# Patient Record
Sex: MALE | Race: WHITE | Employment: OTHER | URBAN - METROPOLITAN AREA
[De-identification: names, ages, dates, MRNs, and addresses within clinical notes are randomized per-mention and may not be internally consistent; named-entity substitution may affect disease eponyms.]

---

## 2017-01-03 ENCOUNTER — ANTI-COAG VISIT (OUTPATIENT)
Dept: PHARMACY | Facility: CLINIC | Age: 67
End: 2017-01-03

## 2017-01-03 LAB — INR BLD: 1.9

## 2017-02-02 ENCOUNTER — ANTI-COAG VISIT (OUTPATIENT)
Dept: PHARMACY | Facility: CLINIC | Age: 67
End: 2017-02-02

## 2017-02-02 DIAGNOSIS — I48.20 CHRONIC ATRIAL FIBRILLATION (HCC): ICD-10-CM

## 2017-02-02 LAB — INR BLD: 2.6

## 2017-03-02 ENCOUNTER — ANTI-COAG VISIT (OUTPATIENT)
Dept: PHARMACY | Facility: CLINIC | Age: 67
End: 2017-03-02

## 2017-03-02 DIAGNOSIS — I48.20 CHRONIC ATRIAL FIBRILLATION (HCC): ICD-10-CM

## 2017-03-02 LAB — INR BLD: 2.9

## 2017-03-31 ENCOUNTER — ANTI-COAG VISIT (OUTPATIENT)
Dept: PHARMACY | Facility: CLINIC | Age: 67
End: 2017-03-31

## 2017-03-31 DIAGNOSIS — I48.20 CHRONIC ATRIAL FIBRILLATION (HCC): ICD-10-CM

## 2017-03-31 LAB — INR BLD: 2.8

## 2017-03-31 RX ORDER — TESTOSTERONE GEL, 1% 10 MG/G
GEL TRANSDERMAL DAILY
Status: ON HOLD | COMMUNITY
End: 2017-07-20 | Stop reason: HOSPADM

## 2017-05-16 ENCOUNTER — ANTI-COAG VISIT (OUTPATIENT)
Dept: PHARMACY | Facility: CLINIC | Age: 67
End: 2017-05-16

## 2017-05-16 DIAGNOSIS — I48.20 CHRONIC ATRIAL FIBRILLATION (HCC): ICD-10-CM

## 2017-05-16 LAB — INR BLD: 3.3

## 2017-06-01 ENCOUNTER — ANTI-COAG VISIT (OUTPATIENT)
Dept: PHARMACY | Facility: CLINIC | Age: 67
End: 2017-06-01

## 2017-06-01 LAB — INR BLD: 2.3

## 2017-07-04 PROBLEM — K92.2 UPPER GI BLEED: Status: ACTIVE | Noted: 2017-07-04

## 2017-07-04 PROBLEM — I48.91 ATRIAL FIBRILLATION WITH RVR (HCC): Status: ACTIVE | Noted: 2017-07-04

## 2017-07-04 PROBLEM — R42 ORTHOSTATIC DIZZINESS: Status: ACTIVE | Noted: 2017-07-04

## 2017-07-04 PROBLEM — I95.9 HYPOTENSION: Status: ACTIVE | Noted: 2017-07-04

## 2017-07-04 PROBLEM — E87.4 METABOLIC ACIDOSIS WITH RESPIRATORY ACIDOSIS: Status: ACTIVE | Noted: 2017-07-04

## 2017-07-04 PROBLEM — E87.5 HYPERKALEMIA: Status: ACTIVE | Noted: 2017-07-04

## 2017-07-04 PROBLEM — E87.20 LACTIC ACIDOSIS: Status: ACTIVE | Noted: 2017-07-04

## 2017-07-04 PROBLEM — E66.9 OBESITY (BMI 30-39.9): Chronic | Status: ACTIVE | Noted: 2017-07-04

## 2017-07-04 PROBLEM — T81.19XA POSTOPERATIVE HEMORRHAGIC SHOCK: Status: ACTIVE | Noted: 2017-07-04

## 2017-07-04 PROBLEM — R65.10 SIRS (SYSTEMIC INFLAMMATORY RESPONSE SYNDROME) (HCC): Status: ACTIVE | Noted: 2017-07-04

## 2017-07-04 PROBLEM — E11.10 DKA (DIABETIC KETOACIDOSIS) (HCC): Status: ACTIVE | Noted: 2017-07-04

## 2017-07-04 PROBLEM — D62 ACUTE BLOOD LOSS ANEMIA: Status: ACTIVE | Noted: 2017-07-04

## 2017-07-04 PROBLEM — E86.9 VOLUME DEPLETION: Status: ACTIVE | Noted: 2017-07-04

## 2017-07-04 PROBLEM — N17.9 AKI (ACUTE KIDNEY INJURY) (HCC): Status: ACTIVE | Noted: 2017-07-04

## 2017-07-04 PROBLEM — Z72.0 TOBACCO ABUSE: Chronic | Status: ACTIVE | Noted: 2017-07-04

## 2017-07-07 PROBLEM — E10.10 DIABETIC KETOACIDOSIS WITHOUT COMA ASSOCIATED WITH TYPE 1 DIABETES MELLITUS (HCC): Status: ACTIVE | Noted: 2017-07-04

## 2017-07-08 PROBLEM — I47.29 NSVT (NONSUSTAINED VENTRICULAR TACHYCARDIA) (HCC): Status: ACTIVE | Noted: 2017-07-08

## 2017-07-30 PROBLEM — D64.9 ANEMIA: Status: ACTIVE | Noted: 2017-07-30

## 2017-07-30 PROBLEM — N18.6 ESRD (END STAGE RENAL DISEASE) (HCC): Status: ACTIVE | Noted: 2017-07-30

## 2017-07-30 PROBLEM — E87.1 HYPONATREMIA: Status: ACTIVE | Noted: 2017-07-30

## 2017-07-30 PROBLEM — N39.0 UTI (URINARY TRACT INFECTION): Status: ACTIVE | Noted: 2017-07-30

## 2017-07-30 PROBLEM — R06.02 SOB (SHORTNESS OF BREATH): Status: ACTIVE | Noted: 2017-07-30

## 2017-07-30 PROBLEM — E11.9 DM (DIABETES MELLITUS) (HCC): Status: ACTIVE | Noted: 2017-07-30

## 2017-08-06 PROBLEM — Z99.2 HEMODIALYSIS PATIENT (HCC): Status: ACTIVE | Noted: 2017-08-06

## 2017-08-06 PROBLEM — G93.41 METABOLIC ENCEPHALOPATHY: Status: ACTIVE | Noted: 2017-08-06

## 2017-08-06 PROBLEM — N30.01 ACUTE CYSTITIS WITH HEMATURIA: Status: ACTIVE | Noted: 2017-08-06

## 2017-08-10 PROBLEM — R31.9 URINARY TRACT INFECTION WITH HEMATURIA: Status: ACTIVE | Noted: 2017-07-30

## 2017-09-28 ENCOUNTER — HOSPITAL ENCOUNTER (OUTPATIENT)
Dept: OTHER | Age: 67
Discharge: HOME | End: 2017-09-28
Attending: INTERNAL MEDICINE | Admitting: INTERNAL MEDICINE

## 2017-09-28 LAB
ANION GAP SERPL CALCULATED.3IONS-SCNC: 13 MMOL/L (ref 3–16)
BASOPHILS ABSOLUTE: 0.1 K/UL (ref 0–0.2)
BASOPHILS RELATIVE PERCENT: 1.3 %
BUN BLDV-MCNC: 15 MG/DL (ref 7–20)
CALCIUM SERPL-MCNC: 9.1 MG/DL (ref 8.3–10.6)
CHLORIDE BLD-SCNC: 105 MMOL/L (ref 99–110)
CO2: 23 MMOL/L (ref 21–32)
CREAT SERPL-MCNC: 1.4 MG/DL (ref 0.8–1.3)
EOSINOPHILS ABSOLUTE: 0.1 K/UL (ref 0–0.6)
EOSINOPHILS RELATIVE PERCENT: 1.2 %
GFR AFRICAN AMERICAN: >60
GFR NON-AFRICAN AMERICAN: 50
GLUCOSE BLD-MCNC: 156 MG/DL (ref 70–99)
HCT VFR BLD CALC: 31 % (ref 40.5–52.5)
HEMOGLOBIN: 9.7 G/DL (ref 13.5–17.5)
LYMPHOCYTES ABSOLUTE: 2.1 K/UL (ref 1–5.1)
LYMPHOCYTES RELATIVE PERCENT: 28.3 %
MCH RBC QN AUTO: 24 PG (ref 26–34)
MCHC RBC AUTO-ENTMCNC: 31.1 G/DL (ref 31–36)
MCV RBC AUTO: 77 FL (ref 80–100)
MONOCYTES ABSOLUTE: 0.6 K/UL (ref 0–1.3)
MONOCYTES RELATIVE PERCENT: 7.7 %
NEUTROPHILS ABSOLUTE: 4.6 K/UL (ref 1.7–7.7)
NEUTROPHILS RELATIVE PERCENT: 61.5 %
PDW BLD-RTO: 17.1 % (ref 12.4–15.4)
PLATELET # BLD: 247 K/UL (ref 135–450)
PMV BLD AUTO: 9.3 FL (ref 5–10.5)
POTASSIUM SERPL-SCNC: 4.1 MMOL/L (ref 3.5–5.1)
RBC # BLD: 4.03 M/UL (ref 4.2–5.9)
SODIUM BLD-SCNC: 141 MMOL/L (ref 136–145)
WBC # BLD: 7.5 K/UL (ref 4–11)

## 2017-10-11 ENCOUNTER — HOSPITAL ENCOUNTER (OUTPATIENT)
Dept: SURGERY | Age: 67
Discharge: HOME | End: 2017-10-11
Attending: OPHTHALMOLOGY | Admitting: OPHTHALMOLOGY

## 2017-10-11 VITALS
HEART RATE: 59 BPM | DIASTOLIC BLOOD PRESSURE: 88 MMHG | RESPIRATION RATE: 16 BRPM | OXYGEN SATURATION: 97 % | SYSTOLIC BLOOD PRESSURE: 152 MMHG

## 2017-10-11 RX ORDER — PROPARACAINE HYDROCHLORIDE 5 MG/ML
1 SOLUTION/ DROPS OPHTHALMIC
Status: COMPLETED | OUTPATIENT
Start: 2017-10-11 | End: 2017-10-11

## 2017-10-11 RX ORDER — PILOCARPINE HYDROCHLORIDE 20 MG/ML
1 SOLUTION/ DROPS OPHTHALMIC
Status: COMPLETED | OUTPATIENT
Start: 2017-10-11 | End: 2017-10-11

## 2017-10-11 RX ORDER — PREDNISOLONE ACETATE 10 MG/ML
1 SUSPENSION/ DROPS OPHTHALMIC
Status: COMPLETED | OUTPATIENT
Start: 2017-10-11 | End: 2017-10-11

## 2017-10-11 RX ADMIN — PREDNISOLONE ACETATE 1 DROP: 10 SUSPENSION/ DROPS OPHTHALMIC at 13:44

## 2017-10-11 RX ADMIN — PILOCARPINE HYDROCHLORIDE 1 DROP: 20 SOLUTION/ DROPS OPHTHALMIC at 12:54

## 2017-10-11 RX ADMIN — PROPARACAINE HYDROCHLORIDE 1 DROP: 5 SOLUTION/ DROPS OPHTHALMIC at 13:44

## 2017-10-11 ASSESSMENT — PAIN - FUNCTIONAL ASSESSMENT: PAIN_FUNCTIONAL_ASSESSMENT: 0-10

## 2017-10-12 NOTE — OP NOTE
OPERATIVE NOTE      Surgery Date:  10/11/2017       Pre-operative Diagnosis:   Primary Open Angle Glaucoma    Procedure:   Selective LASER Trabeculoplasty right eye    Anesthesia:  Topical    Complications:  None    The patient tolerated the procedure well and will follow up as an outpatient in my office as scheduled. Leonor Ozuna M.D.

## 2017-10-25 ENCOUNTER — HOSPITAL ENCOUNTER (OUTPATIENT)
Dept: SURGERY | Age: 67
Discharge: HOME | End: 2017-10-25
Attending: OPHTHALMOLOGY | Admitting: OPHTHALMOLOGY

## 2017-10-25 VITALS
DIASTOLIC BLOOD PRESSURE: 68 MMHG | HEART RATE: 68 BPM | BODY MASS INDEX: 37.19 KG/M2 | SYSTOLIC BLOOD PRESSURE: 152 MMHG | OXYGEN SATURATION: 98 % | WEIGHT: 315 LBS | RESPIRATION RATE: 16 BRPM | HEIGHT: 77 IN

## 2017-10-25 RX ORDER — PREDNISOLONE ACETATE 10 MG/ML
1 SUSPENSION/ DROPS OPHTHALMIC
Status: ACTIVE | OUTPATIENT
Start: 2017-10-25 | End: 2017-10-25

## 2017-10-25 RX ORDER — PROPARACAINE HYDROCHLORIDE 5 MG/ML
1 SOLUTION/ DROPS OPHTHALMIC
Status: COMPLETED | OUTPATIENT
Start: 2017-10-25 | End: 2017-10-25

## 2017-10-25 RX ORDER — PILOCARPINE HYDROCHLORIDE 20 MG/ML
1 SOLUTION/ DROPS OPHTHALMIC
Status: COMPLETED | OUTPATIENT
Start: 2017-10-25 | End: 2017-10-25

## 2017-10-25 RX ADMIN — PILOCARPINE HYDROCHLORIDE 1 DROP: 20 SOLUTION/ DROPS OPHTHALMIC at 12:50

## 2017-10-25 RX ADMIN — PROPARACAINE HYDROCHLORIDE 1 DROP: 5 SOLUTION/ DROPS OPHTHALMIC at 14:14

## 2017-10-25 NOTE — PLAN OF CARE
Patient and family received discharge instruction and tolerated procedure well. All questions answered. ** Prescription drop instruction given. Patient left via self.

## 2017-10-26 NOTE — OP NOTE
OPERATIVE NOTE      Surgery Date:   10/25/2017       Pre-operative Diagnosis:   Primary Open Angle Glaucoma    Procedure:   Selective LASER Trabeculoplasty left eye    Anesthesia:  Topical    Complications:  None    The patient tolerated the procedure well and will follow up as an outpatient in my office as scheduled. Alex Lyman M.D.

## 2017-11-10 ENCOUNTER — HOSPITAL ENCOUNTER (OUTPATIENT)
Dept: OTHER | Age: 67
Discharge: HOME | End: 2017-11-30
Attending: INTERNAL MEDICINE | Admitting: INTERNAL MEDICINE

## 2017-11-12 LAB — URINE CULTURE, ROUTINE: NORMAL

## 2017-12-01 ENCOUNTER — HOSPITAL ENCOUNTER (OUTPATIENT)
Dept: OTHER | Age: 67
Discharge: HOME | End: 2017-12-31
Attending: INTERNAL MEDICINE | Admitting: INTERNAL MEDICINE

## 2018-04-22 ENCOUNTER — HOSPITAL ENCOUNTER (OUTPATIENT)
Dept: OTHER | Age: 68
Discharge: HOME | End: 2018-04-30
Attending: INTERNAL MEDICINE | Admitting: INTERNAL MEDICINE

## 2018-04-22 LAB
BACTERIA: ABNORMAL /HPF
BILIRUBIN URINE: NEGATIVE
BLOOD, URINE: NEGATIVE
CLARITY: CLEAR
COLOR: YELLOW
EPITHELIAL CELLS, UA: ABNORMAL /HPF
GLUCOSE URINE: NEGATIVE MG/DL
KETONES, URINE: NEGATIVE MG/DL
LEUKOCYTE ESTERASE, URINE: NEGATIVE
MICROSCOPIC EXAMINATION: YES
NITRITE, URINE: NEGATIVE
PH UA: 7.5
PROTEIN UA: ABNORMAL MG/DL
RBC UA: ABNORMAL /HPF (ref 0–2)
SPECIFIC GRAVITY UA: 1.01
URINE TYPE: ABNORMAL
UROBILINOGEN, URINE: 1 E.U./DL
WBC UA: ABNORMAL /HPF (ref 0–5)

## 2018-04-24 LAB — URINE CULTURE, ROUTINE: NORMAL

## 2018-05-01 ENCOUNTER — HOSPITAL ENCOUNTER (OUTPATIENT)
Dept: OTHER | Age: 68
Discharge: HOME | End: 2018-05-31
Attending: INTERNAL MEDICINE | Admitting: INTERNAL MEDICINE

## 2018-09-12 ENCOUNTER — HOSPITAL ENCOUNTER (OUTPATIENT)
Dept: OPERATING ROOM | Age: 68
Setting detail: OUTPATIENT SURGERY
Discharge: HOME | End: 2018-09-12
Admitting: INTERNAL MEDICINE
Payer: MEDICARE

## 2018-09-12 VITALS
HEART RATE: 61 BPM | RESPIRATION RATE: 20 BRPM | DIASTOLIC BLOOD PRESSURE: 87 MMHG | OXYGEN SATURATION: 100 % | SYSTOLIC BLOOD PRESSURE: 176 MMHG

## 2018-09-12 PROCEDURE — 6370000000 HC RX 637 (ALT 250 FOR IP): Performed by: OPHTHALMOLOGY

## 2018-09-12 PROCEDURE — 65855 TRABECULOPLASTY LASER SURG: CPT

## 2018-09-12 RX ORDER — PREDNISOLONE ACETATE 10 MG/ML
1 SUSPENSION/ DROPS OPHTHALMIC
Status: COMPLETED | OUTPATIENT
Start: 2018-09-12 | End: 2018-09-12

## 2018-09-12 RX ORDER — ASPIRIN/CALCIUM/MAG/ALUMINUM 325 MG
TABLET ORAL DAILY
Status: ON HOLD | COMMUNITY
End: 2018-12-22 | Stop reason: HOSPADM

## 2018-09-12 RX ORDER — PROPARACAINE HYDROCHLORIDE 5 MG/ML
1 SOLUTION/ DROPS OPHTHALMIC
Status: COMPLETED | OUTPATIENT
Start: 2018-09-12 | End: 2018-09-12

## 2018-09-12 RX ORDER — PILOCARPINE HYDROCHLORIDE 20 MG/ML
1 SOLUTION/ DROPS OPHTHALMIC
Status: COMPLETED | OUTPATIENT
Start: 2018-09-12 | End: 2018-09-12

## 2018-09-12 RX ADMIN — PROPARACAINE HYDROCHLORIDE 1 DROP: 5 SOLUTION/ DROPS OPHTHALMIC at 14:18

## 2018-09-12 RX ADMIN — PILOCARPINE HYDROCHLORIDE 1 DROP: 20 SOLUTION/ DROPS OPHTHALMIC at 13:38

## 2018-09-12 RX ADMIN — PREDNISOLONE ACETATE 1 DROP: 10 SUSPENSION/ DROPS OPHTHALMIC at 14:18

## 2018-09-12 RX ADMIN — APRACLONIDINE HYDROCHLORIDE 1 DROP: 10 SOLUTION/ DROPS OPHTHALMIC at 13:39

## 2018-09-12 ASSESSMENT — PAIN - FUNCTIONAL ASSESSMENT: PAIN_FUNCTIONAL_ASSESSMENT: 0-10

## 2018-09-18 NOTE — OP NOTE
OPERATIVE NOTE      Surgery Date:  09/12/2018       Pre-operative Diagnosis:   Primary Open Angle Glaucoma    Procedure:   Selective LASER Trabeculoplasty right eye    Anesthesia:  Topical    Complications:  None    The patient tolerated the procedure well and will follow up as an outpatient in my office as scheduled. Patrica Castellon M.D.

## 2018-09-26 ENCOUNTER — HOSPITAL ENCOUNTER (OUTPATIENT)
Dept: OPERATING ROOM | Age: 68
Setting detail: OUTPATIENT SURGERY
Discharge: HOME | End: 2018-09-26
Payer: MEDICARE

## 2018-09-26 VITALS — SYSTOLIC BLOOD PRESSURE: 159 MMHG | HEART RATE: 51 BPM | DIASTOLIC BLOOD PRESSURE: 78 MMHG

## 2018-09-26 PROCEDURE — 65855 TRABECULOPLASTY LASER SURG: CPT

## 2018-09-26 PROCEDURE — 6370000000 HC RX 637 (ALT 250 FOR IP): Performed by: OPHTHALMOLOGY

## 2018-09-26 RX ORDER — PROPARACAINE HYDROCHLORIDE 5 MG/ML
1 SOLUTION/ DROPS OPHTHALMIC
Status: COMPLETED | OUTPATIENT
Start: 2018-09-26 | End: 2018-09-26

## 2018-09-26 RX ORDER — PILOCARPINE HYDROCHLORIDE 20 MG/ML
1 SOLUTION/ DROPS OPHTHALMIC
Status: COMPLETED | OUTPATIENT
Start: 2018-09-26 | End: 2018-09-26

## 2018-09-26 RX ORDER — PREDNISOLONE ACETATE 10 MG/ML
1 SUSPENSION/ DROPS OPHTHALMIC
Status: COMPLETED | OUTPATIENT
Start: 2018-09-26 | End: 2018-09-26

## 2018-09-26 RX ADMIN — PROPARACAINE HYDROCHLORIDE 1 DROP: 5 SOLUTION/ DROPS OPHTHALMIC at 14:18

## 2018-09-26 RX ADMIN — PILOCARPINE HYDROCHLORIDE 1 DROP: 20 SOLUTION/ DROPS OPHTHALMIC at 13:37

## 2018-09-26 RX ADMIN — APRACLONIDINE HYDROCHLORIDE 1 DROP: 10 SOLUTION/ DROPS OPHTHALMIC at 13:37

## 2018-09-26 RX ADMIN — PREDNISOLONE ACETATE 1 DROP: 10 SUSPENSION/ DROPS OPHTHALMIC at 14:24

## 2018-09-27 NOTE — OP NOTE
OPERATIVE NOTE      Surgery Date:   09/26/2018       Pre-operative Diagnosis:   Primary Open Angle Glaucoma    Procedure:   Selective LASER Trabeculoplasty left eye    Anesthesia:  Topical    Complications:  None    The patient tolerated the procedure well and will follow up as an outpatient in my office as scheduled. Nettie Palma M.D.

## 2018-12-05 ENCOUNTER — APPOINTMENT (OUTPATIENT)
Dept: GENERAL RADIOLOGY | Age: 68
End: 2018-12-05
Payer: MEDICARE

## 2018-12-05 ENCOUNTER — HOSPITAL ENCOUNTER (EMERGENCY)
Age: 68
Discharge: HOME | End: 2018-12-05
Attending: EMERGENCY MEDICINE
Payer: MEDICARE

## 2018-12-05 VITALS
BODY MASS INDEX: 28.46 KG/M2 | SYSTOLIC BLOOD PRESSURE: 187 MMHG | OXYGEN SATURATION: 99 % | RESPIRATION RATE: 18 BRPM | DIASTOLIC BLOOD PRESSURE: 89 MMHG | HEART RATE: 68 BPM | TEMPERATURE: 98.6 F | WEIGHT: 240 LBS

## 2018-12-05 DIAGNOSIS — R55 SYNCOPE AND COLLAPSE: Primary | ICD-10-CM

## 2018-12-05 DIAGNOSIS — E86.0 DEHYDRATION: ICD-10-CM

## 2018-12-05 LAB
A/G RATIO: 1.2 (ref 1.1–2.2)
ALBUMIN SERPL-MCNC: 3.9 G/DL (ref 3.4–5)
ALP BLD-CCNC: 38 U/L (ref 40–129)
ALT SERPL-CCNC: 14 U/L (ref 10–40)
ANION GAP SERPL CALCULATED.3IONS-SCNC: 14 MMOL/L (ref 3–16)
AST SERPL-CCNC: 19 U/L (ref 15–37)
BASOPHILS ABSOLUTE: 0.1 K/UL (ref 0–0.2)
BASOPHILS RELATIVE PERCENT: 1.1 %
BILIRUB SERPL-MCNC: 0.5 MG/DL (ref 0–1)
BUN BLDV-MCNC: 37 MG/DL (ref 7–20)
CALCIUM SERPL-MCNC: 9.8 MG/DL (ref 8.3–10.6)
CHLORIDE BLD-SCNC: 102 MMOL/L (ref 99–110)
CO2: 26 MMOL/L (ref 21–32)
CREAT SERPL-MCNC: 1.5 MG/DL (ref 0.8–1.3)
EKG ATRIAL RATE: 54 BPM
EKG DIAGNOSIS: NORMAL
EKG Q-T INTERVAL: 434 MS
EKG QRS DURATION: 104 MS
EKG QTC CALCULATION (BAZETT): 403 MS
EKG R AXIS: -67 DEGREES
EKG T AXIS: 50 DEGREES
EKG VENTRICULAR RATE: 52 BPM
EOSINOPHILS ABSOLUTE: 0.1 K/UL (ref 0–0.6)
EOSINOPHILS RELATIVE PERCENT: 2.2 %
GFR AFRICAN AMERICAN: 56
GFR NON-AFRICAN AMERICAN: 46
GLOBULIN: 3.3 G/DL
GLUCOSE BLD-MCNC: 205 MG/DL (ref 70–99)
HCT VFR BLD CALC: 40 % (ref 40.5–52.5)
HEMOGLOBIN: 13.2 G/DL (ref 13.5–17.5)
LYMPHOCYTES ABSOLUTE: 1.7 K/UL (ref 1–5.1)
LYMPHOCYTES RELATIVE PERCENT: 24.7 %
MCH RBC QN AUTO: 26.8 PG (ref 26–34)
MCHC RBC AUTO-ENTMCNC: 32.9 G/DL (ref 31–36)
MCV RBC AUTO: 81.5 FL (ref 80–100)
MONOCYTES ABSOLUTE: 0.4 K/UL (ref 0–1.3)
MONOCYTES RELATIVE PERCENT: 6.5 %
NEUTROPHILS ABSOLUTE: 4.5 K/UL (ref 1.7–7.7)
NEUTROPHILS RELATIVE PERCENT: 65.5 %
PDW BLD-RTO: 15.9 % (ref 12.4–15.4)
PLATELET # BLD: 195 K/UL (ref 135–450)
PMV BLD AUTO: 8.6 FL (ref 5–10.5)
POTASSIUM SERPL-SCNC: 4 MMOL/L (ref 3.5–5.1)
RBC # BLD: 4.91 M/UL (ref 4.2–5.9)
SODIUM BLD-SCNC: 142 MMOL/L (ref 136–145)
TOTAL PROTEIN: 7.2 G/DL (ref 6.4–8.2)
TROPONIN: <0.01 NG/ML
WBC # BLD: 6.8 K/UL (ref 4–11)

## 2018-12-05 PROCEDURE — 93005 ELECTROCARDIOGRAM TRACING: CPT | Performed by: EMERGENCY MEDICINE

## 2018-12-05 PROCEDURE — 93010 ELECTROCARDIOGRAM REPORT: CPT | Performed by: INTERNAL MEDICINE

## 2018-12-05 PROCEDURE — 85025 COMPLETE CBC W/AUTO DIFF WBC: CPT

## 2018-12-05 PROCEDURE — 96360 HYDRATION IV INFUSION INIT: CPT

## 2018-12-05 PROCEDURE — 84484 ASSAY OF TROPONIN QUANT: CPT

## 2018-12-05 PROCEDURE — 71046 X-RAY EXAM CHEST 2 VIEWS: CPT

## 2018-12-05 PROCEDURE — 2580000003 HC RX 258: Performed by: NURSE PRACTITIONER

## 2018-12-05 PROCEDURE — 80053 COMPREHEN METABOLIC PANEL: CPT

## 2018-12-05 PROCEDURE — 99284 EMERGENCY DEPT VISIT MOD MDM: CPT

## 2018-12-05 RX ORDER — INSULIN GLARGINE 100 [IU]/ML
INJECTION, SOLUTION SUBCUTANEOUS NIGHTLY
COMMUNITY
End: 2019-03-22

## 2018-12-05 RX ORDER — MAGNESIUM OXIDE 400 MG/1
400 TABLET ORAL PRN
Status: ON HOLD | COMMUNITY
End: 2019-06-24

## 2018-12-05 RX ORDER — CITALOPRAM 20 MG/1
20 TABLET ORAL
COMMUNITY
Start: 2018-08-31 | End: 2019-03-22

## 2018-12-05 RX ORDER — LANOLIN ALCOHOL/MO/W.PET/CERES
325 CREAM (GRAM) TOPICAL
COMMUNITY
End: 2019-03-22

## 2018-12-05 RX ORDER — POTASSIUM CHLORIDE 1.5 G/1.77G
20 POWDER, FOR SOLUTION ORAL 2 TIMES DAILY
Status: ON HOLD | COMMUNITY
End: 2018-12-22 | Stop reason: HOSPADM

## 2018-12-05 RX ORDER — LOSARTAN POTASSIUM 50 MG/1
100 TABLET ORAL DAILY
Status: ON HOLD | COMMUNITY
End: 2019-06-24

## 2018-12-05 RX ORDER — 0.9 % SODIUM CHLORIDE 0.9 %
1000 INTRAVENOUS SOLUTION INTRAVENOUS ONCE
Status: COMPLETED | OUTPATIENT
Start: 2018-12-05 | End: 2018-12-05

## 2018-12-05 RX ORDER — QUETIAPINE FUMARATE 25 MG/1
50 TABLET, FILM COATED ORAL 2 TIMES DAILY
Status: ON HOLD | COMMUNITY
End: 2019-06-24

## 2018-12-05 RX ORDER — ATORVASTATIN CALCIUM 40 MG/1
40 TABLET, FILM COATED ORAL DAILY
COMMUNITY

## 2018-12-05 RX ORDER — TORSEMIDE 20 MG/1
20 TABLET ORAL DAILY
Status: ON HOLD | COMMUNITY
End: 2018-12-22 | Stop reason: HOSPADM

## 2018-12-05 RX ADMIN — SODIUM CHLORIDE 1000 ML: 9 INJECTION, SOLUTION INTRAVENOUS at 12:22

## 2018-12-05 NOTE — ED PROVIDER NOTES
 Traumatic hemorrhagic shock ([facility] [address]      Past Surgical History:   Procedure Laterality Date    KNEE SURGERY      OTHER SURGICAL HISTORY  10/11/13     CYSTOSCOPY, TRANSURETHRAL RESECTION OF PROSTATE WITH OLYMPUS    PROSTATE BIOPSY      UPPER GASTROINTESTINAL ENDOSCOPY  7/13 17     Family History   Problem Relation Age of Onset    Early Death Mother         age 54    Other Mother         head aneursym    Heart Attack Father     Diabetes Brother     Heart Disease Brother      Social History     Social History    Marital status:      Spouse name: N/A    Number of children: N/A    Years of education: N/A     Occupational History    Not on file. Social History Main Topics    Smoking status: Former Smoker     Packs/day: 0.50     Years: 20.00     Types: Cigarettes     Quit date: 01/1997    Smokeless tobacco: Not on file      Comment: smokes occ cigar, socially    Alcohol use Yes      Comment: occasionally    Drug use: No    Sexual activity: Not on file     Other Topics Concern    Not on file     Social History Narrative    No narrative on file     No current facility-administered medications for this encounter.       Current Outpatient Prescriptions   Medication Sig Dispense Refill    Aspirin Buf,[id number], (BUFFERED ASPIRIN) 325 MG TABS Take by mouth daily      metFORMIN (GLUCOPHAGE) 500 MG tablet Take 500 mg by mouth 2 times daily (with meals)      carvedilol (COREG) 6.25 MG tablet Take 6.25 mg by mouth 2 times daily      amLODIPine (NORVASC) 5 MG tablet Take 5 mg by mouth daily      clopidogrel (PLAVIX) 75 MG tablet Take 75 mg by mouth daily      digoxin (DIGOX) 125 MCG tablet Take 125 mcg by mouth daily      pantoprazole (PROTONIX) 40 MG tablet Take 40 mg by mouth daily      folic acid (FOLVITE) 1 MG tablet Take 1 mg by mouth daily      acetaminophen (TYLENOL) 325 MG tablet Take 2 tablets by mouth every 6 hours as needed for Pain or Fever 120 tablet 3    fenofibrate 12/5/2018 10:52 am COMPARISON: August 5, 2017 HISTORY: ORDERING SYSTEM PROVIDED HISTORY: syncope TECHNOLOGIST PROVIDED HISTORY: Reason for exam:->syncope Ordering Physician Provided Reason for Exam: syncope Acuity: Acute Type of Exam: Initial Acute syncopal episode. Initial encounter. FINDINGS: Cardiac silhouette is mildly enlarged. Closure device overlies the heart. Lungs are clear. No focal consolidation. No pleural effusion or pneumothorax. No evidence of pulmonary edema. 1. Stable mild enlargement of the cardiac silhouette. No superimposed acute pulmonary abnormality. ED COURSE   I have evaluated this patient in collaboration with Dr Andrew Sullivan.      Vital signs stable. EKG performed and interpreted by Dr. Andrew Sullivan. Patient did not require pain medication while in emergency department. Radiological imaging of the chest revealed stable mild enlargement of the cardiac silhouettesuperimposed acute pulmonary abnormalities noted per radiologist. CBC revealed no leukocytosis. CMP revealed BUN 37, creatinine 1.5, GFR 46 which appears to be baseline for patient in no acute abnormalities based on previous laboratory studies. Troponin negative at <0.01. Patient was hypertensive upon arrival which improved throughout ED course and treatment. Orthostatic vital signs were performed patient denies any dizziness or lightheadedness abnormalities noted and readings. Patient will be given 1 L normal saline bolus and reassessed. Patient was reassessed after IV fluids and continues to deny any dizziness, lightheadedness, chest pain or shortness of breath. Vital signs remained stable. All ED findings were discussed in detail with the ER attending Dr. Andrew Sullivan prior to disposition. A discussion was had with  Maciej Catalanmarito regarding ED findings. All questions were answered. Patient will follow up with  PCP in 1-2 days for further evaluation/treatment. Patient will return to ED for new/worsening symptoms.   She comfortable upon 03-JUL-2017)Anterior infarct (cited on or before 03-JUL-2017)Abnormal ECGWhen compared with ECG of 06-AUG-2017 07:11,No significant change was foundConfirmed by [facility] JUSTYN FARRAR, Marielos Crespo ([id number]) on 12/5/2018 12:39:17 PM       I estimate there is LOW risk for ACUTE CORONARY SYNDROME, INTRACRANIAL HEMORRHAGE, MALIGNANT DYSRHYTHMIA or HYPERTENSION, PULMONARY EMBOLISM, SEPSIS, SUBARACHNOID HEMORRHAGE, SUBDURAL HEMATOMA, STROKE, or THORACIC AORTIC DISSECTION, thus I consider the discharge disposition reasonable. Agustina Mccarthy and I have discussed the diagnosis and risks, and we agree with discharging home to follow-up with their primary doctor. We also discussed returning to the Emergency Department immediately if new or worsening symptoms occur. We have discussed the symptoms which are most concerning (e.g., bloody sputum, fever, worsening pain or shortness of breath, vomiting, weakness) that necessitate immediate return. Final Impression    1. Syncope and collapse    2. Dehydration        Blood pressure (!) 187/89, pulse 68, temperature 98.6 °F (37 °C), temperature source Oral, resp. rate 18, weight 240 lb (108.9 kg), SpO2 99 %. DISPOSITION  Patient was discharged to home in good condition.           Kingsley Romberg, JAMEY - KALI  12/05/18 9444

## 2018-12-20 ENCOUNTER — APPOINTMENT (OUTPATIENT)
Dept: GENERAL RADIOLOGY | Age: 68
DRG: 071 | End: 2018-12-20
Payer: MEDICARE

## 2018-12-20 ENCOUNTER — HOSPITAL ENCOUNTER (INPATIENT)
Age: 68
LOS: 1 days | Discharge: HOME | DRG: 071 | End: 2018-12-22
Attending: EMERGENCY MEDICINE | Admitting: INTERNAL MEDICINE
Payer: MEDICARE

## 2018-12-20 ENCOUNTER — APPOINTMENT (OUTPATIENT)
Dept: CT IMAGING | Age: 68
DRG: 071 | End: 2018-12-20
Payer: MEDICARE

## 2018-12-20 DIAGNOSIS — D64.9 CHRONIC ANEMIA: ICD-10-CM

## 2018-12-20 DIAGNOSIS — N18.9 CHRONIC KIDNEY DISEASE, UNSPECIFIED CKD STAGE: ICD-10-CM

## 2018-12-20 DIAGNOSIS — R41.82 ALTERED MENTAL STATUS, UNSPECIFIED ALTERED MENTAL STATUS TYPE: Primary | ICD-10-CM

## 2018-12-20 DIAGNOSIS — E83.42 HYPOMAGNESEMIA: ICD-10-CM

## 2018-12-20 LAB
A/G RATIO: 1.4 (ref 1.1–2.2)
ALBUMIN SERPL-MCNC: 3.8 G/DL (ref 3.4–5)
ALP BLD-CCNC: 36 U/L (ref 40–129)
ALT SERPL-CCNC: 12 U/L (ref 10–40)
AMPHETAMINE SCREEN, URINE: NORMAL
ANION GAP SERPL CALCULATED.3IONS-SCNC: 13 MMOL/L (ref 3–16)
APTT: 32.5 SEC (ref 26–36)
AST SERPL-CCNC: 17 U/L (ref 15–37)
BARBITURATE SCREEN URINE: NORMAL
BASOPHILS ABSOLUTE: 0 K/UL (ref 0–0.2)
BASOPHILS RELATIVE PERCENT: 0.7 %
BENZODIAZEPINE SCREEN, URINE: NORMAL
BILIRUB SERPL-MCNC: 0.4 MG/DL (ref 0–1)
BILIRUBIN URINE: NEGATIVE
BLOOD, URINE: NEGATIVE
BUN BLDV-MCNC: 28 MG/DL (ref 7–20)
CALCIUM SERPL-MCNC: 9.4 MG/DL (ref 8.3–10.6)
CANNABINOID SCREEN URINE: NORMAL
CHLORIDE BLD-SCNC: 103 MMOL/L (ref 99–110)
CLARITY: CLEAR
CO2: 24 MMOL/L (ref 21–32)
COCAINE METABOLITE SCREEN URINE: NORMAL
COLOR: YELLOW
CREAT SERPL-MCNC: 1.4 MG/DL (ref 0.8–1.3)
EOSINOPHILS ABSOLUTE: 0.2 K/UL (ref 0–0.6)
EOSINOPHILS RELATIVE PERCENT: 2.9 %
ETHANOL: NORMAL MG/DL (ref 0–0.08)
GFR AFRICAN AMERICAN: >60
GFR NON-AFRICAN AMERICAN: 50
GLOBULIN: 2.8 G/DL
GLUCOSE BLD-MCNC: 158 MG/DL (ref 70–99)
GLUCOSE URINE: NEGATIVE MG/DL
HCT VFR BLD CALC: 33.5 % (ref 40.5–52.5)
HEMOGLOBIN: 11.2 G/DL (ref 13.5–17.5)
INR BLD: 1.2 (ref 0.86–1.14)
KETONES, URINE: NEGATIVE MG/DL
LEUKOCYTE ESTERASE, URINE: NEGATIVE
LYMPHOCYTES ABSOLUTE: 1.5 K/UL (ref 1–5.1)
LYMPHOCYTES RELATIVE PERCENT: 24.8 %
Lab: NORMAL
MAGNESIUM: 1.6 MG/DL (ref 1.8–2.4)
MCH RBC QN AUTO: 27.8 PG (ref 26–34)
MCHC RBC AUTO-ENTMCNC: 33.5 G/DL (ref 31–36)
MCV RBC AUTO: 83 FL (ref 80–100)
METHADONE SCREEN, URINE: NORMAL
MICROSCOPIC EXAMINATION: NORMAL
MONOCYTES ABSOLUTE: 0.4 K/UL (ref 0–1.3)
MONOCYTES RELATIVE PERCENT: 6.9 %
NEUTROPHILS ABSOLUTE: 3.9 K/UL (ref 1.7–7.7)
NEUTROPHILS RELATIVE PERCENT: 64.7 %
NITRITE, URINE: NEGATIVE
OPIATE SCREEN URINE: NORMAL
OXYCODONE URINE: NORMAL
PDW BLD-RTO: 16.4 % (ref 12.4–15.4)
PH UA: 7
PH UA: 7
PHENCYCLIDINE SCREEN URINE: NORMAL
PLATELET # BLD: 163 K/UL (ref 135–450)
PMV BLD AUTO: 8.7 FL (ref 5–10.5)
POTASSIUM REFLEX MAGNESIUM: 4.3 MMOL/L (ref 3.5–5.1)
PROPOXYPHENE SCREEN: NORMAL
PROTEIN UA: NEGATIVE MG/DL
PROTHROMBIN TIME: 13.7 SEC (ref 9.8–13)
RAPID INFLUENZA  B AGN: NEGATIVE
RAPID INFLUENZA A AGN: NEGATIVE
RBC # BLD: 4.04 M/UL (ref 4.2–5.9)
SODIUM BLD-SCNC: 140 MMOL/L (ref 136–145)
SPECIFIC GRAVITY UA: 1.01
TOTAL PROTEIN: 6.6 G/DL (ref 6.4–8.2)
TROPONIN: <0.01 NG/ML
URINE REFLEX TO CULTURE: NORMAL
URINE TYPE: NORMAL
UROBILINOGEN, URINE: 0.2 E.U./DL
WBC # BLD: 6 K/UL (ref 4–11)

## 2018-12-20 PROCEDURE — 6360000002 HC RX W HCPCS: Performed by: EMERGENCY MEDICINE

## 2018-12-20 PROCEDURE — 80307 DRUG TEST PRSMV CHEM ANLYZR: CPT

## 2018-12-20 PROCEDURE — 81003 URINALYSIS AUTO W/O SCOPE: CPT

## 2018-12-20 PROCEDURE — 99285 EMERGENCY DEPT VISIT HI MDM: CPT

## 2018-12-20 PROCEDURE — G0480 DRUG TEST DEF 1-7 CLASSES: HCPCS

## 2018-12-20 PROCEDURE — 71046 X-RAY EXAM CHEST 2 VIEWS: CPT

## 2018-12-20 PROCEDURE — 84484 ASSAY OF TROPONIN QUANT: CPT

## 2018-12-20 PROCEDURE — 93005 ELECTROCARDIOGRAM TRACING: CPT | Performed by: EMERGENCY MEDICINE

## 2018-12-20 PROCEDURE — 80053 COMPREHEN METABOLIC PANEL: CPT

## 2018-12-20 PROCEDURE — 85025 COMPLETE CBC W/AUTO DIFF WBC: CPT

## 2018-12-20 PROCEDURE — 96365 THER/PROPH/DIAG IV INF INIT: CPT

## 2018-12-20 PROCEDURE — 85730 THROMBOPLASTIN TIME PARTIAL: CPT

## 2018-12-20 PROCEDURE — 36415 COLL VENOUS BLD VENIPUNCTURE: CPT

## 2018-12-20 PROCEDURE — 83735 ASSAY OF MAGNESIUM: CPT

## 2018-12-20 PROCEDURE — 87804 INFLUENZA ASSAY W/OPTIC: CPT

## 2018-12-20 PROCEDURE — 85610 PROTHROMBIN TIME: CPT

## 2018-12-20 PROCEDURE — 96372 THER/PROPH/DIAG INJ SC/IM: CPT

## 2018-12-20 PROCEDURE — 70450 CT HEAD/BRAIN W/O DYE: CPT

## 2018-12-20 RX ORDER — SPIRONOLACTONE 25 MG/1
25 TABLET ORAL DAILY
COMMUNITY
End: 2019-09-17

## 2018-12-20 RX ORDER — MAGNESIUM SULFATE 1 G/100ML
1 INJECTION INTRAVENOUS ONCE
Status: COMPLETED | OUTPATIENT
Start: 2018-12-20 | End: 2018-12-21

## 2018-12-20 RX ORDER — ASPIRIN 325 MG
325 TABLET ORAL DAILY
COMMUNITY
End: 2019-03-22

## 2018-12-20 RX ORDER — DICYCLOMINE HYDROCHLORIDE 10 MG/ML
20 INJECTION INTRAMUSCULAR ONCE
Status: DISCONTINUED | OUTPATIENT
Start: 2018-12-20 | End: 2018-12-20

## 2018-12-20 RX ADMIN — MAGNESIUM SULFATE HEPTAHYDRATE 1 G: 1 INJECTION, SOLUTION INTRAVENOUS at 23:49

## 2018-12-20 ASSESSMENT — ENCOUNTER SYMPTOMS
EYE PAIN: 0
CONSTIPATION: 0
ABDOMINAL DISTENTION: 0
SHORTNESS OF BREATH: 0
SORE THROAT: 0
COUGH: 0
EYE REDNESS: 0
BACK PAIN: 0

## 2018-12-21 ENCOUNTER — APPOINTMENT (OUTPATIENT)
Dept: MRI IMAGING | Age: 68
DRG: 071 | End: 2018-12-21
Payer: MEDICARE

## 2018-12-21 PROBLEM — G93.41 ACUTE METABOLIC ENCEPHALOPATHY: Status: ACTIVE | Noted: 2018-12-21

## 2018-12-21 PROBLEM — F63.9 IMPULSE CONTROL DISORDER IN ADULT: Chronic | Status: ACTIVE | Noted: 2018-12-21

## 2018-12-21 LAB
A/G RATIO: 1.3 (ref 1.1–2.2)
ALBUMIN SERPL-MCNC: 3.3 G/DL (ref 3.4–5)
ALP BLD-CCNC: 32 U/L (ref 40–129)
ALT SERPL-CCNC: 10 U/L (ref 10–40)
ANION GAP SERPL CALCULATED.3IONS-SCNC: 12 MMOL/L (ref 3–16)
AST SERPL-CCNC: 14 U/L (ref 15–37)
BASOPHILS ABSOLUTE: 0 K/UL (ref 0–0.2)
BASOPHILS RELATIVE PERCENT: 0.8 %
BILIRUB SERPL-MCNC: 0.4 MG/DL (ref 0–1)
BUN BLDV-MCNC: 26 MG/DL (ref 7–20)
CALCIUM SERPL-MCNC: 9.2 MG/DL (ref 8.3–10.6)
CHLORIDE BLD-SCNC: 106 MMOL/L (ref 99–110)
CHOLESTEROL, TOTAL: 103 MG/DL (ref 0–199)
CO2: 25 MMOL/L (ref 21–32)
CREAT SERPL-MCNC: 1.3 MG/DL (ref 0.8–1.3)
DIGOXIN LEVEL: 0.5 NG/ML (ref 0.8–2)
EOSINOPHILS ABSOLUTE: 0.2 K/UL (ref 0–0.6)
EOSINOPHILS RELATIVE PERCENT: 3.5 %
ESTIMATED AVERAGE GLUCOSE: 162.8 MG/DL
GFR AFRICAN AMERICAN: >60
GFR NON-AFRICAN AMERICAN: 54
GLOBULIN: 2.6 G/DL
GLUCOSE BLD-MCNC: 107 MG/DL (ref 70–99)
GLUCOSE BLD-MCNC: 109 MG/DL (ref 70–99)
GLUCOSE BLD-MCNC: 120 MG/DL (ref 70–99)
GLUCOSE BLD-MCNC: 126 MG/DL (ref 70–99)
GLUCOSE BLD-MCNC: 128 MG/DL (ref 70–99)
GLUCOSE BLD-MCNC: 175 MG/DL (ref 70–99)
HBA1C MFR BLD: 7.3 %
HCT VFR BLD CALC: 30.7 % (ref 40.5–52.5)
HDLC SERPL-MCNC: 39 MG/DL (ref 40–60)
HEMOGLOBIN: 10.3 G/DL (ref 13.5–17.5)
LDL CHOLESTEROL CALCULATED: 50 MG/DL
LYMPHOCYTES ABSOLUTE: 1.8 K/UL (ref 1–5.1)
LYMPHOCYTES RELATIVE PERCENT: 32.3 %
MCH RBC QN AUTO: 28 PG (ref 26–34)
MCHC RBC AUTO-ENTMCNC: 33.7 G/DL (ref 31–36)
MCV RBC AUTO: 83.1 FL (ref 80–100)
MONOCYTES ABSOLUTE: 0.5 K/UL (ref 0–1.3)
MONOCYTES RELATIVE PERCENT: 9.7 %
NEUTROPHILS ABSOLUTE: 2.9 K/UL (ref 1.7–7.7)
NEUTROPHILS RELATIVE PERCENT: 53.7 %
PDW BLD-RTO: 16.3 % (ref 12.4–15.4)
PERFORMED ON: ABNORMAL
PLATELET # BLD: 151 K/UL (ref 135–450)
PMV BLD AUTO: 8.7 FL (ref 5–10.5)
POTASSIUM REFLEX MAGNESIUM: 3.7 MMOL/L (ref 3.5–5.1)
RBC # BLD: 3.7 M/UL (ref 4.2–5.9)
SEDIMENTATION RATE, ERYTHROCYTE: 13 MM/HR (ref 0–20)
SODIUM BLD-SCNC: 143 MMOL/L (ref 136–145)
TOTAL PROTEIN: 5.9 G/DL (ref 6.4–8.2)
TRIGL SERPL-MCNC: 69 MG/DL (ref 0–150)
TROPONIN: <0.01 NG/ML
VLDLC SERPL CALC-MCNC: 14 MG/DL
WBC # BLD: 5.4 K/UL (ref 4–11)

## 2018-12-21 PROCEDURE — 84484 ASSAY OF TROPONIN QUANT: CPT

## 2018-12-21 PROCEDURE — 82607 VITAMIN B-12: CPT

## 2018-12-21 PROCEDURE — 6370000000 HC RX 637 (ALT 250 FOR IP): Performed by: EMERGENCY MEDICINE

## 2018-12-21 PROCEDURE — 6370000000 HC RX 637 (ALT 250 FOR IP): Performed by: NURSE PRACTITIONER

## 2018-12-21 PROCEDURE — 99222 1ST HOSP IP/OBS MODERATE 55: CPT | Performed by: INTERNAL MEDICINE

## 2018-12-21 PROCEDURE — 85652 RBC SED RATE AUTOMATED: CPT

## 2018-12-21 PROCEDURE — 85025 COMPLETE CBC W/AUTO DIFF WBC: CPT

## 2018-12-21 PROCEDURE — 86038 ANTINUCLEAR ANTIBODIES: CPT

## 2018-12-21 PROCEDURE — 1200000000 HC SEMI PRIVATE

## 2018-12-21 PROCEDURE — 83036 HEMOGLOBIN GLYCOSYLATED A1C: CPT

## 2018-12-21 PROCEDURE — 36415 COLL VENOUS BLD VENIPUNCTURE: CPT

## 2018-12-21 PROCEDURE — 84443 ASSAY THYROID STIM HORMONE: CPT

## 2018-12-21 PROCEDURE — 80053 COMPREHEN METABOLIC PANEL: CPT

## 2018-12-21 PROCEDURE — 99223 1ST HOSP IP/OBS HIGH 75: CPT | Performed by: PSYCHIATRY & NEUROLOGY

## 2018-12-21 PROCEDURE — 70551 MRI BRAIN STEM W/O DYE: CPT

## 2018-12-21 PROCEDURE — 80162 ASSAY OF DIGOXIN TOTAL: CPT

## 2018-12-21 PROCEDURE — 87040 BLOOD CULTURE FOR BACTERIA: CPT

## 2018-12-21 PROCEDURE — 80061 LIPID PANEL: CPT

## 2018-12-21 PROCEDURE — 6370000000 HC RX 637 (ALT 250 FOR IP): Performed by: PSYCHIATRY & NEUROLOGY

## 2018-12-21 PROCEDURE — 82746 ASSAY OF FOLIC ACID SERUM: CPT

## 2018-12-21 PROCEDURE — 2580000003 HC RX 258: Performed by: NURSE PRACTITIONER

## 2018-12-21 RX ORDER — AMLODIPINE BESYLATE 5 MG/1
5 TABLET ORAL DAILY
Status: DISCONTINUED | OUTPATIENT
Start: 2018-12-21 | End: 2018-12-22 | Stop reason: HOSPADM

## 2018-12-21 RX ORDER — NICOTINE POLACRILEX 4 MG
15 LOZENGE BUCCAL PRN
Status: DISCONTINUED | OUTPATIENT
Start: 2018-12-21 | End: 2018-12-22 | Stop reason: HOSPADM

## 2018-12-21 RX ORDER — ONDANSETRON 2 MG/ML
4 INJECTION INTRAMUSCULAR; INTRAVENOUS EVERY 6 HOURS PRN
Status: DISCONTINUED | OUTPATIENT
Start: 2018-12-21 | End: 2018-12-22 | Stop reason: HOSPADM

## 2018-12-21 RX ORDER — QUETIAPINE FUMARATE 25 MG/1
50 TABLET, FILM COATED ORAL NIGHTLY
Status: DISCONTINUED | OUTPATIENT
Start: 2018-12-21 | End: 2018-12-22 | Stop reason: HOSPADM

## 2018-12-21 RX ORDER — ASPIRIN 81 MG/1
81 TABLET ORAL DAILY
Status: DISCONTINUED | OUTPATIENT
Start: 2018-12-21 | End: 2018-12-21 | Stop reason: DRUGHIGH

## 2018-12-21 RX ORDER — SODIUM CHLORIDE 0.9 % (FLUSH) 0.9 %
10 SYRINGE (ML) INJECTION PRN
Status: DISCONTINUED | OUTPATIENT
Start: 2018-12-21 | End: 2018-12-22 | Stop reason: HOSPADM

## 2018-12-21 RX ORDER — ACETAMINOPHEN 325 MG/1
650 TABLET ORAL EVERY 6 HOURS PRN
Status: DISCONTINUED | OUTPATIENT
Start: 2018-12-21 | End: 2018-12-22 | Stop reason: HOSPADM

## 2018-12-21 RX ORDER — SODIUM CHLORIDE 0.9 % (FLUSH) 0.9 %
10 SYRINGE (ML) INJECTION EVERY 12 HOURS SCHEDULED
Status: DISCONTINUED | OUTPATIENT
Start: 2018-12-21 | End: 2018-12-22 | Stop reason: HOSPADM

## 2018-12-21 RX ORDER — PANTOPRAZOLE SODIUM 40 MG/1
40 TABLET, DELAYED RELEASE ORAL DAILY
Status: DISCONTINUED | OUTPATIENT
Start: 2018-12-21 | End: 2018-12-22 | Stop reason: HOSPADM

## 2018-12-21 RX ORDER — QUETIAPINE FUMARATE 25 MG/1
25 TABLET, FILM COATED ORAL 2 TIMES DAILY
Status: DISCONTINUED | OUTPATIENT
Start: 2018-12-21 | End: 2018-12-22 | Stop reason: HOSPADM

## 2018-12-21 RX ORDER — QUETIAPINE FUMARATE 25 MG/1
25 TABLET, FILM COATED ORAL 2 TIMES DAILY
Status: DISCONTINUED | OUTPATIENT
Start: 2018-12-21 | End: 2018-12-21

## 2018-12-21 RX ORDER — LOSARTAN POTASSIUM 25 MG/1
50 TABLET ORAL DAILY
Status: DISCONTINUED | OUTPATIENT
Start: 2018-12-21 | End: 2018-12-22 | Stop reason: HOSPADM

## 2018-12-21 RX ORDER — SPIRONOLACTONE 25 MG/1
25 TABLET ORAL DAILY
Status: DISCONTINUED | OUTPATIENT
Start: 2018-12-21 | End: 2018-12-22 | Stop reason: HOSPADM

## 2018-12-21 RX ORDER — CARVEDILOL 6.25 MG/1
6.25 TABLET ORAL 2 TIMES DAILY
Status: DISCONTINUED | OUTPATIENT
Start: 2018-12-21 | End: 2018-12-21

## 2018-12-21 RX ORDER — ATORVASTATIN CALCIUM 40 MG/1
40 TABLET, FILM COATED ORAL DAILY
Status: DISCONTINUED | OUTPATIENT
Start: 2018-12-21 | End: 2018-12-22 | Stop reason: HOSPADM

## 2018-12-21 RX ORDER — LORAZEPAM 1 MG/1
2 TABLET ORAL ONCE
Status: COMPLETED | OUTPATIENT
Start: 2018-12-21 | End: 2018-12-21

## 2018-12-21 RX ORDER — ASPIRIN 325 MG
325 TABLET ORAL DAILY
Status: DISCONTINUED | OUTPATIENT
Start: 2018-12-21 | End: 2018-12-22 | Stop reason: HOSPADM

## 2018-12-21 RX ORDER — FENOFIBRATE 160 MG/1
160 TABLET ORAL DAILY
Status: DISCONTINUED | OUTPATIENT
Start: 2018-12-21 | End: 2018-12-22 | Stop reason: HOSPADM

## 2018-12-21 RX ORDER — DEXTROSE MONOHYDRATE 25 G/50ML
12.5 INJECTION, SOLUTION INTRAVENOUS PRN
Status: DISCONTINUED | OUTPATIENT
Start: 2018-12-21 | End: 2018-12-22 | Stop reason: HOSPADM

## 2018-12-21 RX ORDER — DEXTROSE MONOHYDRATE 50 MG/ML
100 INJECTION, SOLUTION INTRAVENOUS PRN
Status: DISCONTINUED | OUTPATIENT
Start: 2018-12-21 | End: 2018-12-22 | Stop reason: HOSPADM

## 2018-12-21 RX ADMIN — QUETIAPINE FUMARATE 25 MG: 25 TABLET ORAL at 14:08

## 2018-12-21 RX ADMIN — QUETIAPINE FUMARATE 25 MG: 25 TABLET ORAL at 08:11

## 2018-12-21 RX ADMIN — ATORVASTATIN CALCIUM 40 MG: 40 TABLET, FILM COATED ORAL at 08:11

## 2018-12-21 RX ADMIN — SODIUM CHLORIDE, PRESERVATIVE FREE 10 ML: 5 INJECTION INTRAVENOUS at 08:11

## 2018-12-21 RX ADMIN — PANTOPRAZOLE SODIUM 40 MG: 40 TABLET, DELAYED RELEASE ORAL at 08:11

## 2018-12-21 RX ADMIN — FENOFIBRATE 160 MG: 160 TABLET ORAL at 08:11

## 2018-12-21 RX ADMIN — ASPIRIN 325 MG: 325 TABLET ORAL at 08:11

## 2018-12-21 RX ADMIN — LOSARTAN POTASSIUM 50 MG: 25 TABLET, FILM COATED ORAL at 08:11

## 2018-12-21 RX ADMIN — LORAZEPAM 2 MG: 1 TABLET ORAL at 00:30

## 2018-12-21 RX ADMIN — QUETIAPINE FUMARATE 50 MG: 25 TABLET ORAL at 20:47

## 2018-12-21 RX ADMIN — VITAMIN D, TAB 1000IU (100/BT) 1000 UNITS: 25 TAB at 08:11

## 2018-12-21 RX ADMIN — SODIUM CHLORIDE, PRESERVATIVE FREE 10 ML: 5 INJECTION INTRAVENOUS at 20:48

## 2018-12-21 RX ADMIN — AMLODIPINE BESYLATE 5 MG: 5 TABLET ORAL at 08:11

## 2018-12-21 RX ADMIN — SPIRONOLACTONE 25 MG: 25 TABLET ORAL at 08:11

## 2018-12-21 ASSESSMENT — PAIN SCALES - GENERAL
PAINLEVEL_OUTOF10: 0

## 2018-12-21 NOTE — CARE COORDINATION
CASE MANAGEMENT INITIAL ASSESSMENT      Reviewed chart and met with patient today, re: potential needs at discharge. Explained Case Management role/services. Family present: no  Primary contact information: daughterGlenn. [phone]    Admit date/status: inpatient 12/21/18   Diagnosis: acute metabolic encephalopathy    Insurance: Medicare. BCBS secondary    Precert required for SNF - N    3 night stay required - Y    Living arrangements, Adls, care needs, prior to admission: daughter lives with patient and spouse as their \"caretaker\". Daughter, Julissa Whiteside, states pt's spouse has dementia. Pt indep in adls. Transportation: to be determined    Durable Medical Equipment at home: Walker__Cane_x_RTS__ BSC__Shower Chair__  02__ HHN__ CPAP__  BiPap__  Hospital Bed__ W/C___ Other__________    Services in the home and/or outpatient, prior to admission: none    PT/OT recs: to be done prior to d/c    Hospital Exemption Notification (HEN): not started, as pt's daughter states he will decline SNF and she prefers he come home as well. Barriers to discharge: none identified    Plan/comments: per daughter, Julissa Whiteside, pt will decline snf if recommended. Julissa Whiteside also prefers that pt discharge to home. Daughter IS agreeable to home care if needed. ECOC on chart for MD signature. CM will follow.      Kymberly Esquivel RN DCP

## 2018-12-21 NOTE — ED PROVIDER NOTES
[facility]  ED    Patient: Patricia Dodson  MRN: [id number]  : 1950  Date of Evaluation: 2018  ED Provider: Joe Stephen DO    Chief Complaint       Chief Complaint   Patient presents with    Altered Mental Status     patient lives with daughter, daughter states, \"Patient mood has been all over the place, he has been acting different since stopping digoxin and torsemide. \" Patient behavior has been more all over the place starting tonight     HPI     History provided by patient and daughter   Mode of arrival: private car  History limited by Belle Travis is a 77 y.o. male who presents to the emergency department With altered mental status. For the past 2 days, patient has been emotionally labile. He has been very agitated. He has been inappropriately sexual and trying to kiss strangers. This is never happened previously. No history of psych disorder. Daughter does state that he stopped taking his digitoxin and torsemide per his doctors earlier in the week. ROS:     Review of Systems   Constitutional: Negative for chills and fever. HENT: Negative for congestion and sore throat. Eyes: Negative for pain and redness. Respiratory: Negative for cough and shortness of breath. Cardiovascular: Negative for chest pain and palpitations. Gastrointestinal: Negative for abdominal distention and constipation. Genitourinary: Negative for dysuria and frequency. Musculoskeletal: Negative for back pain and gait problem. Neurological: Negative for dizziness and headaches. Psychiatric/Behavioral: Positive for agitation, behavioral problems and confusion. All other systems reviewed and are negative.       Past History     Past Medical History:   Diagnosis Date    Acute gastric ulcer with bleeding     Acute metabolic encephalopathy     MAISHA (acute kidney injury) ([facility] [address]     Requiring dialysis since 15 in setting of hemorrhagic shock daily    CITALOPRAM (CELEXA) 20 MG TABLET    Take 20 mg by mouth    CLOPIDOGREL (PLAVIX) 75 MG TABLET    Take 75 mg by mouth daily    DIGOXIN (DIGOX) 125 MCG TABLET    Take 125 mcg by mouth daily    DOCUSATE SODIUM (COLACE) 100 MG CAPSULE    Take 1 capsule by mouth 2 times daily. FENOFIBRATE (TRICOR) 145 MG TABLET    Take 145 mg by mouth daily    FERROUS SULFATE 325 (65 FE) MG EC TABLET    Take 325 mg by mouth 3 times daily (with meals)    FOLIC ACID (FOLVITE) 1 MG TABLET    Take 1 mg by mouth daily    INSULIN GLARGINE (LANTUS) 100 UNIT/ML INJECTION VIAL    Inject into the skin nightly    INSULIN LISPRO (HUMALOG) 100 UNIT/ML INJECTION VIAL    Inject into the skin 3 times daily (before meals)    LOSARTAN (COZAAR) 50 MG TABLET    Take 50 mg by mouth daily    MAGNESIUM OXIDE (MAG-OX) 400 MG TABLET    Take 400 mg by mouth daily    METFORMIN (GLUCOPHAGE) 500 MG TABLET    Take 500 mg by mouth 2 times daily (with meals)    PANTOPRAZOLE (PROTONIX) 40 MG TABLET    Take 40 mg by mouth daily    POTASSIUM CHLORIDE (KLOR-CON) 20 MEQ PACKET    Take 20 mEq by mouth 2 times daily    QUETIAPINE (SEROQUEL) 25 MG TABLET    Take 25 mg by mouth 2 times daily    SENNOSIDES (SENNA) 8.6 MG TABS    Take 1 tablet by mouth daily as needed for Other (constipation). SPIRONOLACTONE (ALDACTONE) 25 MG TABLET    Take 25 mg by mouth daily    TORSEMIDE (DEMADEX) 20 MG TABLET    Take 20 mg by mouth daily    VITAMIN D (CHOLECALCIFEROL) 1000 UNIT TABS TABLET    Take 1,000 Units by mouth daily     No Known Allergies     Physical Exam       ED Triage Vitals [12/20/18 2220]   BP Temp Temp Source Pulse Resp SpO2 Height Weight   -- 97.9 °F (36.6 °C) Oral 87 18 98 % -- --       Physical Exam   Constitutional: He appears well-developed and well-nourished. No distress. HENT:   Head: Normocephalic and atraumatic. Eyes: Pupils are equal, round, and reactive to light. Conjunctivae and EOM are normal.   Neck: Normal range of motion. Neck supple. and/or Mortality  Presenting problems: high  Diagnostic procedures: high  Management options: high        In brief, Dick Prajapati is a 77 y.o. male who presented to the emergency department For altered mental status. Daughter states that he has been acting bizarrely for the past few days. Recently taken off of digoxin and torsemide. Differential includes but is not limited to intracranial abnormality, infection, anemia, arrhythmia, electrolyte abnormality, ingestion. 0020 Discussed with patient and daughter diagnosis and need for admission. Daughter is in agreement with plan. 0030 Discussed with NP for hospitalist, accepted for admission. ED Medication Orders     Start Ordered     Status Ordering Provider    12/21/18 0030 12/21/18 0020  LORazepam (ATIVAN) tablet 2 mg  ONCE      Last MAR action:  Given - by Madhuri King on 12/21/18 at 850 [facility] 26 A    12/20/18 2345 12/20/18 2339  magnesium sulfate 1 g in dextrose 5% 100 mL IVPB  ONCE      Last MAR action:  New Bag - by Madhuri King on 12/20/18 at [address] [facility] A        Vitals:    12/20/18 2220 12/20/18 2328 12/20/18 2342   BP:  (!) 188/105 (!) 198/84   Pulse: 87 62    Resp: 18 20    Temp: 97.9 °F (36.6 °C)     TempSrc: Oral     SpO2: 98% 97%          DISPOSITION Decision To Admit 12/21/2018 12:21:07 AM       PATIENT REFERRED TO:  No follow-up provider specified. DISCHARGE MEDICATIONS:  New Prescriptions    No medications on file       Final Impression      1. Altered mental status, unspecified altered mental status type    2. Hypomagnesemia    3. Chronic kidney disease, unspecified CKD stage    4.  Chronic anemia        (Please note thatportions of this note may have been completed with a voice recognition program. Efforts were made to edit the dictations but occasionally words are mis-transcribed.)    Marcus Naranjo, [address] [place] [address],   12/21/18 2959

## 2018-12-21 NOTE — H&P
Historical Provider, MD   pantoprazole (PROTONIX) 40 MG tablet Take 40 mg by mouth daily   Yes Historical Provider, MD   fenofibrate (TRICOR) 145 MG tablet Take 145 mg by mouth daily   Yes Historical Provider, MD   citalopram (CELEXA) 20 MG tablet Take 20 mg by mouth 8/31/18   Historical Provider, MD   ferrous sulfate 325 (65 Fe) MG EC tablet Take 325 mg by mouth 3 times daily (with meals)    Historical Provider, MD   insulin glargine (LANTUS) 100 UNIT/ML injection vial Inject into the skin nightly    Historical Provider, MD   insulin lispro (HUMALOG) 100 UNIT/ML injection vial Inject into the skin 3 times daily (before meals)    Historical Provider, MD   magnesium oxide (MAG-OX) 400 MG tablet Take 400 mg by mouth daily    Historical Provider, MD   potassium chloride (KLOR-CON) 20 MEQ packet Take 20 mEq by mouth 2 times daily    Historical Provider, MD   torsemide (DEMADEX) 20 MG tablet Take 20 mg by mouth daily    Historical Provider, MD   Aspirin Buf,[id number]-[id number]-[id number], (BUFFERED ASPIRIN) 325 MG TABS Take by mouth daily    Historical Provider, MD   clopidogrel (PLAVIX) 75 MG tablet Take 75 mg by mouth daily    Historical Provider, MD   digoxin (DIGOX) 125 MCG tablet Take 125 mcg by mouth daily    Historical Provider, MD   folic acid (FOLVITE) 1 MG tablet Take 1 mg by mouth daily    Historical Provider, MD   acetaminophen (TYLENOL) 325 MG tablet Take 2 tablets by mouth every 6 hours as needed for Pain or Fever 8/2/17   Belle Guillermo MD   docusate sodium (COLACE) 100 MG capsule Take 1 capsule by mouth 2 times daily. Patient taking differently: Take 100 mg by mouth daily  9/26/13   Daiana Woodruff MD   Sennosides (SENNA) 8.6 MG TABS Take 1 tablet by mouth daily as needed for Other (constipation). 9/26/13   Daiana Woodruff MD     Allergies:  Patient has no known allergies.     Social History:      The patient currently lives at home with his wife and his daughter    TOBACCO:   reports that he quit smoking about 24 years ago. His smoking use included Cigarettes. He has a 10.00 pack-year smoking history. He uses smokeless tobacco.  ETOH:   reports that he drinks alcohol. Family History:      Reviewed in detail. Positive as follows:    Family History   Problem Relation Age of Onset   [employer] Early Death Mother         age 54    Other Mother         head aneursym    Heart Attack Father     Diabetes Brother     Heart Disease Brother      REVIEW OF SYSTEMS:   Pertinent positives as noted in the HPI. All other systems reviewed and negative. PHYSICAL EXAM PERFORMED:    BP (!) 181/74   Pulse 50   Temp 97.8 °F (36.6 °C) (Oral)   Resp 16   SpO2 97%     General appearance:  Pleasant male in no apparent distress, appears stated age and cooperative. HEENT:  Pupils equal, round, and reactive to light. Glasses, edentulous, Extra ocular muscles intact. Conjunctivae/corneas clear. Neck: Supple, with full range of motion. No jugular venous distention. Trachea midline. Respiratory:  Normal respiratory effort. Clear to auscultation, bilaterally without Rales/Wheezes/Rhonchi. Cardiovascular:  Regular rate and rhythm with normal S1/S2 without murmurs, rubs or gallops. Abdomen: Soft, non-tender, non-distended with normal bowel sounds. Musculoskeletal:  No clubbing, cyanosis or edema bilaterally. Full range of motion without deformity. Skin: Skin color, texture, turgor normal.  No significant rashes or lesions. Neurologic:  Neurovascularly intact.  Cranial nerves: II-XII intact, grossly non-focal.  Psychiatric:  Alert and oriented, thought content appropriate, normal insight  Capillary Refill: Brisk,< 3 seconds   Peripheral Pulses: +2 palpable, equal bilaterally     Labs:     Recent Labs      12/20/18 2248   WBC  6.0   HGB  11.2*   HCT  33.5*   PLT  163     Recent Labs      12/20/18   2248   NA  140   K  4.3   CL  103   CO2  24   BUN  28*   CREATININE  1.4*   CALCIUM  9.4     Recent Labs      12/20/18   2248   AST  17   ALT  12

## 2018-12-22 VITALS
OXYGEN SATURATION: 95 % | WEIGHT: 255.3 LBS | TEMPERATURE: 97.8 F | BODY MASS INDEX: 30.14 KG/M2 | HEART RATE: 67 BPM | DIASTOLIC BLOOD PRESSURE: 71 MMHG | SYSTOLIC BLOOD PRESSURE: 176 MMHG | RESPIRATION RATE: 18 BRPM | HEIGHT: 77 IN

## 2018-12-22 LAB
EKG ATRIAL RATE: 50 BPM
EKG DIAGNOSIS: NORMAL
EKG Q-T INTERVAL: 418 MS
EKG QRS DURATION: 104 MS
EKG QTC CALCULATION (BAZETT): 424 MS
EKG R AXIS: -65 DEGREES
EKG T AXIS: 64 DEGREES
EKG VENTRICULAR RATE: 62 BPM
FOLATE: 6.76 NG/ML (ref 4.78–24.2)
GLUCOSE BLD-MCNC: 121 MG/DL (ref 70–99)
GLUCOSE BLD-MCNC: 208 MG/DL (ref 70–99)
PERFORMED ON: ABNORMAL
PERFORMED ON: ABNORMAL
TSH REFLEX: 2.07 UIU/ML (ref 0.27–4.2)
VITAMIN B-12: 266 PG/ML (ref 211–911)

## 2018-12-22 PROCEDURE — 2580000003 HC RX 258: Performed by: NURSE PRACTITIONER

## 2018-12-22 PROCEDURE — 93010 ELECTROCARDIOGRAM REPORT: CPT | Performed by: INTERNAL MEDICINE

## 2018-12-22 PROCEDURE — 99232 SBSQ HOSP IP/OBS MODERATE 35: CPT | Performed by: PSYCHIATRY & NEUROLOGY

## 2018-12-22 PROCEDURE — 6370000000 HC RX 637 (ALT 250 FOR IP): Performed by: PSYCHIATRY & NEUROLOGY

## 2018-12-22 PROCEDURE — 6370000000 HC RX 637 (ALT 250 FOR IP): Performed by: NURSE PRACTITIONER

## 2018-12-22 RX ADMIN — LOSARTAN POTASSIUM 50 MG: 25 TABLET, FILM COATED ORAL at 09:19

## 2018-12-22 RX ADMIN — FENOFIBRATE 160 MG: 160 TABLET ORAL at 09:19

## 2018-12-22 RX ADMIN — SPIRONOLACTONE 25 MG: 25 TABLET ORAL at 09:19

## 2018-12-22 RX ADMIN — AMLODIPINE BESYLATE 5 MG: 5 TABLET ORAL at 09:19

## 2018-12-22 RX ADMIN — SODIUM CHLORIDE, PRESERVATIVE FREE 10 ML: 5 INJECTION INTRAVENOUS at 09:21

## 2018-12-22 RX ADMIN — ATORVASTATIN CALCIUM 40 MG: 40 TABLET, FILM COATED ORAL at 09:20

## 2018-12-22 RX ADMIN — ASPIRIN 325 MG: 325 TABLET ORAL at 09:19

## 2018-12-22 RX ADMIN — VITAMIN D, TAB 1000IU (100/BT) 1000 UNITS: 25 TAB at 09:19

## 2018-12-22 RX ADMIN — INSULIN LISPRO 4 UNITS: 100 INJECTION, SOLUTION INTRAVENOUS; SUBCUTANEOUS at 13:10

## 2018-12-22 RX ADMIN — QUETIAPINE FUMARATE 25 MG: 25 TABLET ORAL at 09:20

## 2018-12-22 RX ADMIN — PANTOPRAZOLE SODIUM 40 MG: 40 TABLET, DELAYED RELEASE ORAL at 09:19

## 2018-12-22 ASSESSMENT — PAIN SCALES - GENERAL: PAINLEVEL_OUTOF10: 0

## 2018-12-22 NOTE — PLAN OF CARE
Problem: Falls - Risk of:  Goal: Will remain free from falls  Will remain free from falls   Outcome: Ongoing  Fall risk assessment complete, fall precautions in place. Fall visuals posted, bed alarm on, bed in lowest position with wheels locked. Patient has been free of falls this shift, will continue to monitor.

## 2018-12-22 NOTE — DISCHARGE SUMMARY
ischemic disease with evidence of encephalomalacia   left parietal lobe suggestive of remote stroke. Consults:     IP CONSULT TO HOSPITALIST  IP CONSULT TO NEUROLOGY  IP CONSULT TO PSYCHIATRY  IP CONSULT TO CARDIOLOGY    Disposition:  Home    Condition at Discharge: Stable    Discharge Instructions/Follow-up:  PCP and cardiology    Code Status:  Full Code     Activity: activity as tolerated    Diet: cardiac diet and diabetic diet      Discharge Medications:     Current Discharge Medication List           Details   aspirin 325 MG tablet Take 325 mg by mouth daily      spironolactone (ALDACTONE) 25 MG tablet Take 25 mg by mouth daily      atorvastatin (LIPITOR) 40 MG tablet Take 40 mg by mouth daily      vitamin D (CHOLECALCIFEROL) 1000 UNIT TABS tablet Take 1,000 Units by mouth daily      losartan (COZAAR) 50 MG tablet Take 50 mg by mouth daily      QUEtiapine (SEROQUEL) 25 MG tablet Take 25 mg by mouth 2 times daily      metFORMIN (GLUCOPHAGE) 500 MG tablet Take 500 mg by mouth 2 times daily (with meals)      amLODIPine (NORVASC) 5 MG tablet Take 5 mg by mouth daily      pantoprazole (PROTONIX) 40 MG tablet Take 40 mg by mouth daily      fenofibrate (TRICOR) 145 MG tablet Take 145 mg by mouth daily      citalopram (CELEXA) 20 MG tablet Take 20 mg by mouth      ferrous sulfate 325 (65 Fe) MG EC tablet Take 325 mg by mouth 3 times daily (with meals)      insulin glargine (LANTUS) 100 UNIT/ML injection vial Inject into the skin nightly      insulin lispro (HUMALOG) 100 UNIT/ML injection vial Inject into the skin 3 times daily (before meals)      magnesium oxide (MAG-OX) 400 MG tablet Take 400 mg by mouth daily      folic acid (FOLVITE) 1 MG tablet Take 1 mg by mouth daily      acetaminophen (TYLENOL) 325 MG tablet Take 2 tablets by mouth every 6 hours as needed for Pain or Fever  Qty: 120 tablet, Refills: 3      docusate sodium (COLACE) 100 MG capsule Take 1 capsule by mouth 2 times daily.   Qty: 30

## 2018-12-22 NOTE — ED PROVIDER NOTES
Protein 7.2 6.4 - 8.2 g/dL    Alb 3.9 3.4 - 5.0 g/dL    Albumin/Globulin Ratio 1.2 1.1 - 2.2    Total Bilirubin 0.5 0.0 - 1.0 mg/dL    Alkaline Phosphatase 38 (L) 40 - 129 U/L    ALT 14 10 - 40 U/L    AST 19 15 - 37 U/L    Globulin 3.3 g/dL   Troponin   Result Value Ref Range    Troponin <0.01 <0.01 ng/mL   EKG 12 Lead   Result Value Ref Range    Ventricular Rate 52 BPM    Atrial Rate 54 BPM    QRS Duration 104 ms    Q-T Interval 434 ms    QTc Calculation (Bazett) 403 ms    R Axis -67 degrees    T Axis 50 degrees    Diagnosis       Atrial fibrillation with slow ventricular responseLeft axis deviationInferior infarct (cited on or before 03-JUL-2017)Anterior infarct (cited on or before 03-JUL-2017)Abnormal ECGWhen compared with ECG of 06-AUG-2017 07:11,No significant change was foundConfirmed by Moose Vasquez MD, MAYKEL ([id number]) on 12/5/2018 12:39:17 PM       XR CHEST STANDARD (2 VW)   Final Result   1. Stable mild enlargement of the cardiac silhouette. No superimposed acute   pulmonary abnormality. CLINICAL IMPRESSION  1. Syncope and collapse    2. Dehydration        Blood pressure (!) 187/89, pulse 68, temperature 98.6 °F (37 °C), temperature source Oral, resp. rate 18, weight 240 lb (108.9 kg), SpO2 99 %. Remy Sarkar was discharged to home in stable condition. This chart was generated in part by using Dragon Dictation system and may contain errors related to that system including errors in grammar, punctuation, and spelling, as well as words and phrases that may be inappropriate. If there are any questions or concerns please feel free to contact the dictating provider for clarification.      Cecelia Dixon,   ATTENDING, [address] [facility], DO  12/22/18 7951

## 2018-12-22 NOTE — PROGRESS NOTES
[id number]    Bradycardia - asymptomatic    Stop dig and coreg  F/U Dr Kareen Pappas
Known Allergies  family history includes Diabetes in his brother; Early Death in his mother; Heart Attack in his father; Heart Disease in his brother; Other in his mother. reports that he quit smoking about 21 years ago. His smoking use included Cigarettes. He has a 10.00 pack-year smoking history. He uses smokeless tobacco. He reports that he drinks alcohol. He reports that he does not use drugs. Objective:  Exam:   Constitutional:   Vitals:    12/21/18 2325 12/22/18 0337 12/22/18 0900 12/22/18 1129   BP: (!) 177/76 (!) 145/66 (!) 198/104 (!) 176/71   Pulse: 51 52 70 67   Resp: 16 18 18 18   Temp:  97.4 °F (36.3 °C) 97.3 °F (36.3 °C) 97.8 °F (36.6 °C)   TempSrc: Oral Oral Oral Oral   SpO2: 100% 94% 99% 95%   Weight:       Height:           General appearance:  Normal development and appear in no acute distress. Eye: No icterus. Fundus: No blurring of optic disc. Neck: supple  Cardiovascular: No carotid bruit. No lower leg edema with good pulsation. Mental Status:   Oriented to place and person but no problem. Memory: Good immediate recall. Easily distracted. Poor fund of knowledge. Poor insight. Impaired remote memory  Normal attention span and concentration. Language: intact naming, repeating and fluency      Cranial Nerves:   II: Visual fields: Full to confrontation and nl VA. Pupils: equal, round, reactive to light  III,IV,VI: Extra Ocular Movements are intact. No nystagmus  V: Facial sensation is intact to pin prick and light touch  VII: Facial strength and movements: intact and symmetric  VIII: Hearing: Intact to finger rub bilaterally  IX: Palate elevation is symmetric  XI: Shoulder shrug is intact  XII: Tongue movements are normal  Musculoskeletal: 5/5 in all 4 extremities. Tone: Normal tone. Reflexes: Bilateral biceps 2/4, triceps 2/4, brachial radialis 2/4, knee 2/4 and ankle 2/4. Planters: flexor bilaterally.   Coordination: no pronator drift, no dysmetria with FNF

## 2018-12-22 NOTE — CONSULTS
Psychiatry Consult dictated    Dx:   1. Impulse Control Disorder    Rec:  1. Rena Keene was pleasant until his daughter came in and he became more animated, taking about politics and that his daughter\"is a big fredy. \" He was pressured at times and easily agitated. He also appears rather impulsive, and sexually provocative here and home, which he denied. 2. He is refusing psychiatric tx and was terminated at Dr. [facility] practice a few months ago for not making appts. 3.In my opinion,  his stroke, which affected his frontal lobe, has exacerbated his behaviors, as he was also focused on women , but is now more impulsive, and watching porn at home. He showed me photos of a woman in a thong today and was trying to scroll down pages of women. Also instructed daughter to not allow patient to be alone with her 15 yo daughter as he is focused on \"Adopting her, \" and wants her to go to [facility] and be a model. 4. Recommend follow up with [facility] , Dr. Ava Madrid MD    5.  Continue with Seroquel but increase to 25 mg BID and 50 mg HS for agitation
([facility]     Atrial fib/flutter, transient     Atrial fibrillation ([facility]     [facility] with [facility] 08/05/2017    weakness and aphasia    Diabetes mellitus [facility]     ESRD (end stage renal disease) ([facility]     HD had to be stopped due to hemorrhagic stroke    GI bleed     History of blood transfusion     Hyperlipidemia     Hypertension     Mobility impaired     uses walker and W/C    Traumatic hemorrhagic shock ([facility]      Family History   Problem Relation Age of Onset    Early Death Mother         age 52   Rachael Roberto Other Mother         head aneursym    Heart Attack Father     Diabetes Brother     Heart Disease Brother      Past Surgical History:   Procedure Laterality Date    KNEE SURGERY      OTHER SURGICAL HISTORY  10/11/13     CYSTOSCOPY, TRANSURETHRAL RESECTION OF PROSTATE WITH OLYMPUS    PROSTATE BIOPSY      UPPER GASTROINTESTINAL ENDOSCOPY  7/13 17     Past Surgical History:   Procedure Laterality Date    KNEE SURGERY      OTHER SURGICAL HISTORY  10/11/13     CYSTOSCOPY, TRANSURETHRAL RESECTION OF PROSTATE WITH OLYMPUS    PROSTATE BIOPSY      UPPER GASTROINTESTINAL ENDOSCOPY  7/13 17     Family History   Problem Relation Age of Onset    Early Death Mother         age 52   Rachael Mejia Other Mother         head aneursym    Heart Attack Father     Diabetes Brother     Heart Disease Brother      Social History   Substance Use Topics    Smoking status: Former Smoker     Packs/day: 0.50     Years: 20.00     Types: Cigarettes     Quit date: 01/1997    Smokeless tobacco: Current User      Comment: smokes occ cigar, socially    Alcohol use Yes      Comment: occasionally     No Known Allergies  Current Facility-Administered Medications   Medication Dose Route Frequency Provider Last Rate Last Dose    acetaminophen (TYLENOL) tablet 650 mg  650 mg Oral Q6H PRN JAMEY Tabares CNP        amLODIPine (NORVASC) tablet 5 mg  5 mg Oral Daily JAMEY Tabares CNP   5 mg at
white male, in no acute  distress. HEENT:  Normocephalic, atraumatic. Oropharynx clear. Moist mucous  membranes. NECK:  Supple. CHEST:  Clear. CARDIAC:  Irregularly irregular S1, S2. There is no S3 or S4 gallop. There is a soft systolic murmur. Jugular venous pressure is normal.   Carotids are 2+ and symmetric without bruit. ABDOMEN:  Soft and nontender. Positive bowel sounds. EXTREMITIES:  No cyanosis or edema. NEUROLOGIC:  Grossly nonfocal.  SKIN:  Warm and dry. PSYCHIATRIC:  Affect calm. EKG, atrial fibrillation with slow ventricular response, left axis  deviation, inferior wall myocardial infarction, age indeterminate; poor  R-wave progression. It represents prior anterior wall myocardial  infarction, age indeterminate. Significant laboratory data includes  creatinine 1.3. Troponin is less than 0.01. Chest x-ray, no pulmonary  edema. IMPRESSION:  1. Bradycardia. It appears that he was recently symptomatic with his  bradycardia and had his digoxin subsequently discontinued just 3 days  ago by his primary cardiologist, Dr. Janna Acuña. He is now asymptomatic;  however, he continues to have bradycardia which primarily occurs at  night when he is asleep. His heart rate has been normal during his  waking hours and with activity. 2.  Atrial fibrillation. The patient is currently not on  anticoagulation for reason that are unclear. He just saw his primary  cardiologist 3 days ago and therefore, we will defer the decision  regarding anticoagulation to his primary cardiologist, Dr. Janna Acuña. 3.  Hypertension, suboptimally controlled. RECOMMENDATIONS:  1. Digoxin was recently discontinued by Dr. Janna Acuña. At this point, we  will also discontinue his carvedilol and follow his heart rate.   2.  The patient is to follow up with Dr. Janna Acuña in next 1 to 2 weeks for  followup with his bradycardia as well as further treatment of his atrial  fibrillation and any decision regarding
clinically  indicated.  Drug Screen Comment: 12/20/2018 see below   Final    Comment: This method is a screening test to detect only these drug  classes as part of a medical workup. Confirmatory testing  by another method should be ordered if clinically indicated.  Oxycodone Urine 12/20/2018 Neg  Negative <100 ng/ml Final    Protime 12/20/2018 13.7* 9.8 - 13.0 sec Final    Comment: Effective 5-31-18 09:00am EST  Please note reference ranges have  changed for PT and INR Testing.  INR 12/20/2018 1.20* 0.86 - 1.14 Final    Comment: Effective 11/28/18 at 02:30pm EST    Normal: 0.94 - 1.06  Therapeutic: 2.0 - 3.0  Pros. Valve: 2.5 - 3.5  AMI: 2.0 - 3.0      aPTT 12/20/2018 32.5  26.0 - 36.0 sec Final    Comment: Therapeutic range: 54.0 - 90.0 sec    Effective 5-31-18 9:00am EST  Please note reference ranges have  changed for PTT Testing.       Rapid Influenza A Ag 12/20/2018 Negative  Negative Final    Rapid Influenza B Ag 12/20/2018 Negative  Negative Final    Troponin 12/21/2018 <0.01  <0.01 ng/mL Final    Methodology by Troponin T    Magnesium 12/20/2018 1.60* 1.80 - 2.40 mg/dL Final    Troponin 12/20/2018 <0.01  <0.01 ng/mL Final    Methodology by Troponin T    Ethanol Lvl 12/20/2018 None Detected  mg/dL Final    Comment:    None Detected  Conversion factor:  100 mg/dl = .100 g/dl  For Medical Purposes Only      Cholesterol, Total 12/21/2018 103  0 - 199 mg/dL Final    Triglycerides 12/21/2018 69  0 - 150 mg/dL Final    HDL 12/21/2018 39* 40 - 60 mg/dL Final    LDL Calculated 12/21/2018 50  <100 mg/dL Final    VLDL Cholesterol Calculated 12/21/2018 14  Not Established mg/dL Final    Sodium 12/21/2018 143  136 - 145 mmol/L Final    Potassium reflex Magnesium 12/21/2018 3.7  3.5 - 5.1 mmol/L Final    Chloride 12/21/2018 106  99 - 110 mmol/L Final    CO2 12/21/2018 25  21 - 32 mmol/L Final    Anion Gap 12/21/2018 12  3 - 16 Final    Glucose 12/21/2018 109* 70 - 99 mg/dL Final    BUN

## 2018-12-23 LAB
ANA INTERPRETATION: NORMAL
ANTI-NUCLEAR ANTIBODY (ANA): NEGATIVE

## 2018-12-26 LAB
BLOOD CULTURE, ROUTINE: NORMAL
CULTURE, BLOOD 2: NORMAL

## 2019-02-11 ENCOUNTER — ANTI-COAG VISIT (OUTPATIENT)
Dept: PHARMACY | Age: 69
End: 2019-02-11

## 2019-03-13 ENCOUNTER — HOSPITAL ENCOUNTER (OUTPATIENT)
Dept: OPERATING ROOM | Age: 69
Setting detail: OUTPATIENT SURGERY
Discharge: HOME | End: 2019-03-13
Payer: MEDICARE

## 2019-03-13 VITALS — SYSTOLIC BLOOD PRESSURE: 137 MMHG | DIASTOLIC BLOOD PRESSURE: 72 MMHG | HEART RATE: 111 BPM

## 2019-03-13 PROCEDURE — 6370000000 HC RX 637 (ALT 250 FOR IP): Performed by: OPHTHALMOLOGY

## 2019-03-13 PROCEDURE — 65855 TRABECULOPLASTY LASER SURG: CPT

## 2019-03-13 RX ORDER — PILOCARPINE HYDROCHLORIDE 20 MG/ML
1 SOLUTION/ DROPS OPHTHALMIC ONCE
Status: COMPLETED | OUTPATIENT
Start: 2019-03-13 | End: 2019-03-13

## 2019-03-13 RX ORDER — PROPARACAINE HYDROCHLORIDE 5 MG/ML
1 SOLUTION/ DROPS OPHTHALMIC ONCE
Status: COMPLETED | OUTPATIENT
Start: 2019-03-13 | End: 2019-03-13

## 2019-03-13 RX ADMIN — BROMFENAC SODIUM 1 DROP: 0.7 SOLUTION/ DROPS OPHTHALMIC at 14:41

## 2019-03-13 RX ADMIN — APRACLONIDINE HYDROCHLORIDE 1 DROP: 10 SOLUTION/ DROPS OPHTHALMIC at 14:40

## 2019-03-13 RX ADMIN — PROPARACAINE HYDROCHLORIDE 1 DROP: 5 SOLUTION/ DROPS OPHTHALMIC at 14:36

## 2019-03-13 RX ADMIN — APRACLONIDINE HYDROCHLORIDE 1 DROP: 10 SOLUTION/ DROPS OPHTHALMIC at 13:30

## 2019-03-13 RX ADMIN — PILOCARPINE HYDROCHLORIDE 1 DROP: 20 SOLUTION/ DROPS OPHTHALMIC at 13:30

## 2019-03-22 RX ORDER — LANOLIN ALCOHOL/MO/W.PET/CERES
3 CREAM (GRAM) TOPICAL DAILY
COMMUNITY
End: 2019-03-22

## 2019-03-22 RX ORDER — DIVALPROEX SODIUM 500 MG/1
500 TABLET, DELAYED RELEASE ORAL EVERY MORNING
Status: ON HOLD | COMMUNITY
End: 2019-06-24

## 2019-03-22 RX ORDER — VITAMIN B COMPLEX
1 CAPSULE ORAL DAILY
COMMUNITY

## 2019-03-22 RX ORDER — DIVALPROEX SODIUM 500 MG/1
1000 TABLET, DELAYED RELEASE ORAL NIGHTLY
Status: ON HOLD | COMMUNITY
End: 2019-06-24

## 2019-03-22 RX ORDER — CHOLECALCIFEROL (VITAMIN D3) 125 MCG
5 CAPSULE ORAL NIGHTLY
COMMUNITY
End: 2020-12-28

## 2019-03-22 NOTE — PROGRESS NOTES
Obstructive Sleep Apnea (CARMELLA) Screening     Patient:  Sherry Cisneros    YOB: 1950      Medical Record #:  [id number]                     Date:  3/22/2019     1. Are you a loud and/or regular snorer? []  Yes       [x] No    2. Have you been observed to gasp or stop breathing during sleep? []  Yes       [x] No    3. Do you feel tired or groggy upon awakening or do you awaken with a headache?           []  Yes       [x] No    4. Are you often tired or fatigued during the wake time hours? []  Yes       [x] No    5. Do you fall asleep sitting, reading, watching TV or driving? []  Yes       [x] No    6. Do you often have problems with memory or concentration? []  Yes       [x] No    **If patient's score is ? 3 they are considered high risk for CARMELLA. Notify the anesthesiologist of the high risk and document in focus note. Note:  If the patient's BMI is more than 35 kg m¯² , has neck circumference > 40 cm, and/or high blood pressure the risk is greater (© American Sleep Apnea Association, 2006).

## 2019-03-27 ENCOUNTER — ANESTHESIA EVENT (OUTPATIENT)
Dept: OPERATING ROOM | Age: 69
End: 2019-03-27
Payer: MEDICARE

## 2019-03-28 ENCOUNTER — HOSPITAL ENCOUNTER (OUTPATIENT)
Age: 69
Setting detail: OUTPATIENT SURGERY
Discharge: HOME | End: 2019-03-28
Attending: DENTIST | Admitting: DENTIST
Payer: MEDICARE

## 2019-03-28 ENCOUNTER — ANESTHESIA (OUTPATIENT)
Dept: OPERATING ROOM | Age: 69
End: 2019-03-28
Payer: MEDICARE

## 2019-03-28 VITALS
RESPIRATION RATE: 23 BRPM | DIASTOLIC BLOOD PRESSURE: 109 MMHG | SYSTOLIC BLOOD PRESSURE: 129 MMHG | OXYGEN SATURATION: 100 %

## 2019-03-28 VITALS
OXYGEN SATURATION: 97 % | HEART RATE: 85 BPM | TEMPERATURE: 97 F | DIASTOLIC BLOOD PRESSURE: 84 MMHG | BODY MASS INDEX: 32.51 KG/M2 | RESPIRATION RATE: 12 BRPM | SYSTOLIC BLOOD PRESSURE: 149 MMHG | WEIGHT: 267 LBS | HEIGHT: 76 IN

## 2019-03-28 LAB
GLUCOSE BLD-MCNC: 97 MG/DL (ref 70–99)
PERFORMED ON: NORMAL

## 2019-03-28 PROCEDURE — 3700000000 HC ANESTHESIA ATTENDED CARE: Performed by: DENTIST

## 2019-03-28 PROCEDURE — 6360000002 HC RX W HCPCS: Performed by: DENTIST

## 2019-03-28 PROCEDURE — 7100000011 HC PHASE II RECOVERY - ADDTL 15 MIN: Performed by: DENTIST

## 2019-03-28 PROCEDURE — 6360000002 HC RX W HCPCS: Performed by: NURSE ANESTHETIST, CERTIFIED REGISTERED

## 2019-03-28 PROCEDURE — 2709999900 HC NON-CHARGEABLE SUPPLY: Performed by: DENTIST

## 2019-03-28 PROCEDURE — 3600000014 HC SURGERY LEVEL 4 ADDTL 15MIN: Performed by: DENTIST

## 2019-03-28 PROCEDURE — 6370000000 HC RX 637 (ALT 250 FOR IP): Performed by: NURSE ANESTHETIST, CERTIFIED REGISTERED

## 2019-03-28 PROCEDURE — 3700000001 HC ADD 15 MINUTES (ANESTHESIA): Performed by: DENTIST

## 2019-03-28 PROCEDURE — 6360000002 HC RX W HCPCS: Performed by: ANESTHESIOLOGY

## 2019-03-28 PROCEDURE — 2580000003 HC RX 258: Performed by: ANESTHESIOLOGY

## 2019-03-28 PROCEDURE — 2500000003 HC RX 250 WO HCPCS: Performed by: DENTIST

## 2019-03-28 PROCEDURE — 7100000010 HC PHASE II RECOVERY - FIRST 15 MIN: Performed by: DENTIST

## 2019-03-28 PROCEDURE — 3600000004 HC SURGERY LEVEL 4 BASE: Performed by: DENTIST

## 2019-03-28 PROCEDURE — 7100000001 HC PACU RECOVERY - ADDTL 15 MIN: Performed by: DENTIST

## 2019-03-28 PROCEDURE — 2580000003 HC RX 258: Performed by: DENTIST

## 2019-03-28 PROCEDURE — 7100000000 HC PACU RECOVERY - FIRST 15 MIN: Performed by: DENTIST

## 2019-03-28 PROCEDURE — 2500000003 HC RX 250 WO HCPCS: Performed by: NURSE ANESTHETIST, CERTIFIED REGISTERED

## 2019-03-28 RX ORDER — FENTANYL CITRATE 50 UG/ML
INJECTION, SOLUTION INTRAMUSCULAR; INTRAVENOUS PRN
Status: DISCONTINUED | OUTPATIENT
Start: 2019-03-28 | End: 2019-03-28 | Stop reason: SDUPTHER

## 2019-03-28 RX ORDER — MORPHINE SULFATE 4 MG/ML
2 INJECTION, SOLUTION INTRAMUSCULAR; INTRAVENOUS EVERY 5 MIN PRN
Status: DISCONTINUED | OUTPATIENT
Start: 2019-03-28 | End: 2019-03-28 | Stop reason: HOSPADM

## 2019-03-28 RX ORDER — ONDANSETRON 2 MG/ML
4 INJECTION INTRAMUSCULAR; INTRAVENOUS
Status: DISCONTINUED | OUTPATIENT
Start: 2019-03-28 | End: 2019-03-28 | Stop reason: HOSPADM

## 2019-03-28 RX ORDER — MORPHINE SULFATE 4 MG/ML
1 INJECTION, SOLUTION INTRAMUSCULAR; INTRAVENOUS EVERY 5 MIN PRN
Status: DISCONTINUED | OUTPATIENT
Start: 2019-03-28 | End: 2019-03-28 | Stop reason: HOSPADM

## 2019-03-28 RX ORDER — EPHEDRINE SULFATE 50 MG/ML
INJECTION INTRAVENOUS PRN
Status: DISCONTINUED | OUTPATIENT
Start: 2019-03-28 | End: 2019-03-28 | Stop reason: SDUPTHER

## 2019-03-28 RX ORDER — OXYCODONE HYDROCHLORIDE AND ACETAMINOPHEN 5; 325 MG/1; MG/1
2 TABLET ORAL PRN
Status: DISCONTINUED | OUTPATIENT
Start: 2019-03-28 | End: 2019-03-28 | Stop reason: HOSPADM

## 2019-03-28 RX ORDER — OXYMETAZOLINE HYDROCHLORIDE 0.05 G/100ML
SPRAY NASAL
Status: COMPLETED
Start: 2019-03-28 | End: 2019-03-28

## 2019-03-28 RX ORDER — OXYCODONE HYDROCHLORIDE AND ACETAMINOPHEN 5; 325 MG/1; MG/1
1 TABLET ORAL PRN
Status: DISCONTINUED | OUTPATIENT
Start: 2019-03-28 | End: 2019-03-28 | Stop reason: HOSPADM

## 2019-03-28 RX ORDER — LIDOCAINE HYDROCHLORIDE 10 MG/ML
INJECTION, SOLUTION INFILTRATION; PERINEURAL PRN
Status: DISCONTINUED | OUTPATIENT
Start: 2019-03-28 | End: 2019-03-28 | Stop reason: SDUPTHER

## 2019-03-28 RX ORDER — PROMETHAZINE HYDROCHLORIDE 25 MG/ML
6.25 INJECTION, SOLUTION INTRAMUSCULAR; INTRAVENOUS
Status: DISCONTINUED | OUTPATIENT
Start: 2019-03-28 | End: 2019-03-28 | Stop reason: HOSPADM

## 2019-03-28 RX ORDER — MEPERIDINE HYDROCHLORIDE 50 MG/ML
12.5 INJECTION INTRAMUSCULAR; INTRAVENOUS; SUBCUTANEOUS EVERY 5 MIN PRN
Status: DISCONTINUED | OUTPATIENT
Start: 2019-03-28 | End: 2019-03-28 | Stop reason: HOSPADM

## 2019-03-28 RX ORDER — OXYMETAZOLINE HYDROCHLORIDE 0.05 G/100ML
SPRAY NASAL PRN
Status: DISCONTINUED | OUTPATIENT
Start: 2019-03-28 | End: 2019-03-28 | Stop reason: SDUPTHER

## 2019-03-28 RX ORDER — SODIUM CHLORIDE 0.9 % (FLUSH) 0.9 %
10 SYRINGE (ML) INJECTION PRN
Status: DISCONTINUED | OUTPATIENT
Start: 2019-03-28 | End: 2019-03-28 | Stop reason: HOSPADM

## 2019-03-28 RX ORDER — HYDRALAZINE HYDROCHLORIDE 20 MG/ML
5 INJECTION INTRAMUSCULAR; INTRAVENOUS EVERY 10 MIN PRN
Status: DISCONTINUED | OUTPATIENT
Start: 2019-03-28 | End: 2019-03-28 | Stop reason: HOSPADM

## 2019-03-28 RX ORDER — MAGNESIUM HYDROXIDE 1200 MG/15ML
LIQUID ORAL CONTINUOUS PRN
Status: COMPLETED | OUTPATIENT
Start: 2019-03-28 | End: 2019-03-28

## 2019-03-28 RX ORDER — LIDOCAINE HYDROCHLORIDE 10 MG/ML
1 INJECTION, SOLUTION EPIDURAL; INFILTRATION; INTRACAUDAL; PERINEURAL
Status: DISCONTINUED | OUTPATIENT
Start: 2019-03-28 | End: 2019-03-28 | Stop reason: HOSPADM

## 2019-03-28 RX ORDER — LABETALOL HYDROCHLORIDE 5 MG/ML
5 INJECTION, SOLUTION INTRAVENOUS EVERY 10 MIN PRN
Status: DISCONTINUED | OUTPATIENT
Start: 2019-03-28 | End: 2019-03-28 | Stop reason: HOSPADM

## 2019-03-28 RX ORDER — ONDANSETRON 2 MG/ML
INJECTION INTRAMUSCULAR; INTRAVENOUS PRN
Status: DISCONTINUED | OUTPATIENT
Start: 2019-03-28 | End: 2019-03-28 | Stop reason: SDUPTHER

## 2019-03-28 RX ORDER — PROPOFOL 10 MG/ML
INJECTION, EMULSION INTRAVENOUS PRN
Status: DISCONTINUED | OUTPATIENT
Start: 2019-03-28 | End: 2019-03-28 | Stop reason: SDUPTHER

## 2019-03-28 RX ORDER — BUPIVACAINE HYDROCHLORIDE AND EPINEPHRINE 5; 5 MG/ML; UG/ML
INJECTION, SOLUTION PERINEURAL PRN
Status: DISCONTINUED | OUTPATIENT
Start: 2019-03-28 | End: 2019-03-28 | Stop reason: ALTCHOICE

## 2019-03-28 RX ORDER — SODIUM CHLORIDE 0.9 % (FLUSH) 0.9 %
10 SYRINGE (ML) INJECTION EVERY 12 HOURS SCHEDULED
Status: DISCONTINUED | OUTPATIENT
Start: 2019-03-28 | End: 2019-03-28 | Stop reason: HOSPADM

## 2019-03-28 RX ORDER — SODIUM CHLORIDE, SODIUM LACTATE, POTASSIUM CHLORIDE, CALCIUM CHLORIDE 600; 310; 30; 20 MG/100ML; MG/100ML; MG/100ML; MG/100ML
INJECTION, SOLUTION INTRAVENOUS CONTINUOUS
Status: DISCONTINUED | OUTPATIENT
Start: 2019-03-28 | End: 2019-03-28 | Stop reason: HOSPADM

## 2019-03-28 RX ORDER — LIDOCAINE HYDROCHLORIDE AND EPINEPHRINE 10; 10 MG/ML; UG/ML
INJECTION, SOLUTION INFILTRATION; PERINEURAL PRN
Status: DISCONTINUED | OUTPATIENT
Start: 2019-03-28 | End: 2019-03-28 | Stop reason: ALTCHOICE

## 2019-03-28 RX ORDER — DEXAMETHASONE SODIUM PHOSPHATE 4 MG/ML
INJECTION, SOLUTION INTRA-ARTICULAR; INTRALESIONAL; INTRAMUSCULAR; INTRAVENOUS; SOFT TISSUE PRN
Status: DISCONTINUED | OUTPATIENT
Start: 2019-03-28 | End: 2019-03-28 | Stop reason: SDUPTHER

## 2019-03-28 RX ORDER — ROCURONIUM BROMIDE 10 MG/ML
INJECTION, SOLUTION INTRAVENOUS PRN
Status: DISCONTINUED | OUTPATIENT
Start: 2019-03-28 | End: 2019-03-28 | Stop reason: SDUPTHER

## 2019-03-28 RX ORDER — GLYCOPYRROLATE 0.2 MG/ML
INJECTION INTRAMUSCULAR; INTRAVENOUS PRN
Status: DISCONTINUED | OUTPATIENT
Start: 2019-03-28 | End: 2019-03-28 | Stop reason: SDUPTHER

## 2019-03-28 RX ORDER — DIPHENHYDRAMINE HYDROCHLORIDE 50 MG/ML
12.5 INJECTION INTRAMUSCULAR; INTRAVENOUS
Status: DISCONTINUED | OUTPATIENT
Start: 2019-03-28 | End: 2019-03-28 | Stop reason: HOSPADM

## 2019-03-28 RX ADMIN — EPHEDRINE SULFATE 10 MG: 50 INJECTION INTRAVENOUS at 08:14

## 2019-03-28 RX ADMIN — ONDANSETRON 4 MG: 2 INJECTION INTRAMUSCULAR; INTRAVENOUS at 08:40

## 2019-03-28 RX ADMIN — GLYCOPYRROLATE 0.2 MG: 0.2 INJECTION, SOLUTION INTRAMUSCULAR; INTRAVENOUS at 08:10

## 2019-03-28 RX ADMIN — Medication 2 G: at 07:46

## 2019-03-28 RX ADMIN — SUGAMMADEX 200 MG: 100 INJECTION, SOLUTION INTRAVENOUS at 08:40

## 2019-03-28 RX ADMIN — PHENYLEPHRINE HYDROCHLORIDE 100 MCG: 10 INJECTION INTRAVENOUS at 07:52

## 2019-03-28 RX ADMIN — PHENYLEPHRINE HYDROCHLORIDE 200 MCG: 10 INJECTION INTRAVENOUS at 08:16

## 2019-03-28 RX ADMIN — SODIUM CHLORIDE, POTASSIUM CHLORIDE, SODIUM LACTATE AND CALCIUM CHLORIDE: 600; 310; 30; 20 INJECTION, SOLUTION INTRAVENOUS at 08:16

## 2019-03-28 RX ADMIN — FENTANYL CITRATE 100 MCG: 50 INJECTION, SOLUTION INTRAMUSCULAR; INTRAVENOUS at 07:30

## 2019-03-28 RX ADMIN — ROCURONIUM BROMIDE 50 MG: 10 SOLUTION INTRAVENOUS at 07:41

## 2019-03-28 RX ADMIN — PROPOFOL 300 MG: 10 INJECTION, EMULSION INTRAVENOUS at 07:41

## 2019-03-28 RX ADMIN — PHENYLEPHRINE HYDROCHLORIDE 100 MCG: 10 INJECTION INTRAVENOUS at 08:23

## 2019-03-28 RX ADMIN — PHENYLEPHRINE HYDROCHLORIDE 100 MCG: 10 INJECTION INTRAVENOUS at 08:00

## 2019-03-28 RX ADMIN — SODIUM CHLORIDE, POTASSIUM CHLORIDE, SODIUM LACTATE AND CALCIUM CHLORIDE: 600; 310; 30; 20 INJECTION, SOLUTION INTRAVENOUS at 07:17

## 2019-03-28 RX ADMIN — FENTANYL CITRATE 50 MCG: 50 INJECTION, SOLUTION INTRAMUSCULAR; INTRAVENOUS at 07:41

## 2019-03-28 RX ADMIN — OXYMETAZOLINE HYDROCHLORIDE 2 SPRAY: 5 SPRAY NASAL at 07:22

## 2019-03-28 RX ADMIN — LIDOCAINE HYDROCHLORIDE 50 MG: 10 INJECTION, SOLUTION INFILTRATION; PERINEURAL at 07:41

## 2019-03-28 RX ADMIN — DEXAMETHASONE SODIUM PHOSPHATE 12 MG: 4 INJECTION, SOLUTION INTRAMUSCULAR; INTRAVENOUS at 07:52

## 2019-03-28 RX ADMIN — HYDROMORPHONE HYDROCHLORIDE 0.5 MG: 1 INJECTION, SOLUTION INTRAMUSCULAR; INTRAVENOUS; SUBCUTANEOUS at 09:20

## 2019-03-28 RX ADMIN — PHENYLEPHRINE HYDROCHLORIDE 200 MCG: 10 INJECTION INTRAVENOUS at 08:35

## 2019-03-28 ASSESSMENT — PULMONARY FUNCTION TESTS
PIF_VALUE: 20
PIF_VALUE: 2
PIF_VALUE: 17
PIF_VALUE: 19
PIF_VALUE: 12
PIF_VALUE: 21
PIF_VALUE: 20
PIF_VALUE: 19
PIF_VALUE: 20
PIF_VALUE: 18
PIF_VALUE: 21
PIF_VALUE: 20
PIF_VALUE: 19
PIF_VALUE: 1
PIF_VALUE: 20
PIF_VALUE: 24
PIF_VALUE: 20
PIF_VALUE: 21
PIF_VALUE: 20
PIF_VALUE: 18
PIF_VALUE: 20
PIF_VALUE: 1
PIF_VALUE: 20
PIF_VALUE: 16
PIF_VALUE: 21
PIF_VALUE: 19
PIF_VALUE: 20
PIF_VALUE: 21
PIF_VALUE: 18
PIF_VALUE: 3
PIF_VALUE: 16
PIF_VALUE: 5
PIF_VALUE: 18
PIF_VALUE: 20
PIF_VALUE: 12
PIF_VALUE: 20
PIF_VALUE: 19
PIF_VALUE: 19
PIF_VALUE: 2
PIF_VALUE: 21
PIF_VALUE: 18
PIF_VALUE: 22
PIF_VALUE: 20
PIF_VALUE: 3
PIF_VALUE: 21
PIF_VALUE: 19
PIF_VALUE: 19
PIF_VALUE: 20
PIF_VALUE: 19
PIF_VALUE: 1
PIF_VALUE: 20
PIF_VALUE: 19
PIF_VALUE: 20
PIF_VALUE: 18
PIF_VALUE: 20
PIF_VALUE: 18
PIF_VALUE: 19
PIF_VALUE: 20
PIF_VALUE: 18
PIF_VALUE: 21
PIF_VALUE: 4
PIF_VALUE: 18
PIF_VALUE: 2
PIF_VALUE: 19
PIF_VALUE: 20

## 2019-03-28 ASSESSMENT — PAIN SCALES - GENERAL
PAINLEVEL_OUTOF10: 0
PAINLEVEL_OUTOF10: 10

## 2019-03-28 ASSESSMENT — PAIN DESCRIPTION - PAIN TYPE: TYPE: SURGICAL PAIN

## 2019-03-28 ASSESSMENT — LIFESTYLE VARIABLES: SMOKING_STATUS: 1

## 2019-03-28 ASSESSMENT — PAIN DESCRIPTION - LOCATION: LOCATION: MOUTH

## 2019-03-28 ASSESSMENT — ENCOUNTER SYMPTOMS: SHORTNESS OF BREATH: 1

## 2019-03-28 NOTE — OP NOTE
dose used. The oral cavity  was then suctioned and verified to be free of debris. The throat pack  was removed and an OG tube was passed per Anesthesia request.  The upper  and lower dentures were then delivered. Gauze pack placed  interocclusally for retention. The patient then re-emerged from  anesthesia and was extubated without difficulty and taken to the PACU  for recovery. DISPOSITION:  The patient will be discharged home to the care of his  family once he meets PACU/anesthesia criteria. COMPLICATIONS:  None. ESTIMATED BLOOD LOSS:  Less than 50 mL. FOLLOWUP:  Daughter/power of  is to call my office for followup  in two weeks. He is to follow up as scheduled with his general Dr. Cristian Galvin in 24 hours for denture adjustment. POSTOPERATIVE INSTRUCTIONS:  Instructions given by phone and in a  written form to the daughter. PRESCRIPTIONS:  Norco 5/325 take one tab q.4-6 hours p.r.n. pain, 12. No refills.         Cammy Perdomo DMD    D: 03/28/2019 11:02:17       T: 03/28/2019 15:42:52     [email or web address]  Job#: [id number]     Doc#: [id number]    CC:

## 2019-03-28 NOTE — DISCHARGE INSTR - COC
Continuity of Care Form    Patient Name: Osmin Andrea   :  1950  MRN:  [id number]    Admit date:  3/28/2019  Discharge date:  ***    Code Status Order: Full Code   Advance Directives:   Advance Care Flowsheet Documentation     Date/Time Healthcare Directive Type of Healthcare Directive Copy in [address] Agent's Name Healthcare Agent's Phone Number    19 1211  Yes, patient has an advance directive for healthcare treatment  Living will;Durable power of  for health care --  Adult Children  [address] --          Admitting Physician:  Josef Sebastian., DMD  PCP: Fallon Zuniga MD    Discharging Nurse: [facility] Unit/Room#: OR Pool/NONE  Discharging Unit Phone Number: ***    Emergency Contact:   Extended Emergency Contact Information  Primary Emergency Contact: Chandni Garner  Address: [address] [place] [address], [address]  Home Phone: [phone]  Mobile Phone: [phone]  Relation: Spouse  Secondary Emergency Contact: Meghann [address] Phone: [phone]  Relation: Child    Past Surgical History:  Past Surgical History:   Procedure Laterality Date    KNEE SURGERY      OTHER SURGICAL HISTORY  10/11/13     CYSTOSCOPY, TRANSURETHRAL RESECTION OF PROSTATE WITH OLYMPUS    PROSTATE BIOPSY      UPPER GASTROINTESTINAL ENDOSCOPY   17       Immunization History:   Immunization History   Administered Date(s) Administered    Influenza Virus Vaccine 2014    Pneumococcal Polysaccharide ([id number]) 2015    Td Vaccine >8yo [facility] 2008       Active Problems:  Patient Active Problem List   Diagnosis Code    Hx BPH w/urinary retention s/p prostatectomy R33.9    Hypertension I10    Cigar smoker Z72.0    Obesity (BMI 30-39. 9) E66.9    Diabetic ketoacidosis without coma associated with type 1 diabetes mellitus ([facility] E10.10    Lactic acidosis E87.2    Hyperkalemia E87.5    MAISHA (acute kidney injury) ([facility] N17.9    SIRS (systemic inflammatory response syndrome) ([facility]) R65.10    Volume depletion E86.9    Hypotension I95.9    Orthostatic dizziness R42    Hemorrhagic shock T81.19XA    Upper GI bleed K92.2    Atrial fibrillation with RVR ([facility]) I48.91    Acute blood loss anemia B65    Metabolic acidosis with respiratory acidosis E87.4    Hyperglycemia R73.9    Elevated lactic acid level R79.89    NSVT (nonsustained ventricular tachycardia) ([facility]) I47.2    DM (diabetes mellitus), secondary, uncontrolled, w/neurologic complic ([facility]) Z99.61, Y83.29    ESRD (end stage renal disease) ([facility]) N18.6    Hyponatremia E87.1    Anemia D64.9    SOB (shortness of breath) R06.02    Urinary tract infection with hematuria N39.0, R31.9    Hemodialysis patient ([facility] N99.5    Metabolic encephalopathy N39.60    Acute cystitis with hematuria N30.01    Positive blood culture R78.81    Altered mental status R41.82    Chronic kidney disease (CKD) N18.9    Arterial ischemic stroke, ICA, left, acute ([facility]) B06.977    Encephalopathy, metabolic T59.47    HTN (hypertension), benign I10    CKD (chronic kidney disease) N18.9    Dyslipidemia E78.5    Acute metabolic encephalopathy L96.13    Bradycardia R00.1    Impulse control disorder in adult F63.9    Dementia due to Alzheimer's disease G30.9, F02.80    PAF (paroxysmal atrial fibrillation) ([facility]) I48.0       Isolation/Infection:   Isolation          No Isolation            Nurse Assessment:  Last Vital Signs: BP (!) 152/92   Pulse 87   Resp 16   Ht 6' 4\" (1.93 m)   Wt 267 lb (121.1 kg)   SpO2 100%   BMI 32.50 kg/m²     Last documented pain score (0-10 scale):    Last Weight:   Wt Readings from Last 1 Encounters:   03/22/19 267 lb (121.1 kg)     Mental Status:  {IP PT MENTAL STATUS:60516}    IV Access:  {[facility] IV ACCESS:253260742}    Nursing Mobility/ADLs:  Walking   {P DME HPGD:591115056}  Transfer  {P DME JCXA:820017774}  Bathing  {P DME XCBZ:867719063}  Dressing  {CHP DME PVZN:872112858}  Toileting  {CHP DME GFCK:223640302}  Feeding  {CHP DME FKRN:153540898}  Med Admin  {CHP DME EZUI:259148006}  Med Delivery   { YAS MED Delivery:302619563}    Wound Care Documentation and Therapy:        Elimination:  Continence:   · Bowel: {YES / WY:58253}  · Bladder: {YES / NM:30275}  Urinary Catheter: {Urinary Catheter:380302670}   Colostomy/Ileostomy/Ileal Conduit: {YES / EA:92401}       Date of Last BM: ***  No intake or output data in the 24 hours ending 19 0731  No intake/output data recorded.     Safety Concerns:     [address] Safety Concerns:641024952}    Impairments/Disabilities:      [address] Impairments/Disabilities:611288600}    Nutrition Therapy:  Current Nutrition Therapy:   [address] Diet List:728496859}    Routes of Feeding: {CHP DME Other Feedings:540961349}  Liquids: {Slp liquid thickness:38909}  Daily Fluid Restriction: {CHP DME Yes amt example:718462343}  Last Modified Barium Swallow with Video (Video Swallowing Test): {Done Not Done VDE}    Treatments at the Time of Hospital Discharge:   Respiratory Treatments: ***  Oxygen Therapy:  {Therapy; copd oxygen:53106}  Ventilator:    { CC Vent BHVL:297497326}    Rehab Therapies: {THERAPEUTIC INTERVENTION:7402797857}  Weight Bearing Status/Restrictions: [address] Weight Bearin}  Other Medical Equipment (for information only, NOT a DME order):  {EQUIPMENT:019174227}  Other Treatments: ***    Patient's personal belongings (please select all that are sent with patient):  {P DME Belongings:832383064}    RN SIGNATURE:  {Esignature:223209922}    CASE MANAGEMENT/SOCIAL WORK SECTION    Inpatient Status Date: ***    Readmission Risk Assessment Score:  Readmission Risk              Risk of Unplanned Readmission:        16           Discharging to Facility/ Agency   · Name:   · Address:  · Phone:  · Fax:    Dialysis Facility (if applicable)   · Name:  · Address:  · Dialysis Schedule:  · Phone:  · Fax:    / signature: {Esignature:412641628}    PHYSICIAN SECTION    Prognosis: {Prognosis:8160541921}    Condition at Discharge: Lencho Silvestre Patient Condition:762860159}    Rehab Potential (if transferring to Rehab): {Prognosis:7287384730}    Recommended Labs or Other Treatments After Discharge: ***    Physician Certification: I certify the above information and transfer of Gavin Martinez  is necessary for the continuing treatment of the diagnosis listed and that he requires {Admit to Appropriate Level of Care:68239} for {GREATER/LESS:077088854} 30 days.      Update Admission H&P: {CHP DME Changes in LPPPX:914143750}    PHYSICIAN SIGNATURE:  {Esignature:575058450}

## 2019-03-28 NOTE — PROGRESS NOTES
Pt arrived to PACU in stable condition. Upper and lower dentures and gauze in place. No drainage on gauze. Will monitor.

## 2019-04-26 ENCOUNTER — APPOINTMENT (OUTPATIENT)
Dept: GENERAL RADIOLOGY | Age: 69
End: 2019-04-26
Payer: MEDICARE

## 2019-04-26 ENCOUNTER — HOSPITAL ENCOUNTER (OUTPATIENT)
Age: 69
Setting detail: OBSERVATION
Discharge: HOME | End: 2019-04-29
Attending: EMERGENCY MEDICINE | Admitting: INTERNAL MEDICINE
Payer: MEDICARE

## 2019-04-26 DIAGNOSIS — N39.0 ACUTE UTI: Primary | ICD-10-CM

## 2019-04-26 DIAGNOSIS — R19.7 DIARRHEA OF PRESUMED INFECTIOUS ORIGIN: ICD-10-CM

## 2019-04-26 DIAGNOSIS — E86.0 DEHYDRATION: ICD-10-CM

## 2019-04-26 DIAGNOSIS — R41.0 CONFUSION: ICD-10-CM

## 2019-04-26 LAB
A/G RATIO: 0.8 (ref 1.1–2.2)
ALBUMIN SERPL-MCNC: 3.1 G/DL (ref 3.4–5)
ALP BLD-CCNC: 55 U/L (ref 40–129)
ALT SERPL-CCNC: 18 U/L (ref 10–40)
ANION GAP SERPL CALCULATED.3IONS-SCNC: 10 MMOL/L (ref 3–16)
AST SERPL-CCNC: 55 U/L (ref 15–37)
BASE EXCESS VENOUS: -0.2 MMOL/L (ref -3–3)
BASOPHILS ABSOLUTE: 0.1 K/UL (ref 0–0.2)
BASOPHILS RELATIVE PERCENT: 0.9 %
BILIRUB SERPL-MCNC: 0.5 MG/DL (ref 0–1)
BUN BLDV-MCNC: 29 MG/DL (ref 7–20)
CALCIUM SERPL-MCNC: 9 MG/DL (ref 8.3–10.6)
CARBOXYHEMOGLOBIN: 0.8 % (ref 0–1.5)
CHLORIDE BLD-SCNC: 104 MMOL/L (ref 99–110)
CO2: 23 MMOL/L (ref 21–32)
CREAT SERPL-MCNC: 1.5 MG/DL (ref 0.8–1.3)
EOSINOPHILS ABSOLUTE: 0.1 K/UL (ref 0–0.6)
EOSINOPHILS RELATIVE PERCENT: 2 %
GFR AFRICAN AMERICAN: 56
GFR NON-AFRICAN AMERICAN: 46
GLOBULIN: 4.1 G/DL
GLUCOSE BLD-MCNC: 170 MG/DL (ref 70–99)
HCO3 VENOUS: 25.1 MMOL/L (ref 23–29)
HCT VFR BLD CALC: 35.5 % (ref 40.5–52.5)
HEMOGLOBIN: 12 G/DL (ref 13.5–17.5)
LACTIC ACID: 1.6 MMOL/L (ref 0.4–2)
LYMPHOCYTES ABSOLUTE: 1.3 K/UL (ref 1–5.1)
LYMPHOCYTES RELATIVE PERCENT: 21.5 %
MCH RBC QN AUTO: 29 PG (ref 26–34)
MCHC RBC AUTO-ENTMCNC: 33.7 G/DL (ref 31–36)
MCV RBC AUTO: 86 FL (ref 80–100)
METHEMOGLOBIN VENOUS: 0.5 %
MONOCYTES ABSOLUTE: 0.6 K/UL (ref 0–1.3)
MONOCYTES RELATIVE PERCENT: 9.2 %
NEUTROPHILS ABSOLUTE: 4.2 K/UL (ref 1.7–7.7)
NEUTROPHILS RELATIVE PERCENT: 66.4 %
O2 CONTENT, VEN: 8 VOL %
O2 SAT, VEN: 46 %
O2 THERAPY: NORMAL
PCO2, VEN: 43.4 MMHG (ref 40–50)
PDW BLD-RTO: 17.7 % (ref 12.4–15.4)
PH VENOUS: 7.38 (ref 7.35–7.45)
PLATELET # BLD: 139 K/UL (ref 135–450)
PMV BLD AUTO: 9.5 FL (ref 5–10.5)
PO2, VEN: 28.3 MMHG (ref 25–40)
POTASSIUM SERPL-SCNC: 4.7 MMOL/L (ref 3.5–5.1)
RBC # BLD: 4.13 M/UL (ref 4.2–5.9)
SODIUM BLD-SCNC: 137 MMOL/L (ref 136–145)
TCO2 CALC VENOUS: 26 MMOL/L
TOTAL PROTEIN: 7.2 G/DL (ref 6.4–8.2)
WBC # BLD: 6.3 K/UL (ref 4–11)

## 2019-04-26 PROCEDURE — 84145 PROCALCITONIN (PCT): CPT

## 2019-04-26 PROCEDURE — 71045 X-RAY EXAM CHEST 1 VIEW: CPT

## 2019-04-26 PROCEDURE — 84484 ASSAY OF TROPONIN QUANT: CPT

## 2019-04-26 PROCEDURE — 83605 ASSAY OF LACTIC ACID: CPT

## 2019-04-26 PROCEDURE — 80053 COMPREHEN METABOLIC PANEL: CPT

## 2019-04-26 PROCEDURE — 85025 COMPLETE CBC W/AUTO DIFF WBC: CPT

## 2019-04-26 PROCEDURE — 82803 BLOOD GASES ANY COMBINATION: CPT

## 2019-04-26 PROCEDURE — 99285 EMERGENCY DEPT VISIT HI MDM: CPT

## 2019-04-26 ASSESSMENT — PAIN SCALES - GENERAL: PAINLEVEL_OUTOF10: 2

## 2019-04-27 ENCOUNTER — APPOINTMENT (OUTPATIENT)
Dept: CT IMAGING | Age: 69
End: 2019-04-27
Payer: MEDICARE

## 2019-04-27 LAB
A/G RATIO: 0.9 (ref 1.1–2.2)
ALBUMIN SERPL-MCNC: 3 G/DL (ref 3.4–5)
ALP BLD-CCNC: 50 U/L (ref 40–129)
ALT SERPL-CCNC: 13 U/L (ref 10–40)
ANION GAP SERPL CALCULATED.3IONS-SCNC: 9 MMOL/L (ref 3–16)
AST SERPL-CCNC: 29 U/L (ref 15–37)
BACTERIA: ABNORMAL /HPF
BASOPHILS ABSOLUTE: 0 K/UL (ref 0–0.2)
BASOPHILS RELATIVE PERCENT: 0.7 %
BILIRUB SERPL-MCNC: 0.5 MG/DL (ref 0–1)
BILIRUBIN URINE: NEGATIVE
BLOOD, URINE: NEGATIVE
BUN BLDV-MCNC: 27 MG/DL (ref 7–20)
CALCIUM SERPL-MCNC: 8.4 MG/DL (ref 8.3–10.6)
CHLORIDE BLD-SCNC: 106 MMOL/L (ref 99–110)
CLARITY: ABNORMAL
CO2: 26 MMOL/L (ref 21–32)
COLOR: YELLOW
CREAT SERPL-MCNC: 1.2 MG/DL (ref 0.8–1.3)
CREATININE URINE: 51.3 MG/DL (ref 39–259)
EKG ATRIAL RATE: 66 BPM
EKG DIAGNOSIS: NORMAL
EKG Q-T INTERVAL: 386 MS
EKG QRS DURATION: 100 MS
EKG QTC CALCULATION (BAZETT): 410 MS
EKG R AXIS: -62 DEGREES
EKG T AXIS: 37 DEGREES
EKG VENTRICULAR RATE: 68 BPM
EOSINOPHILS ABSOLUTE: 0.1 K/UL (ref 0–0.6)
EOSINOPHILS RELATIVE PERCENT: 1.3 %
GFR AFRICAN AMERICAN: >60
GFR NON-AFRICAN AMERICAN: 60
GLOBULIN: 3.4 G/DL
GLUCOSE BLD-MCNC: 100 MG/DL (ref 70–99)
GLUCOSE BLD-MCNC: 107 MG/DL (ref 70–99)
GLUCOSE BLD-MCNC: 112 MG/DL (ref 70–99)
GLUCOSE BLD-MCNC: 119 MG/DL (ref 70–99)
GLUCOSE BLD-MCNC: 124 MG/DL (ref 70–99)
GLUCOSE URINE: NEGATIVE MG/DL
HCT VFR BLD CALC: 34.2 % (ref 40.5–52.5)
HEMOGLOBIN: 11.5 G/DL (ref 13.5–17.5)
KETONES, URINE: NEGATIVE MG/DL
LEUKOCYTE ESTERASE, URINE: ABNORMAL
LYMPHOCYTES ABSOLUTE: 1.2 K/UL (ref 1–5.1)
LYMPHOCYTES RELATIVE PERCENT: 20 %
MCH RBC QN AUTO: 29.1 PG (ref 26–34)
MCHC RBC AUTO-ENTMCNC: 33.7 G/DL (ref 31–36)
MCV RBC AUTO: 86.2 FL (ref 80–100)
MICROSCOPIC EXAMINATION: YES
MONOCYTES ABSOLUTE: 0.6 K/UL (ref 0–1.3)
MONOCYTES RELATIVE PERCENT: 10.4 %
MUCUS: ABNORMAL /LPF
NEUTROPHILS ABSOLUTE: 3.9 K/UL (ref 1.7–7.7)
NEUTROPHILS RELATIVE PERCENT: 67.6 %
NITRITE, URINE: POSITIVE
PDW BLD-RTO: 18.4 % (ref 12.4–15.4)
PERFORMED ON: ABNORMAL
PH UA: 5.5 (ref 5–8)
PLATELET # BLD: 154 K/UL (ref 135–450)
PMV BLD AUTO: 8.7 FL (ref 5–10.5)
POTASSIUM REFLEX MAGNESIUM: 3.7 MMOL/L (ref 3.5–5.1)
PROCALCITONIN: 0.06 NG/ML (ref 0–0.15)
PROTEIN UA: NEGATIVE MG/DL
RBC # BLD: 3.97 M/UL (ref 4.2–5.9)
RBC UA: ABNORMAL /HPF (ref 0–2)
SODIUM BLD-SCNC: 141 MMOL/L (ref 136–145)
SODIUM URINE: 138 MMOL/L
SPECIFIC GRAVITY UA: 1.01 (ref 1–1.03)
TOTAL PROTEIN: 6.4 G/DL (ref 6.4–8.2)
TROPONIN: <0.01 NG/ML
URINE REFLEX TO CULTURE: YES
URINE TYPE: ABNORMAL
UROBILINOGEN, URINE: 0.2 E.U./DL
VALPROIC ACID LEVEL: 23 UG/ML (ref 50–100)
WBC # BLD: 5.8 K/UL (ref 4–11)
WBC UA: ABNORMAL /HPF (ref 0–5)

## 2019-04-27 PROCEDURE — 96361 HYDRATE IV INFUSION ADD-ON: CPT

## 2019-04-27 PROCEDURE — 87186 SC STD MICRODIL/AGAR DIL: CPT

## 2019-04-27 PROCEDURE — 6370000000 HC RX 637 (ALT 250 FOR IP): Performed by: NURSE PRACTITIONER

## 2019-04-27 PROCEDURE — 93010 ELECTROCARDIOGRAM REPORT: CPT | Performed by: INTERNAL MEDICINE

## 2019-04-27 PROCEDURE — 87086 URINE CULTURE/COLONY COUNT: CPT

## 2019-04-27 PROCEDURE — 87449 NOS EACH ORGANISM AG IA: CPT

## 2019-04-27 PROCEDURE — 36415 COLL VENOUS BLD VENIPUNCTURE: CPT

## 2019-04-27 PROCEDURE — 6360000002 HC RX W HCPCS: Performed by: NURSE PRACTITIONER

## 2019-04-27 PROCEDURE — 93005 ELECTROCARDIOGRAM TRACING: CPT | Performed by: EMERGENCY MEDICINE

## 2019-04-27 PROCEDURE — 2580000003 HC RX 258: Performed by: EMERGENCY MEDICINE

## 2019-04-27 PROCEDURE — G0378 HOSPITAL OBSERVATION PER HR: HCPCS

## 2019-04-27 PROCEDURE — 96366 THER/PROPH/DIAG IV INF ADDON: CPT

## 2019-04-27 PROCEDURE — 6360000002 HC RX W HCPCS: Performed by: EMERGENCY MEDICINE

## 2019-04-27 PROCEDURE — 80053 COMPREHEN METABOLIC PANEL: CPT

## 2019-04-27 PROCEDURE — 84300 ASSAY OF URINE SODIUM: CPT

## 2019-04-27 PROCEDURE — 2580000003 HC RX 258: Performed by: NURSE PRACTITIONER

## 2019-04-27 PROCEDURE — 96372 THER/PROPH/DIAG INJ SC/IM: CPT

## 2019-04-27 PROCEDURE — 82570 ASSAY OF URINE CREATININE: CPT

## 2019-04-27 PROCEDURE — 96365 THER/PROPH/DIAG IV INF INIT: CPT

## 2019-04-27 PROCEDURE — 80164 ASSAY DIPROPYLACETIC ACD TOT: CPT

## 2019-04-27 PROCEDURE — 87077 CULTURE AEROBIC IDENTIFY: CPT

## 2019-04-27 PROCEDURE — 85025 COMPLETE CBC W/AUTO DIFF WBC: CPT

## 2019-04-27 PROCEDURE — 87324 CLOSTRIDIUM AG IA: CPT

## 2019-04-27 PROCEDURE — 81001 URINALYSIS AUTO W/SCOPE: CPT

## 2019-04-27 RX ORDER — DOCUSATE SODIUM 100 MG/1
100 CAPSULE, LIQUID FILLED ORAL 2 TIMES DAILY
Status: DISCONTINUED | OUTPATIENT
Start: 2019-04-27 | End: 2019-04-29 | Stop reason: HOSPADM

## 2019-04-27 RX ORDER — DIVALPROEX SODIUM 250 MG/1
1000 TABLET, DELAYED RELEASE ORAL NIGHTLY
Status: DISCONTINUED | OUTPATIENT
Start: 2019-04-27 | End: 2019-04-29 | Stop reason: HOSPADM

## 2019-04-27 RX ORDER — VITAMIN B COMPLEX
1 CAPSULE ORAL DAILY
Status: DISCONTINUED | OUTPATIENT
Start: 2019-04-27 | End: 2019-04-27 | Stop reason: RX

## 2019-04-27 RX ORDER — SODIUM CHLORIDE 9 MG/ML
1000 INJECTION, SOLUTION INTRAVENOUS CONTINUOUS
Status: DISCONTINUED | OUTPATIENT
Start: 2019-04-27 | End: 2019-04-28

## 2019-04-27 RX ORDER — AMLODIPINE BESYLATE 5 MG/1
5 TABLET ORAL 2 TIMES DAILY
Status: DISCONTINUED | OUTPATIENT
Start: 2019-04-27 | End: 2019-04-29 | Stop reason: HOSPADM

## 2019-04-27 RX ORDER — ATORVASTATIN CALCIUM 40 MG/1
40 TABLET, FILM COATED ORAL DAILY
Status: DISCONTINUED | OUTPATIENT
Start: 2019-04-27 | End: 2019-04-29 | Stop reason: HOSPADM

## 2019-04-27 RX ORDER — LOSARTAN POTASSIUM 100 MG/1
100 TABLET ORAL DAILY
Status: DISCONTINUED | OUTPATIENT
Start: 2019-04-27 | End: 2019-04-29 | Stop reason: HOSPADM

## 2019-04-27 RX ORDER — ASPIRIN 81 MG/1
81 TABLET, CHEWABLE ORAL DAILY
Status: DISCONTINUED | OUTPATIENT
Start: 2019-04-27 | End: 2019-04-29 | Stop reason: HOSPADM

## 2019-04-27 RX ORDER — DEXTROSE MONOHYDRATE 25 G/50ML
12.5 INJECTION, SOLUTION INTRAVENOUS PRN
Status: DISCONTINUED | OUTPATIENT
Start: 2019-04-27 | End: 2019-04-29 | Stop reason: HOSPADM

## 2019-04-27 RX ORDER — ACETAMINOPHEN 325 MG/1
650 TABLET ORAL EVERY 6 HOURS PRN
Status: DISCONTINUED | OUTPATIENT
Start: 2019-04-27 | End: 2019-04-29 | Stop reason: HOSPADM

## 2019-04-27 RX ORDER — NICOTINE POLACRILEX 4 MG
15 LOZENGE BUCCAL PRN
Status: DISCONTINUED | OUTPATIENT
Start: 2019-04-27 | End: 2019-04-29 | Stop reason: HOSPADM

## 2019-04-27 RX ORDER — PANTOPRAZOLE SODIUM 40 MG/1
40 TABLET, DELAYED RELEASE ORAL DAILY
Status: DISCONTINUED | OUTPATIENT
Start: 2019-04-27 | End: 2019-04-29 | Stop reason: HOSPADM

## 2019-04-27 RX ORDER — SODIUM CHLORIDE 0.9 % (FLUSH) 0.9 %
10 SYRINGE (ML) INJECTION PRN
Status: DISCONTINUED | OUTPATIENT
Start: 2019-04-27 | End: 2019-04-29 | Stop reason: HOSPADM

## 2019-04-27 RX ORDER — SODIUM CHLORIDE 9 MG/ML
INJECTION, SOLUTION INTRAVENOUS CONTINUOUS
Status: DISCONTINUED | OUTPATIENT
Start: 2019-04-27 | End: 2019-04-29

## 2019-04-27 RX ORDER — HEPARIN SODIUM 5000 [USP'U]/ML
5000 INJECTION, SOLUTION INTRAVENOUS; SUBCUTANEOUS EVERY 8 HOURS SCHEDULED
Status: DISCONTINUED | OUTPATIENT
Start: 2019-04-27 | End: 2019-04-29 | Stop reason: HOSPADM

## 2019-04-27 RX ORDER — ONDANSETRON 2 MG/ML
4 INJECTION INTRAMUSCULAR; INTRAVENOUS EVERY 6 HOURS PRN
Status: DISCONTINUED | OUTPATIENT
Start: 2019-04-27 | End: 2019-04-29 | Stop reason: HOSPADM

## 2019-04-27 RX ORDER — DIVALPROEX SODIUM 250 MG/1
500 TABLET, DELAYED RELEASE ORAL EVERY MORNING
Status: DISCONTINUED | OUTPATIENT
Start: 2019-04-27 | End: 2019-04-29 | Stop reason: HOSPADM

## 2019-04-27 RX ORDER — SODIUM CHLORIDE 0.9 % (FLUSH) 0.9 %
10 SYRINGE (ML) INJECTION EVERY 12 HOURS SCHEDULED
Status: DISCONTINUED | OUTPATIENT
Start: 2019-04-27 | End: 2019-04-29 | Stop reason: HOSPADM

## 2019-04-27 RX ORDER — DEXTROSE MONOHYDRATE 50 MG/ML
100 INJECTION, SOLUTION INTRAVENOUS PRN
Status: DISCONTINUED | OUTPATIENT
Start: 2019-04-27 | End: 2019-04-29 | Stop reason: HOSPADM

## 2019-04-27 RX ORDER — FENOFIBRATE 54 MG/1
54 TABLET ORAL DAILY
Status: DISCONTINUED | OUTPATIENT
Start: 2019-04-27 | End: 2019-04-29 | Stop reason: HOSPADM

## 2019-04-27 RX ADMIN — SODIUM CHLORIDE: 9 INJECTION, SOLUTION INTRAVENOUS at 22:11

## 2019-04-27 RX ADMIN — AMLODIPINE BESYLATE 5 MG: 5 TABLET ORAL at 22:01

## 2019-04-27 RX ADMIN — SODIUM CHLORIDE: 9 INJECTION, SOLUTION INTRAVENOUS at 04:32

## 2019-04-27 RX ADMIN — HEPARIN SODIUM 5000 UNITS: 5000 INJECTION INTRAVENOUS; SUBCUTANEOUS at 06:17

## 2019-04-27 RX ADMIN — SODIUM CHLORIDE: 9 INJECTION, SOLUTION INTRAVENOUS at 14:26

## 2019-04-27 RX ADMIN — FENOFIBRATE 54 MG: 54 TABLET ORAL at 09:28

## 2019-04-27 RX ADMIN — LOSARTAN POTASSIUM 100 MG: 100 TABLET, FILM COATED ORAL at 09:28

## 2019-04-27 RX ADMIN — DIVALPROEX SODIUM 500 MG: 250 TABLET, DELAYED RELEASE ORAL at 09:29

## 2019-04-27 RX ADMIN — ASPIRIN 81 MG 81 MG: 81 TABLET ORAL at 09:29

## 2019-04-27 RX ADMIN — CEFTRIAXONE SODIUM 1 G: 1 INJECTION, POWDER, FOR SOLUTION INTRAMUSCULAR; INTRAVENOUS at 01:00

## 2019-04-27 RX ADMIN — AMLODIPINE BESYLATE 5 MG: 5 TABLET ORAL at 09:29

## 2019-04-27 RX ADMIN — SODIUM CHLORIDE 1000 ML: 9 INJECTION, SOLUTION INTRAVENOUS at 01:00

## 2019-04-27 RX ADMIN — DOCUSATE SODIUM 100 MG: 100 CAPSULE, LIQUID FILLED ORAL at 22:01

## 2019-04-27 RX ADMIN — HEPARIN SODIUM 5000 UNITS: 5000 INJECTION INTRAVENOUS; SUBCUTANEOUS at 22:02

## 2019-04-27 RX ADMIN — HEPARIN SODIUM 5000 UNITS: 5000 INJECTION INTRAVENOUS; SUBCUTANEOUS at 14:30

## 2019-04-27 RX ADMIN — ATORVASTATIN CALCIUM 40 MG: 40 TABLET, FILM COATED ORAL at 09:29

## 2019-04-27 RX ADMIN — MAGNESIUM GLUCONATE 500 MG ORAL TABLET 400 MG: 500 TABLET ORAL at 09:28

## 2019-04-27 RX ADMIN — PANTOPRAZOLE SODIUM 40 MG: 40 TABLET, DELAYED RELEASE ORAL at 09:29

## 2019-04-27 RX ADMIN — DIVALPROEX SODIUM 1000 MG: 250 TABLET, DELAYED RELEASE ORAL at 22:02

## 2019-04-27 ASSESSMENT — PAIN SCALES - GENERAL: PAINLEVEL_OUTOF10: 0

## 2019-04-27 NOTE — H&P
Hospital Medicine History & Physical      PCP: Shaila Chavez MD    Date of Admission: 4/26/2019    Date of Service: Pt seen/examined on 4/27/2019 and Admitted to Inpatient with expected LOS greater than two midnights due to medical therapy. Chief Complaint:  Altered mental status    History Of Present Illness:      68 y.o. male, PMH of DM, HTN, HLD and obesity, who presented to [facility] with altered mental status. History obtained from the patient and review of EMR. The patient stated he has been experiencing diarrhea for the last 2 weeks. He stated he was on antibiotics a few weeks ago for a UTI and has had \"explosive diarrhea\" since then. The patient stated his daughter brought him to the ED for further evaluation because she thought the patient was acting confused. At the time of this assessment, the patient appeared A&O x4. His son is at the bedside and stated the patients seems to be at his baseline. In the ED the patient was found to have a UTI and was started on rocephin and a C Diff was ordered. The patient denied any other associated symptoms as well as any aggravating and/or alleviating factors. At the time of this assessment, the patient was resting comfortably in bed. He currently denies any chest pain, back pain, abdominal pain, shortness of breath, numbness, tingling, N/V/C/D, fever and/or chills.      Past Medical History:          Diagnosis Date    Acute gastric ulcer with bleeding     Acute metabolic encephalopathy     MAISHA (acute kidney injury) ([address]     Requiring dialysis since 7/4/17 in setting of hemorrhagic shock    ATN (acute tubular necrosis) (HCC)     Atrial fib/flutter, transient     Atrial fibrillation (HCC)     BPH (benign prostatic hyperplasia)     Cerebral artery occlusion with cerebral infarction ([address] 08/05/2017    weakness and aphasia    Diabetes mellitus ([address]     ESRD (end stage renal disease) ([address]     HD had to be stopped due to hemorrhagic stroke    GI bleed     Glaucoma     History of blood transfusion     Hx of diabetic neuropathy     Hyperlipidemia     Hypertension     Mitral insufficiency     Mobility impaired     uses walker and W/C    Pulmonary hypertension (HCC)     Traumatic hemorrhagic shock (HCC)      Past Surgical History:          Procedure Laterality Date    DENTAL SURGERY N/A 3/28/2019    EXTRACTION OF ALL REMAINING UPPER AND LOWER TEETH AND ALVEOLOPLASTY   performed by Riccardo Suero DMD at [address] [place] [address] OTHER SURGICAL HISTORY  10/11/13     CYSTOSCOPY, TRANSURETHRAL RESECTION OF PROSTATE WITH OLYMPUS    PROSTATE BIOPSY      UPPER GASTROINTESTINAL ENDOSCOPY  7/13 17     Medications Prior to Admission:      Prior to Admission medications    Medication Sig Start Date End Date Taking?  Authorizing Provider   aspirin 81 MG tablet Take 81 mg by mouth daily   Yes Historical Provider, MD   divalproex (DEPAKOTE) 500 MG DR tablet Take 500 mg by mouth every morning    Yes Historical Provider, MD   divalproex (DEPAKOTE) 500 MG DR tablet Take 1,000 mg by mouth nightly   Yes Historical Provider, MD   b complex vitamins capsule Take 1 capsule by mouth daily   Yes Historical Provider, MD   melatonin 5 MG TABS tablet Take 5 mg by mouth nightly   Yes Historical Provider, MD   spironolactone (ALDACTONE) 25 MG tablet Take 25 mg by mouth daily   Yes Historical Provider, MD   atorvastatin (LIPITOR) 40 MG tablet Take 40 mg by mouth daily   Yes Historical Provider, MD   vitamin D (CHOLECALCIFEROL) 1000 UNIT TABS tablet Take 1,000 Units by mouth daily   Yes Historical Provider, MD   losartan (COZAAR) 50 MG tablet Take 100 mg by mouth daily    Yes Historical Provider, MD   magnesium oxide (MAG-OX) 400 MG tablet Take 400 mg by mouth daily   Yes Historical Provider, MD   QUEtiapine (SEROQUEL) 25 MG tablet Take 50 mg by mouth 2 times daily Indications: 50mg in AM 75 @ HS    Yes Historical Provider, MD   amLODIPine (NORVASC) 5 MG tablet Take 5 mg by mouth 2 times daily    Yes Historical Provider, MD   pantoprazole (PROTONIX) 40 MG tablet Take 40 mg by mouth daily   Yes Historical Provider, MD   acetaminophen (TYLENOL) 325 MG tablet Take 2 tablets by mouth every 6 hours as needed for Pain or Fever 8/2/17  Yes Rena Oneill MD   fenofibrate (TRICOR) 145 MG tablet Take 145 mg by mouth daily   Yes Historical Provider, MD     Allergies:  Bactrim [sulfamethoxazole-trimethoprim]    Social History:      The patient currently lives at home with his wife    TOBACCO:   reports that he quit smoking about 22 years ago. His smoking use included cigarettes. He has a 10.00 pack-year smoking history. He has quit using smokeless tobacco.  ETOH:   reports that he drinks alcohol. Family History:      Reviewed in detail. Positive as follows:        Problem Relation Age of Onset   [facility] Early Death Mother         age 54    Other Mother         head aneursym    Heart Attack Father     Diabetes Brother     Heart Disease Brother      REVIEW OF SYSTEMS:   Pertinent positives as noted in the HPI. All other systems reviewed and negative. PHYSICAL EXAM PERFORMED:    BP (!) 134/99   Pulse 74   Temp 97.6 °F (36.4 °C) (Oral)   Resp 16   Ht 6' 5\" (1.956 m)   Wt 259 lb (117.5 kg)   SpO2 100%   BMI 30.71 kg/m²     General appearance:  Pleasant, obese male in no apparent distress, appears stated age and cooperative. HEENT:  Pupils equal, round, and reactive to light. Extra ocular muscles intact. Conjunctivae/corneas clear. Neck: Supple, with full range of motion. No jugular venous distention. Trachea midline. Respiratory:  Normal respiratory effort. Clear to auscultation, bilaterally without Rales/Wheezes/Rhonchi. Cardiovascular:  Regular rate and rhythm with normal S1/S2 without murmurs, rubs or gallops. Abdomen: Soft, non-tender, non-distended with normal bowel sounds. Musculoskeletal:  No clubbing, cyanosis or edema bilaterally.   Full range of motion without am  -mdssi  -poct ac/hs  -hypoglycemia protocol  -carb control diet    Obesity  With Body mass index is 30.8 kg/m². Complicating assessment and treatment. Placing patient at risk for multiple co-morbidities as well as early death and contributing to the patient's presentation. Counseled on weight loss    Essential HTN  -continue amlodipine and losartan    HLD, stable  -continue atorvstatin    Chronic normocytic anemia, 12.0/35.5  -no s/s of bleeding at this time  -cbc in am    DVT Prophylaxis: heparin  Diet: No diet orders on file  Code Status: Prior    PT/OT Eval Status: no indication for need at this time    Dispo - 2-3 days pending clinical improvement     Husam Quiñones, JAMEY - CNP    Thank you Zaida Landers MD for the opportunity to be involved in this patient's care.  If you have any questions or concerns please feel free to contact me at [phone].  ------------------------------------------Anticipated Dr. Lynn aguirre----------------------------------------

## 2019-04-27 NOTE — PROGRESS NOTES
Admission assessment completed. Pt A&O, VSS. Pt oriented to room. Denies pain. Pt reports he has had diarrhea for one week. Denies any needs at this time. Bed locked and in lowest position, call light within reach, side rails up 2/4. Will continue to monitor.

## 2019-04-27 NOTE — DISCHARGE INSTR - COC
Continuity of Care Form    Patient Name: Gavin Martinez   :  1950  MRN:  [id number]    Admit date:  2019  Discharge date:  19    Code Status Order: Full Code   Advance Directives:   [facility] Documentation     Date/Time Healthcare Directive Type of Healthcare Directive Copy in [facility] [address] Agent's Name Healthcare Agent's Phone Number    [phone]  Yes, patient has an advance directive for healthcare treatment  Durable power of  for health care;Living will  --  Adult Children  Rollen Moritz - daughter  --          Admitting Physician:  Amos Thomas MD  PCP: Lizbeth Ramon MD    Discharging Nurse: Deanne Moreno Unit/Room#: 9242/2711-63  Discharging Unit Phone Number: [phone]    Emergency Contact:   Extended Emergency Contact Information  [facility]  Address: [address] [place] [address], [address] [employer]  Home Phone: [phone]  Mobile Phone: [phone]  Relation: Spouse  Secondary Emergency Contact: Meghann Gary 1696 Phone: [phone]  Relation: Child    Past Surgical History:  Past Surgical History:   Procedure Laterality Date    DENTAL SURGERY N/A 3/28/2019    EXTRACTION OF ALL REMAINING UPPER AND LOWER TEETH AND ALVEOLOPLASTY   performed by Humza Anderson DMD at [address] [place] [address]  10/11/13     CYSTOSCOPY, TRANSURETHRAL RESECTION OF PROSTATE WITH OLYMPUS    PROSTATE BIOPSY      UPPER GASTROINTESTINAL ENDOSCOPY   17       Immunization History:   Immunization History   Administered Date(s) Administered    Influenza Virus Vaccine 2014    Pneumococcal Polysaccharide ([id number]) 2015    Td Vaccine >8yo [facility] 2008       Active Problems:  Patient Active Problem List   Diagnosis Code    Hx BPH w/urinary retention s/p prostatectomy R33.9    Hypertension I10    Cigar smoker Z72.0    Obesity lb (117.5 kg)     Mental Status:  oriented and alert    IV Access:  - None    Nursing Mobility/ADLs:  Walking   Assisted  Transfer  Assisted  Bathing  Assisted  Dressing  Assisted  Toileting  Assisted  Feeding  Assisted  Med Admin  Assisted  Med Delivery   whole    Wound Care Documentation and Therapy:  Wound 04/27/19 Leg Left (Active)   Wound Traumatic 4/27/2019  3:02 PM   Dressing Status Clean; Intact;Dry;Changed 4/27/2019  3:02 PM   Dressing Changed Changed/New 4/27/2019  3:02 PM   Dressing/Treatment Moist to dry;Dry Dressing 4/27/2019  3:02 PM   Wound Cleansed Rinsed/Irrigated with saline 4/27/2019  3:02 PM   Dressing Change Due 04/29/19 4/27/2019  3:02 PM   Wound Length (cm) 1.6 cm 4/27/2019  3:02 PM   Wound Width (cm) 1.3 cm 4/27/2019  3:02 PM   Wound Depth (cm) 2 cm 4/27/2019  3:02 PM   Wound Surface Area (cm^2) 2.08 cm^2 4/27/2019  3:02 PM   Wound Volume (cm^3) 4.16 cm^3 4/27/2019  3:02 PM   Distance Tunneling (cm) 1.8 cm 4/27/2019  3:02 PM   Wound Assessment Drainage; Intact;Dry;Clean 4/27/2019  3:02 PM   Drainage Amount Moderate 4/27/2019  3:02 PM   Drainage Description Selestine Brand 4/27/2019  3:02 PM   Odor Strong 4/27/2019  3:02 PM   Margins Attached edges 4/27/2019  3:02 PM   Tasia-wound Assessment Brown;Red;Swelling 4/27/2019  3:02 PM   Number of days: 0        Elimination:  Continence:   · Bowel: Yes  · Bladder: Yes  Urinary Catheter: None   Colostomy/Ileostomy/Ileal Conduit: No       Date of Last BM: 4/29    Intake/Output Summary (Last 24 hours) at 4/27/2019 1616  Last data filed at 4/27/2019 1426  Gross per 24 hour   Intake 863 ml   Output 200 ml   Net 663 ml     I/O last 3 completed shifts: In: 863 [I.V.:863]  Out: 200 [Urine:200]    Safety Concerns: At Risk for Falls    Impairments/Disabilities:      None    Nutrition Therapy:  Current Nutrition Therapy:   - Oral Diet:  Carb Control 3 carbs/meal (1500kcals/day)    Routes of Feeding: Oral  Liquids:  Thin Liquids  Daily Fluid Restriction: no  Last Modified Barium Swallow with Video (Video Swallowing Test): not done    Treatments at the Time of Hospital Discharge:   Respiratory Treatments:   Oxygen Therapy:  is not on home oxygen therapy. Ventilator:    - No ventilator support    Rehab Therapies: Physical Therapy and Occupational Therapy  Weight Bearing Status/Restrictions: No weight bearing restirctions  Other Medical Equipment (for information only, NOT a DME order): Other Treatments:     Patient's personal belongings (please select all that are sent with patient):  Glasses, Dentures upper and lower    RN SIGNATURE:  Electronically signed by Pamella Michaud RN on 4/29/19 at 5:35 PM    CASE MANAGEMENT/SOCIAL WORK SECTION    Inpatient Status Date: OVA    Readmission Risk Assessment Score:  Readmission Risk              Risk of Unplanned Readmission:        26           Discharging to Facility/ Agency   · Name: [facility]  · Address:  · Phone:[phone]  · Fax:[phone]      / signature: Electronically signed by Gm Del Cid RN on 4/29/19 at 2:14 PM    PHYSICIAN SECTION    Prognosis: Good    Condition at Discharge: Stable    Rehab Potential (if transferring to Rehab): Good    Recommended Labs or Other Treatments After Discharge: PT, OT, skilled nursing    Physician Certification: I certify the above information and transfer of Ángel June  is necessary for the continuing treatment of the diagnosis listed and that he requires [address] for less 30 days.      Update Admission H&P: No change in H&P    PHYSICIAN SIGNATURE:  Electronically signed by Radha Ragland MD on 4/29/19 at 12:26 PM

## 2019-04-27 NOTE — PROGRESS NOTES
Pt came in with a packed and dressed wound. He states he has had it for a month d/t a fall he had at home. He is followed by wound care at home. Will need a wound consult.

## 2019-04-27 NOTE — CARE COORDINATION
CASE MANAGEMENT INITIAL ASSESSMENT      Reviewed chart and met with patient today, re: DCP services  Explained Case Management role/services. Family present: none  Primary contact information: dtr Savannah Shepherd [phone]    Admit date/status: OBS  Diagnosis: AMS    Insurance: Medicare primary and BCBS secondary  Precert required for SNF - N        3 night stay required - Y    Living arrangements, Adls, care needs, prior to admission: Pt lives with spouse (dementia) and dtr who is their caregiver    Transportation: tbd    Durable Medical Equipment at home: Walker__Cane_x_RTS__ BSC__Shower Chair__  02__ HHN__ CPAP__  BiPap__  Hospital Bed__ W/C___ Other__________    Services in the home and/or outpatient, prior to admission: Pt reports yes, but cannot remember name. Will clarify with dtr as pt seems slightly confused. Addendum- pt is active with [address] [place] [address] (if applicable) N/A  · Name:  · Address:  · Dialysis Schedule:  · Phone:  · Fax:    PT/OT recs: none yet    Hospital Exemption Notification (HEN): N/A    Barriers to discharge: none    Plan/comments: Pt reports wishing to d/c home when ready but would be willing to go to rehab if needed. Will clarify whether pt is active with [place] [address] agency already. Left vm with dtr Savannah Shepherd with pt permission. Addendum: David Mata would be open to referral to [facility] if pt is rec'd for rehab. She thinks pt could do 3 hrs therapy a day and would really benefit from rehab stay but aware pt is in obs status. Will follow.     ECOC on chart for MD signature

## 2019-04-27 NOTE — PROGRESS NOTES
Shift assessment completed. Pt A&O forgetful at times, VSS. Pt has wound on his leg, informed this RN the dressing is changed daily by wound care nurse. Will access and change wound dressing today. Dressing is currently C/D/I. Bed alarm on for safety. Denies any needs at this time. Bed locked and in lowest position. Call light within reach. Will continue to monitor.

## 2019-04-27 NOTE — PROGRESS NOTES
4 Eyes Skin Assessment     The patient is being assess for  {Blank single:85105::\"Admission\",\"Transfer to New Unit\",\"Post-Op Surgical\",\"Cath Lab Post-Op\",\"Shift Handoff\"}    I agree that 2 RN's have performed a thorough Head to Toe Skin Assessment on the patient. ALL assessment sites listed below have been assessed. Areas assessed by both nurses:   [x]   Head, Face, and Ears   [x]   Shoulders, Back, and Chest  [x]   Arms, Elbows, and Hands   [x]   Coccyx, Sacrum, and Ischum  [x]   Legs, Feet, and Heels        Does the Patient have Skin Breakdown?   Yes a wound was noted on the Admission Assessment and an WOUND LDA was Initiated documentation include the Tasia-wound, Wound Assessment, Measurements, Dressing Treatment, Drainage, and Color\",         Davidson Prevention initiated:  Yes   Wound Care Orders initiated:  Yes      [id number]  nurse consulted for Pressure Injury (Stage 3,4, Unstageable, DTI, NWPT, and Complex wounds):  No      Nurse 1 eSignature: Electronically signed by Lara De La Rosa RN on 4/27/19 at 4:02 AM    **SHARE this note so that the co-signing nurse is able to place an eSignature**    Nurse 2 eSignature: {Esignature:[id number]}

## 2019-04-27 NOTE — PROGRESS NOTES
Assessed patient's wound on left calf with Josh Chase RN. Patient had a large amount of brown drainage on the dressing. Cleaned and rinsed with saline, wet to dry dressing applied. Wrapped applied. Patient stated dressing gets changed every other day per wound care nurse. Dressing C/D/I. Measurements in the flowsheets.

## 2019-04-28 LAB
AMMONIA: 90 UMOL/L (ref 16–60)
ANION GAP SERPL CALCULATED.3IONS-SCNC: 11 MMOL/L (ref 3–16)
BUN BLDV-MCNC: 21 MG/DL (ref 7–20)
C DIFFICILE TOXIN, EIA: NORMAL
CALCIUM SERPL-MCNC: 8.3 MG/DL (ref 8.3–10.6)
CHLORIDE BLD-SCNC: 104 MMOL/L (ref 99–110)
CO2: 22 MMOL/L (ref 21–32)
CREAT SERPL-MCNC: 1.1 MG/DL (ref 0.8–1.3)
GFR AFRICAN AMERICAN: >60
GFR NON-AFRICAN AMERICAN: >60
GLUCOSE BLD-MCNC: 108 MG/DL (ref 70–99)
GLUCOSE BLD-MCNC: 117 MG/DL (ref 70–99)
GLUCOSE BLD-MCNC: 143 MG/DL (ref 70–99)
GLUCOSE BLD-MCNC: 271 MG/DL (ref 70–99)
GLUCOSE BLD-MCNC: 98 MG/DL (ref 70–99)
HCT VFR BLD CALC: 45.9 % (ref 40.5–52.5)
HEMOGLOBIN: 14.2 G/DL (ref 13.5–17.5)
MCH RBC QN AUTO: 27.7 PG (ref 26–34)
MCHC RBC AUTO-ENTMCNC: 31 G/DL (ref 31–36)
MCV RBC AUTO: 89.4 FL (ref 80–100)
PDW BLD-RTO: 18.3 % (ref 12.4–15.4)
PERFORMED ON: ABNORMAL
PLATELET # BLD: 117 K/UL (ref 135–450)
PMV BLD AUTO: 8.8 FL (ref 5–10.5)
POTASSIUM SERPL-SCNC: 3.4 MMOL/L (ref 3.5–5.1)
RBC # BLD: 5.13 M/UL (ref 4.2–5.9)
SODIUM BLD-SCNC: 137 MMOL/L (ref 136–145)
WBC # BLD: 6.2 K/UL (ref 4–11)

## 2019-04-28 PROCEDURE — 96372 THER/PROPH/DIAG INJ SC/IM: CPT

## 2019-04-28 PROCEDURE — 6360000002 HC RX W HCPCS: Performed by: NURSE PRACTITIONER

## 2019-04-28 PROCEDURE — 96361 HYDRATE IV INFUSION ADD-ON: CPT

## 2019-04-28 PROCEDURE — 2580000003 HC RX 258: Performed by: INTERNAL MEDICINE

## 2019-04-28 PROCEDURE — 82140 ASSAY OF AMMONIA: CPT

## 2019-04-28 PROCEDURE — 96366 THER/PROPH/DIAG IV INF ADDON: CPT

## 2019-04-28 PROCEDURE — 2580000003 HC RX 258

## 2019-04-28 PROCEDURE — 36415 COLL VENOUS BLD VENIPUNCTURE: CPT

## 2019-04-28 PROCEDURE — 80048 BASIC METABOLIC PNL TOTAL CA: CPT

## 2019-04-28 PROCEDURE — 6370000000 HC RX 637 (ALT 250 FOR IP): Performed by: NURSE PRACTITIONER

## 2019-04-28 PROCEDURE — G0378 HOSPITAL OBSERVATION PER HR: HCPCS

## 2019-04-28 PROCEDURE — 2580000003 HC RX 258: Performed by: NURSE PRACTITIONER

## 2019-04-28 PROCEDURE — 6360000002 HC RX W HCPCS: Performed by: INTERNAL MEDICINE

## 2019-04-28 PROCEDURE — 85027 COMPLETE CBC AUTOMATED: CPT

## 2019-04-28 PROCEDURE — 6370000000 HC RX 637 (ALT 250 FOR IP): Performed by: INTERNAL MEDICINE

## 2019-04-28 RX ORDER — SODIUM CHLORIDE 9 MG/ML
INJECTION, SOLUTION INTRAVENOUS
Status: COMPLETED
Start: 2019-04-28 | End: 2019-04-28

## 2019-04-28 RX ORDER — LEVOCARNITINE 1 G/10ML
1000 SOLUTION ORAL 3 TIMES DAILY
Status: DISCONTINUED | OUTPATIENT
Start: 2019-04-28 | End: 2019-04-29 | Stop reason: HOSPADM

## 2019-04-28 RX ADMIN — DOCUSATE SODIUM 100 MG: 100 CAPSULE, LIQUID FILLED ORAL at 22:04

## 2019-04-28 RX ADMIN — AMLODIPINE BESYLATE 5 MG: 5 TABLET ORAL at 10:15

## 2019-04-28 RX ADMIN — DIVALPROEX SODIUM 1000 MG: 250 TABLET, DELAYED RELEASE ORAL at 22:03

## 2019-04-28 RX ADMIN — ASPIRIN 81 MG 81 MG: 81 TABLET ORAL at 10:15

## 2019-04-28 RX ADMIN — HEPARIN SODIUM 5000 UNITS: 5000 INJECTION INTRAVENOUS; SUBCUTANEOUS at 15:04

## 2019-04-28 RX ADMIN — SODIUM CHLORIDE 250 ML: 9 INJECTION, SOLUTION INTRAVENOUS at 17:54

## 2019-04-28 RX ADMIN — DIVALPROEX SODIUM 500 MG: 250 TABLET, DELAYED RELEASE ORAL at 10:15

## 2019-04-28 RX ADMIN — ATORVASTATIN CALCIUM 40 MG: 40 TABLET, FILM COATED ORAL at 10:15

## 2019-04-28 RX ADMIN — HEPARIN SODIUM 5000 UNITS: 5000 INJECTION INTRAVENOUS; SUBCUTANEOUS at 07:12

## 2019-04-28 RX ADMIN — INSULIN LISPRO 6 UNITS: 100 INJECTION, SOLUTION INTRAVENOUS; SUBCUTANEOUS at 17:50

## 2019-04-28 RX ADMIN — CEFTRIAXONE SODIUM 1 G: 1 INJECTION, POWDER, FOR SOLUTION INTRAMUSCULAR; INTRAVENOUS at 17:53

## 2019-04-28 RX ADMIN — SODIUM CHLORIDE: 9 INJECTION, SOLUTION INTRAVENOUS at 22:18

## 2019-04-28 RX ADMIN — MAGNESIUM GLUCONATE 500 MG ORAL TABLET 400 MG: 500 TABLET ORAL at 10:15

## 2019-04-28 RX ADMIN — LEVOCARNITINE 1000 MG: 1 SOLUTION ORAL at 15:10

## 2019-04-28 RX ADMIN — LEVOCARNITINE 1000 MG: 1 SOLUTION ORAL at 22:13

## 2019-04-28 RX ADMIN — LOSARTAN POTASSIUM 100 MG: 100 TABLET, FILM COATED ORAL at 10:15

## 2019-04-28 RX ADMIN — DOCUSATE SODIUM 100 MG: 100 CAPSULE, LIQUID FILLED ORAL at 10:15

## 2019-04-28 RX ADMIN — FENOFIBRATE 54 MG: 54 TABLET ORAL at 10:15

## 2019-04-28 RX ADMIN — AMLODIPINE BESYLATE 5 MG: 5 TABLET ORAL at 22:02

## 2019-04-28 RX ADMIN — HEPARIN SODIUM 5000 UNITS: 5000 INJECTION INTRAVENOUS; SUBCUTANEOUS at 22:04

## 2019-04-28 RX ADMIN — PANTOPRAZOLE SODIUM 40 MG: 40 TABLET, DELAYED RELEASE ORAL at 10:16

## 2019-04-28 NOTE — PLAN OF CARE
Problem: Falls - Risk of:  Goal: Will remain free from falls  Description  Will remain free from falls  4/28/2019 1304 by Yusef Schmitt RN  Outcome: Ongoing  Note:   Pt remains free from falls. Non skid socks on. Bed alarm active for safety. Bed locked and in lowest position. Call light and bedside table within reach. Will continue to monitor.

## 2019-04-28 NOTE — PROGRESS NOTES
Hospitalist Progress Note      PCP: Tanya Hoyos MD    Date of Admission: 4/26/2019    Chief Complaint: confusion    Hospital Course: admitted with diarrhea, confusion, dehydration. No signs of acute infection. On Depakote for fan. Ammonia found elevated despite low valproic acid level. Off antibiotics. Wants to go home    Subjective: no more diarrhea. No chest pain, no shortness of breath, no nausea or vomiting. Medications:  Reviewed    Infusion Medications    sodium chloride 75 mL/hr at 04/27/19 2211    dextrose       Scheduled Medications    levOCARNitine  1,000 mg Oral TID    amLODIPine  5 mg Oral BID    aspirin  81 mg Oral Daily    atorvastatin  40 mg Oral Daily    divalproex  500 mg Oral QAM    divalproex  1,000 mg Oral Nightly    fenofibrate  54 mg Oral Daily    losartan  100 mg Oral Daily    magnesium oxide  400 mg Oral Daily    pantoprazole  40 mg Oral Daily    sodium chloride flush  10 mL Intravenous 2 times per day    docusate sodium  100 mg Oral BID    heparin (porcine)  5,000 Units Subcutaneous 3 times per day    insulin lispro  0-12 Units Subcutaneous TID WC    insulin lispro  0-6 Units Subcutaneous Nightly     PRN Meds: acetaminophen, sodium chloride flush, ondansetron, glucose, dextrose, glucagon (rDNA), dextrose      Intake/Output Summary (Last 24 hours) at 4/28/2019 1359  Last data filed at 4/28/2019 1345  Gross per 24 hour   Intake 3195 ml   Output 2200 ml   Net 995 ml       Physical Exam Performed:    BP (!) 156/73   Pulse 64   Temp 97.8 °F (36.6 °C) (Oral)   Resp 16   Ht 6' 5\" (1.956 m)   Wt 259 lb (117.5 kg)   SpO2 100%   BMI 30.71 kg/m²     General appearance: No apparent distress, appears stated age and cooperative. HEENT: Pupils equal, round, and reactive to light. Conjunctivae/corneas clear. Neck: Supple, with full range of motion. No jugular venous distention. Trachea midline. Respiratory:  Normal respiratory effort.  Clear to auscultation, bilaterally without Rales/Wheezes/Rhonchi. Cardiovascular: Regular rate and rhythm with normal S1/S2 without murmurs, rubs or gallops. Abdomen: Soft, non-tender, non-distended with normal bowel sounds. Musculoskeletal: No clubbing, cyanosis or edema bilaterally. Full range of motion without deformity. Skin: Skin color, texture, turgor normal.  No rashes or lesions. Neurologic:  Neurovascularly intact without any focal sensory/motor deficits. Cranial nerves: II-XII intact, grossly non-focal.  Psychiatric: Alert, short term memory impairment noted. Appears very pleasant and cooperative but manic  Capillary Refill: Brisk,< 3 seconds   Peripheral Pulses: +2 palpable, equal bilaterally       Labs:   Recent Labs     04/26/19 2324 04/27/19  1002 04/28/19  0518   WBC 6.3 5.8 6.2   HGB 12.0* 11.5* 14.2   HCT 35.5* 34.2* 45.9    154 117*     Recent Labs     04/26/19 2324 04/27/19  1002 04/28/19  0518    141 137   K 4.7 3.7 3.4*    106 104   CO2 23 26 22   BUN 29* 27* 21*   CREATININE 1.5* 1.2 1.1   CALCIUM 9.0 8.4 8.3     Recent Labs     04/26/19 2324 04/27/19  1002   AST 55* 29   ALT 18 13   BILITOT 0.5 0.5   ALKPHOS 55 50     No results for input(s): INR in the last 72 hours. Recent Labs     04/26/19 2324   TROPONINI <0.01       Urinalysis:      Lab Results   Component Value Date    NITRU POSITIVE 04/27/2019    WBCUA  04/27/2019    BACTERIA 4+ 04/27/2019    RBCUA None seen 04/27/2019    BLOODU Negative 04/27/2019    SPECGRAV 1.015 04/27/2019    GLUCOSEU Negative 04/27/2019       Radiology:  XR CHEST PORTABLE   Final Result   No acute cardiopulmonary process. Mild cardiomegaly                 Assessment/Plan:    Active Hospital Problems    Diagnosis Date Noted    DM (diabetes mellitus), secondary, uncontrolled, w/neurologic complic ([facility] [N08.32, O87.86] 07/30/2017    Obesity (BMI 30-39. 9) [E66.9] 07/04/2017    MAISHA (acute kidney injury) ([facility] [N17.9] 07/04/2017    Hypertension [I10]
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
## 2019-04-28 NOTE — PROGRESS NOTES
Shift assessment completed. Pt A&O with some confusion, VSS. Daughter at bedside. Non skid socks on. Denies any needs at this time. Bed locked and in lowest position. Call light within reach. Will continue to monitor.

## 2019-04-28 NOTE — PROGRESS NOTES
Psychiatry   Received consult for confusion.    Elevated Ammonia and >212956 gram neg rods may help explain the confusion.'  Depakote subtherapeutic    Will see 4/29/19

## 2019-04-29 VITALS
WEIGHT: 259 LBS | SYSTOLIC BLOOD PRESSURE: 129 MMHG | DIASTOLIC BLOOD PRESSURE: 87 MMHG | HEIGHT: 77 IN | BODY MASS INDEX: 30.58 KG/M2 | HEART RATE: 78 BPM | OXYGEN SATURATION: 93 % | TEMPERATURE: 97.5 F | RESPIRATION RATE: 20 BRPM

## 2019-04-29 LAB
AMMONIA: 61 UMOL/L (ref 16–60)
ANION GAP SERPL CALCULATED.3IONS-SCNC: 9 MMOL/L (ref 3–16)
BUN BLDV-MCNC: 23 MG/DL (ref 7–20)
CALCIUM SERPL-MCNC: 8.5 MG/DL (ref 8.3–10.6)
CHLORIDE BLD-SCNC: 108 MMOL/L (ref 99–110)
CO2: 23 MMOL/L (ref 21–32)
CREAT SERPL-MCNC: 1.2 MG/DL (ref 0.8–1.3)
GFR AFRICAN AMERICAN: >60
GFR NON-AFRICAN AMERICAN: 60
GLUCOSE BLD-MCNC: 103 MG/DL (ref 70–99)
GLUCOSE BLD-MCNC: 105 MG/DL (ref 70–99)
GLUCOSE BLD-MCNC: 137 MG/DL (ref 70–99)
GLUCOSE BLD-MCNC: 144 MG/DL (ref 70–99)
ORGANISM: ABNORMAL
PERFORMED ON: ABNORMAL
POTASSIUM SERPL-SCNC: 3.6 MMOL/L (ref 3.5–5.1)
SODIUM BLD-SCNC: 140 MMOL/L (ref 136–145)
URINE CULTURE, ROUTINE: ABNORMAL
URINE CULTURE, ROUTINE: ABNORMAL

## 2019-04-29 PROCEDURE — 36415 COLL VENOUS BLD VENIPUNCTURE: CPT

## 2019-04-29 PROCEDURE — 6370000000 HC RX 637 (ALT 250 FOR IP): Performed by: NURSE PRACTITIONER

## 2019-04-29 PROCEDURE — 97161 PT EVAL LOW COMPLEX 20 MIN: CPT

## 2019-04-29 PROCEDURE — 2580000003 HC RX 258: Performed by: NURSE PRACTITIONER

## 2019-04-29 PROCEDURE — 97166 OT EVAL MOD COMPLEX 45 MIN: CPT

## 2019-04-29 PROCEDURE — 96372 THER/PROPH/DIAG INJ SC/IM: CPT

## 2019-04-29 PROCEDURE — 97535 SELF CARE MNGMENT TRAINING: CPT

## 2019-04-29 PROCEDURE — G0378 HOSPITAL OBSERVATION PER HR: HCPCS

## 2019-04-29 PROCEDURE — 97116 GAIT TRAINING THERAPY: CPT

## 2019-04-29 PROCEDURE — 82140 ASSAY OF AMMONIA: CPT

## 2019-04-29 PROCEDURE — 80048 BASIC METABOLIC PNL TOTAL CA: CPT

## 2019-04-29 PROCEDURE — 6370000000 HC RX 637 (ALT 250 FOR IP): Performed by: INTERNAL MEDICINE

## 2019-04-29 PROCEDURE — 6360000002 HC RX W HCPCS: Performed by: NURSE PRACTITIONER

## 2019-04-29 RX ORDER — GREEN TEA/HOODIA GORDONII 315-12.5MG
1 CAPSULE ORAL DAILY
Qty: 30 TABLET | Refills: 0 | Status: SHIPPED | OUTPATIENT
Start: 2019-04-29

## 2019-04-29 RX ORDER — AMOXICILLIN AND CLAVULANATE POTASSIUM 875; 125 MG/1; MG/1
1 TABLET, FILM COATED ORAL EVERY 12 HOURS SCHEDULED
Status: DISCONTINUED | OUTPATIENT
Start: 2019-04-29 | End: 2019-04-29 | Stop reason: HOSPADM

## 2019-04-29 RX ORDER — AMOXICILLIN AND CLAVULANATE POTASSIUM 875; 125 MG/1; MG/1
1 TABLET, FILM COATED ORAL EVERY 12 HOURS SCHEDULED
Qty: 14 TABLET | Refills: 0 | Status: SHIPPED | OUTPATIENT
Start: 2019-04-29 | End: 2019-05-06

## 2019-04-29 RX ADMIN — MAGNESIUM GLUCONATE 500 MG ORAL TABLET 400 MG: 500 TABLET ORAL at 08:40

## 2019-04-29 RX ADMIN — FENOFIBRATE 54 MG: 54 TABLET ORAL at 08:40

## 2019-04-29 RX ADMIN — ATORVASTATIN CALCIUM 40 MG: 40 TABLET, FILM COATED ORAL at 08:40

## 2019-04-29 RX ADMIN — LEVOCARNITINE 1000 MG: 1 SOLUTION ORAL at 08:40

## 2019-04-29 RX ADMIN — AMLODIPINE BESYLATE 5 MG: 5 TABLET ORAL at 08:40

## 2019-04-29 RX ADMIN — SODIUM CHLORIDE, PRESERVATIVE FREE 10 ML: 5 INJECTION INTRAVENOUS at 08:41

## 2019-04-29 RX ADMIN — LOSARTAN POTASSIUM 100 MG: 100 TABLET, FILM COATED ORAL at 08:40

## 2019-04-29 RX ADMIN — DOCUSATE SODIUM 100 MG: 100 CAPSULE, LIQUID FILLED ORAL at 08:40

## 2019-04-29 RX ADMIN — HEPARIN SODIUM 5000 UNITS: 5000 INJECTION INTRAVENOUS; SUBCUTANEOUS at 06:12

## 2019-04-29 RX ADMIN — AMOXICILLIN AND CLAVULANATE POTASSIUM 1 TABLET: 875; 125 TABLET, FILM COATED ORAL at 08:40

## 2019-04-29 RX ADMIN — ASPIRIN 81 MG 81 MG: 81 TABLET ORAL at 08:40

## 2019-04-29 RX ADMIN — DIVALPROEX SODIUM 500 MG: 250 TABLET, DELAYED RELEASE ORAL at 08:40

## 2019-04-29 RX ADMIN — LEVOCARNITINE 1000 MG: 1 SOLUTION ORAL at 15:21

## 2019-04-29 RX ADMIN — PANTOPRAZOLE SODIUM 40 MG: 40 TABLET, DELAYED RELEASE ORAL at 08:40

## 2019-04-29 NOTE — CARE COORDINATION
[facility]    Referral received from SOHA Mead RN CM with request for SN visits. Pt's dtr is the primary caregiver, pt's bedside nurse spoke with dtr who would like the pt to have [place] [id number] services. CM reports the pt is a little confused at this time. Per pt's MVP through [facility] the pt is showing as being active with a [address]. CM updated, will sign off at this time. RN is aware that the pt needs to DC home with supplies for dressing changes.      Tiffany Ferrer RN CTN with [facility]eg[facility]  JET:[phone]

## 2019-04-29 NOTE — DISCHARGE SUMMARY
Hospital Medicine Discharge Summary    Patient ID: Nimisha Lund      Patient's PCP: Lia Irizarry MD    Admit Date: 4/26/2019     Discharge Date:   04/29/19     Admitting Physician: Mulugeta Rashid MD     Discharge Physician: Lizette Hernandez MD     Discharge Diagnoses: Active Hospital Problems    Diagnosis Date Noted    DM (diabetes mellitus), secondary, uncontrolled, w/neurologic complic ([facility] [U71.25, K42.09] 07/30/2017    Obesity (BMI 30-39. 9) [E66.9] 07/04/2017    MAISHA (acute kidney injury) ([facility] [N17.9] 07/04/2017    Hypertension [G85]        Metabolic encephalopathy  Hyperammonemia caused by valproic acid  UTI   Dehydration  MAISHA      The patient was seen and examined on day of discharge and this discharge summary is in conjunction with any daily progress note from day of discharge. Hospital Course:     Patient was admitted with mental status changes. More than one reason was found. Ammonia level was elevated. Patient is on valproic acid, the valproic acid levels are not toxic, but hyperammonemia can happen even in the presence of low levels. Ammonia level improved with carnitine supplementation. I did not want to stop the patient's valproic acid because it was started for fan. Creatinine was about baseline at the time of admission. Improved with IV hydration and supportive treatment. Antibiotics were started according to the urine culture results, for urinary tract infection  On the day of discharge, patient was steady on his feet. PT and OT evaluated the patient and recommended discharge home with prn assist.  Patient will be discharged home with oral antibiotics, diabetes supplementation and probiotics  Home health care was also ordered.         Physical Exam Performed:     BP (!) 163/79   Pulse 79   Temp 97.8 °F (36.6 °C) (Oral)   Resp 18   Ht 6' 5\" (1.956 m)   Wt 259 lb (117.5 kg)   SpO2 99%   BMI 30.71 kg/m²       General appearance:  No apparent distress, appears stated age and cooperative. HEENT:  Normal cephalic, atraumatic without obvious deformity. Pupils equal, round, and reactive to light. Extra ocular muscles intact. Conjunctivae/corneas clear. Neck: Supple, with full range of motion. No jugular venous distention. Trachea midline. Respiratory:  Normal respiratory effort. Clear to auscultation, bilaterally without Rales/Wheezes/Rhonchi. Cardiovascular:  Regular rate and rhythm with normal S1/S2 without murmurs, rubs or gallops. Abdomen: Soft, non-tender, non-distended with normal bowel sounds. Musculoskeletal:  No clubbing, cyanosis or edema bilaterally. Full range of motion without deformity. Skin: Skin color, texture, turgor normal.  No rashes or lesions. Neurologic:  Neurovascularly intact without any focal sensory/motor deficits. Cranial nerves: II-XII intact, grossly non-focal.  Psychiatric:  Alert and oriented, thought content appropriate, normal insight  Capillary Refill: Brisk,< 3 seconds   Peripheral Pulses: +2 palpable, equal bilaterally       Labs: For convenience and continuity at follow-up the following most recent labs are provided:      CBC:    Lab Results   Component Value Date    WBC 6.2 04/28/2019    HGB 14.2 04/28/2019    HCT 45.9 04/28/2019     04/28/2019       Renal:    Lab Results   Component Value Date     04/29/2019    K 3.6 04/29/2019    K 3.7 04/27/2019     04/29/2019    CO2 23 04/29/2019    BUN 23 04/29/2019    CREATININE 1.2 04/29/2019    CALCIUM 8.5 04/29/2019    PHOS 3.0 08/14/2017         Significant Diagnostic Studies    Radiology:   XR CHEST PORTABLE   Final Result   No acute cardiopulmonary process.   Mild cardiomegaly                Consults:     IP CONSULT TO HOSPITALIST  IP CONSULT TO HOME CARE NEEDS    Disposition:  Home with home health care    Condition at Discharge: Stable    Discharge Instructions/Follow-up:  Primary care physician and psychiatrist    Code Status:  Full Code     Activity: activity as tolerated    Diet: regular diet      Discharge Medications:     Current Discharge Medication List           Details   amoxicillin-clavulanate (AUGMENTIN) 875-125 MG per tablet Take 1 tablet by mouth every 12 hours for 7 days  Qty: 14 tablet, Refills: 0      levOCARNitine fumarate (CARNITOR) 250 MG capsule Take 2 capsules by mouth 3 times daily  Qty: 180 capsule, Refills: 1      Lactobacillus (PROBIOTIC ACIDOPHILUS) TABS Take 1 tablet by mouth daily  Qty: 30 tablet, Refills: 0              Details   aspirin 81 MG tablet Take 81 mg by mouth daily      !! divalproex (DEPAKOTE) 500 MG DR tablet Take 500 mg by mouth every morning       !! divalproex (DEPAKOTE) 500 MG DR tablet Take 1,000 mg by mouth nightly      b complex vitamins capsule Take 1 capsule by mouth daily      spironolactone (ALDACTONE) 25 MG tablet Take 25 mg by mouth daily      atorvastatin (LIPITOR) 40 MG tablet Take 40 mg by mouth daily      vitamin D (CHOLECALCIFEROL) 1000 UNIT TABS tablet Take 1,000 Units by mouth daily      losartan (COZAAR) 50 MG tablet Take 100 mg by mouth daily       magnesium oxide (MAG-OX) 400 MG tablet Take 400 mg by mouth daily      QUEtiapine (SEROQUEL) 25 MG tablet Take 50 mg by mouth 2 times daily Indications: 50mg in AM 75 @ HS       amLODIPine (NORVASC) 5 MG tablet Take 5 mg by mouth 2 times daily       pantoprazole (PROTONIX) 40 MG tablet Take 40 mg by mouth daily      acetaminophen (TYLENOL) 325 MG tablet Take 2 tablets by mouth every 6 hours as needed for Pain or Fever  Qty: 120 tablet, Refills: 3      fenofibrate (TRICOR) 145 MG tablet Take 145 mg by mouth daily      melatonin 5 MG TABS tablet Take 5 mg by mouth nightly       !! - Potential duplicate medications found. Please discuss with provider. Time Spent on discharge is more than 45 minutes in the examination, evaluation, counseling and review of medications and discharge plan.       Signed:    Sonia Farooq MD   4/29/2019      Thank you

## 2019-04-29 NOTE — PROGRESS NOTES
Pt's verbal and written discharge instructions given, all questions answered. Instructions given to daughter. IV removed. Follow up appointments made and discussed. New medications reviewed and scripts called to [facility] pharmacy. Pt left in stable condition via wheelchair to private car with family.
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
## 2019-04-29 NOTE — PLAN OF CARE
Problem: Falls - Risk of:  Goal: Will remain free from falls  Description  Will remain free from falls  4/29/2019 1404 by Corina Rizvi RN  Outcome: Ongoing  Note:   Pt remains free from falls. Non skid socks on. Bed alarm active for safety. Bed locked and in lowest position. Call light and bedside table within reach. Will continue to monitor.

## 2019-04-29 NOTE — PROGRESS NOTES
Shift assessment completed. Pt A&O, VSS. Denies any needs at this time. Bed locked and in lowest position, call light within reach, side rails up 2/4. Bed alarm on. Will continue to monitor.

## 2019-04-29 NOTE — PROGRESS NOTES
Shift assessment completed. Pt A&O with some confusion, VSS. Denies any needs at this time. Bed locked and in lowest position. Call light within reach. Will continue to monitor.

## 2019-04-29 NOTE — CONSULTS
Consult for Wound on Left lower extremity. Patient dressed in street clothes and states he is going home. He reports he has home care nurses help with he dressings at home. Follows Dr Darlyn Heredia at [facility] wound care clinic. Patient reports the bed side RN just changed his dressings. No need to see.

## 2019-04-29 NOTE — PROGRESS NOTES
Physical Therapy    Facility/Department: [facility] C3 TELE/MED SURG/ONC  Initial Assessment, treatment, and discharge    NAME: Ming Ortiz  : 1950  MRN: [id number]    Date of Service: 2019    Discharge Recommendations:  24 hour supervision or assist   PT Equipment Recommendations  Equipment Needed: No    Assessment   Assessment: Pt is a 69 y/o male admitted with confusion and dehydration. Pt is mod I for transfers and short distance ambulation. No further skilled PT indicated at this time. Recommend 24hr supervision at discharge secondary to impulsivity. Prognosis: Good  Decision Making: Low Complexity  Patient Education: Educated on role of PT, use of call light, safety with mobility  No Skilled PT: At baseline function  REQUIRES PT FOLLOW UP: No  Activity Tolerance  Activity Tolerance: Patient Tolerated treatment well       Patient Diagnosis(es): The primary encounter diagnosis was Acute UTI. Diagnoses of Confusion, Dehydration, and Diarrhea of presumed infectious origin were also pertinent to this visit. has a past medical history of Acute gastric ulcer with bleeding, Acute metabolic encephalopathy, MAISHA (acute kidney injury) ([address] ATN (acute tubular necrosis) ([address] Atrial fib/flutter, transient, Atrial fibrillation ([address] BPH (benign prostatic hyperplasia), Cerebral artery occlusion with cerebral infarction ([address] Diabetes mellitus ([address] ESRD (end stage renal disease) ([address] GI bleed, Glaucoma, History of blood transfusion, Hx of diabetic neuropathy, Hyperlipidemia, Hypertension, Mitral insufficiency, Mobility impaired, Pulmonary hypertension ([address] and Traumatic hemorrhagic shock ([address] has a past surgical history that includes knee surgery; Prostate biopsy; other surgical history (10/11/13); Upper gastrointestinal endoscopy (17); and Dental surgery (N/A, 3/28/2019).     Restrictions  Restrictions/Precautions  Restrictions/Precautions: Fall Risk, Up as Tolerated  Vision/Hearing  Vision: Impaired  Vision Exceptions: Wears glasses at all times  Hearing: Within functional limits     Subjective  General  Chart Reviewed: Yes  Patient assessed for rehabilitation services?: Yes  Family / Caregiver Present: No  Referring Practitioner: Nancy Watson MD  Referral Date : 04/28/19  Diagnosis: MAISHA  Follows Commands: Within Functional Limits  Other (Comment): requires cues to redirect to current task and is impulsive  General Comment  Comments: Pt supine in bed upon arrival. RN cleared pt for therapy  Subjective  Subjective: Pt agreeable to evaluation  Pain Screening  Patient Currently in Pain: No  Vital Signs  Patient Currently in Pain: No   Social/Functional History  Social/Functional History  Lives With: Spouse(and daughter; spouse has dementia)  Type of Home: House  Home Layout: Two level, Able to Live on Main level with bedroom/bathroom  Entrance Stairs - Number of Steps: 2 TANNER  Bathroom Shower/Tub: Walk-in shower  Bathroom Toilet: Standard  Bathroom Equipment: Grab bars in shower, Grab bars around toilet, Built-in shower seat  Home Equipment: Hospital bed, Cane, Rolling walker, [employer]  ADL Assistance: Independent  Homemaking Assistance: Independent  Ambulation Assistance: Independent  Transfer Assistance: Independent  Objective  AROM RLE (degrees)  RLE AROM: WFL  AROM LLE (degrees)  LLE AROM : WFL  Strength RLE  Strength RLE: WFL  Strength LLE  Strength LLE: WFL        Bed mobility  Supine to Sit: Modified independent  Sit to Supine: Modified independent  Transfers  Sit to Stand: Modified independent  Stand to sit: Modified independent  Ambulation  Ambulation?: Yes  Ambulation 1  Surface: level tile  Device: No Device  Assistance: Modified Independent  Quality of Gait: partial step through gait pattern, decreased shruthi, mildly unsteady but no LOB  Distance: 36' and 15'  Stairs/Curb  Stairs?: No     Balance  Sitting - Static: Good  Sitting - Dynamic: Good  Standing - Static: Good;-  Standing - Dynamic: Good;-      Plan   Plan  Times per week: one time only  Current Treatment Recommendations: Gait Training  Safety Devices  Type of devices:  All fall risk precautions in place, Call light within reach, Bed alarm in place, Nurse notified, Patient at risk for falls, Left in bed    AM-PAC Score  AM-PAC Inpatient Mobility Raw Score : 23  AM-PAC Inpatient T-Scale Score : 56.93  Mobility Inpatient CMS 0-100% Score: 11.2  Mobility Inpatient CMS G-Code Modifier : CI        Goals  Short term goals  Time Frame for Short term goals: 1 day  Short term goal 1: Pt will perform transfers mod I - goal met  Patient Goals   Patient goals : \"to go home\"     Therapy Time   Individual Concurrent Group Co-treatment   Time In 0956         Time Out 1017         Minutes 21         Timed Code Treatment Minutes: [address] [facility],   [facility]

## 2019-04-29 NOTE — PROGRESS NOTES
Pt's leg wound cleaned with normal saline, wet to dry dressing applied with kerlex. Sending supplies home with patient until wound care nurse comes to see him at home.

## 2019-06-22 ENCOUNTER — APPOINTMENT (OUTPATIENT)
Dept: GENERAL RADIOLOGY | Age: 69
DRG: 329 | End: 2019-06-22
Payer: MEDICARE

## 2019-06-22 ENCOUNTER — ANESTHESIA EVENT (OUTPATIENT)
Dept: OPERATING ROOM | Age: 69
DRG: 329 | End: 2019-06-22
Payer: MEDICARE

## 2019-06-22 ENCOUNTER — APPOINTMENT (OUTPATIENT)
Dept: CT IMAGING | Age: 69
DRG: 329 | End: 2019-06-22
Payer: MEDICARE

## 2019-06-22 ENCOUNTER — ANESTHESIA (OUTPATIENT)
Dept: OPERATING ROOM | Age: 69
DRG: 329 | End: 2019-06-22
Payer: MEDICARE

## 2019-06-22 ENCOUNTER — HOSPITAL ENCOUNTER (INPATIENT)
Age: 69
LOS: 7 days | Discharge: NURSING, REHAB OR LONG-TERM CARE | DRG: 329 | End: 2019-06-29
Attending: EMERGENCY MEDICINE | Admitting: SURGERY
Payer: MEDICARE

## 2019-06-22 VITALS — SYSTOLIC BLOOD PRESSURE: 121 MMHG | DIASTOLIC BLOOD PRESSURE: 75 MMHG | OXYGEN SATURATION: 100 %

## 2019-06-22 DIAGNOSIS — N28.9 RENAL INSUFFICIENCY: ICD-10-CM

## 2019-06-22 DIAGNOSIS — K56.609 SMALL BOWEL OBSTRUCTION (HCC): Primary | ICD-10-CM

## 2019-06-22 DIAGNOSIS — R31.9 HEMATURIA, UNSPECIFIED TYPE: ICD-10-CM

## 2019-06-22 LAB
A/G RATIO: 0.8 (ref 1.1–2.2)
ALBUMIN SERPL-MCNC: 2.4 G/DL (ref 3.4–5)
ALP BLD-CCNC: 42 U/L (ref 40–129)
ALT SERPL-CCNC: 15 U/L (ref 10–40)
AMMONIA: 25 UMOL/L (ref 16–60)
ANION GAP SERPL CALCULATED.3IONS-SCNC: 10 MMOL/L (ref 3–16)
AST SERPL-CCNC: 38 U/L (ref 15–37)
BACTERIA: ABNORMAL /HPF
BASOPHILS ABSOLUTE: 0 K/UL (ref 0–0.2)
BASOPHILS RELATIVE PERCENT: 0.7 %
BILIRUB SERPL-MCNC: 0.5 MG/DL (ref 0–1)
BILIRUBIN URINE: ABNORMAL
BLOOD, URINE: ABNORMAL
BUN BLDV-MCNC: 31 MG/DL (ref 7–20)
CALCIUM SERPL-MCNC: 8.6 MG/DL (ref 8.3–10.6)
CHLORIDE BLD-SCNC: 104 MMOL/L (ref 99–110)
CLARITY: CLEAR
CO2: 27 MMOL/L (ref 21–32)
COLOR: ABNORMAL
CREAT SERPL-MCNC: 1.6 MG/DL (ref 0.8–1.3)
EKG ATRIAL RATE: 468 BPM
EKG DIAGNOSIS: NORMAL
EKG Q-T INTERVAL: 406 MS
EKG QRS DURATION: 118 MS
EKG QTC CALCULATION (BAZETT): 453 MS
EKG R AXIS: -68 DEGREES
EKG T AXIS: 1 DEGREES
EKG VENTRICULAR RATE: 75 BPM
EOSINOPHILS ABSOLUTE: 0.1 K/UL (ref 0–0.6)
EOSINOPHILS RELATIVE PERCENT: 1 %
EPITHELIAL CELLS, UA: ABNORMAL /HPF
GFR AFRICAN AMERICAN: 52
GFR NON-AFRICAN AMERICAN: 43
GLOBULIN: 2.9 G/DL
GLUCOSE BLD-MCNC: 105 MG/DL (ref 70–99)
GLUCOSE BLD-MCNC: 114 MG/DL (ref 70–99)
GLUCOSE URINE: NEGATIVE MG/DL
HCT VFR BLD CALC: 34.4 % (ref 40.5–52.5)
HEMOGLOBIN: 11.4 G/DL (ref 13.5–17.5)
KETONES, URINE: ABNORMAL MG/DL
LACTIC ACID: 1.1 MMOL/L (ref 0.4–2)
LEUKOCYTE ESTERASE, URINE: NEGATIVE
LYMPHOCYTES ABSOLUTE: 1 K/UL (ref 1–5.1)
LYMPHOCYTES RELATIVE PERCENT: 17.8 %
MCH RBC QN AUTO: 29.4 PG (ref 26–34)
MCHC RBC AUTO-ENTMCNC: 33.2 G/DL (ref 31–36)
MCV RBC AUTO: 88.5 FL (ref 80–100)
MICROSCOPIC EXAMINATION: YES
MONOCYTES ABSOLUTE: 0.4 K/UL (ref 0–1.3)
MONOCYTES RELATIVE PERCENT: 6.8 %
NEUTROPHILS ABSOLUTE: 4.1 K/UL (ref 1.7–7.7)
NEUTROPHILS RELATIVE PERCENT: 73.7 %
NITRITE, URINE: NEGATIVE
PDW BLD-RTO: 18.5 % (ref 12.4–15.4)
PERFORMED ON: ABNORMAL
PH UA: 5.5 (ref 5–8)
PLATELET # BLD: 146 K/UL (ref 135–450)
PMV BLD AUTO: 8.1 FL (ref 5–10.5)
POTASSIUM SERPL-SCNC: 3.6 MMOL/L (ref 3.5–5.1)
PRO-BNP: 9271 PG/ML (ref 0–124)
PROTEIN UA: >=300 MG/DL
RBC # BLD: 3.89 M/UL (ref 4.2–5.9)
RBC UA: ABNORMAL /HPF (ref 0–2)
SODIUM BLD-SCNC: 141 MMOL/L (ref 136–145)
SPECIFIC GRAVITY UA: >=1.03 (ref 1–1.03)
TOTAL PROTEIN: 5.3 G/DL (ref 6.4–8.2)
TROPONIN: <0.01 NG/ML
URINE TYPE: ABNORMAL
UROBILINOGEN, URINE: 1 E.U./DL
VALPROIC ACID LEVEL: 40.1 UG/ML (ref 50–100)
WBC # BLD: 5.6 K/UL (ref 4–11)
WBC UA: ABNORMAL /HPF (ref 0–5)

## 2019-06-22 PROCEDURE — 6360000002 HC RX W HCPCS: Performed by: SURGERY

## 2019-06-22 PROCEDURE — 6360000002 HC RX W HCPCS: Performed by: ANESTHESIOLOGY

## 2019-06-22 PROCEDURE — 83880 ASSAY OF NATRIURETIC PEPTIDE: CPT

## 2019-06-22 PROCEDURE — 96374 THER/PROPH/DIAG INJ IV PUSH: CPT

## 2019-06-22 PROCEDURE — 3600000004 HC SURGERY LEVEL 4 BASE: Performed by: SURGERY

## 2019-06-22 PROCEDURE — 49587 REPAIR UMBILICAL HERN,5+Y/O,STRANG: CPT | Performed by: SURGERY

## 2019-06-22 PROCEDURE — 81001 URINALYSIS AUTO W/SCOPE: CPT

## 2019-06-22 PROCEDURE — 80053 COMPREHEN METABOLIC PANEL: CPT

## 2019-06-22 PROCEDURE — 0WJF4ZZ INSPECTION OF ABDOMINAL WALL, PERCUTANEOUS ENDOSCOPIC APPROACH: ICD-10-PCS | Performed by: SURGERY

## 2019-06-22 PROCEDURE — 82140 ASSAY OF AMMONIA: CPT

## 2019-06-22 PROCEDURE — 96375 TX/PRO/DX INJ NEW DRUG ADDON: CPT

## 2019-06-22 PROCEDURE — 7100000000 HC PACU RECOVERY - FIRST 15 MIN: Performed by: SURGERY

## 2019-06-22 PROCEDURE — 0DT80ZZ RESECTION OF SMALL INTESTINE, OPEN APPROACH: ICD-10-PCS | Performed by: SURGERY

## 2019-06-22 PROCEDURE — 99285 EMERGENCY DEPT VISIT HI MDM: CPT

## 2019-06-22 PROCEDURE — 7100000001 HC PACU RECOVERY - ADDTL 15 MIN: Performed by: SURGERY

## 2019-06-22 PROCEDURE — 3700000001 HC ADD 15 MINUTES (ANESTHESIA): Performed by: SURGERY

## 2019-06-22 PROCEDURE — 99222 1ST HOSP IP/OBS MODERATE 55: CPT | Performed by: SURGERY

## 2019-06-22 PROCEDURE — 44120 REMOVAL OF SMALL INTESTINE: CPT | Performed by: SURGERY

## 2019-06-22 PROCEDURE — 85025 COMPLETE CBC W/AUTO DIFF WBC: CPT

## 2019-06-22 PROCEDURE — 71045 X-RAY EXAM CHEST 1 VIEW: CPT

## 2019-06-22 PROCEDURE — 93010 ELECTROCARDIOGRAM REPORT: CPT | Performed by: INTERNAL MEDICINE

## 2019-06-22 PROCEDURE — 88307 TISSUE EXAM BY PATHOLOGIST: CPT

## 2019-06-22 PROCEDURE — 0WQF0ZZ REPAIR ABDOMINAL WALL, OPEN APPROACH: ICD-10-PCS | Performed by: SURGERY

## 2019-06-22 PROCEDURE — 2720000010 HC SURG SUPPLY STERILE: Performed by: SURGERY

## 2019-06-22 PROCEDURE — 84484 ASSAY OF TROPONIN QUANT: CPT

## 2019-06-22 PROCEDURE — 83605 ASSAY OF LACTIC ACID: CPT

## 2019-06-22 PROCEDURE — 2500000003 HC RX 250 WO HCPCS

## 2019-06-22 PROCEDURE — 80164 ASSAY DIPROPYLACETIC ACD TOT: CPT

## 2019-06-22 PROCEDURE — 3700000000 HC ANESTHESIA ATTENDED CARE: Performed by: SURGERY

## 2019-06-22 PROCEDURE — 2580000003 HC RX 258: Performed by: SURGERY

## 2019-06-22 PROCEDURE — 74176 CT ABD & PELVIS W/O CONTRAST: CPT

## 2019-06-22 PROCEDURE — 6370000000 HC RX 637 (ALT 250 FOR IP): Performed by: SURGERY

## 2019-06-22 PROCEDURE — 1200000000 HC SEMI PRIVATE

## 2019-06-22 PROCEDURE — 3600000014 HC SURGERY LEVEL 4 ADDTL 15MIN: Performed by: SURGERY

## 2019-06-22 PROCEDURE — 6360000002 HC RX W HCPCS: Performed by: EMERGENCY MEDICINE

## 2019-06-22 PROCEDURE — 2709999900 HC NON-CHARGEABLE SUPPLY: Performed by: SURGERY

## 2019-06-22 PROCEDURE — 2580000003 HC RX 258: Performed by: EMERGENCY MEDICINE

## 2019-06-22 PROCEDURE — 2580000003 HC RX 258: Performed by: ANESTHESIOLOGY

## 2019-06-22 PROCEDURE — 93005 ELECTROCARDIOGRAM TRACING: CPT | Performed by: EMERGENCY MEDICINE

## 2019-06-22 PROCEDURE — 2500000003 HC RX 250 WO HCPCS: Performed by: ANESTHESIOLOGY

## 2019-06-22 RX ORDER — ONDANSETRON 2 MG/ML
4 INJECTION INTRAMUSCULAR; INTRAVENOUS EVERY 6 HOURS PRN
Status: DISCONTINUED | OUTPATIENT
Start: 2019-06-22 | End: 2019-06-29 | Stop reason: HOSPADM

## 2019-06-22 RX ORDER — 0.9 % SODIUM CHLORIDE 0.9 %
10 VIAL (ML) INJECTION DAILY
Status: DISCONTINUED | OUTPATIENT
Start: 2019-06-23 | End: 2019-06-28

## 2019-06-22 RX ORDER — SODIUM CHLORIDE, SODIUM LACTATE, POTASSIUM CHLORIDE, CALCIUM CHLORIDE 600; 310; 30; 20 MG/100ML; MG/100ML; MG/100ML; MG/100ML
INJECTION, SOLUTION INTRAVENOUS CONTINUOUS PRN
Status: DISCONTINUED | OUTPATIENT
Start: 2019-06-22 | End: 2019-06-22 | Stop reason: SDUPTHER

## 2019-06-22 RX ORDER — PANTOPRAZOLE SODIUM 40 MG/10ML
40 INJECTION, POWDER, LYOPHILIZED, FOR SOLUTION INTRAVENOUS DAILY
Status: DISCONTINUED | OUTPATIENT
Start: 2019-06-23 | End: 2019-06-28

## 2019-06-22 RX ORDER — MORPHINE SULFATE 2 MG/ML
2 INJECTION, SOLUTION INTRAMUSCULAR; INTRAVENOUS
Status: DISCONTINUED | OUTPATIENT
Start: 2019-06-22 | End: 2019-06-29 | Stop reason: HOSPADM

## 2019-06-22 RX ORDER — 0.9 % SODIUM CHLORIDE 0.9 %
1000 INTRAVENOUS SOLUTION INTRAVENOUS ONCE
Status: COMPLETED | OUTPATIENT
Start: 2019-06-22 | End: 2019-06-22

## 2019-06-22 RX ORDER — ONDANSETRON 2 MG/ML
4 INJECTION INTRAMUSCULAR; INTRAVENOUS ONCE
Status: COMPLETED | OUTPATIENT
Start: 2019-06-22 | End: 2019-06-22

## 2019-06-22 RX ORDER — SODIUM CHLORIDE 0.9 % (FLUSH) 0.9 %
10 SYRINGE (ML) INJECTION EVERY 12 HOURS SCHEDULED
Status: DISCONTINUED | OUTPATIENT
Start: 2019-06-22 | End: 2019-06-29 | Stop reason: HOSPADM

## 2019-06-22 RX ORDER — DIVALPROEX SODIUM 250 MG/1
500 TABLET, DELAYED RELEASE ORAL EVERY MORNING
Status: DISCONTINUED | OUTPATIENT
Start: 2019-06-23 | End: 2019-06-23

## 2019-06-22 RX ORDER — ONDANSETRON 2 MG/ML
INJECTION INTRAMUSCULAR; INTRAVENOUS PRN
Status: DISCONTINUED | OUTPATIENT
Start: 2019-06-22 | End: 2019-06-22 | Stop reason: SDUPTHER

## 2019-06-22 RX ORDER — LIDOCAINE HYDROCHLORIDE 20 MG/ML
INJECTION, SOLUTION INFILTRATION; PERINEURAL PRN
Status: DISCONTINUED | OUTPATIENT
Start: 2019-06-22 | End: 2019-06-22 | Stop reason: SDUPTHER

## 2019-06-22 RX ORDER — ROCURONIUM BROMIDE 10 MG/ML
INJECTION, SOLUTION INTRAVENOUS PRN
Status: DISCONTINUED | OUTPATIENT
Start: 2019-06-22 | End: 2019-06-22 | Stop reason: SDUPTHER

## 2019-06-22 RX ORDER — SODIUM CHLORIDE 0.9 % (FLUSH) 0.9 %
10 SYRINGE (ML) INJECTION PRN
Status: DISCONTINUED | OUTPATIENT
Start: 2019-06-22 | End: 2019-06-29 | Stop reason: HOSPADM

## 2019-06-22 RX ORDER — DEXTROSE, SODIUM CHLORIDE, AND POTASSIUM CHLORIDE 5; .45; .15 G/100ML; G/100ML; G/100ML
1000 INJECTION INTRAVENOUS CONTINUOUS
Status: DISCONTINUED | OUTPATIENT
Start: 2019-06-22 | End: 2019-06-24

## 2019-06-22 RX ORDER — DIVALPROEX SODIUM 250 MG/1
1000 TABLET, DELAYED RELEASE ORAL NIGHTLY
Status: DISCONTINUED | OUTPATIENT
Start: 2019-06-22 | End: 2019-06-23

## 2019-06-22 RX ORDER — MORPHINE SULFATE 4 MG/ML
4 INJECTION, SOLUTION INTRAMUSCULAR; INTRAVENOUS
Status: DISCONTINUED | OUTPATIENT
Start: 2019-06-22 | End: 2019-06-29 | Stop reason: HOSPADM

## 2019-06-22 RX ORDER — SUCCINYLCHOLINE CHLORIDE 20 MG/ML
INJECTION INTRAMUSCULAR; INTRAVENOUS PRN
Status: DISCONTINUED | OUTPATIENT
Start: 2019-06-22 | End: 2019-06-22 | Stop reason: SDUPTHER

## 2019-06-22 RX ORDER — SODIUM CHLORIDE 9 MG/ML
INJECTION, SOLUTION INTRAVENOUS CONTINUOUS PRN
Status: DISCONTINUED | OUTPATIENT
Start: 2019-06-22 | End: 2019-06-22 | Stop reason: SDUPTHER

## 2019-06-22 RX ORDER — LOSARTAN POTASSIUM 100 MG/1
100 TABLET ORAL DAILY
Status: DISCONTINUED | OUTPATIENT
Start: 2019-06-23 | End: 2019-06-23

## 2019-06-22 RX ORDER — MAGNESIUM HYDROXIDE 1200 MG/15ML
LIQUID ORAL CONTINUOUS PRN
Status: COMPLETED | OUTPATIENT
Start: 2019-06-22 | End: 2019-06-22

## 2019-06-22 RX ORDER — DEXTROSE, SODIUM CHLORIDE, AND POTASSIUM CHLORIDE 5; .45; .15 G/100ML; G/100ML; G/100ML
INJECTION INTRAVENOUS
Status: COMPLETED
Start: 2019-06-22 | End: 2019-06-22

## 2019-06-22 RX ORDER — AMLODIPINE BESYLATE 5 MG/1
5 TABLET ORAL 2 TIMES DAILY
Status: DISCONTINUED | OUTPATIENT
Start: 2019-06-22 | End: 2019-06-24

## 2019-06-22 RX ORDER — BUPIVACAINE HYDROCHLORIDE 5 MG/ML
INJECTION, SOLUTION PERINEURAL PRN
Status: DISCONTINUED | OUTPATIENT
Start: 2019-06-22 | End: 2019-06-22 | Stop reason: HOSPADM

## 2019-06-22 RX ORDER — PROPOFOL 10 MG/ML
INJECTION, EMULSION INTRAVENOUS PRN
Status: DISCONTINUED | OUTPATIENT
Start: 2019-06-22 | End: 2019-06-22 | Stop reason: SDUPTHER

## 2019-06-22 RX ADMIN — ROCURONIUM BROMIDE 40 MG: 10 SOLUTION INTRAVENOUS at 19:29

## 2019-06-22 RX ADMIN — SODIUM CHLORIDE, POTASSIUM CHLORIDE, SODIUM LACTATE AND CALCIUM CHLORIDE: 600; 310; 30; 20 INJECTION, SOLUTION INTRAVENOUS at 20:17

## 2019-06-22 RX ADMIN — SUCCINYLCHOLINE CHLORIDE 160 MG: 20 INJECTION, SOLUTION INTRAMUSCULAR; INTRAVENOUS at 19:19

## 2019-06-22 RX ADMIN — SODIUM CHLORIDE: 9 INJECTION, SOLUTION INTRAVENOUS at 19:14

## 2019-06-22 RX ADMIN — ONDANSETRON 4 MG: 2 INJECTION INTRAMUSCULAR; INTRAVENOUS at 15:20

## 2019-06-22 RX ADMIN — PHENYLEPHRINE HYDROCHLORIDE 100 MCG: 10 INJECTION INTRAVENOUS at 19:33

## 2019-06-22 RX ADMIN — PHENYLEPHRINE HYDROCHLORIDE 100 MCG: 10 INJECTION INTRAVENOUS at 19:27

## 2019-06-22 RX ADMIN — SODIUM CHLORIDE, POTASSIUM CHLORIDE, SODIUM LACTATE AND CALCIUM CHLORIDE: 600; 310; 30; 20 INJECTION, SOLUTION INTRAVENOUS at 19:42

## 2019-06-22 RX ADMIN — ONDANSETRON 4 MG: 2 INJECTION INTRAMUSCULAR; INTRAVENOUS at 20:35

## 2019-06-22 RX ADMIN — PROPOFOL 250 MG: 10 INJECTION, EMULSION INTRAVENOUS at 19:19

## 2019-06-22 RX ADMIN — SODIUM CHLORIDE 1000 ML: 9 INJECTION, SOLUTION INTRAVENOUS at 15:20

## 2019-06-22 RX ADMIN — HYDROMORPHONE HYDROCHLORIDE 1 MG: 1 INJECTION, SOLUTION INTRAMUSCULAR; INTRAVENOUS; SUBCUTANEOUS at 15:20

## 2019-06-22 RX ADMIN — DEXTROSE MONOHYDRATE, SODIUM CHLORIDE, AND POTASSIUM CHLORIDE 1000 ML: 50; 4.5; 1.49 INJECTION, SOLUTION INTRAVENOUS at 21:31

## 2019-06-22 RX ADMIN — PHENYLEPHRINE HYDROCHLORIDE 100 MCG: 10 INJECTION INTRAVENOUS at 19:31

## 2019-06-22 RX ADMIN — Medication 2 G: at 19:25

## 2019-06-22 RX ADMIN — LIDOCAINE HYDROCHLORIDE 3 ML: 20 INJECTION, SOLUTION INFILTRATION; PERINEURAL at 19:19

## 2019-06-22 RX ADMIN — DIVALPROEX SODIUM 1000 MG: 250 TABLET, DELAYED RELEASE ORAL at 22:52

## 2019-06-22 RX ADMIN — SUGAMMADEX 200 MG: 100 INJECTION, SOLUTION INTRAVENOUS at 20:35

## 2019-06-22 RX ADMIN — AMLODIPINE BESYLATE 5 MG: 5 TABLET ORAL at 22:52

## 2019-06-22 RX ADMIN — POTASSIUM CHLORIDE, DEXTROSE MONOHYDRATE AND SODIUM CHLORIDE 1000 ML: 150; 5; 450 INJECTION, SOLUTION INTRAVENOUS at 21:31

## 2019-06-22 ASSESSMENT — PULMONARY FUNCTION TESTS
PIF_VALUE: 2
PIF_VALUE: 20
PIF_VALUE: 20
PIF_VALUE: 23
PIF_VALUE: 21
PIF_VALUE: 22
PIF_VALUE: 23
PIF_VALUE: 21
PIF_VALUE: 2
PIF_VALUE: 1
PIF_VALUE: 23
PIF_VALUE: 23
PIF_VALUE: 21
PIF_VALUE: 21
PIF_VALUE: 22
PIF_VALUE: 1
PIF_VALUE: 24
PIF_VALUE: 15
PIF_VALUE: 1
PIF_VALUE: 19
PIF_VALUE: 21
PIF_VALUE: 20
PIF_VALUE: 21
PIF_VALUE: 19
PIF_VALUE: 24
PIF_VALUE: 22
PIF_VALUE: 18
PIF_VALUE: 23
PIF_VALUE: 24
PIF_VALUE: 22
PIF_VALUE: 21
PIF_VALUE: 24
PIF_VALUE: 24
PIF_VALUE: 20
PIF_VALUE: 21
PIF_VALUE: 19
PIF_VALUE: 22
PIF_VALUE: 18
PIF_VALUE: 22
PIF_VALUE: 20
PIF_VALUE: 21
PIF_VALUE: 23
PIF_VALUE: 22
PIF_VALUE: 3
PIF_VALUE: 18
PIF_VALUE: 20
PIF_VALUE: 21
PIF_VALUE: 24
PIF_VALUE: 20
PIF_VALUE: 19
PIF_VALUE: 2
PIF_VALUE: 4
PIF_VALUE: 24
PIF_VALUE: 23
PIF_VALUE: 21
PIF_VALUE: 3
PIF_VALUE: 19
PIF_VALUE: 23
PIF_VALUE: 24
PIF_VALUE: 20
PIF_VALUE: 22
PIF_VALUE: 17
PIF_VALUE: 21
PIF_VALUE: 22
PIF_VALUE: 2
PIF_VALUE: 23
PIF_VALUE: 22
PIF_VALUE: 22
PIF_VALUE: 17
PIF_VALUE: 0
PIF_VALUE: 3
PIF_VALUE: 22
PIF_VALUE: 21
PIF_VALUE: 22
PIF_VALUE: 21
PIF_VALUE: 22
PIF_VALUE: 2
PIF_VALUE: 13
PIF_VALUE: 2
PIF_VALUE: 1
PIF_VALUE: 21
PIF_VALUE: 4
PIF_VALUE: 23
PIF_VALUE: 19
PIF_VALUE: 22
PIF_VALUE: 19
PIF_VALUE: 19
PIF_VALUE: 23
PIF_VALUE: 24

## 2019-06-22 ASSESSMENT — ENCOUNTER SYMPTOMS: SHORTNESS OF BREATH: 1

## 2019-06-22 ASSESSMENT — PAIN SCALES - GENERAL
PAINLEVEL_OUTOF10: 0
PAINLEVEL_OUTOF10: 10
PAINLEVEL_OUTOF10: 0
PAINLEVEL_OUTOF10: 10

## 2019-06-22 ASSESSMENT — LIFESTYLE VARIABLES: SMOKING_STATUS: 1

## 2019-06-22 NOTE — H&P
Department of General Surgery - Adult   History and Physical      PATIENT NAME: Juliet Haji OF BIRTH: 1950    ADMISSION DATE: 6/22/2019  2:24 PM      TODAY'S DATE: 6/22/2019    CHIEF COMPLAINT:  Abdominal pain      HISTORY OF PRESENT ILLNESS:  The patient is a 68 y.o. male  who presents with generalized abdominal pain which began early this AM and gradually worsened. Nausea, no emesis, no fever/chills. Thinks last BM was yesterday. No prior h/o similar sx. Feels bloated/distended. No prior abdominal surgery. Seen in ED. CT shows likely closed-loop SBO w/ possible internal hernia, no sx of ischemic bowel.     Past Medical History:        Diagnosis Date    Acute gastric ulcer with bleeding     Acute metabolic encephalopathy     MAISHA (acute kidney injury) ([address]     Requiring dialysis since 7/4/17 in setting of hemorrhagic shock    ATN (acute tubular necrosis) (HCC)     Atrial fib/flutter, transient     Atrial fibrillation (HCC)     BPH (benign prostatic hyperplasia)     Cerebral artery occlusion with cerebral infarction ([address] 08/05/2017    weakness and aphasia    Diabetes mellitus ([address]     ESRD (end stage renal disease) ([address]     HD had to be stopped due to hemorrhagic stroke    GI bleed     Glaucoma     History of blood transfusion     Hx of diabetic neuropathy     Hyperlipidemia     Hypertension     Mitral insufficiency     Mobility impaired     uses walker and W/C    Pulmonary hypertension ([address]     Traumatic hemorrhagic shock ([address]        Past Surgical History:        Procedure Laterality Date    DENTAL SURGERY N/A 3/28/2019    EXTRACTION OF ALL REMAINING UPPER AND LOWER TEETH AND ALVEOLOPLASTY   performed by Marian Elliott., ESTHER at [address] [place] [address] OTHER SURGICAL HISTORY  10/11/13     CYSTOSCOPY, TRANSURETHRAL RESECTION OF PROSTATE WITH OLYMPUS    PROSTATE BIOPSY      UPPER GASTROINTESTINAL ENDOSCOPY  7/13 17       Medications Prior to Admission:

## 2019-06-22 NOTE — ED PROVIDER NOTES
CYSTOSCOPY, TRANSURETHRAL RESECTION OF PROSTATE WITH OLYMPUS    PROSTATE BIOPSY      UPPER GASTROINTESTINAL ENDOSCOPY   17     Family History   Problem Relation Age of Onset    Early Death Mother         age 54    Other Mother         head aneursym    Heart Attack Father     Diabetes Brother     Heart Disease Brother      Social History     Socioeconomic History    Marital status:      Spouse name: Not on file    Number of children: Not on file    Years of education: Not on file    Highest education level: Not on file   Occupational History    Not on file   Social Needs    Financial resource strain: Not on file    Food insecurity:     Worry: Not on file     Inability: Not on file    Transportation needs:     Medical: Not on file     Non-medical: Not on file   Tobacco Use    Smoking status: Former Smoker     Packs/day: 0.50     Years: 20.00     Pack years: 10.00     Types: Cigarettes     Last attempt to quit: 1997     Years since quittin.4    Smokeless tobacco: Former User    Tobacco comment: smokes occ cigar, socially   Substance and Sexual Activity    Alcohol use: Yes     Comment: occasionally    Drug use: No    Sexual activity: Not on file   Lifestyle    Physical activity:     Days per week: Not on file     Minutes per session: Not on file    Stress: Not on file   Relationships    Social connections:     Talks on phone: Not on file     Gets together: Not on file     Attends Hindu service: Not on file     Active member of club or organization: Not on file     Attends meetings of clubs or organizations: Not on file     Relationship status: Not on file    Intimate partner violence:     Fear of current or ex partner: Not on file     Emotionally abused: Not on file     Physically abused: Not on file     Forced sexual activity: Not on file   Other Topics Concern    Not on file   Social History Narrative    Not on file     Current Facility-Administered Medications CBC auto differential   Result Value Ref Range    WBC 5.6 4.0 - 11.0 K/uL    RBC 3.89 (L) 4.20 - 5.90 M/uL    Hemoglobin 11.4 (L) 13.5 - 17.5 g/dL    Hematocrit 34.4 (L) 40.5 - 52.5 %    MCV 88.5 80.0 - 100.0 fL    MCH 29.4 26.0 - 34.0 pg    MCHC 33.2 31.0 - 36.0 g/dL    RDW 18.5 (H) 12.4 - 15.4 %    Platelets 351 677 - 181 K/uL    MPV 8.1 5.0 - 10.5 fL    Neutrophils % 73.7 %    Lymphocytes % 17.8 %    Monocytes % 6.8 %    Eosinophils % 1.0 %    Basophils % 0.7 %    Neutrophils # 4.1 1.7 - 7.7 K/uL    Lymphocytes # 1.0 1.0 - 5.1 K/uL    Monocytes # 0.4 0.0 - 1.3 K/uL    Eosinophils # 0.1 0.0 - 0.6 K/uL    Basophils # 0.0 0.0 - 0.2 K/uL   Comprehensive metabolic panel   Result Value Ref Range    Sodium 141 136 - 145 mmol/L    Potassium 3.6 3.5 - 5.1 mmol/L    Chloride 104 99 - 110 mmol/L    CO2 27 21 - 32 mmol/L    Anion Gap 10 3 - 16    Glucose 114 (H) 70 - 99 mg/dL    BUN 31 (H) 7 - 20 mg/dL    CREATININE 1.6 (H) 0.8 - 1.3 mg/dL    GFR Non- 43 (A) >60    GFR  52 (A) >60    Calcium 8.6 8.3 - 10.6 mg/dL    Total Protein 5.3 (L) 6.4 - 8.2 g/dL    Alb 2.4 (L) 3.4 - 5.0 g/dL    Albumin/Globulin Ratio 0.8 (L) 1.1 - 2.2    Total Bilirubin 0.5 0.0 - 1.0 mg/dL    Alkaline Phosphatase 42 40 - 129 U/L    ALT 15 10 - 40 U/L    AST 38 (H) 15 - 37 U/L    Globulin 2.9 g/dL   Urinalysis, reflex to microscopic   Result Value Ref Range    Color, UA DARK YELLOW (A) Straw/Yellow    Clarity, UA Clear Clear    Glucose, Ur Negative Negative mg/dL    Bilirubin Urine SMALL (A) Negative    Ketones, Urine TRACE (A) Negative mg/dL    Specific Gravity, UA >=1.030 1.005 - 1.030    Blood, Urine LARGE (A) Negative    pH, UA 5.5 5.0 - 8.0    Protein, UA >=300 (A) Negative mg/dL    Urobilinogen, Urine 1.0 <2.0 E.U./dL    Nitrite, Urine Negative Negative    Leukocyte Esterase, Urine Negative Negative    Microscopic Examination YES     Urine Type Not Specified    Lactic acid, plasma   Result Value Ref Range

## 2019-06-22 NOTE — ANESTHESIA PRE PROCEDURE
Department of Anesthesiology  Preprocedure Note       Name:  Deepti Solis   Age:  68 y.o.  :  1950                                          MRN:  [id number]         Date:  2019      Surgeon: Ardeen Crigler):  Gabo Vasquez MD    Procedure: LAPAROSCOPY EXPLORATORY, CONVERTED TO OPEN (N/A Abdomen)    Medications prior to admission:   Prior to Admission medications    Medication Sig Start Date End Date Taking?  Authorizing Provider   levOCARNitine fumarate (CARNITOR) 250 MG capsule Take 2 capsules by mouth 3 times daily 19   Christine Lazcano MD   Lactobacillus (PROBIOTIC ACIDOPHILUS) TABS Take 1 tablet by mouth daily 19   Christine Lazcano MD   aspirin 81 MG tablet Take 81 mg by mouth daily    Historical Provider, MD   divalproex (DEPAKOTE) 500 MG DR tablet Take 500 mg by mouth every morning     Historical Provider, MD   divalproex (DEPAKOTE) 500 MG DR tablet Take 1,000 mg by mouth nightly    Historical Provider, MD   b complex vitamins capsule Take 1 capsule by mouth daily    Historical Provider, MD   melatonin 5 MG TABS tablet Take 5 mg by mouth nightly    Historical Provider, MD   spironolactone (ALDACTONE) 25 MG tablet Take 25 mg by mouth daily    Historical Provider, MD   atorvastatin (LIPITOR) 40 MG tablet Take 40 mg by mouth daily    Historical Provider, MD   vitamin D (CHOLECALCIFEROL) 1000 UNIT TABS tablet Take 1,000 Units by mouth daily    Historical Provider, MD   losartan (COZAAR) 50 MG tablet Take 100 mg by mouth daily     Historical Provider, MD   magnesium oxide (MAG-OX) 400 MG tablet Take 400 mg by mouth daily    Historical Provider, MD   QUEtiapine (SEROQUEL) 25 MG tablet Take 50 mg by mouth 2 times daily Indications: 50mg in AM 75 @ HS     Historical Provider, MD   amLODIPine (NORVASC) 5 MG tablet Take 5 mg by mouth 2 times daily     Historical Provider, MD   pantoprazole (PROTONIX) 40 MG tablet Take 40 mg by mouth daily    Historical Provider, MD   acetaminophen (TYLENOL) 325 Hyperglycemia R73.9    Elevated lactic acid level R79.89    NSVT (nonsustained ventricular tachycardia) ([facility]) I47.2    DM (diabetes mellitus), secondary, uncontrolled, w/neurologic complic ([facility]) P90.57, V71.87    ESRD (end stage renal disease) ([facility] N18.6    Hyponatremia E87.1    Anemia D64.9    SOB (shortness of breath) R06.02    Urinary tract infection with hematuria N39.0, R31.9    Hemodialysis patient ([facility] F34.9    Metabolic encephalopathy K21.88    Acute cystitis with hematuria N30.01    Positive blood culture R78.81    Altered mental status R41.82    Chronic kidney disease (CKD) N18.9    Encephalopathy, metabolic F32.64    HTN (hypertension), benign I10    CKD (chronic kidney disease) N18.9    Dyslipidemia E78.5    Acute metabolic encephalopathy W36.20    Bradycardia R00.1    Impulse control disorder in adult F63.9    Dementia due to Alzheimer's disease G30.9, F02.80    PAF (paroxysmal atrial fibrillation) ([facility]) I48.0       Past Medical History:        Diagnosis Date    Acute gastric ulcer with bleeding     Acute metabolic encephalopathy     MAISHA (acute kidney injury) ([facility]     Requiring dialysis since 7/4/17 in setting of hemorrhagic shock    ATN (acute tubular necrosis) ([facility])     Atrial fib/flutter, transient     Atrial fibrillation ([facility])     BPH (benign prostatic hyperplasia)     Cerebral artery occlusion with cerebral infarction ([address] 08/05/2017    weakness and aphasia    Diabetes mellitus ([address]     ESRD (end stage renal disease) ([address]     HD had to be stopped due to hemorrhagic stroke    GI bleed     Glaucoma     History of blood transfusion     Hx of diabetic neuropathy     Hyperlipidemia     Hypertension     Mitral insufficiency     Mobility impaired     uses walker and W/C    Pulmonary hypertension ([address]     Traumatic hemorrhagic shock ([address]        Past Surgical History:        Procedure Laterality Date    DENTAL SURGERY N/A 3/28/2019    EXTRACTION OF ALL REMAINING UPPER AND LOWER TEETH AND ALVEOLOPLASTY   performed by Monae Hernandez., DMD at [facility] OTHER SURGICAL HISTORY  10/11/13     CYSTOSCOPY, TRANSURETHRAL RESECTION OF PROSTATE WITH OLYMPUS    PROSTATE BIOPSY      UPPER GASTROINTESTINAL ENDOSCOPY  17       Social History:    Social History     Tobacco Use    Smoking status: Former Smoker     Packs/day: 0.50     Years: 20.00     Pack years: 10.00     Types: Cigarettes     Last attempt to quit: 1997     Years since quittin.4    Smokeless tobacco: Former User    Tobacco comment: smokes occ cigar, socially   Substance Use Topics    Alcohol use: Yes     Comment: occasionally                                Counseling given: Not Answered  Comment: smokes occ cigar, socially      Vital Signs (Current):   Vitals:    19 1730 19 1752 19 1758 19 1847   BP: (!) 140/94 (!) 155/114  (!) 155/90   Pulse: 94 113 98 83   Resp:  20   Temp:       TempSrc:       SpO2: 96% 96%  98%   Weight:       Height:                                                  BP Readings from Last 3 Encounters:   19 (!) 155/90   19 121/84   19 129/87       NPO Status: Time of last liquid consumption: 1000                        Time of last solid consumption: 1000                        Date of last liquid consumption: 19                        Date of last solid food consumption: 19    BMI:   Wt Readings from Last 3 Encounters:   19 282 lb (127.9 kg)   19 259 lb (117.5 kg)   19 267 lb (121.1 kg)     Body mass index is 32.59 kg/m².     CBC:   Lab Results   Component Value Date    WBC 5.6 2019    RBC 3.89 2019    HGB 11.4 2019    HCT 34.4 2019    MCV 88.5 2019    RDW 18.5 2019     2019       CMP:   Lab Results   Component Value Date     2019    K 3.6 2019    K 3.7 2019     2019    CO2 27 2019 BUN 31 06/22/2019    CREATININE 1.6 06/22/2019    GFRAA 52 06/22/2019    GFRAA >60 04/05/2013    AGRATIO 0.8 06/22/2019    LABGLOM 43 06/22/2019    GLUCOSE 114 06/22/2019    PROT 5.3 06/22/2019    CALCIUM 8.6 06/22/2019    BILITOT 0.5 06/22/2019    ALKPHOS 42 06/22/2019    AST 38 06/22/2019    ALT 15 06/22/2019       POC Tests: No results for input(s): POCGLU, POCNA, POCK, POCCL, POCBUN, POCHEMO, POCHCT in the last 72 hours. Coags:   Lab Results   Component Value Date    PROTIME 13.7 12/20/2018    INR 1.20 12/20/2018    APTT 32.5 12/20/2018       HCG (If Applicable): No results found for: PREGTESTUR, PREGSERUM, HCG, HCGQUANT     ABGs:   Lab Results   Component Value Date    PHART 7.447 07/08/2017    PO2ART 89 07/08/2017    JTR4GIM 36 07/08/2017    SLQ9XKA 25.1 07/08/2017    BEART 1 07/08/2017    Z8PTPNKC 97 07/08/2017        Type & Screen (If Applicable):  No results found for: LABABO, LABRH    Anesthesia Evaluation   no history of anesthetic complications:   Airway: Mallampati: I  TM distance: >3 FB   Neck ROM: limited  Mouth opening: > = 3 FB Dental:    (+) lower dentures and upper dentures      Pulmonary:   (+) shortness of breath:  current smoker                           Cardiovascular:    (+) hypertension:, valvular problems/murmurs: MR,                ROS comment: Pulmonary HTN     Neuro/Psych:   (+) CVA:, neuromuscular disease (diabetic neuropathy):, psychiatric history:             ROS comment: dementia GI/Hepatic/Renal:   (+) PUD, renal disease: CRI, morbid obesity         ROS comment: H/o GI bleed, today SBO. Endo/Other:    (+) DiabetesType II DM, , .                 Abdominal:           Vascular:                                     Pre-Operative Diagnosis: No admission diagnoses are documented for this encounter. 68 y.o.   BMI:  Body mass index is 32.59 kg/m².      Vitals:    06/22/19 1730 06/22/19 1752 06/22/19 1758 06/22/19 1847   BP: (!) 140/94 (!) 155/114  (!) 155/90   Pulse: 94 113 98 83

## 2019-06-22 NOTE — ED NOTES
Pt wearing only hospital gown. Dentures removed and given to pts daughter. Transport here to take pt to OR.   [facility] sent with pt per [facility] request.      Darlin Oropeza RN  06/22/19 1371

## 2019-06-22 NOTE — ED NOTES
Report called to OR. Consent signed by pt/pts son, and Raji Segura as witness.        Linwood Cabrera RN  06/22/19 4345

## 2019-06-23 LAB
ANION GAP SERPL CALCULATED.3IONS-SCNC: 15 MMOL/L (ref 3–16)
BASOPHILS ABSOLUTE: 0 K/UL (ref 0–0.2)
BASOPHILS RELATIVE PERCENT: 0.2 %
BUN BLDV-MCNC: 36 MG/DL (ref 7–20)
CALCIUM SERPL-MCNC: 8.3 MG/DL (ref 8.3–10.6)
CHLORIDE BLD-SCNC: 107 MMOL/L (ref 99–110)
CO2: 20 MMOL/L (ref 21–32)
CREAT SERPL-MCNC: 1.8 MG/DL (ref 0.8–1.3)
EOSINOPHILS ABSOLUTE: 0 K/UL (ref 0–0.6)
EOSINOPHILS RELATIVE PERCENT: 0.1 %
GFR AFRICAN AMERICAN: 45
GFR NON-AFRICAN AMERICAN: 37
GLUCOSE BLD-MCNC: 121 MG/DL (ref 70–99)
GLUCOSE BLD-MCNC: 148 MG/DL (ref 70–99)
GLUCOSE BLD-MCNC: 164 MG/DL (ref 70–99)
HCT VFR BLD CALC: 36.1 % (ref 40.5–52.5)
HEMOGLOBIN: 11.9 G/DL (ref 13.5–17.5)
LYMPHOCYTES ABSOLUTE: 0.5 K/UL (ref 1–5.1)
LYMPHOCYTES RELATIVE PERCENT: 6.8 %
MCH RBC QN AUTO: 29.8 PG (ref 26–34)
MCHC RBC AUTO-ENTMCNC: 32.9 G/DL (ref 31–36)
MCV RBC AUTO: 90.5 FL (ref 80–100)
MONOCYTES ABSOLUTE: 0.9 K/UL (ref 0–1.3)
MONOCYTES RELATIVE PERCENT: 12.7 %
NEUTROPHILS ABSOLUTE: 5.8 K/UL (ref 1.7–7.7)
NEUTROPHILS RELATIVE PERCENT: 80.2 %
PDW BLD-RTO: 18.6 % (ref 12.4–15.4)
PERFORMED ON: ABNORMAL
PERFORMED ON: ABNORMAL
PLATELET # BLD: 163 K/UL (ref 135–450)
PMV BLD AUTO: 8.7 FL (ref 5–10.5)
POTASSIUM REFLEX MAGNESIUM: 4 MMOL/L (ref 3.5–5.1)
RBC # BLD: 3.98 M/UL (ref 4.2–5.9)
SODIUM BLD-SCNC: 142 MMOL/L (ref 136–145)
WBC # BLD: 7.3 K/UL (ref 4–11)

## 2019-06-23 PROCEDURE — 6370000000 HC RX 637 (ALT 250 FOR IP): Performed by: SURGERY

## 2019-06-23 PROCEDURE — 85025 COMPLETE CBC W/AUTO DIFF WBC: CPT

## 2019-06-23 PROCEDURE — C9113 INJ PANTOPRAZOLE SODIUM, VIA: HCPCS | Performed by: SURGERY

## 2019-06-23 PROCEDURE — 80048 BASIC METABOLIC PNL TOTAL CA: CPT

## 2019-06-23 PROCEDURE — 2580000003 HC RX 258: Performed by: SURGERY

## 2019-06-23 PROCEDURE — 99024 POSTOP FOLLOW-UP VISIT: CPT | Performed by: SURGERY

## 2019-06-23 PROCEDURE — 1200000000 HC SEMI PRIVATE

## 2019-06-23 PROCEDURE — 2500000003 HC RX 250 WO HCPCS: Performed by: SURGERY

## 2019-06-23 PROCEDURE — 97110 THERAPEUTIC EXERCISES: CPT

## 2019-06-23 PROCEDURE — 97162 PT EVAL MOD COMPLEX 30 MIN: CPT

## 2019-06-23 PROCEDURE — 36415 COLL VENOUS BLD VENIPUNCTURE: CPT

## 2019-06-23 PROCEDURE — APPSS45 APP SPLIT SHARED TIME 31-45 MINUTES: Performed by: CLINICAL NURSE SPECIALIST

## 2019-06-23 PROCEDURE — 6360000002 HC RX W HCPCS: Performed by: SURGERY

## 2019-06-23 PROCEDURE — 6370000000 HC RX 637 (ALT 250 FOR IP): Performed by: INTERNAL MEDICINE

## 2019-06-23 RX ORDER — NICOTINE POLACRILEX 4 MG
15 LOZENGE BUCCAL PRN
Status: DISCONTINUED | OUTPATIENT
Start: 2019-06-23 | End: 2019-06-29 | Stop reason: HOSPADM

## 2019-06-23 RX ORDER — QUETIAPINE FUMARATE 25 MG/1
50 TABLET, FILM COATED ORAL EVERY 6 HOURS PRN
Status: DISCONTINUED | OUTPATIENT
Start: 2019-06-23 | End: 2019-06-29 | Stop reason: HOSPADM

## 2019-06-23 RX ORDER — ATORVASTATIN CALCIUM 40 MG/1
40 TABLET, FILM COATED ORAL DAILY
Status: DISCONTINUED | OUTPATIENT
Start: 2019-06-23 | End: 2019-06-29 | Stop reason: HOSPADM

## 2019-06-23 RX ORDER — DEXTROSE MONOHYDRATE 50 MG/ML
100 INJECTION, SOLUTION INTRAVENOUS PRN
Status: DISCONTINUED | OUTPATIENT
Start: 2019-06-23 | End: 2019-06-29 | Stop reason: HOSPADM

## 2019-06-23 RX ORDER — DEXTROSE MONOHYDRATE 25 G/50ML
12.5 INJECTION, SOLUTION INTRAVENOUS PRN
Status: DISCONTINUED | OUTPATIENT
Start: 2019-06-23 | End: 2019-06-29 | Stop reason: HOSPADM

## 2019-06-23 RX ORDER — VALPROIC ACID 250 MG/5ML
500 SOLUTION ORAL 3 TIMES DAILY
Status: DISCONTINUED | OUTPATIENT
Start: 2019-06-23 | End: 2019-06-29 | Stop reason: HOSPADM

## 2019-06-23 RX ORDER — LABETALOL HYDROCHLORIDE 5 MG/ML
5 INJECTION, SOLUTION INTRAVENOUS EVERY 4 HOURS PRN
Status: DISCONTINUED | OUTPATIENT
Start: 2019-06-23 | End: 2019-06-29 | Stop reason: HOSPADM

## 2019-06-23 RX ADMIN — Medication 2 G: at 12:29

## 2019-06-23 RX ADMIN — ENOXAPARIN SODIUM 40 MG: 40 INJECTION SUBCUTANEOUS at 09:58

## 2019-06-23 RX ADMIN — Medication 10 ML: at 10:02

## 2019-06-23 RX ADMIN — AMLODIPINE BESYLATE 5 MG: 5 TABLET ORAL at 09:58

## 2019-06-23 RX ADMIN — METRONIDAZOLE 500 MG: 500 INJECTION, SOLUTION INTRAVENOUS at 02:28

## 2019-06-23 RX ADMIN — Medication 2 G: at 17:48

## 2019-06-23 RX ADMIN — DEXTROSE MONOHYDRATE, SODIUM CHLORIDE, AND POTASSIUM CHLORIDE 1000 ML: 50; 4.5; 1.49 INJECTION, SOLUTION INTRAVENOUS at 16:01

## 2019-06-23 RX ADMIN — METRONIDAZOLE 500 MG: 500 INJECTION, SOLUTION INTRAVENOUS at 17:48

## 2019-06-23 RX ADMIN — Medication 2 G: at 02:21

## 2019-06-23 RX ADMIN — LOSARTAN POTASSIUM 100 MG: 100 TABLET, FILM COATED ORAL at 09:58

## 2019-06-23 RX ADMIN — METRONIDAZOLE 500 MG: 500 INJECTION, SOLUTION INTRAVENOUS at 09:58

## 2019-06-23 RX ADMIN — DEXTROSE MONOHYDRATE, SODIUM CHLORIDE, AND POTASSIUM CHLORIDE 1000 ML: 50; 4.5; 1.49 INJECTION, SOLUTION INTRAVENOUS at 05:57

## 2019-06-23 RX ADMIN — MORPHINE SULFATE 4 MG: 4 INJECTION INTRAVENOUS at 14:39

## 2019-06-23 RX ADMIN — AMLODIPINE BESYLATE 5 MG: 5 TABLET ORAL at 19:46

## 2019-06-23 RX ADMIN — DIVALPROEX SODIUM 500 MG: 250 TABLET, DELAYED RELEASE ORAL at 09:58

## 2019-06-23 RX ADMIN — PANTOPRAZOLE SODIUM 40 MG: 40 INJECTION, POWDER, FOR SOLUTION INTRAVENOUS at 09:58

## 2019-06-23 RX ADMIN — INSULIN LISPRO 1 UNITS: 100 INJECTION, SOLUTION INTRAVENOUS; SUBCUTANEOUS at 17:48

## 2019-06-23 ASSESSMENT — PAIN SCALES - GENERAL: PAINLEVEL_OUTOF10: 9

## 2019-06-23 NOTE — PROGRESS NOTES
We attempted to remove stocking and ace wraps to assess both legs. Pt refused. Says wound care nurse from [facility] assesses and takes care of leg dressings and does not want the ace wraps and dressings removed until this Wednesday.

## 2019-06-23 NOTE — ANESTHESIA POSTPROCEDURE EVALUATION
Department of Anesthesiology  Postprocedure Note    Patient: Nilsa Loco  MRN: [id number]  YOB: 1950  Date of evaluation: 6/22/2019  Time:  9:44 PM     Procedure Summary     Date:  06/22/19 Room / Location:  [facility] OR [address] [place] [address] OR    Anesthesia Start:  Sean Rivers Anesthesia Stop:  2054    Procedure:  LAPAROSCOPIC, CONVERTED TO OPEN INCARCERATED UMBILICAL HERNIA REPAIR (N/A Abdomen) Diagnosis:  (-)    Surgeon:  Catherine Weeks MD Responsible Provider:  Ann-Marie Back MD    Anesthesia Type:  general ASA Status:  4 - Emergent          Anesthesia Type: No value filed. Gabriela Phase I: Gabriela Score: 10    Gabriela Phase II:      Last vitals: Reviewed and per EMR flowsheets. Anesthesia Post Evaluation    Patient location during evaluation: PACU  Patient participation: complete - patient participated  Level of consciousness: awake and alert  Airway patency: patent  Nausea & Vomiting: no nausea and no vomiting  Complications: no  Cardiovascular status: blood pressure returned to baseline  Respiratory status: acceptable  Hydration status: euvolemic  Comments: Pt awake, minimal pain, breathing well. VSS on transfer to phase 2 recovery. No anesthetic complications.

## 2019-06-23 NOTE — OP NOTE
incision. Antithrombotic pumps were placed on the legs. General anesthesia was  administered without difficulty. A العراقي catheter was placed to gravity  in a sterile fashion. The abdomen was prepped and draped in sterile  fashion. I began with a 5-mm trocar incision in the left upper  quadrant. A trocar with a 0 degree scope was passed under direct vision  to the abdominal wall layers into the peritoneal cavity. Insufflation  was initiated. Examining the abdomen with scope, quickly identified a  segment of small bowel coiled in the right upper quadrant and that was  clearly ischemic if not truly infarcted. In addition, I could see a  separate umbilical hernia just below the damaged loops of bowel that had  omentum and possibly some additional small bowel loops entrapped within  it. I felt that with the appearance of the bowel that he would need a  small bowel resection and I would abandon the laparoscopic approach. We, therefore, created a periumbilical midline incision. We dissected  down to around the umbilicus identifying the large hernia sac extending  along the elongated umbilicus. We dissected the hernia sac off of the  umbilical stalk and eventually amputated through the base of the  umbilical stalk relieving some of the umbilical skin attached to the  hernia sac. At the end of the procedure, we closed the defect in the  umbilical skin with a running 3-0 Vicryl suture. The umbilical hernia  sac was then opened and mobilized down to the edges of the fascial  defect. The entire hernia sac was excised. There was omentum entrapped  within the hernia sac, but it was viable. The hernia defect itself was  fairly small, measuring perhaps 1.5 cm in greatest dimension. We then  continued opening the midline fascia above and below the hernia defect  for the length of the incision into the peritoneal cavity. Copious  amounts of serosanguineous ascites were released and suctioned.     I was not unable to mobilize the infarcted segment of small bowel out of  the right abdomen. It was not totally clear what was the source of the  torsion of the bowel, although I did find one band of omentum down deep  underneath the segment of bowel that might have been a source of  torsion. This band of omentum was transected, pulling the damaged bowel  up, it was clear that it was not salvageable and would need to be  resected. We then performed a standard resection with a linear stapler,  both proximal and distal to the most diseased areas and then an ENSEAL  device was used to transect the mesentery underneath this segment. The  damaged segment was removed and it measured at approximately 75 cm in  length. The two healthy end of the intestine was then laid parallel to each  other and secured with silk sutures. The mesenteric defect underneath  was closed with interrupted figure-of-eight 2-0 silk sutures. A  side-to-side staple anastomosis was created in standard fashion and a  common opening closed with another firing of the stapler. The  anastomosis was palpable and patent. Reinforcing sutures were placed on  the angle of the anastomosis. The anastomosis was now returned to the  abdominal cavity. The portion of the omentum that had been entrapped in  the umbilical hernia was sharply excised. We now irrigated the  abdominal cavity with a liter of warm saline and the fluid was  aspirated. The nasogastric tube was confirmed to be in good position. The midline fascial defect was closed with two running looped 0 PDS  sutures. The wound was copiously irrigated. The umbilical skin had  been repaired, was intact back down to the midline with a 3-0 Vicryl  stitch. The midline skin incisions were then closed with staples and a  sterile dressing was applied. The stitch was used to close the trocar  incision in the left upper quadrant.  The patient was then extubated and  taken to the recovery room in [facility] condition.           Joel Faustin MD    D: 06/22/2019 21:00:20       T: 06/23/2019 0:04:30     [id number]  Job#: [id number]     Doc#: [id number]    CC:  Kamari Flores MD

## 2019-06-24 LAB
ANION GAP SERPL CALCULATED.3IONS-SCNC: 13 MMOL/L (ref 3–16)
BUN BLDV-MCNC: 46 MG/DL (ref 7–20)
CALCIUM SERPL-MCNC: 8.3 MG/DL (ref 8.3–10.6)
CHLORIDE BLD-SCNC: 106 MMOL/L (ref 99–110)
CO2: 22 MMOL/L (ref 21–32)
CREAT SERPL-MCNC: 2.6 MG/DL (ref 0.8–1.3)
CREATININE URINE: 180.6 MG/DL (ref 39–259)
EOSINOPHIL,URINE: NORMAL
GFR AFRICAN AMERICAN: 30
GFR NON-AFRICAN AMERICAN: 24
GLUCOSE BLD-MCNC: 119 MG/DL (ref 70–99)
GLUCOSE BLD-MCNC: 129 MG/DL (ref 70–99)
GLUCOSE BLD-MCNC: 133 MG/DL (ref 70–99)
GLUCOSE BLD-MCNC: 136 MG/DL (ref 70–99)
GLUCOSE BLD-MCNC: 146 MG/DL (ref 70–99)
GLUCOSE BLD-MCNC: 156 MG/DL (ref 70–99)
MAGNESIUM: 1.8 MG/DL (ref 1.8–2.4)
PERFORMED ON: ABNORMAL
POTASSIUM SERPL-SCNC: 4.6 MMOL/L (ref 3.5–5.1)
SODIUM BLD-SCNC: 141 MMOL/L (ref 136–145)
SODIUM URINE: <20 MMOL/L

## 2019-06-24 PROCEDURE — 80048 BASIC METABOLIC PNL TOTAL CA: CPT

## 2019-06-24 PROCEDURE — 2580000003 HC RX 258: Performed by: SURGERY

## 2019-06-24 PROCEDURE — 99024 POSTOP FOLLOW-UP VISIT: CPT | Performed by: SURGERY

## 2019-06-24 PROCEDURE — 6370000000 HC RX 637 (ALT 250 FOR IP): Performed by: SURGERY

## 2019-06-24 PROCEDURE — 97110 THERAPEUTIC EXERCISES: CPT

## 2019-06-24 PROCEDURE — 6360000002 HC RX W HCPCS: Performed by: SURGERY

## 2019-06-24 PROCEDURE — 6370000000 HC RX 637 (ALT 250 FOR IP): Performed by: INTERNAL MEDICINE

## 2019-06-24 PROCEDURE — 97166 OT EVAL MOD COMPLEX 45 MIN: CPT

## 2019-06-24 PROCEDURE — 97530 THERAPEUTIC ACTIVITIES: CPT

## 2019-06-24 PROCEDURE — 83735 ASSAY OF MAGNESIUM: CPT

## 2019-06-24 PROCEDURE — 2580000003 HC RX 258: Performed by: INTERNAL MEDICINE

## 2019-06-24 PROCEDURE — 82570 ASSAY OF URINE CREATININE: CPT

## 2019-06-24 PROCEDURE — 84300 ASSAY OF URINE SODIUM: CPT

## 2019-06-24 PROCEDURE — 1200000000 HC SEMI PRIVATE

## 2019-06-24 PROCEDURE — 87205 SMEAR GRAM STAIN: CPT

## 2019-06-24 PROCEDURE — 2500000003 HC RX 250 WO HCPCS: Performed by: SURGERY

## 2019-06-24 PROCEDURE — 36415 COLL VENOUS BLD VENIPUNCTURE: CPT

## 2019-06-24 PROCEDURE — C9113 INJ PANTOPRAZOLE SODIUM, VIA: HCPCS | Performed by: SURGERY

## 2019-06-24 RX ORDER — AMLODIPINE BESYLATE 5 MG/1
5 TABLET ORAL DAILY PRN
Status: DISCONTINUED | OUTPATIENT
Start: 2019-06-24 | End: 2019-06-24

## 2019-06-24 RX ORDER — LOSARTAN POTASSIUM 100 MG/1
100 TABLET ORAL DAILY
Status: ON HOLD | COMMUNITY
End: 2019-07-18 | Stop reason: HOSPADM

## 2019-06-24 RX ORDER — QUETIAPINE FUMARATE 50 MG/1
50 TABLET, FILM COATED ORAL SEE ADMIN INSTRUCTIONS
COMMUNITY
End: 2020-12-28

## 2019-06-24 RX ORDER — LEVOCARNITINE 1 G/10ML
500 SOLUTION ORAL 3 TIMES DAILY
Status: DISCONTINUED | OUTPATIENT
Start: 2019-06-24 | End: 2019-06-29 | Stop reason: HOSPADM

## 2019-06-24 RX ORDER — DEXTROSE AND SODIUM CHLORIDE 5; .45 G/100ML; G/100ML
INJECTION, SOLUTION INTRAVENOUS CONTINUOUS
Status: DISCONTINUED | OUTPATIENT
Start: 2019-06-24 | End: 2019-06-25

## 2019-06-24 RX ORDER — DIVALPROEX SODIUM 500 MG/1
500 TABLET, DELAYED RELEASE ORAL SEE ADMIN INSTRUCTIONS
Status: ON HOLD | COMMUNITY
End: 2019-09-20

## 2019-06-24 RX ADMIN — LEVOCARNITINE 500 MG: 1 SOLUTION ORAL at 16:54

## 2019-06-24 RX ADMIN — Medication 10 ML: at 08:28

## 2019-06-24 RX ADMIN — DEXTROSE MONOHYDRATE, SODIUM CHLORIDE, AND POTASSIUM CHLORIDE 1000 ML: 50; 4.5; 1.49 INJECTION, SOLUTION INTRAVENOUS at 00:59

## 2019-06-24 RX ADMIN — Medication 2 G: at 02:27

## 2019-06-24 RX ADMIN — PANTOPRAZOLE SODIUM 40 MG: 40 INJECTION, POWDER, FOR SOLUTION INTRAVENOUS at 08:27

## 2019-06-24 RX ADMIN — INSULIN LISPRO 1 UNITS: 100 INJECTION, SOLUTION INTRAVENOUS; SUBCUTANEOUS at 14:02

## 2019-06-24 RX ADMIN — Medication 10 ML: at 20:51

## 2019-06-24 RX ADMIN — METRONIDAZOLE 500 MG: 500 INJECTION, SOLUTION INTRAVENOUS at 11:22

## 2019-06-24 RX ADMIN — B-COMPLEX W/ C & FOLIC ACID TAB 1 TABLET: TAB at 08:27

## 2019-06-24 RX ADMIN — DEXTROSE AND SODIUM CHLORIDE: 5; 450 INJECTION, SOLUTION INTRAVENOUS at 21:33

## 2019-06-24 RX ADMIN — METRONIDAZOLE 500 MG: 500 INJECTION, SOLUTION INTRAVENOUS at 02:27

## 2019-06-24 RX ADMIN — ATORVASTATIN CALCIUM 40 MG: 40 TABLET, FILM COATED ORAL at 08:27

## 2019-06-24 RX ADMIN — METRONIDAZOLE 500 MG: 500 INJECTION, SOLUTION INTRAVENOUS at 16:54

## 2019-06-24 RX ADMIN — Medication 2 G: at 11:21

## 2019-06-24 RX ADMIN — VALPROIC ACID 500 MG: 250 SOLUTION ORAL at 08:27

## 2019-06-24 RX ADMIN — VALPROIC ACID 500 MG: 250 SOLUTION ORAL at 20:48

## 2019-06-24 RX ADMIN — METOPROLOL TARTRATE 12.5 MG: 25 TABLET ORAL at 20:48

## 2019-06-24 RX ADMIN — MORPHINE SULFATE 2 MG: 2 INJECTION, SOLUTION INTRAMUSCULAR; INTRAVENOUS at 08:27

## 2019-06-24 RX ADMIN — PHENOL 1 SPRAY: 1.5 LIQUID ORAL at 08:33

## 2019-06-24 RX ADMIN — LEVOCARNITINE 500 MG: 1 SOLUTION ORAL at 20:48

## 2019-06-24 RX ADMIN — ENOXAPARIN SODIUM 40 MG: 40 INJECTION SUBCUTANEOUS at 08:27

## 2019-06-24 RX ADMIN — METOPROLOL TARTRATE 12.5 MG: 25 TABLET ORAL at 08:42

## 2019-06-24 RX ADMIN — DEXTROSE AND SODIUM CHLORIDE: 5; 450 INJECTION, SOLUTION INTRAVENOUS at 11:21

## 2019-06-24 RX ADMIN — Medication 2 G: at 16:54

## 2019-06-24 RX ADMIN — VALPROIC ACID 500 MG: 250 SOLUTION ORAL at 16:54

## 2019-06-24 RX ADMIN — Medication 10 ML: at 08:45

## 2019-06-24 RX ADMIN — VALPROIC ACID 250 MG: 250 SOLUTION ORAL at 00:36

## 2019-06-24 ASSESSMENT — PAIN SCALES - GENERAL
PAINLEVEL_OUTOF10: 0
PAINLEVEL_OUTOF10: 9

## 2019-06-24 ASSESSMENT — ENCOUNTER SYMPTOMS
NAUSEA: 0
RESPIRATORY NEGATIVE: 1
EYES NEGATIVE: 1
ABDOMINAL PAIN: 1
VOMITING: 0

## 2019-06-24 NOTE — DISCHARGE INSTR - COC
6/29/19 at 10:45 AM    PHYSICIAN SECTION    Prognosis: Good    Condition at Discharge: Stable    Rehab Potential (if transferring to Rehab): Good    Recommended Labs or Other Treatments After Discharge: PT/OT Skilled nursing  Recommended Follow-up, Labs or Other Treatments After Discharge:    ***             Physician Certification: I certify the above information and transfer of Gabbie Schmidt  is necessary for the continuing treatment of the diagnosis listed and that he requires [place] for less 30 days.      Update Admission H&P: Changes in H&P as follows - S/P hernia repair and small bowel resection    PHYSICIAN SIGNATURE:  Electronically signed by Kimball Cranker, MD on 6/29/19 at 8:59 AM

## 2019-06-24 NOTE — PROGRESS NOTES
Nutrition Assessment    Type and Reason for Visit: Initial, Positive Nutrition Screen(wounds. )    Nutrition Recommendations:   Monitor improvements in GI function and ability to start PO diet when medically appropriate  Monitor need for protein ONS to promote wound healing     Nutrition Assessment: Nutritionally compromised AEB NPO. Admitted with abd pain, found to have SBO. S/p SBR with hernia repair 6/22 per gen surg. Per GI, await GI function before starting diet. Pt states he ate well PTA. Hx of ESRD, DM2. Will monitor improvements in GI function and ability to start diet. Malnutrition Assessment:  · Malnutrition Status: No malnutrition    Nutrition Risk Level: High    Nutrient Needs:  · Estimated Daily Total Kcal: 3106-2367  · Estimated Daily Protein (g):  gm   · Estimated Daily Total Fluid (ml/day): 1 mL/kcal     Nutrition Diagnosis:   · Problem: Inadequate oral intake  · Etiology: related to Alteration in GI function     Signs and symptoms:  as evidenced by NPO status due to medical condition    Objective Information:  · Nutrition-Focused Physical Findings: Obese Class 1. NGT in place to [facility] with -150 mL output x past 24 hrs. · Wound Type: Multiple, Diabetic Ulcer  · Current Nutrition Therapies:  · Oral Diet Orders: NPO   · Oral Diet intake: NPO  · Oral Nutrition Supplement (ONS) Orders: None  · ONS intake: NPO  · Anthropometric Measures:  · Ht: 6' 6\" (198.1 cm)   · Current Body Wt: 282 lb (127.9 kg)  · Ideal Body Wt: 214 lb (97.1 kg), % Ideal Body 132%  · BMI Classification: BMI 30.0 - 34.9 Obese Class I    Nutrition Interventions:   Continue NPO  Continued Inpatient Monitoring    Nutrition Evaluation:   · Evaluation: Goals set   · Goals:  Tolerate diet advancement once GI function improves, and consume greater than 50% of meals and ONS this admission     · Monitoring: Nutrition Progression, Meal Intake, Supplement Intake, Diet Tolerance, Weight, Pertinent Labs      Electronically signed by Marian Boyer RD, LD on 6/24/19 at 3:23 PM    Contact Number: [phone]

## 2019-06-24 NOTE — CONSULTS
KNEE SURGERY      OTHER SURGICAL HISTORY  10/11/13     CYSTOSCOPY, TRANSURETHRAL RESECTION OF PROSTATE WITH OLYMPUS    PROSTATE BIOPSY      UPPER GASTROINTESTINAL ENDOSCOPY  7/13 17       Current Medications:    No current facility-administered medications on file prior to encounter.       Current Outpatient Medications on File Prior to Encounter   Medication Sig Dispense Refill    levOCARNitine fumarate (CARNITOR) 250 MG capsule Take 2 capsules by mouth 3 times daily 180 capsule 1    Lactobacillus (PROBIOTIC ACIDOPHILUS) TABS Take 1 tablet by mouth daily 30 tablet 0    aspirin 81 MG tablet Take 81 mg by mouth daily      divalproex (DEPAKOTE) 500 MG DR tablet Take 500 mg by mouth every morning       divalproex (DEPAKOTE) 500 MG DR tablet Take 1,000 mg by mouth nightly      b complex vitamins capsule Take 1 capsule by mouth daily      melatonin 5 MG TABS tablet Take 5 mg by mouth nightly      spironolactone (ALDACTONE) 25 MG tablet Take 25 mg by mouth daily      atorvastatin (LIPITOR) 40 MG tablet Take 40 mg by mouth daily      vitamin D (CHOLECALCIFEROL) 1000 UNIT TABS tablet Take 1,000 Units by mouth daily      losartan (COZAAR) 50 MG tablet Take 100 mg by mouth daily       magnesium oxide (MAG-OX) 400 MG tablet Take 400 mg by mouth as needed       QUEtiapine (SEROQUEL) 25 MG tablet Take 50 mg by mouth 2 times daily Indications: 50mg in  @ HS       amLODIPine (NORVASC) 5 MG tablet Take 5 mg by mouth 2 times daily       pantoprazole (PROTONIX) 40 MG tablet Take 40 mg by mouth daily      acetaminophen (TYLENOL) 325 MG tablet Take 2 tablets by mouth every 6 hours as needed for Pain or Fever 120 tablet 3    fenofibrate (TRICOR) 145 MG tablet Take 145 mg by mouth daily         Allergies:  Bactrim [sulfamethoxazole-trimethoprim]    Social History:    Social History     Socioeconomic History    Marital status:      Spouse name: Not on file    Number of children: Not on file    Years of

## 2019-06-24 NOTE — PROGRESS NOTES
last 72 hours. Recent Labs     06/22/19  1439   TROPONINI <0.01       Urinalysis:      Lab Results   Component Value Date    NITRU Negative 06/22/2019    WBCUA 0-2 06/22/2019    BACTERIA 1+ 06/22/2019    RBCUA 10-20 06/22/2019    BLOODU LARGE 06/22/2019    SPECGRAV >=1.030 06/22/2019    GLUCOSEU Negative 06/22/2019       Radiology:  CT ABDOMEN PELVIS WO CONTRAST   Final Result   1. Closed loop small bowel obstruction in the right mid abdomen, with   findings suggesting an internal hernia, best appreciated in the coronal   reconstructions. Infiltration of the subtending mesentery is compatible with   vascular congestion. No intestinal wall thickening or pneumatosis   2. Bilateral nephrolithiasis   3. Cholelithiasis   4. Small amount of ascites and trace pleural effusions         XR CHEST PORTABLE   Final Result   No acute cardiopulmonary abnormality appreciated. Assessment/Plan:    Active Hospital Problems    Diagnosis    SBO (small bowel obstruction) ([facility] [K56.609]       The patient is a 68 Y M with a h/o former smoking, HTN, HLD, DM2, Afib, and subcortical LMCA CVA in 2017 which has left him with R-sided weakness and vascular dementia. He used to be on warfarin for the Afib until he had an UGIB with hemorrhagic shock in 2017. This illness was complicated by severe renal failure and required a short course of hemodialysis, but his kidneys recovered and he settled out to CKD3. He lives at home and is cared for by family. He has now presented to the ED on 6/22 with acute abdominal pain and required emergent bowel resection (75 cm of mid small bowel with reanastamosis) for an incarcerated umbilical hernia. He was admitted by general surgery and hospital medicine was consulted to manage the aforementioned medical issues.          Incarcerated umbilical hernia  - s/p 75 cm of mid small bowel resection and reanastomosis. Analgesia, NG management, and diet advancement per surgery.   - post-op a h/o valproate-induced hyperammonemia. - changed his quetiapine to PRN for now since he will be drowsier with pain meds  - supportive care. DVT Prophylaxis: enoxaparin  Diet: Diet NPO Effective Now Exceptions are: Ice Chips, Sips with Meds  Code Status: Full Code    PT/OT Eval Status: eval ordered    Dispo - ultimately per primary team.  OK from my perspective when he is tolerating PO and when Cr stable, perhaps 6/27.       Sofia Bernal MD

## 2019-06-24 NOTE — CARE COORDINATION
Attempt to assess, patient very drowsy. POD 2. Continues with NGT. Message left for daughter, Brandi Caldera. Waiting return call. CASE MANAGEMENT INITIAL ASSESSMENT      Reviewed chart and met with patient today, re: 68 y.o. Male   Explained Case Management role/services. Family present: none  Primary contact information: Arabella Calderon    Admit date/status: 6/22/19  Diagnosis: SBO    Insurance: [id number] required for SNF - Y      3 night stay required - N    Living arrangements, Adls, care needs, prior to admission: lives in home with wife with dementia and daughter Brandi Caldera who is primary care giver. Transportation: tbd    [employer] [employer] at home: Refuses to use [employer] DME  Walker__Cane__RTS__ BSC__Shower Chair__  02__ HHN__ CPAP__  BiPap__  Hospital Bed__ W/C___ Other__________    Services in the home and/or outpatient, prior to admission: [facility] active. Wound care clinic with [address] [place] [address] location. Dialysis Facility (if applicable) none  · Name:  · Address:  · Dialysis Schedule:  · Phone:  · Fax:    PT/OT recs: SNF    Hospital Exemption Notification (HEN): needed for SNF    Barriers to discharge: none    Plan/comments: patient very drowsy spoke with daughter Brandi Caldera via phone. Stated patient refuses to use any DME is active with [facility]. Discussed therapy recs for SNF stated she would like referral to ARU, will discuss with MD prior to any referral. If not [facility] [place] referral would be ok. Will follow post op course for needs and timing of referrals. Following.  Anna Marie Roy RN      ECOC on chart for MD signature

## 2019-06-24 NOTE — PLAN OF CARE
Problem: Falls - Risk of:  Goal: Will remain free from falls  Description  Will remain free from falls  Outcome: Ongoing  Note:   Pt free from falls. Bed check in place. Bed locked and in lowest position call light within reach. Will continue to monitor. Problem: Pain:  Description  Pain management should include both nonpharmacologic and pharmacologic interventions. Goal: Pain level will decrease  Description  Pain level will decrease  Outcome: Ongoing  Note:   Pt verbalizes how to use 1-10 pain scale. PRN morphine available. Reassessment of pain after administration of pain medication. Use of abd binder and repositioning for comfort. Will continue to monitor.

## 2019-06-24 NOTE — PROGRESS NOTES
Patient assessment complete and documented. VSS. A&O x4, can be forgetful at times. Bowel sounds hypoactive. NG to CLWS. Patient has had trouble with swallowing some of his oral medications due to size of medication and NG in place. Perfectserved Dr. Daryle Coins explaining situation. Patient is wearing BL compression stockings as well as ACE wraps underneath (sees outpatient wound care), patient has refused to let me assess his legs. Patient denies any pain at this time. Bed I lowest locked position with call light within reach. Will continue to monitor situation.

## 2019-06-25 ENCOUNTER — APPOINTMENT (OUTPATIENT)
Dept: GENERAL RADIOLOGY | Age: 69
DRG: 329 | End: 2019-06-25
Payer: MEDICARE

## 2019-06-25 LAB
ANION GAP SERPL CALCULATED.3IONS-SCNC: 11 MMOL/L (ref 3–16)
BUN BLDV-MCNC: 53 MG/DL (ref 7–20)
CALCIUM SERPL-MCNC: 8.3 MG/DL (ref 8.3–10.6)
CHLORIDE BLD-SCNC: 107 MMOL/L (ref 99–110)
CO2: 19 MMOL/L (ref 21–32)
CREAT SERPL-MCNC: 2.5 MG/DL (ref 0.8–1.3)
GFR AFRICAN AMERICAN: 31
GFR NON-AFRICAN AMERICAN: 26
GLUCOSE BLD-MCNC: 114 MG/DL (ref 70–99)
GLUCOSE BLD-MCNC: 126 MG/DL (ref 70–99)
GLUCOSE BLD-MCNC: 133 MG/DL (ref 70–99)
GLUCOSE BLD-MCNC: 138 MG/DL (ref 70–99)
GLUCOSE BLD-MCNC: 143 MG/DL (ref 70–99)
GLUCOSE BLD-MCNC: 149 MG/DL (ref 70–99)
GLUCOSE BLD-MCNC: 160 MG/DL (ref 70–99)
HCT VFR BLD CALC: 29 % (ref 40.5–52.5)
HEMOGLOBIN: 9.2 G/DL (ref 13.5–17.5)
MAGNESIUM: 1.9 MG/DL (ref 1.8–2.4)
MCH RBC QN AUTO: 29.6 PG (ref 26–34)
MCHC RBC AUTO-ENTMCNC: 31.8 G/DL (ref 31–36)
MCV RBC AUTO: 93.2 FL (ref 80–100)
PDW BLD-RTO: 18.4 % (ref 12.4–15.4)
PERFORMED ON: ABNORMAL
PLATELET # BLD: 122 K/UL (ref 135–450)
PMV BLD AUTO: 8.6 FL (ref 5–10.5)
POTASSIUM SERPL-SCNC: 4.4 MMOL/L (ref 3.5–5.1)
RBC # BLD: 3.11 M/UL (ref 4.2–5.9)
SODIUM BLD-SCNC: 137 MMOL/L (ref 136–145)
WBC # BLD: 7.4 K/UL (ref 4–11)

## 2019-06-25 PROCEDURE — APPSS45 APP SPLIT SHARED TIME 31-45 MINUTES: Performed by: CLINICAL NURSE SPECIALIST

## 2019-06-25 PROCEDURE — 36415 COLL VENOUS BLD VENIPUNCTURE: CPT

## 2019-06-25 PROCEDURE — 6360000002 HC RX W HCPCS: Performed by: SURGERY

## 2019-06-25 PROCEDURE — 71045 X-RAY EXAM CHEST 1 VIEW: CPT

## 2019-06-25 PROCEDURE — 2700000000 HC OXYGEN THERAPY PER DAY

## 2019-06-25 PROCEDURE — 85027 COMPLETE CBC AUTOMATED: CPT

## 2019-06-25 PROCEDURE — 6370000000 HC RX 637 (ALT 250 FOR IP): Performed by: INTERNAL MEDICINE

## 2019-06-25 PROCEDURE — 97530 THERAPEUTIC ACTIVITIES: CPT

## 2019-06-25 PROCEDURE — 6370000000 HC RX 637 (ALT 250 FOR IP): Performed by: NURSE PRACTITIONER

## 2019-06-25 PROCEDURE — 99223 1ST HOSP IP/OBS HIGH 75: CPT | Performed by: INTERNAL MEDICINE

## 2019-06-25 PROCEDURE — 99024 POSTOP FOLLOW-UP VISIT: CPT | Performed by: SURGERY

## 2019-06-25 PROCEDURE — C9113 INJ PANTOPRAZOLE SODIUM, VIA: HCPCS | Performed by: SURGERY

## 2019-06-25 PROCEDURE — 1200000000 HC SEMI PRIVATE

## 2019-06-25 PROCEDURE — 97110 THERAPEUTIC EXERCISES: CPT

## 2019-06-25 PROCEDURE — 6360000002 HC RX W HCPCS: Performed by: INTERNAL MEDICINE

## 2019-06-25 PROCEDURE — 94761 N-INVAS EAR/PLS OXIMETRY MLT: CPT

## 2019-06-25 PROCEDURE — 2580000003 HC RX 258: Performed by: SURGERY

## 2019-06-25 PROCEDURE — 97116 GAIT TRAINING THERAPY: CPT

## 2019-06-25 PROCEDURE — 97535 SELF CARE MNGMENT TRAINING: CPT

## 2019-06-25 PROCEDURE — 2580000003 HC RX 258: Performed by: INTERNAL MEDICINE

## 2019-06-25 PROCEDURE — 80048 BASIC METABOLIC PNL TOTAL CA: CPT

## 2019-06-25 PROCEDURE — 6370000000 HC RX 637 (ALT 250 FOR IP): Performed by: SURGERY

## 2019-06-25 PROCEDURE — 83735 ASSAY OF MAGNESIUM: CPT

## 2019-06-25 RX ORDER — DEXTROSE AND SODIUM CHLORIDE 5; .9 G/100ML; G/100ML
INJECTION, SOLUTION INTRAVENOUS CONTINUOUS
Status: DISCONTINUED | OUTPATIENT
Start: 2019-06-25 | End: 2019-06-26

## 2019-06-25 RX ORDER — POTASSIUM CHLORIDE 750 MG/1
10 TABLET, EXTENDED RELEASE ORAL ONCE
Status: COMPLETED | OUTPATIENT
Start: 2019-06-25 | End: 2019-06-25

## 2019-06-25 RX ORDER — FUROSEMIDE 10 MG/ML
20 INJECTION INTRAMUSCULAR; INTRAVENOUS ONCE
Status: COMPLETED | OUTPATIENT
Start: 2019-06-25 | End: 2019-06-25

## 2019-06-25 RX ADMIN — LEVOCARNITINE 500 MG: 1 SOLUTION ORAL at 14:22

## 2019-06-25 RX ADMIN — POTASSIUM CHLORIDE 10 MEQ: 750 TABLET, EXTENDED RELEASE ORAL at 21:55

## 2019-06-25 RX ADMIN — FUROSEMIDE 20 MG: 10 INJECTION, SOLUTION INTRAMUSCULAR; INTRAVENOUS at 21:21

## 2019-06-25 RX ADMIN — VALPROIC ACID 500 MG: 250 SOLUTION ORAL at 21:21

## 2019-06-25 RX ADMIN — DEXTROSE AND SODIUM CHLORIDE: 5; 450 INJECTION, SOLUTION INTRAVENOUS at 05:03

## 2019-06-25 RX ADMIN — PANTOPRAZOLE SODIUM 40 MG: 40 INJECTION, POWDER, FOR SOLUTION INTRAVENOUS at 08:45

## 2019-06-25 RX ADMIN — VALPROIC ACID 500 MG: 250 SOLUTION ORAL at 08:51

## 2019-06-25 RX ADMIN — Medication 10 ML: at 08:47

## 2019-06-25 RX ADMIN — B-COMPLEX W/ C & FOLIC ACID TAB 1 TABLET: TAB at 08:46

## 2019-06-25 RX ADMIN — METOPROLOL TARTRATE 12.5 MG: 25 TABLET ORAL at 08:46

## 2019-06-25 RX ADMIN — LEVOCARNITINE 500 MG: 1 SOLUTION ORAL at 21:21

## 2019-06-25 RX ADMIN — ENOXAPARIN SODIUM 40 MG: 40 INJECTION SUBCUTANEOUS at 08:46

## 2019-06-25 RX ADMIN — ATORVASTATIN CALCIUM 40 MG: 40 TABLET, FILM COATED ORAL at 08:46

## 2019-06-25 RX ADMIN — MAGNESIUM GLUCONATE 500 MG ORAL TABLET 200 MG: 500 TABLET ORAL at 21:55

## 2019-06-25 RX ADMIN — Medication 10 ML: at 08:54

## 2019-06-25 RX ADMIN — DEXTROSE AND SODIUM CHLORIDE: 5; 900 INJECTION, SOLUTION INTRAVENOUS at 21:21

## 2019-06-25 RX ADMIN — DEXTROSE AND SODIUM CHLORIDE: 5; 900 INJECTION, SOLUTION INTRAVENOUS at 12:06

## 2019-06-25 RX ADMIN — LEVOCARNITINE 500 MG: 1 SOLUTION ORAL at 08:57

## 2019-06-25 RX ADMIN — VALPROIC ACID 500 MG: 250 SOLUTION ORAL at 14:22

## 2019-06-25 RX ADMIN — METOPROLOL TARTRATE 12.5 MG: 25 TABLET ORAL at 21:23

## 2019-06-25 ASSESSMENT — PAIN SCALES - WONG BAKER: WONGBAKER_NUMERICALRESPONSE: 4

## 2019-06-25 ASSESSMENT — PAIN DESCRIPTION - PAIN TYPE: TYPE: SURGICAL PAIN

## 2019-06-25 ASSESSMENT — PAIN DESCRIPTION - LOCATION: LOCATION: ABDOMEN

## 2019-06-25 NOTE — CONSULTS
11:03 AM   Tunneling Position ___ O'Clock 0 6/25/2019 11:03 AM   Undermining Starts ___ O'Clock 0 6/25/2019 11:03 AM   Undermining Ends___ O'Clock 0 6/25/2019 11:03 AM   Undermining Maxium Distance (cm) 0 6/25/2019 11:03 AM   Wound Assessment Dry; Intact; Black 6/25/2019 11:03 AM   Drainage Amount None 6/25/2019 11:03 AM   Margins Attached edges; Defined edges 6/25/2019 11:03 AM   Tasia-wound Assessment Clean;Dry 6/25/2019 11:03 AM   Black%Wound Bed 100 6/25/2019 11:03 AM   Culture Taken No 6/25/2019 11:03 AM   Number of days: 0     Incision 06/22/19 Abdomen (Active)   Wound Assessment JAMES 6/25/2019  8:41 AM   Tasia-wound Assessment JAMES 6/25/2019  8:41 AM   Closure JAMES 6/25/2019  8:41 AM   Drainage Amount Scant 6/25/2019  8:41 AM   Drainage Description Sanguinous 6/25/2019  8:41 AM   Odor None 6/23/2019  7:46 PM   Dressing/Treatment Foam 6/25/2019  8:41 AM   Dressing Changed Changed/New 6/22/2019  8:48 PM   Dressing Status Dry; Intact; Old drainage 6/25/2019  8:41 AM   Number of days: 2     R outer ankle        Response to treatment:  Well tolerated by patient. Pain Assessment:  Severity:  0 / 10  Quality of pain: N/A  Wound Pain Timing/Severity: none  Premedicated: No    Plan   Plan of Care: Wound 06/25/19 Ankle Right-Dressing/Treatment: Foam(donut)  Incision 06/22/19 Abdomen-Dressing/Treatment: Foam   Recommended: R outer ankle - OpticelAg to Wound, cover with Foam drsg. May leave foam donut in place around ankle. Bilateral legs - Apply TubiGrip Size E, light compression stockings to bilateral lower extremities from toes to just below knees. Remove TubiGrip every 3 days to cleanse BLE and change Right ankle dressing.     Specialty Bed Required : No   [] Low Air Loss   [x] Pressure Redistribution  [] Fluid Immersion  [] Bariatric  [] Total Pressure Relief  [] Other:     Current Diet: Diet NPO Effective Now Exceptions are: Ice Chips, Sips with Meds  Dietician consult:  Yes    Discharge Plan:  Placement for patient

## 2019-06-25 NOTE — PROGRESS NOTES
Physical Therapy  Facility/Department: [facility] C3 TELE/MED SURG/ONC  Daily Treatment Note  NAME: Yvonne Hassan  : 1950  MRN: [id number]    Date of Service: 2019    Discharge Recommendations:  Subacute/Skilled Nursing Facility        Assessment   Body structures, Functions, Activity limitations: Decreased functional mobility ; Decreased ROM; Decreased strength;Decreased balance;Decreased endurance;Decreased safe awareness  Assessment: Pt required encouragement to participate in therapy today. PT required Markus to mod A for bed mobility, mod A x2 for sit<>stand, min to mod A x2 for gait with RW, very unsteady and with poor safety awareness, but able to ambulate 10 ft x 2. Treatment Diagnosis: Weakness, inability to amb  Specific instructions for Next Treatment: sitting EOB, attempt transfers and progress to amb with RW as able, LE strengthening  Prognosis: Good  Decision Making: Medium Complexity  Patient Education: Role of PT, benefits of mobility, safety in transfers with RW, LE ther ex, D/C recs - pt verbalizes understanding but requires some reinforcement  Barriers to Learning: Impaired memory & decreased motivation at times  REQUIRES PT FOLLOW UP: Yes  Activity Tolerance  Activity Tolerance: Patient limited by fatigue;Patient limited by endurance; Patient limited by cognitive status     Patient Diagnosis(es): The primary encounter diagnosis was Small bowel obstruction ([address] Diagnoses of Hematuria, unspecified type and Renal insufficiency were also pertinent to this visit.      has a past medical history of Acute gastric ulcer with bleeding, Acute metabolic encephalopathy, MAISHA (acute kidney injury) ([address] ATN (acute tubular necrosis) ([address] Atrial fib/flutter, transient, Atrial fibrillation ([address] BPH (benign prostatic hyperplasia), Cerebral artery occlusion with cerebral infarction ([address] Diabetes mellitus ([address] ESRD (end stage renal disease) ([address] GI bleed, Glaucoma, History of blood transfusion, Hx

## 2019-06-25 NOTE — PROGRESS NOTES
Nephrology Progress Note   [email or web address]      This patient is a 68year old male whom we are following for MAISHA. Subjective: The patient was seen and examined. [id number] the past 24H. Hemodynamically stable. Family History: No family at bedside  ROS: (+) abdominal soreness. No fever or chills. Vitals:  BP (!) 150/96   Pulse 120   Temp 97.5 °F (36.4 °C) (Oral)   Resp 16   Ht 6' 6\" (1.981 m)   Wt 282 lb (127.9 kg)   SpO2 95%   BMI 32.59 kg/m²   I/O last 3 completed shifts: In: 2371.3 [I.V.:2371.3]  Out: 875 [Urine:675; Emesis/NG output:200]  No intake/output data recorded. Physical Exam:  Physical Exam   Constitutional: He is sleeping. He is easily aroused. No distress. HENT:   Head: Normocephalic and atraumatic. Eyes: Conjunctivae are normal.   Neck: No tracheal deviation present. No thyromegaly present. Cardiovascular: Exam reveals no friction rub. Irregular rhythm   Pulmonary/Chest: Effort normal. No respiratory distress. He has no wheezes. Decreased breath sounds bibasal   Abdominal: Soft. He exhibits no distension. There is tenderness. Musculoskeletal: He exhibits edema. Neurological: He is easily aroused. Skin: Skin is warm. Vitals reviewed.         Medications:   metoprolol tartrate  12.5 mg Oral BID    levOCARNitine  500 mg Oral TID    insulin lispro  0-6 Units Subcutaneous Q4H    atorvastatin  40 mg Oral Daily    vitamin B complex w/C  1 tablet Oral Daily    valproic acid  500 mg Oral TID    sodium chloride flush  10 mL Intravenous 2 times per day    enoxaparin  40 mg Subcutaneous Daily    pantoprazole  40 mg Intravenous Daily    And    sodium chloride (PF)  10 mL Intravenous Daily         Labs:  Recent Labs     06/22/19  1439 06/23/19  0504 06/25/19  0525   WBC 5.6 7.3 7.4   HGB 11.4* 11.9* 9.2*   HCT 34.4* 36.1* 29.0*   MCV 88.5 90.5 93.2    163 122*     Recent Labs     06/23/19  0504 06/24/19  0844 06/25/19  0525    141 137   K 4.0 4.6 4.4

## 2019-06-25 NOTE — PROGRESS NOTES
keeping NGT until return of bowel function.     [facility]

## 2019-06-25 NOTE — PROGRESS NOTES
Hypertension, Mitral insufficiency, Mobility impaired, Pulmonary hypertension ([facility] [address] and Traumatic hemorrhagic shock ([facility] [address] has a past surgical history that includes knee surgery; Prostate biopsy; other surgical history (10/11/13); Upper gastrointestinal endoscopy (7/13 17); Dental surgery (N/A, 3/28/2019); and laparoscopy (N/A, 6/22/2019). Restrictions  Restrictions/Precautions  Restrictions/Precautions: General Precautions, Fall Risk  Required Braces or Orthoses?: Yes  Required Braces or Orthoses  Other: Abdominal Binder  Position Activity Restriction  Other position/activity restrictions: NPO except with ice chips and sips with meds; abdominal binder, up in chair, NG tube to wall suction, العراق[place], IV lines, O2    Subjective   General  Chart Reviewed: Yes  Patient assessed for rehabilitation services?: Yes  Family / Caregiver Present: Yes(brother)  Referring Practitioner: Dr. Lisa Nair  Diagnosis: Small bowel Obstruction; s/p small bowel resection 6-22-19  Subjective  Subjective: \"I already sat up today. I can't do it. My stomach hurts. \"  General Comment  Comments: RN cleared pt for OT tx. Pt resting in bed, agreeable to OT with encouragement. Patient Currently in Pain: Yes  Pain Assessment  Pain Assessment: Faces  Meeks-Baker Pain Rating: Hurts little more  Pain Type: Surgical pain  Pain Location: Abdomen  Non-Pharmaceutical Pain Intervention(s): Ambulation/Increased Activity;Repositioned; Therapeutic presence  Pt satisfied, denying need for pain medication. Objective        Orientation  Overall Orientation Status: Within Functional Limits(but confusion noted during session)     Balance  Sitting Balance: Supervision  Standing Balance: Dependent/Total(min A x2 with RW)  Functional Mobility  Functional - Mobility Device: Rolling Walker  Activity: To/from bathroom  Assist Level: Dependent/Total(min A x1-2)  Functional Mobility Comments: Pt extremely impulsive with poor safety awareness.  Trying to walk to

## 2019-06-26 LAB
ANION GAP SERPL CALCULATED.3IONS-SCNC: 10 MMOL/L (ref 3–16)
BUN BLDV-MCNC: 54 MG/DL (ref 7–20)
CALCIUM SERPL-MCNC: 8 MG/DL (ref 8.3–10.6)
CHLORIDE BLD-SCNC: 109 MMOL/L (ref 99–110)
CO2: 20 MMOL/L (ref 21–32)
CREAT SERPL-MCNC: 2.1 MG/DL (ref 0.8–1.3)
GFR AFRICAN AMERICAN: 38
GFR NON-AFRICAN AMERICAN: 31
GLUCOSE BLD-MCNC: 101 MG/DL (ref 70–99)
GLUCOSE BLD-MCNC: 113 MG/DL (ref 70–99)
GLUCOSE BLD-MCNC: 116 MG/DL (ref 70–99)
GLUCOSE BLD-MCNC: 119 MG/DL (ref 70–99)
GLUCOSE BLD-MCNC: 124 MG/DL (ref 70–99)
GLUCOSE BLD-MCNC: 127 MG/DL (ref 70–99)
GLUCOSE BLD-MCNC: 134 MG/DL (ref 70–99)
PERFORMED ON: ABNORMAL
POTASSIUM REFLEX MAGNESIUM: 3.9 MMOL/L (ref 3.5–5.1)
SODIUM BLD-SCNC: 139 MMOL/L (ref 136–145)

## 2019-06-26 PROCEDURE — C9113 INJ PANTOPRAZOLE SODIUM, VIA: HCPCS | Performed by: SURGERY

## 2019-06-26 PROCEDURE — 97535 SELF CARE MNGMENT TRAINING: CPT

## 2019-06-26 PROCEDURE — 6370000000 HC RX 637 (ALT 250 FOR IP): Performed by: INTERNAL MEDICINE

## 2019-06-26 PROCEDURE — 6360000002 HC RX W HCPCS: Performed by: INTERNAL MEDICINE

## 2019-06-26 PROCEDURE — 1200000000 HC SEMI PRIVATE

## 2019-06-26 PROCEDURE — 99232 SBSQ HOSP IP/OBS MODERATE 35: CPT | Performed by: INTERNAL MEDICINE

## 2019-06-26 PROCEDURE — APPSS45 APP SPLIT SHARED TIME 31-45 MINUTES: Performed by: CLINICAL NURSE SPECIALIST

## 2019-06-26 PROCEDURE — 6370000000 HC RX 637 (ALT 250 FOR IP): Performed by: SURGERY

## 2019-06-26 PROCEDURE — 36415 COLL VENOUS BLD VENIPUNCTURE: CPT

## 2019-06-26 PROCEDURE — 6360000002 HC RX W HCPCS: Performed by: SURGERY

## 2019-06-26 PROCEDURE — 80048 BASIC METABOLIC PNL TOTAL CA: CPT

## 2019-06-26 PROCEDURE — 97530 THERAPEUTIC ACTIVITIES: CPT

## 2019-06-26 PROCEDURE — 99024 POSTOP FOLLOW-UP VISIT: CPT | Performed by: SURGERY

## 2019-06-26 PROCEDURE — 2580000003 HC RX 258: Performed by: SURGERY

## 2019-06-26 RX ORDER — FUROSEMIDE 10 MG/ML
40 INJECTION INTRAMUSCULAR; INTRAVENOUS ONCE
Status: COMPLETED | OUTPATIENT
Start: 2019-06-26 | End: 2019-06-26

## 2019-06-26 RX ORDER — HEPARIN SODIUM 5000 [USP'U]/ML
5000 INJECTION, SOLUTION INTRAVENOUS; SUBCUTANEOUS EVERY 8 HOURS SCHEDULED
Status: DISCONTINUED | OUTPATIENT
Start: 2019-06-26 | End: 2019-06-29 | Stop reason: HOSPADM

## 2019-06-26 RX ADMIN — Medication 10 ML: at 10:02

## 2019-06-26 RX ADMIN — HEPARIN SODIUM 5000 UNITS: 5000 INJECTION INTRAVENOUS; SUBCUTANEOUS at 22:44

## 2019-06-26 RX ADMIN — Medication 10 ML: at 10:04

## 2019-06-26 RX ADMIN — ATORVASTATIN CALCIUM 40 MG: 40 TABLET, FILM COATED ORAL at 10:03

## 2019-06-26 RX ADMIN — PANTOPRAZOLE SODIUM 40 MG: 40 INJECTION, POWDER, FOR SOLUTION INTRAVENOUS at 10:02

## 2019-06-26 RX ADMIN — HEPARIN SODIUM 5000 UNITS: 5000 INJECTION INTRAVENOUS; SUBCUTANEOUS at 10:16

## 2019-06-26 RX ADMIN — Medication 10 ML: at 22:46

## 2019-06-26 RX ADMIN — VALPROIC ACID 500 MG: 250 SOLUTION ORAL at 10:02

## 2019-06-26 RX ADMIN — HEPARIN SODIUM 5000 UNITS: 5000 INJECTION INTRAVENOUS; SUBCUTANEOUS at 15:03

## 2019-06-26 RX ADMIN — LEVOCARNITINE 500 MG: 1 SOLUTION ORAL at 22:59

## 2019-06-26 RX ADMIN — LEVOCARNITINE 500 MG: 1 SOLUTION ORAL at 15:03

## 2019-06-26 RX ADMIN — VALPROIC ACID 500 MG: 250 SOLUTION ORAL at 15:02

## 2019-06-26 RX ADMIN — VALPROIC ACID 500 MG: 250 SOLUTION ORAL at 22:59

## 2019-06-26 RX ADMIN — METOPROLOL TARTRATE 12.5 MG: 25 TABLET ORAL at 23:02

## 2019-06-26 RX ADMIN — FUROSEMIDE 40 MG: 10 INJECTION, SOLUTION INTRAMUSCULAR; INTRAVENOUS at 22:44

## 2019-06-26 RX ADMIN — METOPROLOL TARTRATE 12.5 MG: 25 TABLET ORAL at 10:03

## 2019-06-26 RX ADMIN — LEVOCARNITINE 500 MG: 1 SOLUTION ORAL at 10:03

## 2019-06-26 ASSESSMENT — PAIN SCALES - GENERAL: PAINLEVEL_OUTOF10: 0

## 2019-06-26 NOTE — PROGRESS NOTES
across the pylorus  into the 2nd portion of the duodenum. Impression:     Apparent development of  large right and moderate left pleural effusions. Associated atelectasis. RECOMMENDATION:  Chest ultrasound and ultrasound-guided thoracentesis recommended. ASSESSMENT AND PLAN:  68 y.o. male status post Diagnostic laparoscopy, converted to open, small  bowel resection with anastomosis, incarcerated umbilical hernia repair    GI: continue with ngt to suction, trial clamping today  : continue with jones to monitor I/O  Acute on chronic kidney injury: nephrology managing - appreciate their assistance  LE wounds bilaterally - present on admission: wound care team following. Pulm: CXR as above, pulmonary following - aggressive pulmonary toilet   CVD: internal medicine managing   DMII: SSI - internal medicine managing  Activity: OOB to chair - PT/OT      Electronically signed by JAMEY Newsome - CNP     Patient seen and agree with above.     Nani Harding MD

## 2019-06-26 NOTE — PROGRESS NOTES
Nephrology Progress Note   [email or web address]      This patient is a 68year old male whom we are following for MAISHA. Subjective: The patient was seen and examined. Improving urine output. Received Lasix yesterday because of pleural effusion findings. Family History: No family at bedside  ROS: No fever or chills. Vitals:  BP (!) 158/81   Pulse 99   Temp 97.4 °F (36.3 °C) (Oral)   Resp 18   Ht 6' 6\" (1.981 m)   Wt 282 lb (127.9 kg)   SpO2 94%   BMI 32.59 kg/m²   I/O last 3 completed shifts: In: 3146 [P.O.:160; I.V.:2986]  Out: 2000 [WUACB:9325; Emesis/NG output:50]  No intake/output data recorded. Physical Exam:  Physical Exam   Constitutional: He is sleeping. He is easily aroused. No distress. HENT:   Head: Normocephalic and atraumatic. Eyes: Conjunctivae are normal.   Neck: No tracheal deviation present. No thyromegaly present. Cardiovascular: Exam reveals no friction rub. Irregular rhythm   Pulmonary/Chest: Effort normal. No respiratory distress. He has no wheezes. Decreased breath sounds bibasal   Abdominal: Soft. He exhibits no distension. There is tenderness. Musculoskeletal: He exhibits edema. Neurological: He is easily aroused. Skin: Skin is warm. Vitals reviewed.         Medications:   heparin (porcine)  5,000 Units Subcutaneous 3 times per day    metoprolol tartrate  12.5 mg Oral BID    levOCARNitine  500 mg Oral TID    insulin lispro  0-6 Units Subcutaneous Q4H    atorvastatin  40 mg Oral Daily    vitamin B complex w/C  1 tablet Oral Daily    valproic acid  500 mg Oral TID    sodium chloride flush  10 mL Intravenous 2 times per day    pantoprazole  40 mg Intravenous Daily    And    sodium chloride (PF)  10 mL Intravenous Daily         Labs:  Recent Labs     06/25/19  0525   WBC 7.4   HGB 9.2*   HCT 29.0*   MCV 93.2   *     Recent Labs     06/24/19  0844 06/25/19  0525 06/26/19  0524    137 139   K 4.6 4.4 3.9    107 109   CO2 22 19* 20*

## 2019-06-26 NOTE — PLAN OF CARE
Pt is a fall risk. Bed in lowest position with wheels locked and side rails x3. Non slip socks and bed alarm on with room door open. Call light and bedside table within reach, will continue to monitor.

## 2019-06-26 NOTE — PROGRESS NOTES
Assessment complete and charted. Pt with 7 beats of vtach asymptomatic with VSS. PM NP made aware along with NPO and NGT status. Ordered PO Mg and K- does not want labs rechecked at this time. Call light within reach, will continue to monitor.

## 2019-06-26 NOTE — PROGRESS NOTES
Associated atelectasis. RECOMMENDATION:   Chest ultrasound and ultrasound-guided thoracentesis recommended. CT ABDOMEN PELVIS WO CONTRAST   Final Result   1. Closed loop small bowel obstruction in the right mid abdomen, with   findings suggesting an internal hernia, best appreciated in the coronal   reconstructions. Infiltration of the subtending mesentery is compatible with   vascular congestion. No intestinal wall thickening or pneumatosis   2. Bilateral nephrolithiasis   3. Cholelithiasis   4. Small amount of ascites and trace pleural effusions         XR CHEST PORTABLE   Final Result   No acute cardiopulmonary abnormality appreciated. Ct Abdomen Pelvis Wo Contrast    Result Date: 6/22/2019  EXAMINATION: CT OF THE ABDOMEN AND PELVIS WITHOUT CONTRAST 6/22/2019 4:13 pm TECHNIQUE: CT of the abdomen and pelvis was performed without the administration of intravenous contrast. Multiplanar reformatted images are provided for review. Dose modulation, iterative reconstruction, and/or weight based adjustment of the mA/kV was utilized to reduce the radiation dose to as low as reasonably achievable. COMPARISON: None. HISTORY: ORDERING SYSTEM PROVIDED HISTORY: ABDOMINAL PAIN TECHNOLOGIST PROVIDED HISTORY: Ordering Physician Provided Reason for Exam: upper abd pain Acuity: Acute Additional signs and symptoms: pt denies any other symptoms FINDINGS: Lower Chest: Trace bilateral pleural effusions Organs: Hepatic subcapsular calcifications presumably related to remote trauma. Tiny cyst in the hepatic dome. Bilateral renal calculi measuring up to approximately 3 mm. No hydronephrosis. Spleen, pancreas, adrenals unremarkable. Stones in the gallbladder with no pericholecystic stranding. GI/Bowel: There is a grouping of dilated fluid-filled small bowel loops in the right mid abdomen.   The small bowel proximal and distal to the dilated small bowel loops are normal in caliber, and there is a beak like

## 2019-06-26 NOTE — PROGRESS NOTES
Physical Exam Performed:    BP (!) 169/95   Pulse 73   Temp 97.574 °F (36.4 °C) (Oral)   Resp 20   Ht 6' 6\" (1.981 m)   Wt 282 lb (127.9 kg)   SpO2 99%   BMI 32.59 kg/m²     General appearance: No apparent distress, appears stated age and cooperative. HEENT: Normal cephalic, atraumatic without obvious deformity. Pupils equal, round, and reactive to light. Extra ocular muscles intact. Conjunctivae/corneas clear. Neck: Supple, with full range of motion. No jugular venous distention. Trachea midline. Respiratory:  Normal respiratory effort. Clear to auscultation, bilaterally without Rales/Wheezes/Rhonchi. Cardiovascular: Regular rate and rhythm with normal S1/S2 without murmurs, rubs or gallops. Abdomen: Soft, mild diffuse tenderness, non-distended with normal bowel sounds. Musculoskeletal:  No clubbing, cyanosis. 2+ BLE pitting edema. Skin: Skin color, texture, turgor normal.  No rashes or lesions. Neurologic:  Neurovascularly intact without any focal sensory/motor deficits. Cranial nerves: II-XII intact, grossly non-focal.  Psychiatric: alert, oriented to self only, minimal insight, impulsive, confused and forgetful  Capillary Refill: Brisk,< 3 seconds   Peripheral Pulses: +2 palpable, equal bilaterally         Labs:   Recent Labs     06/25/19  0525   WBC 7.4   HGB 9.2*   HCT 29.0*   *     Recent Labs     06/24/19  0844 06/25/19  0525 06/26/19  0524    137 139   K 4.6 4.4 3.9    107 109   CO2 22 19* 20*   BUN 46* 53* 54*   CREATININE 2.6* 2.5* 2.1*   CALCIUM 8.3 8.3 8.0*     No results for input(s): AST, ALT, BILIDIR, BILITOT, ALKPHOS in the last 72 hours. No results for input(s): INR in the last 72 hours. No results for input(s): [employer] in the last 72 hours.     Urinalysis:      Lab Results   Component Value Date    NITRU Negative 06/22/2019    WBCUA 0-2 06/22/2019    BACTERIA 1+ 06/22/2019    RBCUA 10-20 06/22/2019    BLOODU LARGE 06/22/2019    SPECGRAV >=1.030 06/22/2019    GLUCOSEU Negative 06/22/2019       Radiology:  XR CHEST PORTABLE   Final Result   Apparent development of  large right and moderate left pleural effusions. Associated atelectasis. RECOMMENDATION:   Chest ultrasound and ultrasound-guided thoracentesis recommended. CT ABDOMEN PELVIS WO CONTRAST   Final Result   1. Closed loop small bowel obstruction in the right mid abdomen, with   findings suggesting an internal hernia, best appreciated in the coronal   reconstructions. Infiltration of the subtending mesentery is compatible with   vascular congestion. No intestinal wall thickening or pneumatosis   2. Bilateral nephrolithiasis   3. Cholelithiasis   4. Small amount of ascites and trace pleural effusions         XR CHEST PORTABLE   Final Result   No acute cardiopulmonary abnormality appreciated. Assessment/Plan:    Active Hospital Problems    Diagnosis    Acute respiratory failure with hypoxia (HCC) [J96.01]    Bilateral pleural effusion [J90]    Atelectasis of right lung [J98.11]    Former smoker [Z87.891]    Small bowel obstruction ([address] [K56.609]       The patient is a 68 Y M with a h/o former smoking, HTN, HLD, DM2, Afib, and subcortical LMCA CVA in 2017 which has left him with R-sided weakness and vascular dementia. He used to be on warfarin for the Afib until he had an UGIB with hemorrhagic shock in 2017. This illness was complicated by severe renal failure and required a short course of hemodialysis, but his kidneys recovered and he settled out to CKD3. He lives at home and is cared for by family. He has now presented to the ED on 6/22 with acute abdominal pain and required emergent bowel resection (75 cm of mid small bowel with reanastamosis) for an incarcerated umbilical hernia.   He was admitted by general surgery and hospital medicine was consulted to manage the aforementioned medical issues.          Incarcerated umbilical hernia  - s/p 75 cm of

## 2019-06-26 NOTE — PROGRESS NOTES
gastrointestinal endoscopy (7/13 17); Dental surgery (N/A, 3/28/2019); and laparoscopy (N/A, 6/22/2019). Restrictions  Restrictions/Precautions  Restrictions/Precautions: General Precautions, Fall Risk  Required Braces or Orthoses?: Yes  Required Braces or Orthoses  Other: Abdominal Binder  Position Activity Restriction  Other position/activity restrictions: NPO except with ice chips and sips with meds; abdominal binder, up in chair, NG tube to wall suction, العراقي, IV lines, O2  Subjective   General  Chart Reviewed: Yes  Patient assessed for rehabilitation services?: Yes  Family / Caregiver Present: No  Referring Practitioner: Dr. Marilia Suh  Diagnosis: Small bowel Obstruction; s/p small bowel resection 6-22-19  Subjective  Subjective: Pt agreeable to participate with encouragement. General Comment  Comments: RN approved therapy. Vital Signs  Patient Currently in Pain: Denies(at rest)   Orientation  Orientation  Overall Orientation Status: Within Functional Limits  Objective    ADL  LE Dressing: Dependent/Total(socks )  Toileting: Dependent/Total(BM at [facility] (Mod A for standing balance; total assist for pericare))     Balance  Sitting Balance: Supervision  Standing Balance: Dependent/Total(min x2 with RW)  Standing Balance  Activity: Bed <> bsc transfer with RW and min/modx2 (elevated surfaces). Verbal cues for safety throughout mobility (pt is impulsive). Bed mobility  Supine to Sit: Moderate assistance(HOB elevated and use of bedrail )  Sit to Supine: Moderate assistance;2 Person assistance(for BLE)  Scooting: Dependent/Total(mod x2 to [facility])  Comment: Pt instructed on log roll technique for bed mobility. Needs reinforcement of instructions. Transfers  Stand Pivot Transfers: Minimal assistance;2 Person assistance(RW)  Sit to stand: 2 Person assistance; Moderate assistance(from elevated bed)  Stand to sit: Minimal assistance;2 Person assistance  Transfer Comments: Pt needing cues throughout for safety and technique Cognition  Arousal/Alertness: Delayed responses to stimuli  Following Commands: Follows one step commands with repetition; Follows one step commands with increased time  Attention Span: Difficulty attending to directions; Difficulty dividing attention  Memory: Decreased recall of precautions;Decreased short term memory;Decreased recall of recent events  Safety Judgement: Decreased awareness of need for assistance;Decreased awareness of need for safety  Problem Solving: Decreased awareness of errors;Assistance required to generate solutions;Assistance required to implement solutions;Assistance required to identify errors made;Assistance required to correct errors made  Initiation: Requires cues for some  Sequencing: Requires cues for some  Cognition Comment: Impulsive      Plan   Plan  Times per week: 3-5x/ week   Current Treatment Recommendations: Strengthening, ROM, Balance Training, Functional Mobility Training, Safety Education & Training, Self-Care / ADL, Endurance Training    AM-PAC Score   AM-[facility] Inpatient Daily Activity Raw Score: 8 (06/26/19 1406)  AM-PAC Inpatient ADL T-Scale Score : 22.86 (06/26/19 1406)  ADL Inpatient CMS 0-100% Score: 85.69 (06/26/19 1406)  ADL Inpatient CMS G-Code Modifier : CM (06/26/19 1406)  Goals  Short term goals  Time Frame for Short term goals: 1 week  Short term goal 1: CGA with functional/toilet transfers by 7-01-19  Short term goal 2: min assist with toileting  Short term goal 3: mod assist with LE dressing   Short term goal 4: CGA standing ADL's for 5 minutes  Patient Goals   Patient goals : go home     Therapy Time   Individual Concurrent Group Co-treatment   Time In 1330         Time Out 1356         Minutes 26         Timed Code Treatment Minutes: [address] [facility],[address] OTR/L

## 2019-06-26 NOTE — CONSULTS
INPATIENT PULMONARY CRITICAL CARE CONSULT NOTE      Chief Complaint/Referring Provider:  Patient is being seen at the request of Dr. Veronika Brooks for a consultation for pleural effusion on CXR, may need thoracentesis     Presenting HPI: Dashawn Turner is a 15-year-old male with multiple medical problems, admitted through the ER on 6/22/2019 with small bowel obstruction, hematuria and renal insufficiency. The patient was taken to the OR on the same day by Dr. Kayce Guerrero, subjected to laparoscopic converted to open small bowel resection, incarcerated umbilical hernia repair. At baseline, the patient has a history of former smoking, hypertension, hyperlipidemia, type 2 diabetes mellitus, atrial fibrillation, subcortical left MCA CVA in 2017 with right-sided weakness and vascular dementia, upper GI bleed with hemorrhagic stroke, renal failure with short-term hemodialysis, BPH status post prostatectomy, obesity. He was apparently a cigar smoker. The patient was being followed by Dr. Mateo Busch for his medical problems. Since yesterday, the patient has required oxygen at 2.5 LPM.  He had increased cough today, chest x-ray was obtained and showed bilateral pleural effusions. Pulmonary was therefore consulted. The patient says he is comfortable, is lying in bed. On supplemental oxygen, NG in place. He denies chest pain, abdominal pain, shortness of breath or cough. The rest of his review of systems was negative.        Patient Active Problem List    Diagnosis Date Noted    Elevated lactic acid level      Priority: High    SBO (small bowel obstruction) ([facility] [address] 06/22/2019    Acute metabolic encephalopathy 19/35/3428    Impulse control disorder in adult 12/21/2018    Bradycardia     Dementia due to Alzheimer's disease     PAF (paroxysmal atrial fibrillation) (HCC)     Encephalopathy, metabolic     HTN (hypertension), benign     CKD (chronic kidney disease)     Dyslipidemia     Positive blood culture     Altered

## 2019-06-27 ENCOUNTER — APPOINTMENT (OUTPATIENT)
Dept: GENERAL RADIOLOGY | Age: 69
DRG: 329 | End: 2019-06-27
Payer: MEDICARE

## 2019-06-27 LAB
ANION GAP SERPL CALCULATED.3IONS-SCNC: 8 MMOL/L (ref 3–16)
BUN BLDV-MCNC: 53 MG/DL (ref 7–20)
CALCIUM SERPL-MCNC: 8.2 MG/DL (ref 8.3–10.6)
CHLORIDE BLD-SCNC: 110 MMOL/L (ref 99–110)
CO2: 25 MMOL/L (ref 21–32)
CREAT SERPL-MCNC: 1.8 MG/DL (ref 0.8–1.3)
GFR AFRICAN AMERICAN: 45
GFR NON-AFRICAN AMERICAN: 37
GLUCOSE BLD-MCNC: 107 MG/DL (ref 70–99)
GLUCOSE BLD-MCNC: 112 MG/DL (ref 70–99)
GLUCOSE BLD-MCNC: 117 MG/DL (ref 70–99)
GLUCOSE BLD-MCNC: 140 MG/DL (ref 70–99)
GLUCOSE BLD-MCNC: 144 MG/DL (ref 70–99)
GLUCOSE BLD-MCNC: 150 MG/DL (ref 70–99)
GLUCOSE BLD-MCNC: 97 MG/DL (ref 70–99)
HCT VFR BLD CALC: 27.5 % (ref 40.5–52.5)
HEMOGLOBIN: 9.4 G/DL (ref 13.5–17.5)
LV EF: 53 %
LVEF MODALITY: NORMAL
MCH RBC QN AUTO: 30 PG (ref 26–34)
MCHC RBC AUTO-ENTMCNC: 34 G/DL (ref 31–36)
MCV RBC AUTO: 88.3 FL (ref 80–100)
PDW BLD-RTO: 17.7 % (ref 12.4–15.4)
PERFORMED ON: ABNORMAL
PERFORMED ON: NORMAL
PLATELET # BLD: 177 K/UL (ref 135–450)
PMV BLD AUTO: 8 FL (ref 5–10.5)
POTASSIUM REFLEX MAGNESIUM: 3.6 MMOL/L (ref 3.5–5.1)
RBC # BLD: 3.12 M/UL (ref 4.2–5.9)
SODIUM BLD-SCNC: 143 MMOL/L (ref 136–145)
WBC # BLD: 4.8 K/UL (ref 4–11)

## 2019-06-27 PROCEDURE — 2580000003 HC RX 258: Performed by: SURGERY

## 2019-06-27 PROCEDURE — 6370000000 HC RX 637 (ALT 250 FOR IP): Performed by: INTERNAL MEDICINE

## 2019-06-27 PROCEDURE — 80048 BASIC METABOLIC PNL TOTAL CA: CPT

## 2019-06-27 PROCEDURE — 85027 COMPLETE CBC AUTOMATED: CPT

## 2019-06-27 PROCEDURE — C9113 INJ PANTOPRAZOLE SODIUM, VIA: HCPCS | Performed by: SURGERY

## 2019-06-27 PROCEDURE — 71046 X-RAY EXAM CHEST 2 VIEWS: CPT

## 2019-06-27 PROCEDURE — 97110 THERAPEUTIC EXERCISES: CPT

## 2019-06-27 PROCEDURE — 2580000003 HC RX 258

## 2019-06-27 PROCEDURE — C8929 TTE W OR WO FOL WCON,DOPPLER: HCPCS

## 2019-06-27 PROCEDURE — 36415 COLL VENOUS BLD VENIPUNCTURE: CPT

## 2019-06-27 PROCEDURE — 6360000004 HC RX CONTRAST MEDICATION: Performed by: INTERNAL MEDICINE

## 2019-06-27 PROCEDURE — 99232 SBSQ HOSP IP/OBS MODERATE 35: CPT | Performed by: INTERNAL MEDICINE

## 2019-06-27 PROCEDURE — 1200000000 HC SEMI PRIVATE

## 2019-06-27 PROCEDURE — APPSS30 APP SPLIT SHARED TIME 16-30 MINUTES: Performed by: CLINICAL NURSE SPECIALIST

## 2019-06-27 PROCEDURE — 6370000000 HC RX 637 (ALT 250 FOR IP): Performed by: SURGERY

## 2019-06-27 PROCEDURE — 6360000002 HC RX W HCPCS: Performed by: INTERNAL MEDICINE

## 2019-06-27 PROCEDURE — 99024 POSTOP FOLLOW-UP VISIT: CPT | Performed by: SURGERY

## 2019-06-27 PROCEDURE — 6360000002 HC RX W HCPCS: Performed by: SURGERY

## 2019-06-27 RX ORDER — POTASSIUM CHLORIDE 20 MEQ/1
20 TABLET, EXTENDED RELEASE ORAL DAILY
Status: DISCONTINUED | OUTPATIENT
Start: 2019-06-27 | End: 2019-06-29 | Stop reason: HOSPADM

## 2019-06-27 RX ORDER — SODIUM CHLORIDE 9 MG/ML
INJECTION, SOLUTION INTRAVENOUS
Status: COMPLETED
Start: 2019-06-27 | End: 2019-06-27

## 2019-06-27 RX ORDER — POTASSIUM CHLORIDE 20 MEQ/1
40 TABLET, EXTENDED RELEASE ORAL DAILY
Status: DISCONTINUED | OUTPATIENT
Start: 2019-06-27 | End: 2019-06-27

## 2019-06-27 RX ORDER — FUROSEMIDE 10 MG/ML
40 INJECTION INTRAMUSCULAR; INTRAVENOUS 2 TIMES DAILY
Status: COMPLETED | OUTPATIENT
Start: 2019-06-27 | End: 2019-06-27

## 2019-06-27 RX ORDER — AMLODIPINE BESYLATE 5 MG/1
5 TABLET ORAL 2 TIMES DAILY
Status: DISCONTINUED | OUTPATIENT
Start: 2019-06-27 | End: 2019-06-29 | Stop reason: HOSPADM

## 2019-06-27 RX ORDER — POTASSIUM CHLORIDE 20 MEQ/1
40 TABLET, EXTENDED RELEASE ORAL ONCE
Status: COMPLETED | OUTPATIENT
Start: 2019-06-27 | End: 2019-06-27

## 2019-06-27 RX ADMIN — AMLODIPINE BESYLATE 5 MG: 5 TABLET ORAL at 21:25

## 2019-06-27 RX ADMIN — POTASSIUM CHLORIDE 40 MEQ: 20 TABLET, EXTENDED RELEASE ORAL at 17:17

## 2019-06-27 RX ADMIN — VALPROIC ACID 500 MG: 250 SOLUTION ORAL at 10:12

## 2019-06-27 RX ADMIN — ATORVASTATIN CALCIUM 40 MG: 40 TABLET, FILM COATED ORAL at 10:11

## 2019-06-27 RX ADMIN — B-COMPLEX W/ C & FOLIC ACID TAB 1 TABLET: TAB at 10:11

## 2019-06-27 RX ADMIN — VALPROIC ACID 500 MG: 250 SOLUTION ORAL at 21:25

## 2019-06-27 RX ADMIN — POTASSIUM CHLORIDE 20 MEQ: 20 TABLET, EXTENDED RELEASE ORAL at 12:15

## 2019-06-27 RX ADMIN — Medication 10 ML: at 21:28

## 2019-06-27 RX ADMIN — HEPARIN SODIUM 5000 UNITS: 5000 INJECTION INTRAVENOUS; SUBCUTANEOUS at 21:27

## 2019-06-27 RX ADMIN — PANTOPRAZOLE SODIUM 40 MG: 40 INJECTION, POWDER, FOR SOLUTION INTRAVENOUS at 10:11

## 2019-06-27 RX ADMIN — METOPROLOL TARTRATE 12.5 MG: 25 TABLET ORAL at 10:11

## 2019-06-27 RX ADMIN — Medication 10 ML: at 10:13

## 2019-06-27 RX ADMIN — FUROSEMIDE 40 MG: 10 INJECTION, SOLUTION INTRAMUSCULAR; INTRAVENOUS at 12:15

## 2019-06-27 RX ADMIN — INSULIN LISPRO 1 UNITS: 100 INJECTION, SOLUTION INTRAVENOUS; SUBCUTANEOUS at 23:58

## 2019-06-27 RX ADMIN — METOPROLOL TARTRATE 12.5 MG: 25 TABLET ORAL at 21:27

## 2019-06-27 RX ADMIN — AMLODIPINE BESYLATE 5 MG: 5 TABLET ORAL at 10:11

## 2019-06-27 RX ADMIN — VALPROIC ACID 500 MG: 250 SOLUTION ORAL at 14:46

## 2019-06-27 RX ADMIN — HEPARIN SODIUM 5000 UNITS: 5000 INJECTION INTRAVENOUS; SUBCUTANEOUS at 06:04

## 2019-06-27 RX ADMIN — LEVOCARNITINE 500 MG: 1 SOLUTION ORAL at 14:47

## 2019-06-27 RX ADMIN — LEVOCARNITINE 500 MG: 1 SOLUTION ORAL at 10:12

## 2019-06-27 RX ADMIN — LEVOCARNITINE 500 MG: 1 SOLUTION ORAL at 21:27

## 2019-06-27 RX ADMIN — INSULIN LISPRO 1 UNITS: 100 INJECTION, SOLUTION INTRAVENOUS; SUBCUTANEOUS at 17:17

## 2019-06-27 RX ADMIN — Medication 10 ML: at 10:12

## 2019-06-27 RX ADMIN — FUROSEMIDE 40 MG: 10 INJECTION, SOLUTION INTRAMUSCULAR; INTRAVENOUS at 17:17

## 2019-06-27 RX ADMIN — INSULIN LISPRO 1 UNITS: 100 INJECTION, SOLUTION INTRAVENOUS; SUBCUTANEOUS at 12:21

## 2019-06-27 RX ADMIN — PERFLUTREN 1.65 MG: 6.52 INJECTION, SUSPENSION INTRAVENOUS at 14:00

## 2019-06-27 RX ADMIN — SODIUM CHLORIDE 250 ML: 9 INJECTION, SOLUTION INTRAVENOUS at 21:28

## 2019-06-27 RX ADMIN — HEPARIN SODIUM 5000 UNITS: 5000 INJECTION INTRAVENOUS; SUBCUTANEOUS at 14:47

## 2019-06-27 ASSESSMENT — PAIN SCALES - GENERAL: PAINLEVEL_OUTOF10: 0

## 2019-06-27 NOTE — PROGRESS NOTES
[facility] GENERAL SURGERY    Surgery Progress Note           POD 5    PATIENT NAME: Jazmine Webster     TODAY'S DATE: 6/27/2019    INTERVAL HISTORY:    Doing well this AM - reports he is passing flatus and denies nausea with ngt clamping  Denies abd pain, he is thirsty    OBJECTIVE:   VITALS:  BP (!) 171/84   Pulse 77   Temp 97.5 °F (36.4 °C) (Oral)   Resp 16   Ht 6' 6\" (1.981 m)   Wt 282 lb (127.9 kg)   SpO2 99%   BMI 32.59 kg/m²     INTAKE/OUTPUT:    I/O last 3 completed shifts: In: 120 [P.O.:120]  Out: 1900 [Urine:1650; Emesis/NG output:250]  I/O this shift:  In: -   Out: 100 [Emesis/NG output:100]              CONSTITUTIONAL:  awake and alert  LUNGS: no crackles or wheezes  ABDOMEN:   hypoactive bowel sounds, soft, non-distended  INCISION: staples intact, no erythema    Data:  CBC:   Recent Labs     06/25/19  0525 06/27/19  0529   WBC 7.4 4.8   HGB 9.2* 9.4*   HCT 29.0* 27.5*   * 177     BMP:    Recent Labs     06/25/19  0525 06/26/19  0524 06/27/19  0529    139 143   K 4.4 3.9 3.6    109 110   CO2 19* 20* 25   BUN 53* 54* 53*   CREATININE 2.5* 2.1* 1.8*   GLUCOSE 143* 134* 112*     Hepatic:   No results for input(s): AST, ALT, ALB, BILITOT, ALKPHOS in the last 72 hours. Mag:      Recent Labs     06/25/19  0525   MG 1.90        ASSESSMENT AND PLAN:  68 y.o. male status post Diagnostic laparoscopy, converted to open, small  bowel resection with anastomosis, incarcerated umbilical hernia repair    GI: remove ngt, start clear liquids  : continue with jones to monitor I/O - defer timing of removal to nephrology  Acute on chronic kidney injury: nephrology managing - appreciate their assistance  LE wounds bilaterally - present on admission: wound care team following.   Pulm:  pulmonary following - aggressive pulmonary toilet   CVD: internal medicine managing   DMII: SSI - internal medicine managing  Activity: OOB to chair - PT/OT      Electronically signed by JAMEY Estrada - CNP

## 2019-06-27 NOTE — PROGRESS NOTES
3/28/2019); and laparoscopy (N/A, 6/22/2019). Restrictions  Restrictions/Precautions  Restrictions/Precautions: General Precautions, Fall Risk  Required Braces or Orthoses?: Yes  Required Braces or Orthoses  Other: Abdominal Binder  Position Activity Restriction  Other position/activity restrictions: abdominal binder, up in chair, IV lines, O2     Subjective   General  Chart Reviewed: Yes  Patient assessed for rehabilitation services?: Yes  Response to previous treatment: Patient with no complaints from previous session  Family / Caregiver Present: No  Referring Practitioner: Dr. Danilo Garcia  Diagnosis: Small bowel Obstruction; s/p small bowel resection 6-22-19    Subjective  Subjective: Pt resting in bed, agreeable to bed level ther ex with encouragement. Vital Signs  Patient Currently in Pain: Denies     Orientation  Orientation  Overall Orientation Status: Within Functional Limits     Objective    Cognition  Overall Cognitive Status: Exceptions  Arousal/Alertness: Delayed responses to stimuli  Following Commands:  Follows one step commands with repetition  Memory: Decreased recall of recent events     Type of ROM/Therapeutic Exercise  Type of ROM/Therapeutic Exercise: AROM  Exercises  Shoulder Flexion: 10x  Horizontal ABduction: 10x  Horizontal ADduction: 10x  Elbow Flexion: 15x  Elbow Extension: 15x  Supination: 15x  Pronation: 15x  Wrist Flexion: 20x  Wrist Extension: 20x  Finger Flexion: 20x  Finger Extension: 20x      Plan   Plan  Times per week: 3-5x/ week   Current Treatment Recommendations: Strengthening, ROM, Balance Training, Functional Mobility Training, Safety Education & Training, Self-Care / ADL, Endurance Training    AM-PAC Score  AM-[facility] Inpatient Daily Activity Raw Score: 10 (06/27/19 1139)  AM-PAC Inpatient ADL T-Scale Score : 27.31 (06/27/19 1139)  ADL Inpatient CMS 0-100% Score: 74.7 (06/27/19 1139)  ADL Inpatient CMS G-Code Modifier : CL (06/27/19 1139)    Goals  Short term goals  Time Frame for

## 2019-06-27 NOTE — CARE COORDINATION
6/27/19 Spoke to pt's daughter Carolyne Mills about SNF recs per her request a referral was [facility]  Will follow

## 2019-06-27 NOTE — PROGRESS NOTES
ml   Net -1880 ml       Physical Exam Performed:    BP (!) 171/84   Pulse 77   Temp 97.5 °F (36.4 °C) (Oral)   Resp 16   Ht 6' 6\" (1.981 m)   Wt 282 lb (127.9 kg)   SpO2 99%   BMI 32.59 kg/m²     General appearance: No apparent distress, appears stated age and cooperative. HEENT: Normal cephalic, atraumatic without obvious deformity. Pupils equal, round, and reactive to light. Extra ocular muscles intact. Conjunctivae/corneas clear. Neck: Supple, with full range of motion. No jugular venous distention. Trachea midline. Respiratory:  Normal respiratory effort. Clear to auscultation, bilaterally without Rales/Wheezes/Rhonchi. Cardiovascular: Regular rate and rhythm with normal S1/S2 without murmurs, rubs or gallops. Abdomen: Soft, mild diffuse tenderness had resolved, non-distended with normal bowel sounds. Musculoskeletal:  No clubbing, cyanosis. 2+ BLE pitting edema. Skin: Skin color, texture, turgor normal.  No rashes or lesions. Neurologic:  Neurovascularly intact without any focal sensory/motor deficits. Cranial nerves: II-XII intact, grossly non-focal.  Psychiatric: alert, oriented to self only, minimal insight, impulsive, confused and forgetful  Capillary Refill: Brisk,< 3 seconds   Peripheral Pulses: +2 palpable, equal bilaterally         Labs:   Recent Labs     06/25/19  0525 06/27/19  0529   WBC 7.4 4.8   HGB 9.2* 9.4*   HCT 29.0* 27.5*   * 177     Recent Labs     06/25/19  0525 06/26/19  0524 06/27/19  0529    139 143   K 4.4 3.9 3.6    109 110   CO2 19* 20* 25   BUN 53* 54* 53*   CREATININE 2.5* 2.1* 1.8*   CALCIUM 8.3 8.0* 8.2*     No results for input(s): AST, ALT, BILIDIR, BILITOT, ALKPHOS in the last 72 hours. No results for input(s): INR in the last 72 hours. No results for input(s): Rashaad Jonah in the last 72 hours.     Urinalysis:      Lab Results   Component Value Date    NITRU Negative 06/22/2019    WBCUA 0-2 06/22/2019    BACTERIA 1+ 06/22/2019 and required emergent bowel resection (75 cm of mid small bowel with reanastamosis) for an incarcerated umbilical hernia. He was admitted by general surgery and hospital medicine was consulted to manage the aforementioned medical issues.          Incarcerated umbilical hernia  - s/p 75 cm of mid small bowel resection and reanastomosis. Analgesia, NG management, and diet advancement per surgery. - post-op cefazolin and metronidazole per surgery through 6/25.       MAISHA on CKD3  - his baseline Cr is uncertain but probably about 1.5. He has labs in many hospital systems this year, with Cr ranging from 1.2 - 1.7 (most recently 1.7 as of a month ago). - stopped IVFs on 6/26 due to anasarca and pleural effusions  - he has h/o obstructive uropathy and had a TURP years ago, so watch for urinary retention when jones removed. There was no hydronephrosis on admission CT.  - avoid nephrotoxins. His CT was without contrast.  - nephrology consulted. Afib with intermittent RVR  - he is not on anticoagulation due to his h/o hemorrhagic shock from UGIB. It also looks like he had a significant hematoma and complicated hospital stay in late 2017 when a L femoral artery aneurysm spontaneously ruptured. He did have have a watchman procedure performed in 1/2018.  - apparently he does not require an outpatient rate-controlling med. Started low-dose metoprolol for now. Pleural effusions  - stopped IVFs. Improved with intermittent furosemide. Unremarkable TTE. HTN  - held losartan and spironolactone until Cr improves  - resumed amlodipine   - metoprolol as above  - let his pressures run a little high for a few days to facilitate renal perfusion and prevent orthostasis. PRN IV labetalol ordered. HLD, h/o CVA  - resume aspirin when OK from surgical perspective. Continue statin.     DM2  - A1c was only 5.1 two weeks prior to admission.   No home meds in our system, but he does have metformin listed in the [facility]

## 2019-06-27 NOTE — PROGRESS NOTES
pneumonia/pulmonary edema. Stable cardiomegaly. XR CHEST PORTABLE   Final Result   Apparent development of  large right and moderate left pleural effusions. Associated atelectasis. RECOMMENDATION:   Chest ultrasound and ultrasound-guided thoracentesis recommended. CT ABDOMEN PELVIS WO CONTRAST   Final Result   1. Closed loop small bowel obstruction in the right mid abdomen, with   findings suggesting an internal hernia, best appreciated in the coronal   reconstructions. Infiltration of the subtending mesentery is compatible with   vascular congestion. No intestinal wall thickening or pneumatosis   2. Bilateral nephrolithiasis   3. Cholelithiasis   4. Small amount of ascites and trace pleural effusions         XR CHEST PORTABLE   Final Result   No acute cardiopulmonary abnormality appreciated. Ct Abdomen Pelvis Wo Contrast    Result Date: 6/22/2019  EXAMINATION: CT OF THE ABDOMEN AND PELVIS WITHOUT CONTRAST 6/22/2019 4:13 pm TECHNIQUE: CT of the abdomen and pelvis was performed without the administration of intravenous contrast. Multiplanar reformatted images are provided for review. Dose modulation, iterative reconstruction, and/or weight based adjustment of the mA/kV was utilized to reduce the radiation dose to as low as reasonably achievable. COMPARISON: None. HISTORY: ORDERING SYSTEM PROVIDED HISTORY: ABDOMINAL PAIN TECHNOLOGIST PROVIDED HISTORY: Ordering Physician Provided Reason for Exam: upper abd pain Acuity: Acute Additional signs and symptoms: pt denies any other symptoms FINDINGS: Lower Chest: Trace bilateral pleural effusions Organs: Hepatic subcapsular calcifications presumably related to remote trauma. Tiny cyst in the hepatic dome. Bilateral renal calculi measuring up to approximately 3 mm. No hydronephrosis. Spleen, pancreas, adrenals unremarkable. Stones in the gallbladder with no pericholecystic stranding. GI/Bowel:  There is a grouping of dilated fluid-filled small bowel loops in the right mid abdomen. The small bowel proximal and distal to the dilated small bowel loops are normal in caliber, and there is a beak like appearance of the transition zones of the proximal and distal small bowel which are adjacent to each other but for separation by mesentery which does not appear twisted. This finding is best appreciated in the coronal reconstruction. There is infiltration of the mesentery subtending the dilated small bowel loops. There is no wall thickening or pneumatosis. No other gastrointestinal abnormality is demonstrated. Pelvis: Urinary bladder unremarkable. Moderate pelvic ascites Peritoneum/Retroperitoneum: Small perihepatic ascites. No pneumoperitoneum. Aorta normal in caliber. Retroaortic left renal vein. Bones/Soft Tissues: Fat containing umbilical hernia. No acute bony abnormality     1. Closed loop small bowel obstruction in the right mid abdomen, with findings suggesting an internal hernia, best appreciated in the coronal reconstructions. Infiltration of the subtending mesentery is compatible with vascular congestion. No intestinal wall thickening or pneumatosis 2. Bilateral nephrolithiasis 3. Cholelithiasis 4. Small amount of ascites and trace pleural effusions     Xr Chest Portable    Result Date: 6/25/2019  EXAMINATION: ONE XRAY VIEW OF THE CHEST 6/25/2019 3:21 pm COMPARISON: 06/22/2019 HISTORY: ORDERING SYSTEM PROVIDED HISTORY: coughing lots post op TECHNOLOGIST PROVIDED HISTORY: Reason for exam:->coughing lots post op Ordering Physician Provided Reason for Exam: coughing lots post op Acuity: Unknown Type of Exam: Unknown Acute abnormality. Initial exam. FINDINGS: The heart appears mildly enlarged. A large right-sided pleural effusion in a sub pulmonic location appears be present. A moderate left pleural effusion is also present. No abnormal pulmonary vascular congestion.   Nasogastric tube extends into the stomach in even appears

## 2019-06-28 LAB
ANION GAP SERPL CALCULATED.3IONS-SCNC: 9 MMOL/L (ref 3–16)
BUN BLDV-MCNC: 52 MG/DL (ref 7–20)
CALCIUM SERPL-MCNC: 8.1 MG/DL (ref 8.3–10.6)
CHLORIDE BLD-SCNC: 109 MMOL/L (ref 99–110)
CO2: 25 MMOL/L (ref 21–32)
CREAT SERPL-MCNC: 1.6 MG/DL (ref 0.8–1.3)
GFR AFRICAN AMERICAN: 52
GFR NON-AFRICAN AMERICAN: 43
GLUCOSE BLD-MCNC: 110 MG/DL (ref 70–99)
GLUCOSE BLD-MCNC: 118 MG/DL (ref 70–99)
GLUCOSE BLD-MCNC: 156 MG/DL (ref 70–99)
GLUCOSE BLD-MCNC: 192 MG/DL (ref 70–99)
GLUCOSE BLD-MCNC: 89 MG/DL (ref 70–99)
GLUCOSE BLD-MCNC: 93 MG/DL (ref 70–99)
PERFORMED ON: ABNORMAL
PERFORMED ON: NORMAL
POTASSIUM REFLEX MAGNESIUM: 3.7 MMOL/L (ref 3.5–5.1)
SODIUM BLD-SCNC: 143 MMOL/L (ref 136–145)

## 2019-06-28 PROCEDURE — 6370000000 HC RX 637 (ALT 250 FOR IP): Performed by: SURGERY

## 2019-06-28 PROCEDURE — 99232 SBSQ HOSP IP/OBS MODERATE 35: CPT | Performed by: INTERNAL MEDICINE

## 2019-06-28 PROCEDURE — 6370000000 HC RX 637 (ALT 250 FOR IP): Performed by: INTERNAL MEDICINE

## 2019-06-28 PROCEDURE — 2580000003 HC RX 258: Performed by: SURGERY

## 2019-06-28 PROCEDURE — 6360000002 HC RX W HCPCS: Performed by: SURGERY

## 2019-06-28 PROCEDURE — 6360000002 HC RX W HCPCS: Performed by: INTERNAL MEDICINE

## 2019-06-28 PROCEDURE — C9113 INJ PANTOPRAZOLE SODIUM, VIA: HCPCS | Performed by: SURGERY

## 2019-06-28 PROCEDURE — 36415 COLL VENOUS BLD VENIPUNCTURE: CPT

## 2019-06-28 PROCEDURE — 1200000000 HC SEMI PRIVATE

## 2019-06-28 PROCEDURE — APPNB45 APP NON BILLABLE 31-45 MINUTES: Performed by: CLINICAL NURSE SPECIALIST

## 2019-06-28 PROCEDURE — 99024 POSTOP FOLLOW-UP VISIT: CPT | Performed by: SURGERY

## 2019-06-28 PROCEDURE — 80048 BASIC METABOLIC PNL TOTAL CA: CPT

## 2019-06-28 RX ORDER — PANTOPRAZOLE SODIUM 40 MG/1
40 TABLET, DELAYED RELEASE ORAL
Status: DISCONTINUED | OUTPATIENT
Start: 2019-06-29 | End: 2019-06-29 | Stop reason: HOSPADM

## 2019-06-28 RX ADMIN — LEVOCARNITINE 500 MG: 1 SOLUTION ORAL at 13:46

## 2019-06-28 RX ADMIN — LEVOCARNITINE 500 MG: 1 SOLUTION ORAL at 20:04

## 2019-06-28 RX ADMIN — PANTOPRAZOLE SODIUM 40 MG: 40 INJECTION, POWDER, FOR SOLUTION INTRAVENOUS at 10:15

## 2019-06-28 RX ADMIN — AMLODIPINE BESYLATE 5 MG: 5 TABLET ORAL at 20:04

## 2019-06-28 RX ADMIN — Medication 10 ML: at 10:16

## 2019-06-28 RX ADMIN — LEVOCARNITINE 500 MG: 1 SOLUTION ORAL at 10:16

## 2019-06-28 RX ADMIN — HEPARIN SODIUM 5000 UNITS: 5000 INJECTION INTRAVENOUS; SUBCUTANEOUS at 05:46

## 2019-06-28 RX ADMIN — POTASSIUM CHLORIDE 20 MEQ: 20 TABLET, EXTENDED RELEASE ORAL at 10:16

## 2019-06-28 RX ADMIN — VALPROIC ACID 500 MG: 250 SOLUTION ORAL at 10:16

## 2019-06-28 RX ADMIN — HEPARIN SODIUM 5000 UNITS: 5000 INJECTION INTRAVENOUS; SUBCUTANEOUS at 20:04

## 2019-06-28 RX ADMIN — HEPARIN SODIUM 5000 UNITS: 5000 INJECTION INTRAVENOUS; SUBCUTANEOUS at 13:46

## 2019-06-28 RX ADMIN — B-COMPLEX W/ C & FOLIC ACID TAB 1 TABLET: TAB at 10:16

## 2019-06-28 RX ADMIN — METOPROLOL TARTRATE 12.5 MG: 25 TABLET ORAL at 10:15

## 2019-06-28 RX ADMIN — METOPROLOL TARTRATE 12.5 MG: 25 TABLET ORAL at 20:04

## 2019-06-28 RX ADMIN — INSULIN LISPRO 1 UNITS: 100 INJECTION, SOLUTION INTRAVENOUS; SUBCUTANEOUS at 20:15

## 2019-06-28 RX ADMIN — ATORVASTATIN CALCIUM 40 MG: 40 TABLET, FILM COATED ORAL at 10:16

## 2019-06-28 RX ADMIN — Medication 10 ML: at 10:15

## 2019-06-28 RX ADMIN — VALPROIC ACID 500 MG: 250 SOLUTION ORAL at 20:03

## 2019-06-28 RX ADMIN — AMLODIPINE BESYLATE 5 MG: 5 TABLET ORAL at 10:16

## 2019-06-28 RX ADMIN — VALPROIC ACID 500 MG: 250 SOLUTION ORAL at 13:46

## 2019-06-28 RX ADMIN — INSULIN LISPRO 1 UNITS: 100 INJECTION, SOLUTION INTRAVENOUS; SUBCUTANEOUS at 17:48

## 2019-06-28 NOTE — CARE COORDINATION
Chart reviewed day 6. Spoke with Peewee Lira NP. Stated patient may d/c over weekend. ARU has declined, the [facility] has accepted. Medicare, has 3 mn.  Maura Kearney RN

## 2019-06-28 NOTE — PROGRESS NOTES
system, but he does have metformin listed in the [facility] system. I would recommend stopping metformin (if he actually takes this) due to his CKD and low A1c.  - SSI while here.     Chronic BLE wounds, probably due to venous stasis ulcers. - he follows with wound care as an outpatient. Leave the current dressings in place. Elevate legs as much as possible while he requires IVFs.    Vascular dementia, with impulsive tendencies and h/o behavioral disturbances while hospitalized  - continue valproic acid. He is also on carnitine supplement because he has a h/o valproate-induced hyperammonemia. - changed his quetiapine to PRN for now since he will be drowsier with pain meds  - supportive care. DVT Prophylaxis: SC heparin  Diet: DIET LOW FIBER;  Code Status: Full Code    PT/OT Eval Status: rec'd SNF    Dispo - ultimately per primary team.  OK from my perspective when he is tolerating PO, perhaps 6/29. Family wants [facility].       Sofia Bernal MD

## 2019-06-28 NOTE — PROGRESS NOTES
Pt axo. Confused at times. Assessment completed as charted. Pt denies pain or needs. States he wants real food. Tolerating clears. Will continue to monitor. Call light in reach.

## 2019-06-29 ENCOUNTER — APPOINTMENT (OUTPATIENT)
Dept: GENERAL RADIOLOGY | Age: 69
DRG: 329 | End: 2019-06-29
Payer: MEDICARE

## 2019-06-29 VITALS
DIASTOLIC BLOOD PRESSURE: 85 MMHG | HEIGHT: 78 IN | BODY MASS INDEX: 32.63 KG/M2 | TEMPERATURE: 97.3 F | WEIGHT: 282 LBS | SYSTOLIC BLOOD PRESSURE: 144 MMHG | OXYGEN SATURATION: 97 % | RESPIRATION RATE: 18 BRPM | HEART RATE: 101 BPM

## 2019-06-29 LAB
GLUCOSE BLD-MCNC: 108 MG/DL (ref 70–99)
GLUCOSE BLD-MCNC: 115 MG/DL (ref 70–99)
GLUCOSE BLD-MCNC: 184 MG/DL (ref 70–99)
GLUCOSE BLD-MCNC: 184 MG/DL (ref 70–99)
GLUCOSE BLD-MCNC: 98 MG/DL (ref 70–99)
PERFORMED ON: ABNORMAL
PERFORMED ON: NORMAL

## 2019-06-29 PROCEDURE — 6370000000 HC RX 637 (ALT 250 FOR IP): Performed by: INTERNAL MEDICINE

## 2019-06-29 PROCEDURE — 6370000000 HC RX 637 (ALT 250 FOR IP): Performed by: SURGERY

## 2019-06-29 PROCEDURE — 71046 X-RAY EXAM CHEST 2 VIEWS: CPT

## 2019-06-29 PROCEDURE — 6360000002 HC RX W HCPCS: Performed by: INTERNAL MEDICINE

## 2019-06-29 PROCEDURE — 99024 POSTOP FOLLOW-UP VISIT: CPT | Performed by: SURGERY

## 2019-06-29 RX ORDER — LOSARTAN POTASSIUM 100 MG/1
100 TABLET ORAL DAILY
Status: DISCONTINUED | OUTPATIENT
Start: 2019-06-29 | End: 2019-06-29 | Stop reason: HOSPADM

## 2019-06-29 RX ORDER — ACETAMINOPHEN 325 MG/1
650 TABLET ORAL EVERY 6 HOURS PRN
Qty: 30 TABLET | COMMUNITY
Start: 2019-06-29 | End: 2020-12-28

## 2019-06-29 RX ORDER — SPIRONOLACTONE 25 MG/1
25 TABLET ORAL DAILY
Status: DISCONTINUED | OUTPATIENT
Start: 2019-06-29 | End: 2019-06-29 | Stop reason: HOSPADM

## 2019-06-29 RX ADMIN — INSULIN LISPRO 1 UNITS: 100 INJECTION, SOLUTION INTRAVENOUS; SUBCUTANEOUS at 13:37

## 2019-06-29 RX ADMIN — INSULIN LISPRO 1 UNITS: 100 INJECTION, SOLUTION INTRAVENOUS; SUBCUTANEOUS at 17:59

## 2019-06-29 RX ADMIN — LEVOCARNITINE 500 MG: 1 SOLUTION ORAL at 15:29

## 2019-06-29 RX ADMIN — VALPROIC ACID 500 MG: 250 SOLUTION ORAL at 15:12

## 2019-06-29 RX ADMIN — HEPARIN SODIUM 5000 UNITS: 5000 INJECTION INTRAVENOUS; SUBCUTANEOUS at 14:45

## 2019-06-29 ASSESSMENT — PAIN SCALES - GENERAL
PAINLEVEL_OUTOF10: 0

## 2019-06-29 ASSESSMENT — PAIN SCALES - WONG BAKER: WONGBAKER_NUMERICALRESPONSE: 0

## 2019-06-29 ASSESSMENT — PAIN DESCRIPTION - PAIN TYPE: TYPE: SURGICAL PAIN

## 2019-06-29 NOTE — PROGRESS NOTES
Nephrology Progress Note   [email or web address]      This patient is a 68year old male whom we are following for MAISHA. Subjective: The patient was seen and examined    Getting discharged today, no new labs  BP acceptable    Last 24h uop of  1.1 l    Family History: No family at bedside  ROS: No fever or chills. Vitals:  BP (!) 146/88   Pulse 75   Temp 97.9 °F (36.6 °C) (Oral)   Resp 16   Ht 6' 6\" (1.981 m)   Wt 282 lb (127.9 kg)   SpO2 95%   BMI 32.59 kg/m²   I/O last 3 completed shifts: In: 18 [P.O.:860]  Out: 1150 [Urine:1150]  I/O this shift:  In: -   Out: 350 [Urine:350]    Physical Exam:  Physical Exam   Constitutional: He is sleeping. He is easily aroused. No distress. HENT:   Head: Normocephalic and atraumatic. Eyes: Conjunctivae are normal.   Neck: No tracheal deviation present. No thyromegaly present. Cardiovascular: Exam reveals no friction rub. Irregular rhythm   Pulmonary/Chest: Effort normal. No respiratory distress. He has no wheezes. Decreased breath sounds bibasal   Abdominal: Soft. He exhibits no distension. There is tenderness. Musculoskeletal: He exhibits edema. Neurological: He is easily aroused. Skin: Skin is warm. Vitals reviewed.         Medications:   losartan  100 mg Oral Daily    spironolactone  25 mg Oral Daily    pantoprazole  40 mg Oral QAM AC    amLODIPine  5 mg Oral BID    potassium chloride  20 mEq Oral Daily    heparin (porcine)  5,000 Units Subcutaneous 3 times per day    metoprolol tartrate  12.5 mg Oral BID    levOCARNitine  500 mg Oral TID    insulin lispro  0-6 Units Subcutaneous Q4H    atorvastatin  40 mg Oral Daily    vitamin B complex w/C  1 tablet Oral Daily    valproic acid  500 mg Oral TID    sodium chloride flush  10 mL Intravenous 2 times per day         Labs:  Recent Labs     06/27/19  0529   WBC 4.8   HGB 9.4*   HCT 27.5*   MCV 88.3        Recent Labs     06/27/19  0529 06/28/19  0537    143   K 3.6 3.7   CL

## 2019-06-29 NOTE — PROGRESS NOTES
Transport taking pt to the [facility]. Carlos A Singh given updated status on pt. Taken by transport and safe on stretcher. [place]

## 2019-06-29 NOTE — PROGRESS NOTES
ml   Output 1075 ml   Net 665 ml       Physical Exam Performed:    BP (!) 144/85   Pulse 101   Temp 97.3 °F (36.3 °C) (Oral)   Resp 18   Ht 6' 6\" (1.981 m)   Wt 282 lb (127.9 kg)   SpO2 97%   BMI 32.59 kg/m²     General appearance: No apparent distress, appears stated age and cooperative. HEENT: Normal cephalic, atraumatic without obvious deformity. Pupils equal, round, and reactive to light. Extra ocular muscles intact. Conjunctivae/corneas clear. Neck: Supple, with full range of motion. No jugular venous distention. Trachea midline. Respiratory:  Normal respiratory effort. Clear to auscultation, bilaterally without Rales/Wheezes/Rhonchi. Cardiovascular: Regular rate and rhythm with normal S1/S2 without murmurs, rubs or gallops. Abdomen: Soft, mild diffuse tenderness had resolved, non-distended with normal bowel sounds. Musculoskeletal:  No clubbing, cyanosis. Now only 1+ BLE pitting edema, improved  Skin: Skin color, texture, turgor normal.  No rashes or lesions. Neurologic:  Neurovascularly intact without any focal sensory/motor deficits. Cranial nerves: II-XII intact, grossly non-focal.  Psychiatric: alert, oriented to self only, minimal insight, impulsive, confused and forgetful  Capillary Refill: Brisk,< 3 seconds   Peripheral Pulses: +2 palpable, equal bilaterally         Labs:   Recent Labs     06/27/19  0529   WBC 4.8   HGB 9.4*   HCT 27.5*        Recent Labs     06/27/19  0529 06/28/19  0537    143   K 3.6 3.7    109   CO2 25 25   BUN 53* 52*   CREATININE 1.8* 1.6*   CALCIUM 8.2* 8.1*     No results for input(s): AST, ALT, BILIDIR, BILITOT, ALKPHOS in the last 72 hours. No results for input(s): INR in the last 72 hours. No results for input(s): Lynnell Lizeth in the last 72 hours.     Urinalysis:      Lab Results   Component Value Date    NITRU Negative 06/22/2019    WBCUA 0-2 06/22/2019    BACTERIA 1+ 06/22/2019    RBCUA 10-20 06/22/2019    BLOODU LARGE 06/22/2019 6/22 with acute abdominal pain and required emergent bowel resection (75 cm of mid small bowel with reanastamosis) for an incarcerated umbilical hernia. He was admitted by general surgery and hospital medicine was consulted to manage the aforementioned medical issues.          Incarcerated umbilical hernia  - s/p 75 cm of mid small bowel resection and reanastomosis. Analgesia, NG management, and diet advancement per surgery. - post-op cefazolin and metronidazole per surgery through 6/25.       MAISHA on CKD3  - his baseline Cr is uncertain but probably about 1.5. He has labs in many hospital systems this year, with Cr ranging from 1.2 - 1.7 (most recently 1.7 as of a month ago). - stopped IVFs on 6/26 due to anasarca and pleural effusions  - he has h/o obstructive uropathy and had a TURP years ago, so watch for urinary retention when jones removed. There was no hydronephrosis on admission CT.  - avoided nephrotoxins. His CT was without contrast.  - nephrology consulted. Afib with intermittent RVR  - he is not on anticoagulation due to his h/o hemorrhagic shock from UGIB. It also looks like he had a significant hematoma and complicated hospital stay in late 2017 when a L femoral artery aneurysm spontaneously ruptured. He did have have a watchman procedure performed in 1/2018.  - apparently he does not require an outpatient rate-controlling med. Started low-dose metoprolol here. Pleural effusions  - stopped IVFs. Improved with intermittent furosemide. Unremarkable TTE. HTN  - held losartan and spironolactone until Cr improved, then resumed upon discharge. - resumed amlodipine   - metoprolol as above  - we let his pressures run a little high for a few days to facilitate renal perfusion and prevent orthostasis. HLD, h/o CVA  - resumed aspirin when OK from surgical perspective. Continue statin.     DM2  - A1c was only 5.1 two weeks prior to admission.   No home meds in our system, but he does have metformin listed in the [facility] system. I would recommend stopping metformin (if he actually takes this) due to his CKD and low A1c.  - used SSI while here.     Chronic BLE wounds, probably due to venous stasis ulcers. - he follows with wound care as an outpatient. Leave the current dressings in place. Elevate legs as much as possible while he requires IVFs.    Vascular dementia, with impulsive tendencies and h/o behavioral disturbances while hospitalized  - continue valproic acid. He is also on carnitine supplement because he has a h/o valproate-induced hyperammonemia. - changed his quetiapine to PRN for now since he will be drowsier with pain meds  - supportive care. DVT Prophylaxis: SC heparin  Diet: DIET LOW FIBER;  Code Status: Full Code    PT/OT Eval Status: rec'd SNF    Dispo - ultimately per primary team.  OK from my perspective. Family wants [facility].       Liana Buenrostro MD

## 2019-06-29 NOTE — CARE COORDINATION
CASE MANAGEMENT DISCHARGE SUMMARY      Discharge to: [facility] completed: [place] medicare stay met  Hospital Exemption Notification (HENS) completed: yes    New Durable Medical Equipment ordered/agency: per facilty    Transportation:    Family/car: no   Medical Transport explained to pt/family. Pt/family voice no agency preference. Agency used: [facility] up time: [phone]   Ambulance form completed: Yes    Notified:    Family: yes   Facility/Agency: [facility] faxed   RN: yes   Phone number for report to facility: [phone]    Note: Discharging nurse to complete [facility], reconcile [facility], and place final copy with patient's discharge packet. RN to ensure that written prescriptions for  Level II medications are sent with patient to the facility as per protocol.

## 2019-07-10 ENCOUNTER — OFFICE VISIT (OUTPATIENT)
Dept: SURGERY | Age: 69
End: 2019-07-10

## 2019-07-10 VITALS — DIASTOLIC BLOOD PRESSURE: 60 MMHG | BODY MASS INDEX: 32.6 KG/M2 | SYSTOLIC BLOOD PRESSURE: 90 MMHG | HEIGHT: 78 IN

## 2019-07-10 DIAGNOSIS — Z09 POSTOP CHECK: ICD-10-CM

## 2019-07-10 PROCEDURE — 99024 POSTOP FOLLOW-UP VISIT: CPT | Performed by: SURGERY

## 2019-07-10 NOTE — PROGRESS NOTES
Surgery Post-op Progress Note    HPI:  Notes reviewed, and agree with documentation in pt's chart. Postoperative Follow-up: Patient presents for 2 week follow-up status post emergent ex lap, small bowel resection for closed loop SBO; also w/ incidental umbilical hernia repair. Uneventful postop course, pt has been at [facility] for PT/OT . Eating a regular diet without difficulty. Bowel movements are Normal.  The patient is not having any pain. .     ROS:    · 10 point review of systems performed; please refer to HPI with pertinent positives, all other ROS are negative    PE:   CONSTITUTIONAL:  awake and alert    ABDOMEN: soft, non-distended, non-tender     INCISION: clean, dry, healing, staples intact, poor hygiene at umbilicus w/ moist and dried fibrinous exudate      ASSESSMENT:   Diagnosis Orders   1. Postop check     ·   Staples removed without incident    PLAN:    Continue with routine wound care as discussed; keep umbilicus clean, covered.   Gradually increase activities as tolerated  Follow-up in 2-3 weeks or as needed; please call with questions or concerns  ·

## 2019-07-16 ENCOUNTER — APPOINTMENT (OUTPATIENT)
Dept: GENERAL RADIOLOGY | Age: 69
DRG: 291 | End: 2019-07-16
Payer: MEDICARE

## 2019-07-16 ENCOUNTER — HOSPITAL ENCOUNTER (INPATIENT)
Age: 69
LOS: 2 days | Discharge: NURSING, REHAB OR LONG-TERM CARE | DRG: 291 | End: 2019-07-18
Attending: EMERGENCY MEDICINE | Admitting: INTERNAL MEDICINE
Payer: MEDICARE

## 2019-07-16 DIAGNOSIS — N18.9 CHRONIC KIDNEY DISEASE, UNSPECIFIED CKD STAGE: ICD-10-CM

## 2019-07-16 DIAGNOSIS — R55 SYNCOPE AND COLLAPSE: Primary | ICD-10-CM

## 2019-07-16 DIAGNOSIS — R53.1 GENERALIZED WEAKNESS: ICD-10-CM

## 2019-07-16 DIAGNOSIS — E87.70 HYPERVOLEMIA, UNSPECIFIED HYPERVOLEMIA TYPE: ICD-10-CM

## 2019-07-16 DIAGNOSIS — I95.1 ORTHOSTATIC HYPOTENSION: ICD-10-CM

## 2019-07-16 DIAGNOSIS — E88.09 HYPOALBUMINEMIA: ICD-10-CM

## 2019-07-16 DIAGNOSIS — J90 PLEURAL EFFUSION, RIGHT: ICD-10-CM

## 2019-07-16 LAB
A/G RATIO: 0.5 (ref 1.1–2.2)
ALBUMIN SERPL-MCNC: 2.1 G/DL (ref 3.4–5)
ALP BLD-CCNC: 59 U/L (ref 40–129)
ALT SERPL-CCNC: 8 U/L (ref 10–40)
ANION GAP SERPL CALCULATED.3IONS-SCNC: 9 MMOL/L (ref 3–16)
AST SERPL-CCNC: 17 U/L (ref 15–37)
BASOPHILS ABSOLUTE: 0 K/UL (ref 0–0.2)
BASOPHILS RELATIVE PERCENT: 0.7 %
BILIRUB SERPL-MCNC: 0.6 MG/DL (ref 0–1)
BUN BLDV-MCNC: 23 MG/DL (ref 7–20)
CALCIUM SERPL-MCNC: 8.5 MG/DL (ref 8.3–10.6)
CHLORIDE BLD-SCNC: 101 MMOL/L (ref 99–110)
CO2: 28 MMOL/L (ref 21–32)
CREAT SERPL-MCNC: 1.9 MG/DL (ref 0.8–1.3)
EKG ATRIAL RATE: 208 BPM
EKG DIAGNOSIS: NORMAL
EKG Q-T INTERVAL: 366 MS
EKG QRS DURATION: 104 MS
EKG QTC CALCULATION (BAZETT): 450 MS
EKG R AXIS: -55 DEGREES
EKG T AXIS: 30 DEGREES
EKG VENTRICULAR RATE: 91 BPM
EOSINOPHILS ABSOLUTE: 0.1 K/UL (ref 0–0.6)
EOSINOPHILS RELATIVE PERCENT: 1 %
GFR AFRICAN AMERICAN: 42
GFR NON-AFRICAN AMERICAN: 35
GLOBULIN: 4 G/DL
GLUCOSE BLD-MCNC: 114 MG/DL (ref 70–99)
HCT VFR BLD CALC: 32.3 % (ref 40.5–52.5)
HEMOGLOBIN: 10.6 G/DL (ref 13.5–17.5)
LACTIC ACID: 1.6 MMOL/L (ref 0.4–2)
LYMPHOCYTES ABSOLUTE: 0.9 K/UL (ref 1–5.1)
LYMPHOCYTES RELATIVE PERCENT: 16.3 %
MCH RBC QN AUTO: 29.8 PG (ref 26–34)
MCHC RBC AUTO-ENTMCNC: 32.9 G/DL (ref 31–36)
MCV RBC AUTO: 90.6 FL (ref 80–100)
MONOCYTES ABSOLUTE: 0.6 K/UL (ref 0–1.3)
MONOCYTES RELATIVE PERCENT: 10.6 %
NEUTROPHILS ABSOLUTE: 3.9 K/UL (ref 1.7–7.7)
NEUTROPHILS RELATIVE PERCENT: 71.4 %
PDW BLD-RTO: 16.5 % (ref 12.4–15.4)
PLATELET # BLD: 184 K/UL (ref 135–450)
PMV BLD AUTO: 7.8 FL (ref 5–10.5)
POTASSIUM SERPL-SCNC: 3.6 MMOL/L (ref 3.5–5.1)
PRO-BNP: ABNORMAL PG/ML (ref 0–124)
RBC # BLD: 3.56 M/UL (ref 4.2–5.9)
SODIUM BLD-SCNC: 138 MMOL/L (ref 136–145)
SPECIMEN STATUS: NORMAL
TOTAL PROTEIN: 6.1 G/DL (ref 6.4–8.2)
TROPONIN: <0.01 NG/ML
VALPROIC ACID LEVEL: 52 UG/ML (ref 50–100)
WBC # BLD: 5.5 K/UL (ref 4–11)

## 2019-07-16 PROCEDURE — 2580000003 HC RX 258: Performed by: INTERNAL MEDICINE

## 2019-07-16 PROCEDURE — 80164 ASSAY DIPROPYLACETIC ACD TOT: CPT

## 2019-07-16 PROCEDURE — 6360000002 HC RX W HCPCS: Performed by: INTERNAL MEDICINE

## 2019-07-16 PROCEDURE — 85025 COMPLETE CBC W/AUTO DIFF WBC: CPT

## 2019-07-16 PROCEDURE — 71045 X-RAY EXAM CHEST 1 VIEW: CPT

## 2019-07-16 PROCEDURE — 93010 ELECTROCARDIOGRAM REPORT: CPT | Performed by: INTERNAL MEDICINE

## 2019-07-16 PROCEDURE — 93005 ELECTROCARDIOGRAM TRACING: CPT | Performed by: EMERGENCY MEDICINE

## 2019-07-16 PROCEDURE — 80053 COMPREHEN METABOLIC PANEL: CPT

## 2019-07-16 PROCEDURE — 2580000003 HC RX 258: Performed by: EMERGENCY MEDICINE

## 2019-07-16 PROCEDURE — 83880 ASSAY OF NATRIURETIC PEPTIDE: CPT

## 2019-07-16 PROCEDURE — 6370000000 HC RX 637 (ALT 250 FOR IP): Performed by: INTERNAL MEDICINE

## 2019-07-16 PROCEDURE — 99285 EMERGENCY DEPT VISIT HI MDM: CPT

## 2019-07-16 PROCEDURE — 1200000000 HC SEMI PRIVATE

## 2019-07-16 PROCEDURE — 87040 BLOOD CULTURE FOR BACTERIA: CPT

## 2019-07-16 PROCEDURE — 83605 ASSAY OF LACTIC ACID: CPT

## 2019-07-16 PROCEDURE — 84484 ASSAY OF TROPONIN QUANT: CPT

## 2019-07-16 RX ORDER — FUROSEMIDE 10 MG/ML
20 INJECTION INTRAMUSCULAR; INTRAVENOUS ONCE
Status: COMPLETED | OUTPATIENT
Start: 2019-07-16 | End: 2019-07-16

## 2019-07-16 RX ORDER — LACTOBACILLUS RHAMNOSUS GG 10B CELL
1 CAPSULE ORAL DAILY
Status: DISCONTINUED | OUTPATIENT
Start: 2019-07-16 | End: 2019-07-18 | Stop reason: HOSPADM

## 2019-07-16 RX ORDER — DIVALPROEX SODIUM 250 MG/1
500 TABLET, DELAYED RELEASE ORAL DAILY
Status: DISCONTINUED | OUTPATIENT
Start: 2019-07-17 | End: 2019-07-18 | Stop reason: HOSPADM

## 2019-07-16 RX ORDER — CHOLECALCIFEROL (VITAMIN D3) 125 MCG
5 CAPSULE ORAL NIGHTLY
Status: DISCONTINUED | OUTPATIENT
Start: 2019-07-16 | End: 2019-07-18 | Stop reason: HOSPADM

## 2019-07-16 RX ORDER — METOPROLOL SUCCINATE 25 MG/1
25 TABLET, EXTENDED RELEASE ORAL DAILY
Status: DISCONTINUED | OUTPATIENT
Start: 2019-07-16 | End: 2019-07-18 | Stop reason: HOSPADM

## 2019-07-16 RX ORDER — ACETAMINOPHEN 325 MG/1
650 TABLET ORAL EVERY 6 HOURS PRN
Status: DISCONTINUED | OUTPATIENT
Start: 2019-07-16 | End: 2019-07-18 | Stop reason: HOSPADM

## 2019-07-16 RX ORDER — 0.9 % SODIUM CHLORIDE 0.9 %
250 INTRAVENOUS SOLUTION INTRAVENOUS ONCE
Status: COMPLETED | OUTPATIENT
Start: 2019-07-16 | End: 2019-07-16

## 2019-07-16 RX ORDER — LEVOCARNITINE TARTRATE 250 MG
500 CAPSULE ORAL 3 TIMES DAILY
Status: DISCONTINUED | OUTPATIENT
Start: 2019-07-16 | End: 2019-07-17 | Stop reason: CLARIF

## 2019-07-16 RX ORDER — ATORVASTATIN CALCIUM 40 MG/1
40 TABLET, FILM COATED ORAL DAILY
Status: DISCONTINUED | OUTPATIENT
Start: 2019-07-16 | End: 2019-07-18 | Stop reason: HOSPADM

## 2019-07-16 RX ORDER — SODIUM CHLORIDE 0.9 % (FLUSH) 0.9 %
10 SYRINGE (ML) INJECTION PRN
Status: DISCONTINUED | OUTPATIENT
Start: 2019-07-16 | End: 2019-07-18 | Stop reason: HOSPADM

## 2019-07-16 RX ORDER — ASPIRIN 81 MG/1
81 TABLET ORAL DAILY
Status: DISCONTINUED | OUTPATIENT
Start: 2019-07-16 | End: 2019-07-18 | Stop reason: HOSPADM

## 2019-07-16 RX ORDER — DIVALPROEX SODIUM 250 MG/1
1000 TABLET, DELAYED RELEASE ORAL NIGHTLY
Status: DISCONTINUED | OUTPATIENT
Start: 2019-07-16 | End: 2019-07-18 | Stop reason: HOSPADM

## 2019-07-16 RX ORDER — SPIRONOLACTONE 25 MG/1
25 TABLET ORAL DAILY
Status: DISCONTINUED | OUTPATIENT
Start: 2019-07-16 | End: 2019-07-18 | Stop reason: HOSPADM

## 2019-07-16 RX ORDER — QUETIAPINE FUMARATE 25 MG/1
50 TABLET, FILM COATED ORAL EVERY MORNING
Status: DISCONTINUED | OUTPATIENT
Start: 2019-07-17 | End: 2019-07-18 | Stop reason: HOSPADM

## 2019-07-16 RX ORDER — CHOLECALCIFEROL (VITAMIN D3) 10 MCG
1 TABLET ORAL DAILY
Status: DISCONTINUED | OUTPATIENT
Start: 2019-07-16 | End: 2019-07-18 | Stop reason: HOSPADM

## 2019-07-16 RX ORDER — QUETIAPINE FUMARATE 100 MG/1
100 TABLET, FILM COATED ORAL NIGHTLY
Status: DISCONTINUED | OUTPATIENT
Start: 2019-07-16 | End: 2019-07-18 | Stop reason: HOSPADM

## 2019-07-16 RX ORDER — HEPARIN SODIUM 5000 [USP'U]/ML
5000 INJECTION, SOLUTION INTRAVENOUS; SUBCUTANEOUS EVERY 8 HOURS SCHEDULED
Status: DISCONTINUED | OUTPATIENT
Start: 2019-07-17 | End: 2019-07-18 | Stop reason: HOSPADM

## 2019-07-16 RX ORDER — PROCHLORPERAZINE EDISYLATE 5 MG/ML
10 INJECTION INTRAMUSCULAR; INTRAVENOUS EVERY 6 HOURS PRN
Status: DISCONTINUED | OUTPATIENT
Start: 2019-07-16 | End: 2019-07-18 | Stop reason: HOSPADM

## 2019-07-16 RX ORDER — SODIUM CHLORIDE 0.9 % (FLUSH) 0.9 %
10 SYRINGE (ML) INJECTION EVERY 12 HOURS SCHEDULED
Status: DISCONTINUED | OUTPATIENT
Start: 2019-07-16 | End: 2019-07-18 | Stop reason: HOSPADM

## 2019-07-16 RX ORDER — LOSARTAN POTASSIUM 25 MG/1
50 TABLET ORAL DAILY
Status: DISCONTINUED | OUTPATIENT
Start: 2019-07-16 | End: 2019-07-18 | Stop reason: HOSPADM

## 2019-07-16 RX ADMIN — MELATONIN TAB 5 MG 5 MG: 5 TAB at 23:06

## 2019-07-16 RX ADMIN — SODIUM CHLORIDE 250 ML: 9 INJECTION, SOLUTION INTRAVENOUS at 14:07

## 2019-07-16 RX ADMIN — Medication 10 ML: at 23:11

## 2019-07-16 RX ADMIN — FUROSEMIDE 20 MG: 10 INJECTION, SOLUTION INTRAMUSCULAR; INTRAVENOUS at 19:37

## 2019-07-16 RX ADMIN — DIVALPROEX SODIUM 1000 MG: 250 TABLET, DELAYED RELEASE ORAL at 23:06

## 2019-07-16 RX ADMIN — QUETIAPINE FUMARATE 100 MG: 100 TABLET ORAL at 23:06

## 2019-07-16 ASSESSMENT — PAIN SCALES - WONG BAKER: WONGBAKER_NUMERICALRESPONSE: 0

## 2019-07-16 ASSESSMENT — PAIN SCALES - GENERAL
PAINLEVEL_OUTOF10: 0

## 2019-07-16 NOTE — H&P
Hospital Medicine History & Physical      PCP: Julito Maza MD    Date of Admission: 7/16/2019    Date of Service: Pt seen/examined on 07/16/19 and Admitted to Inpatient with expected LOS greater than two midnights due to medical therapy. Chief Complaint:  Syncope x2    History Of Present Illness: The patient is a 68 Y M with a h/o former smoking, HTN, HLD, DM2, Afib, and subcortical LMCA CVA in 2017 which has left him with R-sided weakness and vascular dementia.  He used to be on warfarin for the Afib until he had an UGIB with hemorrhagic shock in 2017.  This illness was complicated by severe renal failure and required a short course of hemodialysis, but his kidneys recovered and he settled out to [facility] lived at home with family until he presented to the ED on 6/22 with acute abdominal pain and required emergent bowel resection (75 cm of mid small bowel with reanastamosis) for an incarcerated umbilical hernia.  He discharged to SNF on 6/29. While at SNF the family reports that he has done poorly. He isn't making much progress with PT/OT and continues to get weaker and more edematous. He had syncope while on the toilet yesterday and then again while trying to work with PT today, so the SNF sent him to the ED for evaluation. Orthostats were positive in the ED, with SBP dropping from 130's to 82 with standing, and the patient was symptomatic. The patient was admitted for further workup. The patient is confused and has no complaints, says he feels good. Denies chest pain or dyspnea.        Past Medical History:          Diagnosis Date    Acute gastric ulcer with bleeding     Acute metabolic encephalopathy     MAISHA (acute kidney injury) ([address]     Requiring dialysis since 7/4/17 in setting of hemorrhagic shock    ATN (acute tubular necrosis) (HCC)     Atrial fib/flutter, transient     Atrial fibrillation (HCC)     BPH (benign prostatic hyperplasia)     Cerebral artery occlusion with intact. Conjunctivae/corneas clear. Neck: Supple, with full range of motion. No jugular venous distention. Trachea midline. Respiratory:  Normal respiratory effort. Clear to auscultation, bilaterally without Rales/Wheezes/Rhonchi. Cardiovascular: Regular rate and rhythm with normal S1/S2 without murmurs, rubs or gallops. Abdomen: Soft, nontender, non-distended with normal bowel sounds. Musculoskeletal:  No clubbing, cyanosis.  3+ BLE pitting edema. Skin: Skin color, texture, turgor normal.  No rashes or lesions. Neurologic:  Neurovascularly intact without any focal sensory/motor deficits. Cranial nerves: II-XII intact, grossly non-focal.  Psychiatric: alert, oriented to self only, minimal insight, impulsive, confused and forgetful  Capillary Refill: Brisk,< 3 seconds   Peripheral Pulses: +2 palpable, equal bilaterally       Labs:     Recent Labs     07/16/19  1243   WBC 5.5   HGB 10.6*   HCT 32.3*        Recent Labs     07/16/19  1243      K 3.6      CO2 28   BUN 23*   CREATININE 1.9*   CALCIUM 8.5     Recent Labs     07/16/19  1243   AST 17   ALT 8*   BILITOT 0.6   ALKPHOS 59     No results for input(s): INR in the last 72 hours. Recent Labs     07/16/19  1243   TROPONINI <0.01       Urinalysis:      Lab Results   Component Value Date    NITRU Negative 06/22/2019    WBCUA 0-2 06/22/2019    BACTERIA 1+ 06/22/2019    RBCUA 10-20 06/22/2019    BLOODU LARGE 06/22/2019    SPECGRAV >=1.030 06/22/2019    GLUCOSEU Negative 06/22/2019       Radiology:     CXR: I have reviewed the CXR with the following interpretation: R atelectasis, effusion  EKG:  I have reviewed the EKG with the following interpretation: Afib, infarct    XR CHEST PORTABLE   Final Result   Right pleural effusion with associated atelectasis/pneumonia, mildly   increased from 06/29/2019.              ASSESSMENT:    Active Hospital Problems    Diagnosis Date Noted    Syncope and collapse [R55] 07/16/2019         PLAN:    The patient is a 68 Y M with a h/o former smoking, HTN, HLD, DM2, Afib, and subcortical LMCA CVA in 2017 which has left him with R-sided weakness and vascular dementia.  He used to be on warfarin for the Afib until he had an UGIB with hemorrhagic shock in 2017.  This illness was complicated by severe renal failure and required a short course of hemodialysis, but his kidneys recovered and he settled out to [facility] lived at home with family until he presented to the ED on 6/22 with acute abdominal pain and required emergent bowel resection (75 cm of mid small bowel with reanastamosis) for an incarcerated umbilical hernia.  He discharged to SNF on 6/29. While at SNF the family reports that he has done poorly. He isn't making much progress with PT/OT and continues to get weaker and more edematous. He had syncope while on the toilet yesterday and then again while trying to work with PT today, so the SNF sent him to the ED for evaluation. Orthostats were positive in the ED, with SBP dropping from 130's to 82 with standing, and the patient was symptomatic. The patient was admitted for further workup.        Syncopal spells, suspect due to orthostasis  - he was volume overloaded on admission, not dehydrated. - no apparent infections. - no events on tele. Recent TTE last month was fairly unremarkable. - this is probably due to his general debility, weakness, deconditioning, and poor oncotic pressures from malnutrition. Conservative management with compression stockings, standing up slowly, and syncope precautions in high risk situations like when he is on the toilet. - we will need to tolerate some higher blood pressures when he is recumbent so that he isn't hypotensive when he stands.     Acute on chronic diastolic CHF, HTN  - furosemide IV on admission, continue spironolactone  - reduced losartan, changed metoprolol to succinate form, stopped amlodipine  - elevating and compressing his legs will be key.     CKD3  - his baseline Cr is uncertain but probably about 1.8.  s  - he has h/o obstructive uropathy and had a TURP years ago, so watch for urinary retention. There was no hydronephrosis on CT from last admission.  - avoid nephrotoxins.      Afib   - he is not on anticoagulation due to his h/o hemorrhagic shock from UGIB.  It also looks like he had a significant hematoma and complicated hospital stay in late 2017 when a L femoral artery aneurysm spontaneously ruptured. Felix Farooq did have a watchman procedure performed in 1/2018.  - metoprolol as above     H/o CVA  -  aspirin, statin.     DM2  - A1c was only 5.1 two weeks prior to admission.  No home meds in our system, but he does have metformin listed in the [facility] system.  I would recommend stopping metformin (if he actually takes this) due to his CKD and low A1c.  - no need for SSI while here.     Vascular dementia, with impulsive tendencies and h/o behavioral disturbances while hospitalized  - continue valproic acid.  He is also on carnitine supplement because he has a h/o valproate-induced hyperammonemia. - supportive care.        DVT Prophylaxis: SC heparin  Diet: DIET CARDIAC; Low Sodium (2 GM)  Code Status: Full Code    PT/OT Eval Status: eval ordered    Dispo - perhaps 7/17-18, when he has no further orthostasis with PT/OT. He came from [facility]. Clover Rubinstein, MD    Thank you Nickolas Ulrich MD for the opportunity to be involved in this patient's care. If you have any questions or concerns please feel free to contact me at [phone].

## 2019-07-16 NOTE — ED PROVIDER NOTES
PSYCHIATRIC: Normal mood and affect. RADIOLOGY    Xr Chest Portable    Result Date: 7/16/2019  EXAMINATION: ONE XRAY VIEW OF THE CHEST 7/16/2019 1:07 pm COMPARISON: Two views of the chest 06/29/2019. HISTORY: ORDERING SYSTEM PROVIDED HISTORY: SOB TECHNOLOGIST PROVIDED HISTORY: Reason for exam:->SOB Reason for Exam: sob Acuity: Acute Type of Exam: Initial FINDINGS: Right pleural effusion with associated atelectasis/pneumonia, mildly increased from 06/29/2019. Additional right suprahilar airspace disease is suspected. The left lung appears clear. The left costophrenic angle is preserved. Right pleural effusion with associated atelectasis/pneumonia, mildly increased from 06/29/2019. ED COURSE   I have evaluated this patient in collaboration with Dr Angelina Valente. Vital signs stable. EKG performed and interpreted by Dr. Angelina Valente. Patient did not require pain medication while in emergency department. Radiological chest pain pleural effusion with associated atelectasis and pneumonia mildly increased from previous imaging. No leukocytosis with stable hemoglobin hematocrit. CMP revealed increase function based on laboratory findings from yesterday. Creatinine 1.9 and GFR 35. Troponin negative<0.01. Lactic acid 1.6 blood cultures drawn x2 pending. BNP  11, 272, patient  was given IV Lasix. Valproic acid level 52. .  A discussion was had with Mr. Tammie Carrion regarding ED findings, results and admission. All questions were answered. Patient agreeable with plan of care, treatment, and admission at this time. Consult performed per ER attending regarding admission. See Dr. Bocanegra Reason note for further documentation.     MDM  Results for orders placed or performed during the hospital encounter of 07/16/19   CBC Auto Differential   Result Value Ref Range    WBC 5.5 4.0 - 11.0 K/uL    RBC 3.56 (L) 4.20 - 5.90 M/uL    Hemoglobin 10.6 (L) 13.5 - 17.5 g/dL    Hematocrit 32.3 (L) 40.5 - 52.5 %    MCV 90.6 80.0 - 100.0 fL

## 2019-07-17 LAB
ANION GAP SERPL CALCULATED.3IONS-SCNC: 8 MMOL/L (ref 3–16)
BUN BLDV-MCNC: 23 MG/DL (ref 7–20)
CALCIUM SERPL-MCNC: 8 MG/DL (ref 8.3–10.6)
CHLORIDE BLD-SCNC: 104 MMOL/L (ref 99–110)
CO2: 28 MMOL/L (ref 21–32)
CREAT SERPL-MCNC: 1.7 MG/DL (ref 0.8–1.3)
GFR AFRICAN AMERICAN: 48
GFR NON-AFRICAN AMERICAN: 40
GLUCOSE BLD-MCNC: 88 MG/DL (ref 70–99)
HCT VFR BLD CALC: 28.4 % (ref 40.5–52.5)
HEMOGLOBIN: 9.6 G/DL (ref 13.5–17.5)
MAGNESIUM: 1.8 MG/DL (ref 1.8–2.4)
MCH RBC QN AUTO: 30.1 PG (ref 26–34)
MCHC RBC AUTO-ENTMCNC: 33.7 G/DL (ref 31–36)
MCV RBC AUTO: 89.4 FL (ref 80–100)
PDW BLD-RTO: 16.2 % (ref 12.4–15.4)
PLATELET # BLD: 168 K/UL (ref 135–450)
PMV BLD AUTO: 7.6 FL (ref 5–10.5)
POTASSIUM REFLEX MAGNESIUM: 3.3 MMOL/L (ref 3.5–5.1)
RBC # BLD: 3.18 M/UL (ref 4.2–5.9)
SODIUM BLD-SCNC: 140 MMOL/L (ref 136–145)
WBC # BLD: 5.1 K/UL (ref 4–11)

## 2019-07-17 PROCEDURE — 97162 PT EVAL MOD COMPLEX 30 MIN: CPT

## 2019-07-17 PROCEDURE — 83735 ASSAY OF MAGNESIUM: CPT

## 2019-07-17 PROCEDURE — 6360000002 HC RX W HCPCS: Performed by: INTERNAL MEDICINE

## 2019-07-17 PROCEDURE — 1200000000 HC SEMI PRIVATE

## 2019-07-17 PROCEDURE — 97530 THERAPEUTIC ACTIVITIES: CPT

## 2019-07-17 PROCEDURE — 6370000000 HC RX 637 (ALT 250 FOR IP): Performed by: INTERNAL MEDICINE

## 2019-07-17 PROCEDURE — 85027 COMPLETE CBC AUTOMATED: CPT

## 2019-07-17 PROCEDURE — 97166 OT EVAL MOD COMPLEX 45 MIN: CPT

## 2019-07-17 PROCEDURE — 80048 BASIC METABOLIC PNL TOTAL CA: CPT

## 2019-07-17 PROCEDURE — 36415 COLL VENOUS BLD VENIPUNCTURE: CPT

## 2019-07-17 PROCEDURE — 2580000003 HC RX 258: Performed by: INTERNAL MEDICINE

## 2019-07-17 RX ORDER — POTASSIUM CHLORIDE 20 MEQ/1
40 TABLET, EXTENDED RELEASE ORAL ONCE
Status: COMPLETED | OUTPATIENT
Start: 2019-07-17 | End: 2019-07-17

## 2019-07-17 RX ORDER — FUROSEMIDE 10 MG/ML
40 INJECTION INTRAMUSCULAR; INTRAVENOUS 2 TIMES DAILY
Status: DISCONTINUED | OUTPATIENT
Start: 2019-07-17 | End: 2019-07-18 | Stop reason: HOSPADM

## 2019-07-17 RX ORDER — LEVOCARNITINE 1 G/10ML
500 SOLUTION ORAL 3 TIMES DAILY
Status: DISCONTINUED | OUTPATIENT
Start: 2019-07-17 | End: 2019-07-18 | Stop reason: HOSPADM

## 2019-07-17 RX ADMIN — SPIRONOLACTONE 25 MG: 25 TABLET ORAL at 08:30

## 2019-07-17 RX ADMIN — QUETIAPINE FUMARATE 50 MG: 25 TABLET ORAL at 08:30

## 2019-07-17 RX ADMIN — ASPIRIN 81 MG: 81 TABLET ORAL at 08:30

## 2019-07-17 RX ADMIN — MELATONIN TAB 5 MG 5 MG: 5 TAB at 23:20

## 2019-07-17 RX ADMIN — QUETIAPINE FUMARATE 100 MG: 100 TABLET ORAL at 23:20

## 2019-07-17 RX ADMIN — HEPARIN SODIUM 5000 UNITS: 5000 INJECTION INTRAVENOUS; SUBCUTANEOUS at 17:46

## 2019-07-17 RX ADMIN — Medication 1 CAPSULE: at 08:30

## 2019-07-17 RX ADMIN — DIVALPROEX SODIUM 1000 MG: 250 TABLET, DELAYED RELEASE ORAL at 23:21

## 2019-07-17 RX ADMIN — DIVALPROEX SODIUM 500 MG: 250 TABLET, DELAYED RELEASE ORAL at 08:30

## 2019-07-17 RX ADMIN — Medication 10 ML: at 08:30

## 2019-07-17 RX ADMIN — Medication 10 ML: at 23:21

## 2019-07-17 RX ADMIN — HEPARIN SODIUM 5000 UNITS: 5000 INJECTION INTRAVENOUS; SUBCUTANEOUS at 06:51

## 2019-07-17 RX ADMIN — LOSARTAN POTASSIUM 50 MG: 25 TABLET, FILM COATED ORAL at 08:30

## 2019-07-17 RX ADMIN — NEPHROCAP 1 MG: 1 CAP ORAL at 08:30

## 2019-07-17 RX ADMIN — ATORVASTATIN CALCIUM 40 MG: 40 TABLET, FILM COATED ORAL at 08:30

## 2019-07-17 RX ADMIN — POTASSIUM CHLORIDE 40 MEQ: 20 TABLET, EXTENDED RELEASE ORAL at 12:46

## 2019-07-17 RX ADMIN — VITAMIN D, TAB 1000IU (100/BT) 1000 UNITS: 25 TAB at 08:30

## 2019-07-17 RX ADMIN — FUROSEMIDE 40 MG: 10 INJECTION, SOLUTION INTRAMUSCULAR; INTRAVENOUS at 12:46

## 2019-07-17 RX ADMIN — METOPROLOL SUCCINATE 25 MG: 25 TABLET, EXTENDED RELEASE ORAL at 08:30

## 2019-07-17 RX ADMIN — LEVOCARNITINE 500 MG: 1 SOLUTION ORAL at 23:29

## 2019-07-17 RX ADMIN — FUROSEMIDE 40 MG: 10 INJECTION, SOLUTION INTRAMUSCULAR; INTRAVENOUS at 17:46

## 2019-07-17 ASSESSMENT — PAIN SCALES - WONG BAKER
WONGBAKER_NUMERICALRESPONSE: 0

## 2019-07-17 ASSESSMENT — PAIN SCALES - GENERAL: PAINLEVEL_OUTOF10: 0

## 2019-07-17 NOTE — PLAN OF CARE
Problem: Falls - Risk of:  Goal: Will remain free from falls  Description  Will remain free from falls  Outcome: Met This Shift  Note:   Pt high fall risk. Instructed to use call light before getting out of bed. Call light within reach. Bed in low position. Bed alarm on. Increased rounds to ensure pt safety. Will continue to monitor.

## 2019-07-17 NOTE — CONSULTS
180 capsule 1    Lactobacillus (PROBIOTIC ACIDOPHILUS) TABS Take 1 tablet by mouth daily 30 tablet 0    aspirin 81 MG tablet Take 81 mg by mouth daily      b complex vitamins capsule Take 1 capsule by mouth daily      melatonin 5 MG TABS tablet Take 5 mg by mouth nightly      spironolactone (ALDACTONE) 25 MG tablet Take 25 mg by mouth daily      atorvastatin (LIPITOR) 40 MG tablet Take 40 mg by mouth daily      vitamin D (CHOLECALCIFEROL) 1000 UNIT TABS tablet Take 1,000 Units by mouth daily      amLODIPine (NORVASC) 5 MG tablet Take 5 mg by mouth 2 times daily       pantoprazole (PROTONIX) 40 MG tablet Take 40 mg by mouth daily      fenofibrate (TRICOR) 145 MG tablet Take 145 mg by mouth daily         Objective  Lying in bed with right lateral leg and ankle laying against the mattress. Patient had a hard time moving the leg to be straight in the bed. BP (!) 144/94   Pulse 90   Temp 98.1 °F (36.7 °C) (Oral)   Resp 18   Ht 6' 5\" (1.956 m)   Wt 282 lb 12.8 oz (128.3 kg)   SpO2 91%   BMI 33.54 kg/m²     LABS:  WBC:    Lab Results   Component Value Date    WBC 5.1 07/17/2019     H/H:    Lab Results   Component Value Date    HGB 9.6 07/17/2019    HCT 28.4 07/17/2019     PTT:    Lab Results   Component Value Date    APTT 32.5 12/20/2018   [APTT}  PT/INR:    Lab Results   Component Value Date    PROTIME 13.7 12/20/2018    INR 1.20 12/20/2018     HgBA1c:    Lab Results   Component Value Date    LABA1C 7.3 12/21/2018       Assessment  Right malleolus wound has deteriorated since last admission. New unstagable left lateral lower leg present. New skin tear right dorsal 3rd toe   Davidson Risk Score: Davidson Scale Score: 17    Patient Active Problem List   Diagnosis    Hx BPH w/urinary retention s/p prostatectomy    Hypertension    Cigar smoker    Obesity (BMI 30-39. 9)    Diabetic ketoacidosis without coma associated with type 1 diabetes mellitus (HCC)    Lactic acidosis    Hyperkalemia    MAISHA (acute Wound Volume (cm^3) 0 cm^3 7/17/2019  3:30 PM   Wound Healing % 100 7/17/2019  3:30 PM   Distance Tunneling (cm) 0 cm 7/17/2019  3:30 PM   Tunneling Position ___ O'Clock 0 7/17/2019  3:30 PM   Undermining Starts ___ O'Clock 0 7/17/2019  3:30 PM   Undermining Ends___ O'Clock 0 7/17/2019  3:30 PM   Undermining Maxium Distance (cm) 0 7/17/2019  3:30 PM   Wound Assessment Yellow 7/17/2019  3:30 PM   Drainage Amount Scant 7/17/2019  3:30 PM   Drainage Description Serosanguinous 7/17/2019  3:30 PM   Odor Mild 7/17/2019  3:30 PM   Margins Defined edges; Attached edges 7/17/2019  3:30 PM   Tasia-wound Assessment Pink;Edema 7/17/2019  3:30 PM   Red%Wound Bed 10 7/17/2019  3:30 PM   Yellow%Wound Bed 90 7/17/2019  3:30 PM   Black%Wound Bed 100 6/25/2019 11:03 AM   Culture Taken No 7/17/2019  3:30 PM   Number of days: 22      Right malleolus:             Wound 07/17/19 Leg Right;Lateral yellow wound bed (Active)   Wound Image   7/17/2019  3:30 PM   Wound Pressure Unstageable 7/17/2019  3:30 PM   Dressing Status Changed 7/17/2019  3:30 PM   Dressing Changed Changed/New 7/17/2019  3:30 PM   Dressing/Treatment Foam 7/17/2019  3:30 PM   Wound Cleansed Rinsed/Irrigated with saline 7/17/2019  3:30 PM   Dressing Change Due 07/17/19 7/17/2019  3:30 PM   Wound Length (cm) 1 cm 7/17/2019  3:30 PM   Wound Width (cm) 2 cm 7/17/2019  3:30 PM   Wound Depth (cm) 0 cm 7/17/2019  3:30 PM   Wound Surface Area (cm^2) 2 cm^2 7/17/2019  3:30 PM   Wound Volume (cm^3) 0 cm^3 7/17/2019  3:30 PM   Distance Tunneling (cm) 0 cm 7/17/2019  3:30 PM   Tunneling Position ___ O'Clock 0 7/17/2019  3:30 PM   Undermining Starts ___ O'Clock 0 7/17/2019  3:30 PM   Undermining Ends___ O'Clock 0 7/17/2019  3:30 PM   Undermining Maxium Distance (cm) 0 7/17/2019  3:30 PM   Wound Assessment Yellow; Red 7/17/2019  3:30 PM   Drainage Amount Small 7/17/2019  3:30 PM   Drainage Description Serosanguinous 7/17/2019  3:30 PM   Odor None 7/17/2019  3:30 PM   Margins Attached edges; Defined edges 7/17/2019  3:30 PM   Tasia-wound Assessment Edema; Red 7/17/2019  3:30 PM   Red%Wound Bed 20 7/17/2019  3:30 PM   Yellow%Wound Bed 80 7/17/2019  3:30 PM   Culture Taken No 7/17/2019  3:30 PM   Number of days: 0      Right lateral lower leg:           Wound 07/17/19 Toe (Comment  which one) Right;Dorsal red open wound bed. (Active)   Wound Image   7/17/2019  3:30 PM   Wound Skin Tear 7/17/2019  3:30 PM   Dressing Status Changed 7/17/2019  3:30 PM   Dressing Changed Changed/New 7/17/2019  3:30 PM   Dressing/Treatment Foam 7/17/2019  3:30 PM   Wound Cleansed Rinsed/Irrigated with saline 7/17/2019  3:30 PM   Dressing Change Due 07/20/19 7/17/2019  3:30 PM   Wound Length (cm) 1 cm 7/17/2019  3:30 PM   Wound Width (cm) 0.5 cm 7/17/2019  3:30 PM   Wound Depth (cm) 0.1 cm 7/17/2019  3:30 PM   Wound Surface Area (cm^2) 0.5 cm^2 7/17/2019  3:30 PM   Wound Volume (cm^3) 0.05 cm^3 7/17/2019  3:30 PM   Distance Tunneling (cm) 0 cm 7/17/2019  3:30 PM   Tunneling Position ___ O'Clock 0 7/17/2019  3:30 PM   Undermining Starts ___ O'Clock 0 7/17/2019  3:30 PM   Undermining Ends___ O'Clock 0 7/17/2019  3:30 PM   Undermining Maxium Distance (cm) 0 7/17/2019  3:30 PM   Wound Assessment Red 7/17/2019  3:30 PM   Drainage Amount Scant 7/17/2019  3:30 PM   Drainage Description Serosanguinous 7/17/2019  3:30 PM   Odor None 7/17/2019  3:30 PM   Margins Attached edges; Defined edges 7/17/2019  3:30 PM   Tasia-wound Assessment Dry;Brown 7/17/2019  3:30 PM   Non-staged Wound Description Partial thickness 7/17/2019  3:30 PM   Red%Wound Bed 100 7/17/2019  3:30 PM   Culture Taken No 7/17/2019  3:30 PM   Number of days: 0     Right dorsal 3rd toe: Incision 06/22/19 Abdomen (Active)   Wound Image   7/17/2019  3:30 PM   Wound Assessment Pink 7/17/2019  3:30 PM   Tasia-wound Assessment Clean;Dry; Intact 7/17/2019  3:30 PM   Wound Length (cm) 0.5 cm 7/17/2019  3:30 PM   Wound Width (cm) 0.3 cm 7/17/2019  3:30 PM   Wound Depth  working on placement  [] [facility]  [] Supplies  [] Other    Patient/Caregiver Teaching:  Level of patient/caregiver understanding able to:   [] Indicates understanding       [] Needs reinforcement  [] Unsuccessful      [] Verbal Understanding  [] Demonstrated understanding       [] No evidence of learning  [] Refused teaching         [x] N/A       Electronically signed by Ovidio Carney RN, MSN, Vaishali Batres on 7/17/2019 at 4:54 PM

## 2019-07-17 NOTE — PLAN OF CARE
Patient is at risk for falls. Bed alarm on, bed in lowest position, and x2 side rails up. Patient educated to use call light when needing assistance. Call light and bedside table within reach.

## 2019-07-17 NOTE — DISCHARGE INSTR - COC
Colostomy/Ileostomy/Ileal Conduit: No       Date of Last BM: 7/18/19    Intake/Output Summary (Last 24 hours) at 7/17/2019 1155  Last data filed at 7/17/2019 0830  Gross per 24 hour   Intake 500 ml   Output 0 ml   Net 500 ml     I/O last 3 completed shifts: In: 56 [P.O.:240; IV Piggyback:250]  Out: 0     Safety Concerns:     History of Falls (last 30 days)    Impairments/Disabilities:      None    Nutrition Therapy:  Current Nutrition Therapy:   - Oral Diet:  Low Sodium (2gm)    Routes of Feeding: Oral  Liquids: No Restrictions  Daily Fluid Restriction: no  Last Modified Barium Swallow with Video (Video Swallowing Test): not done    Treatments at the Time of Hospital Discharge:   Respiratory Treatments: none  Oxygen Therapy:  is not on home oxygen therapy. Ventilator:    - No ventilator support    Rehab Therapies: Physical Therapy and Occupational Therapy  Weight Bearing Status/Restrictions: No weight bearing restirctions  Other Medical Equipment (for information only, NOT a DME order):  hospital bed  Other Treatments: none    Patient's personal belongings (please select all that are sent with patient):  None    RN SIGNATURE:  Electronically signed by Didi Easley RN on 7/18/19 at 2:45 PM    CASE MANAGEMENT/SOCIAL WORK SECTION    Inpatient Status Date: 7/16/19    Readmission Risk Assessment Score:  Readmission Risk              Risk of Unplanned Readmission:        32           Discharging to Facility/ Agency   · Name: [facility]  · Phone:[phone]  · Fax:[phone]      / signature: Electronically signed by Tami Hampton RN on 7/18/19 at 12:27 PM    PHYSICIAN SECTION    Prognosis: Good    Condition at Discharge: Stable    Rehab Potential (if transferring to Rehab): Good    Recommended Follow-up, Labs or Other Treatments After Discharge:    Clean wounds with normal saline.  Apply white foam boarder around right ankle wound and right lateral lower leg wound to

## 2019-07-18 VITALS
BODY MASS INDEX: 35.14 KG/M2 | OXYGEN SATURATION: 92 % | HEART RATE: 87 BPM | DIASTOLIC BLOOD PRESSURE: 86 MMHG | HEIGHT: 77 IN | WEIGHT: 297.62 LBS | TEMPERATURE: 98.7 F | RESPIRATION RATE: 18 BRPM | SYSTOLIC BLOOD PRESSURE: 131 MMHG

## 2019-07-18 LAB
ANION GAP SERPL CALCULATED.3IONS-SCNC: 7 MMOL/L (ref 3–16)
BUN BLDV-MCNC: 21 MG/DL (ref 7–20)
CALCIUM SERPL-MCNC: 8.2 MG/DL (ref 8.3–10.6)
CHLORIDE BLD-SCNC: 101 MMOL/L (ref 99–110)
CO2: 30 MMOL/L (ref 21–32)
CREAT SERPL-MCNC: 1.8 MG/DL (ref 0.8–1.3)
GFR AFRICAN AMERICAN: 45
GFR NON-AFRICAN AMERICAN: 37
GLUCOSE BLD-MCNC: 103 MG/DL (ref 70–99)
MAGNESIUM: 1.7 MG/DL (ref 1.8–2.4)
POTASSIUM REFLEX MAGNESIUM: 3.4 MMOL/L (ref 3.5–5.1)
SODIUM BLD-SCNC: 138 MMOL/L (ref 136–145)

## 2019-07-18 PROCEDURE — 6370000000 HC RX 637 (ALT 250 FOR IP): Performed by: INTERNAL MEDICINE

## 2019-07-18 PROCEDURE — 36415 COLL VENOUS BLD VENIPUNCTURE: CPT

## 2019-07-18 PROCEDURE — 6360000002 HC RX W HCPCS: Performed by: INTERNAL MEDICINE

## 2019-07-18 PROCEDURE — 2580000003 HC RX 258: Performed by: INTERNAL MEDICINE

## 2019-07-18 PROCEDURE — 80048 BASIC METABOLIC PNL TOTAL CA: CPT

## 2019-07-18 PROCEDURE — 83735 ASSAY OF MAGNESIUM: CPT

## 2019-07-18 RX ORDER — FUROSEMIDE 20 MG/1
20 TABLET ORAL DAILY
Qty: 60 TABLET | Refills: 3 | Status: ON HOLD
Start: 2019-07-18 | End: 2019-07-25 | Stop reason: SDUPTHER

## 2019-07-18 RX ORDER — LOSARTAN POTASSIUM 50 MG/1
50 TABLET ORAL DAILY
Qty: 30 TABLET | Refills: 3 | Status: ON HOLD
Start: 2019-07-19 | End: 2019-09-21 | Stop reason: HOSPADM

## 2019-07-18 RX ORDER — MAGNESIUM SULFATE 1 G/100ML
1 INJECTION INTRAVENOUS ONCE
Status: COMPLETED | OUTPATIENT
Start: 2019-07-18 | End: 2019-07-18

## 2019-07-18 RX ORDER — POTASSIUM CHLORIDE 20 MEQ/1
40 TABLET, EXTENDED RELEASE ORAL ONCE
Status: COMPLETED | OUTPATIENT
Start: 2019-07-18 | End: 2019-07-18

## 2019-07-18 RX ORDER — METOPROLOL SUCCINATE 25 MG/1
25 TABLET, EXTENDED RELEASE ORAL DAILY
Qty: 30 TABLET | Refills: 3 | Status: ON HOLD
Start: 2019-07-19 | End: 2019-09-21 | Stop reason: HOSPADM

## 2019-07-18 RX ADMIN — HEPARIN SODIUM 5000 UNITS: 5000 INJECTION INTRAVENOUS; SUBCUTANEOUS at 14:07

## 2019-07-18 RX ADMIN — MAGNESIUM SULFATE HEPTAHYDRATE 1 G: 1 INJECTION, SOLUTION INTRAVENOUS at 11:43

## 2019-07-18 RX ADMIN — Medication 1 CAPSULE: at 10:03

## 2019-07-18 RX ADMIN — COLLAGENASE SANTYL: 250 OINTMENT TOPICAL at 14:08

## 2019-07-18 RX ADMIN — LEVOCARNITINE 500 MG: 1 SOLUTION ORAL at 14:08

## 2019-07-18 RX ADMIN — NEPHROCAP 1 MG: 1 CAP ORAL at 10:03

## 2019-07-18 RX ADMIN — QUETIAPINE FUMARATE 50 MG: 25 TABLET ORAL at 10:03

## 2019-07-18 RX ADMIN — LOSARTAN POTASSIUM 50 MG: 25 TABLET, FILM COATED ORAL at 10:03

## 2019-07-18 RX ADMIN — LEVOCARNITINE 500 MG: 1 SOLUTION ORAL at 10:10

## 2019-07-18 RX ADMIN — SPIRONOLACTONE 25 MG: 25 TABLET ORAL at 10:03

## 2019-07-18 RX ADMIN — POTASSIUM CHLORIDE 40 MEQ: 20 TABLET, EXTENDED RELEASE ORAL at 11:43

## 2019-07-18 RX ADMIN — VITAMIN D, TAB 1000IU (100/BT) 1000 UNITS: 25 TAB at 10:03

## 2019-07-18 RX ADMIN — DIVALPROEX SODIUM 500 MG: 250 TABLET, DELAYED RELEASE ORAL at 10:03

## 2019-07-18 RX ADMIN — FUROSEMIDE 40 MG: 10 INJECTION, SOLUTION INTRAMUSCULAR; INTRAVENOUS at 10:04

## 2019-07-18 RX ADMIN — ASPIRIN 81 MG: 81 TABLET ORAL at 10:03

## 2019-07-18 RX ADMIN — Medication 10 ML: at 10:04

## 2019-07-18 RX ADMIN — ATORVASTATIN CALCIUM 40 MG: 40 TABLET, FILM COATED ORAL at 10:04

## 2019-07-18 RX ADMIN — HEPARIN SODIUM 5000 UNITS: 5000 INJECTION INTRAVENOUS; SUBCUTANEOUS at 01:50

## 2019-07-18 RX ADMIN — METOPROLOL SUCCINATE 25 MG: 25 TABLET, EXTENDED RELEASE ORAL at 11:07

## 2019-07-18 RX ADMIN — HEPARIN SODIUM 5000 UNITS: 5000 INJECTION INTRAVENOUS; SUBCUTANEOUS at 05:50

## 2019-07-18 ASSESSMENT — PAIN SCALES - GENERAL
PAINLEVEL_OUTOF10: 0
PAINLEVEL_OUTOF10: 7
PAINLEVEL_OUTOF10: 7
PAINLEVEL_OUTOF10: 0
PAINLEVEL_OUTOF10: 0

## 2019-07-18 ASSESSMENT — PAIN SCALES - WONG BAKER
WONGBAKER_NUMERICALRESPONSE: 0

## 2019-07-18 NOTE — CARE COORDINATION
CASE MANAGEMENT INITIAL ASSESSMENT      Reviewed chart and met with patient today, re: DCP needs  Explained Case Management role/services. yes    Family present: none  Primary contact information: dtr Georginal Eleroy [phone]    Admit date/status:   Diagnosis:     Insurance:   Precert required for SNF - Y, N        3 night stay required - Y, N    Living arrangements, Adls, care needs, prior to admission:  Pt lives with spouse (dementia) and dtr who is their caregiver. Assisted with ADL's      Transportation: Medical    Durable Medical Equipment at home: Walker__Cane_X_RTS__ BSC__Shower Chair__  02__ HHN__ CPAP__  BiPap__  Hospital Bed__ W/C___ Other__________    Services in the home and/or outpatient, prior to admission: [facility]. Dialysis Facility (if applicable) N/A  · Name:  · Address:  · Dialysis Schedule:  · Phone:  · Fax:    PT/OT recs: SNF    Hospital Exemption Notification (HEN): needed for snf, not initiated. Barriers to discharge: None    Plan/comments: CM attempted to meet with pt for initial assessment. However pt was soundly sleeping. Spoke with daughter Mike Carmen on the phone. Updated that she would like her Dad to discharge to [facility] and not back to [facility]. Referral faxed to [facility] with [facility] [place] and they can accept pt when medically stable for d/c. 3 MN stay met from prior IP stay.  CM following-Jemiy Mejias RN      ECOC on chart for MD signature
w/neurologic complic ([facility] [address]    ESRD (end stage renal disease) ([facility] [address]    Hyponatremia    Anemia    SOB (shortness of breath)    Urinary tract infection with hematuria    Hemodialysis patient ([facility] [address]    Metabolic encephalopathy    Acute cystitis with hematuria    Positive blood culture    Altered mental status    Chronic kidney disease (CKD)    Encephalopathy, metabolic    HTN (hypertension), benign    CKD (chronic kidney disease)    Dyslipidemia    Acute metabolic encephalopathy    Bradycardia    Impulse control disorder in adult    Dementia due to Alzheimer's disease    PAF (paroxysmal atrial fibrillation) (HCC)    Small bowel obstruction (HCC)    Acute respiratory failure with hypoxia (HCC)    Bilateral pleural effusion    Atelectasis of right lung    Former smoker    Postop check    Syncope and collapse       Inpatient Assessment  Care Transitions Summary    Care Transitions Inpatient Review  Medication Review  Housing Review  Who do you live with?:  Other Caregiver  Social Support  Do you have a ?:  No  Do you have a Home Health Coordinator?:  No  Durable Medical Equipment  Functional Review  Hearing and Vision  Visual Impairment:  Visual impairment (Glasses/contacts)  Care Transitions Interventions         Follow Up  Future Appointments   Date Time Provider Evangelina Rodriguez   7/23/2019  1:00 PM Danie Menendez MD AND [facility]       Health Maintenance  There are no preventive care reminders to display for this patient.     Lisbeth Dia RN

## 2019-07-18 NOTE — PLAN OF CARE
Problem: Risk for Impaired Skin Integrity  Goal: Tissue integrity - skin and mucous membranes  Description  Structural intactness and normal physiological function of skin and  mucous membranes. 7/18/2019 0200 by Anna Caballero RN  Outcome: Met This Shift  Note:   Pt switched to bariatric bed with air distribution. All wound dressings are clean, dry, and intact. Pt repositioned every two hours. Pain 0/10. Will continue to monitor. 7/18/2019 0140 by Anna Caballero RN  Note:   Pt switched to specialty bed that allows air distribution. Q2 turns completed. Problem: Falls - Risk of:  Goal: Will remain free from falls  Description  Will remain free from falls  Pt high fall risk. Instructed to use call light before getting out of bed. Call light within reach. Bed in low position. Bed alarm on. Will continue to monitor.

## 2019-07-18 NOTE — PLAN OF CARE
Problem: HEMODYNAMIC STATUS  Goal: Patient has stable vital signs and fluid balance  Outcome: Ongoing  Note:     Patient's EF (Ejection Fraction) is greater than 40%    Patient's weights and intake/output reviewed:    Patient's Last Weight: 297 lbs obtained by bed scale. Difference of 15 lbs more than last documented weight. Intake/Output Summary (Last 24 hours) at 7/18/2019 1315  Last data filed at 7/18/2019 1028  Gross per 24 hour   Intake 360 ml   Output 0 ml   Net 360 ml         Patient stated Daily Functional Goal:  To be compliant with Q2 turns and PT/OT. Ongoing Functional Capacity Assessment:  Patient  unable to ambulate distance of 15 feet in 0 Minutes/Seconds0  with no difficulty. Patient noted to be on no supplemental O2 with SpO2 of 92 %. Pt resting in bed at this time on 0 L O2. Pt denies shortness of breath. Pt with pitting lower extremity edema. Patient and/or Family's stated Goal of Care this Admission: increase activity tolerance, better understand heart failure and disease management, be more comfortable and reduce lower extremity edema prior to discharge      Comorbidities Reviewed Yes  Patient has a past medical history of Acute gastric ulcer with bleeding, Acute metabolic encephalopathy, MAISHA (acute kidney injury) ([address] ATN (acute tubular necrosis) ([address] Atrial fib/flutter, transient, Atrial fibrillation ([address] BPH (benign prostatic hyperplasia), Cerebral artery occlusion with cerebral infarction ([address] Diabetes mellitus ([address] ESRD (end stage renal disease) ([address] GI bleed, Glaucoma, History of blood transfusion, Hx of diabetic neuropathy, Hyperlipidemia, Hypertension, Mitral insufficiency, Mobility impaired, Pulmonary hypertension ([address] and Traumatic hemorrhagic shock ([address]          >>For CHF and Comorbidity documentation on Education Time and Topics, please see Education Tab

## 2019-07-18 NOTE — PLAN OF CARE
Problem: Falls - Risk of:  Goal: Will remain free from falls  Description  Will remain free from falls  7/17/2019 1212 by Paulino Beth RN  Outcome: Ongoing     Problem: FLUID AND ELECTROLYTE IMBALANCE  Goal: Fluid and electrolyte balance are achieved/maintained  7/17/2019 1222 by Paulino Beth RN  Outcome: Ongoing

## 2019-07-21 LAB
BLOOD CULTURE, ROUTINE: NORMAL
CULTURE, BLOOD 2: NORMAL

## 2019-07-22 ENCOUNTER — APPOINTMENT (OUTPATIENT)
Dept: CT IMAGING | Age: 69
DRG: 291 | End: 2019-07-22
Payer: MEDICARE

## 2019-07-22 ENCOUNTER — APPOINTMENT (OUTPATIENT)
Dept: GENERAL RADIOLOGY | Age: 69
DRG: 291 | End: 2019-07-22
Payer: MEDICARE

## 2019-07-22 ENCOUNTER — HOSPITAL ENCOUNTER (INPATIENT)
Age: 69
LOS: 3 days | Discharge: NURSING, REHAB OR LONG-TERM CARE | DRG: 291 | End: 2019-07-25
Attending: EMERGENCY MEDICINE | Admitting: INTERNAL MEDICINE
Payer: MEDICARE

## 2019-07-22 DIAGNOSIS — R41.82 ALTERED MENTAL STATUS, UNSPECIFIED ALTERED MENTAL STATUS TYPE: ICD-10-CM

## 2019-07-22 DIAGNOSIS — R55 SYNCOPE AND COLLAPSE: Primary | ICD-10-CM

## 2019-07-22 DIAGNOSIS — I48.21 PERMANENT ATRIAL FIBRILLATION (HCC): ICD-10-CM

## 2019-07-22 DIAGNOSIS — I95.9 HYPOTENSION, UNSPECIFIED HYPOTENSION TYPE: ICD-10-CM

## 2019-07-22 PROBLEM — E87.70 FLUID OVERLOAD: Status: ACTIVE | Noted: 2019-07-22

## 2019-07-22 LAB
A/G RATIO: 0.4 (ref 1.1–2.2)
ABO/RH: NORMAL
ALBUMIN SERPL-MCNC: 1.9 G/DL (ref 3.4–5)
ALP BLD-CCNC: 50 U/L (ref 40–129)
ALT SERPL-CCNC: 11 U/L (ref 10–40)
AMORPHOUS: ABNORMAL /HPF
ANION GAP SERPL CALCULATED.3IONS-SCNC: 12 MMOL/L (ref 3–16)
ANTIBODY SCREEN: NORMAL
AST SERPL-CCNC: 46 U/L (ref 15–37)
BACTERIA: ABNORMAL /HPF
BASOPHILS ABSOLUTE: 0.1 K/UL (ref 0–0.2)
BASOPHILS RELATIVE PERCENT: 1.5 %
BILIRUB SERPL-MCNC: 0.5 MG/DL (ref 0–1)
BILIRUBIN URINE: ABNORMAL
BLOOD, URINE: ABNORMAL
BUN BLDV-MCNC: 23 MG/DL (ref 7–20)
CALCIUM SERPL-MCNC: 8.6 MG/DL (ref 8.3–10.6)
CASTS: ABNORMAL /LPF
CHLORIDE BLD-SCNC: 98 MMOL/L (ref 99–110)
CLARITY: CLEAR
CO2: 26 MMOL/L (ref 21–32)
COLOR: YELLOW
CREAT SERPL-MCNC: 1.9 MG/DL (ref 0.8–1.3)
EOSINOPHILS ABSOLUTE: 0.1 K/UL (ref 0–0.6)
EOSINOPHILS RELATIVE PERCENT: 1.7 %
GFR AFRICAN AMERICAN: 42
GFR NON-AFRICAN AMERICAN: 35
GLOBULIN: 4.7 G/DL
GLUCOSE BLD-MCNC: 116 MG/DL (ref 70–99)
GLUCOSE URINE: NEGATIVE MG/DL
HCT VFR BLD CALC: 36.3 % (ref 40.5–52.5)
HEMOGLOBIN: 11.9 G/DL (ref 13.5–17.5)
KETONES, URINE: NEGATIVE MG/DL
LACTIC ACID: 1.5 MMOL/L (ref 0.4–2)
LACTIC ACID: 2.3 MMOL/L (ref 0.4–2)
LEUKOCYTE ESTERASE, URINE: NEGATIVE
LYMPHOCYTES ABSOLUTE: 1.6 K/UL (ref 1–5.1)
LYMPHOCYTES RELATIVE PERCENT: 21.4 %
MCH RBC QN AUTO: 29.6 PG (ref 26–34)
MCHC RBC AUTO-ENTMCNC: 32.8 G/DL (ref 31–36)
MCV RBC AUTO: 90.1 FL (ref 80–100)
MICROSCOPIC EXAMINATION: YES
MONOCYTES ABSOLUTE: 0.8 K/UL (ref 0–1.3)
MONOCYTES RELATIVE PERCENT: 10.5 %
NEUTROPHILS ABSOLUTE: 5 K/UL (ref 1.7–7.7)
NEUTROPHILS RELATIVE PERCENT: 64.9 %
NITRITE, URINE: NEGATIVE
PDW BLD-RTO: 16 % (ref 12.4–15.4)
PH UA: 5.5 (ref 5–8)
PLATELET # BLD: 262 K/UL (ref 135–450)
PMV BLD AUTO: 7.5 FL (ref 5–10.5)
POTASSIUM SERPL-SCNC: 5 MMOL/L (ref 3.5–5.1)
PRO-BNP: ABNORMAL PG/ML (ref 0–124)
PROTEIN UA: 100 MG/DL
RBC # BLD: 4.03 M/UL (ref 4.2–5.9)
RBC UA: >100 /HPF (ref 0–2)
SODIUM BLD-SCNC: 136 MMOL/L (ref 136–145)
SPECIFIC GRAVITY UA: 1.02 (ref 1–1.03)
SPECIMEN STATUS: NORMAL
TOTAL PROTEIN: 6.6 G/DL (ref 6.4–8.2)
TROPONIN: <0.01 NG/ML
TROPONIN: <0.01 NG/ML
URINE TYPE: ABNORMAL
UROBILINOGEN, URINE: 4 E.U./DL
WBC # BLD: 7.6 K/UL (ref 4–11)
WBC UA: ABNORMAL /HPF (ref 0–5)

## 2019-07-22 PROCEDURE — 6360000002 HC RX W HCPCS: Performed by: NURSE PRACTITIONER

## 2019-07-22 PROCEDURE — 86900 BLOOD TYPING SEROLOGIC ABO: CPT

## 2019-07-22 PROCEDURE — 6360000002 HC RX W HCPCS: Performed by: EMERGENCY MEDICINE

## 2019-07-22 PROCEDURE — 86901 BLOOD TYPING SEROLOGIC RH(D): CPT

## 2019-07-22 PROCEDURE — 96374 THER/PROPH/DIAG INJ IV PUSH: CPT

## 2019-07-22 PROCEDURE — 84484 ASSAY OF TROPONIN QUANT: CPT

## 2019-07-22 PROCEDURE — 71045 X-RAY EXAM CHEST 1 VIEW: CPT

## 2019-07-22 PROCEDURE — 80053 COMPREHEN METABOLIC PANEL: CPT

## 2019-07-22 PROCEDURE — 1200000000 HC SEMI PRIVATE

## 2019-07-22 PROCEDURE — 99285 EMERGENCY DEPT VISIT HI MDM: CPT

## 2019-07-22 PROCEDURE — 85025 COMPLETE CBC W/AUTO DIFF WBC: CPT

## 2019-07-22 PROCEDURE — 83880 ASSAY OF NATRIURETIC PEPTIDE: CPT

## 2019-07-22 PROCEDURE — 81001 URINALYSIS AUTO W/SCOPE: CPT

## 2019-07-22 PROCEDURE — 93005 ELECTROCARDIOGRAM TRACING: CPT | Performed by: EMERGENCY MEDICINE

## 2019-07-22 PROCEDURE — 51701 INSERT BLADDER CATHETER: CPT

## 2019-07-22 PROCEDURE — 83605 ASSAY OF LACTIC ACID: CPT

## 2019-07-22 PROCEDURE — 86850 RBC ANTIBODY SCREEN: CPT

## 2019-07-22 PROCEDURE — 70450 CT HEAD/BRAIN W/O DYE: CPT

## 2019-07-22 RX ORDER — FUROSEMIDE 10 MG/ML
40 INJECTION INTRAMUSCULAR; INTRAVENOUS 2 TIMES DAILY
Status: DISCONTINUED | OUTPATIENT
Start: 2019-07-22 | End: 2019-07-23

## 2019-07-22 RX ORDER — FUROSEMIDE 10 MG/ML
40 INJECTION INTRAMUSCULAR; INTRAVENOUS ONCE
Status: COMPLETED | OUTPATIENT
Start: 2019-07-22 | End: 2019-07-22

## 2019-07-22 RX ORDER — SODIUM CHLORIDE 0.9 % (FLUSH) 0.9 %
10 SYRINGE (ML) INJECTION EVERY 12 HOURS SCHEDULED
Status: DISCONTINUED | OUTPATIENT
Start: 2019-07-22 | End: 2019-07-25 | Stop reason: HOSPADM

## 2019-07-22 RX ORDER — ONDANSETRON 2 MG/ML
4 INJECTION INTRAMUSCULAR; INTRAVENOUS EVERY 6 HOURS PRN
Status: DISCONTINUED | OUTPATIENT
Start: 2019-07-22 | End: 2019-07-25 | Stop reason: HOSPADM

## 2019-07-22 RX ORDER — SENNA PLUS 8.6 MG/1
1 TABLET ORAL DAILY PRN
Status: DISCONTINUED | OUTPATIENT
Start: 2019-07-22 | End: 2019-07-25 | Stop reason: HOSPADM

## 2019-07-22 RX ORDER — HEPARIN SODIUM 5000 [USP'U]/ML
5000 INJECTION, SOLUTION INTRAVENOUS; SUBCUTANEOUS EVERY 8 HOURS SCHEDULED
Status: DISCONTINUED | OUTPATIENT
Start: 2019-07-22 | End: 2019-07-25 | Stop reason: HOSPADM

## 2019-07-22 RX ORDER — FUROSEMIDE 10 MG/ML
20 INJECTION INTRAMUSCULAR; INTRAVENOUS ONCE
Status: DISCONTINUED | OUTPATIENT
Start: 2019-07-22 | End: 2019-07-22

## 2019-07-22 RX ORDER — NITROGLYCERIN 0.4 MG/1
0.4 TABLET SUBLINGUAL EVERY 5 MIN PRN
Status: DISCONTINUED | OUTPATIENT
Start: 2019-07-22 | End: 2019-07-25 | Stop reason: HOSPADM

## 2019-07-22 RX ORDER — SODIUM CHLORIDE 0.9 % (FLUSH) 0.9 %
10 SYRINGE (ML) INJECTION PRN
Status: DISCONTINUED | OUTPATIENT
Start: 2019-07-22 | End: 2019-07-25 | Stop reason: HOSPADM

## 2019-07-22 RX ADMIN — FUROSEMIDE 40 MG: 10 INJECTION, SOLUTION INTRAMUSCULAR; INTRAVENOUS at 20:55

## 2019-07-22 RX ADMIN — HEPARIN SODIUM 5000 UNITS: 5000 INJECTION INTRAVENOUS; SUBCUTANEOUS at 23:20

## 2019-07-22 ASSESSMENT — PAIN SCALES - WONG BAKER: WONGBAKER_NUMERICALRESPONSE: 10

## 2019-07-22 ASSESSMENT — PAIN DESCRIPTION - LOCATION: LOCATION: GENERALIZED

## 2019-07-22 ASSESSMENT — PAIN DESCRIPTION - PAIN TYPE: TYPE: ACUTE PAIN;CHRONIC PAIN

## 2019-07-23 PROBLEM — I50.31 ACUTE DIASTOLIC CHF (CONGESTIVE HEART FAILURE) (HCC): Status: ACTIVE | Noted: 2019-07-23

## 2019-07-23 PROBLEM — N18.6 ESRD (END STAGE RENAL DISEASE) (HCC): Status: RESOLVED | Noted: 2017-07-30 | Resolved: 2019-07-23

## 2019-07-23 LAB
ANION GAP SERPL CALCULATED.3IONS-SCNC: 11 MMOL/L (ref 3–16)
BUN BLDV-MCNC: 25 MG/DL (ref 7–20)
CALCIUM SERPL-MCNC: 8.4 MG/DL (ref 8.3–10.6)
CHLORIDE BLD-SCNC: 97 MMOL/L (ref 99–110)
CHOLESTEROL, TOTAL: 96 MG/DL (ref 0–199)
CO2: 28 MMOL/L (ref 21–32)
CREAT SERPL-MCNC: 1.9 MG/DL (ref 0.8–1.3)
GFR AFRICAN AMERICAN: 42
GFR NON-AFRICAN AMERICAN: 35
GLUCOSE BLD-MCNC: 76 MG/DL (ref 70–99)
HCT VFR BLD CALC: 36.7 % (ref 40.5–52.5)
HDLC SERPL-MCNC: 26 MG/DL (ref 40–60)
HEMOGLOBIN: 12.2 G/DL (ref 13.5–17.5)
LDL CHOLESTEROL CALCULATED: 48 MG/DL
MAGNESIUM: 2.1 MG/DL (ref 1.8–2.4)
MCH RBC QN AUTO: 29.9 PG (ref 26–34)
MCHC RBC AUTO-ENTMCNC: 33.4 G/DL (ref 31–36)
MCV RBC AUTO: 89.5 FL (ref 80–100)
PDW BLD-RTO: 15.9 % (ref 12.4–15.4)
PLATELET # BLD: 179 K/UL (ref 135–450)
PMV BLD AUTO: 7.8 FL (ref 5–10.5)
POTASSIUM SERPL-SCNC: 3.8 MMOL/L (ref 3.5–5.1)
RBC # BLD: 4.1 M/UL (ref 4.2–5.9)
SODIUM BLD-SCNC: 136 MMOL/L (ref 136–145)
TRIGL SERPL-MCNC: 112 MG/DL (ref 0–150)
VLDLC SERPL CALC-MCNC: 22 MG/DL
WBC # BLD: 5.3 K/UL (ref 4–11)

## 2019-07-23 PROCEDURE — 97530 THERAPEUTIC ACTIVITIES: CPT

## 2019-07-23 PROCEDURE — 1200000000 HC SEMI PRIVATE

## 2019-07-23 PROCEDURE — 85027 COMPLETE CBC AUTOMATED: CPT

## 2019-07-23 PROCEDURE — 83735 ASSAY OF MAGNESIUM: CPT

## 2019-07-23 PROCEDURE — 2580000003 HC RX 258: Performed by: NURSE PRACTITIONER

## 2019-07-23 PROCEDURE — 6360000002 HC RX W HCPCS: Performed by: NURSE PRACTITIONER

## 2019-07-23 PROCEDURE — 80048 BASIC METABOLIC PNL TOTAL CA: CPT

## 2019-07-23 PROCEDURE — 6370000000 HC RX 637 (ALT 250 FOR IP): Performed by: NURSE PRACTITIONER

## 2019-07-23 PROCEDURE — 36415 COLL VENOUS BLD VENIPUNCTURE: CPT

## 2019-07-23 PROCEDURE — 80061 LIPID PANEL: CPT

## 2019-07-23 PROCEDURE — 97166 OT EVAL MOD COMPLEX 45 MIN: CPT

## 2019-07-23 PROCEDURE — 97162 PT EVAL MOD COMPLEX 30 MIN: CPT

## 2019-07-23 RX ORDER — VITAMIN B COMPLEX
1 CAPSULE ORAL DAILY
Status: DISCONTINUED | OUTPATIENT
Start: 2019-07-23 | End: 2019-07-23 | Stop reason: RX

## 2019-07-23 RX ORDER — LEVOCARNITINE 1 G/10ML
500 SOLUTION ORAL 3 TIMES DAILY
Status: DISCONTINUED | OUTPATIENT
Start: 2019-07-23 | End: 2019-07-25 | Stop reason: HOSPADM

## 2019-07-23 RX ORDER — ASPIRIN 81 MG/1
81 TABLET, CHEWABLE ORAL DAILY
Status: DISCONTINUED | OUTPATIENT
Start: 2019-07-23 | End: 2019-07-25 | Stop reason: HOSPADM

## 2019-07-23 RX ORDER — FUROSEMIDE 10 MG/ML
40 INJECTION INTRAMUSCULAR; INTRAVENOUS DAILY
Status: DISCONTINUED | OUTPATIENT
Start: 2019-07-24 | End: 2019-07-24

## 2019-07-23 RX ORDER — LOSARTAN POTASSIUM 25 MG/1
50 TABLET ORAL DAILY
Status: DISCONTINUED | OUTPATIENT
Start: 2019-07-23 | End: 2019-07-25 | Stop reason: HOSPADM

## 2019-07-23 RX ORDER — ACETAMINOPHEN 325 MG/1
650 TABLET ORAL EVERY 6 HOURS PRN
Status: DISCONTINUED | OUTPATIENT
Start: 2019-07-23 | End: 2019-07-25 | Stop reason: HOSPADM

## 2019-07-23 RX ORDER — METOPROLOL SUCCINATE 25 MG/1
25 TABLET, EXTENDED RELEASE ORAL DAILY
Status: DISCONTINUED | OUTPATIENT
Start: 2019-07-23 | End: 2019-07-25 | Stop reason: HOSPADM

## 2019-07-23 RX ORDER — SPIRONOLACTONE 25 MG/1
25 TABLET ORAL DAILY
Status: DISCONTINUED | OUTPATIENT
Start: 2019-07-23 | End: 2019-07-25 | Stop reason: HOSPADM

## 2019-07-23 RX ORDER — DIVALPROEX SODIUM 250 MG/1
1000 TABLET, DELAYED RELEASE ORAL NIGHTLY
Status: DISCONTINUED | OUTPATIENT
Start: 2019-07-23 | End: 2019-07-25 | Stop reason: HOSPADM

## 2019-07-23 RX ORDER — ATORVASTATIN CALCIUM 40 MG/1
40 TABLET, FILM COATED ORAL DAILY
Status: DISCONTINUED | OUTPATIENT
Start: 2019-07-23 | End: 2019-07-25 | Stop reason: HOSPADM

## 2019-07-23 RX ORDER — DIVALPROEX SODIUM 250 MG/1
500 TABLET, DELAYED RELEASE ORAL EVERY MORNING
Status: DISCONTINUED | OUTPATIENT
Start: 2019-07-23 | End: 2019-07-25 | Stop reason: HOSPADM

## 2019-07-23 RX ORDER — PANTOPRAZOLE SODIUM 40 MG/1
40 TABLET, DELAYED RELEASE ORAL DAILY
Status: DISCONTINUED | OUTPATIENT
Start: 2019-07-23 | End: 2019-07-25 | Stop reason: HOSPADM

## 2019-07-23 RX ORDER — CHOLECALCIFEROL (VITAMIN D3) 125 MCG
5 CAPSULE ORAL NIGHTLY
Status: DISCONTINUED | OUTPATIENT
Start: 2019-07-23 | End: 2019-07-25 | Stop reason: HOSPADM

## 2019-07-23 RX ADMIN — ASPIRIN 81 MG 81 MG: 81 TABLET ORAL at 08:23

## 2019-07-23 RX ADMIN — SPIRONOLACTONE 25 MG: 25 TABLET ORAL at 08:23

## 2019-07-23 RX ADMIN — FUROSEMIDE 40 MG: 10 INJECTION, SOLUTION INTRAMUSCULAR; INTRAVENOUS at 08:24

## 2019-07-23 RX ADMIN — METOPROLOL SUCCINATE 25 MG: 25 TABLET, EXTENDED RELEASE ORAL at 08:23

## 2019-07-23 RX ADMIN — HEPARIN SODIUM 5000 UNITS: 5000 INJECTION INTRAVENOUS; SUBCUTANEOUS at 08:23

## 2019-07-23 RX ADMIN — LOSARTAN POTASSIUM 50 MG: 25 TABLET, FILM COATED ORAL at 08:23

## 2019-07-23 RX ADMIN — MELATONIN TAB 5 MG 5 MG: 5 TAB at 23:29

## 2019-07-23 RX ADMIN — PANTOPRAZOLE SODIUM 40 MG: 40 TABLET, DELAYED RELEASE ORAL at 08:23

## 2019-07-23 RX ADMIN — ATORVASTATIN CALCIUM 40 MG: 40 TABLET, FILM COATED ORAL at 08:23

## 2019-07-23 RX ADMIN — Medication 10 ML: at 11:25

## 2019-07-23 RX ADMIN — HEPARIN SODIUM 5000 UNITS: 5000 INJECTION INTRAVENOUS; SUBCUTANEOUS at 17:15

## 2019-07-23 ASSESSMENT — PAIN SCALES - GENERAL
PAINLEVEL_OUTOF10: 0

## 2019-07-23 NOTE — PROGRESS NOTES
Occupational Therapy   Occupational Therapy Initial Assessment/Treatment  Date: 2019   Patient Name: Cristian Barber  MRN: [id number]     : 1950    Date of Service: 2019    Discharge Recommendations:  Subacute/Skilled Nursing Facility       Assessment   Performance deficits / Impairments: Decreased functional mobility ; Decreased endurance;Decreased ADL status; Decreased strength;Decreased balance;Decreased safe awareness;Decreased cognition  After evaluation, pt found to be presenting with the above mentioned occupational performance deficits which are affecting participation in daily living skills. Pt would benefit from continued skilled occupational therapy to address ADLs, functional mobility, and safety while in acute care. Patient 2 person skilled assistance for functional mobility and transfers. Prognosis: Good  Decision Making: Medium Complexity  Patient Education: ADLs, bed mobility, functional transfers and role of OT  REQUIRES OT FOLLOW UP: Yes  Activity Tolerance  Activity Tolerance: Patient Tolerated treatment well  Safety Devices  Safety Devices in place: Yes  Type of devices: Left in chair;Call light within reach; Chair alarm in place;Nurse notified;Gait belt           Patient Diagnosis(es): The primary encounter diagnosis was Syncope and collapse. Diagnoses of Permanent atrial fibrillation (HCC) and Hypotension, unspecified hypotension type were also pertinent to this visit.      has a past medical history of Acute gastric ulcer with bleeding, Acute metabolic encephalopathy, MIASHA (acute kidney injury) ([address] ATN (acute tubular necrosis) ([address] Atrial fib/flutter, transient, Atrial fibrillation ([address] BPH (benign prostatic hyperplasia), Cerebral artery occlusion with cerebral infarction ([address] Diabetes mellitus ([address] ESRD (end stage renal disease) ([address] GI bleed, Glaucoma, History of blood transfusion, Hx of diabetic neuropathy, Hyperlipidemia, Hypertension, Mitral insufficiency,

## 2019-07-23 NOTE — DISCHARGE INSTR - COC
Continuity of Care Form    Patient Name: Chani Segovia   :  1950  MRN:  [id number]    Admit date:  2019  Discharge date:  19    Code Status Order: Full Code   Advance Directives:   [facility] Documentation     Date/Time Healthcare Directive Type of Healthcare Directive Copy in [address] [facility] [address] Agent's Name Healthcare Agent's Phone Number    [phone]  Yes, patient has an advance directive for healthcare treatment  Durable power of  for health care;Living will  No, copy requested from family  Healthcare power of   [employer]  [phone]          Admitting Physician:  Joanie Devlin MD  PCP: Roshni Paulino MD    Discharging Nurse:  [facility] Unit/Room#: 3614/1194-28  Discharging Unit Phone Number: [phone]    Emergency Contact:   Extended Emergency Contact Information  Primary Emergency Contact: Meghann Adame [facility] [address] Phone: [phone]  Relation: Child  Secondary Emergency Contact: [facility] [place] Phone: [phone]  Mobile Phone: [phone]  Relation: Child    Past Surgical History:  Past Surgical History:   Procedure Laterality Date    DENTAL SURGERY N/A 3/28/2019    EXTRACTION OF ALL REMAINING UPPER AND LOWER TEETH AND ALVEOLOPLASTY   performed by Sunni Mansfield DMD at [address] N/A 2019    LAPAROSCOPIC, CONVERTED TO OPEN INCARCERATED UMBILICAL HERNIA REPAIR performed by Criss Mg MD at [facility]  10/11/13     CYSTOSCOPY, TRANSURETHRAL RESECTION OF PROSTATE WITH OLYMPUS    PROSTATE BIOPSY      UPPER GASTROINTESTINAL ENDOSCOPY  17       Immunization History:   Immunization History   Administered Date(s) Administered    Influenza Virus Vaccine 2014    Pneumococcal Polysaccharide ([id number]) 2015    Td Vaccine >8[facility] 2008       Active Problems:  Patient Active Problem List   Diagnosis Rinsed/Irrigated with saline 7/23/2019  9:00 AM   Wound Length (cm) 1.2 cm 7/22/2019 10:40 PM   Wound Width (cm) 0.2 cm 7/22/2019 10:40 PM   Wound Depth (cm) 0 cm 7/22/2019 10:40 PM   Wound Surface Area (cm^2) 0.24 cm^2 7/22/2019 10:40 PM   Wound Volume (cm^3) 0 cm^3 7/22/2019 10:40 PM   Wound Assessment Other (Comment) 7/23/2019  9:00 AM   Drainage Amount None 7/23/2019  9:00 AM   Odor None 7/22/2019 10:40 PM   Red%Wound Bed 20 7/22/2019 10:40 PM   Yellow%Wound Bed 80 7/22/2019 10:40 PM   Black%Wound Bed 100 7/22/2019 10:40 PM   Number of days: 0        Elimination:  Continence:   · Bowel: No incontinent of BM 7/25/19  · Bladder: has catheter   Urinary Catheter: Insertion Date: 7/22/19   Colostomy/Ileostomy/Ileal Conduit: No       Date of Last BM: 7/25/19    Intake/Output Summary (Last 24 hours) at 7/23/2019 1215  Last data filed at 7/23/2019 0900  Gross per 24 hour   Intake 120 ml   Output 1025 ml   Net -905 ml     I/O last 3 completed shifts: In: 0   Out: 825 [Urine:825]    Safety Concerns: At Risk for Falls    Impairments/Disabilities:      None    Nutrition Therapy:  Current Nutrition Therapy:   - Oral Diet:  General  Ensure TID    Routes of Feeding: Oral  Liquids: Thin Liquids  Daily Fluid Restriction: yes - amount 1800ml  Last Modified Barium Swallow with Video (Video Swallowing Test): not done    Treatments at the Time of Hospital Discharge:   Respiratory Treatments:   Oxygen Therapy:  is not on home oxygen therapy. Ventilator:    - No ventilator support    Rehab Therapies: Physical Therapy and Occupational Therapy  Weight Bearing Status/Restrictions: No weight bearing restirctions  Other Medical Equipment (for information only, NOT a DME order):     Other Treatments:     Patient's personal belongings (please select all that are sent with patient):  None    RN SIGNATURE:  Electronically signed by Myranda Gary RN on 7/25/19 at 10:43 AM    CASE MANAGEMENT/SOCIAL WORK SECTION    Inpatient Status Date: 7/22/19    Readmission Risk Assessment Score:  Readmission Risk              Risk of Unplanned Readmission:        32           Discharging to Facility/ Agency   · Name: [facility]   · Address:  · Phone: [phone]  · Fax: [phone]    / signature: Electronically signed by Julianna Grover RN on 7/25/19 at 10:11 AM    PHYSICIAN SECTION    Prognosis: Fair    Condition at Discharge: Stable    Rehab Potential (if transferring to Rehab): Fair    Recommended Labs or Other Treatments After Discharge: weekly BMP, CBC, Mg    Physician Certification: I certify the above information and transfer of Tana Mcgraw  is necessary for the continuing treatment of the diagnosis listed and that he requires [facility] for less 30 days.      Update Admission H&P: No change in H&P    PHYSICIAN SIGNATURE:  Electronically signed by Jeramie Puente MD on 7/25/2019 at 9:54 AM

## 2019-07-23 NOTE — ED NOTES
Report given to CARLEY Daigle at this time. VS as noted. Questions/concerns addressed. No further concerns voiced. Pts family remains present at bedside at this time.       Gina Shelton RN  07/22/19 0011

## 2019-07-23 NOTE — PROGRESS NOTES
clubbing, cyanosis or edema bilaterally. Full range of motion without deformity. Skin: Skin color, texture, turgor normal.  No rashes or lesions. Neurologic:  Neurovascularly intact without any focal sensory/motor deficits. Cranial nerves: II-XII intact, grossly non-focal.  Psychiatric: Alert and presently confused, cooperative  Capillary Refill: Brisk,< 3 seconds   Peripheral Pulses: +2 palpable, equal bilaterally       Labs:   Recent Labs     07/22/19  1355 07/23/19  0613   WBC 7.6 5.3   HGB 11.9* 12.2*   HCT 36.3* 36.7*    179     Recent Labs     07/22/19  1355 07/23/19  0613    136   K 5.0 3.8   CL 98* 97*   CO2 26 28   BUN 23* 25*   CREATININE 1.9* 1.9*   CALCIUM 8.6 8.4     Recent Labs     07/22/19  1355   AST 46*   ALT 11   BILITOT 0.5   ALKPHOS 50     No results for input(s): INR in the last 72 hours. Recent Labs     07/22/19  1355 07/22/19  1932   TROPONINI <0.01 <0.01       Urinalysis:      Lab Results   Component Value Date    NITRU Negative 07/22/2019    WBCUA 6-10 07/22/2019    BACTERIA Rare 07/22/2019    RBCUA >100 07/22/2019    BLOODU LARGE 07/22/2019    SPECGRAV 1.025 07/22/2019    GLUCOSEU Negative 07/22/2019       Radiology:  CT Head WO Contrast   Final Result   No acute intracranial abnormality. XR CHEST PORTABLE   Final Result   1. Slight worsening moderate right pleural effusion. Assessment/Plan:    Active Hospital Problems    Diagnosis    Acute diastolic CHF (congestive heart failure) ([facility]) [I50.31]    Fluid overload [E87.70]    Syncope and collapse [R55]    Dementia due to Alzheimer's disease [G30.9, F02.80]    CKD (chronic kidney disease) [N18.9]     PLAN:    Syncope  Unclear reason. Could be orthostatic. Continue to monitor vitals, I will also check orthostatic vitals as well. No EEG recently. Atypical seizures are possible. I will order an EEG. Acute diastolic congestive heart failure  Patient appears slightly fluid overloaded.   Continue

## 2019-07-24 LAB
ANION GAP SERPL CALCULATED.3IONS-SCNC: 9 MMOL/L (ref 3–16)
BUN BLDV-MCNC: 25 MG/DL (ref 7–20)
CALCIUM SERPL-MCNC: 8.2 MG/DL (ref 8.3–10.6)
CHLORIDE BLD-SCNC: 98 MMOL/L (ref 99–110)
CO2: 33 MMOL/L (ref 21–32)
CREAT SERPL-MCNC: 1.9 MG/DL (ref 0.8–1.3)
EKG ATRIAL RATE: 119 BPM
EKG DIAGNOSIS: NORMAL
EKG Q-T INTERVAL: 330 MS
EKG QRS DURATION: 108 MS
EKG QTC CALCULATION (BAZETT): 462 MS
EKG R AXIS: -63 DEGREES
EKG T AXIS: 52 DEGREES
EKG VENTRICULAR RATE: 118 BPM
GFR AFRICAN AMERICAN: 42
GFR NON-AFRICAN AMERICAN: 35
GLUCOSE BLD-MCNC: 112 MG/DL (ref 70–99)
HCT VFR BLD CALC: 32.5 % (ref 40.5–52.5)
HEMOGLOBIN: 10.6 G/DL (ref 13.5–17.5)
MAGNESIUM: 2 MG/DL (ref 1.8–2.4)
MCH RBC QN AUTO: 29.1 PG (ref 26–34)
MCHC RBC AUTO-ENTMCNC: 32.6 G/DL (ref 31–36)
MCV RBC AUTO: 89.4 FL (ref 80–100)
PDW BLD-RTO: 15.3 % (ref 12.4–15.4)
PLATELET # BLD: 191 K/UL (ref 135–450)
PMV BLD AUTO: 7.1 FL (ref 5–10.5)
POTASSIUM SERPL-SCNC: 3.6 MMOL/L (ref 3.5–5.1)
PRO-BNP: ABNORMAL PG/ML (ref 0–124)
RBC # BLD: 3.64 M/UL (ref 4.2–5.9)
SODIUM BLD-SCNC: 140 MMOL/L (ref 136–145)
WBC # BLD: 6.5 K/UL (ref 4–11)

## 2019-07-24 PROCEDURE — 80048 BASIC METABOLIC PNL TOTAL CA: CPT

## 2019-07-24 PROCEDURE — 95816 EEG AWAKE AND DROWSY: CPT | Performed by: PSYCHIATRY & NEUROLOGY

## 2019-07-24 PROCEDURE — 6370000000 HC RX 637 (ALT 250 FOR IP): Performed by: NURSE PRACTITIONER

## 2019-07-24 PROCEDURE — 85027 COMPLETE CBC AUTOMATED: CPT

## 2019-07-24 PROCEDURE — 83735 ASSAY OF MAGNESIUM: CPT

## 2019-07-24 PROCEDURE — 1200000000 HC SEMI PRIVATE

## 2019-07-24 PROCEDURE — 95819 EEG AWAKE AND ASLEEP: CPT

## 2019-07-24 PROCEDURE — 2580000003 HC RX 258: Performed by: NURSE PRACTITIONER

## 2019-07-24 PROCEDURE — 93010 ELECTROCARDIOGRAM REPORT: CPT | Performed by: INTERNAL MEDICINE

## 2019-07-24 PROCEDURE — 36415 COLL VENOUS BLD VENIPUNCTURE: CPT

## 2019-07-24 PROCEDURE — 83880 ASSAY OF NATRIURETIC PEPTIDE: CPT

## 2019-07-24 PROCEDURE — 6360000002 HC RX W HCPCS: Performed by: NURSE PRACTITIONER

## 2019-07-24 PROCEDURE — 6360000002 HC RX W HCPCS: Performed by: INTERNAL MEDICINE

## 2019-07-24 RX ORDER — FUROSEMIDE 10 MG/ML
40 INJECTION INTRAMUSCULAR; INTRAVENOUS 2 TIMES DAILY
Status: DISCONTINUED | OUTPATIENT
Start: 2019-07-24 | End: 2019-07-25 | Stop reason: HOSPADM

## 2019-07-24 RX ADMIN — DIVALPROEX SODIUM 500 MG: 250 TABLET, DELAYED RELEASE ORAL at 09:35

## 2019-07-24 RX ADMIN — MELATONIN TAB 5 MG 5 MG: 5 TAB at 20:21

## 2019-07-24 RX ADMIN — Medication 10 ML: at 20:22

## 2019-07-24 RX ADMIN — ASPIRIN 81 MG 81 MG: 81 TABLET ORAL at 09:36

## 2019-07-24 RX ADMIN — METOPROLOL SUCCINATE 25 MG: 25 TABLET, EXTENDED RELEASE ORAL at 09:36

## 2019-07-24 RX ADMIN — HEPARIN SODIUM 5000 UNITS: 5000 INJECTION INTRAVENOUS; SUBCUTANEOUS at 17:27

## 2019-07-24 RX ADMIN — PANTOPRAZOLE SODIUM 40 MG: 40 TABLET, DELAYED RELEASE ORAL at 09:36

## 2019-07-24 RX ADMIN — DIVALPROEX SODIUM 1000 MG: 250 TABLET, DELAYED RELEASE ORAL at 20:22

## 2019-07-24 RX ADMIN — DIVALPROEX SODIUM 1000 MG: 250 TABLET, DELAYED RELEASE ORAL at 01:11

## 2019-07-24 RX ADMIN — ATORVASTATIN CALCIUM 40 MG: 40 TABLET, FILM COATED ORAL at 09:36

## 2019-07-24 RX ADMIN — SPIRONOLACTONE 25 MG: 25 TABLET ORAL at 09:35

## 2019-07-24 RX ADMIN — FUROSEMIDE 40 MG: 10 INJECTION, SOLUTION INTRAMUSCULAR; INTRAVENOUS at 17:24

## 2019-07-24 RX ADMIN — Medication 10 ML: at 10:10

## 2019-07-24 RX ADMIN — LOSARTAN POTASSIUM 50 MG: 25 TABLET, FILM COATED ORAL at 09:35

## 2019-07-24 RX ADMIN — HEPARIN SODIUM 5000 UNITS: 5000 INJECTION INTRAVENOUS; SUBCUTANEOUS at 23:32

## 2019-07-24 RX ADMIN — HEPARIN SODIUM 5000 UNITS: 5000 INJECTION INTRAVENOUS; SUBCUTANEOUS at 00:24

## 2019-07-24 RX ADMIN — LEVOCARNITINE 500 MG: 1 SOLUTION ORAL at 23:32

## 2019-07-24 RX ADMIN — HEPARIN SODIUM 5000 UNITS: 5000 INJECTION INTRAVENOUS; SUBCUTANEOUS at 09:38

## 2019-07-24 RX ADMIN — Medication 10 ML: at 00:22

## 2019-07-24 RX ADMIN — FUROSEMIDE 40 MG: 10 INJECTION, SOLUTION INTRAMUSCULAR; INTRAVENOUS at 09:36

## 2019-07-24 ASSESSMENT — PAIN SCALES - WONG BAKER
WONGBAKER_NUMERICALRESPONSE: 0

## 2019-07-24 ASSESSMENT — PAIN SCALES - GENERAL
PAINLEVEL_OUTOF10: 0

## 2019-07-24 NOTE — CONSULTS
Information:  · Nutrition-Focused Physical Findings: Non-pitting generalized edema  · Wound Type: Pressure Ulcer, Stage II  · Current Nutrition Therapies:  · Oral Diet Orders: Cardiac, Fluid Restriction   · Oral Diet intake: 1-25%, %  · Oral Nutrition Supplement (ONS) Orders: None  · ONS intake: Unable to assess  · Anthropometric Measures:  · Ht: 6' 5\" (195.6 cm)   · Current Body Wt: 306 lb 7 oz (139 kg)  · % Weight Change:  ,  No significant loss noted per EMR.    · Ideal Body Wt: 208 lb (94.3 kg),   · BMI Classification: BMI 30.0 - 34.9 Obese Class I    Nutrition Interventions:   Continue current diet, Start ONS  Continued Inpatient Monitoring    Nutrition Evaluation:   · Evaluation: Goals set   · Goals: Pt will have po intakes 50% or greater this admission    · Monitoring: Meal Intake, Supplement Intake, Wound Healing, Weight, Pertinent Labs      Electronically signed by Justina Mcrae RD, LD on 7/24/19 at 11:43 AM    Contact Number: Office: [phone]; [address] [place] [address] Road: [id number]

## 2019-07-25 VITALS
HEART RATE: 100 BPM | DIASTOLIC BLOOD PRESSURE: 89 MMHG | SYSTOLIC BLOOD PRESSURE: 140 MMHG | WEIGHT: 304.24 LBS | TEMPERATURE: 98.1 F | BODY MASS INDEX: 35.92 KG/M2 | RESPIRATION RATE: 18 BRPM | OXYGEN SATURATION: 93 % | HEIGHT: 77 IN

## 2019-07-25 PROCEDURE — 6370000000 HC RX 637 (ALT 250 FOR IP): Performed by: NURSE PRACTITIONER

## 2019-07-25 PROCEDURE — 2580000003 HC RX 258: Performed by: NURSE PRACTITIONER

## 2019-07-25 PROCEDURE — 6360000002 HC RX W HCPCS: Performed by: INTERNAL MEDICINE

## 2019-07-25 RX ORDER — FUROSEMIDE 40 MG/1
40 TABLET ORAL DAILY
DISCHARGE
Start: 2019-07-25 | End: 2019-10-03

## 2019-07-25 RX ADMIN — METOPROLOL SUCCINATE 25 MG: 25 TABLET, EXTENDED RELEASE ORAL at 08:28

## 2019-07-25 RX ADMIN — ASPIRIN 81 MG 81 MG: 81 TABLET ORAL at 08:28

## 2019-07-25 RX ADMIN — SPIRONOLACTONE 25 MG: 25 TABLET ORAL at 08:27

## 2019-07-25 RX ADMIN — PANTOPRAZOLE SODIUM 40 MG: 40 TABLET, DELAYED RELEASE ORAL at 08:28

## 2019-07-25 RX ADMIN — LOSARTAN POTASSIUM 50 MG: 25 TABLET, FILM COATED ORAL at 08:27

## 2019-07-25 RX ADMIN — DIVALPROEX SODIUM 500 MG: 250 TABLET, DELAYED RELEASE ORAL at 08:27

## 2019-07-25 RX ADMIN — FUROSEMIDE 40 MG: 10 INJECTION, SOLUTION INTRAMUSCULAR; INTRAVENOUS at 08:28

## 2019-07-25 RX ADMIN — LEVOCARNITINE 500 MG: 1 SOLUTION ORAL at 08:28

## 2019-07-25 RX ADMIN — Medication 10 ML: at 08:33

## 2019-07-25 RX ADMIN — ATORVASTATIN CALCIUM 40 MG: 40 TABLET, FILM COATED ORAL at 08:28

## 2019-07-25 ASSESSMENT — PAIN SCALES - GENERAL
PAINLEVEL_OUTOF10: 0
PAINLEVEL_OUTOF10: 0

## 2019-07-25 NOTE — PLAN OF CARE
Problem: Risk for Impaired Skin Integrity  Goal: Tissue integrity - skin and mucous membranes  Description  Structural intactness and normal physiological function of skin and  mucous membranes. Outcome: Ongoing   Patient makes small shifts in movement. Is hesitant to have staff turn and reposition him.   Will continue to encourage him to turn and reposition
metabolic encephalopathy, MAISHA (acute kidney injury) ([address] ATN (acute tubular necrosis) ([address] Atrial fib/flutter, transient, Atrial fibrillation ([address] BPH (benign prostatic hyperplasia), Cerebral artery occlusion with cerebral infarction ([address] Diabetes mellitus ([address] ESRD (end stage renal disease) ([address] GI bleed, Glaucoma, History of blood transfusion, Hx of diabetic neuropathy, Hyperlipidemia, Hypertension, Mitral insufficiency, Mobility impaired, Pulmonary hypertension ([address] and Traumatic hemorrhagic shock ([address]          >>For CHF and Comorbidity documentation on Education Time and Topics, please see Education Tab

## 2019-07-25 NOTE — PROGRESS NOTES
Patient's EF (Ejection Fraction) is greater than 40%    Patient's weights and intake/output reviewed:    Patient's Last Weight:  306  lbs obtained by bed scale. Intake/Output Summary (Last 24 hours) at 7/24/2019 2025  Last data filed at 7/24/2019 1809  Gross per 24 hour   Intake 1200 ml   Output 2300 ml   Net -1100 ml         Patient stated Daily Functional Goal: (Note:help the patient identify a challenging but achievable goal per shift that can aid in progression towards increased functional capacity at discharge ie sit in the chair for x amount of time, Decrease walk time by x seconds, stand or walk with minimal assistance, decrease pain to a level of x/10, etc)    Ongoing Functional Capacity Assessment:  Patient  unable to ambulate distance of 15 feet in 0Minutes/Seconds 0 with extreme difficulty. Patient noted to be on 0 supplemental O2 with SpO2 of 93 %. Pt resting in bed at this time on room air. Pt denies shortness of breath. Pt with nonpitting lower extremity edema. Patient and/or Family's stated Goal of Care this Admission: reduce shortness of breath, increase activity tolerance, better understand heart failure and disease management, be more comfortable and reduce lower extremity edema prior to discharge      Comorbidities Reviewed Yes  Patient has a past medical history of Acute gastric ulcer with bleeding, Acute metabolic encephalopathy, MAISHA (acute kidney injury) ([address] ATN (acute tubular necrosis) ([address] Atrial fib/flutter, transient, Atrial fibrillation ([address] BPH (benign prostatic hyperplasia), Cerebral artery occlusion with cerebral infarction ([address] Diabetes mellitus ([address] ESRD (end stage renal disease) ([address] GI bleed, Glaucoma, History of blood transfusion, Hx of diabetic neuropathy, Hyperlipidemia, Hypertension, Mitral insufficiency, Mobility impaired, Pulmonary hypertension ([address] and Traumatic hemorrhagic shock ([address]          >>For CHF and Comorbidity documentation on Education Time and Topics, please see Education Tab

## 2019-07-26 ENCOUNTER — APPOINTMENT (OUTPATIENT)
Dept: CT IMAGING | Age: 69
DRG: 698 | End: 2019-07-26
Payer: MEDICARE

## 2019-07-26 ENCOUNTER — APPOINTMENT (OUTPATIENT)
Dept: GENERAL RADIOLOGY | Age: 69
DRG: 698 | End: 2019-07-26
Payer: MEDICARE

## 2019-07-26 ENCOUNTER — HOSPITAL ENCOUNTER (INPATIENT)
Age: 69
LOS: 5 days | Discharge: NURSING, REHAB OR LONG-TERM CARE | DRG: 698 | End: 2019-07-31
Attending: EMERGENCY MEDICINE | Admitting: INTERNAL MEDICINE
Payer: MEDICARE

## 2019-07-26 DIAGNOSIS — E04.1 THYROID NODULE: ICD-10-CM

## 2019-07-26 DIAGNOSIS — R55 SYNCOPE, UNSPECIFIED SYNCOPE TYPE: ICD-10-CM

## 2019-07-26 DIAGNOSIS — I48.21 PERMANENT ATRIAL FIBRILLATION (HCC): ICD-10-CM

## 2019-07-26 DIAGNOSIS — N30.00 ACUTE CYSTITIS WITHOUT HEMATURIA: ICD-10-CM

## 2019-07-26 DIAGNOSIS — R41.82 ALTERED MENTAL STATUS, UNSPECIFIED ALTERED MENTAL STATUS TYPE: Primary | ICD-10-CM

## 2019-07-26 DIAGNOSIS — J90 PLEURAL EFFUSION: ICD-10-CM

## 2019-07-26 LAB
A/G RATIO: 0.4 (ref 1.1–2.2)
ALBUMIN SERPL-MCNC: 1.7 G/DL (ref 3.4–5)
ALP BLD-CCNC: 44 U/L (ref 40–129)
ALT SERPL-CCNC: 9 U/L (ref 10–40)
AMMONIA: 34 UMOL/L (ref 16–60)
ANION GAP SERPL CALCULATED.3IONS-SCNC: 11 MMOL/L (ref 3–16)
AST SERPL-CCNC: 33 U/L (ref 15–37)
BACTERIA: ABNORMAL /HPF
BASOPHILS ABSOLUTE: 0 K/UL (ref 0–0.2)
BASOPHILS RELATIVE PERCENT: 0.3 %
BILIRUB SERPL-MCNC: 0.6 MG/DL (ref 0–1)
BILIRUBIN URINE: ABNORMAL
BLOOD, URINE: ABNORMAL
BUN BLDV-MCNC: 28 MG/DL (ref 7–20)
CALCIUM SERPL-MCNC: 8 MG/DL (ref 8.3–10.6)
CHLORIDE BLD-SCNC: 98 MMOL/L (ref 99–110)
CLARITY: ABNORMAL
CO2: 27 MMOL/L (ref 21–32)
COLOR: YELLOW
CREAT SERPL-MCNC: 2.1 MG/DL (ref 0.8–1.3)
EKG ATRIAL RATE: 113 BPM
EKG DIAGNOSIS: NORMAL
EKG Q-T INTERVAL: 368 MS
EKG QRS DURATION: 118 MS
EKG QTC CALCULATION (BAZETT): 493 MS
EKG R AXIS: -74 DEGREES
EKG T AXIS: 70 DEGREES
EKG VENTRICULAR RATE: 108 BPM
EOSINOPHILS ABSOLUTE: 0.1 K/UL (ref 0–0.6)
EOSINOPHILS RELATIVE PERCENT: 1.2 %
EPITHELIAL CELLS, UA: ABNORMAL /HPF
GFR AFRICAN AMERICAN: 38
GFR NON-AFRICAN AMERICAN: 31
GLOBULIN: 4 G/DL
GLUCOSE BLD-MCNC: 114 MG/DL (ref 70–99)
GLUCOSE BLD-MCNC: 114 MG/DL (ref 70–99)
GLUCOSE BLD-MCNC: 161 MG/DL (ref 70–99)
GLUCOSE URINE: NEGATIVE MG/DL
HCT VFR BLD CALC: 31 % (ref 40.5–52.5)
HEMOGLOBIN: 10.4 G/DL (ref 13.5–17.5)
KETONES, URINE: NEGATIVE MG/DL
LACTIC ACID, SEPSIS: 1.8 MMOL/L (ref 0.4–1.9)
LACTIC ACID, SEPSIS: 2.4 MMOL/L (ref 0.4–1.9)
LEUKOCYTE ESTERASE, URINE: ABNORMAL
LYMPHOCYTES ABSOLUTE: 1.5 K/UL (ref 1–5.1)
LYMPHOCYTES RELATIVE PERCENT: 20.7 %
MCH RBC QN AUTO: 29.7 PG (ref 26–34)
MCHC RBC AUTO-ENTMCNC: 33.6 G/DL (ref 31–36)
MCV RBC AUTO: 88.4 FL (ref 80–100)
MICROSCOPIC EXAMINATION: YES
MONOCYTES ABSOLUTE: 0.7 K/UL (ref 0–1.3)
MONOCYTES RELATIVE PERCENT: 10.5 %
NEUTROPHILS ABSOLUTE: 4.8 K/UL (ref 1.7–7.7)
NEUTROPHILS RELATIVE PERCENT: 67.3 %
NITRITE, URINE: POSITIVE
PDW BLD-RTO: 15.3 % (ref 12.4–15.4)
PERFORMED ON: ABNORMAL
PERFORMED ON: ABNORMAL
PH UA: 5.5 (ref 5–8)
PLATELET # BLD: 216 K/UL (ref 135–450)
PMV BLD AUTO: 7.5 FL (ref 5–10.5)
POTASSIUM SERPL-SCNC: 4.2 MMOL/L (ref 3.5–5.1)
PRO-BNP: ABNORMAL PG/ML (ref 0–124)
PROTEIN UA: 100 MG/DL
RBC # BLD: 3.5 M/UL (ref 4.2–5.9)
RBC UA: ABNORMAL /HPF (ref 0–2)
SODIUM BLD-SCNC: 136 MMOL/L (ref 136–145)
SPECIFIC GRAVITY UA: 1.02 (ref 1–1.03)
SPECIMEN STATUS: NORMAL
TOTAL PROTEIN: 5.7 G/DL (ref 6.4–8.2)
TROPONIN: <0.01 NG/ML
URINE TYPE: ABNORMAL
UROBILINOGEN, URINE: 2 E.U./DL
VALPROIC ACID LEVEL: 71.3 UG/ML (ref 50–100)
WBC # BLD: 7.1 K/UL (ref 4–11)
WBC UA: ABNORMAL /HPF (ref 0–5)
YEAST: PRESENT /HPF

## 2019-07-26 PROCEDURE — 2580000003 HC RX 258: Performed by: EMERGENCY MEDICINE

## 2019-07-26 PROCEDURE — 51798 US URINE CAPACITY MEASURE: CPT

## 2019-07-26 PROCEDURE — 93010 ELECTROCARDIOGRAM REPORT: CPT | Performed by: INTERNAL MEDICINE

## 2019-07-26 PROCEDURE — 83605 ASSAY OF LACTIC ACID: CPT

## 2019-07-26 PROCEDURE — 99285 EMERGENCY DEPT VISIT HI MDM: CPT

## 2019-07-26 PROCEDURE — 93005 ELECTROCARDIOGRAM TRACING: CPT | Performed by: EMERGENCY MEDICINE

## 2019-07-26 PROCEDURE — 87186 SC STD MICRODIL/AGAR DIL: CPT

## 2019-07-26 PROCEDURE — 85025 COMPLETE CBC W/AUTO DIFF WBC: CPT

## 2019-07-26 PROCEDURE — 81001 URINALYSIS AUTO W/SCOPE: CPT

## 2019-07-26 PROCEDURE — 80053 COMPREHEN METABOLIC PANEL: CPT

## 2019-07-26 PROCEDURE — 2580000003 HC RX 258: Performed by: INTERNAL MEDICINE

## 2019-07-26 PROCEDURE — 71250 CT THORAX DX C-: CPT

## 2019-07-26 PROCEDURE — 83036 HEMOGLOBIN GLYCOSYLATED A1C: CPT

## 2019-07-26 PROCEDURE — 87086 URINE CULTURE/COLONY COUNT: CPT

## 2019-07-26 PROCEDURE — 71045 X-RAY EXAM CHEST 1 VIEW: CPT

## 2019-07-26 PROCEDURE — 96365 THER/PROPH/DIAG IV INF INIT: CPT

## 2019-07-26 PROCEDURE — 87077 CULTURE AEROBIC IDENTIFY: CPT

## 2019-07-26 PROCEDURE — 6370000000 HC RX 637 (ALT 250 FOR IP): Performed by: INTERNAL MEDICINE

## 2019-07-26 PROCEDURE — 6360000002 HC RX W HCPCS: Performed by: EMERGENCY MEDICINE

## 2019-07-26 PROCEDURE — 83880 ASSAY OF NATRIURETIC PEPTIDE: CPT

## 2019-07-26 PROCEDURE — 70450 CT HEAD/BRAIN W/O DYE: CPT

## 2019-07-26 PROCEDURE — 80164 ASSAY DIPROPYLACETIC ACD TOT: CPT

## 2019-07-26 PROCEDURE — 82140 ASSAY OF AMMONIA: CPT

## 2019-07-26 PROCEDURE — 1200000000 HC SEMI PRIVATE

## 2019-07-26 PROCEDURE — 84484 ASSAY OF TROPONIN QUANT: CPT

## 2019-07-26 RX ORDER — METOPROLOL SUCCINATE 25 MG/1
25 TABLET, EXTENDED RELEASE ORAL DAILY
Status: DISCONTINUED | OUTPATIENT
Start: 2019-07-26 | End: 2019-07-31 | Stop reason: HOSPADM

## 2019-07-26 RX ORDER — QUETIAPINE FUMARATE 100 MG/1
100 TABLET, FILM COATED ORAL NIGHTLY
Status: DISCONTINUED | OUTPATIENT
Start: 2019-07-26 | End: 2019-07-31 | Stop reason: HOSPADM

## 2019-07-26 RX ORDER — 0.9 % SODIUM CHLORIDE 0.9 %
500 INTRAVENOUS SOLUTION INTRAVENOUS ONCE
Status: DISCONTINUED | OUTPATIENT
Start: 2019-07-26 | End: 2019-07-26

## 2019-07-26 RX ORDER — LOSARTAN POTASSIUM 25 MG/1
50 TABLET ORAL DAILY
Status: DISCONTINUED | OUTPATIENT
Start: 2019-07-26 | End: 2019-07-27

## 2019-07-26 RX ORDER — LIDOCAINE HYDROCHLORIDE 20 MG/ML
JELLY TOPICAL PRN
Status: DISCONTINUED | OUTPATIENT
Start: 2019-07-26 | End: 2019-07-31 | Stop reason: HOSPADM

## 2019-07-26 RX ORDER — DEXTROSE MONOHYDRATE 25 G/50ML
12.5 INJECTION, SOLUTION INTRAVENOUS PRN
Status: DISCONTINUED | OUTPATIENT
Start: 2019-07-26 | End: 2019-07-31 | Stop reason: HOSPADM

## 2019-07-26 RX ORDER — ASPIRIN 81 MG/1
81 TABLET ORAL DAILY
Status: DISCONTINUED | OUTPATIENT
Start: 2019-07-26 | End: 2019-07-31 | Stop reason: HOSPADM

## 2019-07-26 RX ORDER — QUETIAPINE FUMARATE 25 MG/1
50 TABLET, FILM COATED ORAL EVERY MORNING
Status: DISCONTINUED | OUTPATIENT
Start: 2019-07-27 | End: 2019-07-31 | Stop reason: HOSPADM

## 2019-07-26 RX ORDER — ACETAMINOPHEN 325 MG/1
650 TABLET ORAL EVERY 4 HOURS PRN
Status: DISCONTINUED | OUTPATIENT
Start: 2019-07-26 | End: 2019-07-31 | Stop reason: HOSPADM

## 2019-07-26 RX ORDER — DIVALPROEX SODIUM 250 MG/1
1000 TABLET, DELAYED RELEASE ORAL NIGHTLY
Status: DISCONTINUED | OUTPATIENT
Start: 2019-07-26 | End: 2019-07-31 | Stop reason: HOSPADM

## 2019-07-26 RX ORDER — SODIUM CHLORIDE 0.9 % (FLUSH) 0.9 %
10 SYRINGE (ML) INJECTION PRN
Status: DISCONTINUED | OUTPATIENT
Start: 2019-07-26 | End: 2019-07-31 | Stop reason: HOSPADM

## 2019-07-26 RX ORDER — SODIUM CHLORIDE 0.9 % (FLUSH) 0.9 %
10 SYRINGE (ML) INJECTION EVERY 12 HOURS SCHEDULED
Status: DISCONTINUED | OUTPATIENT
Start: 2019-07-26 | End: 2019-07-31 | Stop reason: HOSPADM

## 2019-07-26 RX ORDER — NICOTINE POLACRILEX 4 MG
15 LOZENGE BUCCAL PRN
Status: DISCONTINUED | OUTPATIENT
Start: 2019-07-26 | End: 2019-07-31 | Stop reason: HOSPADM

## 2019-07-26 RX ORDER — ATORVASTATIN CALCIUM 40 MG/1
40 TABLET, FILM COATED ORAL DAILY
Status: DISCONTINUED | OUTPATIENT
Start: 2019-07-26 | End: 2019-07-31 | Stop reason: HOSPADM

## 2019-07-26 RX ORDER — ONDANSETRON 2 MG/ML
4 INJECTION INTRAMUSCULAR; INTRAVENOUS EVERY 6 HOURS PRN
Status: DISCONTINUED | OUTPATIENT
Start: 2019-07-26 | End: 2019-07-31 | Stop reason: HOSPADM

## 2019-07-26 RX ORDER — DIVALPROEX SODIUM 250 MG/1
500 TABLET, DELAYED RELEASE ORAL EVERY MORNING
Status: DISCONTINUED | OUTPATIENT
Start: 2019-07-27 | End: 2019-07-31 | Stop reason: HOSPADM

## 2019-07-26 RX ORDER — DEXTROSE MONOHYDRATE 50 MG/ML
100 INJECTION, SOLUTION INTRAVENOUS PRN
Status: DISCONTINUED | OUTPATIENT
Start: 2019-07-26 | End: 2019-07-31 | Stop reason: HOSPADM

## 2019-07-26 RX ORDER — SPIRONOLACTONE 25 MG/1
25 TABLET ORAL DAILY
Status: DISCONTINUED | OUTPATIENT
Start: 2019-07-26 | End: 2019-07-31 | Stop reason: HOSPADM

## 2019-07-26 RX ORDER — LIDOCAINE HYDROCHLORIDE 20 MG/ML
JELLY TOPICAL
Status: DISPENSED
Start: 2019-07-26 | End: 2019-07-27

## 2019-07-26 RX ORDER — FUROSEMIDE 40 MG/1
40 TABLET ORAL DAILY
Status: DISCONTINUED | OUTPATIENT
Start: 2019-07-26 | End: 2019-07-27

## 2019-07-26 RX ORDER — FENOFIBRATE 54 MG/1
54 TABLET ORAL DAILY
Status: ACTIVE | OUTPATIENT
Start: 2019-07-26 | End: 2019-07-27

## 2019-07-26 RX ORDER — CHOLECALCIFEROL (VITAMIN D3) 125 MCG
5 CAPSULE ORAL NIGHTLY
Status: DISCONTINUED | OUTPATIENT
Start: 2019-07-26 | End: 2019-07-31 | Stop reason: HOSPADM

## 2019-07-26 RX ORDER — PANTOPRAZOLE SODIUM 40 MG/1
40 TABLET, DELAYED RELEASE ORAL DAILY
Status: DISCONTINUED | OUTPATIENT
Start: 2019-07-26 | End: 2019-07-31 | Stop reason: HOSPADM

## 2019-07-26 RX ORDER — LACTOBACILLUS RHAMNOSUS GG 10B CELL
1 CAPSULE ORAL DAILY
Status: DISCONTINUED | OUTPATIENT
Start: 2019-07-26 | End: 2019-07-31 | Stop reason: HOSPADM

## 2019-07-26 RX ORDER — LEVOCARNITINE 1 G/10ML
500 SOLUTION ORAL 3 TIMES DAILY
Status: DISCONTINUED | OUTPATIENT
Start: 2019-07-26 | End: 2019-07-31 | Stop reason: HOSPADM

## 2019-07-26 RX ADMIN — CEFTRIAXONE SODIUM 1 G: 1 INJECTION, POWDER, FOR SOLUTION INTRAMUSCULAR; INTRAVENOUS at 13:58

## 2019-07-26 RX ADMIN — Medication 10 ML: at 20:11

## 2019-07-26 RX ADMIN — LEVOCARNITINE 500 MG: 1 SOLUTION ORAL at 20:10

## 2019-07-26 RX ADMIN — QUETIAPINE FUMARATE 100 MG: 100 TABLET ORAL at 20:09

## 2019-07-26 RX ADMIN — DIVALPROEX SODIUM 1000 MG: 250 TABLET, DELAYED RELEASE ORAL at 20:10

## 2019-07-26 ASSESSMENT — PAIN SCALES - GENERAL
PAINLEVEL_OUTOF10: 0
PAINLEVEL_OUTOF10: 0

## 2019-07-26 ASSESSMENT — PAIN SCALES - WONG BAKER: WONGBAKER_NUMERICALRESPONSE: 0

## 2019-07-26 NOTE — ED PROVIDER NOTES
Hyperlipidemia, Hypertension, Mitral insufficiency, Mobility impaired, Pulmonary hypertension ([facility] [address] and Traumatic hemorrhagic shock ([facility] [address] Past surgical history:  has a past surgical history that includes knee surgery; Prostate biopsy; other surgical history (10/11/13); Upper gastrointestinal endoscopy (7/13 17); Dental surgery (N/A, 3/28/2019); and laparoscopy (N/A, 6/22/2019). Home medications:   Prior to Admission medications    Medication Sig Start Date End Date Taking?  Authorizing Provider   furosemide (LASIX) 40 MG tablet Take 1 tablet by mouth daily 7/25/19   Lorri Calvo MD   losartan (COZAAR) 50 MG tablet Take 1 tablet by mouth daily 7/19/19   Paige Chatterjee MD   metoprolol succinate (TOPROL XL) 25 MG extended release tablet Take 1 tablet by mouth daily 7/19/19   Paige Chatterjee MD   acetaminophen (AMINOFEN) 325 MG tablet Take 2 tablets by mouth every 6 hours as needed for Pain 6/29/19   Wilbur Mendiola MD   divalproex (DEPAKOTE) 500 MG DR tablet Take 500 mg by mouth See Admin Instructions 500mg in am, 1000mg at hs    Historical Provider, MD   QUEtiapine (SEROQUEL) 50 MG tablet Take 50 mg by mouth See Admin Instructions 50mg in am, 100mg at hs    Historical Provider, MD   levOCARNitine fumarate (CARNITOR) 250 MG capsule Take 2 capsules by mouth 3 times daily 4/29/19   Lorri Calvo MD   Lactobacillus (PROBIOTIC ACIDOPHILUS) TABS Take 1 tablet by mouth daily 4/29/19   Lorri Calvo MD   aspirin 81 MG tablet Take 81 mg by mouth daily    Historical Provider, MD   b complex vitamins capsule Take 1 capsule by mouth daily    Historical Provider, MD   melatonin 5 MG TABS tablet Take 5 mg by mouth nightly    Historical Provider, MD   spironolactone (ALDACTONE) 25 MG tablet Take 25 mg by mouth daily    Historical Provider, MD   atorvastatin (LIPITOR) 40 MG tablet Take 40 mg by mouth daily    Historical Provider, MD   vitamin D (CHOLECALCIFEROL) 1000 UNIT TABS tablet Take 1,000 Units by mouth He displays no seizure activity. Sleepy and altered but able to respond to simple yes/no questions by gently shaking or nodding his head   Skin: Skin is warm and dry. Petechiae (Bilateral forearm) noted. He is not diaphoretic. No cyanosis or erythema. Psychiatric:   Unable to evaluate as patient is altered   Nursing note and vitals reviewed. MDM/ED Course    ED Medication Orders (From admission, onward)    Start Ordered     Status Ordering Provider    07/26/19 1400 07/26/19 1345  cefTRIAXone (ROCEPHIN) 1 g IVPB in 50 mL D5W minibag  ONCE      Last MAR action:  Stopped - by Areli Magana on 07/26/19 at [address] [facility]    07/26/19 1302 07/26/19 1302  lidocaine (XYLOCAINE) 2 % jelly     Note to Pharmacy:  javi pierce: cabinet override    Last MAR action:  Canceled Entry - by Charloteliza Pester on 07/26/19 at [id number]     07/26/19 1255 07/26/19 1255  lidocaine (XYLOCAINE) 2 % jelly     Note to Pharmacy:  javi pierce: cabinet override    Last MAR action:  Canceled Entry - by Charlotta Pester on 07/26/19 at [id number]     07/26/19 1255 07/26/19 1255  lidocaine (XYLOCAINE) 2 % jelly  PRN      Acknowledged KAILEY Reese           EKG  The Ekg interpreted by me in the absence of a cardiologist shows. atrial fibrillation with a rate of 108  Axis is   Left axis deviation  QTc is  prolonged  No specific ST-T wave changes appreciated. No evidence of acute ischemia. No significant change from prior EKG dated 7/22/19. Prior EKG showed a-fib with        Radiology  Ct Head Wo Contrast    Result Date: 7/26/2019  EXAMINATION: CT OF THE HEAD WITHOUT CONTRAST  7/26/2019 11:52 am TECHNIQUE: CT of the head was performed without the administration of intravenous contrast. Dose modulation, iterative reconstruction, and/or weight based adjustment of the mA/kV was utilized to reduce the radiation dose to as low as reasonably achievable. COMPARISON: CT head 07/22/2019, 12/20/2018. MRI brain 12/21/2018.  HISTORY: ORDERING SYSTEM PROVIDED HISTORY: AMS; very sleepy TECHNOLOGIST PROVIDED HISTORY: Has a \"code stroke\" or \"stroke alert\" been called? ->No Reason for Exam: AMS  frequent falls. pt has been weak and lethargic since his surgery   on July 15th.  has been on rehab and has several falls Acuity: Acute Type of Exam: Initial FINDINGS: BRAIN/VENTRICLES: There is no intracranial hemorrhage. The ventricles and basal cisterns are patent and stable in appearance. There is no midline shift or extra-axial collection. There is no acute transcortical infarct. Encephalomalacia and gliosis is redemonstrated in the left MCA territory. Generalized parenchymal volume loss again noted. An underlying background of mild to moderate periventricular, subcortical and deep white matter hypoattenuation is noted likely sequela of chronic small vessel ischemia. ORBITS: The visualized portion of the orbits demonstrate no acute abnormality. SINUSES: The visualized paranasal sinuses and mastoid air cells demonstrate no acute abnormality. SOFT TISSUES/SKULL:  There is no depressed calvarial fracture. 1. No acute intracranial abnormality or significant interval change from prior study 07/22/2019. 2. Redemonstration of encephalomalacia and gliosis in the left MCA distribution. Ct Chest Wo Contrast    1. Bilateral pleural effusions, moderate to large on the right and small on the left. 2. Near complete consolidation of the right lower lobe, most likely reflecting passive atelectasis, less likely aspiration or pneumonia. 3. Mild passive atelectasis within the left lower lobe. 4. Mild nonspecific bilateral supraclavicular and upper paratracheal lymphadenopathy, of questionable clinical significance. 5. Multiple thyroid nodules, the largest measuring 2.1 cm within the right thyroid lobe. Suggest further characterization with a follow-up thyroid ultrasound, unless already performed. See below.  6. Pulmonary arterial enlargement may suggest chronic underlying Sepsis   Result Value Ref Range    Lactic Acid, Sepsis 2.4 (H) 0.4 - 1.9 mmol/L   Brain Natriuretic Peptide   Result Value Ref Range    Pro-BNP 31,906 (H) 0 - 124 pg/mL   Troponin   Result Value Ref Range    Troponin <0.01 <0.01 ng/mL   Sample possible blood bank testing   Result Value Ref Range    Specimen Status ANAM    Microscopic Urinalysis   Result Value Ref Range    WBC, UA  (A) 0 - 5 /HPF    RBC, UA 20-50 (A) 0 - 2 /HPF    Epi Cells 5-10 /HPF    Bacteria, UA 3+ (A) /HPF    Yeast, UA Present (A) /HPF       - Patient seen and evaluated in room 3.  68 y.o. male presented for syncope and never returned to baseline mental status. Exam pertinent for elderly who is altered. - Old records reviewed, specifically prior admissions where patient had diuresis for CHF. - patient does not meet SIRS criteria. Initial  but patient was in a-fib. Without any intervention, HR improved to 80's. BP did decrease to 97/60 but given history of CHF and recent aggressive diuresis, I held off fluids and patient's BP also improved on its own.    - Diagnostic studies reviewed and results discussed with patient's 2 daughters. We agreed to treat UTI and I picked an abx that will also cover PNA (CT results pending at that time)  - CT reviewed - pleural effusion and atelectasis. I spoke with Dr. Yisel Jama, hospitalist. We thoroughly discussed the history, physical exam, laboratory and imaging studies, as well as, emergency department course. Based upon that discussion, we've decided to admit Rajiv Leigh for further observation and evaluation of Teresa Garner's mental status change. As I have deemed necessary from their history, physical and studies, I have considered and evaluated Rajiv Leigh for the following diagnoses: UTI, PNA, DEPAKOTE TOXICITY, DIABETES, INTRACRANIAL HEMORRHAGE, MENINGITIS, SEPSIS SUBARACHNOID HEMORRHAGE, SUBDURAL HEMATOMA, & STROKE. Clinical Impression:   1.  Altered mental status, unspecified altered mental status type    2. Acute cystitis without hematuria    3. Pleural effusion    4. Thyroid nodule    5. Syncope, unspecified syncope type        Disposition:  Admit to hospitalist in stable condition. Blood pressure 108/71, pulse 86, temperature 96.8 °F (36 °C), temperature source Axillary, resp. rate 20, height 6' 5\" (1.956 m), weight (!) 304 lb (137.9 kg), SpO2 95 %. This chart was generated in part by using Dragon Dictation system and may contain errors related to that system including errors in grammar, punctuation, and spelling, as well as words and phrases that may be inappropriate. If there are any questions or concerns please feel free to contact the dictating provider for clarification.      Umair Bryant MD  [facility] Vicente Box MD  07/26/19 0339

## 2019-07-26 NOTE — H&P
Recurrent admission for volume overload. Continue home diuretics and follow fluid status clinically    HyperLipidemia - controlled on home Statin. Continue, w/ f/u and med adjustment w/ PCP    Afib - rate controlled on BBlocker. Not anticoagulated at baseline. CKD - baseline stage 3. Will continue to follow serial labs. Reviewed and documented as above. DM2 - diet controlled on no home meds. Follow FSBS/SSI Low regimen. Dementia - likely Alzheimer's w/out behavioural disturbance. Continue supportive care. Obesity -  With Body mass index is 36.05 kg/m². Complicating assessment and treatment. Placing patient at risk for multiple co-morbidities as well as early death and contributing to the patient's presentation. Counseled on weight loss. DVT Prophylaxis: LMWH  Diet: General  Code Status: Full Code      PT/OT Eval Status: Not yet ordered. Dispo - Likely 2-3 days pending clinical improvement. Navid Alarcon MD    Thank you Naldo Cote MD for the opportunity to be involved in this patient's care. If you have any questions or concerns please feel free to contact me at [phone].

## 2019-07-27 PROBLEM — Z99.2 HEMODIALYSIS PATIENT (HCC): Status: RESOLVED | Noted: 2017-08-06 | Resolved: 2019-07-27

## 2019-07-27 LAB
ALBUMIN SERPL-MCNC: 1.6 G/DL (ref 3.4–5)
ANION GAP SERPL CALCULATED.3IONS-SCNC: 9 MMOL/L (ref 3–16)
BUN BLDV-MCNC: 34 MG/DL (ref 7–20)
CALCIUM SERPL-MCNC: 8.2 MG/DL (ref 8.3–10.6)
CHLORIDE BLD-SCNC: 98 MMOL/L (ref 99–110)
CO2: 33 MMOL/L (ref 21–32)
CREAT SERPL-MCNC: 2.2 MG/DL (ref 0.8–1.3)
ESTIMATED AVERAGE GLUCOSE: 96.8 MG/DL
GFR AFRICAN AMERICAN: 36
GFR NON-AFRICAN AMERICAN: 30
GLUCOSE BLD-MCNC: 85 MG/DL (ref 70–99)
GLUCOSE BLD-MCNC: 89 MG/DL (ref 70–99)
GLUCOSE BLD-MCNC: 92 MG/DL (ref 70–99)
GLUCOSE BLD-MCNC: 94 MG/DL (ref 70–99)
GLUCOSE BLD-MCNC: 95 MG/DL (ref 70–99)
HBA1C MFR BLD: 5 %
HCT VFR BLD CALC: 29.6 % (ref 40.5–52.5)
HEMOGLOBIN: 10 G/DL (ref 13.5–17.5)
MAGNESIUM: 2.1 MG/DL (ref 1.8–2.4)
MCH RBC QN AUTO: 30.1 PG (ref 26–34)
MCHC RBC AUTO-ENTMCNC: 33.8 G/DL (ref 31–36)
MCV RBC AUTO: 89.2 FL (ref 80–100)
PDW BLD-RTO: 15.4 % (ref 12.4–15.4)
PERFORMED ON: NORMAL
PHOSPHORUS: 3.6 MG/DL (ref 2.5–4.9)
PLATELET # BLD: 168 K/UL (ref 135–450)
PLATELET SLIDE REVIEW: ADEQUATE
PMV BLD AUTO: 8.2 FL (ref 5–10.5)
POTASSIUM SERPL-SCNC: 4.1 MMOL/L (ref 3.5–5.1)
RBC # BLD: 3.32 M/UL (ref 4.2–5.9)
SLIDE REVIEW: ABNORMAL
SODIUM BLD-SCNC: 140 MMOL/L (ref 136–145)
WBC # BLD: 6.7 K/UL (ref 4–11)

## 2019-07-27 PROCEDURE — 80069 RENAL FUNCTION PANEL: CPT

## 2019-07-27 PROCEDURE — 85027 COMPLETE CBC AUTOMATED: CPT

## 2019-07-27 PROCEDURE — 2580000003 HC RX 258: Performed by: INTERNAL MEDICINE

## 2019-07-27 PROCEDURE — 83735 ASSAY OF MAGNESIUM: CPT

## 2019-07-27 PROCEDURE — 1200000000 HC SEMI PRIVATE

## 2019-07-27 PROCEDURE — 6360000002 HC RX W HCPCS: Performed by: INTERNAL MEDICINE

## 2019-07-27 PROCEDURE — 36415 COLL VENOUS BLD VENIPUNCTURE: CPT

## 2019-07-27 PROCEDURE — 6370000000 HC RX 637 (ALT 250 FOR IP): Performed by: INTERNAL MEDICINE

## 2019-07-27 PROCEDURE — 99223 1ST HOSP IP/OBS HIGH 75: CPT | Performed by: INTERNAL MEDICINE

## 2019-07-27 RX ORDER — SODIUM CHLORIDE 9 MG/ML
INJECTION, SOLUTION INTRAVENOUS
Status: DISPENSED
Start: 2019-07-27 | End: 2019-07-28

## 2019-07-27 RX ORDER — FUROSEMIDE 10 MG/ML
20 INJECTION INTRAMUSCULAR; INTRAVENOUS 2 TIMES DAILY
Status: DISCONTINUED | OUTPATIENT
Start: 2019-07-27 | End: 2019-07-30

## 2019-07-27 RX ORDER — SODIUM CHLORIDE 9 MG/ML
INJECTION, SOLUTION INTRAVENOUS
Status: DISPENSED
Start: 2019-07-27 | End: 2019-07-27

## 2019-07-27 RX ADMIN — PANTOPRAZOLE SODIUM 40 MG: 40 TABLET, DELAYED RELEASE ORAL at 09:57

## 2019-07-27 RX ADMIN — LEVOCARNITINE 500 MG: 1 SOLUTION ORAL at 22:43

## 2019-07-27 RX ADMIN — ENOXAPARIN SODIUM 40 MG: 40 INJECTION SUBCUTANEOUS at 09:56

## 2019-07-27 RX ADMIN — FUROSEMIDE 20 MG: 10 INJECTION, SOLUTION INTRAMUSCULAR; INTRAVENOUS at 13:50

## 2019-07-27 RX ADMIN — DIVALPROEX SODIUM 500 MG: 250 TABLET, DELAYED RELEASE ORAL at 09:56

## 2019-07-27 RX ADMIN — FUROSEMIDE 40 MG: 40 TABLET ORAL at 09:56

## 2019-07-27 RX ADMIN — SPIRONOLACTONE 25 MG: 25 TABLET ORAL at 09:57

## 2019-07-27 RX ADMIN — Medication 10 ML: at 09:56

## 2019-07-27 RX ADMIN — QUETIAPINE FUMARATE 100 MG: 100 TABLET ORAL at 21:31

## 2019-07-27 RX ADMIN — ASPIRIN 81 MG: 81 TABLET ORAL at 09:56

## 2019-07-27 RX ADMIN — QUETIAPINE FUMARATE 50 MG: 25 TABLET ORAL at 09:57

## 2019-07-27 RX ADMIN — Medication 10 ML: at 21:31

## 2019-07-27 RX ADMIN — Medication 1 CAPSULE: at 09:56

## 2019-07-27 RX ADMIN — LEVOCARNITINE 500 MG: 1 SOLUTION ORAL at 15:55

## 2019-07-27 RX ADMIN — LEVOCARNITINE 500 MG: 1 SOLUTION ORAL at 10:29

## 2019-07-27 RX ADMIN — METOPROLOL SUCCINATE 25 MG: 25 TABLET, EXTENDED RELEASE ORAL at 09:57

## 2019-07-27 RX ADMIN — LOSARTAN POTASSIUM 50 MG: 25 TABLET, FILM COATED ORAL at 09:56

## 2019-07-27 RX ADMIN — MEROPENEM 1 G: 1 INJECTION, POWDER, FOR SOLUTION INTRAVENOUS at 09:57

## 2019-07-27 RX ADMIN — DIVALPROEX SODIUM 1000 MG: 250 TABLET, DELAYED RELEASE ORAL at 21:30

## 2019-07-27 RX ADMIN — ATORVASTATIN CALCIUM 40 MG: 40 TABLET, FILM COATED ORAL at 09:56

## 2019-07-27 RX ADMIN — MEROPENEM 1 G: 1 INJECTION, POWDER, FOR SOLUTION INTRAVENOUS at 23:10

## 2019-07-27 NOTE — CONSULTS
outpatient MCOT or ILR with Dr. Stephen Kennedy of [facility]. His April 2019 visit conveyed that pacemaker therapy was not indicated at that time and recommended annual Holter monitors. IV antibiotics  Consider Neurology evaluation following improvement in acute on chronic conditions. Thank you for allowing me to participate in the care of your patient. Please do not hesitate to call. Jenny Dodson D.O., [facility]  Interventional Cardiology     o: [phone]  [facility] [employer] [facility] [address] [place], [address]        NOTE:  This report was transcribed using voice recognition software. Every effort was made to ensure accuracy; however, inadvertent computerized transcription errors may be present.

## 2019-07-27 NOTE — DISCHARGE INSTR - COC
restirctions  Other Medical Equipment (for information only, NOT a DME order): Other Treatments:     Patient's personal belongings (please select all that are sent with patient):  None    RN SIGNATURE:  Electronically signed by Ziyad Shrama RN on 7/31/19 at 2:00 PM    CASE MANAGEMENT/SOCIAL WORK SECTION    Inpatient Status Date: 7/26/19    Readmission Risk Assessment Score:  Readmission Risk              Risk of Unplanned Readmission:        46           Discharging to Facility/ Agency   · Name: [facility]  · Address:  · Phone: [phone]  · Fax:    / signature: Electronically signed by Renee Cruz RN on 7/31/19 at 4:03 PM    PHYSICIAN SECTION    Prognosis: Good    Condition at Discharge: Stable    Rehab Potential (if transferring to Rehab): Good    Recommended Follow-up, Labs or Other Treatments After Discharge:    Please call [facility] # [phone] for questions or concerns regarding the event monitor. Also to confirm the monitor is working correctly. Please charge the cell phone every night. You will need to change the electrodes every 4 days and charge the white sensor every 4 days. The sensor takes about 4-5 hours to charge. Physician Certification: I certify the above information and transfer of Vivek Elizabeth  is necessary for the continuing treatment of the diagnosis listed and that he requires [facility] for less 30 days.      Update Admission H&P: No change in H&P    PHYSICIAN SIGNATURE:  Electronically signed by Lily Peace MD on 7/31/19 at 3:55 PM

## 2019-07-27 NOTE — PROGRESS NOTES
Nutrition Assessment    Type and Reason for Visit: Initial, Positive Nutrition Screen    Nutrition Recommendations:   1. Continue general diet  2. Modify ONS regimen to Ensure Enlive 1x daily + Ensure HP BID  3. RD encouraged family to promote intake of meals   4. Will continue to monitor pt nutritional status, intake, weights, bowel habits, and need for further nutritional intervention      Nutrition Assessment: Patient is at risk for becoming nutritionally compromised AEB poor PO r/t loss of appetite x1 month and pressure wound on saccrum. Patient sleeping at time of nutrition visit, nutrition information obtained from pt's daughter who is sitting at bedside. She states that he will eat a few bites of meals at most. This started approximately 1 month ago after admission for SBO that has since resolved. Daughter endorses that pt had good intake of ONS and requested that patient be ordered one. Will order Ensure Enlive 1x daily and Ensure HP BID. PMHx includes dementia, DM2, and ESRD. Will complete NFPE pending patient availability and alertness. Will continue to monitor nutrition status and need for further nutrition intervention. Malnutrition Assessment:  · Malnutrition Status: At risk for malnutrition    Nutrition Risk Level:  Moderate    Nutrient Needs:  · Estimated Daily Total Kcal: 9500-6425  · Estimated Daily Protein (g): 122-141  · Estimated Daily Total Fluid (ml/day): 1 mL/kcal or per physician    Nutrition Diagnosis:   · Problem: Inadequate oral intake  · Etiology: related to Insufficient energy/nutrient consumption     Signs and symptoms:  as evidenced by Diet history of poor intake    Objective Information:  · Nutrition-Focused Physical Findings: unable to complete NFPE at this time r/t pt sleeping; GFR >25  · Wound Type: Pressure Ulcer(saccrum)  · Current Nutrition Therapies:  · Oral Diet Orders: General   · Oral Diet intake: Unable to assess  · Oral Nutrition Supplement (ONS) Orders: Diabetic

## 2019-07-27 NOTE — PROGRESS NOTES
Hospitalist Progress Note      PCP: Boby Evans MD    Date of Admission: 7/26/2019    Chief Complaint: Weakness, confusion    Hospital Course: Readmitted yesterday, after recent discharge. Patient had episodes of unconsciousness. EEG was unremarkable. Was treated with IV diuretics. Patient refused to have IV medications and IV blood draws in addition to further imaging during last time. We had discussions with the patient, who made it clear that all he wanted was to go back and be comfortable and nothing else. Patient has baseline dementia, which seems to be progressive but not end-stage. At that time, I also discussed the situation with the patient's daughter before discharging the patient back to skilled nursing facility. Patient came back and was diagnosed with urinary tract infection and a possibility of congestive heart failure. Cardiology believes patient may have sick sinus, pending further work-up. Permanent pacemaker is not currently indicated, but may be indicated depending on further work-up. Daughter at bedside. States she does not believe that the patient does not want to have anything done and then he should be talked into getting things done every time he refuses. Noted that patient refused to have blood work earlier today when the phlebotomy team came in. Daughter called the team back and had them draw the blood again after discussing with the patient. Subjective: Patient feels fine. Does not complain of any issues. Does not have any pain.       Medications:  Reviewed    Infusion Medications    sodium chloride      dextrose       Scheduled Medications    meropenem  1 g Intravenous Q12H    furosemide  20 mg Intravenous BID    sodium chloride flush  10 mL Intravenous 2 times per day    enoxaparin  40 mg Subcutaneous Daily    atorvastatin  40 mg Oral Daily    aspirin  81 mg Oral Daily    divalproex  500 mg Oral QAM    lactobacillus  1 capsule Oral Daily    CL 98* 98*   CO2 27 33*   BUN 28* 34*   CREATININE 2.1* 2.2*   CALCIUM 8.0* 8.2*   PHOS  --  3.6     Recent Labs     07/26/19  1045   AST 33   ALT 9*   BILITOT 0.6   ALKPHOS 44     No results for input(s): INR in the last 72 hours. Recent Labs     07/26/19  1045   TROPONINI <0.01       Urinalysis:      Lab Results   Component Value Date    NITRU POSITIVE 07/26/2019    WBCUA  07/26/2019    BACTERIA 3+ 07/26/2019    RBCUA 20-50 07/26/2019    BLOODU LARGE 07/26/2019    SPECGRAV 1.025 07/26/2019    GLUCOSEU Negative 07/26/2019       Radiology:  CT CHEST WO CONTRAST   Final Result   1. Bilateral pleural effusions, moderate to large on the right and small on   the left. 2. Near complete consolidation of the right lower lobe, most likely   reflecting passive atelectasis, less likely aspiration or pneumonia. 3. Mild passive atelectasis within the left lower lobe. 4. Mild nonspecific bilateral supraclavicular and upper paratracheal   lymphadenopathy, of questionable clinical significance. 5. Multiple thyroid nodules, the largest measuring 2.1 cm within the right   thyroid lobe. Suggest further characterization with a follow-up thyroid   ultrasound, unless already performed. See below. 6. Pulmonary arterial enlargement may suggest chronic underlying pulmonary   arterial hypertension. 7. Cholelithiasis. RECOMMENDATIONS:   Managing Incidental Thyroid Nodule Detected at CT or MRI or US      1.   Further evaluation by thyroid Ultrasound recommended for these incidental   nodules:      Patient Age 25 years or less - Nodule of any size      Patient Age 21-27 years old - Nodule 1 cm in size or greater      PATIENT AGE 28 YEARS OR MORE - NODULE 1.5 CM IN SIZE OR GREATER      Note: These recommendations do not apply to pts. w/ increased risk      for thyroid cancer or pts. with symptomatic thyroid disease.      ________________________________________________________________      Recommendations for f/u of

## 2019-07-28 LAB
ALBUMIN SERPL-MCNC: 1.5 G/DL (ref 3.4–5)
ANION GAP SERPL CALCULATED.3IONS-SCNC: 8 MMOL/L (ref 3–16)
BUN BLDV-MCNC: 36 MG/DL (ref 7–20)
CALCIUM SERPL-MCNC: 8 MG/DL (ref 8.3–10.6)
CHLORIDE BLD-SCNC: 99 MMOL/L (ref 99–110)
CO2: 33 MMOL/L (ref 21–32)
CREAT SERPL-MCNC: 2 MG/DL (ref 0.8–1.3)
GFR AFRICAN AMERICAN: 40
GFR NON-AFRICAN AMERICAN: 33
GLUCOSE BLD-MCNC: 102 MG/DL (ref 70–99)
GLUCOSE BLD-MCNC: 128 MG/DL (ref 70–99)
GLUCOSE BLD-MCNC: 87 MG/DL (ref 70–99)
GLUCOSE BLD-MCNC: 93 MG/DL (ref 70–99)
GLUCOSE BLD-MCNC: 96 MG/DL (ref 70–99)
HCT VFR BLD CALC: 30.8 % (ref 40.5–52.5)
HEMOGLOBIN: 10.1 G/DL (ref 13.5–17.5)
MCH RBC QN AUTO: 29.7 PG (ref 26–34)
MCHC RBC AUTO-ENTMCNC: 32.9 G/DL (ref 31–36)
MCV RBC AUTO: 90.2 FL (ref 80–100)
ORGANISM: ABNORMAL
PDW BLD-RTO: 15.6 % (ref 12.4–15.4)
PERFORMED ON: ABNORMAL
PERFORMED ON: ABNORMAL
PERFORMED ON: NORMAL
PERFORMED ON: NORMAL
PHOSPHORUS: 3.6 MG/DL (ref 2.5–4.9)
PLATELET # BLD: 186 K/UL (ref 135–450)
PMV BLD AUTO: 7.4 FL (ref 5–10.5)
POTASSIUM SERPL-SCNC: 3.5 MMOL/L (ref 3.5–5.1)
PRO-BNP: ABNORMAL PG/ML (ref 0–124)
RBC # BLD: 3.42 M/UL (ref 4.2–5.9)
SODIUM BLD-SCNC: 140 MMOL/L (ref 136–145)
URIC ACID, SERUM: 11.8 MG/DL (ref 3.5–7.2)
URINE CULTURE, ROUTINE: ABNORMAL
URINE CULTURE, ROUTINE: ABNORMAL
WBC # BLD: 5.5 K/UL (ref 4–11)

## 2019-07-28 PROCEDURE — 2580000003 HC RX 258: Performed by: INTERNAL MEDICINE

## 2019-07-28 PROCEDURE — 80069 RENAL FUNCTION PANEL: CPT

## 2019-07-28 PROCEDURE — 85027 COMPLETE CBC AUTOMATED: CPT

## 2019-07-28 PROCEDURE — 84550 ASSAY OF BLOOD/URIC ACID: CPT

## 2019-07-28 PROCEDURE — 6370000000 HC RX 637 (ALT 250 FOR IP): Performed by: INTERNAL MEDICINE

## 2019-07-28 PROCEDURE — 99233 SBSQ HOSP IP/OBS HIGH 50: CPT | Performed by: INTERNAL MEDICINE

## 2019-07-28 PROCEDURE — 6360000002 HC RX W HCPCS: Performed by: INTERNAL MEDICINE

## 2019-07-28 PROCEDURE — 36415 COLL VENOUS BLD VENIPUNCTURE: CPT

## 2019-07-28 PROCEDURE — 83880 ASSAY OF NATRIURETIC PEPTIDE: CPT

## 2019-07-28 PROCEDURE — 1200000000 HC SEMI PRIVATE

## 2019-07-28 RX ADMIN — PANTOPRAZOLE SODIUM 40 MG: 40 TABLET, DELAYED RELEASE ORAL at 10:21

## 2019-07-28 RX ADMIN — MEROPENEM 1 G: 1 INJECTION, POWDER, FOR SOLUTION INTRAVENOUS at 10:20

## 2019-07-28 RX ADMIN — DIVALPROEX SODIUM 1000 MG: 250 TABLET, DELAYED RELEASE ORAL at 21:12

## 2019-07-28 RX ADMIN — FUROSEMIDE 20 MG: 10 INJECTION, SOLUTION INTRAMUSCULAR; INTRAVENOUS at 18:56

## 2019-07-28 RX ADMIN — MEROPENEM 1 G: 1 INJECTION, POWDER, FOR SOLUTION INTRAVENOUS at 21:12

## 2019-07-28 RX ADMIN — LEVOCARNITINE 500 MG: 1 SOLUTION ORAL at 13:40

## 2019-07-28 RX ADMIN — Medication 1 CAPSULE: at 10:20

## 2019-07-28 RX ADMIN — ENOXAPARIN SODIUM 40 MG: 40 INJECTION SUBCUTANEOUS at 10:20

## 2019-07-28 RX ADMIN — Medication 10 ML: at 10:20

## 2019-07-28 RX ADMIN — DIVALPROEX SODIUM 500 MG: 250 TABLET, DELAYED RELEASE ORAL at 10:21

## 2019-07-28 RX ADMIN — Medication 10 ML: at 21:13

## 2019-07-28 RX ADMIN — QUETIAPINE FUMARATE 100 MG: 100 TABLET ORAL at 21:12

## 2019-07-28 RX ADMIN — FUROSEMIDE 20 MG: 10 INJECTION, SOLUTION INTRAMUSCULAR; INTRAVENOUS at 10:20

## 2019-07-28 RX ADMIN — SPIRONOLACTONE 25 MG: 25 TABLET ORAL at 13:37

## 2019-07-28 RX ADMIN — LEVOCARNITINE 500 MG: 1 SOLUTION ORAL at 21:13

## 2019-07-28 RX ADMIN — ATORVASTATIN CALCIUM 40 MG: 40 TABLET, FILM COATED ORAL at 10:20

## 2019-07-28 RX ADMIN — METOPROLOL SUCCINATE 25 MG: 25 TABLET, EXTENDED RELEASE ORAL at 10:21

## 2019-07-28 RX ADMIN — QUETIAPINE FUMARATE 50 MG: 25 TABLET ORAL at 10:21

## 2019-07-28 RX ADMIN — ASPIRIN 81 MG: 81 TABLET ORAL at 10:21

## 2019-07-28 NOTE — PROGRESS NOTES
bilateral supraclavicular and upper paratracheal lymphadenopathy, of questionable clinical significance. Multiple thyroid nodules, the largest measuring 2.1 cm within the right thyroid lobe. Suggest further characterization with a follow-up thyroid ultrasound, unless already performed. Pulmonary arterial enlargement may suggest chronic underlying pulmonary arterial hypertension. Cholelithiasis. Noncontrast CT head (7/26) 1. No acute intracranial abnormality or significant interval change from prior study 07/22/2019. Redemonstration of encephalomalacia and gliosis in the left MCA distribution. Objective:   Vitals:  /82   Pulse 87   Temp 98.1 °F (oral)   Resp 16   Ht 6' 5\"  Wt 137.9 kg  SpO2 93% on RA  BMI 36.05 kg/m²   General appearance: alert and cooperative with exam, not oriented  Lungs: clear to auscultation bilaterally  Heart: regular rate and rhythm, S1, S2 normal, no murmur, click, rub or gallop  Abdomen: soft, non-tender; bowel sounds normal; no masses,  no organomegaly  Extremities: extremities normal, atraumatic, no cyanosis or edema  Neurologic: No obvious focal neurologic deficits. Assessment and Plan:   1. Syncope with bradycardia:  Possible sick sinus syndrome. Monitor on telemetry for event. Consider discharge on event monitor. If symptomatic bradycardia secondary to sick sinus syndrome, consideration of pacemaker. 2. Urinary tract infection with indwelling العر[place]:  Antibiotic therapy with meropenem (day #3). 3. Chronic atrial fibrillation:  Heart rate is controlled. Has a Watchman device. Anticoagulation is contraindicated because of severe life-threatening upper GI bleeding that the patient had about 2 years ago. 4. Acute diastolic congestive heart failure; Volume overloaded on admission, diuresis per cardiology. Continue IV diuretics. Pro-BNP decreased from 31,906 to 10,196.   5. Acute renal failure. Baseline creatinine is about 1.5.   Creatinine is 2.2 to

## 2019-07-28 NOTE — PROGRESS NOTES
NEPHROLOGY PROGRESS NOTE    CC: MAISHA on CKD  HPI  Admitted with recurrent syncope    SUBJECTIVE  Feels fine today, no CP or SOB  No new c/o    SOC: no visitors          Scheduled Meds:   meropenem  1 g Intravenous Q12H    furosemide  20 mg Intravenous BID    sodium chloride flush  10 mL Intravenous 2 times per day    enoxaparin  40 mg Subcutaneous Daily    atorvastatin  40 mg Oral Daily    aspirin  81 mg Oral Daily    divalproex  500 mg Oral QAM    lactobacillus  1 capsule Oral Daily    levOCARNitine  500 mg Oral TID    melatonin  5 mg Oral Nightly    metoprolol succinate  25 mg Oral Daily    pantoprazole  40 mg Oral Daily    spironolactone  25 mg Oral Daily    QUEtiapine  50 mg Oral QAM    insulin lispro  0-6 Units Subcutaneous TID WC    insulin lispro  0-3 Units Subcutaneous Nightly    divalproex  1,000 mg Oral Nightly    QUEtiapine  100 mg Oral Nightly     Continuous Infusions:   sodium chloride      dextrose       PRN Meds:lidocaine, sodium chloride flush, magnesium hydroxide, ondansetron, acetaminophen, glucose, dextrose, glucagon (rDNA), dextrose      Objective:      Physical Exam  Wt Readings from Last 3 Encounters:   07/26/19 (!) 304 lb (137.9 kg)   07/25/19 (!) 304 lb 3.8 oz (138 kg)   07/18/19 297 lb 9.9 oz (135 kg)     Temp Readings from Last 3 Encounters:   07/28/19 98.1 °F (36.7 °C) (Oral)   07/25/19 98.1 °F (36.7 °C) (Oral)   07/18/19 98.7 °F (37.1 °C) (Axillary)     BP Readings from Last 3 Encounters:   07/28/19 137/82   07/25/19 (!) 140/89   07/18/19 131/86     Pulse Readings from Last 3 Encounters:   07/28/19 87   07/25/19 100   07/18/19 87   General appearance:  No apparent distress, appears stated age and cooperative. HEENT:  Normal cephalic, atraumatic without obvious deformity. Pupils equal, round, and reactive to light.  Extra ocular muscles intact. Conjunctivae/corneas clear. Neck: Supple, with full range of motion. No jugular venous distention.  Trachea

## 2019-07-29 LAB
ALBUMIN SERPL-MCNC: 1.8 G/DL (ref 3.4–5)
ANION GAP SERPL CALCULATED.3IONS-SCNC: 9 MMOL/L (ref 3–16)
BUN BLDV-MCNC: 36 MG/DL (ref 7–20)
CALCIUM SERPL-MCNC: 8 MG/DL (ref 8.3–10.6)
CHLORIDE BLD-SCNC: 98 MMOL/L (ref 99–110)
CO2: 31 MMOL/L (ref 21–32)
CREAT SERPL-MCNC: 1.8 MG/DL (ref 0.8–1.3)
GFR AFRICAN AMERICAN: 45
GFR NON-AFRICAN AMERICAN: 37
GLUCOSE BLD-MCNC: 100 MG/DL (ref 70–99)
GLUCOSE BLD-MCNC: 110 MG/DL (ref 70–99)
PERFORMED ON: ABNORMAL
PHOSPHORUS: 3.1 MG/DL (ref 2.5–4.9)
POTASSIUM SERPL-SCNC: 3.5 MMOL/L (ref 3.5–5.1)
PRO-BNP: 8770 PG/ML (ref 0–124)
SODIUM BLD-SCNC: 138 MMOL/L (ref 136–145)

## 2019-07-29 PROCEDURE — 97167 OT EVAL HIGH COMPLEX 60 MIN: CPT

## 2019-07-29 PROCEDURE — 99223 1ST HOSP IP/OBS HIGH 75: CPT | Performed by: INTERNAL MEDICINE

## 2019-07-29 PROCEDURE — 97161 PT EVAL LOW COMPLEX 20 MIN: CPT

## 2019-07-29 PROCEDURE — 80069 RENAL FUNCTION PANEL: CPT

## 2019-07-29 PROCEDURE — 2580000003 HC RX 258: Performed by: INTERNAL MEDICINE

## 2019-07-29 PROCEDURE — 6370000000 HC RX 637 (ALT 250 FOR IP): Performed by: INTERNAL MEDICINE

## 2019-07-29 PROCEDURE — 6360000002 HC RX W HCPCS: Performed by: INTERNAL MEDICINE

## 2019-07-29 PROCEDURE — 1200000000 HC SEMI PRIVATE

## 2019-07-29 PROCEDURE — 36415 COLL VENOUS BLD VENIPUNCTURE: CPT

## 2019-07-29 PROCEDURE — 83880 ASSAY OF NATRIURETIC PEPTIDE: CPT

## 2019-07-29 PROCEDURE — 2580000003 HC RX 258

## 2019-07-29 RX ORDER — SODIUM CHLORIDE 9 MG/ML
INJECTION, SOLUTION INTRAVENOUS
Status: COMPLETED
Start: 2019-07-29 | End: 2019-07-29

## 2019-07-29 RX ADMIN — LEVOCARNITINE 500 MG: 1 SOLUTION ORAL at 09:52

## 2019-07-29 RX ADMIN — Medication 1 CAPSULE: at 09:47

## 2019-07-29 RX ADMIN — QUETIAPINE FUMARATE 50 MG: 25 TABLET ORAL at 09:48

## 2019-07-29 RX ADMIN — FUROSEMIDE 20 MG: 10 INJECTION, SOLUTION INTRAMUSCULAR; INTRAVENOUS at 17:27

## 2019-07-29 RX ADMIN — QUETIAPINE FUMARATE 100 MG: 100 TABLET ORAL at 21:18

## 2019-07-29 RX ADMIN — DIVALPROEX SODIUM 500 MG: 250 TABLET, DELAYED RELEASE ORAL at 09:50

## 2019-07-29 RX ADMIN — LEVOCARNITINE 500 MG: 1 SOLUTION ORAL at 15:11

## 2019-07-29 RX ADMIN — LEVOCARNITINE 500 MG: 1 SOLUTION ORAL at 21:24

## 2019-07-29 RX ADMIN — SODIUM CHLORIDE: 900 INJECTION, SOLUTION INTRAVENOUS at 09:45

## 2019-07-29 RX ADMIN — Medication 10 ML: at 17:27

## 2019-07-29 RX ADMIN — MEROPENEM 1 G: 1 INJECTION, POWDER, FOR SOLUTION INTRAVENOUS at 09:37

## 2019-07-29 RX ADMIN — ASPIRIN 81 MG: 81 TABLET ORAL at 09:47

## 2019-07-29 RX ADMIN — Medication 10 ML: at 09:45

## 2019-07-29 RX ADMIN — ENOXAPARIN SODIUM 40 MG: 40 INJECTION SUBCUTANEOUS at 09:51

## 2019-07-29 RX ADMIN — Medication 10 ML: at 09:39

## 2019-07-29 RX ADMIN — PANTOPRAZOLE SODIUM 40 MG: 40 TABLET, DELAYED RELEASE ORAL at 09:49

## 2019-07-29 RX ADMIN — MELATONIN TAB 5 MG 5 MG: 5 TAB at 21:18

## 2019-07-29 RX ADMIN — SPIRONOLACTONE 25 MG: 25 TABLET ORAL at 09:48

## 2019-07-29 RX ADMIN — FUROSEMIDE 20 MG: 10 INJECTION, SOLUTION INTRAMUSCULAR; INTRAVENOUS at 09:51

## 2019-07-29 RX ADMIN — Medication 10 ML: at 21:19

## 2019-07-29 RX ADMIN — METOPROLOL SUCCINATE 25 MG: 25 TABLET, EXTENDED RELEASE ORAL at 09:49

## 2019-07-29 RX ADMIN — DIVALPROEX SODIUM 1000 MG: 250 TABLET, DELAYED RELEASE ORAL at 21:18

## 2019-07-29 RX ADMIN — MEROPENEM 1 G: 1 INJECTION, POWDER, FOR SOLUTION INTRAVENOUS at 21:18

## 2019-07-29 RX ADMIN — ATORVASTATIN CALCIUM 40 MG: 40 TABLET, FILM COATED ORAL at 09:49

## 2019-07-29 NOTE — PLAN OF CARE
Staff repositioning patient every two hours. Soft pillows utilized for bony prominences. Protective barrier applied to coccyx/buttocks. Heels floated off bed.

## 2019-07-29 NOTE — PROGRESS NOTES
Occupational Therapy   Occupational Therapy Initial/discharge Assessment  1 x only    Date: 2019   Patient Name: Jazmín Krishnamurthy  MRN: [id number]     : 1950    Date of Service: 2019    Discharge Recommendations:  ECF without OT       Assessment      Patient Education: recommended to RN to place pt in \"chair bed position\" in bed to simulate sitting up in chair to assess orthostatic BP     No Skilled OT: At baseline function  REQUIRES OT FOLLOW UP: No  Activity Tolerance  Activity Tolerance: Treatment limited secondary to decreased cognition  Safety Devices  Safety Devices in place: Yes  Type of devices: Call light within reach; Left in bed;Bed alarm in place;Nurse notified           Patient Diagnosis(es): The primary encounter diagnosis was Altered mental status, unspecified altered mental status type. Diagnoses of Acute cystitis without hematuria, Pleural effusion, Thyroid nodule, and Syncope, unspecified syncope type were also pertinent to this visit. has a past medical history of Acute gastric ulcer with bleeding, Acute metabolic encephalopathy, MAISHA (acute kidney injury) ([address] ATN (acute tubular necrosis) ([address] Atrial fib/flutter, transient, Atrial fibrillation ([address] BPH (benign prostatic hyperplasia), Cerebral artery occlusion with cerebral infarction ([address] Diabetes mellitus ([address] GI bleed, Glaucoma, History of blood transfusion, Hx of diabetic neuropathy, Hyperlipidemia, Hypertension, Mitral insufficiency, Mobility impaired, Pulmonary hypertension ([address] and Traumatic hemorrhagic shock ([address] has a past surgical history that includes knee surgery; Prostate biopsy; other surgical history (10/11/13); Upper gastrointestinal endoscopy (17); Dental surgery (N/A, 3/28/2019); and laparoscopy (N/A, 2019).            Restrictions  Restrictions/Precautions  Restrictions/Precautions: Up as Tolerated, Fall Risk, General Precautions    Subjective   General  Chart Reviewed: Yes  Patient

## 2019-07-29 NOTE — PROGRESS NOTES
Rales/Wheezes/Rhonchi. Cardiovascular:  Regular rate and rhythm with normal S1/S2 without murmurs, rubs or gallops. Abdomen: Soft, non-tender, non-distended with normal bowel sounds. Musculoskeletal:  No clubbing, cyanosis or edema bilaterally.  Full range of motion without deformity. Skin: Skin color, texture, turgor normal.  No rashes or lesions.   Neurologic:   grossly non-focal.          Lab Review   Lab Results   Component Value Date    WBC 5.5 07/28/2019    HGB 10.1 (L) 07/28/2019    HCT 30.8 (L) 07/28/2019    MCV 90.2 07/28/2019     07/28/2019     Lab Results   Component Value Date     07/29/2019    K 3.5 07/29/2019    K 3.4 07/18/2019    CL 98 07/29/2019    CO2 31 07/29/2019    BUN 36 07/29/2019    CREATININE 1.8 07/29/2019    GLUCOSE 100 07/29/2019    CALCIUM 8.0 07/29/2019          Patient Active Problem List    Diagnosis Date Noted    Elevated lactic acid level      Priority: High    Acute diastolic CHF (congestive heart failure) ([facility] 07/23/2019    Fluid overload 07/22/2019    Syncope 07/16/2019    Postop check 07/10/2019    Acute respiratory failure with hypoxia (HCC)     Bilateral pleural effusion     Atelectasis of right lung     Former smoker     Small bowel obstruction ([address] 06/22/2019    Acute metabolic encephalopathy 74/64/8741    Impulse control disorder in adult 12/21/2018    Bradycardia     Dementia due to Alzheimer's disease     [facility]oxysmal atrial fibrillation) [facility])     Encephalopathy, metabolic     HTN (hypertension), benign     CKD (chronic kidney disease)     Dyslipidemia     Positive blood culture     Altered mental status     Stage 3 chronic kidney disease ([address]     Metabolic encephalopathy 35/38/1673    Acute cystitis with hematuria 08/06/2017    DM (diabetes mellitus) ([address] 07/30/2017    Hyponatremia 07/30/2017    Anemia 07/30/2017    SOB (shortness of breath) 07/30/2017    UTI (urinary tract infection) 07/30/2017    NSVT (nonsustained

## 2019-07-29 NOTE — PROGRESS NOTES
bradycardia secondary to sick sinus syndrome, consideration of pacemaker. UTI - admission U/A c/w acute cystitis. Likely Catheter associated. Started on empiric Merrem 27 July - continued. Chronic atrial fibrillation -  Heart rate is controlled. Has a Watchman device. Anticoagulation is contraindicated because of severe life-threatening upper GI bleeding that the patient had about 2 years ago. CHF - Acute diastolic congestive heart failure w/ volume overloaded on admission, diuresis per cardiology. Continue IV diuretics and follow I/O. Acute renal failure. Baseline creatinine is about 1.5. Creatinine is 2.2 to 2.0. Difficult to manage diuresis. Nephrology consulted and appreciated. Diabetes mellitus type 2:  Cover with a \"sliding scale\" lispro moderate scale prandial correction insulin. Alzheimer Dementia - w/out behavioural disturbance. Continue supportive care. Obesity -  With Body mass index is 35.03 kg/m². Complicating assessment and treatment. Placing patient at risk for multiple co-morbidities as well as early death and contributing to the patient's presentation. Counseled on weight loss. Multiple thyroid nodules on CT chest:  Characterize with thyroid ultrasound. DVT Prophylaxis: LMWH  Diet: DIET GENERAL;  Dietary Nutrition Supplements: Low Calorie High Protein Supplement  Dietary Nutrition Supplements: Standard High Calorie Oral Supplement  Code Status: Full Code      PT/OT Eval Status: Came from SNF - ordered and pending. Dispo - Likely Wed 31 July pending clinical improvement and subspecialty recs.  Evelyn Salinas MD

## 2019-07-29 NOTE — PROGRESS NOTES
Physical Therapy    Facility/Department: [facility] C5 - MED SURG/ORTHO  Initial Assessment/ 1 x only    NAME: Adam Sawyer  : 1950  MRN: [id number]    Date of Service: 2019    Discharge Recommendations:  ECF without PT   PT Equipment Recommendations  Equipment Needed: No    Assessment   Body structures, Functions, Activity limitations: Decreased cognition  Assessment: Pt adm post syncopal event at SNF. Pt oriented to self only, keeping eyes closed despite cues to open. Pt initially agrees to therapy assessment but then adamently refused any LE movement or functional mob, even just to check orthostatic BPs. Pt voices no goals, even with encouragement /suggestions. AM PAC score 5 due to pt refusal of any movement. At this time due to decreased cognition/inability to learn, not participating in automatic activities, and pt refusing/resisting movement will sign off PT and recommend ECF without PT  Treatment Diagnosis: decreased cognition  Prognosis: Guarded  Decision Making: Low Complexity  Barriers to Learning: decreased cognition  REQUIRES PT FOLLOW UP: No  Activity Tolerance  Activity Tolerance: Patient limited by cognitive status; Patient Tolerated treatment well  Activity Tolerance: BP supine 139/93 HR 70   High Fowlers 160/109     Pt refused to sit EOB or stand for BP readings       Patient Diagnosis(es): The primary encounter diagnosis was Altered mental status, unspecified altered mental status type. Diagnoses of Acute cystitis without hematuria, Pleural effusion, Thyroid nodule, and Syncope, unspecified syncope type were also pertinent to this visit.      has a past medical history of Acute gastric ulcer with bleeding, Acute metabolic encephalopathy, MAISHA (acute kidney injury) ([address] ATN (acute tubular necrosis) ([address] Atrial fib/flutter, transient, Atrial fibrillation ([address] BPH (benign prostatic hyperplasia), Cerebral artery occlusion with cerebral infarction ([address] Diabetes mellitus ([address] GI

## 2019-07-29 NOTE — CONSULTS
[address] [id number]                                  CONSULTATION    PATIENT NAME: Tip Mcdowell                     :        1950  MED REC NO:   [id number]                          ROOM:       0532  ACCOUNT NO:   [de-identified]                           ADMIT DATE: 2019  PROVIDER:     Santosh Portillo MD    CONSULT DATE:  2019    CARDIAC ELECTROPHYSIOLOGY CONSULTATION    REASON FOR CONSULTATION:  Syncope. HISTORY OF PRESENT ILLNESS:  The patient is a 64-year-old man with  history of persistent atrial fibrillation, remote CVA, and diastolic  congestive heart failure who is admitted from rehab session on  2019 with prolonged episodes of unresponsiveness. This is his  third such episode in the past 2 weeks. On the first admission from  2019, he was found to have orthostatic hypotension on ER  evaluation. The patient currently is unable to recall any of these  three episodes. He is unable to provide any information about whether  he had some dizziness prior to these events or not. Orthostatic blood  pressures were obtained on 2019 and did not demonstrate  orthostatic hypotension. It is evident that his blood pressure is  erratic and he does at times have resting hypotension. Since admission,  his rhythm has been atrial fibrillation with well preserved heart rates. He has had some ventricular ectopy consisting of single PVCs as well as  couplets and brief episodes of nonsustained ventricular tachycardia. He  is known to have well-preserved left ventricular systolic function with  an ejection fraction of 50% to 55% on echocardiography from 2019. PAST MEDICAL HISTORY:  1.  Diastolic congestive heart failure. 2.  Persistent atrial fibrillation. 3.  Chronic renal insufficiency. 4.  Diabetes. 5.  Remote CVA.     MEDICATIONS:  At the time of admission include aspirin 81 mg p.o. q.d.,  Lipitor 40 mg p.o. q.d., Depakote 500 mg in a.m. and 1000 mg at H.S.,  fenofibrate 145 mg p.o. q.d., Lasix 40 mg p.o. q.d., Losartan 50 mg p.o.  q.d., Toprol XL 25 mg p.o. q.d., Protonix 40 mg p.o. q.d., Seroquel 50  mg p.o. a.m. and 100 mg p.o. q.p.m., Aldactone 25 mg p.o. q.d. ALLERGIES:  Include BACTRIM. SOCIAL HISTORY:  The patient is a former smoker who still smokes an  occasional cigar. He does not consume alcohol at this time. FAMILY HISTORY:  Remarkable for myocardial infarction in the father. There is also history of heart disease in one male sibling. There is no  known family history of cardiac rhythm disturbance, syncope, or sudden  cardiac death. REVIEW OF SYSTEMS:  Demonstrates no recent change in appetite or change  in weight. The patient has not had recent fever or chills. He does not  describe chest pain. He does not describe palpitations. He is unable  to describe his recent episodes of altered consciousness. All other  components of the 14-system review are negative. PHYSICAL EXAMINATION:  GENERAL:  The patient is awake and alert. He is able to respond  appropriately to some questions. His ability to recall the episodes of  syncope is limited. VITAL SIGNS:  Blood pressure is 141/97, heart rate is 86 beats per  minute and irregular. HEENT EXAM:  Demonstrates normocephalic, atraumatic head. There is no  scleral icterus. Pupils are round and reactive. NECK:  Supple without thyromegaly. LUNGS:  Clear to auscultation. CARDIOVASCULAR EXAM:  Reveals an irregular rhythm. The apical impulse  is discrete. S1 and S2 are distant. There is no audible murmur or  gallop. The jugular venous pressure is difficult to assess. ABDOMEN:  Very obese, soft, nontender. EXTREMITIES:  Demonstrate trace pitting pretibial dependent edema. There are some dermatologic changes consistent with chronic venous  stasis.   SKIN:  Otherwise, warm and dry without skin rash.    DIAGNOSTIC DATA:  A 12-lead ECG from 07/26/2019 demonstrates atrial  fibrillation with rapid ventricular rate of 108 beats per minute. There  is low QRS voltage in the limb leads. There is a nonspecific  intraventricular conduction delay. IMPRESSION:  1. Alteration of consciousness. 2.  Status post remote CVA. 3.  Persistent atrial fibrillation. 4.  Acute on chronic diastolic congestive heart failure. 5.  Chronic renal insufficiency. 6.  Urinary tract infection. The patient is admitted with his third episode of alteration of  consciousness. These episodes are prolonged in duration, lasting 10 to  20 minutes by chart notes. The patient is unaware of these episodes and  is unable to provide any meaningful history with regard to these  episodes. The duration of the alteration of consciousness makes a  cardiovascular cause unlikely. He could have reversion from atrial  fibrillation to sinus rhythm with an inadequate sinus rate. Over a  duration of 20 minutes, this abnormality should be evident and would not  likely resolve spontaneously. He did have orthostatic hypotension  documented on the admission from 07/16/2019. This would be more  plausible explanation for his symptoms. Obtaining a blood pressure  concurrent with his apparent loss of consciousness would be diagnostic  in this regard. He has had no interruption in the atrial fibrillation  on this inpatient hospitalization on ECG telemetry. Ambulatory ECG  monitoring post discharge might be useful to evaluate for other rhythm  disturbances. If this is unrevealing and if the episodes continued,  some consideration for implantable monitor may be of benefit. RECOMMENDATIONS:  1. Continue ECG telemetry monitoring during this admission. 2.  30-day automated ambulatory ECG monitor post discharge. 3.  Stressed the importance of obtaining a heart rate and blood pressure  concurrent with his symptomatic episodes.   4.  If these

## 2019-07-30 PROBLEM — E44.1 MILD MALNUTRITION (HCC): Chronic | Status: ACTIVE | Noted: 2019-07-30

## 2019-07-30 LAB
ALBUMIN SERPL-MCNC: 1.7 G/DL (ref 3.4–5)
ANION GAP SERPL CALCULATED.3IONS-SCNC: 9 MMOL/L (ref 3–16)
BUN BLDV-MCNC: 39 MG/DL (ref 7–20)
CALCIUM SERPL-MCNC: 8.2 MG/DL (ref 8.3–10.6)
CHLORIDE BLD-SCNC: 100 MMOL/L (ref 99–110)
CO2: 34 MMOL/L (ref 21–32)
CREAT SERPL-MCNC: 1.9 MG/DL (ref 0.8–1.3)
GFR AFRICAN AMERICAN: 42
GFR NON-AFRICAN AMERICAN: 35
GLUCOSE BLD-MCNC: 117 MG/DL (ref 70–99)
HCT VFR BLD CALC: 29.9 % (ref 40.5–52.5)
HEMOGLOBIN: 10 G/DL (ref 13.5–17.5)
MCH RBC QN AUTO: 29.6 PG (ref 26–34)
MCHC RBC AUTO-ENTMCNC: 33.6 G/DL (ref 31–36)
MCV RBC AUTO: 88 FL (ref 80–100)
PDW BLD-RTO: 15.2 % (ref 12.4–15.4)
PHOSPHORUS: 3.2 MG/DL (ref 2.5–4.9)
PLATELET # BLD: 207 K/UL (ref 135–450)
PMV BLD AUTO: 7.6 FL (ref 5–10.5)
POTASSIUM SERPL-SCNC: 3.5 MMOL/L (ref 3.5–5.1)
RBC # BLD: 3.4 M/UL (ref 4.2–5.9)
SODIUM BLD-SCNC: 143 MMOL/L (ref 136–145)
WBC # BLD: 4.4 K/UL (ref 4–11)

## 2019-07-30 PROCEDURE — 6360000002 HC RX W HCPCS: Performed by: INTERNAL MEDICINE

## 2019-07-30 PROCEDURE — 97110 THERAPEUTIC EXERCISES: CPT

## 2019-07-30 PROCEDURE — 80069 RENAL FUNCTION PANEL: CPT

## 2019-07-30 PROCEDURE — 36415 COLL VENOUS BLD VENIPUNCTURE: CPT

## 2019-07-30 PROCEDURE — 97162 PT EVAL MOD COMPLEX 30 MIN: CPT

## 2019-07-30 PROCEDURE — 97168 OT RE-EVAL EST PLAN CARE: CPT

## 2019-07-30 PROCEDURE — 2580000003 HC RX 258: Performed by: INTERNAL MEDICINE

## 2019-07-30 PROCEDURE — 85027 COMPLETE CBC AUTOMATED: CPT

## 2019-07-30 PROCEDURE — 99233 SBSQ HOSP IP/OBS HIGH 50: CPT | Performed by: NURSE PRACTITIONER

## 2019-07-30 PROCEDURE — 1200000000 HC SEMI PRIVATE

## 2019-07-30 PROCEDURE — 6370000000 HC RX 637 (ALT 250 FOR IP): Performed by: INTERNAL MEDICINE

## 2019-07-30 PROCEDURE — 97530 THERAPEUTIC ACTIVITIES: CPT

## 2019-07-30 RX ORDER — FUROSEMIDE 40 MG/1
40 TABLET ORAL DAILY
Status: DISCONTINUED | OUTPATIENT
Start: 2019-07-31 | End: 2019-07-31 | Stop reason: HOSPADM

## 2019-07-30 RX ORDER — CIPROFLOXACIN 500 MG/1
500 TABLET, FILM COATED ORAL EVERY 12 HOURS SCHEDULED
Status: DISCONTINUED | OUTPATIENT
Start: 2019-07-30 | End: 2019-07-31 | Stop reason: HOSPADM

## 2019-07-30 RX ADMIN — CIPROFLOXACIN HYDROCHLORIDE 500 MG: 500 TABLET, FILM COATED ORAL at 10:15

## 2019-07-30 RX ADMIN — LEVOCARNITINE 500 MG: 1 SOLUTION ORAL at 21:00

## 2019-07-30 RX ADMIN — PANTOPRAZOLE SODIUM 40 MG: 40 TABLET, DELAYED RELEASE ORAL at 10:16

## 2019-07-30 RX ADMIN — ENOXAPARIN SODIUM 40 MG: 40 INJECTION SUBCUTANEOUS at 10:16

## 2019-07-30 RX ADMIN — Medication 10 ML: at 10:18

## 2019-07-30 RX ADMIN — Medication 10 ML: at 21:00

## 2019-07-30 RX ADMIN — FUROSEMIDE 20 MG: 10 INJECTION, SOLUTION INTRAMUSCULAR; INTRAVENOUS at 10:16

## 2019-07-30 RX ADMIN — DIVALPROEX SODIUM 1000 MG: 250 TABLET, DELAYED RELEASE ORAL at 21:00

## 2019-07-30 RX ADMIN — Medication 1 CAPSULE: at 10:15

## 2019-07-30 RX ADMIN — QUETIAPINE FUMARATE 100 MG: 100 TABLET ORAL at 21:00

## 2019-07-30 RX ADMIN — METOPROLOL SUCCINATE 25 MG: 25 TABLET, EXTENDED RELEASE ORAL at 10:15

## 2019-07-30 RX ADMIN — LEVOCARNITINE 500 MG: 1 SOLUTION ORAL at 10:16

## 2019-07-30 RX ADMIN — MELATONIN TAB 5 MG 5 MG: 5 TAB at 21:00

## 2019-07-30 RX ADMIN — ASPIRIN 81 MG: 81 TABLET ORAL at 10:15

## 2019-07-30 RX ADMIN — QUETIAPINE FUMARATE 50 MG: 25 TABLET ORAL at 10:16

## 2019-07-30 RX ADMIN — DIVALPROEX SODIUM 500 MG: 250 TABLET, DELAYED RELEASE ORAL at 10:16

## 2019-07-30 RX ADMIN — SPIRONOLACTONE 25 MG: 25 TABLET ORAL at 10:16

## 2019-07-30 RX ADMIN — CIPROFLOXACIN HYDROCHLORIDE 500 MG: 500 TABLET, FILM COATED ORAL at 21:00

## 2019-07-30 RX ADMIN — ATORVASTATIN CALCIUM 40 MG: 40 TABLET, FILM COATED ORAL at 10:15

## 2019-07-30 RX ADMIN — LEVOCARNITINE 500 MG: 1 SOLUTION ORAL at 15:12

## 2019-07-30 NOTE — PROGRESS NOTES
Nutrition Assessment    Type and Reason for Visit: Reassess(family request)    Nutrition Recommendations:   1. General diet  2. Ensure and Magic cup with meals  3. Will monitor nutritional adequacy, nutrition-related labs, weights, BMs, and clinical progress   4. May need to consider alternate nutrition if patient's po intake of solid foods does not reach greater than 50% in the next 2 days    Nutrition Assessment: Follow up:  Nutritional status at risk for further decline due to continued decrease in solid food intake. Daughter talked to this RD about patient mostly drinking Ensure supplements but not eating much of food tray. RD mentioned magic cups as another nutrition supplmement we could add to trays. Patient's daughter agreeable to try. RD allowing more than 1 one supplement on each tray to closer meet nutritional needs. Will continue to monitor nutritional intake. May need to consider alternate nutrition if po intake does not improve in the next 24-48 hours. Malnutrition Assessment:  · Malnutrition Status: Mild Malnutrition  · Context: Chronic illness  · Findings of the 6 clinical characteristics of malnutrition (Minimum of 2 out of 6 clinical characteristics is required to make the diagnosis of moderate or severe Protein Calorie Malnutrition based on AND/ASPEN Guidelines):  1. Energy Intake-Less than or equal to 50% of estimated energy requirement, Greater than or equal to 7 days    2. Weight Loss-2% loss or greater(also on Lasix and Aldactone), in 1 week  3. Fat Loss-(patient resting this am),    4. Muscle Loss-(at risk for),    5. Fluid Accumulation-Mild fluid accumulation, Generalized, Extremities  6.   Strength-Not measured    Nutrition Risk Level: High    Nutrient Needs:  · Estimated Daily Total Kcal: 2206-1858  · Estimated Daily Protein (g): 122-141  · Estimated Daily Total Fluid (ml/day): 1 mL/kcal or per physician    Nutrition Diagnosis:   · Problem: Inadequate oral intake  · Etiology: related to Insufficient energy/nutrient consumption, Increased demand for energy/nutrients(lethargy from current condition)     Signs and symptoms:  as evidenced by Intake 0-25%, Diet history of poor intake, Weight loss greater than or equal to 2% in 1 week    Objective Information:  · Nutrition-Focused Physical Findings: BM x 2 on 7/29; patient's daughter reported patient mostly drinking Ensure vs eating food  · Wound Type: (scattered abrasions)  · Current Nutrition Therapies:  · Oral Diet Orders: General   · Oral Diet intake: 1-25%  · Oral Nutrition Supplement (ONS) Orders: Diabetic Oral Supplement  · ONS intake: %  · Anthropometric Measures:  · Ht: 6' 5\" (195.6 cm)   · Current Body Wt: 295 lb 6 oz (134 kg)  · Admission Body Wt: 304 lb 3 oz (138 kg)  · Usual Body Wt: (250-265 lb, per family at bedside)  · % Weight Change:  ,  9 lb weight loss this admission-on Lasix and Aldactone, but po intake also decreased   · Ideal Body Wt: 208 lb (94.3 kg), % Ideal Body 146%  · BMI Classification: BMI 35.0 - 39.9 Obese Class II    Nutrition Interventions:   Continue current diet, Modify current ONS  Continued Inpatient Monitoring    Nutrition Evaluation:   · Evaluation: Progressing toward goals(mostly supplements)   · Goals: Patient will consume 50%< of meals and supplements provided    · Monitoring: Nutrition Progression, Meal Intake, Supplement Intake, Diet Tolerance, Weight, Pertinent Labs, Monitor Bowel Function, I&O      Electronically signed by Franky Adams RD, LD on 7/30/19 at 11:39 AM    Contact Number: Office: [phone]; [address]: [phone]

## 2019-07-30 NOTE — PROGRESS NOTES
1.9 07/30/2019    BUN 39 07/30/2019     07/30/2019    K 3.5 07/30/2019    K 3.4 07/18/2019     07/30/2019    CO2 34 07/30/2019     CBC:    Lab Results   Component Value Date    WBC 4.4 07/30/2019    RBC 3.40 07/30/2019    HGB 10.0 07/30/2019    HCT 29.9 07/30/2019    MCV 88.0 07/30/2019    RDW 15.2 07/30/2019     07/30/2019     BNP:  No results found for: BNP  Fasting Lipid Panel:    Lab Results   Component Value Date    CHOL 96 07/23/2019    HDL 26 07/23/2019    TRIG 112 07/23/2019     Cardiac Enzymes:  CK/MbTroponin  Lab Results   Component Value Date    TROPONINI <0.01 07/26/2019     PT/ INR   Lab Results   Component Value Date    INR 1.20 12/20/2018    INR 1.28 08/08/2017    INR 1.50 07/30/2017    PROTIME 13.7 12/20/2018    PROTIME 14.5 08/08/2017    PROTIME 16.9 07/30/2017     PTT No results found for: PTT   Lab Results   Component Value Date    MG 2.10 07/27/2019    No results found for: TSH    Assessment:  Unresponsiveness: recurrent   -3 episodes reported in 2 weeks    -orthostatics reported as negative on admission but now positive   -EEG normal 7/2019  Permanent atrial fibrillation: stable    -HR controlled    -s/p Watchman implant 1/2018 at [facility]  Labile BP: known history   Orthostatic hypotension: ongoing  Chronic diastolic CHF: compensated  Pulmonary HTN: mild on echo  CKD  History of massive GI bleed: noted 2017  History of CVA in 2017  SBO: s/p resection 6/2019  UTI: noted on admission  Dementia    Plan:   1. Continue ASA, statin, Toprol, and spironolactone  2. Discontinue IV Lasix   3. Restart home Lasix 40mg po QD  4. Would continue to hold home losartan and allow for supine HTN  5. 20-30mm Hg compression stockings (can provide outpatient prescription)  6. 30 day event monitor on discharge  7. Consider outpatient loop recorder implant   8.  Antibiotics per primary     JAMEY Louis-CNP  [facility]  [phone]

## 2019-07-30 NOTE — PLAN OF CARE
Nutrition Problem: Inadequate oral intake  Intervention: Food and/or Nutrient Delivery: Continue current diet, Modify current ONS  Nutritional Goals: Patient will consume 50%< of meals and supplements provided

## 2019-07-30 NOTE — PROGRESS NOTES
suggest chronic underlying pulmonary   arterial hypertension. 7. Cholelithiasis. RECOMMENDATIONS:   Managing Incidental Thyroid Nodule Detected at CT or MRI or US      1. Further evaluation by thyroid Ultrasound recommended for these incidental   nodules:      Patient Age 25 years or less - Nodule of any size      Patient Age 21-27 years old - Nodule 1 cm in size or greater      PATIENT AGE 28 YEARS OR MORE - NODULE 1.5 CM IN SIZE OR GREATER      Note: These recommendations do not apply to pts. w/ increased risk      for thyroid cancer or pts. with symptomatic thyroid disease.      ________________________________________________________________      Recommendations for f/u of Incidental Thyroid Nodules (ITN)      found on CT, MR, NM and Extrathyroidal US are based upon the ACR      white paper and [facility] 3-tiered system for managing ITNs:      J Am Corey Radiol. 2015 Feb;12(2): 143-50         CT HEAD WO CONTRAST   Final Result   1. No acute intracranial abnormality or significant interval change from   prior study 07/22/2019.   2. Redemonstration of encephalomalacia and gliosis in the left MCA   distribution. XR CHEST PORTABLE   Final Result   No significant change in appearance of bilateral pleural effusions, moderate   on the right, and small on the left, with associated right basilar airspace   disease. Assessment/Plan:    Active Hospital Problems    Diagnosis    Syncope [R55]    Dementia due to Alzheimer's disease [G30.9, F02.80]    CKD (chronic kidney disease) [N18.9]    Dyslipidemia [E78.5]    Stage 3 chronic kidney disease ([address] [N18.3]    DM (diabetes mellitus) ([address] [E11.9]    UTI (urinary tract infection) [N39.0]    Obesity (BMI 30-39. 9) [E66.9]    A-fib ([address] [I48.91]    Hypertension [I10]         Syncope - of unclear etiology but possibly with bradycardia -  Possible sick sinus syndrome. Monitor on telemetry for event.   If symptomatic bradycardia secondary to sick

## 2019-07-30 NOTE — PROGRESS NOTES
Patient assessment complete and charted. VSS. AO to self. Bed locked and in lowest position. Non-skid socks in place. Call light within reach. Bed alarm on. Patient states no further needs at this time. Will continue to monitor.

## 2019-07-31 VITALS
HEIGHT: 77 IN | SYSTOLIC BLOOD PRESSURE: 132 MMHG | TEMPERATURE: 97.6 F | RESPIRATION RATE: 16 BRPM | HEART RATE: 77 BPM | BODY MASS INDEX: 34.65 KG/M2 | OXYGEN SATURATION: 95 % | WEIGHT: 293.43 LBS | DIASTOLIC BLOOD PRESSURE: 87 MMHG

## 2019-07-31 PROBLEM — E11.8 DM TYPE 2, CONTROLLED, WITH COMPLICATION (HCC): Status: ACTIVE | Noted: 2017-07-30

## 2019-07-31 PROCEDURE — 2580000003 HC RX 258: Performed by: INTERNAL MEDICINE

## 2019-07-31 PROCEDURE — 97110 THERAPEUTIC EXERCISES: CPT

## 2019-07-31 PROCEDURE — 6370000000 HC RX 637 (ALT 250 FOR IP): Performed by: INTERNAL MEDICINE

## 2019-07-31 PROCEDURE — 6360000002 HC RX W HCPCS: Performed by: INTERNAL MEDICINE

## 2019-07-31 PROCEDURE — 99223 1ST HOSP IP/OBS HIGH 75: CPT | Performed by: PSYCHIATRY & NEUROLOGY

## 2019-07-31 PROCEDURE — 97530 THERAPEUTIC ACTIVITIES: CPT

## 2019-07-31 PROCEDURE — 99233 SBSQ HOSP IP/OBS HIGH 50: CPT | Performed by: NURSE PRACTITIONER

## 2019-07-31 PROCEDURE — 6370000000 HC RX 637 (ALT 250 FOR IP): Performed by: NURSE PRACTITIONER

## 2019-07-31 RX ORDER — CIPROFLOXACIN 500 MG/1
500 TABLET, FILM COATED ORAL 2 TIMES DAILY
Qty: 10 TABLET | Refills: 0 | Status: SHIPPED | OUTPATIENT
Start: 2019-07-31 | End: 2019-08-05

## 2019-07-31 RX ADMIN — Medication 1 CAPSULE: at 07:52

## 2019-07-31 RX ADMIN — METOPROLOL SUCCINATE 25 MG: 25 TABLET, EXTENDED RELEASE ORAL at 07:52

## 2019-07-31 RX ADMIN — FUROSEMIDE 40 MG: 40 TABLET ORAL at 07:52

## 2019-07-31 RX ADMIN — SPIRONOLACTONE 25 MG: 25 TABLET ORAL at 07:52

## 2019-07-31 RX ADMIN — ATORVASTATIN CALCIUM 40 MG: 40 TABLET, FILM COATED ORAL at 07:52

## 2019-07-31 RX ADMIN — ASPIRIN 81 MG: 81 TABLET ORAL at 07:52

## 2019-07-31 RX ADMIN — QUETIAPINE FUMARATE 50 MG: 25 TABLET ORAL at 07:52

## 2019-07-31 RX ADMIN — PANTOPRAZOLE SODIUM 40 MG: 40 TABLET, DELAYED RELEASE ORAL at 07:53

## 2019-07-31 RX ADMIN — LEVOCARNITINE 500 MG: 1 SOLUTION ORAL at 13:45

## 2019-07-31 RX ADMIN — Medication 10 ML: at 07:53

## 2019-07-31 RX ADMIN — DIVALPROEX SODIUM 500 MG: 250 TABLET, DELAYED RELEASE ORAL at 07:52

## 2019-07-31 RX ADMIN — ENOXAPARIN SODIUM 40 MG: 40 INJECTION SUBCUTANEOUS at 07:53

## 2019-07-31 RX ADMIN — LEVOCARNITINE 500 MG: 1 SOLUTION ORAL at 07:53

## 2019-07-31 RX ADMIN — CIPROFLOXACIN HYDROCHLORIDE 500 MG: 500 TABLET, FILM COATED ORAL at 07:53

## 2019-07-31 ASSESSMENT — PAIN SCALES - GENERAL: PAINLEVEL_OUTOF10: 0

## 2019-07-31 NOTE — PROGRESS NOTES
Occupational Therapy  Facility/Department: [facility] C5 - MED SURG/ORTHO  Daily Treatment Note  NAME: Jose Eduardo Ramirez  : 1950  MRN: [id number]    Date of Service: 2019    Discharge Recommendations:  Subacute/Skilled Nursing Facility     Assessment   Performance deficits / Impairments: Decreased functional mobility ; Decreased cognition;Decreased safe awareness;Decreased ADL status; Decreased endurance;Decreased strength;Decreased ROM; Decreased coordination  Assessment: Pt limits his participation in mobility, citing fear of falling and fatigue. Pt participated in mobility and UE exercises with max cues. Recommend SNF for skilled OT services. Cont OT. Prognosis: Fair  Decision Making: High Complexity  OT Education: OT Role;Orientation  Patient Education: safety  REQUIRES OT FOLLOW UP: Yes  Activity Tolerance  Activity Tolerance: Treatment limited secondary to decreased cognition  Safety Devices  Type of devices: Call light within reach; Left in bed;Nurse notified; Bed alarm in place;Gait belt       Patient Diagnosis(es): The primary encounter diagnosis was Altered mental status, unspecified altered mental status type. Diagnoses of Acute cystitis without hematuria, Pleural effusion, Thyroid nodule, Syncope, unspecified syncope type, and Permanent atrial fibrillation ([address] were also pertinent to this visit. has a past medical history of Acute gastric ulcer with bleeding, Acute metabolic encephalopathy, MAISHA (acute kidney injury) ([address] ATN (acute tubular necrosis) ([address] Atrial fib/flutter, transient, Atrial fibrillation ([address] BPH (benign prostatic hyperplasia), Cerebral artery occlusion with cerebral infarction ([address] Diabetes mellitus ([address] GI bleed, Glaucoma, History of blood transfusion, Hx of diabetic neuropathy, Hyperlipidemia, Hypertension, Mitral insufficiency, Mobility impaired, Pulmonary hypertension ([address] and Traumatic hemorrhagic shock ([address]    has a past surgical history that includes knee

## 2019-08-01 NOTE — DISCHARGE SUMMARY
Hospital Medicine Discharge Summary    Patient ID: Malika Nevarez      Patient's PCP: Boby Evans MD    Admit Date: 7/26/2019     Discharge Date: 7/31/2019     Admitting Physician: Luke Jensen MD     Discharge Physician: Luke Jensen MD     Discharge Diagnoses: Active Hospital Problems    Diagnosis    Acute encephalopathy [G93.40]    Remote history of stroke [Z86.73]    Mild malnutrition ([address] [E44.1]    Syncope and collapse [R55]    Dementia due to Alzheimer's disease [G30.9, F02.80]    CKD (chronic kidney disease) [N18.9]    Dyslipidemia [E78.5]    Stage 3 chronic kidney disease ([address] [N18.3]    DM type 2, controlled, with complication ([address] [C89.4]    UTI (urinary tract infection) [N39.0]    Obesity (BMI 30-39. 9) [E66.9]    A-fib ([address] [I48.91]    Hypertension [I10]       The patient was seen and examined on day of discharge and this discharge summary is in conjunction with any daily progress note from day of discharge. Hospital Course:        Syncope - of unclear etiology but possibly with bradycardia -  Possible sick sinus syndrome.  Monitor on telemetry for event.  If symptomatic bradycardia secondary to sick sinus syndrome, consideration of pacemaker.       UTI - admission U/A c/w acute cystitis. Likely Catheter associated. Started on empiric Merrem 27 July - changed to PO Cipro 30 July w/ nearly pan-sensitive E coli.      Chronic atrial fibrillation -  Heart rate is controlled.  Has a Watchman device.  Anticoagulation is contraindicated because of severe life-threatening upper GI bleeding that the patient had about 2 years ago. Cardiology consulted and appreciated, considering future ILR.      CHF - Acute diastolic congestive heart failure w/ volume overloaded on admission, diuresis per cardiology.      ARF/CKD - baseline stage 3. Followed serial labs - stable. Reviewed and documented as above.  Difficult to manage diuresis.  Nephrology consulted and appreciated.

## 2019-08-08 ENCOUNTER — TELEPHONE (OUTPATIENT)
Dept: CARDIOLOGY CLINIC | Age: 69
End: 2019-08-08

## 2019-08-09 PROBLEM — Z09 POSTOP CHECK: Status: RESOLVED | Noted: 2019-07-10 | Resolved: 2019-08-09

## 2019-08-13 ENCOUNTER — OFFICE VISIT (OUTPATIENT)
Dept: SURGERY | Age: 69
End: 2019-08-13

## 2019-08-13 VITALS — DIASTOLIC BLOOD PRESSURE: 60 MMHG | SYSTOLIC BLOOD PRESSURE: 84 MMHG | BODY MASS INDEX: 34.79 KG/M2 | HEIGHT: 77 IN

## 2019-08-13 DIAGNOSIS — Z09 POSTOP CHECK: Primary | ICD-10-CM

## 2019-08-13 PROCEDURE — 99024 POSTOP FOLLOW-UP VISIT: CPT | Performed by: SURGERY

## 2019-08-19 ENCOUNTER — TELEPHONE (OUTPATIENT)
Dept: CARDIOLOGY CLINIC | Age: 69
End: 2019-08-19

## 2019-08-19 NOTE — TELEPHONE ENCOUNTER
Call received from patient's daughter stating he had another syncopal event while wearing event monitor. Please let me know if we received any alerts.

## 2019-08-22 ENCOUNTER — OFFICE VISIT (OUTPATIENT)
Dept: NEUROLOGY | Age: 69
End: 2019-08-22
Payer: MEDICARE

## 2019-08-22 VITALS
DIASTOLIC BLOOD PRESSURE: 56 MMHG | HEIGHT: 77 IN | WEIGHT: 293 LBS | SYSTOLIC BLOOD PRESSURE: 98 MMHG | BODY MASS INDEX: 34.59 KG/M2 | HEART RATE: 62 BPM | OXYGEN SATURATION: 96 %

## 2019-08-22 DIAGNOSIS — F02.80 DEMENTIA DUE TO ALZHEIMER'S DISEASE (HCC): ICD-10-CM

## 2019-08-22 DIAGNOSIS — I10 HTN (HYPERTENSION), BENIGN: ICD-10-CM

## 2019-08-22 DIAGNOSIS — Z86.73 REMOTE HISTORY OF STROKE: ICD-10-CM

## 2019-08-22 DIAGNOSIS — R55 SYNCOPE AND COLLAPSE: ICD-10-CM

## 2019-08-22 DIAGNOSIS — E78.5 DYSLIPIDEMIA: ICD-10-CM

## 2019-08-22 DIAGNOSIS — F34.1 DYSTHYMIA: ICD-10-CM

## 2019-08-22 DIAGNOSIS — G30.9 DEMENTIA DUE TO ALZHEIMER'S DISEASE (HCC): ICD-10-CM

## 2019-08-22 DIAGNOSIS — G93.41 ACUTE METABOLIC ENCEPHALOPATHY: Primary | ICD-10-CM

## 2019-08-22 DIAGNOSIS — I48.0 PAF (PAROXYSMAL ATRIAL FIBRILLATION) (HCC): ICD-10-CM

## 2019-08-22 PROCEDURE — 3017F COLORECTAL CA SCREEN DOC REV: CPT | Performed by: PSYCHIATRY & NEUROLOGY

## 2019-08-22 PROCEDURE — 1111F DSCHRG MED/CURRENT MED MERGE: CPT | Performed by: PSYCHIATRY & NEUROLOGY

## 2019-08-22 PROCEDURE — 4040F PNEUMOC VAC/ADMIN/RCVD: CPT | Performed by: PSYCHIATRY & NEUROLOGY

## 2019-08-22 PROCEDURE — G8417 CALC BMI ABV UP PARAM F/U: HCPCS | Performed by: PSYCHIATRY & NEUROLOGY

## 2019-08-22 PROCEDURE — 99214 OFFICE O/P EST MOD 30 MIN: CPT | Performed by: PSYCHIATRY & NEUROLOGY

## 2019-08-22 PROCEDURE — G8427 DOCREV CUR MEDS BY ELIG CLIN: HCPCS | Performed by: PSYCHIATRY & NEUROLOGY

## 2019-08-22 PROCEDURE — 1123F ACP DISCUSS/DSCN MKR DOCD: CPT | Performed by: PSYCHIATRY & NEUROLOGY

## 2019-08-22 PROCEDURE — 1036F TOBACCO NON-USER: CPT | Performed by: PSYCHIATRY & NEUROLOGY

## 2019-08-23 ENCOUNTER — TELEPHONE (OUTPATIENT)
Dept: CARDIOLOGY CLINIC | Age: 69
End: 2019-08-23

## 2019-08-25 PROBLEM — N39.0 UTI (URINARY TRACT INFECTION): Status: RESOLVED | Noted: 2017-07-30 | Resolved: 2019-08-25

## 2019-09-09 ENCOUNTER — HOSPITAL ENCOUNTER (OUTPATIENT)
Age: 69
Discharge: HOME | End: 2019-09-09
Payer: MEDICARE

## 2019-09-09 ENCOUNTER — HOSPITAL ENCOUNTER (OUTPATIENT)
Dept: GENERAL RADIOLOGY | Age: 69
Discharge: HOME | End: 2019-09-09
Payer: MEDICARE

## 2019-09-09 DIAGNOSIS — R05.9 COUGH: ICD-10-CM

## 2019-09-09 LAB
A/G RATIO: 0.9 (ref 1.1–2.2)
ALBUMIN SERPL-MCNC: 3.3 G/DL (ref 3.4–5)
ALP BLD-CCNC: 40 U/L (ref 40–129)
ALT SERPL-CCNC: 13 U/L (ref 10–40)
ANION GAP SERPL CALCULATED.3IONS-SCNC: 13 MMOL/L (ref 3–16)
AST SERPL-CCNC: 24 U/L (ref 15–37)
BASOPHILS ABSOLUTE: 0.1 K/UL (ref 0–0.2)
BASOPHILS RELATIVE PERCENT: 1.1 %
BILIRUB SERPL-MCNC: 0.9 MG/DL (ref 0–1)
BUN BLDV-MCNC: 19 MG/DL (ref 7–20)
CALCIUM SERPL-MCNC: 9.2 MG/DL (ref 8.3–10.6)
CHLORIDE BLD-SCNC: 105 MMOL/L (ref 99–110)
CO2: 23 MMOL/L (ref 21–32)
CREAT SERPL-MCNC: 1.5 MG/DL (ref 0.8–1.3)
EOSINOPHILS ABSOLUTE: 0.1 K/UL (ref 0–0.6)
EOSINOPHILS RELATIVE PERCENT: 1.8 %
FERRITIN: 347.7 NG/ML (ref 30–400)
GFR AFRICAN AMERICAN: 56
GFR NON-AFRICAN AMERICAN: 46
GLOBULIN: 3.7 G/DL
GLUCOSE BLD-MCNC: 98 MG/DL (ref 70–99)
HCT VFR BLD CALC: 29.4 % (ref 40.5–52.5)
HEMOGLOBIN: 9.6 G/DL (ref 13.5–17.5)
LYMPHOCYTES ABSOLUTE: 1.7 K/UL (ref 1–5.1)
LYMPHOCYTES RELATIVE PERCENT: 26.8 %
MCH RBC QN AUTO: 29.5 PG (ref 26–34)
MCHC RBC AUTO-ENTMCNC: 32.8 G/DL (ref 31–36)
MCV RBC AUTO: 90 FL (ref 80–100)
MONOCYTES ABSOLUTE: 0.3 K/UL (ref 0–1.3)
MONOCYTES RELATIVE PERCENT: 5.3 %
NEUTROPHILS ABSOLUTE: 4 K/UL (ref 1.7–7.7)
NEUTROPHILS RELATIVE PERCENT: 65 %
PDW BLD-RTO: 17.7 % (ref 12.4–15.4)
PLATELET # BLD: 254 K/UL (ref 135–450)
PMV BLD AUTO: 8.9 FL (ref 5–10.5)
POTASSIUM SERPL-SCNC: 3.7 MMOL/L (ref 3.5–5.1)
RBC # BLD: 3.27 M/UL (ref 4.2–5.9)
SODIUM BLD-SCNC: 141 MMOL/L (ref 136–145)
TOTAL IRON BINDING CAPACITY: 284 UG/DL (ref 260–445)
TOTAL PROTEIN: 7 G/DL (ref 6.4–8.2)
TSH REFLEX FT4: 4.18 UIU/ML (ref 0.27–4.2)
WBC # BLD: 6.2 K/UL (ref 4–11)

## 2019-09-09 PROCEDURE — 83036 HEMOGLOBIN GLYCOSYLATED A1C: CPT

## 2019-09-09 PROCEDURE — 82306 VITAMIN D 25 HYDROXY: CPT

## 2019-09-09 PROCEDURE — 80053 COMPREHEN METABOLIC PANEL: CPT

## 2019-09-09 PROCEDURE — 82542 COL CHROMOTOGRAPHY QUAL/QUAN: CPT

## 2019-09-09 PROCEDURE — 85025 COMPLETE CBC W/AUTO DIFF WBC: CPT

## 2019-09-09 PROCEDURE — 84443 ASSAY THYROID STIM HORMONE: CPT

## 2019-09-09 PROCEDURE — 83550 IRON BINDING TEST: CPT

## 2019-09-09 PROCEDURE — 83540 ASSAY OF IRON: CPT

## 2019-09-09 PROCEDURE — 71046 X-RAY EXAM CHEST 2 VIEWS: CPT

## 2019-09-09 PROCEDURE — 82728 ASSAY OF FERRITIN: CPT

## 2019-09-09 PROCEDURE — 36415 COLL VENOUS BLD VENIPUNCTURE: CPT

## 2019-09-10 LAB
ESTIMATED AVERAGE GLUCOSE: 85.3 MG/DL
HBA1C MFR BLD: 4.6 %
IRON: 61 UG/DL (ref 59–158)
VITAMIN D 25-HYDROXY: 33.7 NG/ML

## 2019-09-11 ENCOUNTER — HOSPITAL ENCOUNTER (OUTPATIENT)
Age: 69
Setting detail: SPECIMEN
Discharge: HOME | End: 2019-09-11
Payer: MEDICARE

## 2019-09-11 LAB
CREATININE URINE: 188 MG/DL (ref 39–259)
PROTEIN PROTEIN: 384 MG/DL
PROTEIN/CREAT RATIO: 2 MG/DL

## 2019-09-11 PROCEDURE — 84156 ASSAY OF PROTEIN URINE: CPT

## 2019-09-11 PROCEDURE — 82570 ASSAY OF URINE CREATININE: CPT

## 2019-09-12 PROBLEM — Z09 POSTOP CHECK: Status: RESOLVED | Noted: 2019-07-10 | Resolved: 2019-09-12

## 2019-09-17 ENCOUNTER — HOSPITAL ENCOUNTER (INPATIENT)
Age: 69
LOS: 1 days | Discharge: HOME HEALTH | DRG: 304 | End: 2019-09-21
Attending: INTERNAL MEDICINE | Admitting: INTERNAL MEDICINE
Payer: MEDICARE

## 2019-09-17 DIAGNOSIS — I50.9 CONGESTIVE HEART FAILURE, UNSPECIFIED HF CHRONICITY, UNSPECIFIED HEART FAILURE TYPE (HCC): ICD-10-CM

## 2019-09-17 DIAGNOSIS — I48.91 ATRIAL FIBRILLATION, UNSPECIFIED TYPE (HCC): Primary | ICD-10-CM

## 2019-09-17 LAB — PTH RELATED PEPTIDE: 3.4 PMOL/L (ref 0–2.3)

## 2019-09-17 PROCEDURE — 99285 EMERGENCY DEPT VISIT HI MDM: CPT

## 2019-09-17 RX ORDER — POTASSIUM CHLORIDE 20 MEQ/1
20 TABLET, EXTENDED RELEASE ORAL 2 TIMES DAILY
COMMUNITY

## 2019-09-18 ENCOUNTER — APPOINTMENT (OUTPATIENT)
Dept: GENERAL RADIOLOGY | Age: 69
DRG: 304 | End: 2019-09-18
Payer: MEDICARE

## 2019-09-18 PROBLEM — I16.0 HYPERTENSIVE URGENCY: Status: ACTIVE | Noted: 2019-09-18

## 2019-09-18 PROBLEM — R09.02 HYPOXIA: Status: ACTIVE | Noted: 2019-09-18

## 2019-09-18 PROBLEM — J96.01 ACUTE HYPOXEMIC RESPIRATORY FAILURE (HCC): Status: ACTIVE | Noted: 2019-09-18

## 2019-09-18 LAB
A/G RATIO: 0.8 (ref 1.1–2.2)
ALBUMIN SERPL-MCNC: 3 G/DL (ref 3.4–5)
ALP BLD-CCNC: 34 U/L (ref 40–129)
ALT SERPL-CCNC: 8 U/L (ref 10–40)
AMORPHOUS: ABNORMAL /HPF
ANION GAP SERPL CALCULATED.3IONS-SCNC: 10 MMOL/L (ref 3–16)
AST SERPL-CCNC: 15 U/L (ref 15–37)
BILIRUB SERPL-MCNC: 0.9 MG/DL (ref 0–1)
BILIRUBIN URINE: NEGATIVE
BLOOD, URINE: ABNORMAL
BUN BLDV-MCNC: 18 MG/DL (ref 7–20)
CALCIUM SERPL-MCNC: 9.1 MG/DL (ref 8.3–10.6)
CASTS: ABNORMAL /LPF
CHLORIDE BLD-SCNC: 107 MMOL/L (ref 99–110)
CLARITY: CLEAR
CO2: 24 MMOL/L (ref 21–32)
COLOR: YELLOW
CREAT SERPL-MCNC: 1.7 MG/DL (ref 0.8–1.3)
EKG ATRIAL RATE: 45 BPM
EKG DIAGNOSIS: NORMAL
EKG Q-T INTERVAL: 410 MS
EKG QRS DURATION: 114 MS
EKG QTC CALCULATION (BAZETT): 496 MS
EKG R AXIS: -52 DEGREES
EKG T AXIS: 18 DEGREES
EKG VENTRICULAR RATE: 88 BPM
EPITHELIAL CELLS, UA: ABNORMAL /HPF
GFR AFRICAN AMERICAN: 48
GFR NON-AFRICAN AMERICAN: 40
GLOBULIN: 3.6 G/DL
GLUCOSE BLD-MCNC: 112 MG/DL (ref 70–99)
GLUCOSE URINE: NEGATIVE MG/DL
HCT VFR BLD CALC: 30.5 % (ref 40.5–52.5)
HEMOGLOBIN: 10 G/DL (ref 13.5–17.5)
KETONES, URINE: NEGATIVE MG/DL
LEUKOCYTE ESTERASE, URINE: NEGATIVE
MCH RBC QN AUTO: 29.4 PG (ref 26–34)
MCHC RBC AUTO-ENTMCNC: 32.8 G/DL (ref 31–36)
MCV RBC AUTO: 89.6 FL (ref 80–100)
MICROSCOPIC EXAMINATION: YES
MUCUS: ABNORMAL /LPF
NITRITE, URINE: NEGATIVE
PDW BLD-RTO: 17 % (ref 12.4–15.4)
PH UA: 5.5 (ref 5–8)
PLATELET # BLD: 208 K/UL (ref 135–450)
PMV BLD AUTO: 7.8 FL (ref 5–10.5)
POTASSIUM SERPL-SCNC: 3.6 MMOL/L (ref 3.5–5.1)
PRO-BNP: ABNORMAL PG/ML (ref 0–124)
PROTEIN PROTEIN: 0.17 G/DL
PROTEIN PROTEIN: 173 MG/DL
PROTEIN UA: 100 MG/DL
RBC # BLD: 3.4 M/UL (ref 4.2–5.9)
RBC UA: ABNORMAL /HPF (ref 0–2)
SODIUM BLD-SCNC: 141 MMOL/L (ref 136–145)
SPECIFIC GRAVITY UA: 1.02 (ref 1–1.03)
TOTAL PROTEIN: 6.6 G/DL (ref 6.4–8.2)
TROPONIN: <0.01 NG/ML
URINE REFLEX TO CULTURE: ABNORMAL
URINE TYPE: ABNORMAL
UROBILINOGEN, URINE: 0.2 E.U./DL
WBC # BLD: 5.9 K/UL (ref 4–11)
WBC UA: ABNORMAL /HPF (ref 0–5)

## 2019-09-18 PROCEDURE — 93005 ELECTROCARDIOGRAM TRACING: CPT | Performed by: PHYSICIAN ASSISTANT

## 2019-09-18 PROCEDURE — 6370000000 HC RX 637 (ALT 250 FOR IP): Performed by: INTERNAL MEDICINE

## 2019-09-18 PROCEDURE — 84165 PROTEIN E-PHORESIS SERUM: CPT

## 2019-09-18 PROCEDURE — 2580000003 HC RX 258: Performed by: INTERNAL MEDICINE

## 2019-09-18 PROCEDURE — 71046 X-RAY EXAM CHEST 2 VIEWS: CPT

## 2019-09-18 PROCEDURE — 96372 THER/PROPH/DIAG INJ SC/IM: CPT

## 2019-09-18 PROCEDURE — 6360000002 HC RX W HCPCS: Performed by: INTERNAL MEDICINE

## 2019-09-18 PROCEDURE — 81001 URINALYSIS AUTO W/SCOPE: CPT

## 2019-09-18 PROCEDURE — 96374 THER/PROPH/DIAG INJ IV PUSH: CPT

## 2019-09-18 PROCEDURE — 96365 THER/PROPH/DIAG IV INF INIT: CPT

## 2019-09-18 PROCEDURE — 85027 COMPLETE CBC AUTOMATED: CPT

## 2019-09-18 PROCEDURE — 84484 ASSAY OF TROPONIN QUANT: CPT

## 2019-09-18 PROCEDURE — 80053 COMPREHEN METABOLIC PANEL: CPT

## 2019-09-18 PROCEDURE — 93010 ELECTROCARDIOGRAM REPORT: CPT | Performed by: INTERNAL MEDICINE

## 2019-09-18 PROCEDURE — 6370000000 HC RX 637 (ALT 250 FOR IP): Performed by: PHYSICIAN ASSISTANT

## 2019-09-18 PROCEDURE — G0378 HOSPITAL OBSERVATION PER HR: HCPCS

## 2019-09-18 PROCEDURE — 84166 PROTEIN E-PHORESIS/URINE/CSF: CPT

## 2019-09-18 PROCEDURE — 2500000003 HC RX 250 WO HCPCS: Performed by: INTERNAL MEDICINE

## 2019-09-18 PROCEDURE — 83883 ASSAY NEPHELOMETRY NOT SPEC: CPT

## 2019-09-18 PROCEDURE — 96375 TX/PRO/DX INJ NEW DRUG ADDON: CPT

## 2019-09-18 PROCEDURE — 83880 ASSAY OF NATRIURETIC PEPTIDE: CPT

## 2019-09-18 PROCEDURE — 84156 ASSAY OF PROTEIN URINE: CPT

## 2019-09-18 PROCEDURE — 96366 THER/PROPH/DIAG IV INF ADDON: CPT

## 2019-09-18 PROCEDURE — 84155 ASSAY OF PROTEIN SERUM: CPT

## 2019-09-18 PROCEDURE — 96376 TX/PRO/DX INJ SAME DRUG ADON: CPT

## 2019-09-18 PROCEDURE — 36415 COLL VENOUS BLD VENIPUNCTURE: CPT

## 2019-09-18 PROCEDURE — 6360000002 HC RX W HCPCS: Performed by: PHYSICIAN ASSISTANT

## 2019-09-18 RX ORDER — LABETALOL HYDROCHLORIDE 5 MG/ML
10 INJECTION, SOLUTION INTRAVENOUS EVERY 4 HOURS PRN
Status: DISCONTINUED | OUTPATIENT
Start: 2019-09-18 | End: 2019-09-21 | Stop reason: HOSPADM

## 2019-09-18 RX ORDER — POTASSIUM CHLORIDE 20 MEQ/1
40 TABLET, EXTENDED RELEASE ORAL PRN
Status: DISCONTINUED | OUTPATIENT
Start: 2019-09-18 | End: 2019-09-21 | Stop reason: HOSPADM

## 2019-09-18 RX ORDER — DIVALPROEX SODIUM 250 MG/1
500 TABLET, DELAYED RELEASE ORAL EVERY MORNING
Status: DISCONTINUED | OUTPATIENT
Start: 2019-09-19 | End: 2019-09-20

## 2019-09-18 RX ORDER — FUROSEMIDE 10 MG/ML
40 INJECTION INTRAMUSCULAR; INTRAVENOUS 2 TIMES DAILY
Status: DISCONTINUED | OUTPATIENT
Start: 2019-09-18 | End: 2019-09-19

## 2019-09-18 RX ORDER — QUETIAPINE FUMARATE 25 MG/1
50 TABLET, FILM COATED ORAL 2 TIMES DAILY
Status: DISCONTINUED | OUTPATIENT
Start: 2019-09-18 | End: 2019-09-18

## 2019-09-18 RX ORDER — ONDANSETRON 4 MG/1
4 TABLET, ORALLY DISINTEGRATING ORAL EVERY 4 HOURS PRN
Status: DISCONTINUED | OUTPATIENT
Start: 2019-09-18 | End: 2019-09-21 | Stop reason: HOSPADM

## 2019-09-18 RX ORDER — METOPROLOL SUCCINATE 25 MG/1
25 TABLET, EXTENDED RELEASE ORAL DAILY
Status: DISCONTINUED | OUTPATIENT
Start: 2019-09-18 | End: 2019-09-20

## 2019-09-18 RX ORDER — LOSARTAN POTASSIUM 100 MG/1
100 TABLET ORAL DAILY
Status: DISCONTINUED | OUTPATIENT
Start: 2019-09-18 | End: 2019-09-21 | Stop reason: HOSPADM

## 2019-09-18 RX ORDER — ATORVASTATIN CALCIUM 40 MG/1
40 TABLET, FILM COATED ORAL DAILY
Status: DISCONTINUED | OUTPATIENT
Start: 2019-09-18 | End: 2019-09-21 | Stop reason: HOSPADM

## 2019-09-18 RX ORDER — QUETIAPINE FUMARATE 25 MG/1
50 TABLET, FILM COATED ORAL NIGHTLY
Status: DISCONTINUED | OUTPATIENT
Start: 2019-09-18 | End: 2019-09-18

## 2019-09-18 RX ORDER — FUROSEMIDE 10 MG/ML
20 INJECTION INTRAMUSCULAR; INTRAVENOUS ONCE
Status: COMPLETED | OUTPATIENT
Start: 2019-09-18 | End: 2019-09-18

## 2019-09-18 RX ORDER — ACETAMINOPHEN 160 MG/5ML
650 SOLUTION ORAL EVERY 4 HOURS PRN
Status: DISCONTINUED | OUTPATIENT
Start: 2019-09-18 | End: 2019-09-21 | Stop reason: HOSPADM

## 2019-09-18 RX ORDER — POTASSIUM CHLORIDE 7.45 MG/ML
10 INJECTION INTRAVENOUS PRN
Status: DISCONTINUED | OUTPATIENT
Start: 2019-09-18 | End: 2019-09-21 | Stop reason: HOSPADM

## 2019-09-18 RX ORDER — QUETIAPINE FUMARATE 25 MG/1
50 TABLET, FILM COATED ORAL EVERY MORNING
Status: DISCONTINUED | OUTPATIENT
Start: 2019-09-19 | End: 2019-09-21 | Stop reason: HOSPADM

## 2019-09-18 RX ORDER — SODIUM CHLORIDE 0.9 % (FLUSH) 0.9 %
10 SYRINGE (ML) INJECTION PRN
Status: DISCONTINUED | OUTPATIENT
Start: 2019-09-18 | End: 2019-09-21 | Stop reason: HOSPADM

## 2019-09-18 RX ORDER — ASPIRIN 81 MG/1
81 TABLET, CHEWABLE ORAL DAILY
Status: DISCONTINUED | OUTPATIENT
Start: 2019-09-18 | End: 2019-09-21 | Stop reason: HOSPADM

## 2019-09-18 RX ORDER — DIVALPROEX SODIUM 250 MG/1
500 TABLET, DELAYED RELEASE ORAL 2 TIMES DAILY
Status: DISCONTINUED | OUTPATIENT
Start: 2019-09-18 | End: 2019-09-18

## 2019-09-18 RX ORDER — ACETAMINOPHEN 325 MG/1
650 TABLET ORAL EVERY 4 HOURS PRN
Status: DISCONTINUED | OUTPATIENT
Start: 2019-09-18 | End: 2019-09-21 | Stop reason: HOSPADM

## 2019-09-18 RX ORDER — HYDRALAZINE HYDROCHLORIDE 25 MG/1
25 TABLET, FILM COATED ORAL EVERY 8 HOURS SCHEDULED
Status: DISCONTINUED | OUTPATIENT
Start: 2019-09-18 | End: 2019-09-19

## 2019-09-18 RX ORDER — DIVALPROEX SODIUM 250 MG/1
1000 TABLET, DELAYED RELEASE ORAL NIGHTLY
Status: DISCONTINUED | OUTPATIENT
Start: 2019-09-18 | End: 2019-09-20

## 2019-09-18 RX ORDER — PROMETHAZINE HYDROCHLORIDE 6.25 MG/5ML
12.5 SYRUP ORAL EVERY 4 HOURS PRN
Status: DISCONTINUED | OUTPATIENT
Start: 2019-09-18 | End: 2019-09-21 | Stop reason: HOSPADM

## 2019-09-18 RX ORDER — ACETAMINOPHEN 650 MG/1
650 SUPPOSITORY RECTAL EVERY 4 HOURS PRN
Status: DISCONTINUED | OUTPATIENT
Start: 2019-09-18 | End: 2019-09-21 | Stop reason: HOSPADM

## 2019-09-18 RX ORDER — PANTOPRAZOLE SODIUM 40 MG/1
40 TABLET, DELAYED RELEASE ORAL DAILY
Status: DISCONTINUED | OUTPATIENT
Start: 2019-09-18 | End: 2019-09-21 | Stop reason: HOSPADM

## 2019-09-18 RX ORDER — PROMETHAZINE HYDROCHLORIDE 25 MG/1
12.5 TABLET ORAL EVERY 4 HOURS PRN
Status: DISCONTINUED | OUTPATIENT
Start: 2019-09-18 | End: 2019-09-21 | Stop reason: HOSPADM

## 2019-09-18 RX ORDER — PROMETHAZINE HYDROCHLORIDE 25 MG/ML
12.5 INJECTION, SOLUTION INTRAMUSCULAR; INTRAVENOUS EVERY 4 HOURS PRN
Status: DISCONTINUED | OUTPATIENT
Start: 2019-09-18 | End: 2019-09-21 | Stop reason: HOSPADM

## 2019-09-18 RX ORDER — ONDANSETRON 2 MG/ML
4 INJECTION INTRAMUSCULAR; INTRAVENOUS EVERY 4 HOURS PRN
Status: DISCONTINUED | OUTPATIENT
Start: 2019-09-18 | End: 2019-09-21 | Stop reason: HOSPADM

## 2019-09-18 RX ORDER — MAGNESIUM SULFATE 1 G/100ML
1 INJECTION INTRAVENOUS PRN
Status: DISCONTINUED | OUTPATIENT
Start: 2019-09-18 | End: 2019-09-21 | Stop reason: HOSPADM

## 2019-09-18 RX ORDER — QUETIAPINE FUMARATE 100 MG/1
100 TABLET, FILM COATED ORAL NIGHTLY
Status: DISCONTINUED | OUTPATIENT
Start: 2019-09-18 | End: 2019-09-21 | Stop reason: HOSPADM

## 2019-09-18 RX ORDER — DIVALPROEX SODIUM 250 MG/1
500 TABLET, DELAYED RELEASE ORAL NIGHTLY
Status: DISCONTINUED | OUTPATIENT
Start: 2019-09-18 | End: 2019-09-18

## 2019-09-18 RX ADMIN — HYDRALAZINE HYDROCHLORIDE 25 MG: 25 TABLET, FILM COATED ORAL at 15:30

## 2019-09-18 RX ADMIN — LABETALOL HYDROCHLORIDE 10 MG: 5 INJECTION INTRAVENOUS at 18:26

## 2019-09-18 RX ADMIN — QUETIAPINE FUMARATE 50 MG: 25 TABLET ORAL at 08:05

## 2019-09-18 RX ADMIN — PANTOPRAZOLE SODIUM 40 MG: 40 TABLET, DELAYED RELEASE ORAL at 08:05

## 2019-09-18 RX ADMIN — METOPROLOL SUCCINATE 25 MG: 25 TABLET, EXTENDED RELEASE ORAL at 08:05

## 2019-09-18 RX ADMIN — FUROSEMIDE 20 MG: 10 INJECTION, SOLUTION INTRAMUSCULAR; INTRAVENOUS at 03:35

## 2019-09-18 RX ADMIN — Medication 10 ML: at 08:06

## 2019-09-18 RX ADMIN — LOSARTAN POTASSIUM 100 MG: 100 TABLET, FILM COATED ORAL at 09:16

## 2019-09-18 RX ADMIN — FUROSEMIDE 40 MG: 10 INJECTION, SOLUTION INTRAMUSCULAR; INTRAVENOUS at 15:30

## 2019-09-18 RX ADMIN — DIVALPROEX SODIUM 500 MG: 250 TABLET, DELAYED RELEASE ORAL at 08:05

## 2019-09-18 RX ADMIN — NITROGLYCERIN 0.5 INCH: 20 OINTMENT TOPICAL at 03:35

## 2019-09-18 RX ADMIN — DIVALPROEX SODIUM 1000 MG: 250 TABLET, DELAYED RELEASE ORAL at 22:44

## 2019-09-18 RX ADMIN — HYDRALAZINE HYDROCHLORIDE 25 MG: 25 TABLET, FILM COATED ORAL at 22:44

## 2019-09-18 RX ADMIN — ASPIRIN 81 MG CHEWABLE TABLET 81 MG: 81 TABLET CHEWABLE at 08:05

## 2019-09-18 RX ADMIN — QUETIAPINE FUMARATE 100 MG: 100 TABLET ORAL at 22:44

## 2019-09-18 RX ADMIN — ENOXAPARIN SODIUM 40 MG: 40 INJECTION SUBCUTANEOUS at 08:05

## 2019-09-18 RX ADMIN — LABETALOL HYDROCHLORIDE 10 MG: 5 INJECTION INTRAVENOUS at 13:20

## 2019-09-18 RX ADMIN — ATORVASTATIN CALCIUM 40 MG: 40 TABLET, FILM COATED ORAL at 08:05

## 2019-09-18 ASSESSMENT — PAIN SCALES - GENERAL
PAINLEVEL_OUTOF10: 0

## 2019-09-18 NOTE — PROGRESS NOTES
Patient admitted to Rm 108 via stretcher from ER, telemetry intact, daughter accompanying. Oriented to room, call lt within reach, bed alarm in place, and instructed to call staff and not get out of bed without asst.  Daughter and patient both verbalized an understanding. Secure message to Dr. Jaki Aguayo:  BP remains high 189/123 HR 97 . .. 0430 /121 HR 85. .. daughter said pt was previously on aldactone and Norvasc but taken off recently.

## 2019-09-18 NOTE — H&P
urgency  Active Problems:    A-fib (HCC)    CKD (chronic kidney disease)    Hypoxia  Resolved Problems:    * No resolved hospital problems. *      PLAN:    Hypertensive Urgency: In setting of recurrent syncope and episodes of orthostasis. Likely has some volume overload as well causing his SOB and hypoxia. Resume Toprol XL 25mg and Losartan 100 mg (recently increased by his Cardiologist Dr. Pranay Solis). Will ask for Nephrology to re-evaluate to assist with BP management and volume issues, as they were following during his most recent hospitalization. Monitor. Afib: s/p Watchman. Rate controlled. Continue current regimen. DVT Prophylaxis: Lovenox  Diet: DIET CARDIAC;  Code Status: Full Code    PT/OT Eval Status: Pending    Dispo - 2 days     Delmer Tamez MD    Thank you Axel Randhawa MD for the opportunity to be involved in this patient's care. If you have any questions or concerns please feel free to contact me at [phone].

## 2019-09-18 NOTE — CONSULTS
[facility]    Consult Note           Reason for Consult:  ckd  Requesting Physician:  Dr. María Lugo    Chief Complaint:  sob    History of Present Illness on 9/18/19:    68 y.o. yo male with PMH of DM, HTN, CVA, HLD, a fib, CKD w baseline cr of low to high 1s who is admitted for elevated bP and sob  Pt was brought to the Ed by family for desaturation at home and inability to lay flat  BP was in the 180-200s/110s  Received labetalol iv     Past Medical History:        Diagnosis Date    Acute gastric ulcer with bleeding     Acute metabolic encephalopathy     MAISHA (acute kidney injury) ([address]     Requiring dialysis since 7/4/17 in setting of hemorrhagic shock    ATN (acute tubular necrosis) ([address]     Atrial fib/flutter, transient     Atrial fibrillation ([address]     BPH (benign prostatic hyperplasia)     Cerebral artery occlusion with cerebral infarction ([address] 08/05/2017    weakness and aphasia    Diabetes mellitus ([address]     GI bleed     Glaucoma     History of blood transfusion     Hx of diabetic neuropathy     Hyperlipidemia     Hypertension     Mitral insufficiency     Mobility impaired     uses walker and W/C    Pulmonary hypertension ([address]     Traumatic hemorrhagic shock ([address]        Past Surgical History:        Procedure Laterality Date    DENTAL SURGERY N/A 3/28/2019    EXTRACTION OF ALL REMAINING UPPER AND LOWER TEETH AND ALVEOLOPLASTY   performed by Audi Harris DMD at [address] [place] [address] N/A 6/22/2019    LAPAROSCOPIC, CONVERTED TO OPEN INCARCERATED UMBILICAL HERNIA REPAIR performed by Kalani Lynn MD at [place] [address].  10/11/13     CYSTOSCOPY, TRANSURETHRAL RESECTION OF PROSTATE WITH OLYMPUS    PROSTATE BIOPSY      UPPER GASTROINTESTINAL ENDOSCOPY  7/13 17       Home Medications:    No current facility-administered medications on file prior to encounter.       Current Outpatient Medications on File Prior to Encounter   Medication Sig Dispense Refill    potassium chloride (KLOR-CON M) 20 MEQ extended release tablet Take 20 mEq by mouth 2 times daily      furosemide (LASIX) 40 MG tablet Take 1 tablet by mouth daily (Patient taking differently: Take 20 mg by mouth See Admin Instructions Indications: every other )      losartan (COZAAR) 50 MG tablet Take 1 tablet by mouth daily 30 tablet 3    metoprolol succinate (TOPROL XL) 25 MG extended release tablet Take 1 tablet by mouth daily 30 tablet 3    acetaminophen (AMINOFEN) 325 MG tablet Take 2 tablets by mouth every 6 hours as needed for Pain 30 tablet     divalproex (DEPAKOTE) 500 MG DR tablet Take 500 mg by mouth See Admin Instructions 500mg in am, 1000mg at hs      QUEtiapine (SEROQUEL) 50 MG tablet Take 50 mg by mouth See Admin Instructions 50mg in am, 100mg at hs      levOCARNitine fumarate (CARNITOR) 250 MG capsule Take 2 capsules by mouth 3 times daily 180 capsule 1    Lactobacillus (PROBIOTIC ACIDOPHILUS) TABS Take 1 tablet by mouth daily 30 tablet 0    aspirin 81 MG tablet Take 81 mg by mouth daily      b complex vitamins capsule Take 1 capsule by mouth daily      melatonin 5 MG TABS tablet Take 5 mg by mouth nightly      atorvastatin (LIPITOR) 40 MG tablet Take 40 mg by mouth daily      vitamin D (CHOLECALCIFEROL) 1000 UNIT TABS tablet Take 1,000 Units by mouth daily      pantoprazole (PROTONIX) 40 MG tablet Take 40 mg by mouth daily      fenofibrate (TRICOR) 145 MG tablet Take 145 mg by mouth daily         Allergies:  Bactrim [sulfamethoxazole-trimethoprim]    Social History:    Social History     Socioeconomic History    Marital status:      Spouse name: Not on file    Number of children: Not on file    Years of education: Not on file    Highest education level: Not on file   Occupational History    Not on file   Social Needs    Financial resource strain: Not on file    Food insecurity:     Worry: Not on file     Inability: Not on normal  Abdomen:  +bs, soft, nt, nd, no hepatosplenomegaly  Ext: 1+ lower extremity edema  Neuro/Psy: AAoriented times 3 ; moves all 4 ext  Musculoskeletal:  Rom, muscular strength intact; digits, nails normal    Data/  Recent Labs     09/18/19  0105   WBC 5.9   HGB 10.0*   HCT 30.5*   MCV 89.6        Recent Labs     09/18/19  0105      K 3.6      CO2 24   GLUCOSE 112*   BUN 18   CREATININE 1.7*   LABGLOM 40*   GFRAA 48*      Echo  Technically difficult examination due to patient immobility. Definity® used   for myocardial border enhancement.   Low normal systolic function with a visually estimated ejection fraction of   50-55%.   Mild concentric left ventricular hypertrophy.   No regional wall motion abnormalities are noted.   Elevated left atrial pressures with a septal E/e' ratio of 11.2.   Mild bi-atrial enlargement.   Mild mitral and tricuspid regurgitation.   Systolic pulmonary artery pressure (SPAP) is elevated and estimated at 41   mmHg (right atrial pressure 8 mmHg) consistent with mild pulmonary   hypertension. Assessment  -CKD III, recent baseline cr 1.5-1.9  -Hypertensive urgency  -Acute on chronic CHFrEF w pulm HTN, mild  -right moderate pleural effusion   Monitor with diuresis  -morbid obesity  -Afib  -Proteinuria     Plan  -increase lasix to 40 mg BID  - may need thoracentesis of the right if not responsive to diuretics  -add  hydralazine 25 mg TID  -check spep/upep/sflc ratio  -Serial renal panel  -daily wts and strict i/o  -renal dose medications   -avoid nephrotoxins        Thank you for the consultation. Please do not hesitate to call with questions.     Barby Smith  [facility]  Office: [phone]  Fax:    [phone]

## 2019-09-18 NOTE — CARE COORDINATION
CASE MANAGEMENT INITIAL ASSESSMENT      Reviewed chart and met with patient today, re: potential needs at discharge. Triggered by age and dx  Explained Case Management role/services. Family present: no  Primary contact information: Yudith Sanders , daughter, [phone]    Admit date/status: OBSERVATION 9/17/19 @   Diagnosis: acute hypoxemic resp failure    Insurance: Medicare. BCBS secondary    Precert required for SNF - N        3 night stay required - Y    Living arrangements, Adls, care needs, prior to admission: indep in adls. Lives with spouse who has alzhemiers. Daughter lives with pt and spouse to help care for spouse    Transportation: to be determined    Durable Medical Equipment at home: Walker_[id number]_    Services in the home and/or outpatient, prior to admission: pt states he has aide services Monday-Friday. Unsure of who this is set up through. Pt adds that \"a nurse comes every other day , and speech therapy with physical therapy\". Again, unsure of what agency this is through. PT/OT recs: not completed at this time    [address] Notification (HEN): not started    Barriers to discharge: none identified    Plan/comments: pt intends to discharge home without needs from case mgmt team. Pt states he would NOT be agreeable to return to skilled nursing facility.        [facility] on chart for MD shanda Garcia RN CM

## 2019-09-18 NOTE — ED PROVIDER NOTES
[facility] Emergency Department    CHIEF COMPLAINT  Shortness of Breath (has been home from NH for 3 weeks, has been retaining fluid recently. at home at low oxygen and high blood pressure. )      HISTORY OF PRESENT ILLNESS  Colleen Edgar is a 68 y.o. male who presents to the ED complaining of elevated blood pressure and leg swelling. Patient observed lying in bed, appears nontoxic and in no acute distress. Accompanied by daughter today for evaluation. Patient without history of congestive heart failure although is on diuretic for fluid retention. Does have history of chronic kidney disease. Started on Lasix on Friday. Daughter reports that they live at home with their father. States that pulse ox last night was in low 80s. Shortness of breath appears worse when lying flat. He denies any current shortness of breath. No associated chest pain. No fevers chills. No headache, lightheadedness, dizziness or confusion. No abdominal pain or discomfort. No nausea or vomiting. Still produces urine. No other complaints, modifying factors or associated symptoms. Nursing notes reviewed.    Past Medical History:   Diagnosis Date    Acute gastric ulcer with bleeding     Acute metabolic encephalopathy     MAISHA (acute kidney injury) ([address]     Requiring dialysis since 7/4/17 in setting of hemorrhagic shock    ATN (acute tubular necrosis) (HCC)     Atrial fib/flutter, transient     Atrial fibrillation (HCC)     BPH (benign prostatic hyperplasia)     Cerebral artery occlusion with cerebral infarction ([address] 08/05/2017    weakness and aphasia    Diabetes mellitus ([address]     GI bleed     Glaucoma     History of blood transfusion     Hx of diabetic neuropathy     Hyperlipidemia     Hypertension     Mitral insufficiency     Mobility impaired     uses walker and W/C    Pulmonary hypertension ([address]     Traumatic hemorrhagic shock ([address]      Past Surgical History: clubs or organizations: Not on file     Relationship status: Not on file    Intimate partner violence:     Fear of current or ex partner: Not on file     Emotionally abused: Not on file     Physically abused: Not on file     Forced sexual activity: Not on file   Other Topics Concern    Not on file   Social History Narrative    Not on file     No current facility-administered medications for this encounter.       Current Outpatient Medications   Medication Sig Dispense Refill    potassium chloride (KLOR-CON M) 20 MEQ extended release tablet Take 20 mEq by mouth 2 times daily      furosemide (LASIX) 40 MG tablet Take 1 tablet by mouth daily (Patient taking differently: Take 20 mg by mouth See Admin Instructions Indications: every other )      losartan (COZAAR) 50 MG tablet Take 1 tablet by mouth daily 30 tablet 3    metoprolol succinate (TOPROL XL) 25 MG extended release tablet Take 1 tablet by mouth daily 30 tablet 3    acetaminophen (AMINOFEN) 325 MG tablet Take 2 tablets by mouth every 6 hours as needed for Pain 30 tablet     divalproex (DEPAKOTE) 500 MG DR tablet Take 500 mg by mouth See Admin Instructions 500mg in am, 1000mg at hs      QUEtiapine (SEROQUEL) 50 MG tablet Take 50 mg by mouth See Admin Instructions 50mg in am, 100mg at hs      levOCARNitine fumarate (CARNITOR) 250 MG capsule Take 2 capsules by mouth 3 times daily 180 capsule 1    Lactobacillus (PROBIOTIC ACIDOPHILUS) TABS Take 1 tablet by mouth daily 30 tablet 0    aspirin 81 MG tablet Take 81 mg by mouth daily      b complex vitamins capsule Take 1 capsule by mouth daily      melatonin 5 MG TABS tablet Take 5 mg by mouth nightly      atorvastatin (LIPITOR) 40 MG tablet Take 40 mg by mouth daily      vitamin D (CHOLECALCIFEROL) 1000 UNIT TABS tablet Take 1,000 Units by mouth daily      pantoprazole (PROTONIX) 40 MG tablet Take 40 mg by mouth daily      fenofibrate (TRICOR) 145 MG tablet Take 145 mg by mouth daily       Allergies Allergen Reactions    Bactrim [Sulfamethoxazole-Trimethoprim]      CKD       REVIEW OF SYSTEMS  10 systems reviewed, pertinent positives per HPI otherwise noted to be negative    PHYSICAL EXAM  BP (!) 176/110   Pulse 98   Resp 16   Ht 6' 5\" (1.956 m)   Wt 275 lb (124.7 kg)   SpO2 98%   BMI 32.61 kg/m²   GENERAL APPEARANCE: Awake and alert. Cooperative. No acute distress. HEAD: Normocephalic. Atraumatic. EYES: PERRL. EOM's grossly intact. ENT: Mucous membranes are moist.   NECK: Supple. No JVD. No tracheal tenderness or deviation. No crepitus. HEART: RRR. No murmurs. No chest wall tenderness. LUNGS: Respirations unlabored. CTAB. Good air exchange. Speaking comfortably in full sentences. No wheezes, rhonchi, rales. ABDOMEN: Soft. Non-distended. Non-tender. No guarding or rebound. No midline pulsatile mass. EXTREMITIES: No peripheral edema. +1 pitting edema noted bilaterally. Patient with open blister to anterior right shin. Mildly erythematous without evidence to suggest true cellulitis. No streaking. Compartment soft. No palpable cords or masses. Moves all extremities equally. All extremities neurovascularly intact. SKIN: Warm and dry. No acute rashes. NEUROLOGICAL: Alert and oriented. CN's 2-12 intact. No gross facial drooping. Strength 5/5, sensation intact. PSYCHIATRIC: Normal mood and affect. RADIOLOGY  Xr Chest Standard (2 Vw)    Result Date: 9/18/2019  EXAMINATION: TWO XRAY VIEWS OF THE CHEST 9/18/2019 12:36 am COMPARISON: Chest radiographs 09/09/2019 HISTORY: ORDERING SYSTEM PROVIDED HISTORY: sob TECHNOLOGIST PROVIDED HISTORY: Reason for exam:->sob Reason for Exam: sob FINDINGS: Moderate volume right pleural effusion, increased from prior exam.  Elevated right hemidiaphragm is unchanged. Clear left lung. Obscured right heart borders. Otherwise unchanged cardiomediastinal contours. Probably a Watchman device overlying the left atrial appendage.      Moderate volume CO2 24 21 - 32 mmol/L    Anion Gap 10 3 - 16    Glucose 112 (H) 70 - 99 mg/dL    BUN 18 7 - 20 mg/dL    CREATININE 1.7 (H) 0.8 - 1.3 mg/dL    GFR Non- 40 (A) >60    GFR  48 (A) >60    Calcium 9.1 8.3 - 10.6 mg/dL    Total Protein 6.6 6.4 - 8.2 g/dL    Alb 3.0 (L) 3.4 - 5.0 g/dL    Albumin/Globulin Ratio 0.8 (L) 1.1 - 2.2    Total Bilirubin 0.9 0.0 - 1.0 mg/dL    Alkaline Phosphatase 34 (L) 40 - 129 U/L    ALT 8 (L) 10 - 40 U/L    AST 15 15 - 37 U/L    Globulin 3.6 g/dL   Troponin   Result Value Ref Range    Troponin <0.01 <0.01 ng/mL   Brain Natriuretic Peptide   Result Value Ref Range    Pro-BNP 42,688 (H) 0 - 124 pg/mL   Urinalysis Reflex to Culture   Result Value Ref Range    Color, UA Yellow Straw/Yellow    Clarity, UA Clear Clear    Glucose, Ur Negative Negative mg/dL    Bilirubin Urine Negative Negative    Ketones, Urine Negative Negative mg/dL    Specific Gravity, UA 1.025 1.005 - 1.030    Blood, Urine LARGE (A) Negative    pH, UA 5.5 5.0 - 8.0    Protein,  (A) Negative mg/dL    Urobilinogen, Urine 0.2 <2.0 E.U./dL    Nitrite, Urine Negative Negative    Leukocyte Esterase, Urine Negative Negative    Microscopic Examination YES     Urine Reflex to Culture Not Indicated     Urine Type Not Specified    Electrophoresis Protein, Serum without Reflex to Immunofixation   Result Value Ref Range    Total Protein 6.5 6.4 - 8.2 g/dL   [place]/Lambda Free Lt Chains, Serum Quant   Result Value Ref Range    KAPPA/LAMBDA TEST COMMENT see below    Microscopic Urinalysis   Result Value Ref Range    Casts 3-5 Hyaline (A) /LPF    Mucus, UA 2+ (A) /LPF    WBC, UA 3-5 0 - 5 /HPF    RBC, UA  (A) 0 - 2 /HPF    Epi Cells 3-5 /HPF    Amorphous, UA 1+ (A) /HPF   EKG 12 Lead   Result Value Ref Range    Ventricular Rate 88 BPM    Atrial Rate 45 BPM    QRS Duration 114 ms    Q-T Interval 410 ms    QTc Calculation (Bazett) 496 ms    R Axis -52 degrees    T Axis 18 degrees    Diagnosis       Atrial

## 2019-09-19 LAB
ANION GAP SERPL CALCULATED.3IONS-SCNC: 11 MMOL/L (ref 3–16)
BASOPHILS ABSOLUTE: 0 K/UL (ref 0–0.2)
BASOPHILS RELATIVE PERCENT: 0.8 %
BUN BLDV-MCNC: 16 MG/DL (ref 7–20)
CALCIUM SERPL-MCNC: 8.5 MG/DL (ref 8.3–10.6)
CHLORIDE BLD-SCNC: 108 MMOL/L (ref 99–110)
CO2: 27 MMOL/L (ref 21–32)
CREAT SERPL-MCNC: 1.5 MG/DL (ref 0.8–1.3)
EOSINOPHILS ABSOLUTE: 0.1 K/UL (ref 0–0.6)
EOSINOPHILS RELATIVE PERCENT: 2.8 %
GFR AFRICAN AMERICAN: 56
GFR NON-AFRICAN AMERICAN: 46
GLUCOSE BLD-MCNC: 119 MG/DL (ref 70–99)
HCT VFR BLD CALC: 28.9 % (ref 40.5–52.5)
HEMOGLOBIN: 9.5 G/DL (ref 13.5–17.5)
LYMPHOCYTES ABSOLUTE: 1.4 K/UL (ref 1–5.1)
LYMPHOCYTES RELATIVE PERCENT: 28.7 %
MAGNESIUM: 1.6 MG/DL (ref 1.8–2.4)
MCH RBC QN AUTO: 29.6 PG (ref 26–34)
MCHC RBC AUTO-ENTMCNC: 32.9 G/DL (ref 31–36)
MCV RBC AUTO: 89.9 FL (ref 80–100)
MONOCYTES ABSOLUTE: 0.3 K/UL (ref 0–1.3)
MONOCYTES RELATIVE PERCENT: 5.8 %
NEUTROPHILS ABSOLUTE: 3.1 K/UL (ref 1.7–7.7)
NEUTROPHILS RELATIVE PERCENT: 61.9 %
PDW BLD-RTO: 16.7 % (ref 12.4–15.4)
PLATELET # BLD: 199 K/UL (ref 135–450)
PMV BLD AUTO: 8.2 FL (ref 5–10.5)
POTASSIUM SERPL-SCNC: 3 MMOL/L (ref 3.5–5.1)
RBC # BLD: 3.22 M/UL (ref 4.2–5.9)
SODIUM BLD-SCNC: 146 MMOL/L (ref 136–145)
WBC # BLD: 5 K/UL (ref 4–11)

## 2019-09-19 PROCEDURE — 36415 COLL VENOUS BLD VENIPUNCTURE: CPT

## 2019-09-19 PROCEDURE — 97110 THERAPEUTIC EXERCISES: CPT

## 2019-09-19 PROCEDURE — 97535 SELF CARE MNGMENT TRAINING: CPT

## 2019-09-19 PROCEDURE — 96376 TX/PRO/DX INJ SAME DRUG ADON: CPT

## 2019-09-19 PROCEDURE — G0378 HOSPITAL OBSERVATION PER HR: HCPCS

## 2019-09-19 PROCEDURE — 96372 THER/PROPH/DIAG INJ SC/IM: CPT

## 2019-09-19 PROCEDURE — 6370000000 HC RX 637 (ALT 250 FOR IP): Performed by: INTERNAL MEDICINE

## 2019-09-19 PROCEDURE — 97162 PT EVAL MOD COMPLEX 30 MIN: CPT

## 2019-09-19 PROCEDURE — 85025 COMPLETE CBC W/AUTO DIFF WBC: CPT

## 2019-09-19 PROCEDURE — 6360000002 HC RX W HCPCS: Performed by: INTERNAL MEDICINE

## 2019-09-19 PROCEDURE — 83735 ASSAY OF MAGNESIUM: CPT

## 2019-09-19 PROCEDURE — 80048 BASIC METABOLIC PNL TOTAL CA: CPT

## 2019-09-19 PROCEDURE — 2500000003 HC RX 250 WO HCPCS: Performed by: INTERNAL MEDICINE

## 2019-09-19 PROCEDURE — 2580000003 HC RX 258: Performed by: INTERNAL MEDICINE

## 2019-09-19 PROCEDURE — 97165 OT EVAL LOW COMPLEX 30 MIN: CPT

## 2019-09-19 PROCEDURE — 93228 REMOTE 30 DAY ECG REV/REPORT: CPT | Performed by: INTERNAL MEDICINE

## 2019-09-19 PROCEDURE — 97530 THERAPEUTIC ACTIVITIES: CPT

## 2019-09-19 RX ORDER — MAGNESIUM SULFATE IN WATER 40 MG/ML
2 INJECTION, SOLUTION INTRAVENOUS ONCE
Status: COMPLETED | OUTPATIENT
Start: 2019-09-19 | End: 2019-09-19

## 2019-09-19 RX ORDER — POTASSIUM CHLORIDE 20 MEQ/1
40 TABLET, EXTENDED RELEASE ORAL
Status: COMPLETED | OUTPATIENT
Start: 2019-09-19 | End: 2019-09-19

## 2019-09-19 RX ORDER — HYDRALAZINE HYDROCHLORIDE 50 MG/1
50 TABLET, FILM COATED ORAL EVERY 8 HOURS SCHEDULED
Status: DISCONTINUED | OUTPATIENT
Start: 2019-09-19 | End: 2019-09-20

## 2019-09-19 RX ORDER — FUROSEMIDE 10 MG/ML
40 INJECTION INTRAMUSCULAR; INTRAVENOUS 2 TIMES DAILY
Status: DISCONTINUED | OUTPATIENT
Start: 2019-09-20 | End: 2019-09-21 | Stop reason: HOSPADM

## 2019-09-19 RX ADMIN — LABETALOL HYDROCHLORIDE 10 MG: 5 INJECTION INTRAVENOUS at 00:59

## 2019-09-19 RX ADMIN — MAGNESIUM SULFATE HEPTAHYDRATE 2 G: 40 INJECTION, SOLUTION INTRAVENOUS at 14:12

## 2019-09-19 RX ADMIN — DIVALPROEX SODIUM 500 MG: 250 TABLET, DELAYED RELEASE ORAL at 08:52

## 2019-09-19 RX ADMIN — METOPROLOL SUCCINATE 25 MG: 25 TABLET, EXTENDED RELEASE ORAL at 08:52

## 2019-09-19 RX ADMIN — ASPIRIN 81 MG CHEWABLE TABLET 81 MG: 81 TABLET CHEWABLE at 08:52

## 2019-09-19 RX ADMIN — PANTOPRAZOLE SODIUM 40 MG: 40 TABLET, DELAYED RELEASE ORAL at 08:51

## 2019-09-19 RX ADMIN — LOSARTAN POTASSIUM 100 MG: 100 TABLET, FILM COATED ORAL at 08:52

## 2019-09-19 RX ADMIN — POTASSIUM CHLORIDE 40 MEQ: 1500 TABLET, EXTENDED RELEASE ORAL at 17:09

## 2019-09-19 RX ADMIN — ATORVASTATIN CALCIUM 40 MG: 40 TABLET, FILM COATED ORAL at 08:51

## 2019-09-19 RX ADMIN — Medication 10 ML: at 20:12

## 2019-09-19 RX ADMIN — FUROSEMIDE 40 MG: 10 INJECTION, SOLUTION INTRAMUSCULAR; INTRAVENOUS at 08:52

## 2019-09-19 RX ADMIN — DIVALPROEX SODIUM 1000 MG: 250 TABLET, DELAYED RELEASE ORAL at 20:11

## 2019-09-19 RX ADMIN — QUETIAPINE FUMARATE 50 MG: 100 TABLET ORAL at 20:11

## 2019-09-19 RX ADMIN — HYDRALAZINE HYDROCHLORIDE 50 MG: 50 TABLET, FILM COATED ORAL at 14:12

## 2019-09-19 RX ADMIN — QUETIAPINE FUMARATE 50 MG: 25 TABLET ORAL at 08:52

## 2019-09-19 RX ADMIN — HYDRALAZINE HYDROCHLORIDE 50 MG: 50 TABLET, FILM COATED ORAL at 20:11

## 2019-09-19 RX ADMIN — HYDRALAZINE HYDROCHLORIDE 25 MG: 25 TABLET, FILM COATED ORAL at 05:56

## 2019-09-19 RX ADMIN — ENOXAPARIN SODIUM 40 MG: 40 INJECTION SUBCUTANEOUS at 08:51

## 2019-09-19 RX ADMIN — POTASSIUM CHLORIDE 40 MEQ: 1500 TABLET, EXTENDED RELEASE ORAL at 18:27

## 2019-09-19 ASSESSMENT — PAIN SCALES - GENERAL
PAINLEVEL_OUTOF10: 0

## 2019-09-19 NOTE — PLAN OF CARE
Problem: Falls - Risk of:  Goal: Will remain free from falls  Description  Will remain free from falls  Outcome: Met This Shift  Goal: Absence of physical injury  Description  Absence of physical injury  Outcome: Met This Shift     Problem: Fluid Volume:  Goal: Hemodynamic stability will improve  Description  Hemodynamic stability will improve  9/19/2019 0224 by Romi Bui RN  Outcome: Ongoing  9/18/2019 1528 by Jean Swan RN  Outcome: Ongoing

## 2019-09-19 NOTE — PROGRESS NOTES
[facility]    Follow up Note           Reason for Consult:  ckd  Requesting Physician:  Dr. Meghann Velasco    Chief Complaint:  Sob    Sub/interval history  Pt is resting on bed and feels that his swelling is better    Last 24 h uop 1.3l    ROS: No chest pain/shortness of breath/fever/nausea/vomiting  PSFH: No visitor    Scheduled Meds:   hydrALAZINE  50 mg Oral 3 times per day    potassium chloride  40 mEq Oral Q2H    [START ON 9/20/2019] furosemide  40 mg Intravenous BID    aspirin  81 mg Oral Daily    atorvastatin  40 mg Oral Daily    metoprolol succinate  25 mg Oral Daily    pantoprazole  40 mg Oral Daily    enoxaparin  40 mg Subcutaneous Daily    losartan  100 mg Oral Daily    divalproex  1,000 mg Oral Nightly    divalproex  500 mg Oral QAM    QUEtiapine  100 mg Oral Nightly    QUEtiapine  50 mg Oral QAM     Continuous Infusions:  PRN Meds:.sodium chloride flush, potassium chloride **OR** potassium alternative oral replacement **OR** potassium chloride, magnesium sulfate, acetaminophen, acetaminophen, acetaminophen, ondansetron, ondansetron, promethazine, promethazine, promethazine, melatonin ER, labetalol      History of Present Illness on 9/18/19:    68 y.o. yo male with PMH of DM, HTN, CVA, HLD, a fib, CKD w baseline cr of low to high 1s who is admitted for elevated bP and sob  Pt was brought to the Ed by family for desaturation at home and inability to lay flat  BP was in the 180-200s/110s  Received labetalol iv     Physical exam:   Constitutional:  VITALS:  BP (!) 157/101   Pulse 92   Temp 97.8 °F (36.6 °C) (Oral)   Resp 18   Ht 6' 5\" (1.956 m)   Wt 275 lb (124.7 kg)   SpO2 98%   BMI 32.61 kg/m²   Gen: alert, awake, nad  Skin: no rash, turgor wnl  Heent:  eomi, mmm  Neck: no bruits or jvd noted, thyroid normal  Cardiovascular:  S1, S2 without m/r/g  Respiratory: CTA B without w/r/r; respiratory effort normal  Abdomen:  +bs, soft, nt, nd, no hepatosplenomegaly  Ext: 1+

## 2019-09-19 NOTE — PROGRESS NOTES
Occupational Therapy   Occupational Therapy Initial Assessment  Date: 2019   Patient Name: Jose Eduardo Ramirez  MRN: [id number]     : 1950    Date of Service: 2019    Discharge Recommendations:  Subacute/Skilled Nursing Facility       Assessment   Performance deficits / Impairments: Decreased functional mobility ; Decreased endurance;Decreased ADL status; Decreased safe awareness;Decreased balance  Prognosis: Fair  Decision Making: Low Complexity  OT Education: OT Role;ADL Adaptive Strategies; Plan of Care;Transfer Training;Orientation  REQUIRES OT FOLLOW UP: Yes  Activity Tolerance  Activity Tolerance: Patient self-limiting during session and required max encouragement to engage   Safety Devices  Safety Devices in place: Yes  Type of devices: All fall risk precautions in place;Nurse notified; Left in bed;Patient at risk for falls;Call light within reach; Bed alarm in place           Patient Diagnosis(es): The primary encounter diagnosis was Atrial fibrillation, unspecified type ([address] A diagnosis of Congestive heart failure, unspecified HF chronicity, unspecified heart failure type [facility]) was also pertinent to this visit. has a past medical history of Acute gastric ulcer with bleeding, Acute metabolic encephalopathy, MAISHA (acute kidney injury) ([address] ATN (acute tubular necrosis) ([address] Atrial fib/flutter, transient, Atrial fibrillation ([address] BPH (benign prostatic hyperplasia), Cerebral artery occlusion with cerebral infarction ([address] Diabetes mellitus ([address] GI bleed, Glaucoma, History of blood transfusion, Hx of diabetic neuropathy, Hyperlipidemia, Hypertension, Mitral insufficiency, Mobility impaired, Pulmonary hypertension ([address] and Traumatic hemorrhagic shock ([address] has a past surgical history that includes knee surgery; Prostate biopsy; other surgical history (10/11/13); Upper gastrointestinal endoscopy (17); Dental surgery (N/A, 3/28/2019); and laparoscopy (N/A, 2019).

## 2019-09-19 NOTE — PROGRESS NOTES
Assessment complete. VSS BP elevated. Denies pain. Call light left in reach. Daughter updated at bedside.

## 2019-09-20 LAB
ALBUMIN SERPL-MCNC: 2.8 G/DL (ref 3.1–4.9)
ALPHA-1-GLOBULIN: 0.3 G/DL (ref 0.2–0.4)
ALPHA-2-GLOBULIN: 0.7 G/DL (ref 0.4–1.1)
ANION GAP SERPL CALCULATED.3IONS-SCNC: 11 MMOL/L (ref 3–16)
BASOPHILS ABSOLUTE: 0.1 K/UL (ref 0–0.2)
BASOPHILS RELATIVE PERCENT: 1.2 %
BETA GLOBULIN: 1.1 G/DL (ref 0.9–1.6)
BUN BLDV-MCNC: 16 MG/DL (ref 7–20)
CALCIUM SERPL-MCNC: 8.9 MG/DL (ref 8.3–10.6)
CHLORIDE BLD-SCNC: 107 MMOL/L (ref 99–110)
CO2: 26 MMOL/L (ref 21–32)
CREAT SERPL-MCNC: 1.6 MG/DL (ref 0.8–1.3)
EOSINOPHILS ABSOLUTE: 0.1 K/UL (ref 0–0.6)
EOSINOPHILS RELATIVE PERCENT: 2.4 %
GAMMA GLOBULIN: 1.6 G/DL (ref 0.6–1.8)
GFR AFRICAN AMERICAN: 52
GFR NON-AFRICAN AMERICAN: 43
GLUCOSE BLD-MCNC: 134 MG/DL (ref 70–99)
HCT VFR BLD CALC: 32.3 % (ref 40.5–52.5)
HEMOGLOBIN: 10.3 G/DL (ref 13.5–17.5)
KAPPA, FREE LIGHT CHAINS, SERUM: 56.68 MG/L (ref 3.3–19.4)
KAPPA/LAMBDA RATIO: 1.58 (ref 0.26–1.65)
KAPPA/LAMBDA TEST COMMENT: ABNORMAL
LAMBDA, FREE LIGHT CHAINS, SERUM: 35.96 MG/L (ref 5.71–26.3)
LYMPHOCYTES ABSOLUTE: 1.3 K/UL (ref 1–5.1)
LYMPHOCYTES RELATIVE PERCENT: 23.4 %
MAGNESIUM: 1.9 MG/DL (ref 1.8–2.4)
MCH RBC QN AUTO: 29 PG (ref 26–34)
MCHC RBC AUTO-ENTMCNC: 32.1 G/DL (ref 31–36)
MCV RBC AUTO: 90.4 FL (ref 80–100)
MONOCYTES ABSOLUTE: 0.3 K/UL (ref 0–1.3)
MONOCYTES RELATIVE PERCENT: 5.7 %
NEUTROPHILS ABSOLUTE: 3.8 K/UL (ref 1.7–7.7)
NEUTROPHILS RELATIVE PERCENT: 67.3 %
PDW BLD-RTO: 16.5 % (ref 12.4–15.4)
PLATELET # BLD: 219 K/UL (ref 135–450)
PMV BLD AUTO: 7.9 FL (ref 5–10.5)
POTASSIUM SERPL-SCNC: 3.4 MMOL/L (ref 3.5–5.1)
RBC # BLD: 3.57 M/UL (ref 4.2–5.9)
SODIUM BLD-SCNC: 144 MMOL/L (ref 136–145)
SPE/IFE INTERPRETATION: NORMAL
TOTAL PROTEIN: 6.5 G/DL (ref 6.4–8.2)
URINE ELECTROPHORESIS INTERP: NORMAL
WBC # BLD: 5.7 K/UL (ref 4–11)

## 2019-09-20 PROCEDURE — 6360000002 HC RX W HCPCS: Performed by: INTERNAL MEDICINE

## 2019-09-20 PROCEDURE — 36415 COLL VENOUS BLD VENIPUNCTURE: CPT

## 2019-09-20 PROCEDURE — 2500000003 HC RX 250 WO HCPCS: Performed by: INTERNAL MEDICINE

## 2019-09-20 PROCEDURE — 6370000000 HC RX 637 (ALT 250 FOR IP): Performed by: INTERNAL MEDICINE

## 2019-09-20 PROCEDURE — G0378 HOSPITAL OBSERVATION PER HR: HCPCS

## 2019-09-20 PROCEDURE — 80048 BASIC METABOLIC PNL TOTAL CA: CPT

## 2019-09-20 PROCEDURE — 83735 ASSAY OF MAGNESIUM: CPT

## 2019-09-20 PROCEDURE — 96372 THER/PROPH/DIAG INJ SC/IM: CPT

## 2019-09-20 PROCEDURE — 85025 COMPLETE CBC W/AUTO DIFF WBC: CPT

## 2019-09-20 PROCEDURE — 2580000003 HC RX 258: Performed by: INTERNAL MEDICINE

## 2019-09-20 PROCEDURE — 96376 TX/PRO/DX INJ SAME DRUG ADON: CPT

## 2019-09-20 RX ORDER — HYDRALAZINE HYDROCHLORIDE 50 MG/1
50 TABLET, FILM COATED ORAL EVERY 8 HOURS SCHEDULED
Qty: 90 TABLET | Refills: 0 | Status: CANCELLED | OUTPATIENT
Start: 2019-09-20

## 2019-09-20 RX ORDER — LOSARTAN POTASSIUM 100 MG/1
100 TABLET ORAL DAILY
Qty: 30 TABLET | Refills: 0 | Status: CANCELLED
Start: 2019-09-20 | End: 2019-10-20

## 2019-09-20 RX ORDER — METOPROLOL SUCCINATE 50 MG/1
50 TABLET, EXTENDED RELEASE ORAL DAILY
Status: DISCONTINUED | OUTPATIENT
Start: 2019-09-21 | End: 2019-09-21 | Stop reason: HOSPADM

## 2019-09-20 RX ORDER — FUROSEMIDE 40 MG/1
40 TABLET ORAL DAILY
Qty: 30 TABLET | Refills: 0 | Status: CANCELLED | OUTPATIENT
Start: 2019-09-20

## 2019-09-20 RX ORDER — HYDRALAZINE HYDROCHLORIDE 50 MG/1
100 TABLET, FILM COATED ORAL EVERY 8 HOURS SCHEDULED
Status: DISCONTINUED | OUTPATIENT
Start: 2019-09-20 | End: 2019-09-21 | Stop reason: HOSPADM

## 2019-09-20 RX ORDER — CLONIDINE HYDROCHLORIDE 0.1 MG/1
0.1 TABLET ORAL 2 TIMES DAILY
Status: DISCONTINUED | OUTPATIENT
Start: 2019-09-20 | End: 2019-09-21 | Stop reason: HOSPADM

## 2019-09-20 RX ORDER — METOPROLOL SUCCINATE 25 MG/1
25 TABLET, EXTENDED RELEASE ORAL ONCE
Status: COMPLETED | OUTPATIENT
Start: 2019-09-20 | End: 2019-09-20

## 2019-09-20 RX ORDER — POTASSIUM CHLORIDE 20 MEQ/1
40 TABLET, EXTENDED RELEASE ORAL
Status: COMPLETED | OUTPATIENT
Start: 2019-09-20 | End: 2019-09-20

## 2019-09-20 RX ADMIN — HYDRALAZINE HYDROCHLORIDE 100 MG: 50 TABLET, FILM COATED ORAL at 15:48

## 2019-09-20 RX ADMIN — QUETIAPINE FUMARATE 100 MG: 100 TABLET ORAL at 20:38

## 2019-09-20 RX ADMIN — CLONIDINE HYDROCHLORIDE 0.1 MG: 0.1 TABLET ORAL at 20:38

## 2019-09-20 RX ADMIN — ENOXAPARIN SODIUM 40 MG: 40 INJECTION SUBCUTANEOUS at 08:54

## 2019-09-20 RX ADMIN — POTASSIUM CHLORIDE 40 MEQ: 20 TABLET, EXTENDED RELEASE ORAL at 12:08

## 2019-09-20 RX ADMIN — FUROSEMIDE 40 MG: 10 INJECTION, SOLUTION INTRAMUSCULAR; INTRAVENOUS at 08:53

## 2019-09-20 RX ADMIN — HYDRALAZINE HYDROCHLORIDE 50 MG: 50 TABLET, FILM COATED ORAL at 06:13

## 2019-09-20 RX ADMIN — LOSARTAN POTASSIUM 100 MG: 100 TABLET, FILM COATED ORAL at 08:54

## 2019-09-20 RX ADMIN — ASPIRIN 81 MG CHEWABLE TABLET 81 MG: 81 TABLET CHEWABLE at 08:54

## 2019-09-20 RX ADMIN — ATORVASTATIN CALCIUM 40 MG: 40 TABLET, FILM COATED ORAL at 12:07

## 2019-09-20 RX ADMIN — METOPROLOL SUCCINATE 25 MG: 25 TABLET, EXTENDED RELEASE ORAL at 08:54

## 2019-09-20 RX ADMIN — QUETIAPINE FUMARATE 50 MG: 25 TABLET ORAL at 08:53

## 2019-09-20 RX ADMIN — PANTOPRAZOLE SODIUM 40 MG: 40 TABLET, DELAYED RELEASE ORAL at 08:53

## 2019-09-20 RX ADMIN — Medication 10 ML: at 21:02

## 2019-09-20 RX ADMIN — METOPROLOL SUCCINATE 25 MG: 25 TABLET, EXTENDED RELEASE ORAL at 12:08

## 2019-09-20 RX ADMIN — POTASSIUM CHLORIDE 40 MEQ: 20 TABLET, EXTENDED RELEASE ORAL at 15:48

## 2019-09-20 RX ADMIN — FUROSEMIDE 40 MG: 10 INJECTION, SOLUTION INTRAMUSCULAR; INTRAVENOUS at 17:06

## 2019-09-20 RX ADMIN — CLONIDINE HYDROCHLORIDE 0.1 MG: 0.1 TABLET ORAL at 12:07

## 2019-09-20 RX ADMIN — Medication 10 ML: at 20:39

## 2019-09-20 RX ADMIN — HYDRALAZINE HYDROCHLORIDE 100 MG: 50 TABLET, FILM COATED ORAL at 21:02

## 2019-09-20 ASSESSMENT — PAIN SCALES - GENERAL: PAINLEVEL_OUTOF10: 0

## 2019-09-20 NOTE — DISCHARGE INSTR - COC
Orthostatic dizziness R42    Hemorrhagic shock T81.19XA    Upper GI bleed K92.2    A-fib ([facility]) I48.91    Acute blood loss anemia N00    Metabolic acidosis with respiratory acidosis E87.4    Hyperglycemia R73.9    Elevated lactic acid level R79.89    NSVT (nonsustained ventricular tachycardia) ([facility]) I47.2    DM type 2, controlled, with complication ([facility]) Q60.8    Hyponatremia E87.1    Anemia D64.9    SOB (shortness of breath) U71.14    Metabolic encephalopathy F23.43    Acute cystitis with hematuria N30.01    Positive blood culture R78.81    Altered mental status R41.82    Stage 3 chronic kidney disease ([facility]) N18.3    Encephalopathy, metabolic Z21.25    HTN (hypertension), benign I10    CKD (chronic kidney disease) N18.9    Dyslipidemia E78.5    Acute metabolic encephalopathy W13.31    Bradycardia R00.1    Impulse control disorder in adult F63.9    Dementia due to Alzheimer's disease G30.9, F02.80    PAF (paroxysmal atrial fibrillation) ([facility]) I48.0    Small bowel obstruction ([facility]) K56.609    Acute respiratory failure with hypoxia ([facility]) J96.01    Bilateral pleural effusion J90    Atelectasis of right lung J98.11    Former smoker Z87.891    Syncope and collapse R55    Fluid overload E87.70    Acute diastolic CHF (congestive heart failure) ([facility]) I50.31    Mild malnutrition ([facility]) E44.1    Acute encephalopathy G93.40    Remote history of stroke Z86.73    Dysthymia F34.1    Acute hypoxemic respiratory failure ([facility]) J96.01    Hypertensive urgency I16.0    Hypoxia R09.02       Isolation/Infection:   Isolation          No Isolation            Nurse Assessment:  Last Vital Signs: BP (!) 171/105   Pulse 125   Temp 97.1 °F (36.2 °C) (Oral)   Resp 16   Ht 6' 5\" (1.956 m)   Wt 268 lb 1.3 oz (121.6 kg)   SpO2 98%   BMI 31.79 kg/m²     Last documented pain score (0-10 scale): Pain Level: 0  Last Weight:   Wt Readings from Last 1 Encounters:   09/20/19 268 lb 1.3 oz (121.6 kg)

## 2019-09-21 VITALS
SYSTOLIC BLOOD PRESSURE: 125 MMHG | DIASTOLIC BLOOD PRESSURE: 80 MMHG | HEART RATE: 91 BPM | BODY MASS INDEX: 31.91 KG/M2 | TEMPERATURE: 98.1 F | RESPIRATION RATE: 16 BRPM | HEIGHT: 77 IN | OXYGEN SATURATION: 97 % | WEIGHT: 270.28 LBS

## 2019-09-21 LAB
ANION GAP SERPL CALCULATED.3IONS-SCNC: 10 MMOL/L (ref 3–16)
BASOPHILS ABSOLUTE: 0 K/UL (ref 0–0.2)
BASOPHILS RELATIVE PERCENT: 0.6 %
BUN BLDV-MCNC: 17 MG/DL (ref 7–20)
CALCIUM SERPL-MCNC: 8.8 MG/DL (ref 8.3–10.6)
CHLORIDE BLD-SCNC: 107 MMOL/L (ref 99–110)
CO2: 27 MMOL/L (ref 21–32)
CREAT SERPL-MCNC: 1.6 MG/DL (ref 0.8–1.3)
EOSINOPHILS ABSOLUTE: 0.2 K/UL (ref 0–0.6)
EOSINOPHILS RELATIVE PERCENT: 2.7 %
GFR AFRICAN AMERICAN: 52
GFR NON-AFRICAN AMERICAN: 43
GLUCOSE BLD-MCNC: 160 MG/DL (ref 70–99)
HCT VFR BLD CALC: 32.7 % (ref 40.5–52.5)
HEMOGLOBIN: 10.7 G/DL (ref 13.5–17.5)
LYMPHOCYTES ABSOLUTE: 1.4 K/UL (ref 1–5.1)
LYMPHOCYTES RELATIVE PERCENT: 22.5 %
MAGNESIUM: 1.8 MG/DL (ref 1.8–2.4)
MCH RBC QN AUTO: 29.4 PG (ref 26–34)
MCHC RBC AUTO-ENTMCNC: 32.6 G/DL (ref 31–36)
MCV RBC AUTO: 90.1 FL (ref 80–100)
MONOCYTES ABSOLUTE: 0.4 K/UL (ref 0–1.3)
MONOCYTES RELATIVE PERCENT: 5.9 %
NEUTROPHILS ABSOLUTE: 4.2 K/UL (ref 1.7–7.7)
NEUTROPHILS RELATIVE PERCENT: 68.3 %
PDW BLD-RTO: 16.9 % (ref 12.4–15.4)
PLATELET # BLD: 248 K/UL (ref 135–450)
PMV BLD AUTO: 8 FL (ref 5–10.5)
POTASSIUM SERPL-SCNC: 4 MMOL/L (ref 3.5–5.1)
RBC # BLD: 3.63 M/UL (ref 4.2–5.9)
SODIUM BLD-SCNC: 144 MMOL/L (ref 136–145)
WBC # BLD: 6.1 K/UL (ref 4–11)

## 2019-09-21 PROCEDURE — 6370000000 HC RX 637 (ALT 250 FOR IP): Performed by: INTERNAL MEDICINE

## 2019-09-21 PROCEDURE — G0378 HOSPITAL OBSERVATION PER HR: HCPCS

## 2019-09-21 PROCEDURE — 1200000000 HC SEMI PRIVATE

## 2019-09-21 PROCEDURE — 80048 BASIC METABOLIC PNL TOTAL CA: CPT

## 2019-09-21 PROCEDURE — 6360000002 HC RX W HCPCS: Performed by: INTERNAL MEDICINE

## 2019-09-21 PROCEDURE — 2580000003 HC RX 258: Performed by: INTERNAL MEDICINE

## 2019-09-21 PROCEDURE — 36415 COLL VENOUS BLD VENIPUNCTURE: CPT

## 2019-09-21 PROCEDURE — 83735 ASSAY OF MAGNESIUM: CPT

## 2019-09-21 PROCEDURE — 85025 COMPLETE CBC W/AUTO DIFF WBC: CPT

## 2019-09-21 PROCEDURE — 96372 THER/PROPH/DIAG INJ SC/IM: CPT

## 2019-09-21 RX ORDER — HYDRALAZINE HYDROCHLORIDE 100 MG/1
100 TABLET, FILM COATED ORAL EVERY 8 HOURS SCHEDULED
Qty: 90 TABLET | Refills: 3 | Status: ON HOLD | OUTPATIENT
Start: 2019-09-21 | End: 2021-01-02 | Stop reason: HOSPADM

## 2019-09-21 RX ORDER — CLONIDINE HYDROCHLORIDE 0.1 MG/1
0.1 TABLET ORAL 2 TIMES DAILY
Qty: 60 TABLET | Refills: 3 | Status: SHIPPED | OUTPATIENT
Start: 2019-09-21 | End: 2020-12-28

## 2019-09-21 RX ORDER — LOSARTAN POTASSIUM 100 MG/1
100 TABLET ORAL DAILY
Qty: 30 TABLET | Refills: 3 | Status: ON HOLD | OUTPATIENT
Start: 2019-09-22 | End: 2021-01-02 | Stop reason: HOSPADM

## 2019-09-21 RX ORDER — METOPROLOL SUCCINATE 50 MG/1
50 TABLET, EXTENDED RELEASE ORAL DAILY
Qty: 30 TABLET | Refills: 3 | Status: SHIPPED | OUTPATIENT
Start: 2019-09-22 | End: 2019-10-03

## 2019-09-21 RX ADMIN — QUETIAPINE FUMARATE 50 MG: 25 TABLET ORAL at 08:31

## 2019-09-21 RX ADMIN — FUROSEMIDE 40 MG: 10 INJECTION, SOLUTION INTRAMUSCULAR; INTRAVENOUS at 10:57

## 2019-09-21 RX ADMIN — Medication 10 ML: at 08:31

## 2019-09-21 RX ADMIN — ATORVASTATIN CALCIUM 40 MG: 40 TABLET, FILM COATED ORAL at 08:31

## 2019-09-21 RX ADMIN — ENOXAPARIN SODIUM 40 MG: 40 INJECTION SUBCUTANEOUS at 08:31

## 2019-09-21 RX ADMIN — ASPIRIN 81 MG CHEWABLE TABLET 81 MG: 81 TABLET CHEWABLE at 08:31

## 2019-09-21 RX ADMIN — HYDRALAZINE HYDROCHLORIDE 100 MG: 50 TABLET, FILM COATED ORAL at 14:14

## 2019-09-21 RX ADMIN — PANTOPRAZOLE SODIUM 40 MG: 40 TABLET, DELAYED RELEASE ORAL at 08:31

## 2019-09-21 RX ADMIN — LOSARTAN POTASSIUM 100 MG: 100 TABLET, FILM COATED ORAL at 08:31

## 2019-09-21 RX ADMIN — CLONIDINE HYDROCHLORIDE 0.1 MG: 0.1 TABLET ORAL at 08:31

## 2019-09-21 RX ADMIN — HYDRALAZINE HYDROCHLORIDE 100 MG: 50 TABLET, FILM COATED ORAL at 05:12

## 2019-09-21 RX ADMIN — METOPROLOL SUCCINATE 50 MG: 50 TABLET, EXTENDED RELEASE ORAL at 08:31

## 2019-09-21 ASSESSMENT — PAIN SCALES - GENERAL: PAINLEVEL_OUTOF10: 0

## 2019-09-21 NOTE — DISCHARGE SUMMARY
K 4.0 09/21/2019    K 3.4 07/18/2019     09/21/2019    CO2 27 09/21/2019    BUN 17 09/21/2019    CREATININE 1.6 09/21/2019    CALCIUM 8.8 09/21/2019    PHOS 3.2 07/30/2019     Significant Diagnostic Studies    Radiology:   XR CHEST STANDARD (2 VW)   Final Result   Moderate volume right pleural effusion, increased from prior exam.                Consults:   IP CONSULT TO HOSPITALIST  IP CONSULT TO NEPHROLOGY  IP CONSULT TO HOME CARE NEEDS    Disposition: Home with home care     Condition at Discharge: Stable    Discharge Instructions/Follow-up:    Follow-up With Details Why Contact Info   JAMEY Arreola CNP On 9/23/2019 keep your 2:30 PM appointment for hospital follow up  [address] [place] [address] [place]  [phone]   Jordy Martinez MD On 9/26/2019 go at 10 AM for hospital follow up regarding your high BP and heart failure [address]  [phone]     Code Status:  Full Code     Activity: activity as tolerated    Diet: cardiac diet      Discharge Medications:   Current Discharge Medication List           Details   cloNIDine (CATAPRES) 0.1 MG tablet Take 1 tablet by mouth 2 times daily  Qty: 60 tablet, Refills: 3      hydrALAZINE (APRESOLINE) 100 MG tablet Take 1 tablet by mouth every 8 hours  Qty: 90 tablet, Refills: 3              Details   losartan (COZAAR) 100 MG tablet Take 1 tablet by mouth daily  Qty: 30 tablet, Refills: 3      metoprolol succinate (TOPROL XL) 50 MG extended release tablet Take 1 tablet by mouth daily  Qty: 30 tablet, Refills: 3              Details   potassium chloride (KLOR-CON M) 20 MEQ extended release tablet Take 20 mEq by mouth 2 times daily      furosemide (LASIX) 40 MG tablet Take 1 tablet by mouth daily      acetaminophen (AMINOFEN) 325 MG tablet Take 2 tablets by mouth every 6 hours as needed for Pain  Qty: 30 tablet      QUEtiapine (SEROQUEL) 50 MG tablet Take 50 mg by mouth See Admin Instructions 50mg in am, 100mg at

## 2019-09-23 ENCOUNTER — FOLLOWUP TELEPHONE ENCOUNTER (OUTPATIENT)
Dept: CARDIAC REHAB | Age: 69
End: 2019-09-23

## 2019-09-23 NOTE — TELEPHONE ENCOUNTER
1st Attempt; No answer, listed home number has voicemail with \"full memory\" and unable to leave message.

## 2019-09-24 NOTE — TELEPHONE ENCOUNTER
3rd Attempt; No answer, voicemail of home listed phone number has \"full memory\" and still unable to leave message.

## 2019-10-03 ENCOUNTER — OFFICE VISIT (OUTPATIENT)
Dept: CARDIOLOGY CLINIC | Age: 69
End: 2019-10-03
Payer: MEDICARE

## 2019-10-03 VITALS
BODY MASS INDEX: 29.05 KG/M2 | SYSTOLIC BLOOD PRESSURE: 124 MMHG | DIASTOLIC BLOOD PRESSURE: 70 MMHG | HEIGHT: 77 IN | WEIGHT: 246 LBS | HEART RATE: 100 BPM | OXYGEN SATURATION: 96 %

## 2019-10-03 DIAGNOSIS — I10 HTN (HYPERTENSION), BENIGN: ICD-10-CM

## 2019-10-03 DIAGNOSIS — I48.21 PERMANENT ATRIAL FIBRILLATION (HCC): Primary | ICD-10-CM

## 2019-10-03 DIAGNOSIS — I47.29 NSVT (NONSUSTAINED VENTRICULAR TACHYCARDIA): ICD-10-CM

## 2019-10-03 PROCEDURE — 4040F PNEUMOC VAC/ADMIN/RCVD: CPT | Performed by: NURSE PRACTITIONER

## 2019-10-03 PROCEDURE — 1111F DSCHRG MED/CURRENT MED MERGE: CPT | Performed by: NURSE PRACTITIONER

## 2019-10-03 PROCEDURE — G8417 CALC BMI ABV UP PARAM F/U: HCPCS | Performed by: NURSE PRACTITIONER

## 2019-10-03 PROCEDURE — 99214 OFFICE O/P EST MOD 30 MIN: CPT | Performed by: NURSE PRACTITIONER

## 2019-10-03 PROCEDURE — 3017F COLORECTAL CA SCREEN DOC REV: CPT | Performed by: NURSE PRACTITIONER

## 2019-10-03 PROCEDURE — G8427 DOCREV CUR MEDS BY ELIG CLIN: HCPCS | Performed by: NURSE PRACTITIONER

## 2019-10-03 PROCEDURE — 1123F ACP DISCUSS/DSCN MKR DOCD: CPT | Performed by: NURSE PRACTITIONER

## 2019-10-03 PROCEDURE — G8484 FLU IMMUNIZE NO ADMIN: HCPCS | Performed by: NURSE PRACTITIONER

## 2019-10-03 PROCEDURE — 1036F TOBACCO NON-USER: CPT | Performed by: NURSE PRACTITIONER

## 2019-10-03 RX ORDER — FUROSEMIDE 40 MG/1
40 TABLET ORAL DAILY
Qty: 90 TABLET | Refills: 3 | Status: ON HOLD | OUTPATIENT
Start: 2019-10-03 | End: 2021-01-02 | Stop reason: HOSPADM

## 2019-10-03 RX ORDER — METOPROLOL SUCCINATE 25 MG/1
25 TABLET, EXTENDED RELEASE ORAL DAILY
Qty: 90 TABLET | Refills: 3 | Status: ON HOLD | OUTPATIENT
Start: 2019-10-03 | End: 2021-01-02 | Stop reason: HOSPADM

## 2019-12-05 ENCOUNTER — HOSPITAL ENCOUNTER (EMERGENCY)
Age: 69
Discharge: HOME | End: 2019-12-06
Attending: EMERGENCY MEDICINE
Payer: MEDICARE

## 2019-12-05 ENCOUNTER — APPOINTMENT (OUTPATIENT)
Dept: CT IMAGING | Age: 69
End: 2019-12-05
Payer: MEDICARE

## 2019-12-05 DIAGNOSIS — N18.9 CHRONIC KIDNEY DISEASE, UNSPECIFIED CKD STAGE: ICD-10-CM

## 2019-12-05 DIAGNOSIS — E87.6 HYPOKALEMIA: ICD-10-CM

## 2019-12-05 DIAGNOSIS — D64.9 ANEMIA, UNSPECIFIED TYPE: ICD-10-CM

## 2019-12-05 DIAGNOSIS — K52.9 COLITIS: Primary | ICD-10-CM

## 2019-12-05 DIAGNOSIS — R31.29 MICROSCOPIC HEMATURIA: ICD-10-CM

## 2019-12-05 DIAGNOSIS — R19.7 DIARRHEA, UNSPECIFIED TYPE: ICD-10-CM

## 2019-12-05 DIAGNOSIS — K92.1 BLOOD IN STOOL: ICD-10-CM

## 2019-12-05 LAB
A/G RATIO: 1.1 (ref 1.1–2.2)
ALBUMIN SERPL-MCNC: 3.6 G/DL (ref 3.4–5)
ALP BLD-CCNC: 37 U/L (ref 40–129)
ALT SERPL-CCNC: 10 U/L (ref 10–40)
ANION GAP SERPL CALCULATED.3IONS-SCNC: 11 MMOL/L (ref 3–16)
AST SERPL-CCNC: 24 U/L (ref 15–37)
BACTERIA: ABNORMAL /HPF
BASOPHILS ABSOLUTE: 0 K/UL (ref 0–0.2)
BASOPHILS RELATIVE PERCENT: 0.6 %
BILIRUB SERPL-MCNC: 0.5 MG/DL (ref 0–1)
BILIRUBIN URINE: NEGATIVE
BLOOD, URINE: NEGATIVE
BUN BLDV-MCNC: 20 MG/DL (ref 7–20)
C DIFF TOXIN/ANTIGEN: NORMAL
CALCIUM SERPL-MCNC: 8.9 MG/DL (ref 8.3–10.6)
CHLORIDE BLD-SCNC: 106 MMOL/L (ref 99–110)
CLARITY: CLEAR
CO2: 25 MMOL/L (ref 21–32)
COLOR: YELLOW
CREAT SERPL-MCNC: 1.4 MG/DL (ref 0.8–1.3)
EOSINOPHILS ABSOLUTE: 0.1 K/UL (ref 0–0.6)
EOSINOPHILS RELATIVE PERCENT: 1.7 %
GFR AFRICAN AMERICAN: >60
GFR NON-AFRICAN AMERICAN: 50
GLOBULIN: 3.2 G/DL
GLUCOSE BLD-MCNC: 117 MG/DL (ref 70–99)
GLUCOSE URINE: NEGATIVE MG/DL
HCT VFR BLD CALC: 30.7 % (ref 40.5–52.5)
HEMOGLOBIN: 10 G/DL (ref 13.5–17.5)
KETONES, URINE: NEGATIVE MG/DL
LEUKOCYTE ESTERASE, URINE: NEGATIVE
LIPASE: 35 U/L (ref 13–60)
LYMPHOCYTES ABSOLUTE: 1.7 K/UL (ref 1–5.1)
LYMPHOCYTES RELATIVE PERCENT: 33.4 %
MCH RBC QN AUTO: 26.9 PG (ref 26–34)
MCHC RBC AUTO-ENTMCNC: 32.6 G/DL (ref 31–36)
MCV RBC AUTO: 82.5 FL (ref 80–100)
MICROSCOPIC EXAMINATION: YES
MONOCYTES ABSOLUTE: 0.4 K/UL (ref 0–1.3)
MONOCYTES RELATIVE PERCENT: 8.5 %
MUCUS: ABNORMAL /LPF
NEUTROPHILS ABSOLUTE: 2.8 K/UL (ref 1.7–7.7)
NEUTROPHILS RELATIVE PERCENT: 55.8 %
NITRITE, URINE: NEGATIVE
OCCULT BLOOD SCREENING: ABNORMAL
PDW BLD-RTO: 16.9 % (ref 12.4–15.4)
PH UA: 5.5 (ref 5–8)
PLATELET # BLD: 173 K/UL (ref 135–450)
PMV BLD AUTO: 8.5 FL (ref 5–10.5)
POTASSIUM SERPL-SCNC: 3 MMOL/L (ref 3.5–5.1)
PROTEIN UA: 30 MG/DL
RBC # BLD: 3.73 M/UL (ref 4.2–5.9)
RBC UA: ABNORMAL /HPF (ref 0–2)
SODIUM BLD-SCNC: 142 MMOL/L (ref 136–145)
SPECIFIC GRAVITY UA: 1.01 (ref 1–1.03)
TOTAL PROTEIN: 6.8 G/DL (ref 6.4–8.2)
URINE TYPE: ABNORMAL
UROBILINOGEN, URINE: 0.2 E.U./DL
WBC # BLD: 5 K/UL (ref 4–11)
WBC UA: ABNORMAL /HPF (ref 0–5)

## 2019-12-05 PROCEDURE — G0328 FECAL BLOOD SCRN IMMUNOASSAY: HCPCS

## 2019-12-05 PROCEDURE — 99284 EMERGENCY DEPT VISIT MOD MDM: CPT

## 2019-12-05 PROCEDURE — 80053 COMPREHEN METABOLIC PANEL: CPT

## 2019-12-05 PROCEDURE — 6370000000 HC RX 637 (ALT 250 FOR IP): Performed by: EMERGENCY MEDICINE

## 2019-12-05 PROCEDURE — 85025 COMPLETE CBC W/AUTO DIFF WBC: CPT

## 2019-12-05 PROCEDURE — 6360000004 HC RX CONTRAST MEDICATION: Performed by: EMERGENCY MEDICINE

## 2019-12-05 PROCEDURE — 87449 NOS EACH ORGANISM AG IA: CPT

## 2019-12-05 PROCEDURE — 74177 CT ABD & PELVIS W/CONTRAST: CPT

## 2019-12-05 PROCEDURE — 87324 CLOSTRIDIUM AG IA: CPT

## 2019-12-05 PROCEDURE — 83690 ASSAY OF LIPASE: CPT

## 2019-12-05 PROCEDURE — 81001 URINALYSIS AUTO W/SCOPE: CPT

## 2019-12-05 RX ORDER — CLONIDINE HYDROCHLORIDE 0.1 MG/1
0.1 TABLET ORAL ONCE
Status: COMPLETED | OUTPATIENT
Start: 2019-12-05 | End: 2019-12-05

## 2019-12-05 RX ADMIN — IOPAMIDOL 75 ML: 755 INJECTION, SOLUTION INTRAVENOUS at 22:35

## 2019-12-05 RX ADMIN — CLONIDINE HYDROCHLORIDE 0.1 MG: 0.1 TABLET ORAL at 23:01

## 2019-12-06 VITALS
TEMPERATURE: 98.6 F | BODY MASS INDEX: 30.7 KG/M2 | SYSTOLIC BLOOD PRESSURE: 158 MMHG | WEIGHT: 260 LBS | DIASTOLIC BLOOD PRESSURE: 80 MMHG | HEIGHT: 77 IN | RESPIRATION RATE: 16 BRPM | OXYGEN SATURATION: 98 % | HEART RATE: 61 BPM

## 2019-12-06 PROCEDURE — 6370000000 HC RX 637 (ALT 250 FOR IP): Performed by: EMERGENCY MEDICINE

## 2019-12-06 RX ORDER — POTASSIUM CHLORIDE 20 MEQ/1
40 TABLET, EXTENDED RELEASE ORAL ONCE
Status: COMPLETED | OUTPATIENT
Start: 2019-12-06 | End: 2019-12-06

## 2019-12-06 RX ORDER — AMOXICILLIN AND CLAVULANATE POTASSIUM 875; 125 MG/1; MG/1
1 TABLET, FILM COATED ORAL 2 TIMES DAILY
Qty: 20 TABLET | Refills: 0 | Status: SHIPPED | OUTPATIENT
Start: 2019-12-06 | End: 2019-12-16

## 2019-12-06 RX ORDER — AMOXICILLIN AND CLAVULANATE POTASSIUM 875; 125 MG/1; MG/1
1 TABLET, FILM COATED ORAL ONCE
Status: COMPLETED | OUTPATIENT
Start: 2019-12-06 | End: 2019-12-06

## 2019-12-06 RX ORDER — ONDANSETRON 4 MG/1
4 TABLET, ORALLY DISINTEGRATING ORAL EVERY 8 HOURS PRN
Qty: 20 TABLET | Refills: 0 | Status: SHIPPED | OUTPATIENT
Start: 2019-12-06 | End: 2020-12-28

## 2019-12-06 RX ADMIN — POTASSIUM CHLORIDE 40 MEQ: 20 TABLET, EXTENDED RELEASE ORAL at 01:03

## 2019-12-06 RX ADMIN — AMOXICILLIN AND CLAVULANATE POTASSIUM 1 TABLET: 875; 125 TABLET, FILM COATED ORAL at 01:03

## 2019-12-06 ASSESSMENT — ENCOUNTER SYMPTOMS
COLOR CHANGE: 0
ABDOMINAL PAIN: 0
SHORTNESS OF BREATH: 0
NAUSEA: 0
BLOOD IN STOOL: 0
BACK PAIN: 0
ANAL BLEEDING: 0
DIARRHEA: 1
VOMITING: 0
CONSTIPATION: 0

## 2020-01-24 ENCOUNTER — HOSPITAL ENCOUNTER (OUTPATIENT)
Age: 70
Setting detail: SPECIMEN
Discharge: HOME | End: 2020-01-24
Payer: COMMERCIAL

## 2020-01-24 LAB
BACTERIA: ABNORMAL /HPF
BILIRUBIN URINE: NEGATIVE
BLOOD, URINE: ABNORMAL
CLARITY: ABNORMAL
COLOR: YELLOW
EPITHELIAL CELLS, UA: ABNORMAL /HPF
GLUCOSE URINE: NEGATIVE MG/DL
KETONES, URINE: NEGATIVE MG/DL
LEUKOCYTE ESTERASE, URINE: ABNORMAL
MICROSCOPIC EXAMINATION: YES
NITRITE, URINE: POSITIVE
PH UA: 5.5 (ref 5–8)
PROTEIN UA: ABNORMAL MG/DL
RBC UA: ABNORMAL /HPF (ref 0–2)
RENAL EPITHELIAL, UA: ABNORMAL /HPF
SPECIFIC GRAVITY UA: 1.02 (ref 1–1.03)
URINE TYPE: ABNORMAL
UROBILINOGEN, URINE: 0.2 E.U./DL
WBC UA: >100 /HPF (ref 0–5)

## 2020-01-24 PROCEDURE — 87186 SC STD MICRODIL/AGAR DIL: CPT

## 2020-01-24 PROCEDURE — 87077 CULTURE AEROBIC IDENTIFY: CPT

## 2020-01-24 PROCEDURE — 87086 URINE CULTURE/COLONY COUNT: CPT

## 2020-01-24 PROCEDURE — 81001 URINALYSIS AUTO W/SCOPE: CPT

## 2020-01-26 LAB
ORGANISM: ABNORMAL
URINE CULTURE, ROUTINE: ABNORMAL

## 2020-10-05 RX ORDER — FUROSEMIDE 40 MG/1
TABLET ORAL
Qty: 90 TABLET | Refills: 3 | OUTPATIENT
Start: 2020-10-05

## 2020-10-05 NOTE — TELEPHONE ENCOUNTER
10/3/2019 NP[facility]    No upcoming appt        This refill was declined 9/22/2020 per NPBB  Refill declined. Patient is followed by [facility].

## 2020-12-28 ENCOUNTER — APPOINTMENT (OUTPATIENT)
Dept: CT IMAGING | Age: 70
DRG: 312 | End: 2020-12-28
Payer: MEDICARE

## 2020-12-28 ENCOUNTER — APPOINTMENT (OUTPATIENT)
Dept: GENERAL RADIOLOGY | Age: 70
DRG: 312 | End: 2020-12-28
Payer: MEDICARE

## 2020-12-28 ENCOUNTER — HOSPITAL ENCOUNTER (INPATIENT)
Age: 70
LOS: 5 days | Discharge: HOME | DRG: 312 | End: 2021-01-02
Attending: EMERGENCY MEDICINE | Admitting: INTERNAL MEDICINE
Payer: MEDICARE

## 2020-12-28 DIAGNOSIS — R55 SYNCOPE AND COLLAPSE: ICD-10-CM

## 2020-12-28 DIAGNOSIS — S01.01XA LACERATION OF SCALP, INITIAL ENCOUNTER: ICD-10-CM
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
## 2020-12-28 DIAGNOSIS — N17.9 AKI (ACUTE KIDNEY INJURY) (HCC): ICD-10-CM

## 2020-12-28 DIAGNOSIS — D62 ANEMIA DUE TO ACUTE BLOOD LOSS: ICD-10-CM

## 2020-12-28 LAB
A/G RATIO: 1 (ref 1.1–2.2)
ALBUMIN SERPL-MCNC: 3.7 G/DL (ref 3.4–5)
ALP BLD-CCNC: 55 U/L (ref 40–129)
ALT SERPL-CCNC: 10 U/L (ref 10–40)
ANION GAP SERPL CALCULATED.3IONS-SCNC: 12 MMOL/L (ref 3–16)
AST SERPL-CCNC: 21 U/L (ref 15–37)
BASOPHILS ABSOLUTE: 0 K/UL (ref 0–0.2)
BASOPHILS RELATIVE PERCENT: 0.7 %
BILIRUB SERPL-MCNC: 0.7 MG/DL (ref 0–1)
BUN BLDV-MCNC: 30 MG/DL (ref 7–20)
CALCIUM SERPL-MCNC: 9.4 MG/DL (ref 8.3–10.6)
CHLORIDE BLD-SCNC: 102 MMOL/L (ref 99–110)
CO2: 25 MMOL/L (ref 21–32)
CREAT SERPL-MCNC: 1.9 MG/DL (ref 0.8–1.3)
EOSINOPHILS ABSOLUTE: 0.1 K/UL (ref 0–0.6)
EOSINOPHILS RELATIVE PERCENT: 1.6 %
GFR AFRICAN AMERICAN: 42
GFR NON-AFRICAN AMERICAN: 35
GLOBULIN: 3.6 G/DL
GLUCOSE BLD-MCNC: 185 MG/DL (ref 70–99)
HCT VFR BLD CALC: 27.4 % (ref 40.5–52.5)
HCT VFR BLD CALC: 31.7 % (ref 40.5–52.5)
HEMOGLOBIN: 10.2 G/DL (ref 13.5–17.5)
HEMOGLOBIN: 8.8 G/DL (ref 13.5–17.5)
LYMPHOCYTES ABSOLUTE: 0.7 K/UL (ref 1–5.1)
LYMPHOCYTES RELATIVE PERCENT: 13.3 %
MCH RBC QN AUTO: 27.5 PG (ref 26–34)
MCHC RBC AUTO-ENTMCNC: 32.3 G/DL (ref 31–36)
MCV RBC AUTO: 85 FL (ref 80–100)
MONOCYTES ABSOLUTE: 0.3 K/UL (ref 0–1.3)
MONOCYTES RELATIVE PERCENT: 5.3 %
NEUTROPHILS ABSOLUTE: 4.4 K/UL (ref 1.7–7.7)
NEUTROPHILS RELATIVE PERCENT: 79.1 %
PDW BLD-RTO: 17.1 % (ref 12.4–15.4)
PLATELET # BLD: 162 K/UL (ref 135–450)
PMV BLD AUTO: 8.6 FL (ref 5–10.5)
POTASSIUM SERPL-SCNC: 4.4 MMOL/L (ref 3.5–5.1)
PRO-BNP: ABNORMAL PG/ML (ref 0–124)
PRO-BNP: ABNORMAL PG/ML (ref 0–124)
RBC # BLD: 3.73 M/UL (ref 4.2–5.9)
SODIUM BLD-SCNC: 139 MMOL/L (ref 136–145)
SPECIMEN STATUS: NORMAL
TOTAL PROTEIN: 7.3 G/DL (ref 6.4–8.2)
TROPONIN: <0.01 NG/ML
TROPONIN: <0.01 NG/ML
WBC # BLD: 5.5 K/UL (ref 4–11)

## 2020-12-28 PROCEDURE — 73030 X-RAY EXAM OF SHOULDER: CPT

## 2020-12-28 PROCEDURE — 85025 COMPLETE CBC W/AUTO DIFF WBC: CPT

## 2020-12-28 PROCEDURE — 84484 ASSAY OF TROPONIN QUANT: CPT

## 2020-12-28 PROCEDURE — 70450 CT HEAD/BRAIN W/O DYE: CPT

## 2020-12-28 PROCEDURE — 82607 VITAMIN B-12: CPT

## 2020-12-28 PROCEDURE — 96360 HYDRATION IV INFUSION INIT: CPT

## 2020-12-28 PROCEDURE — 36415 COLL VENOUS BLD VENIPUNCTURE: CPT

## 2020-12-28 PROCEDURE — 93005 ELECTROCARDIOGRAM TRACING: CPT | Performed by: NURSE PRACTITIONER

## 2020-12-28 PROCEDURE — 72125 CT NECK SPINE W/O DYE: CPT

## 2020-12-28 PROCEDURE — 2580000003 HC RX 258: Performed by: PHYSICIAN ASSISTANT

## 2020-12-28 PROCEDURE — G0378 HOSPITAL OBSERVATION PER HR: HCPCS

## 2020-12-28 PROCEDURE — 1200000000 HC SEMI PRIVATE

## 2020-12-28 PROCEDURE — 6370000000 HC RX 637 (ALT 250 FOR IP): Performed by: NURSE PRACTITIONER

## 2020-12-28 PROCEDURE — 99285 EMERGENCY DEPT VISIT HI MDM: CPT

## 2020-12-28 PROCEDURE — 83880 ASSAY OF NATRIURETIC PEPTIDE: CPT

## 2020-12-28 PROCEDURE — 83540 ASSAY OF IRON: CPT

## 2020-12-28 PROCEDURE — 83550 IRON BINDING TEST: CPT

## 2020-12-28 PROCEDURE — 96361 HYDRATE IV INFUSION ADD-ON: CPT

## 2020-12-28 PROCEDURE — 82728 ASSAY OF FERRITIN: CPT

## 2020-12-28 PROCEDURE — 82746 ASSAY OF FOLIC ACID SERUM: CPT

## 2020-12-28 PROCEDURE — 84443 ASSAY THYROID STIM HORMONE: CPT

## 2020-12-28 PROCEDURE — 80053 COMPREHEN METABOLIC PANEL: CPT

## 2020-12-28 PROCEDURE — 93005 ELECTROCARDIOGRAM TRACING: CPT | Performed by: PHYSICIAN ASSISTANT

## 2020-12-28 PROCEDURE — 85018 HEMOGLOBIN: CPT

## 2020-12-28 PROCEDURE — 85014 HEMATOCRIT: CPT

## 2020-12-28 PROCEDURE — 13121 CMPLX RPR S/A/L 2.6-7.5 CM: CPT

## 2020-12-28 PROCEDURE — 71045 X-RAY EXAM CHEST 1 VIEW: CPT

## 2020-12-28 RX ORDER — ACETAMINOPHEN 650 MG/1
650 SUPPOSITORY RECTAL EVERY 6 HOURS PRN
Status: DISCONTINUED | OUTPATIENT
Start: 2020-12-28 | End: 2021-01-02 | Stop reason: HOSPADM

## 2020-12-28 RX ORDER — FERROUS SULFATE 325(65) MG
325 TABLET ORAL
COMMUNITY
Start: 2020-03-15

## 2020-12-28 RX ORDER — QUETIAPINE FUMARATE 25 MG/1
75 TABLET, FILM COATED ORAL EVERY MORNING
Status: ON HOLD | COMMUNITY
Start: 2020-10-05 | End: 2021-01-06 | Stop reason: HOSPADM

## 2020-12-28 RX ORDER — ACETAMINOPHEN 325 MG/1
650 TABLET ORAL EVERY 6 HOURS PRN
Status: DISCONTINUED | OUTPATIENT
Start: 2020-12-28 | End: 2021-01-02 | Stop reason: HOSPADM

## 2020-12-28 RX ORDER — ONDANSETRON 2 MG/ML
4 INJECTION INTRAMUSCULAR; INTRAVENOUS EVERY 6 HOURS PRN
Status: DISCONTINUED | OUTPATIENT
Start: 2020-12-28 | End: 2021-01-02 | Stop reason: HOSPADM

## 2020-12-28 RX ORDER — MESALAMINE 0.38 G/1
CAPSULE, EXTENDED RELEASE ORAL
COMMUNITY
Start: 2020-03-15

## 2020-12-28 RX ORDER — SODIUM CHLORIDE 9 MG/ML
1000 INJECTION, SOLUTION INTRAVENOUS CONTINUOUS
Status: ACTIVE | OUTPATIENT
Start: 2020-12-28 | End: 2020-12-29

## 2020-12-28 RX ORDER — MECOBALAMIN 5000 MCG
5 TABLET,DISINTEGRATING ORAL NIGHTLY
Status: DISCONTINUED | OUTPATIENT
Start: 2020-12-28 | End: 2021-01-02 | Stop reason: HOSPADM

## 2020-12-28 RX ORDER — PROMETHAZINE HYDROCHLORIDE 25 MG/1
12.5 TABLET ORAL EVERY 6 HOURS PRN
Status: DISCONTINUED | OUTPATIENT
Start: 2020-12-28 | End: 2021-01-02 | Stop reason: HOSPADM

## 2020-12-28 RX ORDER — SODIUM CHLORIDE 0.9 % (FLUSH) 0.9 %
10 SYRINGE (ML) INJECTION PRN
Status: DISCONTINUED | OUTPATIENT
Start: 2020-12-28 | End: 2021-01-02 | Stop reason: HOSPADM

## 2020-12-28 RX ORDER — VENLAFAXINE HYDROCHLORIDE 150 MG/1
150 CAPSULE, EXTENDED RELEASE ORAL DAILY
COMMUNITY
Start: 2020-04-16

## 2020-12-28 RX ORDER — ASPIRIN 81 MG/1
81 TABLET, CHEWABLE ORAL DAILY
Status: DISCONTINUED | OUTPATIENT
Start: 2020-12-29 | End: 2021-01-02 | Stop reason: HOSPADM

## 2020-12-28 RX ORDER — ATORVASTATIN CALCIUM 40 MG/1
40 TABLET, FILM COATED ORAL DAILY
Status: DISCONTINUED | OUTPATIENT
Start: 2020-12-29 | End: 2021-01-02 | Stop reason: HOSPADM

## 2020-12-28 RX ORDER — QUETIAPINE FUMARATE 100 MG/1
100 TABLET, FILM COATED ORAL NIGHTLY
Status: ON HOLD | COMMUNITY
Start: 2020-07-16 | End: 2021-01-06 | Stop reason: SDUPTHER

## 2020-12-28 RX ORDER — FENOFIBRATE 160 MG/1
160 TABLET ORAL DAILY
Status: DISCONTINUED | OUTPATIENT
Start: 2020-12-29 | End: 2021-01-02 | Stop reason: HOSPADM

## 2020-12-28 RX ORDER — CLONIDINE HYDROCHLORIDE 0.2 MG/1
0.2 TABLET ORAL 2 TIMES DAILY
Status: ON HOLD | COMMUNITY
Start: 2020-12-18 | End: 2021-01-02 | Stop reason: HOSPADM

## 2020-12-28 RX ORDER — PANTOPRAZOLE SODIUM 40 MG/1
40 TABLET, DELAYED RELEASE ORAL DAILY
Status: DISCONTINUED | OUTPATIENT
Start: 2020-12-29 | End: 2021-01-02 | Stop reason: HOSPADM

## 2020-12-28 RX ORDER — CHOLECALCIFEROL (VITAMIN D3) 125 MCG
10 CAPSULE ORAL
COMMUNITY

## 2020-12-28 RX ORDER — SODIUM CHLORIDE 0.9 % (FLUSH) 0.9 %
10 SYRINGE (ML) INJECTION EVERY 12 HOURS SCHEDULED
Status: DISCONTINUED | OUTPATIENT
Start: 2020-12-28 | End: 2021-01-02 | Stop reason: HOSPADM

## 2020-12-28 RX ORDER — POTASSIUM CHLORIDE 20 MEQ/1
20 TABLET, EXTENDED RELEASE ORAL 2 TIMES DAILY
Status: DISCONTINUED | OUTPATIENT
Start: 2020-12-28 | End: 2021-01-02 | Stop reason: HOSPADM

## 2020-12-28 RX ADMIN — POTASSIUM CHLORIDE 20 MEQ: 20 TABLET, EXTENDED RELEASE ORAL at 23:09

## 2020-12-28 RX ADMIN — Medication 5 MG: at 23:09

## 2020-12-28 RX ADMIN — SODIUM CHLORIDE 1000 ML: 9 INJECTION, SOLUTION INTRAVENOUS at 17:53

## 2020-12-28 RX ADMIN — SODIUM CHLORIDE 1000 ML: 9 INJECTION, SOLUTION INTRAVENOUS at 23:09

## 2020-12-28 NOTE — ED NOTES
Bed: 06  Expected date:   Expected time:   Means of arrival:   Comments:  [facility], [facility]  12/28/20 [id number]

## 2020-12-28 NOTE — ED NOTES
Pt scores as fall risk. Pt bed alarm on, fall band on, fall sign outside pt room, call light within reach. Pt alert at this time. VSS.       Kristine Solis RN  12/28/20 6069

## 2020-12-28 NOTE — ED PROVIDER NOTES
[facility] Emergency Department    CHIEF COMPLAINT  Fall (states was working out in living room and went down, lac to back of head and complains of L shoulder pain ), Laceration, and Shoulder Pain      SHARED SERVICE VISIT  I have seen and evaluated this patient with my supervising physician, Dr. Ruddy Knowles. HISTORY OF PRESENT ILLNESS  Rosanna Gusman is a 65 y.o. male who presents to the ED complaining of fall with closed head and left shoulder pain. Patient brought in by squad. States that he was getting up from a chair when he felt lightheaded and fell backwards striking head on fireplace. Denies loss of consciousness. He has no complaints of headache, lightheadedness, dizziness or confusion currently. No visual changes or disturbances. No difficulty speaking or swallowing. No complaints of neck or back pain. No chest pain shortness of breath. No cough or congestion. No fevers chills. He denies any nausea, vomiting or diarrhea. There was no seizure-like activity. He uses no blood thinners. He denies any numbness, tingling, weakness of extremity. Does have some left shoulder pain with movement. Does not radiate. He is right-hand dominant. No other complaints, modifying factors or associated symptoms. Nursing notes reviewed.    Past Medical History:   Diagnosis Date    Acute gastric ulcer with bleeding     Acute metabolic encephalopathy     MASIHA (acute kidney injury) ([address]     Requiring dialysis since 7/4/17 in setting of hemorrhagic shock    ATN (acute tubular necrosis) (HCC)     Atrial fib/flutter, transient     Atrial fibrillation (HCC)     BPH (benign prostatic hyperplasia)     Cerebral artery occlusion with cerebral infarction ([address] 08/05/2017    weakness and aphasia    Diabetes mellitus ([address]     GI bleed     Glaucoma     History of blood transfusion     Hx of diabetic neuropathy     Hyperlipidemia     Hypertension     Mitral insufficiency  Mobility impaired     uses walker and W/C    Pulmonary hypertension (HCC)     Traumatic hemorrhagic shock (HCC)      Past Surgical History:   Procedure Laterality Date    DENTAL SURGERY N/A 3/28/2019    EXTRACTION OF ALL REMAINING UPPER AND LOWER TEETH AND ALVEOLOPLASTY   performed by Nuno Lopez DMD at [address] LAPAROSCOPY N/A 2019    LAPAROSCOPIC, CONVERTED TO OPEN INCARCERATED UMBILICAL HERNIA REPAIR performed by Ayanna Engel MD at [address] [facility]  10/11/13     CYSTOSCOPY, TRANSURETHRAL RESECTION OF PROSTATE WITH OLYMPUS    PROSTATE BIOPSY      UPPER GASTROINTESTINAL ENDOSCOPY   17     Family History   Problem Relation Age of Onset    Early Death Mother         age 52   [place] Other Mother         head aneursym    Heart Attack Father     Diabetes Brother     Heart Disease Brother      Social History     Socioeconomic History    Marital status:      Spouse name: Not on file    Number of children: Not on file    Years of education: Not on file    Highest education level: Not on file   Occupational History    Not on file   Social Needs    Financial resource strain: Not on file    Food insecurity     Worry: Not on file     Inability: Not on file    Transportation needs     Medical: Not on file     Non-medical: Not on file   Tobacco Use    Smoking status: Former Smoker     Packs/day: 0.50     Years: 20.00     Pack years: 10.00     Types: Cigarettes     Quit date: 1997     Years since quittin.0    Smokeless tobacco: Never Used    Tobacco comment: smokes occ cigar, socially   Substance and Sexual Activity    Alcohol use: Not Currently     Comment: occasionally    Drug use: No    Sexual activity: Not on file   Lifestyle    Physical activity     Days per week: Not on file     Minutes per session: Not on file    Stress: Not on file   Relationships    Social connections     Talks on phone: Not on file Gets together: Not on file     Attends Judaism service: Not on file     Active member of club or organization: Not on file     Attends meetings of clubs or organizations: Not on file     Relationship status: Not on file    Intimate partner violence     Fear of current or ex partner: Not on file     Emotionally abused: Not on file     Physically abused: Not on file     Forced sexual activity: Not on file   Other Topics Concern    Not on file   Social History Narrative    Not on file     No current facility-administered medications for this encounter.       Current Outpatient Medications   Medication Sig Dispense Refill    ondansetron (ZOFRAN ODT) 4 MG disintegrating tablet Take 1 tablet by mouth every 8 hours as needed for Nausea 20 tablet 0    metoprolol succinate (TOPROL XL) 25 MG extended release tablet Take 1 tablet by mouth daily 90 tablet 3    furosemide (LASIX) 40 MG tablet Take 1 tablet by mouth daily 90 tablet 3    Elastic Bandages & Supports (JOBST KNEE HIGH COMPRESSION SM) MISC 1 each by Does not apply route once for 1 dose Thigh high or knee high 1 each 3    cloNIDine (CATAPRES) 0.1 MG tablet Take 1 tablet by mouth 2 times daily 60 tablet 3    hydrALAZINE (APRESOLINE) 100 MG tablet Take 1 tablet by mouth every 8 hours 90 tablet 3    losartan (COZAAR) 100 MG tablet Take 1 tablet by mouth daily 30 tablet 3    potassium chloride (KLOR-CON M) 20 MEQ extended release tablet Take 20 mEq by mouth 2 times daily      acetaminophen (AMINOFEN) 325 MG tablet Take 2 tablets by mouth every 6 hours as needed for Pain 30 tablet     QUEtiapine (SEROQUEL) 50 MG tablet Take 50 mg by mouth See Admin Instructions 50mg in am, 100mg at hs      levOCARNitine fumarate (CARNITOR) 250 MG capsule Take 2 capsules by mouth 3 times daily 180 capsule 1    Lactobacillus (PROBIOTIC ACIDOPHILUS) TABS Take 1 tablet by mouth daily 30 tablet 0    aspirin 81 MG tablet Take 81 mg by mouth daily  b complex vitamins capsule Take 1 capsule by mouth daily      melatonin 5 MG TABS tablet Take 5 mg by mouth nightly      atorvastatin (LIPITOR) 40 MG tablet Take 40 mg by mouth daily      vitamin D (CHOLECALCIFEROL) 1000 UNIT TABS tablet Take 1,000 Units by mouth daily      pantoprazole (PROTONIX) 40 MG tablet Take 40 mg by mouth daily      fenofibrate (TRICOR) 145 MG tablet Take 145 mg by mouth daily       Allergies   Allergen Reactions    Bactrim [Sulfamethoxazole-Trimethoprim]      CKD       REVIEW OF SYSTEMS  10 systems reviewed, pertinent positives per HPI otherwise noted to be negative    PHYSICAL EXAM  BP (!) 141/73   Pulse 84   Temp 98.2 °F (36.8 °C) (Oral)   Resp 18   SpO2 100%   GENERAL APPEARANCE: Awake and alert. Cooperative. No acute distress. HEAD: Normocephalic. 3 cm full-thickness laceration to posterior scalp. Associated tenderness. Mild oozing. No evidence of foreign bodies. No additional hematomas, lesions, lacerations or abrasions. Negative for olivares signs or raccoon's eyes. EYES: PERRL. EOM's grossly intact. No periorbital edema or erythema. No proptosis. No globe tenderness. No blood noted to anterior chambers. ENT: Mucous membranes are Moist.  No oral lesions or lacerations. TMJ pain or malocclusion. NECK: Supple. No midline bony tenderness. No crepitus, deformity, or step-offs noted. No swelling, bruising, or color change. HEART: RRR. No murmurs. LUNGS: Respirations unlabored. CTAB. Good air exchange. Speaking comfortably in full sentences. ABDOMEN: Soft. Non-distended. Non-tender. No guarding or rebound. No midline pulsatile mass. EXTREMITIES: No peripheral edema. Global tenderness to left shoulder without deformity or step-off. No evidence of dislocation subluxations. Normal range of motion and strength testing although does elicit pain. Moves all extremities equally. All extremities neurovascularly intact. SKIN: Warm and dry. No acute rashes. 1. No acute intracranial abnormality. 2. No significant change in appearance of the brain in comparison with the study earlier today. 3. Stable chronic left temporoparietal encephalomalacia. 4. Stable age-appropriate atrophy and chronic small vessel ischemic white matter disease. 5. Interval decrease in size of an occipital scalp hematoma, without acute skull fracture. Ct Head Wo Contrast    Result Date: 12/28/2020  EXAMINATION: CT OF THE HEAD WITHOUT CONTRAST  12/28/2020 4:48 pm TECHNIQUE: CT of the head was performed without the administration of intravenous contrast. Dose modulation, iterative reconstruction, and/or weight based adjustment of the mA/kV was utilized to reduce the radiation dose to as low as reasonably achievable. COMPARISON: 07/26/2019 HISTORY: ORDERING SYSTEM PROVIDED HISTORY: fall TECHNOLOGIST PROVIDED HISTORY: Reason for exam:->fall Has a \"code stroke\" or \"stroke alert\" been called? ->No Reason for Exam: fell hit head, laceration on back of head Acuity: Acute Type of Exam: Initial FINDINGS: BRAIN/VENTRICLES: There is no acute intracerebral hemorrhage or extra-axial fluid collection. There is moderate cerebral atrophy with moderate periventricular, subcortical and deep white matter small vessel ischemic disease. Encephalomalacia changes involve the left temporoparietal lobe. ORBITS: The orbits are unremarkable. SINUSES: Mild coastal hypertrophy involves the bilateral ethmoid air cells. There is partial opacification of the left mastoid air cells. SOFT TISSUES/SKULL:  There is a large right posterior scalp hematoma. 1. No acute intracranial abnormality.      Ct Cervical Spine Wo Contrast    Result Date: 12/28/2020 EXAMINATION: ONE XRAY VIEW OF THE CHEST 12/28/2020 4:35 pm COMPARISON: Chest radiograph September 18, 2019. HISTORY: ORDERING SYSTEM PROVIDED HISTORY: fall TECHNOLOGIST PROVIDED HISTORY: Reason for exam:->fall Reason for Exam: fall Acuity: Acute Type of Exam: Initial FINDINGS: There is minimal asymmetric airspace disease laterally within the left lower lung. No pneumothorax or pleural effusion identified. Cardiac and mediastinal contours are without acute process. No acute osseous abnormality. Minimal asymmetric airspace disease laterally within the left lower lung. Pneumonia is not excluded in the correct clinical setting.      ED COURSE Pain control was not required while here in the emergency department. Triage vital stable. CBC without leukocytosis. H&H at 10.2 and 31.7 respectively. CMP showing elevated BUN and creatinine at 30 and 1.9. According to chart review it does appear patient has baseline kidney dysfunction this does appear worse than normal.  Initiated on a 1 L IV fluid infusion. Troponin less than 0.01. BNP was approximately 11,300. Chest x-ray with minimal asymmetric airspace disease. Left shoulder plain films unremarkable and head CT negative for acute intercranial abnormality. Patient had increased bleeding. Did appear to have arterial scalp bleed which I did initially have some difficulty controlling. Was able to finally tie off artery. Staples applied to head. When attempting to sit patient up after procedure he had essentially had vasovagal episode with decreased responsiveness and low blood pressure. Once lying flat and fluids were resumed patient began mentating again at baseline. I did at that time repeat a head CT which remains negative. Patient has had a drop in hemoglobin down to 8.8. Scalp wound again no longer bleeding. We are at this time recommending admission for further observation of H&H as well as hydration versus CHF management with potential third spacing. Discussed care with hospital medicine regarding admission and orders placed. All questions were answered. Patient in agreement. CRITICAL CARE TIME  45 Minutes of critical care time spent not including separately billable procedures.     MDM  Results for orders placed or performed during the hospital encounter of 12/28/20   CBC auto differential   Result Value Ref Range    WBC 5.5 4.0 - 11.0 K/uL    RBC 3.73 (L) 4.20 - 5.90 M/uL    Hemoglobin 10.2 (L) 13.5 - 17.5 g/dL    Hematocrit 31.7 (L) 40.5 - 52.5 %    MCV 85.0 80.0 - 100.0 fL    MCH 27.5 26.0 - 34.0 pg    MCHC 32.3 31.0 - 36.0 g/dL    RDW 17.1 (H) 12.4 - 15.4 % Platelets 063 911 - 424 K/uL    MPV 8.6 5.0 - 10.5 fL    Neutrophils % 79.1 %    Lymphocytes % 13.3 %    Monocytes % 5.3 %    Eosinophils % 1.6 %    Basophils % 0.7 %    Neutrophils Absolute 4.4 1.7 - 7.7 K/uL    Lymphocytes Absolute 0.7 (L) 1.0 - 5.1 K/uL    Monocytes Absolute 0.3 0.0 - 1.3 K/uL    Eosinophils Absolute 0.1 0.0 - 0.6 K/uL    Basophils Absolute 0.0 0.0 - 0.2 K/uL   Comprehensive metabolic panel   Result Value Ref Range    Sodium 139 136 - 145 mmol/L    Potassium 4.4 3.5 - 5.1 mmol/L    Chloride 102 99 - 110 mmol/L    CO2 25 21 - 32 mmol/L    Anion Gap 12 3 - 16    Glucose 185 (H) 70 - 99 mg/dL    BUN 30 (H) 7 - 20 mg/dL    CREATININE 1.9 (H) 0.8 - 1.3 mg/dL    GFR Non-African American 35 (A) >60    GFR  42 (A) >60    Calcium 9.4 8.3 - 10.6 mg/dL    Total Protein 7.3 6.4 - 8.2 g/dL    Alb 3.7 3.4 - 5.0 g/dL    Albumin/Globulin Ratio 1.0 (L) 1.1 - 2.2    Total Bilirubin 0.7 0.0 - 1.0 mg/dL    Alkaline Phosphatase 55 40 - 129 U/L    ALT 10 10 - 40 U/L    AST 21 15 - 37 U/L    Globulin 3.6 g/dL   Troponin   Result Value Ref Range    Troponin <0.01 <0.01 ng/mL   Brain Natriuretic Peptide   Result Value Ref Range    Pro-BNP 11,298 (H) 0 - 124 pg/mL   Hemoglobin and hematocrit, blood   Result Value Ref Range    Hemoglobin 8.8 (L) 13.5 - 17.5 g/dL    Hematocrit 27.4 (L) 40.5 - 52.5 %   Brain Natriuretic Peptide   Result Value Ref Range    Pro-BNP 14,055 (H) 0 - 124 pg/mL   Sample possible blood bank testing   Result Value Ref Range    Specimen Status ANAM    EKG 12 Lead   Result Value Ref Range    Ventricular Rate 77 BPM    Atrial Rate 72 BPM    QRS Duration 114 ms    Q-T Interval 416 ms    QTc Calculation (Bazett) 470 ms    R Axis -58 degrees    T Axis 6 degrees    Diagnosis

## 2020-12-29 LAB
A/G RATIO: 1.1 (ref 1.1–2.2)
ALBUMIN SERPL-MCNC: 3.4 G/DL (ref 3.4–5)
ALP BLD-CCNC: 48 U/L (ref 40–129)
ALT SERPL-CCNC: 9 U/L (ref 10–40)
ANION GAP SERPL CALCULATED.3IONS-SCNC: 8 MMOL/L (ref 3–16)
AST SERPL-CCNC: 18 U/L (ref 15–37)
BACTERIA: ABNORMAL /HPF
BASOPHILS ABSOLUTE: 0.1 K/UL (ref 0–0.2)
BASOPHILS RELATIVE PERCENT: 0.9 %
BILIRUB SERPL-MCNC: 0.5 MG/DL (ref 0–1)
BILIRUBIN URINE: NEGATIVE
BLOOD, URINE: NEGATIVE
BUN BLDV-MCNC: 32 MG/DL (ref 7–20)
CALCIUM SERPL-MCNC: 8.8 MG/DL (ref 8.3–10.6)
CHLORIDE BLD-SCNC: 107 MMOL/L (ref 99–110)
CLARITY: ABNORMAL
CO2: 26 MMOL/L (ref 21–32)
COLOR: YELLOW
CREAT SERPL-MCNC: 1.7 MG/DL (ref 0.8–1.3)
EKG ATRIAL RATE: 72 BPM
EKG ATRIAL RATE: 77 BPM
EKG DIAGNOSIS: NORMAL
EKG DIAGNOSIS: NORMAL
EKG Q-T INTERVAL: 416 MS
EKG Q-T INTERVAL: 448 MS
EKG QRS DURATION: 114 MS
EKG QRS DURATION: 120 MS
EKG QTC CALCULATION (BAZETT): 470 MS
EKG QTC CALCULATION (BAZETT): 506 MS
EKG R AXIS: -58 DEGREES
EKG R AXIS: -60 DEGREES
EKG T AXIS: 28 DEGREES
EKG T AXIS: 6 DEGREES
EKG VENTRICULAR RATE: 77 BPM
EKG VENTRICULAR RATE: 77 BPM
EOSINOPHILS ABSOLUTE: 0 K/UL (ref 0–0.6)
EOSINOPHILS RELATIVE PERCENT: 0.5 %
EPITHELIAL CELLS, UA: ABNORMAL /HPF (ref 0–5)
FERRITIN: 238.7 NG/ML (ref 30–400)
FOLATE: >20 NG/ML (ref 4.78–24.2)
GFR AFRICAN AMERICAN: 48
GFR NON-AFRICAN AMERICAN: 40
GLOBULIN: 3 G/DL
GLUCOSE BLD-MCNC: 150 MG/DL (ref 70–99)
GLUCOSE URINE: NEGATIVE MG/DL
HCT VFR BLD CALC: 25.4 % (ref 40.5–52.5)
HEMOGLOBIN: 8.4 G/DL (ref 13.5–17.5)
IRON SATURATION: 18 % (ref 20–50)
IRON: 67 UG/DL (ref 59–158)
KETONES, URINE: NEGATIVE MG/DL
LEUKOCYTE ESTERASE, URINE: ABNORMAL
LYMPHOCYTES ABSOLUTE: 0.8 K/UL (ref 1–5.1)
LYMPHOCYTES RELATIVE PERCENT: 11.9 %
MCH RBC QN AUTO: 27.6 PG (ref 26–34)
MCHC RBC AUTO-ENTMCNC: 32.9 G/DL (ref 31–36)
MCV RBC AUTO: 83.9 FL (ref 80–100)
MICROSCOPIC EXAMINATION: YES
MONOCYTES ABSOLUTE: 0.4 K/UL (ref 0–1.3)
MONOCYTES RELATIVE PERCENT: 6.9 %
NEUTROPHILS ABSOLUTE: 5.2 K/UL (ref 1.7–7.7)
NEUTROPHILS RELATIVE PERCENT: 79.8 %
NITRITE, URINE: NEGATIVE
PDW BLD-RTO: 17.5 % (ref 12.4–15.4)
PH UA: 5.5 (ref 5–8)
PLATELET # BLD: 163 K/UL (ref 135–450)
PMV BLD AUTO: 9.4 FL (ref 5–10.5)
POTASSIUM REFLEX MAGNESIUM: 4.1 MMOL/L (ref 3.5–5.1)
PROTEIN UA: NEGATIVE MG/DL
RBC # BLD: 3.02 M/UL (ref 4.2–5.9)
RBC UA: ABNORMAL /HPF (ref 0–4)
SODIUM BLD-SCNC: 141 MMOL/L (ref 136–145)
SPECIFIC GRAVITY UA: 1.02 (ref 1–1.03)
TOTAL IRON BINDING CAPACITY: 371 UG/DL (ref 260–445)
TOTAL PROTEIN: 6.4 G/DL (ref 6.4–8.2)
TROPONIN: <0.01 NG/ML
TSH REFLEX: 3.11 UIU/ML (ref 0.27–4.2)
URINE REFLEX TO CULTURE: YES
URINE TYPE: ABNORMAL
UROBILINOGEN, URINE: 0.2 E.U./DL
VITAMIN B-12: 499 PG/ML (ref 211–911)
WBC # BLD: 6.5 K/UL (ref 4–11)
WBC UA: >100 /HPF (ref 0–5)

## 2020-12-29 PROCEDURE — 97166 OT EVAL MOD COMPLEX 45 MIN: CPT

## 2020-12-29 PROCEDURE — 81001 URINALYSIS AUTO W/SCOPE: CPT

## 2020-12-29 PROCEDURE — 87088 URINE BACTERIA CULTURE: CPT

## 2020-12-29 PROCEDURE — 93010 ELECTROCARDIOGRAM REPORT: CPT | Performed by: INTERNAL MEDICINE

## 2020-12-29 PROCEDURE — 80053 COMPREHEN METABOLIC PANEL: CPT

## 2020-12-29 PROCEDURE — 97535 SELF CARE MNGMENT TRAINING: CPT

## 2020-12-29 PROCEDURE — 1200000000 HC SEMI PRIVATE

## 2020-12-29 PROCEDURE — 85025 COMPLETE CBC W/AUTO DIFF WBC: CPT

## 2020-12-29 PROCEDURE — 6370000000 HC RX 637 (ALT 250 FOR IP): Performed by: INTERNAL MEDICINE

## 2020-12-29 PROCEDURE — 97116 GAIT TRAINING THERAPY: CPT

## 2020-12-29 PROCEDURE — 87186 SC STD MICRODIL/AGAR DIL: CPT

## 2020-12-29 PROCEDURE — G0378 HOSPITAL OBSERVATION PER HR: HCPCS

## 2020-12-29 PROCEDURE — 97162 PT EVAL MOD COMPLEX 30 MIN: CPT

## 2020-12-29 PROCEDURE — 97530 THERAPEUTIC ACTIVITIES: CPT

## 2020-12-29 PROCEDURE — 99223 1ST HOSP IP/OBS HIGH 75: CPT | Performed by: INTERNAL MEDICINE

## 2020-12-29 PROCEDURE — 2580000003 HC RX 258: Performed by: NURSE PRACTITIONER

## 2020-12-29 PROCEDURE — 87086 URINE CULTURE/COLONY COUNT: CPT

## 2020-12-29 PROCEDURE — 6370000000 HC RX 637 (ALT 250 FOR IP): Performed by: NURSE PRACTITIONER

## 2020-12-29 PROCEDURE — 84484 ASSAY OF TROPONIN QUANT: CPT

## 2020-12-29 RX ORDER — AMLODIPINE BESYLATE 5 MG/1
5 TABLET ORAL DAILY
Status: DISCONTINUED | OUTPATIENT
Start: 2020-12-29 | End: 2021-01-01

## 2020-12-29 RX ADMIN — ATORVASTATIN CALCIUM 40 MG: 40 TABLET, FILM COATED ORAL at 11:24

## 2020-12-29 RX ADMIN — POTASSIUM CHLORIDE 20 MEQ: 20 TABLET, EXTENDED RELEASE ORAL at 11:24

## 2020-12-29 RX ADMIN — PANTOPRAZOLE SODIUM 40 MG: 40 TABLET, DELAYED RELEASE ORAL at 11:24

## 2020-12-29 RX ADMIN — POTASSIUM CHLORIDE 20 MEQ: 20 TABLET, EXTENDED RELEASE ORAL at 22:44

## 2020-12-29 RX ADMIN — Medication 5 MG: at 22:44

## 2020-12-29 RX ADMIN — AMLODIPINE BESYLATE 5 MG: 5 TABLET ORAL at 17:16

## 2020-12-29 RX ADMIN — SODIUM CHLORIDE, PRESERVATIVE FREE 10 ML: 5 INJECTION INTRAVENOUS at 22:44

## 2020-12-29 RX ADMIN — FENOFIBRATE 160 MG: 160 TABLET ORAL at 11:24

## 2020-12-29 ASSESSMENT — PAIN SCALES - GENERAL: PAINLEVEL_OUTOF10: 0

## 2020-12-29 NOTE — CONSULTS
Consulted for chronic wounds on bilateral lower legs and R Foot; per the patient has no needs and did not want wound care to removed current dressing; uses Collagen and Medihoney and changes the dressings every 2 days; pt hopes to be discharged today; unsure of discharge plans; Wound Care to follow up if patient still here 12/31.

## 2020-12-29 NOTE — ED PROVIDER NOTES
I independently performed a history and physical on [employer]. All diagnostic, treatment, and disposition decisions were made by myself in conjunction with the advanced practice provider. I have participated in the medical decision making and directed the treatment plan and disposition of the patient. For further details of [facility] emergency department encounter, please see the advanced practice provider's documentation. CHIEF COMPLAINT  Chief Complaint   Patient presents with   Leda Newman     states was working out in living room and went down, lac to back of head and complains of L shoulder pain     Laceration    Shoulder Pain       Briefly, [employer] is a 65 y.o. male  who presents to the ED complaining of multiple medical comorbidities here today after a fall. Reportedly got lightheaded dizzy fell struck his head. FOCUSED PHYSICAL EXAMINATION  BP (!) 154/72   Pulse 80   Temp 98.2 °F (36.8 °C) (Oral)   Resp 17   Wt 250 lb (113.4 kg)   SpO2 100%   BMI 29.65 kg/m²      Focused physical examination:  General appearance:  Cooperative. No acute distress. Skin:  Warm. Dry. Laceration of the right occiput. Eye:  Extraocular movements intact. Ears, nose, mouth and throat:  Oral mucosa moist,  Neck:  Trachea midline. Heart:  Regular rate and rhythm  Perfusion:  intact  Respiratory:  Lungs clear to auscultation bilaterally. Respirations nonlabored. Abdominal:   Non distended. Nontender  Neurological:  Alert and oriented x 3. Moves all extremities spontaneously  Musculoskeletal:   Normal ROM, no deformities          Psychiatric:  Normal mood      EKG: Atrial fibrillation rate of 77 bpm.  Inferior Q waves. Anterior Q waves. No ST elevation.   Compared to prior from 9/18/2019 no change MDM: Patient presented emergency department today after a fall. Laceration with arterial bleed repaired by the midlevel provider. Please see their note. After having his laceration repaired patient became hypotensive. Repeat hemoglobin shows a fall of 2 g. Repeat head CT was negative. Found to have some acute on chronic renal insufficiency. Will be admitted for observation and serial hemoglobin testing. During the patient's ED course, the patient was given:  Medications   0.9 % sodium chloride infusion (0 mLs Intravenous Stopped 12/28/20 1953)        CLINICAL IMPRESSION  1. Fall, initial encounter    2. Laceration of scalp, initial encounter    3. Syncope and collapse        DISPOSITION  Admission      This chart was created using Dragon dictation software. Efforts were made by me to ensure accuracy, however some errors may be present due to limitations of this technology.             Reina Anderson MD  12/28/20 1623

## 2020-12-29 NOTE — PROGRESS NOTES
Secure message sent to MD: The patients BP is 200/111 after multiple attempts and different arms. He is resting in bed. He does not have any prn orders, he is asymptomatic but wanted you to be aware in case you wanted him to have something. MD responded with dailt order of 5 mg norvasc. Will administer once verified by pharmacy.  Electronically signed by Estela Drummond RN on 12/29/2020 at 4:35 PM

## 2020-12-29 NOTE — PROGRESS NOTES
Comprehensive Nutrition Assessment    Type and Reason for Visit:  Initial, Positive Nutrition Screen, Wound    Nutrition Recommendations/Plan:   1. Continue 2 gm sodium diet - monitor need for addition of carb control if BG increase over 200 mg/dl   2. Add Ensure High Protein once daily   3. Monitor nutrition adequacy, pertinent labs, bowel habits, wt changes, and clinical progress    Nutrition Assessment:  Pt admitted s/p fall with lacteration to head. Hx of DM, HLD, HTN and CHF. Pt nutritionally compromised AEB wounds. Pt refused RN skin assessment. Wound care consult pending. Pt reports good appetite and PO intakes now and PTA. Denies recent weight loss. Will add ONS for optimal nutrition this admission for increased estimated protein needs d/t wounds. Malnutrition Assessment:  Malnutrition Status: At risk for malnutrition (Comment)      Estimated Daily Nutrient Needs:  Energy (kcal):  2728-2063 kcal; Weight Used for Energy Requirements:  Ideal(94 kg)     Protein (g):   g; Weight Used for Protein Requirements:  Ideal(1.0-1.2 g/kg)        Fluid (ml/day):   ; Method Used for Fluid Requirements:  1 ml/kcal      Nutrition Related Findings:  Active BS, +2 pitting BLE edema, Jaundice      Wounds:  Wound Consult Pending(laceration to head)       Current Nutrition Therapies:    DIET LOW SODIUM 2 GM; Anthropometric Measures:  · Height: 6' 5\" (195.6 cm)  · Current Body Weight: 250 lb (113.4 kg)   · Usual Body Weight: (250-260 lb per pt)     · Ideal Body Weight: 208 lbs; % Ideal Body Weight 120.2 %   · BMI: 29.6  · BMI Categories: Overweight (BMI 25.0-29. 9)       Nutrition Diagnosis:   · Increased nutrient needs related to increase demand for energy/nutrients as evidenced by wounds      Nutrition Interventions:   Food and/or Nutrient Delivery:  Continue Current Diet, Start Oral Nutrition Supplement  Nutrition Education/Counseling:  No recommendation at this time Coordination of Nutrition Care:  Continue to monitor while inpatient    Goals:  Pt will consume greater than 50% of meals and ONS this admission       Nutrition Monitoring and Evaluation:   Food/Nutrient Intake Outcomes:  Food and Nutrient Intake, Supplement Intake  Physical Signs/Symptoms Outcomes:  Biochemical Data, Skin, Weight     Discharge Planning:    Continue current diet     Electronically signed by Kris Vail MS, RD, LD on 12/29/20 at 10:06 AM EST    Contact: [phone]

## 2020-12-29 NOTE — CARE COORDINATION
CASE MANAGEMENT INITIAL ASSESSMENT      Reviewed chart and completed assessment via telephone with: Pt and pt's daughter Lyn Ordaz  Explained Case Management role/services. Primary contact information: Zhang Brothers [phone]    Health Care Decision Maker :  Zhang Brothers [phone]         Can this person be reached and be able to respond quickly, such as within a few minutes or hours? Yes  Who would be your back-up decision maker? Name  Lyn Ordaz     Phone Number: [phone]    Admit date/status:inpt 12/28/2020  Diagnosis: Syncope and Collapse    Is this a Readmission?:  No      Insurance:BCBS Medicare   Precert required for SNF: No       3 night stay required: No    Living arrangements, Adls, care needs, prior to admission:Pt lives at home with his daughter and his granddaughter, as well as his spouse. He has 24/7 care. Transportation: Pt's family will transport pt home at discharge. Durable Medical Equipment at home:  Walker_x_Cane_x_RTS__ BSC__Shower Chair__  02__ HHN__ CPAP__  BiPap__  Hospital Bed__ W/C_x__ Other__________    Services in the home and/or outpatient, prior to admission: N/A    PT/OT recs: 24/7 care and PT/OT    Hospital Exemption Notification (HEN): N/A    Barriers to discharge: N/A    Plan/comments: John spoke with pt on this day who notes that he lives at home with hier daughter. Due to what appeared to be confusion, Sw attempted to call pt's daughter Claudia Brothers. Sw left a  for Claudia Brothers and called Daughter Lyn Ordaz. Lenimarie Adlers notes that pt has 24/7 care from Claudia Brothers and her daughter. John spoke with her about home health care and Lenimarie Adlers notes that Claudia Brothers should make this decision, however more than likely they will decline due to COVID.      ECOC on chart for MD signature

## 2020-12-29 NOTE — CARE COORDINATION
John attempted to assess pt on this day, however he did not answer his phone. John will attempt again in about an hour.

## 2020-12-29 NOTE — PROGRESS NOTES
Patient refused to let RN's perform 4 eyes skin assessment at admission. Patient has wounds on BLE. Right extremity wound has dressing on it. RN attempted to remove to assess and redress however patient adamantly refused to let writer do so. Writer placed wound care eval and treat order. Hopefully, patient will agree to let wound care nurse evaluate. Charge RN Naye Fuentes notified of situation.

## 2020-12-29 NOTE — PROGRESS NOTES
Physical Therapy    Facility/Department: [facility] A1 REMOTE TELEMETRY  Initial Assessment    NAME: Giovany Webb  : 1950  MRN: [id number]    Date of Service: 2020    Discharge Recommendations:  24 hour supervision or assist, Home with Home health PT   PT Equipment Recommendations  Equipment Needed: No(pt owns RW)    Assessment   Body structures, Functions, Activity limitations: Decreased functional mobility ; Decreased safe awareness;Decreased endurance;Decreased balance  Assessment: Pt is a 64 y/o male who presents after syncopal episode at home. Pt lives with wife and daughter at home. Pt currently requires CGA for transfers and short distance ambulation with RW. Pt would benefit from continued skilled PT to address these limtiations and allow for safe return home. Recommend 24hr supervision and home PT at discharge. Treatment Diagnosis: impaired functional mobility  Prognosis: Good  Decision Making: Medium Complexity  PT Education: Goals; General Safety;PT Role;Gait Training;Disease Specific Education;Plan of Care; Functional Mobility Training;Transfer Training  Patient Education: Educated patient on use of RW initially upon return home - pt verbalized understanding but would benefit from reinforcement  REQUIRES PT FOLLOW UP: Yes  Activity Tolerance  Activity Tolerance: Patient Tolerated treatment well;Patient limited by fatigue  Activity Tolerance: /100 at end of session - RN notified       Patient Diagnosis(es): The primary encounter diagnosis was Fall, initial encounter. Diagnoses of Laceration of scalp, initial encounter, Syncope and collapse, Anemia due to acute blood loss, and MAISHA (acute kidney injury) ([facility] [address] were also pertinent to this visit. has a past medical history of Acute gastric ulcer with bleeding, Acute metabolic encephalopathy, MAISHA (acute kidney injury) ([facility] [address] ATN (acute tubular necrosis) ([facility] [address] Atrial fib/flutter, transient, Atrial fibrillation ([facility] [address] BPH (benign prostatic hyperplasia), Cerebral artery occlusion with cerebral infarction ([facility] [address] Diabetes mellitus ([facility] [address] GI bleed, Glaucoma, History of blood transfusion, Hx of diabetic neuropathy, Hyperlipidemia, Hypertension, Mitral insufficiency, Mobility impaired, Pulmonary hypertension ([facility] [address] and Traumatic hemorrhagic shock ([facility] [address] has a past surgical history that includes knee surgery; Prostate biopsy; other surgical history (10/11/13); Upper gastrointestinal endoscopy (7/13 17); Dental surgery (N/A, 3/28/2019); and laparoscopy (N/A, 6/22/2019). Restrictions  Restrictions/Precautions  Restrictions/Precautions: Fall Risk  Vision/Hearing  Vision: Impaired  Vision Exceptions: Cataracts  Hearing: Within functional limits     Subjective  General  Chart Reviewed: Yes  Patient assessed for rehabilitation services?: Yes  Family / Caregiver Present: No  Referring Practitioner: JAMEY Cano NP  Referral Date : 12/28/20  Diagnosis: syncope and collapse  Follows Commands: Within Functional Limits  Other (Comment): Pt tangential and requires frequent redirection  General Comment  Comments: Pt supine in bed upon arrival. RN cleared pt for therapy. Subjective  Subjective: Pt agreeable to evaluation. Denies pain.   Pain Screening  Patient Currently in Pain: No  Vital Signs  Patient Currently in Pain: No     Social/Functional History  Social/Functional History  Lives With: Family(wife and daughter)  Type of Home: House  Home Layout: Two level(pt has hospital bed in living room on main level)  Home Access: Stairs to enter with rails  Entrance Stairs - Number of Steps: 2 TANNER  Entrance Stairs - Rails: Right  Bathroom Shower/Tub: Walk-in shower, Shower chair with back  [employer]: Standard Short term goals  Time Frame for Short term goals: 3 days (1/2/21)  Short term goal 1: Pt will perform transfers with supervision  Short term goal 2: Pt will ambulate 48' with appropriate AD and supervision  Short term goal 3: By 12/31/20, pt will tolerate 15 reps of LE ther ex for improved strength  Patient Goals   Patient goals : \"to go home\"     Therapy Time   Individual Concurrent Group Co-treatment   Time In 5411         Time Out 1050         Minutes 60         Timed Code Treatment Minutes: [address] [place], [place] [address]

## 2020-12-29 NOTE — PROGRESS NOTES
Repeat EKG was completed in ED prior to coming to floor. Appears to be unchanged. NP Wen updated. EKG in paper chart.

## 2020-12-29 NOTE — H&P
Hospital Medicine History & Physical      PCP: Sagrario Elizabeth MD    Date of Admission: 12/28/2020    Date of Service: Pt seen/examined on 12/28/2020 and Admitted to observation with expected LOS less than two midnights due to medical therapy. Chief Complaint:    Chief Complaint   Patient presents with   Lindsey Chao     states was working out in living room and went down, lac to back of head and complains of L shoulder pain     Laceration    Shoulder Pain     History Of Present Illness:      65 y.o. male, with PMH of HTN, A. fib, CHF, and HLD, who presented to [facility] with syncopal episode. History was obtained from the patient and review of the EMR. The patient states that he was in his living room working out today when he suddenly lost consciousness and fell backwards, hitting his head on the fireplace of his home. Upon further review of the EMR, the patient seems to have recurrent episodes of syncope over the past few years. Upon arrival to the ED, the patient had CT head which was unremarkable and he was given IV fluids. While in the ED, the patient had another syncopal episode when he turned onto his side and was found to have blood pressure 70s over 50s. He was noted to still be bleeding in the back of his head and this was addressed by ED provider.  He has staples to the back of his head, bleeding has seemingly stopped during my exam. Denies any dizziness during my exam.    Past Medical History:          Diagnosis Date    Acute gastric ulcer with bleeding     Acute metabolic encephalopathy     MAISHA (acute kidney injury) ([address]     Requiring dialysis since 7/4/17 in setting of hemorrhagic shock    ATN (acute tubular necrosis) (HCC)     Atrial fib/flutter, transient     Atrial fibrillation (HCC)     BPH (benign prostatic hyperplasia)     Cerebral artery occlusion with cerebral infarction ([address] 08/05/2017    weakness and aphasia    Diabetes mellitus ([address]     GI bleed     Glaucoma  History of blood transfusion     Hx of diabetic neuropathy     Hyperlipidemia     Hypertension     Mitral insufficiency     Mobility impaired     uses walker and W/C    Pulmonary hypertension (HCC)     Traumatic hemorrhagic shock (HCC)        Past Surgical History:          Procedure Laterality Date    DENTAL SURGERY N/A 3/28/2019    EXTRACTION OF ALL REMAINING UPPER AND LOWER TEETH AND ALVEOLOPLASTY   performed by Elzbieta Ayala DMD at [address] [facility] N/A 6/22/2019    LAPAROSCOPIC, CONVERTED TO OPEN INCARCERATED UMBILICAL HERNIA REPAIR performed by Cristo Gaspar MD at [address] [place] [address]  10/11/13     CYSTOSCOPY, TRANSURETHRAL RESECTION OF PROSTATE WITH OLYMPUS    PROSTATE BIOPSY      UPPER GASTROINTESTINAL ENDOSCOPY  7/13 17       Medications Prior to Admission:      Prior to Admission medications    Medication Sig Start Date End Date Taking?  Authorizing Provider   B Complex Vitamins (VITAMIN B COMPLEX 100 IJ) Take 1 tablet by mouth daily   Yes Historical Provider, MD   cloNIDine (CATAPRES) 0.2 MG tablet Take 0.2 mg by mouth 2 times daily 12/18/20  Yes Historical Provider, MD   melatonin 5 MG TABS tablet Take 10 mg by mouth   Yes Historical Provider, MD   QUEtiapine (SEROQUEL) 25 MG tablet Take 75 mg by mouth every morning 10/5/20 1/3/21 Yes Historical Provider, MD   QUEtiapine (SEROQUEL) 100 MG tablet Take 100 mg by mouth nightly 7/16/20  Yes Historical Provider, MD   venlafaxine (EFFEXOR XR) 150 MG extended release capsule Take 150 mg by mouth daily 4/16/20  Yes Historical Provider, MD   mesalamine (APRISO) 0.375 g extended release capsule  3/15/20  Yes Historical Provider, MD   cyanocobalamin 1000 MCG tablet Take by mouth   Yes Historical Provider, MD   blood glucose test strips (ASCENSIA AUTODISC VI;ONE TOUCH ULTRA TEST VI) strip Check glucose daily 9/9/19  Yes Historical Provider, MD ONE TOUCH LANCETS MISC Check glucose daily 9/9/19  Yes Historical Provider, MD   ferrous sulfate (IRON 325) 325 (65 Fe) MG tablet Take 325 mg by mouth 3/15/20  Yes Historical Provider, MD   metoprolol succinate (TOPROL XL) 25 MG extended release tablet Take 1 tablet by mouth daily 10/3/19  Yes JAMEY Raymond CNP   furosemide (LASIX) 40 MG tablet Take 1 tablet by mouth daily 10/3/19  Yes JAMEY Raymond CNP   hydrALAZINE (APRESOLINE) 100 MG tablet Take 1 tablet by mouth every 8 hours 9/21/19  Yes Mami Francis MD   losartan (COZAAR) 100 MG tablet Take 1 tablet by mouth daily 9/22/19  Yes Mami Francis MD   potassium chloride (KLOR-CON M) 20 MEQ extended release tablet Take 20 mEq by mouth 2 times daily   Yes Historical Provider, MD   levOCARNitine fumarate (CARNITOR) 250 MG capsule Take 2 capsules by mouth 3 times daily 4/29/19  Yes Ajit Hester MD   Lactobacillus (PROBIOTIC ACIDOPHILUS) TABS Take 1 tablet by mouth daily 4/29/19  Yes Ajit Hester MD   aspirin 81 MG tablet Take 81 mg by mouth daily   Yes Historical Provider, MD   b complex vitamins capsule Take 1 capsule by mouth daily   Yes Historical Provider, MD   atorvastatin (LIPITOR) 40 MG tablet Take 40 mg by mouth daily   Yes Historical Provider, MD   vitamin D (CHOLECALCIFEROL) 1000 UNIT TABS tablet Take 1,000 Units by mouth daily   Yes Historical Provider, MD   pantoprazole (PROTONIX) 40 MG tablet Take 40 mg by mouth daily   Yes Historical Provider, MD   fenofibrate (TRICOR) 145 MG tablet Take 145 mg by mouth daily   Yes Historical Provider, MD   Elastic Bandages & Supports (JOBST KNEE HIGH COMPRESSION SM) MISC 1 each by Does not apply route once for 1 dose Thigh high or knee high 10/3/19 10/3/19  JAMEY Raymond CNP       Allergies:  Sulfamethoxazole-trimethoprim    Social History:      The patient currently lives independently. 1924 12/29/20  0200   WBC 5.5  --  6.5   HGB 10.2* 8.8* 8.4*   HCT 31.7* 27.4* 25.4*     --  163     Recent Labs     12/28/20  1630 12/29/20  0201    141   K 4.4 4.1    107   CO2 25 26   BUN 30* 32*   CREATININE 1.9* 1.7*   CALCIUM 9.4 8.8     Recent Labs     12/28/20  1630 12/29/20  0201   AST 21 18   ALT 10 9*   BILITOT 0.7 0.5   ALKPHOS 55 48     No results for input(s): INR in the last 72 hours. Recent Labs     12/28/20  1630 12/28/20  2230 12/29/20  0201   TROPONINI <0.01 <0.01 <0.01       Urinalysis:      Lab Results   Component Value Date    NITRU POSITIVE 01/24/2020    WBCUA >100 01/24/2020    BACTERIA 2+ 01/24/2020    RBCUA 3-5 01/24/2020    BLOODU TRACE-INTACT 01/24/2020    SPECGRAV 1.020 01/24/2020    GLUCOSEU Negative 01/24/2020       Radiology:     CXR: I have reviewed the CXR with the following interpretation: Minimal asymmetric airspace disease laterally within the left lower lung. EKG:  I have reviewed the EKG with the following interpretation: afib    CT HEAD WO CONTRAST   Preliminary Result   1. No acute intracranial abnormality. 2. No significant change in appearance of the brain in comparison with the   study earlier today. 3. Stable chronic left temporoparietal encephalomalacia. 4. Stable age-appropriate atrophy and chronic small vessel ischemic white   matter disease. 5. Interval decrease in size of an occipital scalp hematoma, without acute   skull fracture. CT HEAD WO CONTRAST   Final Result   1. No acute intracranial abnormality. CT CERVICAL SPINE WO CONTRAST   Final Result   1. No acute fracture. 2. Incompletely imaged small right pleural effusion. XR SHOULDER LEFT (MIN 2 VIEWS)   Final Result   No acute osseous injury of the left shoulder. XR CHEST PORTABLE   Final Result   Minimal asymmetric airspace disease laterally within the left lower lung. Pneumonia is not excluded in the correct clinical setting. ASSESSMENT:    Active Hospital Problems    Diagnosis Date Noted    Syncope and collapse [R55] 07/16/2019         PLAN:    Syncope and collapse, unclear etiology. Has had several episodes of syncope and collapse in the past. Hx of CHF and afib. Had repeat syncopal episode in the ED after rolling onto his side during which he was noted to have BP 70s/50s.  CT head with no acute intracranial abnormality   EKG revealed afib   Orthostatics pending   Repeat echo pending   Neurochecks   Telemetry monitoring    Fall precautions   IVF bolus (1L) given in ED, did not continue in setting of hx of CHF     Head trauma 2/2 fall  - CT head with occipital scalp hematoma, no skull fracture  - Neuro checks  - Wound care as needed    Chronic diastolic CHF, appears compensated.  Echo on 6/27/2019 revealed: Low normal systolic function with EF 50 to 55%. Mild concentric LVH. No regional wall motion abnormalities noted. Mild MR and TR. Mild pulmonary hypertension.  Continue home Coreg, Lasix   Daily weights   I's and O's    Hx of persistent a. Fib s/p Watchman's device  - EKG on admission: Afib  - Not on ac due to hx of significant GI bleed  - Hold home bb for now in setting of syncope  - Tele monitoring      DVT Prophylaxis: lovenox  Diet: DIET LOW SODIUM 2 GM;  Code Status: Full Code    PT/OT Eval Status: Ordered and pending    Dispo - pending clinical improvement       Karen Wadsworth - NP    Thank you Abby Watkins MD for the opportunity to be involved in this patient's care.  If you have any questions or concerns please feel free to contact me at [phone].  -------------------------Anticipated Dr. Radha aguirre------------------------

## 2020-12-29 NOTE — ED NOTES
Pt wound was cleaned with saline and antiseptic wash. Pt tolerated well. Rn and PA notified.       Kentrell Angeles  12/28/20 1946

## 2020-12-29 NOTE — FLOWSHEET NOTE
12/29/20 0119   Assessment   Charting Type Admission   Neurological   Neuro (WDL) X   Level of Consciousness Alert (0)   Orientation Level Oriented X4   Cognition Impulsive;Poor attention/concentration   Language Clear   Size R Pupil (mm) 3   R Pupil Shape Round   Size L Pupil (mm) 3   L Pupil Shape Round   R Hand  Moderate   L Hand  Moderate   R Foot Dorsiflexion Moderate   L Foot Dorsiflexion Moderate   R Foot Plantar Flexion Moderate   L Foot Plantar Flexion Moderate   RUE Motor Response Responds to command   LUE Motor Response Responds to command   RLE Motor Response Responds to command   LLE Motor Response Responds to command   Gag Present   Dennis Coma Scale   Eye Opening 4   Best Verbal Response 5   Best Motor Response 6   [place] Coma Scale Score 15   Respiratory   Respiratory (WDL) X   Respiratory Pattern Regular   Respiratory Depth Normal   Respiratory Quality/Effort Unlabored   Chest Assessment Chest expansion symmetrical;JAMES   L Breath Sounds Diminished   R Breath Sounds Diminished   Cardiac   Cardiac (WDL) X   Cardiac Regularity Irregular   Heart Sounds S1, S2   Cardiac Rhythm Atrial fibrillation;SVR   Rhythm Interpretation   Pulse 78   Cardiac Monitor   Telemetry Monitor On Yes   Telemetry Audible Yes   Telemetry Alarms Set Yes   Gastrointestinal   Abdominal (WDL) X   Abdomen Inspection Distended;Rounded   RUQ Bowel Sounds Active   LUQ Bowel Sounds Active   RLQ Bowel Sounds Active   LLQ Bowel Sounds Active   Peripheral Vascular   Peripheral Vascular (WDL) X   Edema Left lower extremity;Right lower extremity   RLE Edema +2;Pitting   LLE Edema +2;Pitting   RLE Neurovascular Assessment   Capillary Refill Less than/equal to 3 seconds   Color Bronze;Jaundiced   Temperature Warm   R Pedal Pulse +2   LLE Neurovascular Assessment   Capillary Refill Less than/equal to 3 seconds   Color Jaundiced;Bronze   Temperature Warm   L Pedal Pulse +2   Skin Color/Condition   Skin Color/Condition (WDL) X Skin Color Jaundice;Bronze   Skin Condition/Temp Flaky; Warm;Dry   Skin Integrity   Skin Integrity (WDL) X   Skin Integrity Laceration  (staples )   Location Head    Multiple Skin Integrity Sites Yes   Skin Integrity Site 2   Skin Integrity Location 2 Abrasion   Location 2 BLE  (wounds, refused to let RN assess dressed wounds )   Musculoskeletal   Musculoskeletal (WDL) X  (JAMES/Bedrest )   Genitourinary   Genitourinary (WDL) WDL   Flank Tenderness No   Suprapubic Tenderness No   Dysuria No   Anus/Rectum   Anus/Rectum (WDL) WDL   Psychosocial   Psychosocial (WDL) X  (Inappropriate comments to nursing staff )   Patient Behaviors Anxious; Uncooperative;Aggressive verbally

## 2020-12-29 NOTE — PROGRESS NOTES
Hospitalist Progress Note      PCP: Dante Bolaños MD    Date of Admission: 12/28/2020    Chief Complaint: Syncope and collapse    Hospital Course: see H&P     Subjective:   Patient is up in bed, comfortable, not in distress. No new event overnight. Medications:  Reviewed    Infusion Medications    sodium chloride 1,000 mL (12/28/20 2309)     Scheduled Medications    [Held by provider] aspirin  81 mg Oral Daily    atorvastatin  40 mg Oral Daily    vitamin B complex w/C  1 tablet Oral Daily    fenofibrate  160 mg Oral Daily    melatonin  5 mg Oral Nightly    pantoprazole  40 mg Oral Daily    potassium chloride  20 mEq Oral BID    sodium chloride flush  10 mL Intravenous 2 times per day    enoxaparin  40 mg Subcutaneous Daily     PRN Meds: sodium chloride flush, promethazine **OR** ondansetron, magnesium hydroxide, acetaminophen **OR** acetaminophen, perflutren lipid microspheres      Intake/Output Summary (Last 24 hours) at 12/29/2020 1017  Last data filed at 12/29/2020 0400  Gross per 24 hour   Intake 1725 ml   Output 600 ml   Net 1125 ml       Physical Exam Performed:    BP (!) 160/91   Pulse 86   Temp 98.4 °F (36.9 °C) (Oral)   Resp 18   Ht 6' 5\" (1.956 m)   Wt 250 lb (113.4 kg)   SpO2 97%   BMI 29.65 kg/m²     General appearance: No apparent distress, appears stated age and cooperative. HEENT: Pupils equal, round, and reactive to light. Conjunctivae/corneas clear. Neck: Supple, with full range of motion. No jugular venous distention. Trachea midline. Respiratory:  Normal respiratory effort. Clear to auscultation, bilaterally without Rales/Wheezes/Rhonchi. Cardiovascular: Regular rate and rhythm with normal S1/S2 without murmurs, rubs or gallops. Abdomen: Soft, non-tender, non-distended with normal bowel sounds. Musculoskeletal: No clubbing, cyanosis or edema bilaterally. Full range of motion without deformity. Skin: Skin color, texture, turgor normal.  No rashes or lesions. Neurologic:  Neurovascularly intact without any focal sensory/motor deficits. Cranial nerves: II-XII intact, grossly non-focal.  Psychiatric: Alert and oriented, thought content appropriate, normal insight  Capillary Refill: Brisk,< 3 seconds   Peripheral Pulses: +2 palpable, equal bilaterally       Labs:   Recent Labs     12/28/20  1630 12/28/20  1922 12/29/20  0200   WBC 5.5  --  6.5   HGB 10.2* 8.8* 8.4*   HCT 31.7* 27.4* 25.4*     --  163     Recent Labs     12/28/20  1630 12/29/20  0201    141   K 4.4 4.1    107   CO2 25 26   BUN 30* 32*   CREATININE 1.9* 1.7*   CALCIUM 9.4 8.8     Recent Labs     12/28/20  1630 12/29/20  0201   AST 21 18   ALT 10 9*   BILITOT 0.7 0.5   ALKPHOS 55 48     No results for input(s): INR in the last 72 hours. Recent Labs     12/28/20  1630 12/28/20  2230 12/29/20  0201   TROPONINI <0.01 <0.01 <0.01       Urinalysis:      Lab Results   Component Value Date    NITRU POSITIVE 01/24/2020    WBCUA >100 01/24/2020    BACTERIA 2+ 01/24/2020    RBCUA 3-5 01/24/2020    BLOODU TRACE-INTACT 01/24/2020    SPECGRAV 1.020 01/24/2020    GLUCOSEU Negative 01/24/2020       Radiology:  CT HEAD WO CONTRAST   Preliminary Result   1. No acute intracranial abnormality. 2. No significant change in appearance of the brain in comparison with the   study earlier today. 3. Stable chronic left temporoparietal encephalomalacia. 4. Stable age-appropriate atrophy and chronic small vessel ischemic white   matter disease. 5. Interval decrease in size of an occipital scalp hematoma, without acute   skull fracture. CT HEAD WO CONTRAST   Final Result   1. No acute intracranial abnormality. CT CERVICAL SPINE WO CONTRAST   Final Result   1. No acute fracture. 2. Incompletely imaged small right pleural effusion. XR SHOULDER LEFT (MIN 2 VIEWS)   Final Result   No acute osseous injury of the left shoulder.          XR CHEST PORTABLE   Final Result Minimal asymmetric airspace disease laterally within the left lower lung. Pneumonia is not excluded in the correct clinical setting. Assessment/Plan:    Active Hospital Problems    Diagnosis    Syncope and collapse [R55]     Syncope and collapse, unclear etiology. Has had several episodes of syncope and collapse in the past. Hx of CHF and afib. Had repeat syncopal episode in the ED after rolling onto his side during which he was noted to have BP 70s/50s.  CT head with no acute intracranial abnormality   EKG revealed afib   Orthostatics pending   Repeat echo pending   Neurochecks   Telemetry monitoring    Fall precautions   IVF bolus (1L) given in ED, did not continue in setting of hx of CHF      Head trauma 2/2 fall  - CT head with occipital scalp hematoma, no skull fracture  - Neuro checks  - Wound care as needed     Chronic diastolic CHF, appears compensated.  Echo on 6/27/2019 revealed: Low normal systolic function with EF 50 to 55%. Mild concentric LVH. No regional wall motion abnormalities noted. Mild MR and TR. Mild pulmonary hypertension.  Continue home Coreg, Lasix   Daily weights   I's and O's     Hx of persistent a.  Fib s/p Watchman's device  - EKG on admission: Afib  - Not on ac due to hx of significant GI bleed  - Hold home bb for now in setting of syncope  - Tele monitoring    DVT Prophylaxis: Lovenox  Diet: DIET LOW SODIUM 2 GM;  Code Status: Full Code    PT/OT Eval Status: Ordered    Dispo - in 2-3 days    Bryanna Tipton MD

## 2020-12-29 NOTE — CONSULTS
[address] [address]                                  CONSULTATION    PATIENT NAME: Bossman Salazar                     :        1950  MED REC NO:   [id number]                          ROOM:       0102  ACCOUNT NO:   [de-identified]                           ADMIT DATE: 2020  PROVIDER:     Magaly Cordero MD    CONSULT DATE:  2020    CARDIAC ELECTROPHYSIOLOGY CONSULTATION    REASON FOR CONSULTATION:  Syncope. HISTORY OF PRESENT ILLNESS:  The patient is a 57-year-old man with  history of persistent atrial fibrillation, remote CVA and diastolic  congestive heart failure who is admitted following a fall at home. The  patient reports that he _____ to standing position from a chair and had  walked a few steps when he became very dizzy and fell backwards. He has  history of recurrent episodes of syncope thought to be related to  orthostatic hypotension. In the past, his blood pressures have been  quite erratic, sometimes high and sometimes low. The cardiac rhythm has  been atrial fibrillation for several years. He had a history of remote  CVA and GI bleeding and ultimately underwent implantation of the left  atrial appendage occlusion device in 2018. Left ventricular function  has been reasonably well preserved with an ejection fraction of 50% to  55% by echocardiography from 2019. Heart rate control seems  adequate. On ECG telemetry monitoring, he does have some transient  episodes of tachycardia related to activity, but his baseline heart rate  is adequately controlled. On examination today, he does have  orthostatic hypotension. The blood pressure in the supine position is  115/71. He could not stand long enough to obtain a standing blood  pressure, but the blood pressure, which was obtained when he stood then  sat down again had dropped to 90 mmHg.     PAST MEDICAL HISTORY: 1.  Coronary artery disease. 2.  Diastolic congestive heart failure. 3.  Persistent atrial fibrillation. 4.  History of GI bleeding. 5.  Remote CVA. 6.  Status post left atrial appendage occlusion. 7.  Hypertension. 8.  Recurring syncope. 9.  Chronic renal insufficiency. MEDICATIONS:  At the time of admission includes aspirin 81 mg p.o. q.d.,  Lipitor 40 mg p.o. q.d., clonidine 0.2 mg p.o. b.i.d., TriCor 145 mg  p.o. q.d., ferrous sulfate 325 mg p.o. q.d., Lasix 40 mg p.o. q.d.,  hydralazine 100 mg p.o. q.8 hours. , levocarnitine 500 mg p.o. t.i.d.,  losartan 100 mg p.o. q.d., melatonin 10 mg p.o. q.h.s., metoprolol  succinate 25 mg p.o. q.d., Protonix 40 mg p.o. q.d., potassium chloride  20 mEq p.o. b.i.d., Seroquel 100 mg p.o. q.h.s., 75 mg p.o. q.a.m., and  Effexor  mg p.o. q.d. ALLERGIES:  Include TRIMETHOPRIM and SULFAMETHOXAZOLE. SOCIAL HISTORY:  The patient is a former smoker having stopped smoking  more than 20 years ago. He does not consume alcohol at this time. FAMILY HISTORY:  Remarkable for premature death in the mother and  history of heart attack in the father. There is history of diabetes and  heart disease in one male sibling. There is no known family history of  cardiac rhythm disturbance, syncope or sudden cardiac death. REVIEW OF SYSTEMS:  Demonstrates no recent change in appetite or change  in weight. The patient has not had recent fever or chills. He does not  describe chest pain. He does not have palpitations. His functional  status is somewhat difficult to ascertain. The remainder of the  14-system review is negative. PHYSICAL EXAMINATION:  VITAL SIGNS:  Blood pressure is 115/71 in supine position, standing  blood pressure could not be obtained, but the blood pressure with  partial standing was 90 mmHg systolic. Heart rate is 84 beats per  minute and irregular. GENERAL:  The patient is awake and conversant.   He is able to respond appropriately to questions. HEENT:  Exam demonstrates normocephalic and atraumatic head. There is  no scleral icterus. Pupils are round and reactive. NECK:  Supple without thyromegaly. There is no cervical  lymphadenopathy. LUNGS:  Clear to auscultation. CARDIOVASCULAR:  Exam reveals an irregular rhythm. The apical impulse  is discrete. S1 and S2 are normal.  There is no audible murmur or  gallop. The jugular venous pressure is difficult to assess. ABDOMEN:  The abdomen is obese, soft and nontender. EXTREMITIES:  Demonstrate no pitting pretibial dependent edema. There  is no cyanosis. There is no evidence for chronic venous stasis. SKIN:  Otherwise warm and dry without skin rash. DIAGNOSTIC DATA:  A 12-lead ECG from 12/28/2020 demonstrates atrial  fibrillation with an average ventricular rate of 77 beats per minute. There is low QRS voltage in the limb leads. There is evidence for  previous inferior myocardial infarction. There is a nonspecific  intraventricular conduction delay. IMPRESSIONS:  1. Recurrent syncope. 2.  Orthostatic hypotension. 3.  Coronary artery disease. 4.  Diastolic congestive heart failure. 5.  Chronic renal insufficiency. 6.  Status post remote CVA. 7.  History of GI bleeding. 8.  Status post left atrial appendage occlusion. 9.  Hypertension. The patient presents following a fall after change in posture from  seated to standing position. The history is consistent with orthostatic  hypotension. His blood pressures have been quite erratic historically. He does have evidence for orthostatic _____tension on today's  examination. He is on a number of antihypertensive agents on an  outpatient basis including clonidine, metoprolol succinate, hydralazine  and losartan. He will likely require some modification of this  antihypertensive regimen.   In order to prevent episodes of orthostatic  hypotension and subsequent injury, we may be forced to tolerate some resting hypertension. Lower extremity support garments may also be  useful in this regard. Given his erratic blood pressures, treatment  with midodrine might be difficult as it may contribute to significant  supine hypertension. RECOMMENDATIONS:  1. Continue metoprolol succinate and losartan. 2.  Would hold hydralazine and clonidine for now. 3.  Repeat orthostatic blood pressures. 4.  Would consider lower extremity support garments. 5.  Would avoid midodrine for now. Thanks for the opportunity to assist in the care of the patient. Please  contact me if you have any questions regarding his evaluation.         Abdi Argueta MD    D: 12/29/2020 12:19:14       T: 12/29/2020 12:24:23     JOSE [email or web address]  Job#: [id number]     Doc#: [id number]    CC:

## 2020-12-29 NOTE — PROGRESS NOTES
Occupational Therapy   Occupational Therapy Initial Assessment and Treatment  Date: 2020   Patient Name: Lali Sanchez  MRN: [id number]     : 1950    Date of Service: 2020    Discharge Recommendations:  24 hour supervision or assist, Home with Home health OT  OT Equipment Recommendations  Equipment Needed: No    Assessment   Performance deficits / Impairments: Decreased functional mobility ; Decreased endurance;Decreased ADL status; Decreased strength;Decreased safe awareness;Decreased cognition  Assessment: Pt agreeable to OT eval. Pt presents to [facility] s/p syncope and collapse at home. Pt presenting with above performance deficits and requiring CGA for functional mobility and transfers using RW. Pt with decreased cognition and inappropriate comments made throughout session needing Mod-Max redirection throughout to stay on task. Pt required Min A for UB dressing and CGA for LB dressing with increased time. Pt with decreased activity tolerance and SOB with activity needing rest time to recover. BP elevated following minimal activity (176/100). RN aware. Pt will benefit from continued skilled OT while in house. Prognosis: Good  Decision Making: Medium Complexity  OT Education: OT Role;Orientation;Plan of Care;Transfer Training;Energy Conservation  REQUIRES OT FOLLOW UP: Yes  Activity Tolerance  Activity Tolerance: Patient Tolerated treatment well;Treatment limited secondary to decreased cognition;Treatment limited secondary to medical complications (free text)  Activity Tolerance: EJ=521/100 following transfer  Safety Devices  Safety Devices in place: Yes  Type of devices: Left in chair;Nurse notified;Call light within reach; Chair alarm in place;Gait belt Patient Diagnosis(es): The primary encounter diagnosis was Fall, initial encounter. Diagnoses of Laceration of scalp, initial encounter, Syncope and collapse, Anemia due to acute blood loss, and MAISHA (acute kidney injury) ([address] were also pertinent to this visit. has a past medical history of Acute gastric ulcer with bleeding, Acute metabolic encephalopathy, MAISHA (acute kidney injury) ([address] ATN (acute tubular necrosis) ([address] Atrial fib/flutter, transient, Atrial fibrillation ([address] BPH (benign prostatic hyperplasia), Cerebral artery occlusion with cerebral infarction ([address] Diabetes mellitus ([address] GI bleed, Glaucoma, History of blood transfusion, Hx of diabetic neuropathy, Hyperlipidemia, Hypertension, Mitral insufficiency, Mobility impaired, Pulmonary hypertension ([address] and Traumatic hemorrhagic shock ([address] has a past surgical history that includes knee surgery; Prostate biopsy; other surgical history (10/11/13); Upper gastrointestinal endoscopy (7/13 17); Dental surgery (N/A, 3/28/2019); and laparoscopy (N/A, 6/22/2019).        Restrictions  Restrictions/Precautions  Restrictions/Precautions: Fall Risk    Subjective   General  Chart Reviewed: Yes  Patient assessed for rehabilitation services?: Yes  Family / Caregiver Present: No  Subjective  Subjective: Resting in bed; agreeable to eval  General Comment  Comments: Per RN ok for therapy  Patient Currently in Pain: Denies  Vital Signs  Temp: 97.3 °F (36.3 °C)  Temp Source: Oral  Pulse: 84  Heart Rate Source: Monitor  Resp: 16  BP: (!) 148/78  BP Location: Left Arm  MAP (mmHg): 101  Patient Currently in Pain: Denies  Oxygen Therapy  SpO2: 98 %  O2 Device: None (Room air)  Social/Functional History  Social/Functional History  Lives With: Family(wife and daughter)  Type of Home: House  Home Layout: Two level(pt has hospital bed in living room on main level)  Home Access: Stairs to enter with rails  Entrance Stairs - Number of Steps: 2 TANNER Entrance Stairs - Rails: Right  Bathroom Shower/Tub: Walk-in shower, Shower chair with back  Bathroom Toilet: Standard  Bathroom Equipment: Grab bars in shower, Grab bars around toilet  Home Equipment: Rolling walker, Cane, [address] bed  ADL Assistance: Independent  Homemaking Responsibilities: No(daughter completes homemaking tasks)  Ambulation Assistance: Independent(denies need for use of AD)  Transfer Assistance: Independent  Active : No     Objective   Vision: Impaired  Vision Exceptions: Cataracts  Hearing: Within functional limits    Orientation  Overall Orientation Status: Within Functional Limits     Balance  Sitting Balance: Stand by assistance  Standing Balance: Contact guard assistance  Standing Balance  Time: ~1 min  Activity: functional mobility in room  Comment: RW  Functional Mobility  Functional - Mobility Device: Rolling Walker  Activity: Other  Assist Level: Contact guard assistance  Functional Mobility Comments: RW  ADL  UE Dressing: Minimal assistance(donning gown)  LE Dressing: Contact guard assistance;Setup; Increased time to complete(donning/doffing socks)  Tone RUE  RUE Tone: Normotonic  Tone LUE  LUE Tone: Normotonic  Coordination  Movements Are Fluid And Coordinated: Yes     Bed mobility  Supine to Sit: Unable to assess  Sit to Supine: Unable to assess  Scooting: Supervision  Comment: seated in chair at beginning and end of session  Transfers  Sit to stand: Contact guard assistance  Stand to sit: Contact guard assistance  Transfer Comments: increased time; cues for hand placement; RW     Cognition  Overall Cognitive Status: Exceptions  Arousal/Alertness: Inconsistent responses to stimuli  Following Commands: Follows one step commands with repetition; Follows multistep commands with repitition  Attention Span: Difficulty attending to directions; Difficulty dividing attention  Memory: Decreased long term memory;Decreased short term memory Safety Judgement: Decreased awareness of need for assistance;Decreased awareness of need for safety  Insights: Decreased awareness of deficits  Initiation: Requires cues for some     Sensation  Overall Sensation Status: WFL      LUE AROM (degrees)  LUE AROM : WFL  Left Hand AROM (degrees)  Left Hand AROM: WFL  RUE AROM (degrees)  RUE AROM : WFL  Right Hand AROM (degrees)  Right Hand AROM: WFL  LUE Strength  Gross LUE Strength: WFL  RUE Strength  Gross RUE Strength: WFL      Plan   Plan  Times per week: 3-5x/wk    AM-PAC Score  AM-PAC Inpatient Daily Activity Raw Score: 20 (12/29/20 1251)  AM-PAC Inpatient ADL T-Scale Score : 42.03 (12/29/20 1251)  ADL Inpatient CMS 0-100% Score: 38.32 (12/29/20 1251)  ADL Inpatient CMS G-Code Modifier : CJ (12/29/20 1251)    Goals  Short term goals  Time Frame for Short term goals: within one week (1/5/2021)  Short term goal 1: Pt demonstrates toilet transfer SBA  Short term goal 2: Pt demonstrates BUE AROM ther ex x10-15 reps by 1/3/2021  Short term goal 3: Pt demonstrates x5-7 min dynamic standing balance task with CGA using LRAD  Patient Goals   Patient goals : to go home     Therapy Time   Individual Concurrent Group Co-treatment   Time In 1050         Time Out 1150         Minutes 60         Timed Code Treatment Minutes: 50 Minutes(10 min eval)       Durga Mcmillan OTR/L    If pt is unable to be seen after this session, please let this note serve as discharge summary. Please see case management note for discharge disposition. Thank you.

## 2020-12-30 LAB
LV EF: 58 %
LVEF MODALITY: NORMAL

## 2020-12-30 PROCEDURE — 6360000002 HC RX W HCPCS: Performed by: NURSE PRACTITIONER

## 2020-12-30 PROCEDURE — 99233 SBSQ HOSP IP/OBS HIGH 50: CPT | Performed by: INTERNAL MEDICINE

## 2020-12-30 PROCEDURE — 6360000004 HC RX CONTRAST MEDICATION: Performed by: NURSE PRACTITIONER

## 2020-12-30 PROCEDURE — C8929 TTE W OR WO FOL WCON,DOPPLER: HCPCS

## 2020-12-30 PROCEDURE — 2580000003 HC RX 258: Performed by: NURSE PRACTITIONER

## 2020-12-30 PROCEDURE — 97535 SELF CARE MNGMENT TRAINING: CPT

## 2020-12-30 PROCEDURE — 6370000000 HC RX 637 (ALT 250 FOR IP): Performed by: INTERNAL MEDICINE

## 2020-12-30 PROCEDURE — 97110 THERAPEUTIC EXERCISES: CPT

## 2020-12-30 PROCEDURE — 1200000000 HC SEMI PRIVATE

## 2020-12-30 PROCEDURE — 97530 THERAPEUTIC ACTIVITIES: CPT

## 2020-12-30 PROCEDURE — 6370000000 HC RX 637 (ALT 250 FOR IP): Performed by: NURSE PRACTITIONER

## 2020-12-30 RX ORDER — LOSARTAN POTASSIUM 25 MG/1
50 TABLET ORAL DAILY
Status: DISCONTINUED | OUTPATIENT
Start: 2020-12-30 | End: 2021-01-02 | Stop reason: HOSPADM

## 2020-12-30 RX ORDER — METOPROLOL SUCCINATE 50 MG/1
25 TABLET, EXTENDED RELEASE ORAL DAILY
Status: DISCONTINUED | OUTPATIENT
Start: 2020-12-30 | End: 2021-01-01

## 2020-12-30 RX ADMIN — POTASSIUM CHLORIDE 20 MEQ: 20 TABLET, EXTENDED RELEASE ORAL at 10:41

## 2020-12-30 RX ADMIN — SODIUM CHLORIDE, PRESERVATIVE FREE 10 ML: 5 INJECTION INTRAVENOUS at 22:01

## 2020-12-30 RX ADMIN — ENOXAPARIN SODIUM 40 MG: 40 INJECTION SUBCUTANEOUS at 10:41

## 2020-12-30 RX ADMIN — ATORVASTATIN CALCIUM 40 MG: 40 TABLET, FILM COATED ORAL at 10:41

## 2020-12-30 RX ADMIN — SODIUM CHLORIDE, PRESERVATIVE FREE 10 ML: 5 INJECTION INTRAVENOUS at 10:42

## 2020-12-30 RX ADMIN — PERFLUTREN 1.65 MG: 6.52 INJECTION, SUSPENSION INTRAVENOUS at 09:42

## 2020-12-30 RX ADMIN — METOPROLOL SUCCINATE 25 MG: 50 TABLET, EXTENDED RELEASE ORAL at 16:17

## 2020-12-30 RX ADMIN — PANTOPRAZOLE SODIUM 40 MG: 40 TABLET, DELAYED RELEASE ORAL at 10:41

## 2020-12-30 RX ADMIN — POTASSIUM CHLORIDE 20 MEQ: 20 TABLET, EXTENDED RELEASE ORAL at 21:37

## 2020-12-30 RX ADMIN — AMLODIPINE BESYLATE 5 MG: 5 TABLET ORAL at 10:41

## 2020-12-30 RX ADMIN — Medication 5 MG: at 21:37

## 2020-12-30 RX ADMIN — LOSARTAN POTASSIUM 50 MG: 25 TABLET, FILM COATED ORAL at 16:17

## 2020-12-30 RX ADMIN — B-COMPLEX W/ C & FOLIC ACID TAB 1 TABLET: TAB at 10:43

## 2020-12-30 RX ADMIN — FENOFIBRATE 160 MG: 160 TABLET ORAL at 10:43

## 2020-12-30 NOTE — PROGRESS NOTES
[address]           Cardiology                                                                Progress Note    Admission date:  2020    Subjective:   CC syncope  HPI pt in bed, feels well. Rhythm remains AF with adequate HR control. Vitals:  Blood pressure (!) 188/93, pulse 90, temperature 98.5 °F (36.9 °C), temperature source Oral, resp. rate 16, height 6' 5\" (1.956 m), weight 273 lb 2.4 oz (123.9 kg), SpO2 97 %.   Temp  Av.1 °F (36.7 °C)  Min: 97.7 °F (36.5 °C)  Max: 98.5 °F (36.9 °C)  Pulse  Av  Min: 83  Max: 95  BP  Min: 171/95  Max: 200/111  SpO2  Av.7 %  Min: 92 %  Max: 97 %    24 hour I/O    Intake/Output Summary (Last 24 hours) at 2020 1440  Last data filed at 2020 1337  Gross per 24 hour   Intake 480 ml   Output 1350 ml   Net -870 ml       Objective:     Telemetry monitor: AF    Physical Exam: supine 190/105  Seated 130/81  Standing 70 systolic (manual)  General Appearance:  comfortable  HEENT: pupils equal and reactive, no scleral  icterus  Skin:  Warm and dry  Heart:  irregular, normal apex, S1 and S2 normal  Lungs:  Clear, no wheezing  Abd: soft, non tender  Extremities:  No edema, no cyanosis  Psych: normal affect, talkative      Lab Review     Renal Profile:   Lab Results   Component Value Date    CREATININE 1.7 2020    BUN 32 2020     2020    K 4.1 2020     2020    CO2 26 2020     CBC:    Lab Results   Component Value Date    WBC 6.5 2020    RBC 3.02 2020    HGB 8.4 2020    HCT 25.4 2020    MCV 83.9 2020    RDW 17.5 2020     2020     BNP:  No results found for: BNP  Fasting Lipid Panel:    Lab Results   Component Value Date    CHOL 96 2019    HDL 26 2019    TRIG 112 2019     Cardiac Enzymes:  CK/MbTroponin  Lab Results   Component Value Date    TROPONINI <0.01 2020     PT/ INR   Lab Results   Component Value Date INR 1.20 12/20/2018    INR 1.28 08/08/2017    INR 1.50 07/30/2017    PROTIME 13.7 12/20/2018    PROTIME 14.5 08/08/2017    PROTIME 16.9 07/30/2017     PTT No results found for: PTT   Lab Results   Component Value Date    MG 1.80 09/21/2019    No results found for: TSH    Assessment:     1. Orthostatic hypotension- he has severe OH which likely contributed to the fall. 2. CAD  3. HTN  4. CKD  5. Remote CVA  6. History of GI bleed  7. S/P ANGELITA occlusion    Plan:     1. Resume Toprol  2. Resume losartan  3. Hold hydralazine and clonidine  4. LE compression garments  5.  Baroreceptor conditioning by siting up with legs dangling    Barbara Kyle MD

## 2020-12-30 NOTE — PROGRESS NOTES
Occupational Therapy  Facility/Department: [place] [address] REMOTE TELEMETRY  Daily Treatment Note  NAME: Burt Dougherty  : 1950  MRN: [id number]    Date of Service: 2020    Discharge Recommendations:  24 hour supervision or assist, Home with Home health OT(SNF if family unable to provide 24 hr)     If pt discharges prior to next session, this note will serve as discharge summary. See case management note for discharge disposition. Assessment   Performance deficits / Impairments: Decreased functional mobility ; Decreased endurance;Decreased ADL status; Decreased strength;Decreased safe awareness;Decreased cognition  Assessment: Pt supine, agreeable. Pt appears confused, but redirectable. Pt noted to be incontinent of urine upon entry. Pt very impulsive and moves quickly, resistant to assist for balance and cues for safety. Bed linens changed d/t soiling of bed from urine and pillowcase from posterior head wound. Pt will likely need 24 hr SPV vs SNF (if family unable to provide 24 hr). OT Education: OT Role;Orientation;Plan of Care;Transfer Training;Energy Conservation  Patient Education: transfers, safety, assist with mobility  Barriers to Learning: cognition, impulsivity  Activity Tolerance  Activity Tolerance: Patient Tolerated treatment well;Treatment limited secondary to decreased cognition;Treatment limited secondary to medical complications (free text)  Activity Tolerance: /95 supine HR 95, RN notified. Pt impulsive and unable to obtain BP in stance, pt returns self to supine and /82, , RN present for this reading. Safety Devices  Safety Devices in place: Yes  Type of devices: Bed alarm in place;Call light within reach; Left in bed;Nurse notified(left in bed with alarm on and echo tech performing test) Patient Diagnosis(es): The primary encounter diagnosis was Fall, initial encounter. Diagnoses of Laceration of scalp, initial encounter, Syncope and collapse, Anemia due to acute blood loss, and MAISHA (acute kidney injury) ([address] were also pertinent to this visit. has a past medical history of Acute gastric ulcer with bleeding, Acute metabolic encephalopathy, MAISHA (acute kidney injury) ([address] ATN (acute tubular necrosis) ([address] Atrial fib/flutter, transient, Atrial fibrillation ([address] BPH (benign prostatic hyperplasia), Cerebral artery occlusion with cerebral infarction ([address] Diabetes mellitus ([address] GI bleed, Glaucoma, History of blood transfusion, Hx of diabetic neuropathy, Hyperlipidemia, Hypertension, Mitral insufficiency, Mobility impaired, Pulmonary hypertension ([address] and Traumatic hemorrhagic shock ([address] has a past surgical history that includes knee surgery; Prostate biopsy; other surgical history (10/11/13); Upper gastrointestinal endoscopy (7/13 17); Dental surgery (N/A, 3/28/2019); and laparoscopy (N/A, 6/22/2019). Restrictions  Restrictions/Precautions  Restrictions/Precautions: Fall Risk  Subjective   General  Chart Reviewed: Yes  Patient assessed for rehabilitation services?: Yes  Family / Caregiver Present: No  Subjective  Subjective: supine, agreeable  General Comment  Comments: Per RN ok for therapy  Vital Signs  Patient Currently in Pain: Denies   Orientation     Objective    ADL  Feeding: Supervision;Setup  Grooming: Minimal assistance  UE Dressing: Minimal assistance(change gown)  Toileting: Contact guard assistance(balance during clothing mgmt)  Additional Comments: pt incontinent of urine, per RN refused to get cleaned up. Attempted to have pt change pants and bathe, pt declined.  Bed linens changed        Balance  Sitting Balance: Stand by assistance  Standing Balance: Minimal assistance  Standing Balance  Time: ~30 sec x multiple attempts Activity: to/from restroom, multiple sit to stands for linen change  Comment: pt very impulsive and unsteady, resistant to assist for balance  Functional Mobility  Functional - Mobility Device: No device(pt impulsive and stands and walks w/o AD)  Assist Level: Minimal assistance  Functional Mobility Comments: CGA/min A d/t impulsivity  Toilet Transfers  Toilet - Technique: Ambulating  Equipment Used: Raised toilet seat without rails(grab bars)     Transfers  Sit to stand: Contact guard assistance  Stand to sit: Contact guard assistance  Transfer Comments: impulsive     Cognition  Overall Cognitive Status: Exceptions  Arousal/Alertness: Delayed responses to stimuli  Following Commands: Follows one step commands with increased time; Follows one step commands with repetition  Attention Span: Difficulty attending to directions; Difficulty dividing attention  Memory: Decreased long term memory;Decreased short term memory  Safety Judgement: Decreased awareness of need for assistance;Decreased awareness of need for safety  Problem Solving: Decreased awareness of errors  Insights: Decreased awareness of deficits  Initiation: Requires cues for some  Sequencing: Requires cues for some     Type of ROM/Therapeutic Exercise  Type of ROM/Therapeutic Exercise: AROM  Exercises  Shoulder Flexion: x10  Elbow Flexion: x10  Supination: x10  Pronation: x10  Grasp/Release: x10  Other: difficulty maintaining attention to task       Plan   Plan  Times per week: 3-5x/wk    AM-PAC Score        AM-[facility] Inpatient Daily Activity Raw Score: 19 (12/30/20 0930)  AM-PAC Inpatient ADL T-Scale Score : 40.22 (12/30/20 0930)  ADL Inpatient CMS 0-100% Score: 42.8 (12/30/20 0930)  ADL Inpatient CMS G-Code Modifier : CK (12/30/20 0930)    Goals  Short term goals  Time Frame for Short term goals: within one week (1/5/2021)  Short term goal 1: Pt demonstrates toilet transfer SBA  Short term goal 2: Pt demonstrates BUE AROM ther ex x10-15 reps by 1/3/2021 Short term goal 3: Pt demonstrates x5-7 min dynamic standing balance task with CGA using LRAD  Patient Goals   Patient goals : to go home       Therapy Time   Individual Concurrent Group Co-treatment   Time In 0824         Time Out 0918         Minutes 54         Timed Code Treatment Minutes: [address], [place]

## 2020-12-30 NOTE — PROGRESS NOTES
Left voice mail for wound care RN about seeing pt for consult he is agreeable and asking to have wound care uses medihoney and collagen outpt.

## 2020-12-30 NOTE — PROGRESS NOTES
Orthostatics completed with Dr. Nora Hess at bedside. Encourage patient up to chair and legs dangle.  Need to  Place compression hose

## 2020-12-30 NOTE — PROGRESS NOTES
Hospitalist Progress Note      PCP: Emmett Howard MD    Date of Admission: 12/28/2020    Chief Complaint: Syncope and collapse    Hospital Course: see H&P     Subjective:   Patient is up in bed, comfortable, not in distress. No new event overnight. Medications:  Reviewed    Infusion Medications     Scheduled Medications    amLODIPine  5 mg Oral Daily    [Held by provider] aspirin  81 mg Oral Daily    atorvastatin  40 mg Oral Daily    vitamin B complex w/C  1 tablet Oral Daily    fenofibrate  160 mg Oral Daily    melatonin  5 mg Oral Nightly    pantoprazole  40 mg Oral Daily    potassium chloride  20 mEq Oral BID    sodium chloride flush  10 mL Intravenous 2 times per day    enoxaparin  40 mg Subcutaneous Daily     PRN Meds: sodium chloride flush, promethazine **OR** ondansetron, magnesium hydroxide, acetaminophen **OR** acetaminophen      Intake/Output Summary (Last 24 hours) at 12/30/2020 1120  Last data filed at 12/30/2020 1057  Gross per 24 hour   Intake 1160 ml   Output 775 ml   Net 385 ml       Physical Exam Performed:    BP (!) 187/104   Pulse 89   Temp 97.7 °F (36.5 °C) (Oral)   Resp 16   Ht 6' 5\" (1.956 m)   Wt 273 lb 2.4 oz (123.9 kg)   SpO2 92%   BMI 32.39 kg/m²     General appearance: No apparent distress, appears stated age and cooperative. HEENT: Pupils equal, round, and reactive to light. Conjunctivae/corneas clear. Neck: Supple, with full range of motion. No jugular venous distention. Trachea midline. Respiratory:  Normal respiratory effort. Clear to auscultation, bilaterally without Rales/Wheezes/Rhonchi. Cardiovascular: Regular rate and rhythm with normal S1/S2 without murmurs, rubs or gallops. Abdomen: Soft, non-tender, non-distended with normal bowel sounds. Musculoskeletal: No clubbing, cyanosis or edema bilaterally. Full range of motion without deformity. Skin: Skin color, texture, turgor normal.  No rashes or lesions. Neurologic:  Neurovascularly intact without any focal sensory/motor deficits. Cranial nerves: II-XII intact, grossly non-focal.  Psychiatric: Alert and oriented, thought content appropriate, normal insight  Capillary Refill: Brisk,< 3 seconds   Peripheral Pulses: +2 palpable, equal bilaterally       Labs:   Recent Labs     12/28/20  1630 12/28/20  1922 12/29/20  0200   WBC 5.5  --  6.5   HGB 10.2* 8.8* 8.4*   HCT 31.7* 27.4* 25.4*     --  163     Recent Labs     12/28/20  1630 12/29/20  0201    141   K 4.4 4.1    107   CO2 25 26   BUN 30* 32*   CREATININE 1.9* 1.7*   CALCIUM 9.4 8.8     Recent Labs     12/28/20  1630 12/29/20  0201   AST 21 18   ALT 10 9*   BILITOT 0.7 0.5   ALKPHOS 55 48     No results for input(s): INR in the last 72 hours. Recent Labs     12/28/20  1630 12/28/20  2230 12/29/20  0201   TROPONINI <0.01 <0.01 <0.01       Urinalysis:      Lab Results   Component Value Date    NITRU Negative 12/29/2020    WBCUA >100 12/29/2020    BACTERIA 4+ 12/29/2020    RBCUA 3-4 12/29/2020    BLOODU Negative 12/29/2020    SPECGRAV 1.025 12/29/2020    GLUCOSEU Negative 12/29/2020       Radiology:  CT HEAD WO CONTRAST   Final Result   1. No acute intracranial abnormality. 2. No significant change in appearance of the brain in comparison with the   study earlier today. 3. Stable chronic left temporoparietal encephalomalacia. 4. Stable age-appropriate atrophy and chronic small vessel ischemic white   matter disease. 5. Interval decrease in size of an occipital scalp hematoma, without acute   skull fracture. CT HEAD WO CONTRAST   Final Result   1. No acute intracranial abnormality. CT CERVICAL SPINE WO CONTRAST   Final Result   1. No acute fracture. 2. Incompletely imaged small right pleural effusion. XR SHOULDER LEFT (MIN 2 VIEWS)   Final Result   No acute osseous injury of the left shoulder.          XR CHEST PORTABLE   Final Result Minimal asymmetric airspace disease laterally within the left lower lung. Pneumonia is not excluded in the correct clinical setting. Assessment/Plan:    Active Hospital Problems    Diagnosis    Remote history of stroke [Z86.73]    Syncope and collapse [R55]    Orthostatic dizziness [R42]    A-fib (HCC) [I48.91]     Syncope and collapse, unclear etiology. Has had several episodes of syncope and collapse in the past. Hx of CHF and afib. Had repeat syncopal episode in the ED after rolling onto his side during which he was noted to have BP 70s/50s.  CT head with no acute intracranial abnormality   EKG revealed afib   Orthostatics pending   Repeat echo pending   Neurochecks   Telemetry monitoring    Fall precautions   IVF bolus (1L) given in ED, did not continue in setting of hx of CHF   Cardio consult.     Head trauma 2/2 fall  - CT head with occipital scalp hematoma, no skull fracture  - Neuro checks  - Wound care as needed     Chronic diastolic CHF, appears compensated.  Echo on 6/27/2019 revealed: Low normal systolic function with EF 50 to 55%. Mild concentric LVH. No regional wall motion abnormalities noted. Mild MR and TR. Mild pulmonary hypertension.  Continue home Coreg, Lasix   Daily weights   I's and O's     Hx of persistent a.  Fib s/p Watchman's device  - EKG on admission: Afib  - Not on ac due to hx of significant GI bleed  - Hold home bb for now in setting of syncope  - Tele monitoring    DVT Prophylaxis: Lovenox  Diet: DIET LOW SODIUM 2 GM;  Code Status: Full Code    PT/OT Eval Status: Ordered    Dispo - in 2-3 days    Neil Husain MD

## 2020-12-31 LAB
ANION GAP SERPL CALCULATED.3IONS-SCNC: 12 MMOL/L (ref 3–16)
BUN BLDV-MCNC: 22 MG/DL (ref 7–20)
CALCIUM SERPL-MCNC: 9.5 MG/DL (ref 8.3–10.6)
CHLORIDE BLD-SCNC: 104 MMOL/L (ref 99–110)
CO2: 23 MMOL/L (ref 21–32)
CREAT SERPL-MCNC: 1.6 MG/DL (ref 0.8–1.3)
GFR AFRICAN AMERICAN: 52
GFR NON-AFRICAN AMERICAN: 43
GLUCOSE BLD-MCNC: 190 MG/DL (ref 70–99)
ORGANISM: ABNORMAL
POTASSIUM SERPL-SCNC: 4.1 MMOL/L (ref 3.5–5.1)
SODIUM BLD-SCNC: 139 MMOL/L (ref 136–145)
URINE CULTURE, ROUTINE: ABNORMAL

## 2020-12-31 PROCEDURE — 97530 THERAPEUTIC ACTIVITIES: CPT

## 2020-12-31 PROCEDURE — 99233 SBSQ HOSP IP/OBS HIGH 50: CPT | Performed by: INTERNAL MEDICINE

## 2020-12-31 PROCEDURE — 6370000000 HC RX 637 (ALT 250 FOR IP): Performed by: NURSE PRACTITIONER

## 2020-12-31 PROCEDURE — 80048 BASIC METABOLIC PNL TOTAL CA: CPT

## 2020-12-31 PROCEDURE — 97110 THERAPEUTIC EXERCISES: CPT

## 2020-12-31 PROCEDURE — 6360000002 HC RX W HCPCS: Performed by: NURSE PRACTITIONER

## 2020-12-31 PROCEDURE — 2580000003 HC RX 258: Performed by: NURSE PRACTITIONER

## 2020-12-31 PROCEDURE — 6370000000 HC RX 637 (ALT 250 FOR IP): Performed by: INTERNAL MEDICINE

## 2020-12-31 PROCEDURE — 1200000000 HC SEMI PRIVATE

## 2020-12-31 PROCEDURE — 51798 US URINE CAPACITY MEASURE: CPT

## 2020-12-31 PROCEDURE — 36415 COLL VENOUS BLD VENIPUNCTURE: CPT

## 2020-12-31 RX ADMIN — SODIUM CHLORIDE, PRESERVATIVE FREE 10 ML: 5 INJECTION INTRAVENOUS at 10:35

## 2020-12-31 RX ADMIN — AMLODIPINE BESYLATE 5 MG: 5 TABLET ORAL at 10:34

## 2020-12-31 RX ADMIN — COLLAGENASE SANTYL: 250 OINTMENT TOPICAL at 15:48

## 2020-12-31 RX ADMIN — CEFEPIME 2 G: 2 INJECTION, POWDER, FOR SOLUTION INTRAMUSCULAR; INTRAVENOUS at 15:56

## 2020-12-31 RX ADMIN — FENOFIBRATE 160 MG: 160 TABLET ORAL at 10:33

## 2020-12-31 RX ADMIN — SODIUM CHLORIDE, PRESERVATIVE FREE 10 ML: 5 INJECTION INTRAVENOUS at 21:18

## 2020-12-31 RX ADMIN — METOPROLOL SUCCINATE 25 MG: 50 TABLET, EXTENDED RELEASE ORAL at 10:34

## 2020-12-31 RX ADMIN — PANTOPRAZOLE SODIUM 40 MG: 40 TABLET, DELAYED RELEASE ORAL at 10:34

## 2020-12-31 RX ADMIN — CEFTRIAXONE SODIUM 1 G: 1 INJECTION, POWDER, FOR SOLUTION INTRAMUSCULAR; INTRAVENOUS at 10:28

## 2020-12-31 RX ADMIN — ATORVASTATIN CALCIUM 40 MG: 40 TABLET, FILM COATED ORAL at 10:34

## 2020-12-31 RX ADMIN — POTASSIUM CHLORIDE 20 MEQ: 20 TABLET, EXTENDED RELEASE ORAL at 21:17

## 2020-12-31 RX ADMIN — B-COMPLEX W/ C & FOLIC ACID TAB 1 TABLET: TAB at 10:33

## 2020-12-31 RX ADMIN — POTASSIUM CHLORIDE 20 MEQ: 20 TABLET, EXTENDED RELEASE ORAL at 10:34

## 2020-12-31 RX ADMIN — Medication 5 MG: at 21:17

## 2020-12-31 RX ADMIN — LOSARTAN POTASSIUM 50 MG: 25 TABLET, FILM COATED ORAL at 10:34

## 2020-12-31 RX ADMIN — ENOXAPARIN SODIUM 40 MG: 40 INJECTION SUBCUTANEOUS at 10:33

## 2020-12-31 ASSESSMENT — PAIN SCALES - GENERAL: PAINLEVEL_OUTOF10: 0

## 2020-12-31 NOTE — PROGRESS NOTES
[address]           Cardiology                                                                Progress Note    Admission date:  2020    Subjective:   CC syncope  HPI pt in bed, feels well. Rhythm remains AF with adequate HR control. orthostatic hypotension better today, but supine HTN persists. Vitals:  Blood pressure (!) 156/83, pulse 99, temperature 99.3 °F (37.4 °C), temperature source Oral, resp. rate 16, height 6' 5\" (1.956 m), weight 272 lb 8 oz (123.6 kg), SpO2 96 %.   Temp  Av.7 °F (37.1 °C)  Min: 98 °F (36.7 °C)  Max: 99.7 °F (37.6 °C)  Pulse  Av.3  Min: 87  Max: 111  BP  Min: 156/83  Max: 209/88  SpO2  Av.8 %  Min: 95 %  Max: 98 %    24 hour I/O    Intake/Output Summary (Last 24 hours) at 2020 1152  Last data filed at 2020 1015  Gross per 24 hour   Intake 480 ml   Output 1275 ml   Net -795 ml       Objective:     Telemetry monitor: AF    Physical Exam:   General Appearance:  comfortable  HEENT: pupils equal and reactive, no scleral  icterus  Skin:  Warm and dry  Heart:  irregular, normal apex, S1 and S2 normal  Lungs:  Clear, no wheezing  Abd: soft, non tender  Extremities:  No edema, no cyanosis  Psych: normal affect, talkative      Lab Review     Renal Profile:   Lab Results   Component Value Date    CREATININE 1.7 2020    BUN 32 2020     2020    K 4.1 2020     2020    CO2 26 2020     CBC:    Lab Results   Component Value Date    WBC 6.5 2020    RBC 3.02 2020    HGB 8.4 2020    HCT 25.4 2020    MCV 83.9 2020    RDW 17.5 2020     2020     BNP:  No results found for: BNP  Fasting Lipid Panel:    Lab Results   Component Value Date    CHOL 96 2019    HDL 26 2019    TRIG 112 2019     Cardiac Enzymes:  CK/MbTroponin  Lab Results   Component Value Date    TROPONINI <0.01 2020     PT/ INR   Lab Results   Component Value Date INR 1.20 12/20/2018    INR 1.28 08/08/2017    INR 1.50 07/30/2017    PROTIME 13.7 12/20/2018    PROTIME 14.5 08/08/2017    PROTIME 16.9 07/30/2017     PTT No results found for: PTT   Lab Results   Component Value Date    MG 1.80 09/21/2019    No results found for: TSH    Assessment:     1. Orthostatic hypotension- he has severe OH which likely contributed to the fall. We may be forced to accept some slight supine HTN to avoid extreme OH and fall risk. 2. CAD  3. HTN  4. CKD  5. Remote CVA  6. History of GI bleed  7. S/P ANGELITA occlusion    Plan:     1. Increase Toprol to 50 mg/day  2. continue losartan  3. Hold hydralazine and clonidine  4. LE compression garments (being acquired by family- 30-40 mm Hg)  5.  Baroreceptor conditioning by siting up with legs dangling    Stacey Townsend MD

## 2020-12-31 NOTE — ADT AUTH CERT
Syncope - Care Day 3 (12/30/2020) by Abdi Salguero RN       Review Status Review Entered   Completed 12/31/2020 08:42      Criteria Review      Care Day: 3 Care Date: 12/30/2020 Level of Care: Telemetry    Guideline Day 2    Clinical Status    (X) * Hemodynamic stability    12/31/2020 8:42 AM EST by Leslie Aguirre      98.5  16  90  188/93  92%ra    ( ) * No acute cardiac or neurologic events    (X) * Mental status at baseline    ( ) * Dangerous arrhythmia absent    12/31/2020 8:42 AM EST by Leslie Aguirre      atrial fib    ( ) * No further syncope    12/31/2020 8:42 AM EST by Leslie Aguirre      cardio consulted    ( ) * No evidence of etiology requiring ongoing inpatient care or monitoring (eg, unstable cardiac rhythm or conduction defect)    ( ) * Discharge plans and education understood    Activity    ( ) * Ambulatory or acceptable for next level of care    Routes    ( ) * Oral hydration, medications, and diet    12/31/2020 8:42 AM EST by Leslie Aguirre      cozaar 50mg qd, toprol xl 50mg qd    Interventions    (X) Possible cardiac monitoring    * Milestone   Additional Notes   12/30   Per Cardiology:   1. Orthostatic hypotension- he has severe OH which likely contributed to the fall. 2. CAD   3. HTN   4. CKD   5. Remote CVA   6. History of GI bleed   7. S/P ANGELITA occlusion       Plan:       1. Resume Toprol   2. Resume losartan   3. Hold hydralazine and clonidine   4. LE compression garments   5.  Baroreceptor conditioning by siting up with legs dangling

## 2020-12-31 NOTE — PROGRESS NOTES
Secure message sent via perfect serve to LUCIANO Kumar orthostatic vitals completed lying 204/92 P 99, sitting 131/86 P 87, standing 121/70 P 69. Bladder scan 67 retained. STONEI pt has had no labs drawn since 12/29 do you want labs none are ordered?   thank you

## 2020-12-31 NOTE — PROGRESS NOTES
Sent secure message via perfect serve to Tiffani Loco NP pt urine culture positive for ecoli and multiple drug resistant organism. on IV Rocephin. Do you want any changes?   thank you

## 2020-12-31 NOTE — PROGRESS NOTES
Hospitalist Progress Note      PCP: Erin Schroeder MD    Date of Admission: 12/28/2020    Chief Complaint: Syncope and collapse    Subjective: Orthostatics pending. No complaints. Ucx reveals E. Coli and patient states he was incontinent last night. Medications:  Reviewed    Infusion Medications     Scheduled Medications    cefTRIAXone (ROCEPHIN) IV  1 g Intravenous Q24H    collagenase   Topical Q48H    metoprolol succinate  25 mg Oral Daily    losartan  50 mg Oral Daily    amLODIPine  5 mg Oral Daily    [Held by provider] aspirin  81 mg Oral Daily    atorvastatin  40 mg Oral Daily    vitamin B complex w/C  1 tablet Oral Daily    fenofibrate  160 mg Oral Daily    melatonin  5 mg Oral Nightly    pantoprazole  40 mg Oral Daily    potassium chloride  20 mEq Oral BID    sodium chloride flush  10 mL Intravenous 2 times per day    enoxaparin  40 mg Subcutaneous Daily     PRN Meds: sodium chloride flush, promethazine **OR** ondansetron, magnesium hydroxide, acetaminophen **OR** acetaminophen      Intake/Output Summary (Last 24 hours) at 12/31/2020 1245  Last data filed at 12/31/2020 1015  Gross per 24 hour   Intake 480 ml   Output 1275 ml   Net -795 ml       Physical Exam Performed:    BP (!) 156/83   Pulse 99   Temp 99.3 °F (37.4 °C) (Oral)   Resp 16   Ht 6' 5\" (1.956 m)   Wt 272 lb 8 oz (123.6 kg)   SpO2 96%   BMI 32.31 kg/m²     General appearance: No apparent distress, appears stated age and cooperative. HEENT: Pupils equal, round, and reactive to light. Conjunctivae/corneas clear. Neck: Supple, with full range of motion. No jugular venous distention. Trachea midline. Respiratory:  Normal respiratory effort. Clear to auscultation, bilaterally without Rales/Wheezes/Rhonchi. Cardiovascular: Regular rate and rhythm with normal S1/S2 without murmurs, rubs or gallops. Abdomen: Soft, non-tender, non-distended with normal bowel sounds. 2. Incompletely imaged small right pleural effusion. XR SHOULDER LEFT (MIN 2 VIEWS)   Final Result   No acute osseous injury of the left shoulder. XR CHEST PORTABLE   Final Result   Minimal asymmetric airspace disease laterally within the left lower lung. Pneumonia is not excluded in the correct clinical setting. Assessment/Plan:    Active Hospital Problems    Diagnosis    Remote history of stroke [Z86.73]    Syncope and collapse [R55]    Orthostatic dizziness [R42]    A-fib (HCC) [I48.91]     Syncope and collapse, likely due to orthostatic hypotension. Has had several episodes of syncope and collapse in the past. Hx of CHF and afib. Had repeat syncopal episode in the ED after rolling onto his side during which he was noted to have BP 70s/50s.  CT head with no acute intracranial abnormality   EKG revealed afib   Repeat echo - EF 55-60%. Elevated left ventricular diastolic filling pressures.  Telemetry monitoring    Fall precautions   EP consulted and following.     Head trauma 2/2 fall  - CT head with occipital scalp hematoma, no skull fracture  - Wound care as needed     Chronic diastolic CHF, appears compensated  -- Echo as above.  Continue home Lasix. BB changed to Toprol.  Daily weights   I's and O's     Hx of persistent a. Fib s/p Watchman's device  - EKG on admission: Afib  - Not on ac due to hx of significant GI bleed  - Hold home bb for now in setting of syncope  - Tele monitoring    Acute cystitis - present on admission.  - UCx > 100,000 E. Coli. - rocephin started 12/31.    - cx sensitivities pending. HTN - patient was profoundly orthostatic. Will need to accept higher pressures at rest.    -- EP stopped home clonidine and hydralazine.     DVT Prophylaxis: Lovenox  Diet: DIET LOW SODIUM 2 GM;  Code Status: Full Code    PT/OT Eval Status: recommend home PT/OT    Dispo - possibly tomorrow    JAMEY Veliz - CNP

## 2020-12-31 NOTE — PROGRESS NOTES
[facility] Nurse  Follow-up Progress Note       NAME:  Dulce Rowe  MEDICAL RECORD NUMBER:  [id number]  AGE:  65 y.o. GENDER:  male  :  1950  TODAY'S DATE:  2020    Subjective: I was hoping to have my dressing changed today. Wound Identification:  Wound Type: diabetic  Contributing Factors: diabetes, chronic pressure, decreased mobility, decreased tissue oxygenation and CVA        Patient Goal of Care:  [x] Wound Healing  [] Odor Control  [] Palliative Care  [] Pain Control   [] Other:     Objective:    BP (!) 156/83   Pulse 99   Temp 99.3 °F (37.4 °C) (Oral)   Resp 16   Ht 6' 5\" (1.956 m)   Wt 272 lb 8 oz (123.6 kg)   SpO2 96%   BMI 32.31 kg/m²   Davidson Risk Score: Davidson Scale Score: 18  Assessment: L Foot Medial - dry and intact; wound bed yellow   Measurements:  Wound 19 Sacrum Right Pressure injury vs moisture associated dermatitis (Active)   Number of days: 527       Wound 19 Pretibial Right;Lateral (Active)   Number of days: 527       Wound 19 Arm Left; Lower;Proximal;Posterior bleeding noted, 1.5 inches long, 0.5 inches wide  (Active)   Number of days: 523       Wound 19 Pretibial Left (Active)   Number of days: 469       Wound 19 Ankle Right;Lateral (Active)   Number of days: 467       Wound 20 Foot Left;Medial (Active)   Wound Image   20 1533   Wound Etiology Diabetic 20 1533   Dressing Status Dry; Intact; New dressing applied 20 1533   Wound Cleansed Cleansed with saline 20 1533   Dressing/Treatment Collagen; Honey gel/honey paste;Dry dressing;Roll gauze 20 1533   Dressing Change Due 21 1533   Wound Length (cm) 1 cm 20 1533   Wound Width (cm) 3 cm 20 1533   Wound Depth (cm) 0.1 cm 20 1533   Wound Surface Area (cm^2) 3 cm^2 20 1533   Wound Volume (cm^3) 0.3 cm^3 209   Wound Assessment Dry; Other (Comment) 20 1074 Drainage Amount None 12/31/20 1533   Odor None 12/31/20 1533   Tasia-wound Assessment Dry/flaky 12/31/20 1533   Margins Attached edges; Defined edges 12/31/20 1533   Number of days: 0   L Foot Medial:        Response to treatment:  Well tolerated by patient. Pain Assessment:  Severity:  0 / 10  Quality of pain: N/A  Wound Pain Timing/Severity: none  Premedicated: No  Plan:   Plan of Care: Wound 12/31/20 Foot Left;Medial-Dressing/Treatment: Collagen, Honey gel/honey paste, Dry dressing, Roll gauze   Recommendation: Per pt - L Foot Medial clean with NS; pat dry; nickel thick Santyl; Medihoney; 4x4; roll gauze; change every 2 days. Wound Care has the [address] [place] [address], unable to leave in pt's room. Call Wound Care for deterioration [phone]    Specialty Bed Required : No   [] Low Air Loss   [] Pressure Redistribution  [] Fluid Immersion  [] Bariatric  [] Total Pressure Relief  [] Other:     Current Diet: DIET LOW SODIUM 2 GM;   Dietician consult:  No    Discharge Plan:  Placement for patient upon discharge: home with support   Patient appropriate for Outpatient [address]: No    Referrals:  [x]   [] [address] [employer] [facility]  [] Supplies  [] Other    Patient/Caregiver Teaching:  Level of patient/caregiver understanding able to:   [x] Indicates understanding       [] Needs reinforcement  [] Unsuccessful      [] Verbal Understanding  [] Demonstrated understanding       [] No evidence of learning  [] Refused teaching         [] N/A       Electronically signed by Nirali Sin RN, Juanell Closs on 12/31/2020 at 3:34 PM

## 2020-12-31 NOTE — PROGRESS NOTES
Patient Diagnosis(es): The primary encounter diagnosis was Fall, initial encounter. Diagnoses of Laceration of scalp, initial encounter, Syncope and collapse, Anemia due to acute blood loss, and MAISHA (acute kidney injury) ([address] were also pertinent to this visit. has a past medical history of Acute gastric ulcer with bleeding, Acute metabolic encephalopathy, MAISHA (acute kidney injury) ([address] ATN (acute tubular necrosis) ([address] Atrial fib/flutter, transient, Atrial fibrillation ([address] BPH (benign prostatic hyperplasia), Cerebral artery occlusion with cerebral infarction ([address] Diabetes mellitus ([address] GI bleed, Glaucoma, History of blood transfusion, Hx of diabetic neuropathy, Hyperlipidemia, Hypertension, Mitral insufficiency, Mobility impaired, Pulmonary hypertension ([address] and Traumatic hemorrhagic shock ([address] has a past surgical history that includes knee surgery; Prostate biopsy; other surgical history (10/11/13); Upper gastrointestinal endoscopy (7/13 17); Dental surgery (N/A, 3/28/2019); and laparoscopy (N/A, 6/22/2019). Restrictions  Restrictions/Precautions  Restrictions/Precautions: Fall Risk  Position Activity Restriction  Other position/activity restrictions: Peripheral IV, O.H.  Subjective   General  Chart Reviewed: Yes  Response To Previous Treatment: Patient with no complaints from previous session. Family / Caregiver Present: No  Referring Practitioner: JAMEY Hickey NP  Subjective  Subjective: Pt seated in recliner, requests to check BP and return to bed. Agreeable to exercise. General Comment  Comments: RN cleared for therapy, reports pt orthostatic and BP drops quickly.   Pain Screening  Patient Currently in Pain: Denies  Vital Signs  BP: 128/80  BP Location: Left upper arm  Patient Position: Sitting  Patient Currently in Pain: Denies          Objective   Bed mobility  Supine to Sit: Unable to assess(Pt seated in chair at beginning of session)  Sit to Supine: Supervision

## 2021-01-01 LAB
ANION GAP SERPL CALCULATED.3IONS-SCNC: 10 MMOL/L (ref 3–16)
BASOPHILS ABSOLUTE: 0.1 K/UL (ref 0–0.2)
BASOPHILS RELATIVE PERCENT: 1 %
BUN BLDV-MCNC: 21 MG/DL (ref 7–20)
CALCIUM SERPL-MCNC: 8.9 MG/DL (ref 8.3–10.6)
CHLORIDE BLD-SCNC: 105 MMOL/L (ref 99–110)
CO2: 24 MMOL/L (ref 21–32)
CREAT SERPL-MCNC: 1.3 MG/DL (ref 0.8–1.3)
EOSINOPHILS ABSOLUTE: 0.2 K/UL (ref 0–0.6)
EOSINOPHILS RELATIVE PERCENT: 3.2 %
GFR AFRICAN AMERICAN: >60
GFR NON-AFRICAN AMERICAN: 54
GLUCOSE BLD-MCNC: 147 MG/DL (ref 70–99)
HCT VFR BLD CALC: 22.7 % (ref 40.5–52.5)
HEMOGLOBIN: 7.4 G/DL (ref 13.5–17.5)
LYMPHOCYTES ABSOLUTE: 0.9 K/UL (ref 1–5.1)
LYMPHOCYTES RELATIVE PERCENT: 17.2 %
MCH RBC QN AUTO: 27.7 PG (ref 26–34)
MCHC RBC AUTO-ENTMCNC: 32.6 G/DL (ref 31–36)
MCV RBC AUTO: 84.9 FL (ref 80–100)
MONOCYTES ABSOLUTE: 0.4 K/UL (ref 0–1.3)
MONOCYTES RELATIVE PERCENT: 7.5 %
NEUTROPHILS ABSOLUTE: 3.6 K/UL (ref 1.7–7.7)
NEUTROPHILS RELATIVE PERCENT: 71.1 %
PDW BLD-RTO: 17.2 % (ref 12.4–15.4)
PLATELET # BLD: 167 K/UL (ref 135–450)
PMV BLD AUTO: 8.3 FL (ref 5–10.5)
POTASSIUM SERPL-SCNC: 3.7 MMOL/L (ref 3.5–5.1)
RBC # BLD: 2.68 M/UL (ref 4.2–5.9)
SODIUM BLD-SCNC: 139 MMOL/L (ref 136–145)
WBC # BLD: 5.1 K/UL (ref 4–11)

## 2021-01-01 PROCEDURE — 97535 SELF CARE MNGMENT TRAINING: CPT

## 2021-01-01 PROCEDURE — 2580000003 HC RX 258: Performed by: NURSE PRACTITIONER

## 2021-01-01 PROCEDURE — 80048 BASIC METABOLIC PNL TOTAL CA: CPT

## 2021-01-01 PROCEDURE — 6370000000 HC RX 637 (ALT 250 FOR IP): Performed by: INTERNAL MEDICINE

## 2021-01-01 PROCEDURE — 1200000000 HC SEMI PRIVATE

## 2021-01-01 PROCEDURE — 99223 1ST HOSP IP/OBS HIGH 75: CPT | Performed by: INTERNAL MEDICINE

## 2021-01-01 PROCEDURE — 97530 THERAPEUTIC ACTIVITIES: CPT

## 2021-01-01 PROCEDURE — 85025 COMPLETE CBC W/AUTO DIFF WBC: CPT

## 2021-01-01 PROCEDURE — 6360000002 HC RX W HCPCS: Performed by: NURSE PRACTITIONER

## 2021-01-01 PROCEDURE — 99222 1ST HOSP IP/OBS MODERATE 55: CPT | Performed by: INTERNAL MEDICINE

## 2021-01-01 PROCEDURE — 36415 COLL VENOUS BLD VENIPUNCTURE: CPT

## 2021-01-01 PROCEDURE — 6370000000 HC RX 637 (ALT 250 FOR IP): Performed by: NURSE PRACTITIONER

## 2021-01-01 RX ORDER — METOPROLOL SUCCINATE 50 MG/1
50 TABLET, EXTENDED RELEASE ORAL DAILY
Status: DISCONTINUED | OUTPATIENT
Start: 2021-01-02 | End: 2021-01-02 | Stop reason: HOSPADM

## 2021-01-01 RX ORDER — AMLODIPINE BESYLATE 5 MG/1
10 TABLET ORAL DAILY
Status: DISCONTINUED | OUTPATIENT
Start: 2021-01-02 | End: 2021-01-02 | Stop reason: HOSPADM

## 2021-01-01 RX ORDER — AMLODIPINE BESYLATE 5 MG/1
5 TABLET ORAL ONCE
Status: COMPLETED | OUTPATIENT
Start: 2021-01-01 | End: 2021-01-01

## 2021-01-01 RX ADMIN — AMLODIPINE BESYLATE 5 MG: 5 TABLET ORAL at 17:35

## 2021-01-01 RX ADMIN — CEFEPIME 2 G: 2 INJECTION, POWDER, FOR SOLUTION INTRAMUSCULAR; INTRAVENOUS at 02:46

## 2021-01-01 RX ADMIN — ASPIRIN 81 MG: 81 TABLET, CHEWABLE ORAL at 09:20

## 2021-01-01 RX ADMIN — POTASSIUM CHLORIDE 20 MEQ: 20 TABLET, EXTENDED RELEASE ORAL at 09:20

## 2021-01-01 RX ADMIN — POTASSIUM CHLORIDE 20 MEQ: 20 TABLET, EXTENDED RELEASE ORAL at 20:55

## 2021-01-01 RX ADMIN — Medication 5 MG: at 20:55

## 2021-01-01 RX ADMIN — FENOFIBRATE 160 MG: 160 TABLET ORAL at 09:22

## 2021-01-01 RX ADMIN — AMLODIPINE BESYLATE 5 MG: 5 TABLET ORAL at 09:20

## 2021-01-01 RX ADMIN — METOPROLOL SUCCINATE 25 MG: 50 TABLET, EXTENDED RELEASE ORAL at 09:21

## 2021-01-01 RX ADMIN — SODIUM CHLORIDE, PRESERVATIVE FREE 10 ML: 5 INJECTION INTRAVENOUS at 09:26

## 2021-01-01 RX ADMIN — ENOXAPARIN SODIUM 40 MG: 40 INJECTION SUBCUTANEOUS at 09:25

## 2021-01-01 RX ADMIN — ATORVASTATIN CALCIUM 40 MG: 40 TABLET, FILM COATED ORAL at 09:20

## 2021-01-01 RX ADMIN — PANTOPRAZOLE SODIUM 40 MG: 40 TABLET, DELAYED RELEASE ORAL at 09:20

## 2021-01-01 RX ADMIN — B-COMPLEX W/ C & FOLIC ACID TAB 1 TABLET: TAB at 09:22

## 2021-01-01 RX ADMIN — CEFEPIME 2 G: 2 INJECTION, POWDER, FOR SOLUTION INTRAMUSCULAR; INTRAVENOUS at 15:00

## 2021-01-01 RX ADMIN — LOSARTAN POTASSIUM 50 MG: 25 TABLET, FILM COATED ORAL at 09:21

## 2021-01-01 NOTE — PROGRESS NOTES
[facility]   PROGRESS NOTE  [phone]      Attending Physician: Lorenzo Goodson MD  Reason for Consultation/Chief Complaint: Syncope    Subjective     Denies lightheadedness today and reports that he is feeling much better. Remains in atrial fibrillation with controlled ventricular response. Supine BP elevated between 170-200s/90-100s. CURRENT Medications:      [START ON 1/2/2021] amLODIPine (NORVASC) tablet 10 mg, Daily      collagenase ointment, Q48H      cefepime (MAXIPIME) 2 g IVPB minibag, Q12H      metoprolol succinate (TOPROL XL) extended release tablet 25 mg, Daily      losartan (COZAAR) tablet 50 mg, Daily      aspirin chewable tablet 81 mg, Daily      atorvastatin (LIPITOR) tablet 40 mg, Daily      vitamin B complex w/C 1 tablet, Daily      fenofibrate (TRIGLIDE) tablet 160 mg, Daily      melatonin disintegrating tablet 5 mg, Nightly      pantoprazole (PROTONIX) tablet 40 mg, Daily      potassium chloride (KLOR-CON M) extended release tablet 20 mEq, BID      sodium chloride flush 0.9 % injection 10 mL, 2 times per day      sodium chloride flush 0.9 % injection 10 mL, PRN      enoxaparin (LOVENOX) injection 40 mg, Daily      promethazine (PHENERGAN) tablet 12.5 mg, Q6H PRN    Or      ondansetron (ZOFRAN) injection 4 mg, Q6H PRN      magnesium hydroxide (MILK OF MAGNESIA) 400 MG/5ML suspension 30 mL, Daily PRN      acetaminophen (TYLENOL) tablet 650 mg, Q6H PRN    Or      acetaminophen (TYLENOL) suppository 650 mg, Q6H PRN        Allergies:  Sulfamethoxazole-trimethoprim     Review of Systems:   General: No chills or sweats. Cardiac: No chest pain or palpitations. Respiratory: No cough or shortness of breath. GI: No nausea, vomiting, or diarrhea. Neuro: No lightheadedness or dizziness.     Objective   PHYSICAL EXAM:    Vitals:    01/01/21 1623   BP: (!) 170/93   Pulse: 81   Resp: 18   Temp: 98 °F (36.7 °C)   SpO2: 94% Weight: 267 lb 3.2 oz (121.2 kg)       General: Adult male lying in bed in no acute distress. HEENT: Normocephalic, atraumatic, non-icteric, hearing intact, nares normal, mucous membranes moist.  Neck: No JVD. Heart: Irregularly irregular rhythm. Normal S1 and S2. No murmurs, gallops or rubs. Lungs: Normal respiratory effort. Clear to auscultation bilaterally. No wheezes, rales, or rhonchi. Abdomen: Soft, non-tender. Normoactive bowel sounds. No masses or organomegaly. Skin: No rashes, wounds, or lesions. Pulses: 2+ and symmetric. Extremities: No clubbing, cyanosis, or edema. Wearing compression hose. Psych: Normal mood and affect. Neuro: Alert and oriented to person, place, and time. No focal deficits noted. Labs   CBC:   Lab Results   Component Value Date    WBC 5.1 01/01/2021    RBC 2.68 01/01/2021    HGB 7.4 01/01/2021    HCT 22.7 01/01/2021    MCV 84.9 01/01/2021    RDW 17.2 01/01/2021     01/01/2021     CMP:  Lab Results   Component Value Date     01/01/2021    K 3.7 01/01/2021    K 4.1 12/29/2020     01/01/2021    CO2 24 01/01/2021    BUN 21 01/01/2021    CREATININE 1.3 01/01/2021    GFRAA >60 01/01/2021    GFRAA >60 04/05/2013    AGRATIO 1.1 12/29/2020    LABGLOM 54 01/01/2021    GLUCOSE 147 01/01/2021    PROT 6.4 12/29/2020    CALCIUM 8.9 01/01/2021    BILITOT 0.5 12/29/2020    ALKPHOS 48 12/29/2020    AST 18 12/29/2020    ALT 9 12/29/2020     PT/INR:  No results found for: PTINR  HgBA1c:  Lab Results   Component Value Date    LABA1C 4.6 09/09/2019     Lab Results   Component Value Date    TROPONINI <0.01 12/29/2020         Cardiac Data     TTE 12/28/20:  Conclusions   Summary   Normal left ventricular systolic function with ejection fraction of 55-60%. No regional wall motion abnormalites are seen. Left ventricular diastolic filling pressure is elevated. Compared to previous study from 6- no changes noted in left   ventricular function. Mild mitral and tricuspid regurgitation. Moderate Bi-atrial enlargement. Mild aortic stenosis. The right ventricle is mildly enlarged. Right ventricular systolic function is visually mildly reduced . Systolic pulmonic artery pressure (SPAP) is estimated at 55 mmHg consistent   with mild pulmonary hypertension (Right atrial pressure of 8 mmHg). Assessment:      1. Orthostatic hypotension - Patient has rather pronounced orthostasis which has lead to recurrent falls. His clonidine and hydralazine were discontinued earlier. Since then, his orthostasis has improved, but he now has uncontrolled supine hypertension. 2. Non-obstructive coronary artery disease  3. Persistent atrial fibrillation - S/p Watchman device (had history of GI bleed). 4. Essential hypertension  5. Remote CVA  6. CKD  7. Type II DM  8. Hyperlipidemia  9. Mild aortic stenosis  10. Mild mitral regurgitation    Plan:      1. Overall, patient presents a very challenging case as it has been difficult balance control of his supine hypertension with his orthostasis. Ultimately, will likely have to tolerate some degree of supine hypertension to avoid severe orthostasis. 2. Will increase amlodipine to 10 mg daily and metoprolol succinate to 50 mg daily and re-assess. 3. Continue losartan 50 mg daily. 4. Hydralazine and clonidine were discontinued earlier this admission. 5. Continue lower extremity compression stockings and baroreceptor conditioning. 6. If supine BP is better controlled tomorrow and repeat orthostatics are negative, can discharge home from cardiology standpoint and have patient follow-up with his outpatient cardiologist, Dr. Maria Fernanda Padilla in 2-4 weeks. Thank you for allowing us to participate in the care of [facility]. Please call me with any questions [phone].       Diane Castillo, 5 [facility]  [phone] [facility]  [phone] [address] [place] [address]  1/1/2021 6:19 PM I will address the patient's cardiac risk factors and adjusted pharmacologic treatment as needed. In addition, I have reinforced the need for patient directed risk factor modification. All questions and concerns were addressed to the patient/family. Alternatives to my treatment were discussed. The note was completed using EMR. Every effort was made to ensure accuracy; however, inadvertent computerized transcription errors may be present.

## 2021-01-01 NOTE — PROGRESS NOTES
Occupational Therapy  Facility/Department: [place] [address] REMOTE TELEMETRY  Daily Treatment Note  NAME: Fidel Stewart  : 1950  MRN: [id number]    Date of Service: 2021    Discharge Recommendations:  Home with Home health OT, 24 hour supervision or assist  OT Equipment Recommendations  Equipment Needed: No    Assessment   Assessment: Pt initially refusing OOB, however, agreeable to sitting EOB during breakfast and agreeable to standing EOB for ADLs. Pt presenting with frequent questions regarding POC and DC plan. Pt requiring SPV bed mobility and CGA t/fs and standing balance this date with VCs for safety. Pt continuing to present with increased impulsivity and increased distractability requiring max VCs to follow commands and attend to task throughout session. Pt reporting dtr with provide  assist upon DC. OT Education: OT Role;Orientation;Plan of Care;Transfer Training;Energy Conservation  Patient Education: transfers, safety, assist with mobility  Barriers to Learning: cognition, impulsivity  REQUIRES OT FOLLOW UP: Yes  Activity Tolerance  Activity Tolerance: Patient Tolerated treatment well;Treatment limited secondary to decreased cognition  Safety Devices  Safety Devices in place: Yes  Type of devices: Bed alarm in place;Call light within reach; Left in bed;Nurse notified; All fall risk precautions in place;Gait belt  Restraints  Initially in place: No         Patient Diagnosis(es): The primary encounter diagnosis was Fall, initial encounter. Diagnoses of Laceration of scalp, initial encounter, Syncope and collapse, Anemia due to acute blood loss, and MAISHA (acute kidney injury) ([facility] were also pertinent to this visit. has a past medical history of Acute gastric ulcer with bleeding, Acute metabolic encephalopathy, MAISHA (acute kidney injury) ([facility] ATN (acute tubular necrosis) ([facility] Atrial fib/flutter, transient, Atrial fibrillation ([facility] BPH (benign prostatic hyperplasia), Cerebral artery occlusion with cerebral infarction ([facility] Diabetes mellitus ([facility] GI bleed, Glaucoma, History of blood transfusion, Hx of diabetic neuropathy, Hyperlipidemia, Hypertension, Mitral insufficiency, Mobility impaired, Pulmonary hypertension ([facility] and Traumatic hemorrhagic shock ([facility] has a past surgical history that includes knee surgery; Prostate biopsy; other surgical history (10/11/13); Upper gastrointestinal endoscopy (7/13 17); Dental surgery (N/A, 3/28/2019); and laparoscopy (N/A, 6/22/2019).     Restrictions  Restrictions/Precautions  Restrictions/Precautions: Fall Risk  Position Activity Restriction  Other position/activity restrictions: Peripheral IV, O.H., staples to posterior head  Subjective   General  Chart Reviewed: Yes  Patient assessed for rehabilitation services?: Yes  Family / Caregiver Present: No  Subjective  Subjective: supine, agreeable  General Comment  Comments: Per RN ok for therapy  Vital Signs  Patient Currently in Pain: Denies   Orientation  Orientation  Overall Orientation Status: Within Functional Limits  Objective    ADL  Feeding: Independent  Grooming: Contact guard assistance(washing hands with cloth standing EOB)  LE Dressing: Supervision(donning socks seated EOB, increased time required)  Toileting: Contact guard assistance(CGA standing EOB using urinal, pt declining toileting at toilet)  Additional Comments: Pt declining mobility this date refusing BR, pt agreeable to stand EOB to use urinal and wash hands with cloth        Balance  Sitting Balance: Supervision  Standing Balance: Contact guard assistance(no AD)  Standing Balance  Time: x1 min x3 trials  Activity: ADLs, dynamic standing Comment: standing EOB no AD with CGA  Functional Mobility  Functional Mobility Comments: Pt declined mobility this date  Bed mobility  Supine to Sit: Supervision  Sit to Supine: Supervision  Scooting: Supervision  Transfers  Sit to stand: Contact guard assistance  Stand to sit: Contact guard assistance  Transfer Comments: impulsive                       Cognition  Overall Cognitive Status: Exceptions  Arousal/Alertness: Delayed responses to stimuli  Following Commands: Follows one step commands with increased time; Follows one step commands with repetition  Attention Span: Difficulty attending to directions; Difficulty dividing attention  Memory: Decreased long term memory;Decreased short term memory  Safety Judgement: Decreased awareness of need for assistance;Decreased awareness of need for safety  Problem Solving: Decreased awareness of errors  Insights: Decreased awareness of deficits  Initiation: Requires cues for some  Sequencing: Requires cues for some  Cognition Comment: increased distractability, max VCs required to attend to task           Plan   Plan  Times per week: 3-5x/wk    AM-PAC Score        AM-[facility] Inpatient Daily Activity Raw Score: 19 (01/01/21 0925)  AM-PAC Inpatient ADL T-Scale Score : 40.22 (01/01/21 0925)  ADL Inpatient CMS 0-100% Score: 42.8 (01/01/21 0925)  ADL Inpatient CMS G-Code Modifier : CK (01/01/21 0925)    Goals  Short term goals  Time Frame for Short term goals: within one week (1/5/2021)  Short term goal 1: Pt demonstrates toilet transfer SBA  Short term goal 2: Pt demonstrates BUE AROM ther ex x10-15 reps by 1/3/2021  Short term goal 3: Pt demonstrates x5-7 min dynamic standing balance task with CGA using LRAD  Patient Goals   Patient goals : to go home       Therapy Time   Individual Concurrent Group Co-treatment   Time In 0820         Time Out 0913         Minutes 53         Timed Code Treatment Minutes: [address] [facility]       RAFAEL Nixon/CELIA

## 2021-01-01 NOTE — PROGRESS NOTES
Hospitalist Progress Note      PCP: Sraah Dennison MD    Date of Admission: 12/28/2020    Chief Complaint: Syncope and collapse    Subjective:   Pt in bed. Feels ok. Worried about his ecoli infection. Medications:  Reviewed    Infusion Medications     Scheduled Medications    collagenase   Topical Q48H    cefepime  2 g Intravenous Q12H    metoprolol succinate  25 mg Oral Daily    losartan  50 mg Oral Daily    amLODIPine  5 mg Oral Daily    aspirin  81 mg Oral Daily    atorvastatin  40 mg Oral Daily    vitamin B complex w/C  1 tablet Oral Daily    fenofibrate  160 mg Oral Daily    melatonin  5 mg Oral Nightly    pantoprazole  40 mg Oral Daily    potassium chloride  20 mEq Oral BID    sodium chloride flush  10 mL Intravenous 2 times per day    enoxaparin  40 mg Subcutaneous Daily     PRN Meds: sodium chloride flush, promethazine **OR** ondansetron, magnesium hydroxide, acetaminophen **OR** acetaminophen      Intake/Output Summary (Last 24 hours) at 1/1/2021 0854  Last data filed at 1/1/2021 0718  Gross per 24 hour   Intake 480 ml   Output 1025 ml   Net -545 ml       Physical Exam Performed:    BP (!) 177/93   Pulse 97   Temp 98.1 °F (36.7 °C) (Oral)   Resp 18   Ht 6' 5\" (1.956 m)   Wt 267 lb 3.2 oz (121.2 kg)   SpO2 93%   BMI 31.69 kg/m²     General appearance: No apparent distress, appears stated age and cooperative. HEENT: Pupils equal, round, and reactive to light. Conjunctivae/corneas clear. Neck: Supple, with full range of motion. No jugular venous distention. Trachea midline. Respiratory:  Normal respiratory effort. Clear to auscultation, bilaterally without Rales/Wheezes/Rhonchi. Cardiovascular: Regular rate and rhythm with normal S1/S2 without murmurs, rubs or gallops. Abdomen: Soft, non-tender, non-distended with normal bowel sounds. Musculoskeletal: No clubbing, cyanosis or edema bilaterally. Full range of motion without deformity. Skin: Skin color, texture, turgor normal.  No rashes or lesions. Neurologic:  Neurovascularly intact without any focal sensory/motor deficits. Cranial nerves: II-XII intact, grossly non-focal.  Psychiatric: Alert and oriented, thought content appropriate, normal insight  Capillary Refill: Brisk,< 3 seconds   Peripheral Pulses: +2 palpable, equal bilaterally       Labs:   No results for input(s): WBC, HGB, HCT, PLT in the last 72 hours. Recent Labs     12/31/20  1212      K 4.1      CO2 23   BUN 22*   CREATININE 1.6*   CALCIUM 9.5     No results for input(s): AST, ALT, BILIDIR, BILITOT, ALKPHOS in the last 72 hours. No results for input(s): INR in the last 72 hours. No results for input(s): Francies Gathers in the last 72 hours. Urinalysis:      Lab Results   Component Value Date    NITRU Negative 12/29/2020    WBCUA >100 12/29/2020    BACTERIA 4+ 12/29/2020    RBCUA 3-4 12/29/2020    BLOODU Negative 12/29/2020    SPECGRAV 1.025 12/29/2020    GLUCOSEU Negative 12/29/2020       Radiology:  CT HEAD WO CONTRAST   Final Result   1. No acute intracranial abnormality. 2. No significant change in appearance of the brain in comparison with the   study earlier today. 3. Stable chronic left temporoparietal encephalomalacia. 4. Stable age-appropriate atrophy and chronic small vessel ischemic white   matter disease. 5. Interval decrease in size of an occipital scalp hematoma, without acute   skull fracture. CT HEAD WO CONTRAST   Final Result   1. No acute intracranial abnormality. CT CERVICAL SPINE WO CONTRAST   Final Result   1. No acute fracture. 2. Incompletely imaged small right pleural effusion. XR SHOULDER LEFT (MIN 2 VIEWS)   Final Result   No acute osseous injury of the left shoulder. XR CHEST PORTABLE   Final Result   Minimal asymmetric airspace disease laterally within the left lower lung. Pneumonia is not excluded in the correct clinical setting. Assessment/Plan:    Active Hospital Problems    Diagnosis    Remote history of stroke [Z86.73]    Syncope and collapse [R55]    Orthostatic dizziness [R42]    A-fib (HCC) [I48.91]     Syncope and collapse, likely due to orthostatic hypotension. Has had several episodes of syncope and collapse in the past. Hx of CHF and afib. Had repeat syncopal episode in the ED after rolling onto his side during which he was noted to have BP 70s/50s.  CT head with no acute intracranial abnormality   EKG revealed afib   Repeat echo - EF 55-60%. Elevated left ventricular diastolic filling pressures.  Telemetry monitoring    Fall precautions   EP consulted and following.     Head trauma 2/2 fall  - CT head with occipital scalp hematoma, no skull fracture  - Wound care as needed     Chronic diastolic CHF, appears compensated  -- Echo as above.  Continue home Lasix. BB changed to Toprol.  Daily weights   I's and O's     Hx of persistent a. Fib s/p Watchman's device  - EKG on admission: Afib  - Not on ac due to hx of significant GI bleed  - Hold home bb for now in setting of syncope  - Tele monitoring    Acute cystitis - present on admission.  - UCx > 100,000 MDR E. Coli. - rocephin started 12/31, changed to cefepime last night  - sig resistance since 1/2020    HTN - patient was profoundly orthostatic. Will need to accept higher pressures at rest.    -- EP stopped home clonidine and hydralazine. DVT Prophylaxis: Lovenox  Diet: DIET LOW SODIUM 2 GM;  Code Status: Full Code    PT/OT Eval Status: recommend home PT/OT    Dispo - pending ID recs.  Poss 1-2 days    JAMEY Vincent - CNP

## 2021-01-01 NOTE — PROGRESS NOTES
13:29:  Updated patient's daughter Shara Mcleod. 16:20: updated daughter Shara Mcleod that pt will be staying overnight again.

## 2021-01-02 VITALS
OXYGEN SATURATION: 97 % | HEIGHT: 77 IN | TEMPERATURE: 98.4 F | WEIGHT: 267.2 LBS | SYSTOLIC BLOOD PRESSURE: 161 MMHG | HEART RATE: 101 BPM | RESPIRATION RATE: 18 BRPM | BODY MASS INDEX: 31.55 KG/M2 | DIASTOLIC BLOOD PRESSURE: 70 MMHG

## 2021-01-02 LAB
ANION GAP SERPL CALCULATED.3IONS-SCNC: 10 MMOL/L (ref 3–16)
BUN BLDV-MCNC: 20 MG/DL (ref 7–20)
CALCIUM SERPL-MCNC: 9 MG/DL (ref 8.3–10.6)
CHLORIDE BLD-SCNC: 106 MMOL/L (ref 99–110)
CO2: 26 MMOL/L (ref 21–32)
CREAT SERPL-MCNC: 1.4 MG/DL (ref 0.8–1.3)
GFR AFRICAN AMERICAN: >60
GFR NON-AFRICAN AMERICAN: 50
GLUCOSE BLD-MCNC: 179 MG/DL (ref 70–99)
HCT VFR BLD CALC: 23.1 % (ref 40.5–52.5)
HEMOGLOBIN: 7.4 G/DL (ref 13.5–17.5)
MCH RBC QN AUTO: 27.3 PG (ref 26–34)
MCHC RBC AUTO-ENTMCNC: 32 G/DL (ref 31–36)
MCV RBC AUTO: 85.2 FL (ref 80–100)
PDW BLD-RTO: 17.5 % (ref 12.4–15.4)
PLATELET # BLD: 189 K/UL (ref 135–450)
PMV BLD AUTO: 8.2 FL (ref 5–10.5)
POTASSIUM SERPL-SCNC: 4.1 MMOL/L (ref 3.5–5.1)
RBC # BLD: 2.71 M/UL (ref 4.2–5.9)
SODIUM BLD-SCNC: 142 MMOL/L (ref 136–145)
WBC # BLD: 5 K/UL (ref 4–11)

## 2021-01-02 PROCEDURE — 2580000003 HC RX 258: Performed by: NURSE PRACTITIONER

## 2021-01-02 PROCEDURE — 6360000002 HC RX W HCPCS: Performed by: NURSE PRACTITIONER

## 2021-01-02 PROCEDURE — 99232 SBSQ HOSP IP/OBS MODERATE 35: CPT | Performed by: NURSE PRACTITIONER

## 2021-01-02 PROCEDURE — 36415 COLL VENOUS BLD VENIPUNCTURE: CPT

## 2021-01-02 PROCEDURE — 6370000000 HC RX 637 (ALT 250 FOR IP): Performed by: INTERNAL MEDICINE

## 2021-01-02 PROCEDURE — 80048 BASIC METABOLIC PNL TOTAL CA: CPT

## 2021-01-02 PROCEDURE — 6370000000 HC RX 637 (ALT 250 FOR IP): Performed by: NURSE PRACTITIONER

## 2021-01-02 PROCEDURE — 85027 COMPLETE CBC AUTOMATED: CPT

## 2021-01-02 RX ORDER — LOSARTAN POTASSIUM 50 MG/1
50 TABLET ORAL DAILY
Qty: 30 TABLET | Refills: 3 | Status: SHIPPED | OUTPATIENT
Start: 2021-01-03

## 2021-01-02 RX ORDER — AMLODIPINE BESYLATE 10 MG/1
10 TABLET ORAL DAILY
Qty: 30 TABLET | Refills: 3 | Status: SHIPPED | OUTPATIENT
Start: 2021-01-03

## 2021-01-02 RX ORDER — METOPROLOL SUCCINATE 50 MG/1
50 TABLET, EXTENDED RELEASE ORAL DAILY
Qty: 30 TABLET | Refills: 3 | Status: SHIPPED | OUTPATIENT
Start: 2021-01-03

## 2021-01-02 RX ADMIN — POTASSIUM CHLORIDE 20 MEQ: 20 TABLET, EXTENDED RELEASE ORAL at 08:26

## 2021-01-02 RX ADMIN — CEFEPIME 2 G: 2 INJECTION, POWDER, FOR SOLUTION INTRAMUSCULAR; INTRAVENOUS at 14:02

## 2021-01-02 RX ADMIN — PANTOPRAZOLE SODIUM 40 MG: 40 TABLET, DELAYED RELEASE ORAL at 08:26

## 2021-01-02 RX ADMIN — SODIUM CHLORIDE, PRESERVATIVE FREE 10 ML: 5 INJECTION INTRAVENOUS at 10:22

## 2021-01-02 RX ADMIN — B-COMPLEX W/ C & FOLIC ACID TAB 1 TABLET: TAB at 08:37

## 2021-01-02 RX ADMIN — FENOFIBRATE 160 MG: 160 TABLET ORAL at 08:37

## 2021-01-02 RX ADMIN — METOPROLOL SUCCINATE 50 MG: 50 TABLET, EXTENDED RELEASE ORAL at 08:26

## 2021-01-02 RX ADMIN — ASPIRIN 81 MG: 81 TABLET, CHEWABLE ORAL at 08:26

## 2021-01-02 RX ADMIN — AMLODIPINE BESYLATE 10 MG: 5 TABLET ORAL at 08:26

## 2021-01-02 RX ADMIN — CEFEPIME 2 G: 2 INJECTION, POWDER, FOR SOLUTION INTRAMUSCULAR; INTRAVENOUS at 03:33

## 2021-01-02 RX ADMIN — ATORVASTATIN CALCIUM 40 MG: 40 TABLET, FILM COATED ORAL at 08:26

## 2021-01-02 RX ADMIN — LOSARTAN POTASSIUM 50 MG: 25 TABLET, FILM COATED ORAL at 08:26

## 2021-01-02 NOTE — DISCHARGE INSTR - COC
Continuity of Care Form    Patient Name: Florin oTlbert   :  1950  MRN:  [id number]    Admit date:  2020  Discharge date:  ***    Code Status Order: Full Code   Advance Directives:      Admitting Physician:  Rick Love MD  PCP: Ann Jose MD    Discharging Nurse: [facility] Unit/Room#: 0102/0102-01  Discharging Unit Phone Number: ***    Emergency Contact:   Extended Emergency Contact Information  Primary Emergency Contact: Meghann Adame [address] Phone: [phone]  Relation: Child  Secondary Emergency Contact: Claire Church Phone: [phone]  Mobile Phone: [phone]  Relation: Child    Past Surgical History:  Past Surgical History:   Procedure Laterality Date    DENTAL SURGERY N/A 3/28/2019    EXTRACTION OF ALL REMAINING UPPER AND LOWER TEETH AND ALVEOLOPLASTY   performed by Nano Sandoval DMD at [address] [place] [address] 2019    LAPAROSCOPIC, CONVERTED TO OPEN INCARCERATED UMBILICAL HERNIA REPAIR performed by Juanis Gimenez MD at [place] [address]  10/11/13     CYSTOSCOPY, TRANSURETHRAL RESECTION OF PROSTATE WITH OLYMPUS    PROSTATE BIOPSY      UPPER GASTROINTESTINAL ENDOSCOPY   17       Immunization History:   Immunization History   Administered Date(s) Administered    Influenza Virus Vaccine 2014    Pneumococcal Polysaccharide ([place]arrysz10) 2015    Td Vaccine >8yo [facility] 2008       Active Problems:  Patient Active Problem List   Diagnosis Code    Hx BPH w/urinary retention s/p prostatectomy R33.9    Cigar smoker Z72.0    Obesity (BMI 30-39. 9) E66.9    Diabetic ketoacidosis without coma associated with type 1 diabetes mellitus (HCC) E10.10    Lactic acidosis E87.2    Hyperkalemia E87.5    MAISHA (acute kidney injury) ([facility] N17.9    SIRS (systemic inflammatory response syndrome) ([facility]) R65.10    Volume depletion E86.9    Hypotension I95.9    Orthostatic dizziness R42 Colostomy/Ileostomy/Ileal Conduit: {YES / ZJ:21754}       Date of Last BM: ***    Intake/Output Summary (Last 24 hours) at 2021 1053  Last data filed at 2021 1024  Gross per 24 hour   Intake 1200 ml   Output 725 ml   Net 475 ml     I/O last 3 completed shifts: In: 1080 [P.O.:1080]  Out: 775 [Urine:775]    Safety Concerns:     [address] Safety Concerns:[id number]:::0}    Impairments/Disabilities:      [address] Impairments/Disabilities:172349780:::0}    Nutrition Therapy:  Current Nutrition Therapy:   [address]rad YAS Diet List:623863182:::0}    Routes of Feeding: {CHP DME Other Feedings:282844782:::0}  Liquids: {Slp liquid thickness:07106}  Daily Fluid Restriction: {CHP DME Yes amt example:558998821:::0}  Last Modified Barium Swallow with Video (Video Swallowing Test): {Done Not Done CHWX:069630234:::3}    Treatments at the Time of Hospital Discharge:   Respiratory Treatments: ***  Oxygen Therapy:  {Therapy; copd oxygen:00237:::0}  Ventilator:    {[facility] Vent List:448814071:::0}    Rehab Therapies: {THERAPEUTIC INTERVENTION:5650075224}  Weight Bearing Status/Restrictions: [employer]  Weight Bearin:::0}  Other Medical Equipment (for information only, NOT a DME order):  {EQUIPMENT:282184387}  Other Treatments: ***    Patient's personal belongings (please select all that are sent with patient):  {CHP DME Belongings:703873055:::0}    RN SIGNATURE:  {Esignature:933949494:::0}    CASE MANAGEMENT/SOCIAL WORK SECTION    Inpatient Status Date: ***    Readmission Risk Assessment Score:  Readmission Risk              Risk of Unplanned Readmission:        23           Discharging to Facility/ Agency   Name:   Address:  Phone:  Fax:    Dialysis Facility (if applicable)   Name:  Address:  Dialysis Schedule:  Phone:  Fax:    / signature: {Esignature:253161226:::0}    PHYSICIAN SECTION    Prognosis: Good    Condition at Discharge: Stable    Recommended Labs or Other Treatments After Discharge: Home PT/OT. Physician Certification: I certify the above information and transfer of Jfricky Calles  is necessary for the continuing treatment of the diagnosis listed and that he requires [address] for greater 30 days.      Update Admission H&P: No change in H&P    PHYSICIAN SIGNATURE:  Electronically signed by JAMEY Weaver CNP on 1/2/21 at 10:53 AM EST

## 2021-01-02 NOTE — CARE COORDINATION
Reached out to pt dtr Gokul Moulton to coordinate p/u time and offer [facility] referral as rec'd. Left HIPAA compliant vm.  Used [facility] in past.

## 2021-01-02 NOTE — PROGRESS NOTES
Physical Therapy Attempt    Attempted to see pt for follow-up PT session. Pt resting in bed and refusing therapy. Pt states he has been active today with other providers. He requests this therapist look up recent blood pressure readings in chart, which this therapist does. Pt reminded of benefits of frequent mobility and changes in position but pt continues to refuse. Will re-attempt at later date if pt does not discharge today.       Ronald Cardozo, PT, DPT

## 2021-01-02 NOTE — PROGRESS NOTES
Pt d/c'd home. Removed right arm  IV and stopped bleeding. Catheter intact. Pt tolerated well. No redness noted at site. Notified [facility] and removed tele box. Reviewed d/c instructions, home meds, and  f/u information utilizing teach-back method. Medication refilled at [facility] and  given to patient. Patient verbalized understanding. Discharge instructions given to pt's daughter at .

## 2021-01-02 NOTE — CONSULTS
Infectious Diseases Inpatient Consult Note    Reason for Consult:   UTI vs bacteriuria    Requesting Physician:   Dr Malathi Kowalski  Primary Care Physician:  Jose Simmons MD  History Obtained From:   Pt, EPIC    Admit Date: 12/28/2020  Hospital Day: 11    CHIEF COMPLAINT:       Chief Complaint   Patient presents with   Chun Benavides     states was working out in living room and went down, lac to back of head and complains of L shoulder pain     Laceration    Shoulder Pain       HISTORY OF PRESENT ILLNESS:      65 man with hx DM, HTN, AF, CVA, CKD    Pt presented after a fall at his home, accidental, no LOC, L shoulder pain. Pt had no dysuria, change in urine, suprapubic / flank pain    In ED 12/28, afeb, WBC 5.5. Cr 1.9. UA pos, micro >100 WBC  Admit, seen by Card / EPS    Today 1/1/21 - afeb. WBC 5.1.   Cr 1.3    Past Medical History:    Past Medical History:   Diagnosis Date    Acute gastric ulcer with bleeding     Acute metabolic encephalopathy     MAISHA (acute kidney injury) ([address]     Requiring dialysis since 7/4/17 in setting of hemorrhagic shock    ATN (acute tubular necrosis) (HCC)     Atrial fib/flutter, transient     Atrial fibrillation (HCC)     BPH (benign prostatic hyperplasia)     Cerebral artery occlusion with cerebral infarction ([address] 08/05/2017    weakness and aphasia    Diabetes mellitus ([address]     GI bleed     Glaucoma     History of blood transfusion     Hx of diabetic neuropathy     Hyperlipidemia     Hypertension     Mitral insufficiency     Mobility impaired     uses walker and W/C    Multiple drug resistant organism (MDRO) culture positive 12/29/2020    urine    Pulmonary hypertension ([address]     Traumatic hemorrhagic shock ([address]        Past Surgical History:    Past Surgical History:   Procedure Laterality Date    DENTAL SURGERY N/A 3/28/2019    EXTRACTION OF ALL REMAINING UPPER AND LOWER TEETH AND ALVEOLOPLASTY   performed by Mayo Segura.ESTHER at [facility] HEENT: Membranes moist, no oral lesion, PERRL  NECK:  Supple, no lymphadenopathy  LUNGS: Clear b/l, no rales, no dullness  CARDIAC: RRR, no murmur appreciated  ABD:  + BS, soft / NT - no suprapubic tenderness, no CVAT  EXT:  No rash, no edema, no lesions  NEURO: No focal neurologic findings  PSYCH: Orientation, sensorium, mood normal  LINES:  Peripheral iv    DATA:    Lab Results   Component Value Date    WBC 5.1 2021    HGB 7.4 (L) 2021    HCT 22.7 (L) 2021    MCV 84.9 2021     2021     Lab Results   Component Value Date    CREATININE 1.3 2021    BUN 21 (H) 2021     2021    K 3.7 2021     2021    CO2 24 2021       Hepatic Function Panel:   Lab Results   Component Value Date    ALKPHOS 48 2020    ALT 9 2020    AST 18 2020    PROT 6.4 2020    BILITOT 0.5 2020    BILIDIR 0.8 2017    IBILI 0.2 2017    LABALBU 3.4 2020       Micro:   Urine cult: >100k E coli  Antibiotic Interpretation MICHELLE   ampicillin Resistant >=32 mcg/mL   ceFAZolin Resistant >=64 mcg/mL   cefepime Sensitive 1 mcg/mL   cefTRIAXone Resistant >=64 mcg/mL   ciprofloxacin Resistant >=4 mcg/mL   gentamicin Sensitive <=1 mcg/mL   levofloxacin Resistant >=8 mcg/mL   nitrofurantoin Sensitive <=16 mcg/mL   piperacillin-tazobactam Intermediate 64 mcg/mL   trimethoprim-sulfamethoxazole Sensitive <=20 mcg/mL     Imagin/28 --   CT HEAD WO CONTRAST   Preliminary Result   1. No acute intracranial abnormality. 2. No significant change in appearance of the brain in comparison with the   study earlier today. 3. Stable chronic left temporoparietal encephalomalacia. 4. Stable age-appropriate atrophy and chronic small vessel ischemic white   matter disease. 5.  Interval decrease in size of an occipital scalp hematoma, without acute   skull fracture.       CT HEAD WO CONTRAST   Final Result

## 2021-01-02 NOTE — PROGRESS NOTES
[address]   Daily Progress Note      Admit Date:  12/28/2020    CC: syncope     Subjective: patient denies any acute events overnight. HPI:   Mr. Haylie Silva patient in bed. Rhythm AF - controlled rate. Syst. B/P- supine in the 180's, standing in the syst. 130's    The patient was seen and examined. Notes, labs and recent testing reviewed. There were not complications over night. The patient is being seen for atrial fibrillation.       Objective:     BP (!) 178/103   Pulse 101   Temp 97.4 °F (36.3 °C) (Oral)   Resp 16   Ht 6' 5\" (1.956 m)   Wt 267 lb 3.2 oz (121.2 kg)   SpO2 98%   BMI 31.69 kg/m²      Intake/Output Summary (Last 24 hours) at 1/2/2021 0745  Last data filed at 1/1/2021 2337  Gross per 24 hour   Intake 1080 ml   Output 525 ml   Net 555 ml       Physical Exam:  General:  Awake, alert, NAD  Skin:  Warm and dry  Neck:  JVD<8, no bruit  Chest:  Clear to auscultation, no wheezes/rhonchi/rales  Cardiovascular: Afib   Abdomen:  Soft, nontender, +bowel sounds  Extremities:  no edema    Medications:    amLODIPine  10 mg Oral Daily    metoprolol succinate  50 mg Oral Daily    collagenase   Topical Q48H    cefepime  2 g Intravenous Q12H    losartan  50 mg Oral Daily    aspirin  81 mg Oral Daily    atorvastatin  40 mg Oral Daily    vitamin B complex w/C  1 tablet Oral Daily    fenofibrate  160 mg Oral Daily    melatonin  5 mg Oral Nightly    pantoprazole  40 mg Oral Daily    potassium chloride  20 mEq Oral BID    sodium chloride flush  10 mL Intravenous 2 times per day    enoxaparin  40 mg Subcutaneous Daily       sodium chloride flush, promethazine **OR** ondansetron, magnesium hydroxide, acetaminophen **OR** acetaminophen    Lab Data:  CBC:   Recent Labs     01/01/21  1021   WBC 5.1   HGB 7.4*        BMP:    Recent Labs     12/31/20  1212 01/01/21  1021    139   K 4.1 3.7   CO2 23 24   BUN 22* 21*   CREATININE 1.6* 1.3 LIVR: No results for input(s): AST, ALT in the last 72 hours. PT/INR: No results for input(s): PROT, INR in the last 72 hours. APTT: No results for input(s): APTT in the last 72 hours. BNP:  No results for input(s): BNP in the last 72 hours. CARDIAC ENZYMES:No results for input(s): CKMB, CKMBINDEX, TROPONINI in the last 72 hours. Invalid input(s): CKTOTAL;3  FASTING LIPID PANEL:  Lab Results   Component Value Date    CHOL 96 07/23/2019    HDL 26 07/23/2019    TRIG 112 07/23/2019       Diagnostics:    ECHO: 12/30/2020   Summary   Normal left ventricular systolic function with ejection fraction of 55-60%. No regional wall motion abnormalites are seen. Left ventricular diastolic filling pressure is elevated. Compared to previous study from 6- no changes noted in left   ventricular function. Mild mitral and tricuspid regurgitation. Moderate Bi-atrial enlargement. Mild aortic stenosis. The right ventricle is mildly enlarged. Right ventricular systolic function is visually mildly reduced . Systolic pulmonic artery pressure (SPAP) is estimated at 55 mmHg consistent   with mild pulmonary hypertension (Right atrial pressure of 8 mmHg). Patient Active Problem List   Diagnosis    Hx BPH w/urinary retention s/p prostatectomy    Cigar smoker    Obesity (BMI 30-39. 9)    Diabetic ketoacidosis without coma associated with type 1 diabetes mellitus (HCC)    Lactic acidosis    Hyperkalemia    MAISHA (acute kidney injury) ([address]    SIRS (systemic inflammatory response syndrome) ([facility])    Volume depletion    Hypotension    Orthostatic dizziness    Hemorrhagic shock    Upper GI bleed    A-fib ([facility])    Acute blood loss anemia    Metabolic acidosis with respiratory acidosis    Hyperglycemia    Elevated lactic acid level    NSVT (nonsustained ventricular tachycardia) ([address]    DM type 2, controlled, with complication (HCC)    Hyponatremia    Anemia    Metabolic encephalopathy  Acute cystitis with hematuria    Altered mental status    Stage 3 chronic kidney disease (HCC)    Encephalopathy, metabolic    HTN (hypertension), benign    CKD (chronic kidney disease)    Dyslipidemia    Acute metabolic encephalopathy    Impulse control disorder in adult    Dementia due to [facility]'s disease [facility]    Small bowel obstruction (HCC)    Bilateral pleural effusion    Atelectasis of right lung    Former smoker    Syncope and collapse    Fluid overload    Mild malnutrition (HCC)    Acute encephalopathy    Remote history of stroke    Dysthymia    Hypertensive urgency       Assessment/plan:   1. Orthostatic hypotension - Patient has rather pronounced orthostasis which has lead to recurrent falls. His clonidine and hydralazine were discontinued earlier. Since then, his orthostasis has improved, but he now has uncontrolled supine hypertension. 2. Non-obstructive coronary artery disease  3. Persistent atrial fibrillation - S/p Watchman device (had history of GI bleed). 4. Essential hypertension  5. Remote CVA  6. CKD  7. Type II DM  8. Hyperlipidemia  9. Mild aortic stenosis  10. Mild mitral regurgitation    Plan: continue current medications: Norvasc 10 mg daily, losartan 50 mg daily, Toprol XL 50 mg daily       Dispo:      Please see orders. Discussed with patient and nursing.   Discussed with Dr. Army Reed     The patient is on a beta-blocker: Toprol XL 50 mg daily   The patient is on an ace-i/ARB: Losartan 50 mg daily   The patient is on a statin: Atorvastatin 40 mg daily   The patient is on antiplatelet therapy: ASA 81 mg daily   The patient does have history of AF, and is not on anticoagulation: history of GI bleeding, and Watchman device     Signed:  Miguel Ángel Kramer, [address]  [phone]

## 2021-01-03 ENCOUNTER — APPOINTMENT (OUTPATIENT)
Dept: CT IMAGING | Age: 71
End: 2021-01-03
Payer: MEDICARE

## 2021-01-03 ENCOUNTER — HOSPITAL ENCOUNTER (OUTPATIENT)
Age: 71
Setting detail: OBSERVATION
Discharge: HOME HEALTH | End: 2021-01-06
Attending: EMERGENCY MEDICINE | Admitting: INTERNAL MEDICINE
Payer: MEDICARE

## 2021-01-03 ENCOUNTER — APPOINTMENT (OUTPATIENT)
Dept: GENERAL RADIOLOGY | Age: 71
End: 2021-01-03
Payer: MEDICARE

## 2021-01-03 DIAGNOSIS — E87.20 LACTIC ACIDOSIS: ICD-10-CM

## 2021-01-03 DIAGNOSIS — R55 SYNCOPE AND COLLAPSE: Primary | ICD-10-CM

## 2021-01-03 DIAGNOSIS — I95.9 HYPOTENSION, UNSPECIFIED HYPOTENSION TYPE: ICD-10-CM

## 2021-01-03 LAB
A/G RATIO: 0.9 (ref 1.1–2.2)
ACETAMINOPHEN LEVEL: <5 UG/ML (ref 10–30)
ALBUMIN SERPL-MCNC: 3.5 G/DL (ref 3.4–5)
ALP BLD-CCNC: 50 U/L (ref 40–129)
ALT SERPL-CCNC: 10 U/L (ref 10–40)
ANION GAP SERPL CALCULATED.3IONS-SCNC: 18 MMOL/L (ref 3–16)
AST SERPL-CCNC: 19 U/L (ref 15–37)
BASOPHILS ABSOLUTE: 0.1 K/UL (ref 0–0.2)
BASOPHILS RELATIVE PERCENT: 1.2 %
BILIRUB SERPL-MCNC: 0.7 MG/DL (ref 0–1)
BUN BLDV-MCNC: 19 MG/DL (ref 7–20)
CALCIUM SERPL-MCNC: 9.6 MG/DL (ref 8.3–10.6)
CHLORIDE BLD-SCNC: 102 MMOL/L (ref 99–110)
CO2: 19 MMOL/L (ref 21–32)
CREAT SERPL-MCNC: 1.5 MG/DL (ref 0.8–1.3)
EOSINOPHILS ABSOLUTE: 0.2 K/UL (ref 0–0.6)
EOSINOPHILS RELATIVE PERCENT: 2.5 %
ETHANOL: NORMAL MG/DL (ref 0–0.08)
GFR AFRICAN AMERICAN: 56
GFR NON-AFRICAN AMERICAN: 46
GLOBULIN: 3.8 G/DL
GLUCOSE BLD-MCNC: 239 MG/DL (ref 70–99)
HCT VFR BLD CALC: 27.7 % (ref 40.5–52.5)
HEMOGLOBIN: 8.5 G/DL (ref 13.5–17.5)
LACTIC ACID: 1.6 MMOL/L (ref 0.4–2)
LACTIC ACID: 5.6 MMOL/L (ref 0.4–2)
LYMPHOCYTES ABSOLUTE: 1.7 K/UL (ref 1–5.1)
LYMPHOCYTES RELATIVE PERCENT: 24.2 %
MCH RBC QN AUTO: 27.1 PG (ref 26–34)
MCHC RBC AUTO-ENTMCNC: 30.8 G/DL (ref 31–36)
MCV RBC AUTO: 87.9 FL (ref 80–100)
MONOCYTES ABSOLUTE: 0.6 K/UL (ref 0–1.3)
MONOCYTES RELATIVE PERCENT: 8.1 %
NEUTROPHILS ABSOLUTE: 4.6 K/UL (ref 1.7–7.7)
NEUTROPHILS RELATIVE PERCENT: 64 %
PDW BLD-RTO: 18 % (ref 12.4–15.4)
PLATELET # BLD: 262 K/UL (ref 135–450)
PMV BLD AUTO: 9 FL (ref 5–10.5)
POTASSIUM REFLEX MAGNESIUM: 4.1 MMOL/L (ref 3.5–5.1)
RBC # BLD: 3.16 M/UL (ref 4.2–5.9)
SALICYLATE, SERUM: <0.3 MG/DL (ref 15–30)
SARS-COV-2, NAAT: NOT DETECTED
SODIUM BLD-SCNC: 139 MMOL/L (ref 136–145)
SPECIMEN STATUS: NORMAL
TOTAL PROTEIN: 7.3 G/DL (ref 6.4–8.2)
TROPONIN: <0.01 NG/ML
WBC # BLD: 7.2 K/UL (ref 4–11)

## 2021-01-03 PROCEDURE — 2580000003 HC RX 258: Performed by: INTERNAL MEDICINE

## 2021-01-03 PROCEDURE — G0378 HOSPITAL OBSERVATION PER HR: HCPCS

## 2021-01-03 PROCEDURE — G0480 DRUG TEST DEF 1-7 CLASSES: HCPCS

## 2021-01-03 PROCEDURE — 93005 ELECTROCARDIOGRAM TRACING: CPT | Performed by: EMERGENCY MEDICINE

## 2021-01-03 PROCEDURE — 96360 HYDRATION IV INFUSION INIT: CPT

## 2021-01-03 PROCEDURE — 96361 HYDRATE IV INFUSION ADD-ON: CPT

## 2021-01-03 PROCEDURE — 71045 X-RAY EXAM CHEST 1 VIEW: CPT

## 2021-01-03 PROCEDURE — 99285 EMERGENCY DEPT VISIT HI MDM: CPT

## 2021-01-03 PROCEDURE — 2580000003 HC RX 258: Performed by: EMERGENCY MEDICINE

## 2021-01-03 PROCEDURE — 85025 COMPLETE CBC W/AUTO DIFF WBC: CPT

## 2021-01-03 PROCEDURE — 80053 COMPREHEN METABOLIC PANEL: CPT

## 2021-01-03 PROCEDURE — 83605 ASSAY OF LACTIC ACID: CPT

## 2021-01-03 PROCEDURE — 6370000000 HC RX 637 (ALT 250 FOR IP): Performed by: INTERNAL MEDICINE

## 2021-01-03 PROCEDURE — 6360000002 HC RX W HCPCS: Performed by: EMERGENCY MEDICINE

## 2021-01-03 PROCEDURE — 70450 CT HEAD/BRAIN W/O DYE: CPT

## 2021-01-03 PROCEDURE — U0002 COVID-19 LAB TEST NON-CDC: HCPCS

## 2021-01-03 PROCEDURE — 96375 TX/PRO/DX INJ NEW DRUG ADDON: CPT

## 2021-01-03 PROCEDURE — 84484 ASSAY OF TROPONIN QUANT: CPT

## 2021-01-03 RX ORDER — VENLAFAXINE HYDROCHLORIDE 150 MG/1
150 CAPSULE, EXTENDED RELEASE ORAL DAILY
Status: DISCONTINUED | OUTPATIENT
Start: 2021-01-04 | End: 2021-01-06 | Stop reason: HOSPADM

## 2021-01-03 RX ORDER — ACETAMINOPHEN 325 MG/1
650 TABLET ORAL EVERY 6 HOURS PRN
Status: DISCONTINUED | OUTPATIENT
Start: 2021-01-03 | End: 2021-01-06 | Stop reason: HOSPADM

## 2021-01-03 RX ORDER — METOPROLOL SUCCINATE 50 MG/1
50 TABLET, EXTENDED RELEASE ORAL DAILY
Status: DISCONTINUED | OUTPATIENT
Start: 2021-01-04 | End: 2021-01-06 | Stop reason: HOSPADM

## 2021-01-03 RX ORDER — SODIUM CHLORIDE, SODIUM LACTATE, POTASSIUM CHLORIDE, CALCIUM CHLORIDE 600; 310; 30; 20 MG/100ML; MG/100ML; MG/100ML; MG/100ML
2000 INJECTION, SOLUTION INTRAVENOUS ONCE
Status: COMPLETED | OUTPATIENT
Start: 2021-01-03 | End: 2021-01-03

## 2021-01-03 RX ORDER — SODIUM CHLORIDE 0.9 % (FLUSH) 0.9 %
10 SYRINGE (ML) INJECTION PRN
Status: DISCONTINUED | OUTPATIENT
Start: 2021-01-03 | End: 2021-01-06 | Stop reason: HOSPADM

## 2021-01-03 RX ORDER — QUETIAPINE FUMARATE 100 MG/1
100 TABLET, FILM COATED ORAL NIGHTLY
Status: DISCONTINUED | OUTPATIENT
Start: 2021-01-03 | End: 2021-01-04

## 2021-01-03 RX ORDER — LORAZEPAM 2 MG/ML
1 INJECTION INTRAMUSCULAR ONCE
Status: COMPLETED | OUTPATIENT
Start: 2021-01-03 | End: 2021-01-03

## 2021-01-03 RX ORDER — FENOFIBRATE 160 MG/1
160 TABLET ORAL DAILY
Status: DISCONTINUED | OUTPATIENT
Start: 2021-01-04 | End: 2021-01-06 | Stop reason: HOSPADM

## 2021-01-03 RX ORDER — LOSARTAN POTASSIUM 25 MG/1
50 TABLET ORAL DAILY
Status: DISCONTINUED | OUTPATIENT
Start: 2021-01-04 | End: 2021-01-06 | Stop reason: HOSPADM

## 2021-01-03 RX ORDER — VITAMIN B COMPLEX
1000 TABLET ORAL DAILY
Status: DISCONTINUED | OUTPATIENT
Start: 2021-01-04 | End: 2021-01-06 | Stop reason: HOSPADM

## 2021-01-03 RX ORDER — ONDANSETRON 2 MG/ML
4 INJECTION INTRAMUSCULAR; INTRAVENOUS EVERY 6 HOURS PRN
Status: DISCONTINUED | OUTPATIENT
Start: 2021-01-03 | End: 2021-01-06 | Stop reason: HOSPADM

## 2021-01-03 RX ORDER — LACTOBACILLUS RHAMNOSUS GG 10B CELL
1 CAPSULE ORAL DAILY
Status: DISCONTINUED | OUTPATIENT
Start: 2021-01-04 | End: 2021-01-06 | Stop reason: HOSPADM

## 2021-01-03 RX ORDER — AMLODIPINE BESYLATE 5 MG/1
10 TABLET ORAL DAILY
Status: DISCONTINUED | OUTPATIENT
Start: 2021-01-04 | End: 2021-01-06 | Stop reason: HOSPADM

## 2021-01-03 RX ORDER — PROMETHAZINE HYDROCHLORIDE 25 MG/1
12.5 TABLET ORAL EVERY 6 HOURS PRN
Status: DISCONTINUED | OUTPATIENT
Start: 2021-01-03 | End: 2021-01-06 | Stop reason: HOSPADM

## 2021-01-03 RX ORDER — ATORVASTATIN CALCIUM 40 MG/1
40 TABLET, FILM COATED ORAL DAILY
Status: DISCONTINUED | OUTPATIENT
Start: 2021-01-04 | End: 2021-01-06 | Stop reason: HOSPADM

## 2021-01-03 RX ORDER — SODIUM CHLORIDE 0.9 % (FLUSH) 0.9 %
10 SYRINGE (ML) INJECTION EVERY 12 HOURS SCHEDULED
Status: DISCONTINUED | OUTPATIENT
Start: 2021-01-03 | End: 2021-01-06 | Stop reason: HOSPADM

## 2021-01-03 RX ORDER — ASPIRIN 81 MG/1
81 TABLET, CHEWABLE ORAL DAILY
Status: DISCONTINUED | OUTPATIENT
Start: 2021-01-04 | End: 2021-01-06 | Stop reason: HOSPADM

## 2021-01-03 RX ORDER — QUETIAPINE FUMARATE 25 MG/1
75 TABLET, FILM COATED ORAL EVERY MORNING
Status: DISCONTINUED | OUTPATIENT
Start: 2021-01-04 | End: 2021-01-04

## 2021-01-03 RX ORDER — POLYETHYLENE GLYCOL 3350 17 G/17G
17 POWDER, FOR SOLUTION ORAL DAILY PRN
Status: DISCONTINUED | OUTPATIENT
Start: 2021-01-03 | End: 2021-01-06 | Stop reason: HOSPADM

## 2021-01-03 RX ORDER — ACETAMINOPHEN 650 MG/1
650 SUPPOSITORY RECTAL EVERY 6 HOURS PRN
Status: DISCONTINUED | OUTPATIENT
Start: 2021-01-03 | End: 2021-01-06 | Stop reason: HOSPADM

## 2021-01-03 RX ADMIN — SODIUM CHLORIDE, SODIUM LACTATE, POTASSIUM CHLORIDE, AND CALCIUM CHLORIDE 2000 ML: .6; .31; .03; .02 INJECTION, SOLUTION INTRAVENOUS at 21:00

## 2021-01-03 RX ADMIN — LORAZEPAM 1 MG: 2 INJECTION, SOLUTION INTRAMUSCULAR; INTRAVENOUS at 22:12

## 2021-01-03 RX ADMIN — QUETIAPINE FUMARATE 100 MG: 100 TABLET ORAL at 23:45

## 2021-01-03 RX ADMIN — Medication 10 ML: at 23:51

## 2021-01-03 NOTE — ADT AUTH CERT
Syncope - Care Day 4 (12/31/2020) by Cora De La Rosa RN       Review Status Review Entered   Completed 12/31/2020 17:26      Criteria Review      Care Day: 4 Care Date: 12/31/2020 Level of Care: Telemetry    Guideline Day 2    Clinical Status    (X) * Hemodynamic stability    12/31/2020 5:26 PM EST by Lianna Dacosta      98.2 16  90  146/74   97% ra    creat 1.6  hgb 8.4    ( ) * No acute cardiac or neurologic events    (X) * Mental status at baseline    (X) * Dangerous arrhythmia absent    ( ) * No further syncope    ( ) * No evidence of etiology requiring ongoing inpatient care or monitoring (eg, unstable cardiac rhythm or conduction defect)    ( ) * Discharge plans and education understood    Activity    ( ) * Ambulatory or acceptable for next level of care    Routes    ( ) * Oral hydration, medications, and diet    12/31/2020 5:26 PM EST by Lianna Dacosta      norvasc 5mg qd  rocephin 1 gm iv q24hrs, cefepime 2gm iv q12hrs  lovenox 40mg qc qd  toprolxl 25mg qd  klor 20meq bid    low sodium diet    Interventions    (X) Possible cardiac monitoring    * Milestone   Additional Notes   12/31   Per Cardiology:   orthostatic hypotension better today, but supine HTN persists. 1. Orthostatic hypotension- he has severe OH which likely contributed to the fall. We may be forced to accept some slight supine HTN to avoid extreme OH and fall risk. 2. CAD   3. HTN   4. CKD   5. Remote CVA   6. History of GI bleed   7. S/P ANGELITA occlusion       Plan:       1. Increase Toprol to 50 mg/day   2. continue losartan   3. Hold hydralazine and clonidine   4. LE compression garments (being acquired by family- 30-40 mm Hg)   5. Baroreceptor conditioning by siting up with legs dangling      Per Hospitalist:   Ucx reveals E. Coli and patient states he was incontinent last night.            Chronic diastolic CHF, appears compensated   -- Echo as above.    - Continue home Lasix.  BB changed to Toprol.   - Daily weights   - I's and O's     Hx of persistent a. Fib s/p Watchman's device   - EKG on admission: Afib   - Not on ac due to hx of significant GI bleed   - Hold home bb for now in setting of syncope   - Tele monitoring       Acute cystitis - present on admission.   - UCx > 100,000 E. Coli. - rocephin started 12/31.     - cx sensitivities pending.       HTN - patient was profoundly orthostatic.  Will need to accept higher pressures at rest.     -- EP stopped home clonidine and hydralazine.       DVT Prophylaxis: Lovenox   Diet: DIET LOW SODIUM 2 GM;   Code Status: Full Code       PT/OT Eval Status: recommend home PT/OT       Dispo - possibly tomorrow

## 2021-01-03 NOTE — ED NOTES
Bed: 01  Expected date:   Expected time:   Means of arrival:   Comments:  sonia Leroy RN  01/03/21 [id number]

## 2021-01-04 LAB
EKG ATRIAL RATE: 111 BPM
EKG DIAGNOSIS: NORMAL
EKG Q-T INTERVAL: 356 MS
EKG QRS DURATION: 96 MS
EKG QTC CALCULATION (BAZETT): 498 MS
EKG R AXIS: -67 DEGREES
EKG T AXIS: 16 DEGREES
EKG VENTRICULAR RATE: 118 BPM

## 2021-01-04 PROCEDURE — 97166 OT EVAL MOD COMPLEX 45 MIN: CPT

## 2021-01-04 PROCEDURE — 6370000000 HC RX 637 (ALT 250 FOR IP): Performed by: INTERNAL MEDICINE

## 2021-01-04 PROCEDURE — 97116 GAIT TRAINING THERAPY: CPT

## 2021-01-04 PROCEDURE — 6360000002 HC RX W HCPCS: Performed by: INTERNAL MEDICINE

## 2021-01-04 PROCEDURE — G0378 HOSPITAL OBSERVATION PER HR: HCPCS

## 2021-01-04 PROCEDURE — 97535 SELF CARE MNGMENT TRAINING: CPT

## 2021-01-04 PROCEDURE — 96366 THER/PROPH/DIAG IV INF ADDON: CPT

## 2021-01-04 PROCEDURE — 93010 ELECTROCARDIOGRAM REPORT: CPT | Performed by: INTERNAL MEDICINE

## 2021-01-04 PROCEDURE — 96365 THER/PROPH/DIAG IV INF INIT: CPT

## 2021-01-04 PROCEDURE — 97162 PT EVAL MOD COMPLEX 30 MIN: CPT

## 2021-01-04 PROCEDURE — 96372 THER/PROPH/DIAG INJ SC/IM: CPT

## 2021-01-04 PROCEDURE — 2580000003 HC RX 258: Performed by: INTERNAL MEDICINE

## 2021-01-04 PROCEDURE — 97530 THERAPEUTIC ACTIVITIES: CPT

## 2021-01-04 RX ORDER — QUETIAPINE FUMARATE 25 MG/1
50 TABLET, FILM COATED ORAL 2 TIMES DAILY
Status: DISCONTINUED | OUTPATIENT
Start: 2021-01-04 | End: 2021-01-06 | Stop reason: HOSPADM

## 2021-01-04 RX ADMIN — FENOFIBRATE 160 MG: 160 TABLET ORAL at 11:23

## 2021-01-04 RX ADMIN — LOSARTAN POTASSIUM 50 MG: 25 TABLET, FILM COATED ORAL at 11:22

## 2021-01-04 RX ADMIN — CEFEPIME HYDROCHLORIDE 2 G: 2 INJECTION, POWDER, FOR SOLUTION INTRAVENOUS at 14:10

## 2021-01-04 RX ADMIN — ENOXAPARIN SODIUM 40 MG: 40 INJECTION SUBCUTANEOUS at 11:34

## 2021-01-04 RX ADMIN — B-COMPLEX W/ C & FOLIC ACID TAB 1 TABLET: TAB at 11:20

## 2021-01-04 RX ADMIN — Medication 1000 UNITS: at 11:21

## 2021-01-04 RX ADMIN — ATORVASTATIN CALCIUM 40 MG: 40 TABLET, FILM COATED ORAL at 11:21

## 2021-01-04 RX ADMIN — CEFEPIME HYDROCHLORIDE 2 G: 2 INJECTION, POWDER, FOR SOLUTION INTRAVENOUS at 23:40

## 2021-01-04 RX ADMIN — VENLAFAXINE HYDROCHLORIDE 150 MG: 150 CAPSULE, EXTENDED RELEASE ORAL at 11:21

## 2021-01-04 RX ADMIN — METOPROLOL SUCCINATE 50 MG: 50 TABLET, EXTENDED RELEASE ORAL at 11:25

## 2021-01-04 RX ADMIN — Medication 10 ML: at 11:23

## 2021-01-04 RX ADMIN — ACETAMINOPHEN 650 MG: 325 TABLET ORAL at 22:09

## 2021-01-04 RX ADMIN — Medication 1 CAPSULE: at 11:22

## 2021-01-04 RX ADMIN — Medication 10 ML: at 20:45

## 2021-01-04 RX ADMIN — ASPIRIN 81 MG: 81 TABLET, CHEWABLE ORAL at 11:22

## 2021-01-04 RX ADMIN — AMLODIPINE BESYLATE 10 MG: 5 TABLET ORAL at 11:22

## 2021-01-04 RX ADMIN — QUETIAPINE FUMARATE 50 MG: 25 TABLET ORAL at 20:45

## 2021-01-04 RX ADMIN — QUETIAPINE FUMARATE 75 MG: 25 TABLET ORAL at 11:21

## 2021-01-04 NOTE — PROGRESS NOTES
Patient admitted into room 206. Patient oriented to room, call light, and current plan of care. Fall precautions are in place and belongings are within reach.

## 2021-01-04 NOTE — H&P
Hospital Medicine History & Physical      PCP: Shane Espinal MD    Date of Admission: 1/3/2021    Date of Service: Pt seen/examined on 1/3/2021 and Placed in Observation. Chief Complaint: Passing out spell      History Of Present Illness:   65 y.o. male who presented to [facility] with above complaints  Patient presented to the ED via EMS for syncopal episode at home. Patient reports he was in the bathroom in the shower with a family member cleansing him when he apparently passed out. Family reports patient had a slow heart rate but when EMS arrived on the scene they reported normal vital signs. Patient was apparently altered and combative en route. Patient was just discharged on 1/2/21 for similar syncopal episode, likely due to orthostatic hypotension. He has past medical history of A. fib, CHF, HTN, HLD. Multiple recurrent syncopal episodes. Previous admission it was thought to be due to orthostatic hypotension. He has had cardiac monitor telemetry which showed no arrhythmias in the past.  Last admission EP stopped clonidine and hydralazine to let his baseline blood pressures be on the higher side due to severe orthostatic hypotension. At this time patient is disoriented, unable to provide any history.       Past Medical History:          Diagnosis Date    Acute gastric ulcer with bleeding     Acute metabolic encephalopathy     MAISHA (acute kidney injury) ([address]     Requiring dialysis since 7/4/17 in setting of hemorrhagic shock    ATN (acute tubular necrosis) (HCC)     Atrial fib/flutter, transient     Atrial fibrillation (HCC)     BPH (benign prostatic hyperplasia)     Cerebral artery occlusion with cerebral infarction ([address] 08/05/2017    weakness and aphasia    Diabetes mellitus ([address]     GI bleed     Glaucoma     History of blood transfusion     Hx of diabetic neuropathy     Hyperlipidemia     Hypertension     Mitral insufficiency     Mobility impaired uses walker and W/C    Multiple drug resistant organism (MDRO) culture positive 12/29/2020    urine    Pulmonary hypertension (HCC)     Traumatic hemorrhagic shock (HCC)        Past Surgical History:          Procedure Laterality Date    DENTAL SURGERY N/A 3/28/2019    EXTRACTION OF ALL REMAINING UPPER AND LOWER TEETH AND ALVEOLOPLASTY   performed by Grady Rodriguez DMD at [facility] N/A 6/22/2019    LAPAROSCOPIC, CONVERTED TO OPEN INCARCERATED UMBILICAL HERNIA REPAIR performed by Crystal Blackwood MD at [address]  10/11/13     CYSTOSCOPY, TRANSURETHRAL RESECTION OF PROSTATE WITH OLYMPUS    PROSTATE BIOPSY      UPPER GASTROINTESTINAL ENDOSCOPY  7/13 17       Medications Prior to Admission:      Prior to Admission medications    Medication Sig Start Date End Date Taking?  Authorizing Provider   losartan (COZAAR) 50 MG tablet Take 1 tablet by mouth daily 1/3/21   JAMEY Veliz CNP   metoprolol succinate (TOPROL XL) 50 MG extended release tablet Take 1 tablet by mouth daily 1/3/21   JAMEY Veliz CNP   amLODIPine (NORVASC) 10 MG tablet Take 1 tablet by mouth daily 1/3/21   JAMEY Veliz CNP   B Complex Vitamins (VITAMIN B COMPLEX 100 IJ) Take 1 tablet by mouth daily    Historical Provider, MD   melatonin 5 MG TABS tablet Take 10 mg by mouth    Historical Provider, MD   QUEtiapine (SEROQUEL) 100 MG tablet Take 100 mg by mouth nightly 7/16/20   Historical Provider, MD   venlafaxine (EFFEXOR XR) 150 MG extended release capsule Take 150 mg by mouth daily 4/16/20   Historical Provider, MD   mesalamine (APRISO) 0.375 g extended release capsule  3/15/20   Historical Provider, MD   cyanocobalamin 1000 MCG tablet Take by mouth    Historical Provider, MD   blood glucose test strips (ASCENSIA AUTODISC VI;ONE TOUCH ULTRA TEST VI) strip Check glucose daily 9/9/19   Historical Provider, MD ONE TOUCH LANCETS MISC Check glucose daily 9/9/19   Historical Provider, MD   ferrous sulfate (IRON 325) 325 (65 Fe) MG tablet Take 325 mg by mouth 3/15/20   Historical Provider, MD   Elastic Bandages & Supports (JOBST KNEE HIGH COMPRESSION SM) MISC 1 each by Does not apply route once for 1 dose Thigh high or knee high 10/3/19 10/3/19  Joleen Comment, APRN - CNP   potassium chloride (KLOR-CON M) 20 MEQ extended release tablet Take 20 mEq by mouth 2 times daily    Historical Provider, MD   levOCARNitine fumarate (CARNITOR) 250 MG capsule Take 2 capsules by mouth 3 times daily 4/29/19 Joshua Night, MD   Lactobacillus (PROBIOTIC ACIDOPHILUS) TABS Take 1 tablet by mouth daily 4/29/19 Joshua Night, MD   aspirin 81 MG tablet Take 81 mg by mouth daily    Historical Provider, MD   b complex vitamins capsule Take 1 capsule by mouth daily    Historical Provider, MD   atorvastatin (LIPITOR) 40 MG tablet Take 40 mg by mouth daily    Historical Provider, MD   vitamin D (CHOLECALCIFEROL) 1000 UNIT TABS tablet Take 1,000 Units by mouth daily    Historical Provider, MD   pantoprazole (PROTONIX) 40 MG tablet Take 40 mg by mouth daily    Historical Provider, MD   fenofibrate (TRICOR) 145 MG tablet Take 145 mg by mouth daily    Historical Provider, MD       Allergies:  Sulfamethoxazole-trimethoprim    Social History:      The patient currently lives at home    TOBACCO:   reports that he quit smoking about 24 years ago. His smoking use included cigarettes. He has a 10.00 pack-year smoking history. He has never used smokeless tobacco.  ETOH:   reports previous alcohol use.   E-Cigarettes/Vaping Use     Questions Responses    E-Cigarette/Vaping Use Never User    Start Date     Passive Exposure     Quit Date     Counseling Given     Comments             Family History:    Positive as follows:        Problem Relation Age of Onset   [facility] Early Death Mother         age 54    Other Mother         head aneursym  Heart Attack Father     Diabetes Brother     Heart Disease Brother        REVIEW OF SYSTEMS:   Pertinent positives as noted in the HPI. All other systems reviewed and negative. PHYSICAL EXAM PERFORMED:    BP (!) 184/89   Pulse 104   Temp 98.9 °F (37.2 °C) (Oral)   Resp 20   Ht 6' 5\" (1.956 m)   Wt 261 lb 0.4 oz (118.4 kg)   SpO2 100%   BMI 30.95 kg/m²     General appearance:  No apparent distress, appears stated age and cooperative. HEENT:  Normal cephalic, atraumatic without obvious deformity. Pupils equal, round, and reactive to light. Extra ocular muscles intact. Conjunctivae/corneas clear. Neck: Supple, with full range of motion. No jugular venous distention. Trachea midline. Respiratory:  Normal respiratory effort. Clear to auscultation, bilaterally without Rales/Wheezes/Rhonchi. Cardiovascular:  Regular rate and rhythm with normal S1/S2 without murmurs, rubs or gallops. Abdomen: Soft, non-tender, non-distended with normal bowel sounds. Musculoskeletal:  No clubbing, cyanosis or edema bilaterally. Full range of motion without deformity. Skin: Skin color, texture, turgor normal.  No rashes or lesions. Neurologic:, Alert and oriented x2 , neurovascularly intact without any focal sensory/motor deficits.  Cranial nerves: II-XII intact, grossly non-focal.  Psychiatric:  Alert and oriented, thought content appropriate, normal insight  Capillary Refill: Brisk,< 3 seconds   Peripheral Pulses: +2 palpable, equal bilaterally       Labs:     Recent Labs     01/01/21  1021 01/02/21  1032 01/03/21  1747   WBC 5.1 5.0 7.2   HGB 7.4* 7.4* 8.5*   HCT 22.7* 23.1* 27.7*    189 262     Recent Labs     01/01/21  1021 01/02/21  1032 01/03/21  1747    142 139   K 3.7 4.1 4.1    106 102   CO2 24 26 19*   BUN 21* 20 19   CREATININE 1.3 1.4* 1.5*   CALCIUM 8.9 9.0 9.6     Recent Labs     01/03/21  1747   AST 19   ALT 10   BILITOT 0.7   ALKPHOS 50 No results for input(s): INR in the last 72 hours. Recent Labs     21  1747   TROPONINI <0.01       Urinalysis:      Lab Results   Component Value Date    NITRU Negative 2020    WBCUA >100 2020    BACTERIA 4+ 2020    RBCUA 3-4 2020    BLOODU Negative 2020    SPECGRAV 1.025 2020    GLUCOSEU Negative 2020       Radiology:     CXR: I have reviewed the CXR with the following interpretation: No acute abnormality    I reviewed the CT head, no acute abnormality      EKG:  I have reviewed the EKG with the following interpretation: A. fib, rate 118 no acute ischemic changes    XR CHEST PORTABLE   Final Result   No acute abnormality identified. CT head without contrast   Final Result   No acute intracranial abnormality. ASSESSMENT:PLAN:    Syncope and collapse. Recurrent  By description could be vasovagal.  Family report patient was bradycardic. Occurred in the shower. However patient does have history of recurrent syncope, last admission was believed to be severe orthostatic hypotension. Initial EKG and troponin negative for acute ACS  -Patient is on Seroquel which is associated with up to 18% incidence of orthostatic hypotension in elderly due to alpha blockade. Given his prolonged history of recurrent syncopal episodes likely due to orthostatic hypotension, this medication needs to be discontinued and weaned off which is best done in an outpatient setting. Please arrange for patient's PCP Dr. Hannah Meredith to do with need ful to get the patient off Seroquel.  -Hypotension and tachycardia improving with IV fluids  -Since blood pressures improved we will resume antihypertensive regimen  -No arrhythmias evident on telemetry      Severe orthostatic hypotension  I believe this is likely due to Seroquel. May have autonomic dysfunction as well.   Pt still orthostatic symptom wise and vitals wise    Blood Pressure Lyin/95 Blood Pressure Sittin/72     Blood Pressure Standing very lightheaded, unable to stand     -continue IV fluids  -Allowing higher baseline BP and supine HTN (discontinued 2 BP meds on last admission)  -Patient  Has compression stockings at home that he uses  -counseled about various non pharmalogic measures to allow orthostatic tolerance  -Patient needs a plan to be weaned off of Seroquel due to its severe side effect of orthostatic hypotension which is well-known due to alpha blockade      HTN - patient is profoundly orthostatic. Will need to accept higher pressures at rest.    - EP stopped home clonidine and hydralazine  On last admission  -- continue Toprol, losartan, and amlodipine. Elevated lactic level, resolved  5.6-->1.6 after IV fluids. Likely due to hypotension and dehydration. Chronic diastolic CHF, appears compensated  -Hold Lasix    Hx of persistent a. Fib s/p Watchman's device  - EKG on admission: Afib  - Not on ac due to hx of significant GI bleed    Impulse control disorder  -Patient is on Seroquel and Effexor. Needs to be weaned off of Seroquel due to the reasons mentioned above. DVT Prophylaxis: Lovenox  Diet: DIET GENERAL;  Code Status: Full Code    PT/OT Eval Status: Held    Dispo -observation       Pako Harrell MD    Thank you Nancy Schultz MD for the opportunity to be involved in this patient's care. If you have any questions or concerns please feel free to contact me at [phone].

## 2021-01-04 NOTE — DISCHARGE INSTR - COC
Continuity of Care Form    Patient Name: Ela Albarran   :  1950  MRN:  [id number]    Admit date:  1/3/2021  Discharge date: 2021  Code Status Order: Full Code   Advance Directives:   Advance Care Flowsheet Documentation       Date/Time Healthcare Directive Type of Healthcare Directive Copy in [address] Agent's Name Healthcare Agent's Phone Number    [phone]  Unknown, patient unable to respond due to medical condition -- -- -- -- --            Admitting Physician:  Rahul Farmer MD  PCP: Elio Atkinson MD    Discharging Nurse:  [address] Unit/Room#: 0206/0206-02  Discharging Unit Phone Number:    Emergency Contact:   Extended Emergency Contact Information  Primary Emergency Contact: Meghann LinkClevelandsyl [address] Phone: [phone]  Relation: Child  Secondary Emergency Contact: Claire Church Phone: [phone]  Mobile Phone: [phone]  Relation: Child    Past Surgical History:  Past Surgical History:   Procedure Laterality Date    DENTAL SURGERY N/A 3/28/2019    EXTRACTION OF ALL REMAINING UPPER AND LOWER TEETH AND ALVEOLOPLASTY   performed by Grady Rodriguez DMD at [address] [place] [address] N/A 2019    LAPAROSCOPIC, CONVERTED TO OPEN INCARCERATED UMBILICAL HERNIA REPAIR performed by Crystal Blackwood MD at [place] [address].  10/11/13     CYSTOSCOPY, TRANSURETHRAL RESECTION OF PROSTATE WITH OLYMPUS    PROSTATE BIOPSY      UPPER GASTROINTESTINAL ENDOSCOPY   17       Immunization History:   Immunization History   Administered Date(s) Administered    Influenza Virus Vaccine 2014    Pneumococcal Polysaccharide ([id number]) 2015    Td Vaccine >6yo [facility] 2008       Active Problems:  Patient Active Problem List   Diagnosis Code    Hx BPH w/urinary retention s/p prostatectomy R33.9    Cigar smoker Z72.0    Obesity (BMI 30-39. 9) E66.9  Diabetic ketoacidosis without coma associated with type 1 diabetes mellitus ([facility] E10.10    Lactic acidosis E87.2    Hyperkalemia E87.5    MAISHA (acute kidney injury) ([facility]) N17.9    SIRS (systemic inflammatory response syndrome) ([facility]) R65.10    Volume depletion E86.9    Hypotension I95.9    Orthostatic dizziness R42    Hemorrhagic shock T81.19XA    Upper GI bleed K92.2    A-fib ([facility]) I48.91    Acute blood loss anemia X01    Metabolic acidosis with respiratory acidosis E87.4    Hyperglycemia R73.9    Elevated lactic acid level R79.89    NSVT (nonsustained ventricular tachycardia) ([facility]) I47.2    DM type 2, controlled, with complication ([facility]) N33.0    Hyponatremia E87.1    Anemia F43.8    Metabolic encephalopathy N06.04    Acute cystitis with hematuria N30.01    Altered mental status R41.82    Stage 3 chronic kidney disease ([facility]) N18.30    Encephalopathy, metabolic O39.74    HTN (hypertension), benign I10    CKD (chronic kidney disease) N18.9    Dyslipidemia E78.5    Acute metabolic encephalopathy A98.48    Impulse control disorder in adult F63.9    Dementia due to Alzheimer's disease ([facility] G30.9, F02.80    Small bowel obstruction ([facility]) K56.609    Bilateral pleural effusion J90    Atelectasis of right lung J98.11    Former smoker Z87.891    Syncope and collapse R55    Fluid overload E87.70    Mild malnutrition ([facility]) E44.1    Acute encephalopathy G93.40    Remote history of stroke Z86.73    Dysthymia F34.1    Hypertensive urgency I16.0    Fall W19. Stormy Liter       Isolation/Infection:   Isolation            Contact          Patient Infection Status       Infection Onset Added Last Indicated Last Indicated By Review Planned Expiration Resolved Resolved By    MDRO (multi-drug resistant organism) 12/29/20 12/31/20 12/29/20 Culture, Urine        Resolved    COVID-19 Rule Out 01/03/21 01/03/21 01/03/21 COVID-19 (Ordered)   01/03/21 Rule-Out Test Resulted C-diff Rule Out  12/05/19 12/05/19 Clostridium difficile toxin/antigen (Ordered)   12/05/19 Rule-Out Test Resulted            Nurse Assessment:  Last Vital Signs: BP (!) 153/66   Pulse 87   Temp 97.4 °F (36.3 °C) (Axillary)   Resp 20   Ht 6' 5\" (1.956 m)   Wt 261 lb 0.4 oz (118.4 kg)   SpO2 93%   BMI 30.95 kg/m²     Last documented pain score (0-10 scale): Pain Level: 6  Last Weight:   Wt Readings from Last 1 Encounters:   01/03/21 261 lb 0.4 oz (118.4 kg)     Mental Status:  oriented, alert, coherent, logical, thought processes intact and able to concentrate and follow conversation    IV Access:  - None    Nursing Mobility/ADLs:  Walking   Assisted  Transfer  Assisted  Bathing  Assisted  Dressing  Assisted  Toileting  Assisted  Feeding  Assisted  Med Admin  Independent  Med Delivery   whole    Wound Care Documentation and Therapy:  Wound 07/22/19 Sacrum Right Pressure injury vs moisture associated dermatitis (Active)   Number of days: 531       Wound 07/22/19 Pretibial Right;Lateral (Active)   Number of days: 531       Wound 07/26/19 Arm Left; Lower;Proximal;Posterior bleeding noted, 1.5 inches long, 0.5 inches wide  (Active)   Number of days: 527       Wound 09/18/19 Pretibial Left (Active)   Number of days: 473       Wound 09/20/19 Ankle Right;Lateral (Active)   Number of days: 471       Wound 12/31/20 Foot Left;Medial (Active)   Wound Image   12/31/20 1533   Wound Etiology Diabetic 12/31/20 1533   Dressing Status Dry; Intact; New dressing applied 12/31/20 1533   Wound Cleansed Cleansed with saline 12/31/20 1533   Dressing/Treatment Open to air 01/04/21 0612   Dressing Change Due 01/02/21 12/31/20 1533   Wound Length (cm) 1 cm 12/31/20 1533   Wound Width (cm) 3 cm 12/31/20 1533   Wound Depth (cm) 0.1 cm 12/31/20 1533   Wound Surface Area (cm^2) 3 cm^2 12/31/20 1533   Wound Volume (cm^3) 0.3 cm^3 12/31/20 1533   Wound Assessment Pink/red 01/04/21 0612   Drainage Amount None 01/04/21 0612 ?  evaluation within 24-48 hours to evaluate resources [email or web address] for potential AL, [place], LTC, and Medicaid options   ? Palliative Care referral within 5 days of hospital discharge   PCP Visit scheduled within 3 - 7 days of hospital discharge    [address] (If patient is agreeable and meets guidelines)      Patient's personal belongings (please select all that are sent with patient):  None    RN SIGNATURE:  Electronically signed by Krystal Avila RN on 1/6/21 at 12:18 PM EST    CASE MANAGEMENT/SOCIAL WORK SECTION    Inpatient Status Date:     Readmission Risk Assessment Score:  Readmission Risk              Risk of Unplanned Readmission:        0           ? Discharging to Facility/ Agency   ? Name:  [facility]    ? Address: [address] [place] [address], [address] [place] [address]  ? Phone: [phone]  ? Fax: [phone]      Dialysis Facility (if applicable)   · Name:  · Address:  · Dialysis Schedule:  · Phone:  · Fax:    / signature: {Esignature:[id number]}    PHYSICIAN SECTION    Prognosis: Good    Condition at Discharge: Stable    Rehab Potential (if transferring to Rehab): Good    Recommended Labs or Other Treatments After Discharge:   Home Care  PT/OT  Monitor Blood Pressure  Follow-up with Primary Care Provider    Physician Certification: I certify the above information and transfer of Rosanna Gusman  is necessary for the continuing treatment of the diagnosis listed and that he requires [address] for greater 30 days.      Update Admission H&P: No change in H&P    PHYSICIAN SIGNATURE:  Electronically signed by Josi Aguila MD on 1/6/21 at 11:16 AM EST

## 2021-01-04 NOTE — PROGRESS NOTES
Attempted to do morning vitals,assessment and meds. Pt Verbally aggressive and refused at this time. Will cont to monitor and attempt later.

## 2021-01-04 NOTE — PROGRESS NOTES
Multiple attempts made to obtain an accurate set of orthostatic blood pressures. While the patient is able to stand he was unable to stand long enough to obtain a blood pressure and is refusing to continue to try.

## 2021-01-04 NOTE — DISCHARGE SUMMARY
Hospital Medicine Discharge Summary    Patient ID: Darron Steward      Patient's PCP: Man Clemons MD    Admit Date: 12/28/2020     Discharge Date: 1/2/2021      Admitting Physician: Timur Contreras MD     Discharge Physician: JAMEY Hays - CNP     Discharge Diagnoses: Active Hospital Problems    Diagnosis    Fall [W19. XXXA]    Remote history of stroke [Z86.73]    Syncope and collapse [R55]    Orthostatic dizziness [R42]    A-fib ([address] [I48.91]       The patient was seen and examined on day of discharge and this discharge summary is in conjunction with any daily progress note from day of discharge. Hospital Course:     65 y.o. male, with PMH of HTN, A. fib, CHF, and HLD, who presented to [facility] with syncopal episode. History was obtained from the patient and review of the EMR. The patient states that he was in his living room working out today when he suddenly lost consciousness and fell backwards, hitting his head on the fireplace of his home. Upon further review of the EMR, the patient seems to have recurrent episodes of syncope over the past few years. Upon arrival to the ED, the patient had CT head which was unremarkable and he was given IV fluids. While in the ED, the patient had another syncopal episode when he turned onto his side and was found to have blood pressure 70s over 50s. He was noted to still be bleeding in the back of his head and this was addressed by ED provider. He has staples to the back of his head. Syncope and collapse, likely due to orthostatic hypotension. Has had several episodes of syncope and collapse in the past. Hx of CHF and afib. Had repeat syncopal episode in the ED after rolling onto his side during which he was noted to have BP 70s/50s.  CT head with no acute intracranial abnormality   EKG revealed afib   Repeat echo - EF 55-60%. Elevated left ventricular diastolic filling pressures.  EP consulted and following. -- Continue Toprol and losartan. -- recommend compression stockings daily.     Head trauma 2/2 fall  - CT head with occipital scalp hematoma, no skull fracture  - Wound care as needed     Chronic diastolic CHF, appears compensated  -- Echo as above.  Continue home Lasix. BB changed to Toprol.  Daily weights   I's and O's     Hx of persistent a. Fib s/p Watchman's device  - EKG on admission: Afib  - Not on ac due to hx of significant GI bleed     Acute cystitis - present on admission.  - UCx > 100,000 MDR E. Coli. - ID consulted - NO need for outpatient abx.     HTN - patient was profoundly orthostatic. Will need to accept higher pressures at rest.    -- EP stopped home clonidine and hydralazine. -- continue Toprol, losartan, and amlodipine.       Physical Exam Performed:     BP (!) 161/70   Pulse 101   Temp 98.4 °F (36.9 °C) (Oral)   Resp 18   Ht 6' 5\" (1.956 m)   Wt 267 lb 3.2 oz (121.2 kg)   SpO2 97%   BMI 31.69 kg/m²       General appearance:  No apparent distress, appears stated age and cooperative. HEENT:  Normal cephalic, atraumatic without obvious deformity. Pupils equal, round, and reactive to light. Extra ocular muscles intact. Conjunctivae/corneas clear. Neck: Supple, with full range of motion. No jugular venous distention. Trachea midline. Respiratory:  Normal respiratory effort. Clear to auscultation, bilaterally without Rales/Wheezes/Rhonchi. Cardiovascular:  Irregularly irregular with normal S1/S2 without murmurs, rubs or gallops. Abdomen: Soft, non-tender, non-distended with normal bowel sounds. Musculoskeletal:  No clubbing, cyanosis or edema bilaterally. Full range of motion without deformity. Skin: Skin color, texture, turgor normal.  No rashes or lesions. Neurologic:  Neurovascularly intact without any focal sensory/motor deficits.  Cranial nerves: II-XII intact, grossly non-focal.  Psychiatric:  Alert and oriented, thought content appropriate, normal insight Capillary Refill: Brisk,< 3 seconds   Peripheral Pulses: +2 palpable, equal bilaterally       Labs: For convenience and continuity at follow-up the following most recent labs are provided:      CBC:    Lab Results   Component Value Date    WBC 7.2 01/03/2021    HGB 8.5 01/03/2021    HCT 27.7 01/03/2021     01/03/2021       Renal:    Lab Results   Component Value Date     01/03/2021    K 4.1 01/03/2021     01/03/2021    CO2 19 01/03/2021    BUN 19 01/03/2021    CREATININE 1.5 01/03/2021    CALCIUM 9.6 01/03/2021    PHOS 3.2 07/30/2019         Significant Diagnostic Studies    Radiology:   CT HEAD WO CONTRAST   Final Result   1. No acute intracranial abnormality. 2. No significant change in appearance of the brain in comparison with the   study earlier today. 3. Stable chronic left temporoparietal encephalomalacia. 4. Stable age-appropriate atrophy and chronic small vessel ischemic white   matter disease. 5. Interval decrease in size of an occipital scalp hematoma, without acute   skull fracture. CT HEAD WO CONTRAST   Final Result   1. No acute intracranial abnormality. CT CERVICAL SPINE WO CONTRAST   Final Result   1. No acute fracture. 2. Incompletely imaged small right pleural effusion. XR SHOULDER LEFT (MIN 2 VIEWS)   Final Result   No acute osseous injury of the left shoulder. XR CHEST PORTABLE   Final Result   Minimal asymmetric airspace disease laterally within the left lower lung. Pneumonia is not excluded in the correct clinical setting. Consults:     IP CONSULT TO HOSPITALIST  IP CONSULT TO CARDIOLOGY  IP CONSULT TO INFECTIOUS DISEASES  IP CONSULT TO HOME CARE NEEDS    Disposition:  Home with home health. Daughter insistent on patient being discharged home today, despite my recommendation to keep him one more day to follow BPs.     Condition at Discharge: Stable Discharge Instructions/Follow-up:  F/u with PCP in 1-2 weeks. F/u with cardiology in 2 weeks.     Code Status:  Full    Activity: activity as tolerated    Diet: general      Discharge Medications:     Discharge Medication List as of 1/2/2021 11:52 AM           Details   amLODIPine (NORVASC) 10 MG tablet Take 1 tablet by mouth daily, Disp-30 tablet, R-3Normal              Details   losartan (COZAAR) 50 MG tablet Take 1 tablet by mouth daily, Disp-30 tablet, R-3Normal      metoprolol succinate (TOPROL XL) 50 MG extended release tablet Take 1 tablet by mouth daily, Disp-30 tablet, R-3Normal              Details   B Complex Vitamins (VITAMIN B COMPLEX 100 IJ) Take 1 tablet by mouth dailyHistorical Med      melatonin 5 MG TABS tablet Take 10 mg by mouthHistorical Med      !! QUEtiapine (SEROQUEL) 25 MG tablet Take 75 mg by mouth every morningHistorical Med      !! QUEtiapine (SEROQUEL) 100 MG tablet Take 100 mg by mouth nightlyHistorical Med      venlafaxine (EFFEXOR XR) 150 MG extended release capsule Take 150 mg by mouth dailyHistorical Med      mesalamine (APRISO) 0.375 g extended release capsule Historical Med      cyanocobalamin 1000 MCG tablet Take by mouthHistorical Med      blood glucose test strips (ASCENSIA AUTODISC VI;ONE TOUCH ULTRA TEST VI) strip Historical Med      ONE TOUCH LANCETS MISC Historical Med      ferrous sulfate (IRON 325) 325 (65 Fe) MG tablet Take 325 mg by mouthHistorical Med      Elastic Bandages & Supports (JOBST KNEE HIGH COMPRESSION SM) MISC ONCE Starting Thu 10/3/2019, For 1 dose, Disp-1 each, R-3, PrintThigh high or knee high      potassium chloride (KLOR-CON M) 20 MEQ extended release tablet Take 20 mEq by mouth 2 times dailyHistorical Med      levOCARNitine fumarate (CARNITOR) 250 MG capsule Take 2 capsules by mouth 3 times daily, Disp-180 capsule, R-1Print      Lactobacillus (PROBIOTIC ACIDOPHILUS) TABS Take 1 tablet by mouth daily, Disp-30 tablet, R-0Normal

## 2021-01-04 NOTE — CARE COORDINATION
[facility]    Referral received from  to follow for home care services. I will follow for needs, and speak with patient to verify demos.     [address]  [facility] [phone]

## 2021-01-04 NOTE — ED PROVIDER NOTES
Emergency Physician Note    Chief Complaint  Bradycardia and Loss of Consciousness       History of Present Illness  Russel Givens is a 65 y.o. male who presents to the ED for syncope. EMS reports they were called for syncope and that the patient had syncopal episode while in the shower with a family member cleansing him apparently. Patient was taken out of the shower and was reportedly bradycardic. EMS reports normal vital signs. Patient however was altered and combative in route according to EMS. History and review of systems limited by patient's mental status    I have reviewed the following from the nursing documentation:      Prior to Admission medications    Medication Sig Start Date End Date Taking?  Authorizing Provider   losartan (COZAAR) 50 MG tablet Take 1 tablet by mouth daily 1/3/21   JAMEY Orona CNP   metoprolol succinate (TOPROL XL) 50 MG extended release tablet Take 1 tablet by mouth daily 1/3/21   JAMEY Orona CNP   amLODIPine (NORVASC) 10 MG tablet Take 1 tablet by mouth daily 1/3/21   JAMEY Orona CNP   B Complex Vitamins (VITAMIN B COMPLEX 100 IJ) Take 1 tablet by mouth daily    Historical Provider, MD   melatonin 5 MG TABS tablet Take 10 mg by mouth    Historical Provider, MD   QUEtiapine (SEROQUEL) 25 MG tablet Take 75 mg by mouth every morning 10/5/20 1/3/21  Historical Provider, MD   QUEtiapine (SEROQUEL) 100 MG tablet Take 100 mg by mouth nightly 7/16/20   Historical Provider, MD   venlafaxine (EFFEXOR XR) 150 MG extended release capsule Take 150 mg by mouth daily 4/16/20   Historical Provider, MD   mesalamine (APRISO) 0.375 g extended release capsule  3/15/20   Historical Provider, MD   cyanocobalamin 1000 MCG tablet Take by mouth    Historical Provider, MD   blood glucose test strips (ASCENSIA AUTODISC VI;ONE TOUCH ULTRA TEST VI) strip Check glucose daily 9/9/19   Historical Provider, MD ONE TOUCH LANCETS MISC Check glucose daily 9/9/19   Historical Provider, MD   ferrous sulfate (IRON 325) 325 (65 Fe) MG tablet Take 325 mg by mouth 3/15/20   Historical Provider, MD   Elastic Bandages & Supports (JOBST KNEE HIGH COMPRESSION SM) MISC 1 each by Does not apply route once for 1 dose Thigh high or knee high 10/3/19 10/3/19  Parish Saravia APRN - CNP   potassium chloride (KLOR-CON M) 20 MEQ extended release tablet Take 20 mEq by mouth 2 times daily    Historical Provider, MD   levOCARNitine fumarate (CARNITOR) 250 MG capsule Take 2 capsules by mouth 3 times daily 4/29/19   Tresa Park MD   Lactobacillus (PROBIOTIC ACIDOPHILUS) TABS Take 1 tablet by mouth daily 4/29/19   Tresa Park MD   aspirin 81 MG tablet Take 81 mg by mouth daily    Historical Provider, MD   b complex vitamins capsule Take 1 capsule by mouth daily    Historical Provider, MD   atorvastatin (LIPITOR) 40 MG tablet Take 40 mg by mouth daily    Historical Provider, MD   vitamin D (CHOLECALCIFEROL) 1000 UNIT TABS tablet Take 1,000 Units by mouth daily    Historical Provider, MD   pantoprazole (PROTONIX) 40 MG tablet Take 40 mg by mouth daily    Historical Provider, MD   fenofibrate (TRICOR) 145 MG tablet Take 145 mg by mouth daily    Historical Provider, MD       Allergies as of 01/03/2021 - Review Complete 01/03/2021   Allergen Reaction Noted    Sulfamethoxazole-trimethoprim Other (See Comments) 12/14/2017       Past Medical History:   Diagnosis Date    Acute gastric ulcer with bleeding     Acute metabolic encephalopathy     MAISHA (acute kidney injury) ([address]     Requiring dialysis since 7/4/17 in setting of hemorrhagic shock    ATN (acute tubular necrosis) (HCC)     Atrial fib/flutter, transient     Atrial fibrillation (HCC)     BPH (benign prostatic hyperplasia)     Cerebral artery occlusion with cerebral infarction ([address] 08/05/2017    weakness and aphasia    Diabetes mellitus ([address]     GI bleed     Glaucoma  Drug use: No    Sexual activity: Not on file   Lifestyle    Physical activity     Days per week: Not on file     Minutes per session: Not on file    Stress: Not on file   Relationships    Social connections     Talks on phone: Not on file     Gets together: Not on file     Attends Shinto service: Not on file     Active member of club or organization: Not on file     Attends meetings of clubs or organizations: Not on file     Relationship status: Not on file    Intimate partner violence     Fear of current or ex partner: Not on file     Emotionally abused: Not on file     Physically abused: Not on file     Forced sexual activity: Not on file   Other Topics Concern    Not on file   Social History Narrative    Not on file       Nursing notes reviewed. ED Triage Vitals   Enc Vitals Group      BP 01/03/21 1737 94/77      Pulse 01/03/21 1737 127      Resp 01/03/21 1737 20      Temp 01/03/21 1745 98.5 °F (36.9 °C)      Temp src --       SpO2 01/03/21 1737 90 %      Weight --       Height --       Head Circumference --       Peak Flow --       Pain Score --       Pain Loc --       Pain Edu? --       Excl. in GC? --        GENERAL:  Awake, alert. Well developed, well nourished with shaking and shivering, agitated. HENT:  Normocephalic, Atraumatic, moist mucous membranes. EYES:  Pupils equal round and reactive to light, Conjunctiva normal, extraocular movements normal.  NECK:  No meningeal signs, Supple. CHEST: Tachycardic and irregularly irregular, chest wall non-tender. LUNGS:  Clear to auscultation bilaterally. ABDOMEN:  Soft, non-tender, no rebound, rigidity or guarding, non-distended, normal bowel sounds. No costovertebral angle tenderness to palpation. BACK:  No tenderness. EXTREMITIES:  Normal range of motion, no edema, no bony tenderness, no deformity, distal pulses present. SKIN: Cool, pale, dry and intact. NEUROLOGIC: Oriented to person and place but not time. Moving all extremities spontaneously and purposefully. LABS and DIAGNOSTIC RESULTS  EKG  The Ekg interpreted by me shows  atrial fibrillation with a rate of 118  Axis is   Left axis deviation  QTc is  normal  ST Segments: no acute change  Delta waves, Brugada Syndrome, and Short MS are not present. No significant change from prior EKG dated 28 dec 2020    RADIOLOGY  X-RAYS:  I have reviewed radiologic plain film image(s). ALL OTHER NON-PLAIN FILM IMAGES SUCH AS CT, ULTRASOUND AND MRI HAVE BEEN READ BY THE RADIOLOGIST. XR CHEST PORTABLE   Final Result   No acute abnormality identified. CT head without contrast   Final Result   No acute intracranial abnormality.               LABS  Labs Reviewed   CBC WITH AUTO DIFFERENTIAL - Abnormal; Notable for the following components:       Result Value    RBC 3.16 (*)     Hemoglobin 8.5 (*)     Hematocrit 27.7 (*)     MCHC 30.8 (*)     RDW 18.0 (*)     All other components within normal limits    Narrative:     Performed at:  [address], [facility] [employer]   Phone [phone]   COMPREHENSIVE METABOLIC PANEL W/ REFLEX TO MG FOR LOW K - Abnormal; Notable for the following components:    CO2 19 (*)     Anion Gap 18 (*)     Glucose 239 (*)     CREATININE 1.5 (*)     GFR Non- 46 (*)     GFR African American 56 (*)     Albumin/Globulin Ratio 0.9 (*)     All other components within normal limits    Narrative:     Performed at:  [address], [facility] [employer]   Phone [phone]   LACTIC ACID, PLASMA - Abnormal; Notable for the following components:    Lactic Acid 5.6 (*)     All other components within normal limits    Narrative:     Paris Carter tel. [phone],  Chemistry results called to and read back by CARLEY Watson, 01/03/2021  18:41, by Chanel Gonzalez  Performed at: [address],  [place], [address] [employer] [employer]   Phone [phone]   ACETAMINOPHEN LEVEL - Abnormal; Notable for the following components:    Acetaminophen Level <5 (*)     All other components within normal limits    Narrative:     Performed at:  [address],  [place], [address] [employer]   Phone [phone]   SALICYLATE LEVEL - Abnormal; Notable for the following components:    Salicylate, Serum <3.9 (*)     All other components within normal limits    Narrative:     Performed at:  [address],  [place], [address] [facility]   Phone [phone]   TROPONIN    Narrative:     Performed at:  [address],  [place], [address] [facility]   Phone [phone]   ETHANOL    Narrative:     Performed at:  [address],  [place], [address] [facility]   Phone [phone]   SAMPLE POSSIBLE BLOOD BANK TESTING    Narrative:     Performed at:  [address],  [place], [address] [facility]   Phone [phone]    Narrative:     Performed at:  [address],  [place], [address] [employer]   Phone [phone]   URINE RT REFLEX TO CULTURE   URINE DRUG SCREEN   LACTIC ACID, PLASMA     MEDICAL DECISION MAKING     The total Critical Care time is 45 minutes which excludes separately billable procedures. The critical care was concerning IV fluid boluses for treatment of lactic acidosis and presumed hypovolemia. This time is exclusive of any time documented by any other providers. Patient's mental status has improved from slightly combative and agitated to more pleasant and tearful and stating that he loves everyone. He denies any complaints at this time. I spoke with Dr. Anabel Ye. We thoroughly discussed the history, physical exam, laboratory and imaging studies, as well as, emergency department course. Based upon that discussion, we've decided to admit Dulce Rowe for further observation and evaluation of Danyell Garner's mental status change. As I have deemed necessary from their history, physical and studies, I have considered and evaluated Dulce Arrant for the following diagnoses:  DIABETES, INTRACRANIAL HEMORRHAGE, MENINGITIS, SEPSIS SUBARACHNOID HEMORRHAGE, SUBDURAL HEMATOMA, & STROKE. FINAL IMPRESSION  1. Syncope and collapse    2. Hypotension, unspecified hypotension type    3. Lactic acidosis        Vitals:  Blood pressure (!) 167/92, pulse 112, temperature 98.5 °F (36.9 °C), resp. rate 24, SpO2 100 %. Disposition  Pt is in stable condition upon Admit to telemetry. This chart was generated using the [address] [place], [place] [id number]  dictation system. I created this record but it may contain dictation errors.           Sarah Carballo MD  01/03/21 0954

## 2021-01-04 NOTE — PROGRESS NOTES
Occupational Therapy   Occupational Therapy Initial Assessment and Treatment Note  Date: 2021   Patient Name: Hayes Lundy  MRN: [id number]     : 1950    Date of Service: 2021    Discharge Recommendations:  Continue to assess pending progress, 24 hour supervision or assist, S Level 3, Home with Home health OT  OT Equipment Recommendations  Other: CTA    Assessment   Performance deficits / Impairments: Decreased functional mobility ; Decreased cognition;Decreased ADL status; Decreased endurance;Decreased balance;Decreased safe awareness;Decreased posture  Assessment: Pt a questionable historian at time of eval, reporting typically transfers and ambulates without assistance, presenting at [facility] for functional transfers/brief mobility at time of eval. Pt recently admitted to [facility] and discharged home with support from his daughter and granddaughter. Anticipate pt will progress toward goals and be able to return home with this supervision/assistance, but would benefit from further therapy to increase endurance, and safety/independence with ADL, functional mobility/transfers at d/c. Continue OT tx. Prognosis: Fair  Decision Making: Medium Complexity  OT Education: OT Role;Plan of Care;Precautions; ADL Adaptive Strategies;Orientation;Equipment;Transfer Training;Energy Conservation  Patient Education: importance of mobility  Barriers to Learning: Memory, attention  REQUIRES OT FOLLOW UP: Yes  Activity Tolerance  Activity Tolerance: Treatment limited secondary to decreased cognition  Activity Tolerance: Difficult to accurately assess orthostatic BP and symptoms d/t pt command following/attention, returning to supine after first standing trial, but not indicating if d/t dizziness/lightheadedness; Pt /82 () in semi-best's, 119/69 (HR 70) at EOB, no significant drop seated in chair; unable to obtain in standing during session - RN aware  Safety Devices  Safety Devices in place:  Yes Type of devices: Nurse notified; Chair alarm in place; Left in chair;Call light within reach;Gait belt           Patient Diagnosis(es): The primary encounter diagnosis was Syncope and collapse. Diagnoses of Hypotension, unspecified hypotension type and Lactic acidosis were also pertinent to this visit. 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 has a past surgical history that includes knee surgery; Prostate biopsy; other surgical history (10/11/13); Upper gastrointestinal endoscopy (17); Dental surgery (N/A, 3/28/2019); and laparoscopy (N/A, 2019).            Restrictions  Restrictions/Precautions  Restrictions/Precautions: General Precautions, Fall Risk, Up as Tolerated  Position Activity Restriction  Other position/activity restrictions: Orthostatic blood pressure and pulse    Subjective   General  Referring Practitioner: Saundra Venegas MD  Diagnosis: Syncope and collapse, Recurrent  Subjective  Subjective: Pt in bed on arrival, seemingly confused/lethargic, not resistant to eval and treat  General Comment  Comments: Per RN okay to treat  Patient Currently in Pain: Denies  Vital Signs  Blood Pressure Lyin/82  Pulse Lyin PER MINUTE  Blood Pressure Sittin/69  Pulse Sittin PER MINUTE  Blood Pressure Standing: (Pt unable to tolerate)  Patient Currently in Pain: Denies  Social/Functional History Social/Functional History  Lives With: Family(wife and daughter)  Type of Home: House  Home Layout: Two level(hospital bed in living room on first floor)  Home Access: Stairs to enter with rails  Entrance Stairs - Number of Steps: 2  Entrance Stairs - Rails: Right  Bathroom Shower/Tub: Walk-in shower, Shower chair with back  Bathroom Toilet: Standard  Bathroom Equipment: Grab bars in shower, Grab bars around toilet  Home Equipment: Rolling walker, Cane, [address] bed  ADL Assistance: Needs assistance  Homemaking Responsibilities: No(daughter completes)  Ambulation Assistance: Independent  Transfer Assistance: Independent  Additional Comments: Pt a questionable historian at time of eval       Objective        Orientation  Overall Orientation Status: Impaired  Orientation Level: Oriented to person;Disoriented to situation;Disoriented to time     Balance  Sitting Balance: Stand by assistance(unsupported sitting)  Standing Balance: Contact guard assistance  Standing Balance  Time: <30 seconds, <1 minute  Activity: transfers, mobility  Functional Mobility  Functional - Mobility Device: Standard Walker  Activity: Other(bed <> chair)  Assist Level: Contact guard assistance  ADL  Feeding: Setup; Increased time to complete;Verbal cueing;Supervision(for lunch tray at end of session)  Grooming: Setup;Supervision  LE Dressing: Maximum assistance;Setup  Toileting: Dependent/Total(incontinent of urine on arrival)  Tone RUE  RUE Tone: Normotonic  Tone LUE  LUE Tone: Normotonic     Bed mobility  Supine to Sit: Stand by assistance(HOB elevated, use of bedrail)  Sit to Supine: Stand by assistance(after first standing trial - difficult to accurately assess whether d/t orthostasis or not)  Scooting: Stand by assistance  Transfers  Sit to stand: Minimal assistance  Stand to sit: Minimal assistance  Transfer Comments: Cues for safe hand placement     Cognition  Overall Cognitive Status: Exceptions Arousal/Alertness: Delayed responses to stimuli  Following Commands: Follows one step commands with increased time; Follows one step commands with repetition  Attention Span: Attends with cues to redirect; Difficulty attending to directions; Difficulty dividing attention  Memory: Decreased recall of precautions;Decreased recall of recent events  Safety Judgement: Decreased awareness of need for assistance;Decreased awareness of need for safety  Problem Solving: Assistance required to generate solutions;Assistance required to implement solutions  Insights: Decreased awareness of deficits  Initiation: Requires cues for all  Sequencing: Requires cues for some           LUE AROM (degrees)  LUE AROM : WFL  Left Hand AROM (degrees)  Left Hand AROM: WFL  RUE AROM (degrees)  RUE AROM : WFL  Right Hand AROM (degrees)  Right Hand AROM: WFL  LUE Strength  Gross LUE Strength: WFL  RUE Strength  Gross RUE Strength: WFL              Plan   Plan  Times per week: 3-5x/week  Current Treatment Recommendations: Balance Training, Strengthening, Functional Mobility Training, Endurance Training, Gait Training, Self-Care / ADL, Patient/Caregiver Education & Training, Safety Education & Training, Equipment Evaluation, Education, & procurement, Positioning    AM-PAC Score        -formerly [facility] Inpatient Daily Activity Raw Score: 14 (01/04/21 1451)  AM-PAC Inpatient ADL T-Scale Score : 33.39 (01/04/21 1451)  ADL Inpatient CMS 0-100% Score: 59.67 (01/04/21 1451)  ADL Inpatient CMS G-Code Modifier : CK (01/04/21 1451)    Goals  Short term goals  Time Frame for Short term goals: 1 week (by 1/11/21)  Short term goal 1: Pt will complete functional transfers with Supervision  Short term goal 2: Pt will complete LE dressing with Mod A  Short term goal 3: Pt will tolerate 2 minutes standing activity with SBA in preparation for standing ADL by 1/7  Patient Goals   Patient goals : \"to go home\"       Therapy Time   Individual Concurrent Group Co-treatment

## 2021-01-04 NOTE — PROGRESS NOTES
4 Eyes Skin Assessment     The patient is being assess for   Admission    I agree that 2 RN's have performed a thorough Head to Toe Skin Assessment on the patient. ALL assessment sites listed below have been assessed. Areas assessed by both nurses:   [x]   Head, Face, and Ears   [x]   Shoulders, Back, and Chest, Abdomen  [x]   Arms, Elbows, and Hands   [x]   Coccyx, Sacrum, and Ischium  [x]   Legs, Feet, and Heels        Multiple abrasions scattered over bilateral feet and toes. **SHARE this note so that the co-signing nurse is able to place an eSignature**    Co-signer eSignature: Electronically signed by Tammy Sylvester RN on 1/4/21 at 2:30 AM EST    Does the Patient have Skin Breakdown?   Yes LDA WOUND CARE was Initiated documentation include the Tasia-wound, Wound Assessment, Measurements, Dressing Treatment, Drainage, and Color\",          Davidson Prevention initiated:  NA   Wound Care Orders initiated:  NA      WOC nurse consulted for Pressure Injury (Stage 3,4, Unstageable, DTI, NWPT, Complex wounds)and New or Established Ostomies:  NA      Primary Nurse eSignature: Electronically signed by Nancy Eaton RN on 1/3/21 at 11:51 PM EST

## 2021-01-04 NOTE — CARE COORDINATION
CASE MANAGEMENT INITIAL ASSESSMENT        Reviewed chart and completed assessment via telephone with: Pt daughter Gokul Moulton via phone   Explained Case Management role/services.     Primary contact information: Zhang Moulton [phone]     Health Care Decision Maker :  Zhang Moulton [phone]           Can this person be reached and be able to respond quickly, such as within a few minutes or hours? Yes  Who would be your back-up decision maker? Name  Joleen Leblanc            Phone Number: [phone]     Admit date/status:inpt 1/3/21  Diagnosis: Syncope and Collapse    Is this a Readmission?:  Yes      Insurance:[employer]   Precert required for SNF: No - currently waived      3 night stay required: No     Living arrangements, Adls, care needs, prior to admission:Pt lives at home with his daughter and his granddaughter, as well as his spouse. He has 24/7 care.      Transportation: Pt's family will transport pt home at discharge.   [facility] at home:  Walker_x_Cane_x_RTS__ BSC__Shower Chair__  02__ HHN__ CPAP__  BiPap__  Hospital Bed__ W/C_x__ Other__________     Services in the home and/or outpatient, prior to admission: none at this time      PT/OT recs: Ordered and pending  [address] Notification (HEN): N/A     Barriers to discharge: N/A     Plan/comments: plan for pt to return home at DC with his daughter Gokul Moulton. Gokul Moulton states that she DOES want [place] [id number] for pt at [place] with preference for [facility] if possible. CM called referral to [facility] for S3 services. She will verify and call CM with updates.  Will continue to follow and assist with needs as able.        ECOC on chart for MD shanda Huynh, RN

## 2021-01-04 NOTE — PROGRESS NOTES
Physical Therapy    Facility/Department: [facility] A2 CARD TELEMETRY  Initial Assessment    NAME: Ela Albarran  : 1950  MRN: [id number]    Date of Service: 2021    Discharge Recommendations:  Continue to assess pending progress, Home with Home health PT, S Level 3, 24 hour supervision or assist   PT Equipment Recommendations  Equipment Needed: No    Assessment   Body structures, Functions, Activity limitations: Decreased functional mobility ; Decreased balance;Decreased strength;Decreased safe awareness;Decreased endurance  Assessment: Pt is 66 yo male who presents with diagnosis of syncope and collapse and fall in shower. Family assists as needed at baseline. Min A to stand and CGA to take steps to chair with RW this date. Difficulty to fully assess symptoms and dizziness d/t impaired cognition and pt having difficulty reporting symptoms. Pt would benefit from continued skilled therapy to address deficits. Recommend home with 24-hr Sup and home PT at d/c. Treatment Diagnosis: impaired functional mobility  Specific instructions for Next Treatment: progress mobility as tolerated  Prognosis: Good  Decision Making: Medium Complexity  PT Education: Goals; General Safety;Gait Training;PT Role;Disease Specific Education;Plan of Care; Functional Mobility Training;Home Exercise Program;Transfer Training  Patient Education: Pt educated on importance of OOB mobility and sitting up in chair -- pt verbalized understanding but would benefit from reinforcement d/t impaired cognition  Barriers to Learning: cognition  REQUIRES PT FOLLOW UP: Yes  Activity Tolerance  Activity Tolerance: Patient Tolerated treatment well;Patient limited by cognitive status  Activity Tolerance: Semi Fowlers /82, ; Seated EOB /69, HR 70; Seated in recliner /65, HR 68 Patient Diagnosis(es): The primary encounter diagnosis was Syncope and collapse. Diagnoses of Hypotension, unspecified hypotension type and Lactic acidosis were also pertinent to this visit. has a past medical history of Acute gastric ulcer with bleeding, Acute metabolic encephalopathy, MAISHA (acute kidney injury) ([facility] ATN (acute tubular necrosis) ([facility] Atrial fib/flutter, transient, Atrial fibrillation ([facility] BPH (benign prostatic hyperplasia), Cerebral artery occlusion with cerebral infarction ([facility] Diabetes mellitus ([facility] GI bleed, Glaucoma, History of blood transfusion, Hx of diabetic neuropathy, Hyperlipidemia, Hypertension, Mitral insufficiency, Mobility impaired, 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 has a past surgical history that includes knee surgery; Prostate biopsy; other surgical history (10/11/13); Upper gastrointestinal endoscopy (7/13 17); Dental surgery (N/A, 3/28/2019); and laparoscopy (N/A, 6/22/2019).     Restrictions  Restrictions/Precautions  Restrictions/Precautions: General Precautions, Fall Risk, Up as Tolerated  Position Activity Restriction  Other position/activity restrictions: Orthostatic blood pressure and pulse  Vision/Hearing  Vision: Impaired  Vision Exceptions: Cataracts  Hearing: Within functional limits     Subjective  General  Chart Reviewed: Yes  Patient assessed for rehabilitation services?: Yes  Response To Previous Treatment: Not applicable  Family / Caregiver Present: No  Referring Practitioner: Dr. Sylvester Gibbons MD  Referral Date : 01/04/21  Diagnosis: Syncope and Collapse  Follows Commands: Within Functional Limits  General Comment  Comments: Pt resting in bed on approach; RN cleared pt for therapy  Subjective  Subjective: pt agreeable to therapy  Pain Screening  Patient Currently in Pain: Denies    Orientation  Orientation  Overall Orientation Status: Impaired Orientation Level: Oriented to person;Disoriented to time;Disoriented to situation  Social/Functional History  Social/Functional History  Lives With: Family(wife and daughter)  Type of Home: House  Home Layout: Two level(hospital bed in living room on first floor)  Home Access: Stairs to enter with rails  Entrance Stairs - Number of Steps: 2  Entrance Stairs - Rails: Right  Bathroom Shower/Tub: Walk-in shower, Shower chair with back  Bathroom Toilet: Standard  Bathroom Equipment: Grab bars in shower, Grab bars around toilet  Home Equipment: Rolling walker, Cane, [address] bed  ADL Assistance: Needs assistance  Homemaking Responsibilities: No(daughter completes)  Ambulation Assistance: Independent  Transfer Assistance: Independent  Additional Comments: Pt a questionable historian at time of eval    Objective     RLE AROM: WFL  LLE AROM : WFL  Strength RLE: WFL  Strength LLE: WFL        Bed mobility  Supine to Sit: Stand by assistance(HOB elevated, use of bed rails, x 2 reps)  Sit to Supine: Stand by assistance  Scooting: Stand by assistance(forward to EOB)     Transfers  Sit to Stand: Minimal Assistance(x 2 reps from EOB to RW with cues for hand placement)  Stand to sit: Contact guard assistance     Ambulation  Ambulation?: Yes  Ambulation 1  Surface: level tile  Device: Rolling Walker  Assistance: Contact guard assistance  Quality of Gait: Cues for sequencing, no overt LOB. Shuffling steps with decreased step length  Gait Deviations: Decreased step length;Decreased step height;Slow Arlene; Shuffles  Distance: 3 ft to chair     Balance  Sitting - Static: Good;-  Sitting - Dynamic: Fair;+  Standing - Static: Fair  Standing - Dynamic: [facility]  Times per week: 3-5x/wk  Times per day: Daily  Specific instructions for Next Treatment: progress mobility as tolerated Current Treatment Recommendations: Strengthening, Neuromuscular Re-education, Safety Education & Training, Home Exercise Program, Balance Training, Endurance Training, Functional Mobility Training, Transfer Training, Gait Training, Equipment Evaluation, Education, & procurement, Patient/Caregiver Education & Training, Stair training  Safety Devices  Type of devices: All fall risk precautions in place, Call light within reach, Chair alarm in place, Gait belt, Patient at risk for falls, Nurse notified, Left in chair                                   AM-PAC Score  AM-PAC Inpatient Mobility Raw Score : 19 (01/04/21 1453)  AM-PAC Inpatient T-Scale Score : 45.44 (01/04/21 1453)  Mobility Inpatient CMS 0-100% Score: 41.77 (01/04/21 1453)  Mobility Inpatient CMS G-Code Modifier : CK (01/04/21 1453)          Goals  Short term goals  Time Frame for Short term goals: 1 week (1/11) unless otherwise specified  Short term goal 1: Pt will be mod I with bed mobility. Short term goal 2: Pt will be supervision for transfers with LRAD. Short term goal 3: Pt will ambulate 25 ft with CGA and LRAD. Short term goal 4: 1/18: Pt will participate in 12-15 reps of BLE exercises to promote strength and activity tolerance. Short term goal 5: Pt will negotiate 2 stairs with SBA and rail. Patient Goals   Patient goals : \"to go home\"       Therapy Time   Individual Concurrent Group Co-treatment   Time In 1225         Time Out 6968         Minutes 19         Timed Code Treatment Minutes: [address], PT, DPT  If pt is unable to be seen after this session, please let this note serve as discharge summary. Please see case management note for discharge disposition. Thank you.

## 2021-01-04 NOTE — ED NOTES
Dr. Radha Wells [address] @ 2127  RE: syncope, hypotension, lactic acidosis  Dr. Dayana Dong called back @ 2139       Metstacy Aju  01/03/21 2148

## 2021-01-04 NOTE — PROGRESS NOTES
Hospitalist Progress Note    Date of Admission: 1/3/2021    Chief Complaint: Syncope    Hospital Course:   65 y.o. male who presented to [facility] with above complaints  Patient presented to the ED via EMS for syncopal episode at home. Patient reports he was in the bathroom in the shower with a family member cleansing him when he apparently passed out. Family reports patient had a slow heart rate but when EMS arrived on the scene they reported normal vital signs. Patient was apparently altered and combative en route. Patient was just discharged on 1/2/21 for similar syncopal episode, likely due to orthostatic hypotension. He has past medical history of A. fib, CHF, HTN, HLD. Multiple recurrent syncopal episodes. Previous admission it was thought to be due to orthostatic hypotension. He has had cardiac monitor telemetry which showed no arrhythmias in the past.  Last admission EP stopped clonidine and hydralazine to let his baseline blood pressures be on the higher side due to severe orthostatic hypotension. At this time patient is disoriented, unable to provide any history. Subjective: Confused. Telemetry stable. Labs:   Recent Labs     01/02/21  1032 01/03/21  1747   WBC 5.0 7.2   HGB 7.4* 8.5*   HCT 23.1* 27.7*    262     Recent Labs     01/02/21  1032 01/03/21  1747    139   K 4.1 4.1    102   CO2 26 19*   BUN 20 19   CREATININE 1.4* 1.5*   CALCIUM 9.0 9.6     Recent Labs     01/03/21  1747   AST 19   ALT 10   BILITOT 0.7   ALKPHOS 50     No results for input(s): INR in the last 72 hours. Physical Exam Performed:    BP (!) 155/80   Pulse 104   Temp 98.3 °F (36.8 °C) (Oral)   Resp 20   Ht 6' 5\" (1.956 m)   Wt 261 lb 0.4 oz (118.4 kg)   SpO2 94%   BMI 30.95 kg/m²     General appearance:  No apparent distress, appears stated age and cooperative. HEENT:  Normal cephalic, atraumatic without obvious deformity. Pupils equal, round, and reactive to light. Extra ocular muscles intact. Conjunctivae/corneas clear. Neck: Supple, with full range of motion. No jugular venous distention. Trachea midline. Respiratory:  Normal respiratory effort. Clear to auscultation, bilaterally without Rales/Wheezes/Rhonchi. Cardiovascular:  Regular rate and rhythm with normal S1/S2 without murmurs, rubs or gallops. Abdomen: Soft, non-tender, non-distended with normal bowel sounds. Musculoskeletal:  No clubbing, cyanosis or edema bilaterally. Full range of motion without deformity. Skin: Skin color, texture, turgor normal.  No rashes or lesions. Neurologic:, Alert and partially oriented, neurovascularly intact without any focal sensory/motor deficits. Cranial nerves: II-XII intact, grossly non-focal.  Psychiatric:  Alert and oriented, thought content appropriate, normal insight  Capillary Refill: Brisk,< 3 seconds   Peripheral Pulses: +2 palpable, equal bilaterally     Assessment/Plan:    Active Hospital Problems    Diagnosis    Elevated lactic acid level [R79.89]     Priority: High    Syncope and collapse [R55]    Impulse control disorder in adult [F63.9]    HTN (hypertension), benign [I10]    Hypotension [I95.9]     Syncope and collapse. Recurrent  By description could be vasovagal.  Family report patient was bradycardic. Occurred in the shower. However patient does have history of recurrent syncope, last admission was believed to be severe orthostatic hypotension.   Initial EKG and troponin negative for acute ACS  -Patient is on Seroquel which is associated with up to 18% incidence of orthostatic hypotension in elderly due to alpha blockade.   -Will reduce Seroquel dose  -Resume Abx  -Since blood pressures improved we will resume antihypertensive regimen  -No arrhythmias evident on telemetry, monitor    Severe orthostatic hypotension I believe this is likely due to Seroquel. May have autonomic dysfunction as well. Pt still orthostatic symptom wise and vitals wise    Blood Pressure Lyin/95     Blood Pressure Sittin/72     Blood Pressure Standing very lightheaded, unable to stand     -continue IV fluids  -Allowing higher baseline BP and supine HTN (discontinued 2 BP meds on last admission)  -Patient  Has compression stockings at home that he uses  -counseled about various non pharmalogic measures to allow orthostatic tolerance    Possible UTI:  Recent admission with abnormal UA/UCx positive for MDRO E.coli. Received a few doses of IV Cefepime per ID, and no further Abx recommended as asymptomatic bacteruria was suspected. Can repeat UA. Not unreasonable to give a few more doses of IV Abx while hospitalized as he continues to have profound orthostasis. HTN - patient is profoundly orthostatic. Will need to accept higher pressures at rest.    - EP stopped home clonidine and hydralazine  On last admission  -- continue Toprol, losartan, and amlodipine. Elevated lactic level, resolved  5.6-->1.6 after IV fluids. Likely due to hypotension and dehydration. Chronic diastolic CHF, appears compensated  -Hold Lasix    Hx of persistent a. Fib s/p Watchman's device  - EKG on admission: Afib  - Not on ac due to hx of significant GI bleed    Impulse control disorder  -Patient is on Seroquel and Effexor. Needs to be weaned off of Seroquel due to the reasons mentioned above.     DVT Prophylaxis: Lovenox  Diet: DIET GENERAL;  Code Status: Full Code  PT/OT Eval Status: Pending    Dispo - 1-2 days     Nikia Medrano MD

## 2021-01-05 PROCEDURE — 2580000003 HC RX 258: Performed by: INTERNAL MEDICINE

## 2021-01-05 PROCEDURE — 6360000002 HC RX W HCPCS: Performed by: INTERNAL MEDICINE

## 2021-01-05 PROCEDURE — 96372 THER/PROPH/DIAG INJ SC/IM: CPT

## 2021-01-05 PROCEDURE — 97110 THERAPEUTIC EXERCISES: CPT

## 2021-01-05 PROCEDURE — 6370000000 HC RX 637 (ALT 250 FOR IP): Performed by: INTERNAL MEDICINE

## 2021-01-05 PROCEDURE — 97530 THERAPEUTIC ACTIVITIES: CPT

## 2021-01-05 PROCEDURE — 96366 THER/PROPH/DIAG IV INF ADDON: CPT

## 2021-01-05 PROCEDURE — G0378 HOSPITAL OBSERVATION PER HR: HCPCS

## 2021-01-05 RX ADMIN — AMLODIPINE BESYLATE 10 MG: 5 TABLET ORAL at 08:24

## 2021-01-05 RX ADMIN — Medication 10 ML: at 22:52

## 2021-01-05 RX ADMIN — B-COMPLEX W/ C & FOLIC ACID TAB 1 TABLET: TAB at 08:24

## 2021-01-05 RX ADMIN — ENOXAPARIN SODIUM 40 MG: 40 INJECTION SUBCUTANEOUS at 08:24

## 2021-01-05 RX ADMIN — ASPIRIN 81 MG: 81 TABLET, CHEWABLE ORAL at 08:24

## 2021-01-05 RX ADMIN — LOSARTAN POTASSIUM 50 MG: 25 TABLET, FILM COATED ORAL at 08:24

## 2021-01-05 RX ADMIN — QUETIAPINE FUMARATE 50 MG: 25 TABLET ORAL at 22:52

## 2021-01-05 RX ADMIN — METOPROLOL SUCCINATE 50 MG: 50 TABLET, EXTENDED RELEASE ORAL at 08:24

## 2021-01-05 RX ADMIN — Medication 10 ML: at 08:26

## 2021-01-05 RX ADMIN — QUETIAPINE FUMARATE 50 MG: 25 TABLET ORAL at 08:24

## 2021-01-05 RX ADMIN — ATORVASTATIN CALCIUM 40 MG: 40 TABLET, FILM COATED ORAL at 08:24

## 2021-01-05 RX ADMIN — Medication 1 CAPSULE: at 08:24

## 2021-01-05 RX ADMIN — FENOFIBRATE 160 MG: 160 TABLET ORAL at 08:24

## 2021-01-05 RX ADMIN — VENLAFAXINE HYDROCHLORIDE 150 MG: 150 CAPSULE, EXTENDED RELEASE ORAL at 08:24

## 2021-01-05 RX ADMIN — Medication 1000 UNITS: at 08:24

## 2021-01-05 RX ADMIN — CEFEPIME HYDROCHLORIDE 2 G: 2 INJECTION, POWDER, FOR SOLUTION INTRAVENOUS at 13:54

## 2021-01-05 ASSESSMENT — PAIN SCALES - GENERAL
PAINLEVEL_OUTOF10: 0
PAINLEVEL_OUTOF10: 0

## 2021-01-05 NOTE — ADT AUTH CERT
Fall / syncope   Urine cult + E coli UTI, MDRO, R PCN, oral cephalosporins, FQ    - NO sx c/c bacteriuria     - can complete short course    - TMP/SMX can be problematic with renal failure yet if use low dose, drug cleared / concentrated in urine       RECOMMENDATIONS:       Cont cefepime, d#2       Ok for discharge from ID standpoint -    At discharge, home on no antibiotics

## 2021-01-05 NOTE — PROGRESS NOTES
Hospitalist Progress Note    Date of Admission: 1/3/2021    Chief Complaint: Syncope    Hospital Course:   65 y.o. male who presented to [facility] with above complaints  Patient presented to the ED via EMS for syncopal episode at home. Patient reports he was in the bathroom in the shower with a family member cleansing him when he apparently passed out. Family reports patient had a slow heart rate but when EMS arrived on the scene they reported normal vital signs. Patient was apparently altered and combative en route. Patient was just discharged on 1/2/21 for similar syncopal episode, likely due to orthostatic hypotension. He has past medical history of A. fib, CHF, HTN, HLD. Multiple recurrent syncopal episodes. Previous admission it was thought to be due to orthostatic hypotension. He has had cardiac monitor telemetry which showed no arrhythmias in the past.  Last admission EP stopped clonidine and hydralazine to let his baseline blood pressures be on the higher side due to severe orthostatic hypotension. At this time patient is disoriented, unable to provide any history. Subjective: Confusion improving. More compliant with staff. Still orthostatic. Labs:   Recent Labs     01/03/21  1747   WBC 7.2   HGB 8.5*   HCT 27.7*        Recent Labs     01/03/21  1747      K 4.1      CO2 19*   BUN 19   CREATININE 1.5*   CALCIUM 9.6     Recent Labs     01/03/21  1747   AST 19   ALT 10   BILITOT 0.7   ALKPHOS 50     No results for input(s): INR in the last 72 hours. Physical Exam Performed:    /69   Pulse 78   Temp 97.5 °F (36.4 °C)   Resp 12   Ht 6' 5\" (1.956 m)   Wt 261 lb 0.4 oz (118.4 kg)   SpO2 94%   BMI 30.95 kg/m²     General appearance:  No apparent distress, appears stated age and cooperative. HEENT:  Normal cephalic, atraumatic without obvious deformity. Pupils equal, round, and reactive to light. Extra ocular muscles intact. Conjunctivae/corneas clear. Neck: Supple, with full range of motion. No jugular venous distention. Trachea midline. Respiratory:  Normal respiratory effort. Clear to auscultation, bilaterally without Rales/Wheezes/Rhonchi. Cardiovascular:  Regular rate and rhythm with normal S1/S2 without murmurs, rubs or gallops. Abdomen: Soft, non-tender, non-distended with normal bowel sounds. Musculoskeletal:  No clubbing, cyanosis or edema bilaterally. Full range of motion without deformity. Skin: Skin color, texture, turgor normal.  No rashes or lesions. Neurologic:, Alert and partially oriented, neurovascularly intact without any focal sensory/motor deficits. Cranial nerves: II-XII intact, grossly non-focal.  Psychiatric:  Alert and better oriented, improving insight  Capillary Refill: Brisk,< 3 seconds   Peripheral Pulses: +2 palpable, equal bilaterally     Assessment/Plan:    Active Hospital Problems    Diagnosis    Elevated lactic acid level [R79.89]     Priority: High    Syncope and collapse [R55]    Impulse control disorder in adult [F63.9]    HTN (hypertension), benign [I10]    Hypotension [I95.9]     Syncope and collapse. Recurrent  By description could be vasovagal.  Family report patient was bradycardic. Occurred in the shower. However patient does have history of recurrent syncope, last admission was believed to be severe orthostatic hypotension. Initial EKG and troponin negative for acute ACS  -Patient is on Seroquel which is associated with up to 18% incidence of orthostatic hypotension in elderly due to alpha blockade.   -Reduced Seroquel dose  -Resume Abx  -Continue antihypertensive regimen  -No arrhythmias evident on telemetry, monitor    Severe orthostatic hypotension  May be related to high dose of Seroquel. May have autonomic dysfunction as well.   - IV fluids given  -Allowing higher baseline BP and supine HTN (discontinued 2 BP meds on last admission)  -Patient has compression stockings at home that he uses -counseled about various non pharmalogic measures to allow orthostatic tolerance    Possible UTI:  Recent admission with abnormal UA/UCx positive for MDRO E.coli. Received a few doses of IV Cefepime per ID, and no further Abx recommended as asymptomatic bacteruria was suspected. Can repeat UA although patient not able to provide sample; defer straight cath as not going to . Not unreasonable to give a few more doses of IV Abx while hospitalized as he continues to have profound orthostasis. HTN - patient is profoundly orthostatic. Will need to accept higher pressures at rest.    - EP stopped home clonidine and hydralazine  On last admission  -- continue Toprol, losartan, and amlodipine. Elevated lactic level, resolved  5.6-->1.6 after IV fluids. Likely due to hypotension and dehydration. Chronic diastolic CHF, appears compensated  -Hold Lasix    Hx of persistent a. Fib s/p Watchman's device  - EKG on admission: Afib  - Not on ac due to hx of significant GI bleed    Impulse control disorder  -Patient is on Seroquel and Effexor. Needs to be weaned off of Seroquel due to the reasons mentioned above.     DVT Prophylaxis: Lovenox  Diet: DIET GENERAL;  Code Status: Full Code  PT/OT Eval Status: Pending    Trice Ulloa MD

## 2021-01-05 NOTE — PROGRESS NOTES
Physical Therapy  Facility/Department: [facility] A2 CARD TELEMETRY  Daily Treatment Note  NAME: Ronni Garcia  : 1950  MRN: [id number]    Date of Service: 2021    Discharge Recommendations:  Continue to assess pending progress, Home with Home health PT, S Level 3, 24 hour supervision or assist   PT Equipment Recommendations  Equipment Needed: No    Assessment    Body structures, Functions, Activity limitations: Decreased functional mobility ; Decreased balance;Decreased strength;Decreased safe awareness;Decreased endurance  Assessment: Patient seen for bed mobility and LE strengthening. Patient cleared by RN for therapy participation this date. Patient agreeable to therapy. Patient completed supine<>sit with SBA and sat EOB with SBA while completing seated LE exercises. See activity tolerance section for vitals response with mobility. Deferred transfer d/t low BP in sitting. P.T .will continue to follow throughout LOS. Will CTA pending OOB mobility. Treatment Diagnosis: impaired functional mobility  Specific instructions for Next Treatment: progress mobility as tolerated  Prognosis: Good  Decision Making: Medium Complexity  PT Education: Goals; General Safety;Gait Training;PT Role;Disease Specific Education;Plan of Care; Functional Mobility Training;Home Exercise Program;Transfer Training;Precautions  Patient Education: pt educated on vitals response with mobility  Barriers to Learning: cognition  REQUIRES PT FOLLOW UP: Yes  Activity Tolerance  Activity Tolerance: Patient Tolerated treatment well;Patient limited by cognitive status  Activity Tolerance: /71 in supine, 88/56 in sitting initially, 88/58 after sitting ~5 mins and completing seated exercises. Deferred transfer d/t orthostatic hypotension. Patient Diagnosis(es): The primary encounter diagnosis was Syncope and collapse. Diagnoses of Hypotension, unspecified hypotension type and Lactic acidosis were also pertinent to this visit. has a past medical history of Acute gastric ulcer with bleeding, Acute metabolic encephalopathy, MAISHA (acute kidney injury) ([facility] [address] ATN (acute tubular necrosis) ([facility] [address] Atrial fib/flutter, transient, Atrial fibrillation ([facility] [address] BPH (benign prostatic hyperplasia), Cerebral artery occlusion with cerebral infarction ([facility] [address] Diabetes mellitus (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 [address] and [facility] has a past surgical history that includes knee surgery; Prostate biopsy; other surgical history (10/11/13); Upper gastrointestinal endoscopy (17); Dental surgery (N/A, 3/28/2019); and laparoscopy (N/A, 2019). Restrictions  Restrictions/Precautions  Restrictions/Precautions: General Precautions, Fall Risk, Up as Tolerated  Position Activity Restriction  Other position/activity restrictions: Orthostatic blood pressure and pulse  Subjective   General  Chart Reviewed: Yes  Response To Previous Treatment: Patient with no complaints from previous session.   Family / Caregiver Present: No  Referring Practitioner: Dr. Pancho Steen MD  Subjective  Subjective: pt in bed, agreeable to therapy  Pain Screening  Patient Currently in Pain: Denies  Vital Signs  Orthostatic B/P and Pulse?: Yes  Blood Pressure Lyin/71  Blood Pressure Sittin/56  Blood Pressure Standing: (did not attempt d/t low sitting BP)  Patient Currently in Pain: Denies  Orthostatic B/P and Pulse?: Yes       Orientation  Orientation  Overall Orientation Status: Impaired  Orientation Level: Oriented to person;Disoriented to time;Disoriented to situation     Objective   Bed mobility  Supine to Sit: Stand by assistance(with bedrail, toward L, HOB elevated)  Sit to Supine: Stand by assistance  Transfers  Sit to Stand: Unable to assess Stand to sit: Unable to assess  Bed to Chair: Unable to assess  Comment: unable to attempt transfers as pt orthostatic with supine->sit. BP did not improve with seated therex. Ambulation  Ambulation?: No        Exercises  Hip Flexion: 1x 10 BLE  Knee Long Arc Quad: 1x 15 BLE  Ankle Pumps: 1x 20 BLE                     AM-PAC Score  AM-PAC Inpatient Mobility Raw Score : 19 (01/05/21 1543)  AM-PAC Inpatient T-Scale Score : 45.44 (01/05/21 1543)  Mobility Inpatient CMS 0-100% Score: 41.77 (01/05/21 1543)  Mobility Inpatient CMS G-Code Modifier : CK (01/05/21 1543)          Goals  Short term goals  Time Frame for Short term goals: 1 week (1/11) unless otherwise specified  Short term goal 1: Pt will be mod I with bed mobility. Short term goal 2: Pt will be supervision for transfers with LRAD. Short term goal 3: Pt will ambulate 25 ft with CGA and LRAD. Short term goal 4: 1/18: Pt will participate in 12-15 reps of BLE exercises to promote strength and activity tolerance; goal met 1/5 - pt completed 15 reps of LE exercises  Short term goal 5: Pt will negotiate 2 stairs with SBA and rail. Patient Goals   Patient goals : \"to go home\"    Plan    Plan  Times per week: 3-5x/wk  Times per day: Daily  Specific instructions for Next Treatment: progress mobility as tolerated  Current Treatment Recommendations: Strengthening, Neuromuscular Re-education, Safety Education & Training, Home Exercise Program, Balance Training, Endurance Training, Functional Mobility Training, Transfer Training, Gait Training, Equipment Evaluation, Education, & procurement, Patient/Caregiver Education & Training, Stair training  Safety Devices  Type of devices:  All fall risk precautions in place, Call light within reach, Gait belt, Patient at risk for falls, Nurse notified, Bed alarm in place, Left in bed  Restraints  Initially in place: No     Therapy Time   Individual Concurrent Group Co-treatment   Time In 1445         Time Out 1520 Minutes 35         Timed Code Treatment Minutes: 35 Minutes     If pt is unable to be seen after this session, please let this note serve as discharge summary. Please see case management note for discharge disposition. Thank you.     Milana Eaton, PT

## 2021-01-06 VITALS
HEIGHT: 77 IN | OXYGEN SATURATION: 95 % | SYSTOLIC BLOOD PRESSURE: 126 MMHG | BODY MASS INDEX: 30.82 KG/M2 | DIASTOLIC BLOOD PRESSURE: 78 MMHG | RESPIRATION RATE: 16 BRPM | TEMPERATURE: 98.1 F | WEIGHT: 261.02 LBS | HEART RATE: 103 BPM

## 2021-01-06 LAB
ANION GAP SERPL CALCULATED.3IONS-SCNC: 9 MMOL/L (ref 3–16)
BUN BLDV-MCNC: 27 MG/DL (ref 7–20)
CALCIUM SERPL-MCNC: 9 MG/DL (ref 8.3–10.6)
CHLORIDE BLD-SCNC: 108 MMOL/L (ref 99–110)
CO2: 25 MMOL/L (ref 21–32)
CREAT SERPL-MCNC: 1.3 MG/DL (ref 0.8–1.3)
GFR AFRICAN AMERICAN: >60
GFR NON-AFRICAN AMERICAN: 54
GLUCOSE BLD-MCNC: 174 MG/DL (ref 70–99)
POTASSIUM REFLEX MAGNESIUM: 3.9 MMOL/L (ref 3.5–5.1)
SODIUM BLD-SCNC: 142 MMOL/L (ref 136–145)

## 2021-01-06 PROCEDURE — 97110 THERAPEUTIC EXERCISES: CPT

## 2021-01-06 PROCEDURE — 80048 BASIC METABOLIC PNL TOTAL CA: CPT

## 2021-01-06 PROCEDURE — 6360000002 HC RX W HCPCS: Performed by: INTERNAL MEDICINE

## 2021-01-06 PROCEDURE — 97530 THERAPEUTIC ACTIVITIES: CPT

## 2021-01-06 PROCEDURE — 2580000003 HC RX 258: Performed by: INTERNAL MEDICINE

## 2021-01-06 PROCEDURE — G0378 HOSPITAL OBSERVATION PER HR: HCPCS

## 2021-01-06 PROCEDURE — 96372 THER/PROPH/DIAG INJ SC/IM: CPT

## 2021-01-06 PROCEDURE — 96366 THER/PROPH/DIAG IV INF ADDON: CPT

## 2021-01-06 PROCEDURE — 36415 COLL VENOUS BLD VENIPUNCTURE: CPT

## 2021-01-06 PROCEDURE — 6370000000 HC RX 637 (ALT 250 FOR IP): Performed by: INTERNAL MEDICINE

## 2021-01-06 RX ORDER — QUETIAPINE FUMARATE 50 MG/1
50 TABLET, FILM COATED ORAL 2 TIMES DAILY
Qty: 1 TABLET | Refills: 0 | Status: SHIPPED
Start: 2021-01-06

## 2021-01-06 RX ADMIN — VENLAFAXINE HYDROCHLORIDE 150 MG: 150 CAPSULE, EXTENDED RELEASE ORAL at 08:20

## 2021-01-06 RX ADMIN — ATORVASTATIN CALCIUM 40 MG: 40 TABLET, FILM COATED ORAL at 08:20

## 2021-01-06 RX ADMIN — LOSARTAN POTASSIUM 50 MG: 25 TABLET, FILM COATED ORAL at 08:20

## 2021-01-06 RX ADMIN — AMLODIPINE BESYLATE 10 MG: 5 TABLET ORAL at 08:20

## 2021-01-06 RX ADMIN — Medication 1 CAPSULE: at 08:20

## 2021-01-06 RX ADMIN — CEFEPIME HYDROCHLORIDE 2 G: 2 INJECTION, POWDER, FOR SOLUTION INTRAVENOUS at 00:44

## 2021-01-06 RX ADMIN — B-COMPLEX W/ C & FOLIC ACID TAB 1 TABLET: TAB at 08:20

## 2021-01-06 RX ADMIN — Medication 10 ML: at 08:20

## 2021-01-06 RX ADMIN — FENOFIBRATE 160 MG: 160 TABLET ORAL at 08:20

## 2021-01-06 RX ADMIN — QUETIAPINE FUMARATE 50 MG: 25 TABLET ORAL at 08:20

## 2021-01-06 RX ADMIN — ASPIRIN 81 MG: 81 TABLET, CHEWABLE ORAL at 08:20

## 2021-01-06 RX ADMIN — Medication 1000 UNITS: at 08:20

## 2021-01-06 RX ADMIN — ENOXAPARIN SODIUM 40 MG: 40 INJECTION SUBCUTANEOUS at 08:20

## 2021-01-06 RX ADMIN — METOPROLOL SUCCINATE 50 MG: 50 TABLET, EXTENDED RELEASE ORAL at 08:20

## 2021-01-06 ASSESSMENT — PAIN SCALES - GENERAL: PAINLEVEL_OUTOF10: 0

## 2021-01-06 NOTE — PROGRESS NOTES
Orthos completed this am. OLIVIA hose applied prior to working with PT/OT.  Electronically signed by Denisa Goss RN on 1/6/2021 at 10:45 AM

## 2021-01-06 NOTE — CARE COORDINATION
CM spoke with pt daughter Sue Rosas who is requesting update on pt medical status. MARLENI provided primary RN number. No further needs verbalized at this time by Sue Rosas.      Francy Stephens, RN

## 2021-01-06 NOTE — CARE COORDINATION
CASE MANAGEMENT DISCHARGE SUMMARY      Discharge to: Home with Chaitanya Hutson ordered/agency: [address]    Transportation: Private via family     Confirmed discharge plan with:     Patient: no - CVA hx & confusion      Family, name and contact number: Called daughter Michelle Luciano and LVM with call back number.  Alternately called chaitanya Black [phone] and spoke with her to inform of DC plan     Facility/Agency, name:  [facility] faxed     RN, name: Malini Ray RN

## 2021-01-06 NOTE — DISCHARGE INSTR - DIET
? Good nutrition is important when healing from an illness, injury, or surgery. Follow any nutrition recommendations given to you during your hospital stay. ? If you were given an oral nutrition supplement while in the hospital, continue to take this supplement at home. You can take it with meals, in-between meals, and/or before bedtime. These supplements can be purchased at most local grocery stores, pharmacies, and chain [employer]-stores. ? If you have any questions about your diet or nutrition, call the hospital and ask for the dietitian.

## 2021-01-06 NOTE — PROGRESS NOTES
Discharge instructions and medications reviewed with patient at bedside. IV and Tele discontinued, patient has no questions at this time. New medication prescription instructions explained in full. All belongings accounted for. Discharge packet copy sheet signed and placed in chart. Ramanasusan Zacarias notified. Awaiting for family to come and pick him up to transport him home. Electronically signed by Nati Munoz RN on 1/6/2021 at 12:20 PM    Patient discharged.  Electronically signed by Nati Munoz RN on 1/6/2021 at 1:38 PM\

## 2021-01-06 NOTE — HOME CARE
Ro King will require the following home care treatments or therapies: Skilled Nursing, vital signs, medication compliance and education, PT/OT, wound care, teaching and management of medical conditions, etc.  Home care will be necessary because of deconditioning, orthostatic hypotension. The patient is in agreement to receiving home care.

## 2021-01-06 NOTE — PROGRESS NOTES
Occupational Therapy  Facility/Department: [facility] A2 CARD TELEMETRY  Daily Treatment Note  NAME: Ronni Garcia  : 1950  MRN: [id number]    Date of Service: 2021    Discharge Recommendations:  Continue to assess pending progress, 24 hour supervision or assist, S Level 3, Home with Home health OT       Assessment   Performance deficits / Impairments: Decreased functional mobility ; Decreased cognition;Decreased ADL status; Decreased endurance;Decreased balance;Decreased safe awareness;Decreased posture  Assessment: Pt pleasant and agreeable to therapy with cues for importance of OOB activity. Pt demo'd improving activity tolerance and BP stable throughout session. Pt educated on BUE exercises to build strength and endurance. Pt verbalized understanding. pt educated on increased safety awareness for home. Cont with POC. Prognosis: Fair  OT Education: OT Role;Plan of Care;Precautions; ADL Adaptive Strategies;Orientation;Equipment;Transfer Training;Energy Conservation  Patient Education: importance of mobility  Barriers to Learning: Memory, attention  REQUIRES OT FOLLOW UP: Yes  Activity Tolerance  Activity Tolerance: Patient Tolerated treatment well  Activity Tolerance: 150/82 supine , 130/74 seated in chair  Safety Devices  Safety Devices in place: Yes  Type of devices: Nurse notified; Chair alarm in place; Left in chair;Call light within reach;Gait belt         Patient Diagnosis(es): The primary encounter diagnosis was Syncope and collapse. Diagnoses of Hypotension, unspecified hypotension type and Lactic acidosis were also pertinent to this visit. has a past medical history of Acute gastric ulcer with bleeding, Acute metabolic encephalopathy, MAISHA (acute kidney injury) ([facility] [address] ATN (acute tubular necrosis) ([facility] [address] Atrial fib/flutter, transient, Atrial fibrillation ([facility] [address] BPH (benign prostatic hyperplasia), Cerebral artery occlusion with cerebral infarction ([facility] [address] Diabetes mellitus (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 [address] and Traumatic hemorrhagic shock ([facility] [address] has a past surgical history that includes knee surgery; Prostate biopsy; other surgical history (10/11/13); Upper gastrointestinal endoscopy (7/13 17); Dental surgery (N/A, 3/28/2019); and laparoscopy (N/A, 6/22/2019). Restrictions  Restrictions/Precautions  Restrictions/Precautions: General Precautions, Fall Risk, Up as Tolerated  Position Activity Restriction  Other position/activity restrictions: Orthostatic blood pressure and pulse     Subjective   General  Chart Reviewed: Yes  Patient assessed for rehabilitation services?: Yes(home with home health)  Family / Caregiver Present: No  Referring Practitioner: Eli Agosto MD  Diagnosis: Syncope and collapse, Recurrent  Subjective  Subjective: Pt agreeable to therapy with increased cues for encouragement  General Comment  Comments: Per RN okay to treat  Vital Signs  Patient Currently in Pain: Yes(reporting some L shoulder pain)     Orientation  Orientation  Overall Orientation Status: Within Functional Limits  Orientation Level: Oriented to person;Disoriented to situation;Disoriented to time     Objective    ADL  Feeding: Setup; Increased time to complete;Verbal cueing;Supervision  Additional Comments: pt declined adls        Balance  Sitting Balance: Stand by assistance  Standing Balance: Contact guard assistance(no aD)  Standing Balance Time: <30 seconds, <1 minute  Activity: transfers,  Functional Mobility  Functional Mobility Comments: pt declined further mobility  Bed mobility  Supine to Sit: Stand by assistance  Sit to Supine: Unable to assess(up in  chair at end of session)  Transfers  Stand Pivot Transfers: Stand by assistance  Sit to stand: Stand by assistance  Stand to sit: Stand by assistance                       Cognition  Overall Cognitive Status: Exceptions  Arousal/Alertness: Delayed responses to stimuli  Following Commands: Follows one step commands with increased time; Follows one step commands with repetition  Attention Span: Attends with cues to redirect; Difficulty attending to directions; Difficulty dividing attention  Memory: Decreased recall of precautions;Decreased recall of recent events  Safety Judgement: Decreased awareness of need for assistance;Decreased awareness of need for safety  Problem Solving: Assistance required to generate solutions;Assistance required to implement solutions  Insights: Decreased awareness of deficits  Initiation: Requires cues for all  Sequencing: Requires cues for some                    Type of ROM/Therapeutic Exercise  Type of ROM/Therapeutic Exercise: AROM  Comment: BUE  Exercises  Elbow Flexion: x10  Elbow Extension: x10  Supination: x10  Pronation: x10  Wrist Flexion: x10  Wrist Extension: x10  Grasp/Release: x10                    Plan   Plan  Times per week: 3-5x/week  Current Treatment Recommendations: Balance Training, Strengthening, Functional Mobility Training, Endurance Training, Gait Training, Self-Care / ADL, Patient/Caregiver Education & Training, Safety Education & Training, Equipment Evaluation, Education, & procurement, Positioning    AM-PAC Score        AM-[facility] Inpatient Daily Activity Raw Score: 14 (01/06/21 1211)  AM-PAC Inpatient ADL T-Scale Score : 33.39 (01/06/21 1211)  ADL Inpatient CMS 0-100% Score: 59.67 (01/06/21 1211) ADL Inpatient CMS G-Code Modifier : CK (01/06/21 1211)    Goals  Short term goals  Time Frame for Short term goals: 1 week (by 1/11/21)  Short term goal 1: Pt will complete functional transfers with Supervision- ongoing  Short term goal 2: Pt will complete LE dressing with Mod A- ongoing  Short term goal 3: Pt will tolerate 2 minutes standing activity with SBA in preparation for standing ADL by 1/7- ongoing  Patient Goals   Patient goals : \"to go home\"       Therapy Time   Individual Concurrent Group Co-treatment   Time In 1003         Time Out 1027         Minutes 24         Timed Code Treatment Minutes: 24 Minutes       Jessie Blake OTR/L    If pt is unable to be seen after this session, please let this note serve as discharge summary. Please see case management note for discharge disposition. Thank you.

## 2021-01-06 NOTE — DISCHARGE SUMMARY
Hospital Medicine Discharge Summary    Patient: Wilfredo Godwin     Age: 65 y.o. Gender: male  : 1950   MRN: [id number]  Code Status: Full     Admit Date: 1/3/2021   Discharge Date: 2021    Disposition:  Home with Pam [address] [place] [address])    Condition at Discharge: Stable    Primary Care Provider: Kyra Ferrara MD    Admitting Physician: Dipika Johnson MD  Discharge Physician: Palma Philippe MD       Discharge Diagnoses: Active Hospital Problems    Diagnosis    Elevated lactic acid level [R79.89]     Priority: High    Syncope and collapse [R55]    Impulse control disorder in adult [F63.9]    HTN (hypertension), benign [I10]    Hypotension [I95.9]       Hospital Course:   65 y.o. male who presented to [facility] with above complaints  Patient presented to the ED via EMS for syncopal episode at home. Patient reports he was in the bathroom in the shower with a family member cleansing him when he apparently passed out. Family reports patient had a slow heart rate but when EMS arrived on the scene they reported normal vital signs. Patient was apparently altered and combative en route. Patient was just discharged on 21 for similar syncopal episode, likely due to orthostatic hypotension. He has past medical history of A. fib, CHF, HTN, HLD. Multiple recurrent syncopal episodes. Previous admission it was thought to be due to orthostatic hypotension. He has had cardiac monitor telemetry which showed no arrhythmias in the past.  Last admission EP stopped clonidine and hydralazine to let his baseline blood pressures be on the higher side due to severe orthostatic hypotension. At this time patient is disoriented, unable to provide any history. Assessment/Plan:    Syncope and collapse. Recurrent  By description could be vasovagal.  Family report patient was bradycardic. Occurred in the shower. However patient does have history of recurrent syncope, last admission was believed to be severe orthostatic hypotension. Initial EKG and troponin negative for acute ACS  -Patient is on Seroquel which is associated with up to 18% incidence of orthostatic hypotension in elderly due to alpha blockade.  -Reduced Seroquel dose to 50 mg BID. So far orthostasis improving and behavioral issues actually seem better.   -Completed Abx  -Continue antihypertensive regimen  -No arrhythmias evident on telemetry    Severe orthostatic hypotension  May be related to high dose of Seroquel. May have autonomic dysfunction as well. - IV fluids given  -Allowing higher baseline BP and supine HTN (discontinued 2 BP meds on last admission)  -Patient has compression stockings at home that he uses  -counseled about various non pharmalogic measures to allow orthostatic tolerance    Possible UTI:  Recent admission with abnormal UA/UCx positive for MDRO E.coli. Received a few doses of IV Cefepime per ID, and no further Abx recommended as asymptomatic bacteruria was suspected. Received 3 more days of IV Abx while hospitalized as he continued to have profound orthostasis. Clinically improved. HTN - patient is profoundly orthostatic. Will need to accept higher pressures at rest.    - EP stopped home clonidine and hydralazine  On last admission  -- continue Toprol, losartan, and amlodipine. Elevated lactic level, resolved  5.6-->1.6 after IV fluids. Likely due to hypotension and dehydration. Chronic diastolic CHF, appears compensated  -Hold Lasix    Hx of persistent a. Fib s/p Watchman's device  - EKG on admission: Afib  - Not on ac due to hx of significant GI bleed    Impulse control disorder  -Patient is on Seroquel and Effexor. -Reduced Seroquel dose to 50 mg BID as above. So far orthostasis improving and behavioral issues actually seem better. Discussed with family about continuing lower dose at home and seeing monitoring for behavioral issues. Needs to be weaned off of Seroquel due to the reasons mentioned above. Exam:   /78   Pulse 103   Temp 98.1 °F (36.7 °C) (Oral)   Resp 16   Ht 6' 5\" (1.956 m)   Wt 261 lb 0.4 oz (118.4 kg)   SpO2 95%   BMI 30.95 kg/m²   General appearance:  No apparent distress, appears stated age and cooperative. HEENT:  Normal cephalic, atraumatic without obvious deformity. Pupils equal, round, and reactive to light. Extra ocular muscles intact. Conjunctivae/corneas clear. Neck: Supple, with full range of motion. No jugular venous distention. Trachea midline. Respiratory:  Normal respiratory effort. Clear to auscultation, bilaterally without Rales/Wheezes/Rhonchi. Cardiovascular:  Regular rate and rhythm with normal S1/S2 without murmurs, rubs or gallops. Abdomen: Soft, non-tender, non-distended with normal bowel sounds. Musculoskeletal:  No clubbing, cyanosis or edema bilaterally. Full range of motion without deformity. Skin: Skin color, texture, turgor normal.  No rashes or lesions. Neurologic:, Alert and partially oriented, neurovascularly intact without any focal sensory/motor deficits.  Cranial nerves: II-XII intact, grossly non-focal.  Psychiatric:  Alert and better oriented, improving insight  Capillary Refill: Brisk,< 3 seconds   Peripheral Pulses: +2 palpable, equal bilaterally       Patient Discharge Instructions:   Home Care  PT/OT  Monitor Blood Pressure  Follow-up with Primary Care Provider    Discharge Medications:   Discharge Medication List as of 1/6/2021 12:19 PM        Discharge Medication List as of 1/6/2021 12:19 PM      CONTINUE these medications which have CHANGED    Details QUEtiapine (SEROQUEL) 50 MG tablet Take 1 tablet by mouth 2 times daily, Disp-1 tablet, R-0Adjust Sig           Discharge Medication List as of 1/6/2021 12:19 PM      CONTINUE these medications which have NOT CHANGED    Details   losartan (COZAAR) 50 MG tablet Take 1 tablet by mouth daily, Disp-30 tablet, R-3Normal      metoprolol succinate (TOPROL XL) 50 MG extended release tablet Take 1 tablet by mouth daily, Disp-30 tablet, R-3Normal      amLODIPine (NORVASC) 10 MG tablet Take 1 tablet by mouth daily, Disp-30 tablet, R-3Normal      B Complex Vitamins (VITAMIN B COMPLEX 100 IJ) Take 1 tablet by mouth dailyHistorical Med      melatonin 5 MG TABS tablet Take 10 mg by mouthHistorical Med      venlafaxine (EFFEXOR XR) 150 MG extended release capsule Take 150 mg by mouth dailyHistorical Med      mesalamine (APRISO) 0.375 g extended release capsule Historical Med      cyanocobalamin 1000 MCG tablet Take by mouthHistorical Med      blood glucose test strips (ASCENSIA AUTODISC VI;ONE TOUCH ULTRA TEST VI) strip Historical Med      ONE TOUCH LANCETS MISC Historical Med      ferrous sulfate (IRON 325) 325 (65 Fe) MG tablet Take 325 mg by mouthHistorical Med      Elastic Bandages & Supports (JOBST KNEE HIGH COMPRESSION SM) MISC ONCE Starting Thu 10/3/2019, For 1 dose, Disp-1 each, R-3, PrintThigh high or knee high      potassium chloride (KLOR-CON M) 20 MEQ extended release tablet Take 20 mEq by mouth 2 times dailyHistorical Med      levOCARNitine fumarate (CARNITOR) 250 MG capsule Take 2 capsules by mouth 3 times daily, Disp-180 capsule, R-1Print      Lactobacillus (PROBIOTIC ACIDOPHILUS) TABS Take 1 tablet by mouth daily, Disp-30 tablet, R-0Normal      aspirin 81 MG tablet Take 81 mg by mouth dailyHistorical Med      b complex vitamins capsule Take 1 capsule by mouth dailyHistorical Med      atorvastatin (LIPITOR) 40 MG tablet Take 40 mg by mouth dailyHistorical Med vitamin D (CHOLECALCIFEROL) 1000 UNIT TABS tablet Take 1,000 Units by mouth dailyHistorical Med      pantoprazole (PROTONIX) 40 MG tablet Take 40 mg by mouth dailyHistorical Med      fenofibrate (TRICOR) 145 MG tablet Take 145 mg by mouth daily           Discharge Medication List as of 1/6/2021 12:19 PM            Significant Test Results    No results found. Consults:     IP CONSULT TO HOSPITALIST  IP CONSULT TO HOME CARE NEEDS    Labs: For convenience and continuity at follow-up the following most recent labs are provided:    Lab Results   Component Value Date    WBC 7.2 01/03/2021    HGB 8.5 01/03/2021    HCT 27.7 01/03/2021    MCV 87.9 01/03/2021     01/03/2021     01/06/2021    K 3.9 01/06/2021     01/06/2021    CO2 25 01/06/2021    BUN 27 01/06/2021    CREATININE 1.3 01/06/2021    CALCIUM 9.0 01/06/2021    PHOS 3.2 07/30/2019    TROPONINI <0.01 01/03/2021    ALKPHOS 50 01/03/2021    ALT 10 01/03/2021    AST 19 01/03/2021    BILITOT 0.7 01/03/2021    BILIDIR 0.8 07/21/2017    LABALBU 3.5 01/03/2021    LDLCALC 48 07/23/2019    TRIG 112 07/23/2019    LABA1C 4.6 09/09/2019     Lab Results   Component Value Date    INR 1.20 (H) 12/20/2018    INR 1.28 (H) 08/08/2017    INR 1.50 (H) 07/30/2017         The patient was seen and examined on day of discharge and this discharge summary is in conjunction with any daily progress note from day of discharge. Time spent on discharge is more than 30 minutes in the examination, evaluation, counseling and review of medications and discharge plan. Signed:    Mando Farrell MD   2/7/2021    Thank you Alan Balbuena MD for the opportunity to be involved in this patient's care. If you have any questions or concerns please feel free to contact my office [phone].

## 2021-01-06 NOTE — CARE COORDINATION
[facility]    DC order noted, all docs needed have been faxed to [facility] for home care services.     Home care to see patient within 24-48 hrs    Candy Box RN, BSN CTN  [facility] [phone]

## 2021-03-04 NOTE — PROGRESS NOTES
Cardiovascular Progress Note      Chief Complaint:   Chief Complaint   Patient presents with    Altered Mental Status     pt was at therapy and he had a syncopal episode with 20 minutes of not responding. Pt was awake in route. Pt was just here recently for the same thing     Impression/Recommendations:  Mr. Jose Eduardo Ramirez is a 68 y.o. male patient of Dr. Carole Martini at [facility]:     Recurrent syncope  UTI  Acute on chronic Diastolic HF (94-57% by June 2019), improving  SSS/Chronic AFib (Hx. CVA) , rate controlled , S/P Watchman   DM  HTN  HLD  CKD    Stable chronic anemia  Former tobacco        IV antibiotics   Continue ASA, Atorvastatin, Metoprolol, Losartan, Spironolactone  IV Lasix 20 mg bid- nearing euvolemia, may be able to switch to PO tomorrow (sooner if Nephrology prefers)  Monitor telemetry for progressive conduction disease/heart block. If no indication for pacer requirements inpatient, will look to  outpatient [facility] (will be first before discussing potential ILR)    Interval History:  In bed on RA- comfortable no complaints. Daughter wondering if he can \"get physical therapy to recreate it, every time passed out was sitting in wheelchair during PT\". Denies CP, SOB. Legs are tender.    Net negative 1.1 L    Pro-BNP 31,906 --> 10,196     Medications:    meropenem (MERREM) 1 g in sodium chloride 0.9 % 100 mL IVPB (mini-bag) Q12H   furosemide (LASIX) injection 20 mg BID   sodium chloride 0.9 % infusion    lidocaine (XYLOCAINE) 2 % jelly PRN   sodium chloride flush 0.9 % injection 10 mL 2 times per day   sodium chloride flush 0.9 % injection 10 mL PRN   magnesium hydroxide (MILK OF MAGNESIA) 400 MG/5ML suspension 30 mL Daily PRN   ondansetron (ZOFRAN) injection 4 mg Q6H PRN   enoxaparin (LOVENOX) injection 40 mg Daily   acetaminophen (TYLENOL) tablet 650 mg Q4H PRN   atorvastatin (LIPITOR) tablet 40 mg Daily   aspirin EC tablet 81 mg Daily   divalproex (DEPAKOTE) DR tablet 500 mg QAM   lactobacillus See Lactation Notes.   10.4* 10.0* 10.1*   HCT 31.0* 29.6* 30.8*   MCV 88.4 89.2 90.2    168 186     BMP: Recent Labs     07/26/19  1045 07/27/19  1121 07/28/19  0726    140 140   K 4.2 4.1 3.5   CL 98* 98* 99   CO2 27 33* 33*   PHOS  --  3.6 3.6   BUN 28* 34* 36*   CREATININE 2.1* 2.2* 2.0*   GFRAA 38* 36* 40*   MG  --  2.10  --      LFTS: Recent Labs     07/26/19  1045   ALT 9*   AST 33   ALKPHOS 44   PROT 5.7*   AGRATIO 0.4*   BILITOT 0.6     Cardiac Enzymes: Recent Labs     07/26/19  2041 Sundance [place] <0.01           Marcos Mao D.O., [facility]  Interventional Cardiology     o: [phone]  [facility] [employer] [facility] [address], [address]      NOTE:  This report was transcribed using voice recognition software. Every effort was made to ensure accuracy; however, inadvertent computerized transcription errors may be present.

## 2021-12-14 ENCOUNTER — HOSPITAL ENCOUNTER (OUTPATIENT)
Age: 71
Discharge: HOME | End: 2021-12-14
Payer: MEDICARE

## 2021-12-14 LAB
ALBUMIN SERPL-MCNC: 3.7 G/DL (ref 3.4–5)
ANION GAP SERPL CALCULATED.3IONS-SCNC: 14 MMOL/L (ref 3–16)
BACTERIA: ABNORMAL /HPF
BASOPHILS ABSOLUTE: 0.1 K/UL (ref 0–0.2)
BASOPHILS RELATIVE PERCENT: 1.1 %
BILIRUBIN URINE: NEGATIVE
BLOOD, URINE: ABNORMAL
BUN BLDV-MCNC: 20 MG/DL (ref 7–20)
CALCIUM SERPL-MCNC: 9.2 MG/DL (ref 8.3–10.6)
CHLORIDE BLD-SCNC: 103 MMOL/L (ref 99–110)
CLARITY: ABNORMAL
CO2: 23 MMOL/L (ref 21–32)
COLOR: ABNORMAL
CREAT SERPL-MCNC: 1.3 MG/DL (ref 0.8–1.3)
CREATININE URINE: 144.9 MG/DL (ref 39–259)
EOSINOPHILS ABSOLUTE: 0.1 K/UL (ref 0–0.6)
EOSINOPHILS RELATIVE PERCENT: 2 %
GFR AFRICAN AMERICAN: >60
GFR NON-AFRICAN AMERICAN: 54
GLUCOSE BLD-MCNC: 116 MG/DL (ref 70–99)
GLUCOSE URINE: NEGATIVE MG/DL
HCT VFR BLD CALC: 35.6 % (ref 40.5–52.5)
HEMOGLOBIN: 11.1 G/DL (ref 13.5–17.5)
KETONES, URINE: ABNORMAL MG/DL
LEUKOCYTE ESTERASE, URINE: ABNORMAL
LYMPHOCYTES ABSOLUTE: 1 K/UL (ref 1–5.1)
LYMPHOCYTES RELATIVE PERCENT: 18.3 %
MCH RBC QN AUTO: 25.6 PG (ref 26–34)
MCHC RBC AUTO-ENTMCNC: 31.3 G/DL (ref 31–36)
MCV RBC AUTO: 81.7 FL (ref 80–100)
MICROSCOPIC EXAMINATION: YES
MONOCYTES ABSOLUTE: 0.3 K/UL (ref 0–1.3)
MONOCYTES RELATIVE PERCENT: 5.5 %
NEUTROPHILS ABSOLUTE: 3.9 K/UL (ref 1.7–7.7)
NEUTROPHILS RELATIVE PERCENT: 73.1 %
NITRITE, URINE: NEGATIVE
PARATHYROID HORMONE INTACT: 62.9 PG/ML (ref 14–72)
PDW BLD-RTO: 19.4 % (ref 12.4–15.4)
PH UA: 6 (ref 5–8)
PHOSPHORUS: 3.8 MG/DL (ref 2.5–4.9)
PLATELET # BLD: 196 K/UL (ref 135–450)
PMV BLD AUTO: 8.9 FL (ref 5–10.5)
POTASSIUM SERPL-SCNC: 3.9 MMOL/L (ref 3.5–5.1)
PROTEIN PROTEIN: 120 MG/DL
PROTEIN UA: 30 MG/DL
PROTEIN/CREAT RATIO: 0.8 MG/DL
RBC # BLD: 4.35 M/UL (ref 4.2–5.9)
RBC UA: ABNORMAL /HPF (ref 0–4)
SODIUM BLD-SCNC: 140 MMOL/L (ref 136–145)
SPECIFIC GRAVITY UA: >=1.03 (ref 1–1.03)
URINE TYPE: ABNORMAL
UROBILINOGEN, URINE: 1 E.U./DL
VITAMIN D 25-HYDROXY: 28.8 NG/ML
WBC # BLD: 5.3 K/UL (ref 4–11)
WBC UA: >100 /HPF (ref 0–5)

## 2021-12-14 PROCEDURE — 85025 COMPLETE CBC W/AUTO DIFF WBC: CPT

## 2021-12-14 PROCEDURE — 82570 ASSAY OF URINE CREATININE: CPT

## 2021-12-14 PROCEDURE — 82306 VITAMIN D 25 HYDROXY: CPT

## 2021-12-14 PROCEDURE — 84156 ASSAY OF PROTEIN URINE: CPT

## 2021-12-14 PROCEDURE — 80069 RENAL FUNCTION PANEL: CPT

## 2021-12-14 PROCEDURE — 83970 ASSAY OF PARATHORMONE: CPT

## 2021-12-14 PROCEDURE — 81001 URINALYSIS AUTO W/SCOPE: CPT

## 2021-12-14 PROCEDURE — 36415 COLL VENOUS BLD VENIPUNCTURE: CPT

## 2022-07-13 ENCOUNTER — HOSPITAL ENCOUNTER (OUTPATIENT)
Age: 72
Discharge: HOME | End: 2022-07-13
Payer: MEDICARE

## 2022-07-13 LAB
BACTERIA: ABNORMAL /HPF
BILIRUBIN URINE: NEGATIVE
BLOOD, URINE: ABNORMAL
CLARITY: CLEAR
COLOR: YELLOW
EPITHELIAL CELLS, UA: ABNORMAL /HPF (ref 0–5)
GLUCOSE URINE: NEGATIVE MG/DL
KETONES, URINE: NEGATIVE MG/DL
LEUKOCYTE ESTERASE, URINE: ABNORMAL
MICROSCOPIC EXAMINATION: YES
NITRITE, URINE: POSITIVE
PH UA: 6 (ref 5–8)
PROTEIN UA: 30 MG/DL
RBC UA: ABNORMAL /HPF (ref 0–4)
SPECIFIC GRAVITY UA: 1.02 (ref 1–1.03)
URINE TYPE: ABNORMAL
UROBILINOGEN, URINE: 4 E.U./DL
WBC UA: ABNORMAL /HPF (ref 0–5)

## 2022-07-13 PROCEDURE — 80069 RENAL FUNCTION PANEL: CPT

## 2022-07-13 PROCEDURE — 36415 COLL VENOUS BLD VENIPUNCTURE: CPT

## 2022-07-13 PROCEDURE — 84156 ASSAY OF PROTEIN URINE: CPT

## 2022-07-13 PROCEDURE — 83970 ASSAY OF PARATHORMONE: CPT

## 2022-07-13 PROCEDURE — 81001 URINALYSIS AUTO W/SCOPE: CPT

## 2022-07-13 PROCEDURE — 82570 ASSAY OF URINE CREATININE: CPT

## 2022-07-13 PROCEDURE — 82306 VITAMIN D 25 HYDROXY: CPT

## 2022-07-13 PROCEDURE — 85027 COMPLETE CBC AUTOMATED: CPT

## 2022-07-14 LAB
ALBUMIN SERPL-MCNC: 3.6 G/DL (ref 3.4–5)
ANION GAP SERPL CALCULATED.3IONS-SCNC: 11 MMOL/L (ref 3–16)
BUN BLDV-MCNC: 20 MG/DL (ref 7–20)
CALCIUM SERPL-MCNC: 9.2 MG/DL (ref 8.3–10.6)
CHLORIDE BLD-SCNC: 105 MMOL/L (ref 99–110)
CO2: 25 MMOL/L (ref 21–32)
CREAT SERPL-MCNC: 1.7 MG/DL (ref 0.8–1.3)
CREATININE URINE: 126.5 MG/DL (ref 39–259)
GFR AFRICAN AMERICAN: 48
GFR NON-AFRICAN AMERICAN: 40
GLUCOSE BLD-MCNC: 114 MG/DL (ref 70–99)
HCT VFR BLD CALC: 32.3 % (ref 40.5–52.5)
HEMOGLOBIN: 10.2 G/DL (ref 13.5–17.5)
MCH RBC QN AUTO: 27.5 PG (ref 26–34)
MCHC RBC AUTO-ENTMCNC: 31.6 G/DL (ref 31–36)
MCV RBC AUTO: 87.2 FL (ref 80–100)
PARATHYROID HORMONE INTACT: 55.3 PG/ML (ref 14–72)
PDW BLD-RTO: 19.8 % (ref 12.4–15.4)
PHOSPHORUS: 3.8 MG/DL (ref 2.5–4.9)
PLATELET # BLD: 221 K/UL (ref 135–450)
PMV BLD AUTO: 8.8 FL (ref 5–10.5)
POTASSIUM SERPL-SCNC: 4.6 MMOL/L (ref 3.5–5.1)
PROTEIN PROTEIN: 67 MG/DL
PROTEIN/CREAT RATIO: 0.5 MG/DL
RBC # BLD: 3.7 M/UL (ref 4.2–5.9)
SODIUM BLD-SCNC: 141 MMOL/L (ref 136–145)
VITAMIN D 25-HYDROXY: 47.6 NG/ML
WBC # BLD: 5.4 K/UL (ref 4–11)

## 2022-07-15 ENCOUNTER — HOSPITAL ENCOUNTER (OUTPATIENT)
Age: 72
Setting detail: SPECIMEN
Discharge: HOME | End: 2022-07-15
Payer: MEDICARE

## 2022-07-15 PROCEDURE — 87088 URINE BACTERIA CULTURE: CPT

## 2022-07-15 PROCEDURE — 87086 URINE CULTURE/COLONY COUNT: CPT

## 2022-07-15 PROCEDURE — 87186 SC STD MICRODIL/AGAR DIL: CPT

## 2022-07-15 PROCEDURE — 87184 SC STD DISK METHOD PER PLATE: CPT

## 2022-07-18 LAB
ORGANISM: ABNORMAL
URINE CULTURE, ROUTINE: ABNORMAL

## 2022-07-19 ENCOUNTER — HOSPITAL ENCOUNTER (EMERGENCY)
Age: 72
Discharge: HOME | End: 2022-07-19
Attending: STUDENT IN AN ORGANIZED HEALTH CARE EDUCATION/TRAINING PROGRAM
Payer: MEDICARE

## 2022-07-19 VITALS
BODY MASS INDEX: 34 KG/M2 | HEART RATE: 78 BPM | OXYGEN SATURATION: 94 % | WEIGHT: 288 LBS | DIASTOLIC BLOOD PRESSURE: 74 MMHG | HEIGHT: 77 IN | RESPIRATION RATE: 18 BRPM | TEMPERATURE: 98.4 F | SYSTOLIC BLOOD PRESSURE: 117 MMHG

## 2022-07-19 DIAGNOSIS — N30.00 ACUTE CYSTITIS WITHOUT HEMATURIA: Primary | ICD-10-CM

## 2022-07-19 DIAGNOSIS — N18.9 CHRONIC KIDNEY DISEASE, UNSPECIFIED CKD STAGE: ICD-10-CM

## 2022-07-19 PROCEDURE — 99283 EMERGENCY DEPT VISIT LOW MDM: CPT

## 2022-07-19 RX ORDER — SULFAMETHOXAZOLE AND TRIMETHOPRIM 800; 160 MG/1; MG/1
1 TABLET ORAL 2 TIMES DAILY
Qty: 14 TABLET | Refills: 0 | Status: SHIPPED | OUTPATIENT
Start: 2022-07-19 | End: 2022-07-26

## 2022-07-19 ASSESSMENT — ENCOUNTER SYMPTOMS
VOMITING: 0
BACK PAIN: 0
COLOR CHANGE: 0
SORE THROAT: 0
ABDOMINAL PAIN: 0
SHORTNESS OF BREATH: 0
CONSTIPATION: 0
SINUS PAIN: 0
NAUSEA: 0
DIARRHEA: 0
COUGH: 0
CHEST TIGHTNESS: 0
BLOOD IN STOOL: 0

## 2022-07-19 NOTE — ED PROVIDER NOTES
[facility] Emergency Department       Date of evaluation: 7/19/2022    Chief Complaint     Urinary Tract Infection (Pt diagnosed with UTI by Dr Syl Mancini on 7/13. Pt hasnt started antibiotics, was sent by PCP for treatment. )      History of Present Illness     Jorje Nicolas is a 74 y.o. male who presents urinary tract infection. He is CKD patient followed by [facility]) nephrology. Per review of records patient had E. coli positive urinary tract infection from July 13, 2022. He was placed on Cipro however culture came back resistant to that antibiotic. Patient currently denies any symptoms. States that his urine appeared dark and that is why they tested his urine in the first place. Denies any fever, chills, nausea, vomiting, cough, chest pain, shortness of breath, abdominal pain, dysuria, hematuria, change in urinary frequency, diarrhea, constipation, or bloody stools. Reports prior UTIs. Patient reports that he feels in his normal health. He is eating and drinking normally. Review of Systems     Review of Systems   Constitutional:  Negative for activity change, chills, fatigue and fever. HENT:  Negative for congestion, sinus pain and sore throat. Eyes:  Negative for visual disturbance. Respiratory:  Negative for cough, chest tightness and shortness of breath. Cardiovascular:  Negative for chest pain, palpitations and leg swelling. Gastrointestinal:  Negative for abdominal pain, blood in stool, constipation, diarrhea, nausea and vomiting. Endocrine: Negative for polyphagia and polyuria. Genitourinary:  Negative for dysuria, flank pain, frequency and hematuria. Musculoskeletal:  Negative for back pain, myalgias and neck stiffness. Skin:  Negative for color change and rash. Neurological:  Negative for dizziness, weakness, light-headedness and headaches. Psychiatric/Behavioral:  Negative for confusion.       Past Medical, Surgical, Family, and Social History     He has a past medical history of Acute gastric ulcer with bleeding, Acute metabolic encephalopathy, MAISHA (acute kidney injury) ([facility] ATN (acute tubular necrosis) ([facility] Atrial fib/flutter, transient, Atrial fibrillation ([facility] BPH (benign prostatic hyperplasia), Cerebral artery occlusion with cerebral infarction ([facility] Diabetes mellitus ([facility] GI bleed, Glaucoma, History of blood transfusion, Hx of diabetic neuropathy, Hyperlipidemia, Hypertension, Mitral insufficiency, Mobility impaired, Multiple drug resistant organism (MDRO) culture positive, Pulmonary hypertension ([facility] and [facility] He has a past surgical history that includes knee surgery; Prostate biopsy; other surgical history (10/11/13); Upper gastrointestinal endoscopy (7/13 17); Dental surgery (N/A, 3/28/2019); and laparoscopy (N/A, 6/22/2019). His family history includes Diabetes in his brother; Early Death in his mother; Heart Attack in his father; Heart Disease in his brother; Other in his mother. He reports that he has been smoking cigarettes. He has a 10.00 pack-year smoking history. He has never used smokeless tobacco. He reports that he does not currently use alcohol. He reports that he does not use drugs.     Medications     Discharge Medication List as of 7/19/2022  2:18 PM        CONTINUE these medications which have NOT CHANGED    Details   QUEtiapine (SEROQUEL) 50 MG tablet Take 1 tablet by mouth 2 times daily, Disp-1 tablet, R-0Adjust Sig      losartan (COZAAR) 50 MG tablet Take 1 tablet by mouth daily, Disp-30 tablet, R-3Normal      metoprolol succinate (TOPROL XL) 50 MG extended release tablet Take 1 tablet by mouth daily, Disp-30 tablet, R-3Normal      amLODIPine (NORVASC) 10 MG tablet Take 1 tablet by mouth daily, Disp-30 tablet, R-3Normal      B Complex Vitamins (VITAMIN B COMPLEX 100 IJ) Take 1 tablet by mouth dailyHistorical Med      melatonin 5 MG TABS tablet Take 10 mg by mouthHistorical Med      venlafaxine (EFFEXOR XR) 150 MG extended release capsule Take 150 mg by mouth dailyHistorical Med      mesalamine (APRISO) 0.375 g extended release capsule Historical Med      cyanocobalamin 1000 MCG tablet Take by mouthHistorical Med      blood glucose test strips (ASCENSIA AUTODISC VI;ONE TOUCH ULTRA TEST VI) strip Historical Med      ONE TOUCH LANCETS MISC Historical Med      ferrous sulfate (IRON 325) 325 (65 Fe) MG tablet Take 325 mg by mouthHistorical Med      Elastic Bandages & Supports (JOBST KNEE HIGH COMPRESSION SM) MISC ONCE Starting Thu 10/3/2019, For 1 dose, Disp-1 each, R-3, PrintThigh high or knee high      potassium chloride (KLOR-CON M) 20 MEQ extended release tablet Take 20 mEq by mouth 2 times dailyHistorical Med      levOCARNitine fumarate (CARNITOR) 250 MG capsule Take 2 capsules by mouth 3 times daily, Disp-180 capsule, R-1Print      Lactobacillus (PROBIOTIC ACIDOPHILUS) TABS Take 1 tablet by mouth daily, Disp-30 tablet, R-0Normal      aspirin 81 MG tablet Take 81 mg by mouth dailyHistorical Med      b complex vitamins capsule Take 1 capsule by mouth dailyHistorical Med      atorvastatin (LIPITOR) 40 MG tablet Take 40 mg by mouth dailyHistorical Med      vitamin D (CHOLECALCIFEROL) 1000 UNIT TABS tablet Take 1,000 Units by mouth dailyHistorical Med      pantoprazole (PROTONIX) 40 MG tablet Take 40 mg by mouth dailyHistorical Med      fenofibrate (TRICOR) 145 MG tablet Take 145 mg by mouth daily             Allergies     He is allergic to sulfamethoxazole-trimethoprim. Physical Exam     INITIAL VITALS: BP: 117/74, Temp: 98.4 °F (36.9 °C), Heart Rate: 78, Resp: 18, SpO2: 94 %   Physical Exam  Vitals and nursing note reviewed. Constitutional:       General: He is not in acute distress. Appearance: Normal appearance. HENT:      Head: Normocephalic and atraumatic.       Right Ear: External ear normal.      Left Ear: External ear normal.      Nose: Nose normal.      Mouth/Throat: Mouth: Mucous membranes are moist.      Pharynx: Oropharynx is clear. Eyes:      Conjunctiva/sclera: Conjunctivae normal.   Cardiovascular:      Rate and Rhythm: Normal rate and regular rhythm. Pulses: Normal pulses. Heart sounds: Normal heart sounds. Pulmonary:      Effort: Pulmonary effort is normal.      Breath sounds: Normal breath sounds. Abdominal:      General: There is no distension. Palpations: Abdomen is soft. Tenderness: There is no abdominal tenderness. There is no right CVA tenderness, left CVA tenderness, guarding or rebound. Musculoskeletal:         General: No swelling. Cervical back: Normal range of motion and neck supple. Right lower leg: No edema. Left lower leg: No edema. Skin:     General: Skin is warm. Coloration: Skin is not jaundiced or pale. Findings: No rash. Neurological:      General: No focal deficit present. Mental Status: He is alert. Mental status is at baseline. Cranial Nerves: No cranial nerve deficit. Motor: No weakness. Gait: Gait normal.   Psychiatric:         Mood and Affect: Mood normal.       Diagnostic Results     EKG   NA    RADIOLOGY:  No orders to display       LABS:   No results found for this visit on 07/19/22. ED BEDSIDE ULTRASOUND:  No results found. RECENT VITALS:  BP: 117/74,Temp: 98.4 °F (36.9 °C), Heart Rate: 78, Resp: 18, SpO2: 94 %     Procedures     NA    ED Course     Nursing Notes, Past Medical Hx, Past Surgical Hx, Social Hx,Allergies, and Family Hx were reviewed. patient was given the following medications:  Orders Placed This Encounter   Medications    sulfamethoxazole-trimethoprim (BACTRIM DS;SEPTRA DS) 800-160 MG per tablet     Sig: Take 1 tablet by mouth in the morning and 1 tablet before bedtime. Do all this for 7 days.      Dispense:  14 tablet     Refill:  0       CONSULTS:  IP CONSULT TO [facility] [employer] / Arvind Young / Lashaun White [place] is a 74 y.o. male who presents with recently diagnosed urinary tract infection on July 13, 2022. Culture resistant to ciprofloxacin therefore was sent to emergency department for further evaluation. Patient presented with normal vitals. He was normotensive, afebrile, normal heart rate of 78, normal respiratory rate of 18 and satting at 94% on room air. He had no focal physical exam findings. Given reassuring presentation doubt underlying urosepsis. He has no fever and states that he feels in his normal state of health. He is tolerating eating and drinking therefore doubt underlying electrolyte abnormalities. He has no abdominal tenderness therefore doubt underlying acute surgical abdomen. I consulted with Dr. Caprice Simmonds with nephrology. States that given that patient is asymptomatic we could try starting him on oral Bactrim since culture is sensitive to that. Patient would like to pursue this option versus being admitted for IV antibiotics. I spoke with pharmacy who confirmed that we do not need to renally dose given that his creatinine clearance is 44. Patient states that he has had Bactrim in the past and denies any allergic reaction when taking this medication. Again patient remained hemodynamically stable and denies any symptoms. Therefore we felt patient was stable for discharge home. All questions and concerns were addressed. Strict return precautions reviewed. Clinical Impression     1. Acute cystitis without hematuria    2. Chronic kidney disease, unspecified CKD stage        Disposition     PATIENT REFERRED TO:  Nidia Maddox MD  [address] [place], [place] [id number] [phone]          DISCHARGE MEDICATIONS:  Discharge Medication List as of 7/19/2022  2:18 PM        START taking these medications    Details   sulfamethoxazole-trimethoprim (BACTRIM DS;SEPTRA DS) 800-160 MG per tablet Take 1 tablet by mouth in the morning and 1 tablet before bedtime.  Do all this for 7 days. , Disp-14 tablet, R-0Normal             DISPOSITION Decision To Discharge 07/19/2022 02:14:07 PM         Tamica Talbert MD  07/19/22 [id number]

## 2022-07-19 NOTE — DISCHARGE INSTRUCTIONS
Please return to the emergency department if you develop fever, vomiting, poor appetite, worsening abdominal pain, diarrhea or any new or concerning changes to your health.

## 2022-07-19 NOTE — ED NOTES
Please contact [facility]- called kidney and hypertension center for consult per Dr. Keith Galeazzi   RE: UTI  26- Dr. Clover Felty called back and spoke with Dr. Cipriano Luis  07/19/22 [phone]

## 2022-07-19 NOTE — ED NOTES
Discharge instructions reviewed with Pt. Pt verbalizes understanding at this time. Prescriptions/medications reviewed with pt at this time. Pt condition stable at this time. No concerns voiced.       Mary Patel RN  07/19/22 [id number]

## 2022-07-29 ENCOUNTER — HOSPITAL ENCOUNTER (OUTPATIENT)
Age: 72
Discharge: HOME | End: 2022-07-29
Payer: MEDICARE

## 2022-07-29 LAB
ALBUMIN SERPL-MCNC: 3.4 G/DL (ref 3.4–5)
ANION GAP SERPL CALCULATED.3IONS-SCNC: 9 MMOL/L (ref 3–16)
BUN BLDV-MCNC: 27 MG/DL (ref 7–20)
CALCIUM SERPL-MCNC: 9.5 MG/DL (ref 8.3–10.6)
CHLORIDE BLD-SCNC: 104 MMOL/L (ref 99–110)
CO2: 24 MMOL/L (ref 21–32)
CREAT SERPL-MCNC: 2.1 MG/DL (ref 0.8–1.3)
GFR AFRICAN AMERICAN: 37
GFR NON-AFRICAN AMERICAN: 31
GLUCOSE BLD-MCNC: 133 MG/DL (ref 70–99)
PHOSPHORUS: 3.7 MG/DL (ref 2.5–4.9)
POTASSIUM SERPL-SCNC: 5 MMOL/L (ref 3.5–5.1)
SODIUM BLD-SCNC: 137 MMOL/L (ref 136–145)

## 2022-07-29 PROCEDURE — 36415 COLL VENOUS BLD VENIPUNCTURE: CPT

## 2022-07-29 PROCEDURE — 80069 RENAL FUNCTION PANEL: CPT

## 2022-08-03 ENCOUNTER — HOSPITAL ENCOUNTER (OUTPATIENT)
Age: 72
Discharge: HOME | End: 2022-08-03
Payer: MEDICARE

## 2022-08-03 LAB
BILIRUBIN URINE: NEGATIVE
BLOOD, URINE: NEGATIVE
CLARITY: CLEAR
COLOR: YELLOW
CREATININE URINE: 23.7 MG/DL (ref 39–259)
GLUCOSE URINE: NEGATIVE MG/DL
KETONES, URINE: NEGATIVE MG/DL
LEUKOCYTE ESTERASE, URINE: NEGATIVE
MICROSCOPIC EXAMINATION: NORMAL
NITRITE, URINE: NEGATIVE
PH UA: 6 (ref 5–8)
PROTEIN PROTEIN: 7 MG/DL
PROTEIN UA: NEGATIVE MG/DL
PROTEIN/CREAT RATIO: 0.3 MG/DL
SPECIFIC GRAVITY UA: <=1.005 (ref 1–1.03)
URINE TYPE: NORMAL
UROBILINOGEN, URINE: 0.2 E.U./DL

## 2022-08-03 PROCEDURE — 82306 VITAMIN D 25 HYDROXY: CPT

## 2022-08-03 PROCEDURE — 85027 COMPLETE CBC AUTOMATED: CPT

## 2022-08-03 PROCEDURE — 81003 URINALYSIS AUTO W/O SCOPE: CPT

## 2022-08-03 PROCEDURE — 84156 ASSAY OF PROTEIN URINE: CPT

## 2022-08-03 PROCEDURE — 80069 RENAL FUNCTION PANEL: CPT

## 2022-08-03 PROCEDURE — 83970 ASSAY OF PARATHORMONE: CPT

## 2022-08-03 PROCEDURE — 87086 URINE CULTURE/COLONY COUNT: CPT

## 2022-08-03 PROCEDURE — 82570 ASSAY OF URINE CREATININE: CPT

## 2022-08-03 PROCEDURE — 36415 COLL VENOUS BLD VENIPUNCTURE: CPT

## 2022-08-04 LAB
ALBUMIN SERPL-MCNC: 3.7 G/DL (ref 3.4–5)
ANION GAP SERPL CALCULATED.3IONS-SCNC: 14 MMOL/L (ref 3–16)
BUN BLDV-MCNC: 30 MG/DL (ref 7–20)
CALCIUM SERPL-MCNC: 9.4 MG/DL (ref 8.3–10.6)
CHLORIDE BLD-SCNC: 100 MMOL/L (ref 99–110)
CO2: 21 MMOL/L (ref 21–32)
CREAT SERPL-MCNC: 1.7 MG/DL (ref 0.8–1.3)
GFR AFRICAN AMERICAN: 48
GFR NON-AFRICAN AMERICAN: 40
GLUCOSE BLD-MCNC: 82 MG/DL (ref 70–99)
HCT VFR BLD CALC: 32.5 % (ref 40.5–52.5)
HEMOGLOBIN: 10.3 G/DL (ref 13.5–17.5)
MCH RBC QN AUTO: 27 PG (ref 26–34)
MCHC RBC AUTO-ENTMCNC: 31.7 G/DL (ref 31–36)
MCV RBC AUTO: 85.2 FL (ref 80–100)
PARATHYROID HORMONE INTACT: 65.3 PG/ML (ref 14–72)
PDW BLD-RTO: 18.5 % (ref 12.4–15.4)
PHOSPHORUS: 4 MG/DL (ref 2.5–4.9)
PLATELET # BLD: 178 K/UL (ref 135–450)
PMV BLD AUTO: 8.8 FL (ref 5–10.5)
POTASSIUM SERPL-SCNC: 4 MMOL/L (ref 3.5–5.1)
RBC # BLD: 3.82 M/UL (ref 4.2–5.9)
SODIUM BLD-SCNC: 135 MMOL/L (ref 136–145)
URINE CULTURE, ROUTINE: NORMAL
VITAMIN D 25-HYDROXY: 46.6 NG/ML
WBC # BLD: 4.8 K/UL (ref 4–11)

## 2023-04-05 ENCOUNTER — APPOINTMENT (OUTPATIENT)
Dept: CT IMAGING | Age: 73
End: 2023-04-05
Payer: MEDICARE

## 2023-04-05 ENCOUNTER — HOSPITAL ENCOUNTER (EMERGENCY)
Age: 73
Discharge: ANOTHER HOSPITAL | End: 2023-04-05
Attending: STUDENT IN AN ORGANIZED HEALTH CARE EDUCATION/TRAINING PROGRAM
Payer: MEDICARE

## 2023-04-05 ENCOUNTER — HOSPITAL ENCOUNTER (INPATIENT)
Age: 73
LOS: 2 days | Discharge: HOME | End: 2023-04-07
Attending: PSYCHIATRY & NEUROLOGY | Admitting: PSYCHIATRY & NEUROLOGY
Payer: MEDICARE

## 2023-04-05 VITALS
DIASTOLIC BLOOD PRESSURE: 68 MMHG | BODY MASS INDEX: 32.02 KG/M2 | OXYGEN SATURATION: 95 % | TEMPERATURE: 98.3 F | HEART RATE: 56 BPM | WEIGHT: 270 LBS | SYSTOLIC BLOOD PRESSURE: 149 MMHG | RESPIRATION RATE: 17 BRPM

## 2023-04-05 DIAGNOSIS — N30.00 ACUTE CYSTITIS WITHOUT HEMATURIA: ICD-10-CM

## 2023-04-05 DIAGNOSIS — F31.10 BIPOLAR AFFECTIVE DISORDER, CURRENT EPISODE MANIC, CURRENT EPISODE SEVERITY UNSPECIFIED (HCC): Primary | ICD-10-CM

## 2023-04-05 LAB
ALBUMIN SERPL-MCNC: 4.1 G/DL (ref 3.4–5)
ALBUMIN/GLOB SERPL: 1.4 {RATIO} (ref 1.1–2.2)
ALP SERPL-CCNC: 37 U/L (ref 40–129)
ALT SERPL-CCNC: 9 U/L (ref 10–40)
AMPHETAMINES UR QL SCN>1000 NG/ML: NORMAL
ANION GAP SERPL CALCULATED.3IONS-SCNC: 14 MMOL/L (ref 3–16)
APAP SERPL-MCNC: <5 UG/ML (ref 10–30)
AST SERPL-CCNC: 23 U/L (ref 15–37)
BACTERIA URNS QL MICRO: ABNORMAL /HPF
BARBITURATES UR QL SCN>200 NG/ML: NORMAL
BASOPHILS # BLD: 0 K/UL (ref 0–0.2)
BASOPHILS NFR BLD: 0.8 %
BENZODIAZ UR QL SCN>200 NG/ML: NORMAL
BILIRUB SERPL-MCNC: 1.6 MG/DL (ref 0–1)
BILIRUB UR QL STRIP.AUTO: NEGATIVE
BUN SERPL-MCNC: 18 MG/DL (ref 7–20)
CALCIUM SERPL-MCNC: 9.1 MG/DL (ref 8.3–10.6)
CANNABINOIDS UR QL SCN>50 NG/ML: NORMAL
CHLORIDE SERPL-SCNC: 100 MMOL/L (ref 99–110)
CLARITY UR: ABNORMAL
CO2 SERPL-SCNC: 23 MMOL/L (ref 21–32)
COCAINE UR QL SCN: NORMAL
COLOR UR: YELLOW
CREAT SERPL-MCNC: 1.8 MG/DL (ref 0.8–1.3)
DEPRECATED RDW RBC AUTO: 18.5 % (ref 12.4–15.4)
DRUG SCREEN COMMENT UR-IMP: NORMAL
EOSINOPHIL # BLD: 0 K/UL (ref 0–0.6)
EOSINOPHIL NFR BLD: 0.7 %
EPI CELLS #/AREA URNS HPF: ABNORMAL /HPF (ref 0–5)
ETHANOLAMINE SERPL-MCNC: NORMAL MG/DL (ref 0–0.08)
FENTANYL SCREEN, URINE: NORMAL
FLUAV RNA RESP QL NAA+PROBE: NOT DETECTED
FLUBV RNA RESP QL NAA+PROBE: NOT DETECTED
GFR SERPLBLD CREATININE-BSD FMLA CKD-EPI: 39 ML/MIN/{1.73_M2}
GLUCOSE BLD-MCNC: 109 MG/DL (ref 70–99)
GLUCOSE SERPL-MCNC: 112 MG/DL (ref 70–99)
GLUCOSE UR STRIP.AUTO-MCNC: NEGATIVE MG/DL
HCT VFR BLD AUTO: 31.5 % (ref 40.5–52.5)
HGB BLD-MCNC: 10.4 G/DL (ref 13.5–17.5)
HGB UR QL STRIP.AUTO: ABNORMAL
KETONES UR STRIP.AUTO-MCNC: NEGATIVE MG/DL
LEUKOCYTE ESTERASE UR QL STRIP.AUTO: ABNORMAL
LYMPHOCYTES # BLD: 0.7 K/UL (ref 1–5.1)
LYMPHOCYTES NFR BLD: 13.6 %
MAGNESIUM SERPL-MCNC: 1.6 MG/DL (ref 1.8–2.4)
MCH RBC QN AUTO: 28 PG (ref 26–34)
MCHC RBC AUTO-ENTMCNC: 33 G/DL (ref 31–36)
MCV RBC AUTO: 84.7 FL (ref 80–100)
METHADONE UR QL SCN>300 NG/ML: NORMAL
MONOCYTES # BLD: 0.3 K/UL (ref 0–1.3)
MONOCYTES NFR BLD: 5.9 %
NEUTROPHILS # BLD: 4 K/UL (ref 1.7–7.7)
NEUTROPHILS NFR BLD: 79 %
NITRITE UR QL STRIP.AUTO: POSITIVE
OPIATES UR QL SCN>300 NG/ML: NORMAL
OXYCODONE UR QL SCN: NORMAL
PCP UR QL SCN>25 NG/ML: NORMAL
PERFORMED ON: ABNORMAL
PH UR STRIP.AUTO: 7.5 [PH] (ref 5–8)
PH UR STRIP: 7.5 [PH]
PLATELET # BLD AUTO: 165 K/UL (ref 135–450)
PMV BLD AUTO: 7.7 FL (ref 5–10.5)
POTASSIUM SERPL-SCNC: 3.2 MMOL/L (ref 3.5–5.1)
PROT SERPL-MCNC: 7 G/DL (ref 6.4–8.2)
PROT UR STRIP.AUTO-MCNC: ABNORMAL MG/DL
RBC # BLD AUTO: 3.72 M/UL (ref 4.2–5.9)
RBC #/AREA URNS HPF: ABNORMAL /HPF (ref 0–4)
RENAL EPI CELLS #/AREA UR COMP ASSIST: ABNORMAL /HPF (ref 0–1)
SALICYLATES SERPL-MCNC: <0.3 MG/DL (ref 15–30)
SARS-COV-2 RNA RESP QL NAA+PROBE: NOT DETECTED
SODIUM SERPL-SCNC: 137 MMOL/L (ref 136–145)
SP GR UR STRIP.AUTO: 1.01 (ref 1–1.03)
TROPONIN T SERPL-MCNC: <0.01 NG/ML
UA DIPSTICK W REFLEX MICRO PNL UR: YES
URN SPEC COLLECT METH UR: ABNORMAL
UROBILINOGEN UR STRIP-ACNC: 1 E.U./DL
WBC # BLD AUTO: 5 K/UL (ref 4–11)
WBC #/AREA URNS HPF: >100 /HPF (ref 0–5)

## 2023-04-05 PROCEDURE — 80053 COMPREHEN METABOLIC PANEL: CPT

## 2023-04-05 PROCEDURE — 82077 ASSAY SPEC XCP UR&BREATH IA: CPT

## 2023-04-05 PROCEDURE — 6370000000 HC RX 637 (ALT 250 FOR IP): Performed by: STUDENT IN AN ORGANIZED HEALTH CARE EDUCATION/TRAINING PROGRAM

## 2023-04-05 PROCEDURE — 87186 SC STD MICRODIL/AGAR DIL: CPT

## 2023-04-05 PROCEDURE — 80143 DRUG ASSAY ACETAMINOPHEN: CPT

## 2023-04-05 PROCEDURE — 80179 DRUG ASSAY SALICYLATE: CPT

## 2023-04-05 PROCEDURE — 87086 URINE CULTURE/COLONY COUNT: CPT

## 2023-04-05 PROCEDURE — 83735 ASSAY OF MAGNESIUM: CPT

## 2023-04-05 PROCEDURE — 81001 URINALYSIS AUTO W/SCOPE: CPT

## 2023-04-05 PROCEDURE — 93005 ELECTROCARDIOGRAM TRACING: CPT | Performed by: STUDENT IN AN ORGANIZED HEALTH CARE EDUCATION/TRAINING PROGRAM

## 2023-04-05 PROCEDURE — 70450 CT HEAD/BRAIN W/O DYE: CPT

## 2023-04-05 PROCEDURE — 87636 SARSCOV2 & INF A&B AMP PRB: CPT

## 2023-04-05 PROCEDURE — 87077 CULTURE AEROBIC IDENTIFY: CPT

## 2023-04-05 PROCEDURE — 80307 DRUG TEST PRSMV CHEM ANLYZR: CPT

## 2023-04-05 PROCEDURE — 84484 ASSAY OF TROPONIN QUANT: CPT

## 2023-04-05 PROCEDURE — 1240000000 HC EMOTIONAL WELLNESS R&B

## 2023-04-05 PROCEDURE — 85025 COMPLETE CBC W/AUTO DIFF WBC: CPT

## 2023-04-05 RX ORDER — CEPHALEXIN 500 MG/1
500 CAPSULE ORAL 4 TIMES DAILY
Qty: 28 CAPSULE | Refills: 0 | Status: ON HOLD | OUTPATIENT
Start: 2023-04-05 | End: 2023-04-07 | Stop reason: SDUPTHER

## 2023-04-05 RX ORDER — CEPHALEXIN 250 MG/1
500 CAPSULE ORAL ONCE
Status: COMPLETED | OUTPATIENT
Start: 2023-04-05 | End: 2023-04-05

## 2023-04-05 RX ORDER — CEPHALEXIN 500 MG/1
500 CAPSULE ORAL 4 TIMES DAILY
Qty: 28 CAPSULE | Refills: 0 | Status: SHIPPED | OUTPATIENT
Start: 2023-04-05 | End: 2023-04-05 | Stop reason: SDUPTHER

## 2023-04-05 RX ADMIN — CEPHALEXIN 500 MG: 250 CAPSULE ORAL at 15:28

## 2023-04-05 ASSESSMENT — PAIN SCALES - GENERAL: PAINLEVEL_OUTOF10: 0

## 2023-04-05 ASSESSMENT — PAIN - FUNCTIONAL ASSESSMENT: PAIN_FUNCTIONAL_ASSESSMENT: 0-10

## 2023-04-05 NOTE — ED NOTES
Accepting- [facility] unit  Dx bipolar currently manic  5 days  IP  [facility]     Silke Landeros  04/05/23 [id number]

## 2023-04-05 NOTE — VIRTUAL HEALTH
guardian if applicable) is aware that this is a billable service, which includes applicable co-pays. This virtual visit was conducted with patient's (and/or legal guardian's) consent. Patient identification was verified, and a caregiver was present when appropriate. The patient was located at [address]): [address] [place] [address]  ED  [address] [place] [address]: [phone]  The provider was located at Home (City/State): [place], [place]     Total time spent on this encounter: Not billed by time    --DANIEL Garcia on 4/5/2023 at 3:26 PM    An electronic signature was used to authenticate this note.
<300 ng/mL    PCP Screen, Urine Neg Negative <25 ng/mL    Methadone Screen, Urine Neg Negative <300 ng/mL    Oxycodone Urine Neg Negative <100 ng/ml    FENTANYL SCREEN, URINE Neg Negative <5 ng/mL    pH, UA 7.5     Drug Screen Comment: see below    COVID-19 & Influenza Combo    Collection Time: 04/05/23  2:13 PM    Specimen: Nasopharyngeal Swab   Result Value Ref Range    SARS-CoV-2 RNA, RT PCR NOT DETECTED NOT DETECTED    INFLUENZA A NOT DETECTED NOT DETECTED    INFLUENZA B NOT DETECTED NOT DETECTED   Microscopic Urinalysis    Collection Time: 04/05/23  2:13 PM   Result Value Ref Range    WBC, UA >100 (A) 0 - 5 /HPF    RBC, UA 3-4 0 - 4 /HPF    Epithelial Cells, UA 2-5 0 - 5 /HPF    Renal Epithelial, UA 0-1 0 - 1 /HPF    Bacteria, UA 3+ (A) None Seen /HPF       Medications:    (Verified psychiatric meds with daughter:   states pt takes 100 mg of Seroquel during the day and 50 mg at night, Effexor dose is correct)    No current facility-administered medications for this encounter.     Current Outpatient Medications:     QUEtiapine (SEROQUEL) 50 MG tablet, Take 1 tablet by mouth 2 times daily, Disp: 1 tablet, Rfl: 0    losartan (COZAAR) 50 MG tablet, Take 1 tablet by mouth daily, Disp: 30 tablet, Rfl: 3    metoprolol succinate (TOPROL XL) 50 MG extended release tablet, Take 1 tablet by mouth daily, Disp: 30 tablet, Rfl: 3    amLODIPine (NORVASC) 10 MG tablet, Take 1 tablet by mouth daily, Disp: 30 tablet, Rfl: 3    B Complex Vitamins (VITAMIN B COMPLEX 100 IJ), Take 1 tablet by mouth daily, Disp: , Rfl:     melatonin 5 MG TABS tablet, Take 10 mg by mouth, Disp: , Rfl:     venlafaxine (EFFEXOR XR) 150 MG extended release capsule, Take 150 mg by mouth daily, Disp: , Rfl:     mesalamine (APRISO) 0.375 g extended release capsule, , Disp: , Rfl:     cyanocobalamin 1000 MCG tablet, Take by mouth, Disp: , Rfl:     blood glucose test strips (ASCENSIA AUTODISC VI;ONE TOUCH ULTRA TEST VI) strip, Check glucose daily, Disp: ,

## 2023-04-05 NOTE — ED NOTES
Patient in bed with both side rails up for safety. Education on using call light before getting up provided. Bed alarm in place, fall wristband on.       Trice Marr RN  04/05/23 [id number]

## 2023-04-05 NOTE — ED PROVIDER NOTES
Supports (JOBST KNEE HIGH COMPRESSION SM) MISC 1 each by Does not apply route once for 1 dose Thigh high or knee high  Qty: 1 each, Refills: 3      potassium chloride (KLOR-CON M) 20 MEQ extended release tablet Take 20 mEq by mouth 2 times daily      levOCARNitine fumarate (CARNITOR) 250 MG capsule Take 2 capsules by mouth 3 times daily  Qty: 180 capsule, Refills: 1      Lactobacillus (PROBIOTIC ACIDOPHILUS) TABS Take 1 tablet by mouth daily  Qty: 30 tablet, Refills: 0      aspirin 81 MG tablet Take 81 mg by mouth daily      b complex vitamins capsule Take 1 capsule by mouth daily      atorvastatin (LIPITOR) 40 MG tablet Take 40 mg by mouth daily      vitamin D (CHOLECALCIFEROL) 1000 UNIT TABS tablet Take 1,000 Units by mouth daily      pantoprazole (PROTONIX) 40 MG tablet Take 40 mg by mouth daily      fenofibrate (TRICOR) 145 MG tablet Take 145 mg by mouth daily            SCREENINGS          [place] Coma Scale  Eye Opening: Spontaneous  Best Verbal Response: Oriented  Best Motor Response: Obeys commands  [place] Coma Scale Score: 15                CIWA Assessment  BP: (!) 144/111  Heart Rate: 70                  PHYSICAL EXAM :  ED Triage Vitals   BP Temp Temp src Pulse Resp SpO2 Height Weight   -- -- -- -- -- -- -- --      GENERAL APPEARANCE: Awake and alert. Cooperative. No acute distress. HEAD: Normocephalic. Atraumatic. EYES: PERRL. EOM's grossly intact. ENT: Mucous membranes are moist.   NECK: Supple, trachea midline. HEART: RRR. Normal S1, S2. No murmurs, rubs or gallops. LUNGS: Respirations unlabored. CTAB. Good air exchange. No wheezes, rales, or rhonchi. Speaking comfortably in full sentences. ABDOMEN: Soft. Non-distended. Non-tender. No guarding or rebound. Normal Bowel sounds. EXTREMITIES: No peripheral edema. A few scattered skin tears to bilateral upper extremities that do not appear infected. No acute deformities. SKIN: Warm and dry. No acute rashes.    NEUROLOGICAL: Alert and oriented

## 2023-04-06 PROBLEM — F31.10: Status: ACTIVE | Noted: 2023-04-06

## 2023-04-06 PROBLEM — I25.9 CHRONIC ISCHEMIC HEART DISEASE: Status: ACTIVE | Noted: 2023-04-06

## 2023-04-06 PROBLEM — N30.00 ACUTE CYSTITIS WITHOUT HEMATURIA: Status: ACTIVE | Noted: 2017-08-06

## 2023-04-06 PROBLEM — I48.20 CHRONIC A-FIB (HCC): Status: ACTIVE | Noted: 2017-07-04

## 2023-04-06 LAB
EKG ATRIAL RATE: 80 BPM
EKG DIAGNOSIS: NORMAL
EKG Q-T INTERVAL: 460 MS
EKG QRS DURATION: 144 MS
EKG QTC CALCULATION (BAZETT): 527 MS
EKG R AXIS: -66 DEGREES
EKG T AXIS: 61 DEGREES
EKG VENTRICULAR RATE: 79 BPM

## 2023-04-06 PROCEDURE — 6370000000 HC RX 637 (ALT 250 FOR IP): Performed by: PSYCHIATRY & NEUROLOGY

## 2023-04-06 PROCEDURE — 1240000000 HC EMOTIONAL WELLNESS R&B

## 2023-04-06 PROCEDURE — 93010 ELECTROCARDIOGRAM REPORT: CPT | Performed by: INTERNAL MEDICINE

## 2023-04-06 RX ORDER — CEPHALEXIN 500 MG/1
500 CAPSULE ORAL EVERY 6 HOURS SCHEDULED
Status: DISCONTINUED | OUTPATIENT
Start: 2023-04-06 | End: 2023-04-07 | Stop reason: HOSPADM

## 2023-04-06 RX ORDER — LACTOBACILLUS RHAMNOSUS GG 10B CELL
1 CAPSULE ORAL DAILY
Status: DISCONTINUED | OUTPATIENT
Start: 2023-04-06 | End: 2023-04-07 | Stop reason: HOSPADM

## 2023-04-06 RX ORDER — OLANZAPINE 10 MG/1
2.5 INJECTION, POWDER, LYOPHILIZED, FOR SOLUTION INTRAMUSCULAR 3 TIMES DAILY PRN
Status: DISCONTINUED | OUTPATIENT
Start: 2023-04-06 | End: 2023-04-06

## 2023-04-06 RX ORDER — VITAMIN B COMPLEX
1000 TABLET ORAL DAILY
Status: DISCONTINUED | OUTPATIENT
Start: 2023-04-06 | End: 2023-04-07 | Stop reason: HOSPADM

## 2023-04-06 RX ORDER — LOSARTAN POTASSIUM 25 MG/1
50 TABLET ORAL DAILY
Status: DISCONTINUED | OUTPATIENT
Start: 2023-04-06 | End: 2023-04-07 | Stop reason: HOSPADM

## 2023-04-06 RX ORDER — MESALAMINE 400 MG/1
800 CAPSULE, DELAYED RELEASE ORAL 3 TIMES DAILY
Status: DISCONTINUED | OUTPATIENT
Start: 2023-04-06 | End: 2023-04-07 | Stop reason: HOSPADM

## 2023-04-06 RX ORDER — QUETIAPINE FUMARATE 25 MG/1
50 TABLET, FILM COATED ORAL 2 TIMES DAILY PRN
Status: DISCONTINUED | OUTPATIENT
Start: 2023-04-06 | End: 2023-04-07 | Stop reason: HOSPADM

## 2023-04-06 RX ORDER — ATORVASTATIN CALCIUM 40 MG/1
40 TABLET, FILM COATED ORAL DAILY
Status: DISCONTINUED | OUTPATIENT
Start: 2023-04-06 | End: 2023-04-07 | Stop reason: HOSPADM

## 2023-04-06 RX ORDER — OLANZAPINE 5 MG/1
2.5 TABLET ORAL 3 TIMES DAILY PRN
Status: DISCONTINUED | OUTPATIENT
Start: 2023-04-06 | End: 2023-04-06

## 2023-04-06 RX ORDER — MECOBALAMIN 5000 MCG
10 TABLET,DISINTEGRATING ORAL NIGHTLY
Status: DISCONTINUED | OUTPATIENT
Start: 2023-04-06 | End: 2023-04-07 | Stop reason: HOSPADM

## 2023-04-06 RX ORDER — FERROUS SULFATE 325(65) MG
325 TABLET ORAL
Status: DISCONTINUED | OUTPATIENT
Start: 2023-04-07 | End: 2023-04-07 | Stop reason: HOSPADM

## 2023-04-06 RX ORDER — MESALAMINE 0.38 G/1
0.38 CAPSULE, EXTENDED RELEASE ORAL DAILY
Status: DISCONTINUED | OUTPATIENT
Start: 2023-04-06 | End: 2023-04-06 | Stop reason: CLARIF

## 2023-04-06 RX ORDER — METOPROLOL SUCCINATE 50 MG/1
50 TABLET, EXTENDED RELEASE ORAL DAILY
Status: DISCONTINUED | OUTPATIENT
Start: 2023-04-06 | End: 2023-04-07 | Stop reason: HOSPADM

## 2023-04-06 RX ORDER — VITAMIN B COMPLEX
1 CAPSULE ORAL DAILY
Status: DISCONTINUED | OUTPATIENT
Start: 2023-04-06 | End: 2023-04-06

## 2023-04-06 RX ORDER — FENOFIBRATE 145 MG/1
145 TABLET, COATED ORAL DAILY
Status: DISCONTINUED | OUTPATIENT
Start: 2023-04-06 | End: 2023-04-06 | Stop reason: CLARIF

## 2023-04-06 RX ORDER — ACETAMINOPHEN 325 MG/1
650 TABLET ORAL EVERY 8 HOURS PRN
Status: DISCONTINUED | OUTPATIENT
Start: 2023-04-06 | End: 2023-04-07 | Stop reason: HOSPADM

## 2023-04-06 RX ORDER — ASPIRIN 81 MG/1
81 TABLET ORAL DAILY
Status: DISCONTINUED | OUTPATIENT
Start: 2023-04-06 | End: 2023-04-07 | Stop reason: HOSPADM

## 2023-04-06 RX ORDER — POTASSIUM CHLORIDE 20 MEQ/1
20 TABLET, EXTENDED RELEASE ORAL 2 TIMES DAILY
Status: DISCONTINUED | OUTPATIENT
Start: 2023-04-06 | End: 2023-04-07 | Stop reason: HOSPADM

## 2023-04-06 RX ORDER — PANTOPRAZOLE SODIUM 40 MG/1
40 TABLET, DELAYED RELEASE ORAL DAILY
Status: DISCONTINUED | OUTPATIENT
Start: 2023-04-06 | End: 2023-04-07 | Stop reason: HOSPADM

## 2023-04-06 RX ORDER — CHOLECALCIFEROL (VITAMIN D3) 125 MCG
1000 CAPSULE ORAL DAILY
Status: DISCONTINUED | OUTPATIENT
Start: 2023-04-06 | End: 2023-04-07 | Stop reason: HOSPADM

## 2023-04-06 RX ORDER — AMLODIPINE BESYLATE 5 MG/1
10 TABLET ORAL DAILY
Status: DISCONTINUED | OUTPATIENT
Start: 2023-04-06 | End: 2023-04-07 | Stop reason: HOSPADM

## 2023-04-06 RX ORDER — FENOFIBRATE 160 MG/1
160 TABLET ORAL DAILY
Status: DISCONTINUED | OUTPATIENT
Start: 2023-04-06 | End: 2023-04-07 | Stop reason: HOSPADM

## 2023-04-06 RX ORDER — QUETIAPINE FUMARATE 25 MG/1
50 TABLET, FILM COATED ORAL 2 TIMES DAILY
Status: DISCONTINUED | OUTPATIENT
Start: 2023-04-06 | End: 2023-04-07 | Stop reason: HOSPADM

## 2023-04-06 RX ADMIN — POTASSIUM CHLORIDE 20 MEQ: 1500 TABLET, EXTENDED RELEASE ORAL at 13:32

## 2023-04-06 RX ADMIN — Medication 1000 UNITS: at 13:46

## 2023-04-06 RX ADMIN — QUETIAPINE FUMARATE 50 MG: 25 TABLET ORAL at 13:32

## 2023-04-06 RX ADMIN — Medication 1 CAPSULE: at 13:32

## 2023-04-06 RX ADMIN — MESALAMINE 800 MG: 400 CAPSULE, DELAYED RELEASE ORAL at 21:20

## 2023-04-06 RX ADMIN — FENOFIBRATE 160 MG: 160 TABLET ORAL at 13:46

## 2023-04-06 RX ADMIN — CEPHALEXIN 500 MG: 500 CAPSULE ORAL at 17:04

## 2023-04-06 RX ADMIN — CEPHALEXIN 500 MG: 500 CAPSULE ORAL at 23:34

## 2023-04-06 RX ADMIN — ASPIRIN 81 MG: 81 TABLET, COATED ORAL at 13:32

## 2023-04-06 RX ADMIN — ATORVASTATIN CALCIUM 40 MG: 40 TABLET, FILM COATED ORAL at 13:32

## 2023-04-06 RX ADMIN — AMLODIPINE BESYLATE 10 MG: 5 TABLET ORAL at 13:33

## 2023-04-06 RX ADMIN — Medication 10 MG: at 21:20

## 2023-04-06 RX ADMIN — CYANOCOBALAMIN TAB 500 MCG 1000 MCG: 500 TAB at 13:32

## 2023-04-06 RX ADMIN — METOPROLOL SUCCINATE 50 MG: 50 TABLET, EXTENDED RELEASE ORAL at 13:32

## 2023-04-06 RX ADMIN — MESALAMINE 800 MG: 400 CAPSULE, DELAYED RELEASE ORAL at 17:04

## 2023-04-06 RX ADMIN — POTASSIUM CHLORIDE 20 MEQ: 1500 TABLET, EXTENDED RELEASE ORAL at 21:21

## 2023-04-06 RX ADMIN — LOSARTAN POTASSIUM 50 MG: 25 TABLET, FILM COATED ORAL at 13:33

## 2023-04-06 RX ADMIN — QUETIAPINE FUMARATE 50 MG: 25 TABLET ORAL at 21:20

## 2023-04-06 RX ADMIN — PANTOPRAZOLE SODIUM 40 MG: 40 TABLET, DELAYED RELEASE ORAL at 13:33

## 2023-04-06 SDOH — ECONOMIC STABILITY: TRANSPORTATION INSECURITY
IN THE PAST 12 MONTHS, HAS THE LACK OF TRANSPORTATION KEPT YOU FROM MEDICAL APPOINTMENTS OR FROM GETTING MEDICATIONS?: NO

## 2023-04-06 SDOH — ECONOMIC STABILITY: HOUSING INSECURITY
IN THE LAST 12 MONTHS, WAS THERE A TIME WHEN YOU DID NOT HAVE A STEADY PLACE TO SLEEP OR SLEPT IN A SHELTER (INCLUDING NOW)?: NO

## 2023-04-06 SDOH — ECONOMIC STABILITY: INCOME INSECURITY: IN THE LAST 12 MONTHS, WAS THERE A TIME WHEN YOU WERE NOT ABLE TO PAY THE MORTGAGE OR RENT ON TIME?: NO

## 2023-04-06 SDOH — SOCIAL STABILITY: SOCIAL INSECURITY: WITHIN THE LAST YEAR, HAVE YOU BEEN HUMILIATED OR EMOTIONALLY ABUSED IN OTHER WAYS BY YOUR PARTNER OR EX-PARTNER?: NO

## 2023-04-06 SDOH — SOCIAL STABILITY: SOCIAL INSECURITY
WITHIN THE LAST YEAR, HAVE YOU BEEN KICKED, HIT, SLAPPED, OR OTHERWISE PHYSICALLY HURT BY YOUR PARTNER OR EX-PARTNER?: NO

## 2023-04-06 SDOH — SOCIAL STABILITY: SOCIAL INSECURITY
WITHIN THE LAST YEAR, HAVE TO BEEN RAPED OR FORCED TO HAVE ANY KIND OF SEXUAL ACTIVITY BY YOUR PARTNER OR EX-PARTNER?: NO

## 2023-04-06 SDOH — HEALTH STABILITY: MENTAL HEALTH: HOW MANY STANDARD DRINKS CONTAINING ALCOHOL DO YOU HAVE ON A TYPICAL DAY?: 1 OR 2

## 2023-04-06 SDOH — ECONOMIC STABILITY: TRANSPORTATION INSECURITY
IN THE PAST 12 MONTHS, HAS LACK OF TRANSPORTATION KEPT YOU FROM MEETINGS, WORK, OR FROM GETTING THINGS NEEDED FOR DAILY LIVING?: NO

## 2023-04-06 SDOH — ECONOMIC STABILITY: FOOD INSECURITY: WITHIN THE PAST 12 MONTHS, THE FOOD YOU BOUGHT JUST DIDN'T LAST AND YOU DIDN'T HAVE MONEY TO GET MORE.: NEVER TRUE

## 2023-04-06 SDOH — HEALTH STABILITY: MENTAL HEALTH
STRESS IS WHEN SOMEONE FEELS TENSE, NERVOUS, ANXIOUS, OR CAN'T SLEEP AT NIGHT BECAUSE THEIR MIND IS TROUBLED. HOW STRESSED ARE YOU?: ONLY A LITTLE

## 2023-04-06 SDOH — ECONOMIC STABILITY: INCOME INSECURITY: HOW HARD IS IT FOR YOU TO PAY FOR THE VERY BASICS LIKE FOOD, HOUSING, MEDICAL CARE, AND HEATING?: NOT VERY HARD

## 2023-04-06 SDOH — SOCIAL STABILITY: SOCIAL INSECURITY: WITHIN THE LAST YEAR, HAVE YOU BEEN AFRAID OF YOUR PARTNER OR EX-PARTNER?: NO

## 2023-04-06 SDOH — ECONOMIC STABILITY: HOUSING INSECURITY: IN THE LAST 12 MONTHS, HOW MANY PLACES HAVE YOU LIVED?: 1

## 2023-04-06 SDOH — ECONOMIC STABILITY: FOOD INSECURITY: WITHIN THE PAST 12 MONTHS, YOU WORRIED THAT YOUR FOOD WOULD RUN OUT BEFORE YOU GOT MONEY TO BUY MORE.: NEVER TRUE

## 2023-04-06 SDOH — HEALTH STABILITY: MENTAL HEALTH: HOW OFTEN DO YOU HAVE A DRINK CONTAINING ALCOHOL?: 2-4 TIMES A MONTH

## 2023-04-06 ASSESSMENT — SLEEP AND FATIGUE QUESTIONNAIRES
DO YOU USE A SLEEP AID: YES
DO YOU USE A SLEEP AID: YES
DO YOU HAVE DIFFICULTY SLEEPING: YES
SLEEP PATTERN: DIFFICULTY FALLING ASLEEP;DISTURBED/INTERRUPTED SLEEP
SLEEP PATTERN: DIFFICULTY FALLING ASLEEP;DISTURBED/INTERRUPTED SLEEP
AVERAGE NUMBER OF SLEEP HOURS: 6
DO YOU HAVE DIFFICULTY SLEEPING: YES
AVERAGE NUMBER OF SLEEP HOURS: 6

## 2023-04-06 NOTE — PLAN OF CARE
Problem: Chronic Conditions and Co-morbidities  Goal: Patient's chronic conditions and co-morbidity symptoms are monitored and maintained or improved  4/6/2023 0233 by Fili Fonseca RN  Outcome: Progressing     Problem: Risk for Elopement  Goal: Patient will not exit the unit/facility without proper excort  4/6/2023 0233 by Fili Fonseca RN  Outcome: Progressing     Problem: Safety - Adult  Goal: Free from fall injury  4/6/2023 0233 by Fili Fonseca RN  Outcome: Progressing     Problem: ABCDS Injury Assessment  Goal: Absence of physical injury  4/6/2023 0233 by Fili Fonseca RN  Outcome: Progressing     Problem: Involuntary Admit  Goal: Will cooperate with staff recommendations and doctor's orders and will demonstrate appropriate behavior  Description: INTERVENTIONS:  1. Treat underlying conditions and offer medication as ordered  2. Educate regarding involuntary admission procedures and rules  3.  Contain excessive/inappropriate behavior per unit and hospital policies  Outcome: Progressing

## 2023-04-06 NOTE — PLAN OF CARE
Pt. Is alert and oriented x 3, hyperverbal, pleasant and friendly, withdrawn to bedroom, good appetite, denies SI, HI, AVH.

## 2023-04-06 NOTE — H&P
[address]                 [address] [address]                              HISTORY AND PHYSICAL    PATIENT NAME: Jamey Singh                     :        1950  MED REC NO:   [id number]                          ROOM:       2206  ACCOUNT NO:   [de-identified]                           ADMIT DATE: 2023  PROVIDER:     Errol Mahan MD    PSYCHIATRY HISTORY AND PHYSICAL    IDENTIFICATION:  This is a domiciled, , retired 64-year-old with  history of bipolar disorder and multiple chronic medical conditions who  was brought to [facility] Emergency Department by daughter with  increasing manic symptoms. SOURCES OF INFORMATION:  The patient. Focused record review. CHIEF COMPLAINT:  \"I can drive better than anybody. \"    HISTORY OF PRESENT ILLNESSES:  According to the emergency department   note, he was brought to [facility] ER by his daughter with changes in  mental state. He had become more agitated, elevated, and \"charged up. \"   Evidently at one point, he got into a stranger's car and was dropped off  at a dealership were he bought a car. Even though he does not have an active  's license, he was driving it around. She had police  involved to help locate him. He presents tangential, elevated, and energetic, but  goal-directable. He says he feels like living again. He does  not see the harm in going on into the community, meeting people, and  buying a car. He does not present with symptoms of psychosis or   other symptoms of fan such as pressured speech, severely delusional  thought, or irritability. He is able to talk about recent events in an organized and coherent  manner. PSYCHIATRIC REVIEW OF SYSTEMS:  As above. STRESSORS:  \"No, not really. \"    PAST PSYCHIATRIC HISTORY:  Per patient, hospitalized 5 years ago at [facility] he was there for about 3 weeks. No other hospitalizations.    No

## 2023-04-06 NOTE — CARE COORDINATION
problems No  (None reported)   Medical and Self-Care Issues   Relevant medical problems UTI, patient has diabetes and used metformin (no longer using),  almost bled together due to medical condition required surgery, kidney failure and underwent dialysis, history of strokes. Heart problems: has a stint and Afib (has a watchmaker). Has stint in leg. Elevated blood pressue (under control with medications). Relevant self-care issues None reported   Barriers to treatment No   Family Constellation   Spouse/partner-name/age Issac Garces (21)   Children-names/ages Rachel (48), Andre (52), Radha Sr (08), [place] (47),Nadir (47), Debo Cortez (39) Princess Gregory (66)   Parents Saul Leigh and John euceda   Siblings Neel Bacon, Brice, Veronika Butler, Rakan Ulloa, and [facility]   Support services Other(Comment)  (Did have [employer] services but they ended a month ago.)   Childhood   Raised by Biological mother;Biological father   Biological mother John euceda (mom passed when patient was 19)   Biological father Saul Leigh   History of abuse No  (None reported)   Legal History   Legal history No  (None reported)   Juvenile legal history No  (None reported)   Abuse Assessment   Physical Abuse Denies   Verbal Abuse Denies   Emotional abuse Denies   Financial Abuse Denies   Sexual abuse Denies   Possible abuse reported to None needed   Substance Use   Use of substances  Yes   Substance 1   Substance used Alcohol   Amount/frequency/route Occasional use, on holidays/special occasions. Drinks whiskey   Age of first use 23   Last use/History a few days ago   Motivation for SA Treatment   Stage of engagement Pre-engagement/engagement  (Patient reports not needing TINEO treatment at this time.)   Motivation for treatment No  (Patient reports not needing TINEO treatment at this time.)   Current barriers to treatment   (Patient reports not needing TINEO treatment at this time.)   Comment Patient reports not needing TINEO treatment at this time.    Education   Education College graduate  (Bachelors in

## 2023-04-06 NOTE — GROUP NOTE
Group Therapy Note    Date: 4/6/2023    Group Start Time: 1100  Group End Time: 6568  Group Topic: Orientation Group    [facility]        Group Therapy Note    Group members engaged in semi-structured social group, utilizing conversation ball to discuss multiple topics and categories of cooking, diets, international cuisines, preferences in meals. Attendees: 5         Notes:  Mindy Caldera did not attend group, verbalized preference to stay in bed and continue resting.       Discipline Responsible: Psychoeducational Specialist      Signature:  Bill Uriarte MM, MT-BC

## 2023-04-06 NOTE — H&P
Hospital Medicine History & Physical      PCP: Sagrario Church MD    Date of Admission: 4/5/2023    Date of Service: Pt seen/examined on 4/6/2023     Chief Complaint:  AMS      History Of Present Illness: The patient is a 74 y.o. male with a-fib, ischemic heart disease, GERD, h/o CVA, Glaucoma, h/o GI Bleed, BPH, h/o traumatic hemorrhage who presented to [facility] with complaint of AMS. Patient was seen and evaluated in the ED by the ED medical provider, patient was medically cleared for admission to [facility]. This note serves as an admission medical H&P.    PCP: Sagrario Church MD    Tobacco use: occasional cigar  ETOH use: occasional, holidays/birthdays. Illicit drug use: denies    Patient denies any symptoms/complaints.     Past Medical History:        Diagnosis Date    Acute gastric ulcer with bleeding     Acute metabolic encephalopathy     MAISHA (acute kidney injury) ([facility]     Requiring dialysis since 7/4/17 in setting of hemorrhagic shock    ATN (acute tubular necrosis) (HCC)     Atrial fib/flutter, transient     Atrial fibrillation (HCC)     BPH (benign prostatic hyperplasia)     Cerebral artery occlusion with cerebral infarction ([facility] [facility] 08/05/2017    weakness and aphasia    Diabetes mellitus (HCC)     GI bleed     Glaucoma     History of blood transfusion     Hx of diabetic neuropathy     Hyperlipidemia     Hypertension     Mitral insufficiency     Mobility impaired     uses walker and W/C    Multiple drug resistant organism (MDRO) culture positive 12/29/2020    urine    Pulmonary hypertension (HCC)     Traumatic hemorrhagic shock ([facility]        Past Surgical History:        Procedure Laterality Date    DENTAL SURGERY N/A 3/28/2019    EXTRACTION OF ALL REMAINING UPPER AND LOWER TEETH AND ALVEOLOPLASTY   performed by Jyothi Machado DMD at [address] [place] [address] N/A 6/22/2019    LAPAROSCOPIC, CONVERTED TO OPEN INCARCERATED UMBILICAL HERNIA REPAIR performed by Radha Perdomo MD at [place] [address]

## 2023-04-07 VITALS
RESPIRATION RATE: 16 BRPM | SYSTOLIC BLOOD PRESSURE: 168 MMHG | DIASTOLIC BLOOD PRESSURE: 86 MMHG | HEIGHT: 77 IN | HEART RATE: 64 BPM | WEIGHT: 270 LBS | OXYGEN SATURATION: 100 % | TEMPERATURE: 98.3 F | BODY MASS INDEX: 31.88 KG/M2

## 2023-04-07 PROCEDURE — 6370000000 HC RX 637 (ALT 250 FOR IP): Performed by: PSYCHIATRY & NEUROLOGY

## 2023-04-07 RX ORDER — CEPHALEXIN 500 MG/1
500 CAPSULE ORAL 4 TIMES DAILY
Qty: 28 CAPSULE | Refills: 0 | Status: SHIPPED | OUTPATIENT
Start: 2023-04-07 | End: 2023-04-14

## 2023-04-07 RX ADMIN — LOSARTAN POTASSIUM 50 MG: 25 TABLET, FILM COATED ORAL at 08:06

## 2023-04-07 RX ADMIN — FERROUS SULFATE TAB 325 MG (65 MG ELEMENTAL FE) 325 MG: 325 (65 FE) TAB at 08:05

## 2023-04-07 RX ADMIN — AMLODIPINE BESYLATE 10 MG: 5 TABLET ORAL at 08:04

## 2023-04-07 RX ADMIN — PANTOPRAZOLE SODIUM 40 MG: 40 TABLET, DELAYED RELEASE ORAL at 08:05

## 2023-04-07 RX ADMIN — Medication 1000 UNITS: at 08:04

## 2023-04-07 RX ADMIN — ATORVASTATIN CALCIUM 40 MG: 40 TABLET, FILM COATED ORAL at 08:05

## 2023-04-07 RX ADMIN — METOPROLOL SUCCINATE 50 MG: 50 TABLET, EXTENDED RELEASE ORAL at 08:05

## 2023-04-07 RX ADMIN — MESALAMINE 800 MG: 400 CAPSULE, DELAYED RELEASE ORAL at 08:05

## 2023-04-07 RX ADMIN — FENOFIBRATE 160 MG: 160 TABLET ORAL at 08:06

## 2023-04-07 RX ADMIN — QUETIAPINE FUMARATE 50 MG: 25 TABLET ORAL at 08:06

## 2023-04-07 RX ADMIN — CEPHALEXIN 500 MG: 500 CAPSULE ORAL at 05:03

## 2023-04-07 RX ADMIN — Medication 1 CAPSULE: at 08:04

## 2023-04-07 RX ADMIN — ASPIRIN 81 MG: 81 TABLET, COATED ORAL at 08:05

## 2023-04-07 RX ADMIN — CEPHALEXIN 500 MG: 500 CAPSULE ORAL at 11:57

## 2023-04-07 RX ADMIN — POTASSIUM CHLORIDE 20 MEQ: 1500 TABLET, EXTENDED RELEASE ORAL at 08:06

## 2023-04-07 RX ADMIN — CYANOCOBALAMIN TAB 500 MCG 1000 MCG: 500 TAB at 08:05

## 2023-04-07 NOTE — PLAN OF CARE
Problem: Risk for Elopement  Goal: Patient will not exit the unit/facility without proper excort  4/6/2023 2211 by Jefe Barcenas LPN  Outcome: Progressing     Problem: Involuntary Admit  Goal: Will cooperate with staff recommendations and doctor's orders and will demonstrate appropriate behavior  Description: INTERVENTIONS:  1. Treat underlying conditions and offer medication as ordered  2. Educate regarding involuntary admission procedures and rules  3. Contain excessive/inappropriate behavior per unit and hospital policies  8/5/4987 9204 by Jefe Barcenas LPN  Outcome: Progressing   Patient remains in his room pleasant and friendly when approached. Cooperative with care. Patient was up set that he daughter did not bring clothes in and stated that she was to be her at any minute. Reoriented patient to place and informed him that he would be discharged tomorrow and daughter would be up then. Speech remains rapid when talking and does bounce from one subject to the next quickly. Denies SI, HI, or AVH this evening. Patient given bath wipes and clean pants and gown at his request to clean up. Patient currently resting in bed with eyes closed and no further complaints voiced. No attempts to exit floor this evening. Will continue to monitor patient through the shift for safety and symptoms.

## 2023-04-07 NOTE — DISCHARGE INSTRUCTIONS
insurance information available. Other appointments:   Name of Provider: Dr. Carolan Fabry   Provider specialty/license: primary care   Date and time of appointment: Today 4/7/2023 @ 3:00pm <- Suggested call in time to schedule follow up   The type/s of services requested are: post-hospital follow up  Agency name: [facility] [address], [address]   Phone Number: [phone]  Special instructions (what to bring to appointment, etc.): N/A    We also want to express the availability of Bridge Appointments here at [facility]. If you are connecting to community services and you find you have a long wait time until you can see your new psychiatrist, therapist, or primary care physician, know that we can provide health care services to you in the time between your care here and the beginning of your care with your new providers. As long as you have psychiatry and therapy appointments scheduled, we can see you for medicine management. If you need a Bridge Appointment, please call the  at [phone]. Discharge Completed By: Kandy Aguilera MSW, LSW  Fax to: Follow up provider name and number  Dr. Carolan Fabry [phone]    The following personal items were collected during your admission and were returned to you:    Belongings  Dental Appliances: None  Vision - Corrective Lenses: None  Hearing Aid: None  Clothing: Other (Comment) (Pt didn't arrive with any personal clothing)  Jewelry: None  Body Piercings Removed: N/A  Electronic Devices: None  Weapons (Notify Protective Services/Security): None  Other Valuables: Other (Comment) (Pt doesn't have any valuables)  Home Medications: None  Valuables Given To:  Other (Comment) (No valuables)  Provide Name(s) of Who Valuable(s) Were Given To: N/A  Responsible person(s) in the waiting room: N/A  Patient approves for provider to speak to responsible person post operatively: No    By signing below, I understand and

## 2023-04-07 NOTE — FLOWSHEET NOTE
Purposeful Rounding    Patient concerns reportednone noted    Nurse made aware:yes    Patient Turned and repositioned: Independent    Patient Toileted: Continent and Independent    Fall Precautions in Place: Yellow non-skid socks on, Bed alarm on and functioning properly, Bed locked in low position, 1/2 bed rails up, Lighting appropriate, Room free of clutter, and Clear path to bathroom      Electronically signed by Megan Mathis on 4/7/23 at 9:24 AM EDT

## 2023-04-07 NOTE — PROGRESS NOTES
Alma Delia's mother  when she was 52 & he was 19. She had heart defects/disease/issues. Timothy Gibbons was very close to his mom and did not handle her death well. He became depressed & started to drink alcohol to cope with his grief. He continued to drink alcohol throughout his life - specifically beer. He may possibly be addicted to gambling. He stated that he has spent lots of time at [employer] and horse [employer]. Vandana Suárez also stated that he has been in retirement \"a few times\" for fighting/assault. He denies a history of abuse; stated his childhood was really good. Vandana Suárez has 7 kids - 4 girls, 3 boys - and 17 grandchildren. He states that one of his sons owns a farm in [place] when Vandana Suárez doesn't come home at night, he's there \"working & looking for [employer]". Numerous bruises on his arms & legs. 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 He has a particularly significant bruise/abrasion/tear on his upper L arm. He stated he hit his arm on some furniture when he fell out of bed. This writer offered a bandage, but he declined, stating that he \"prefers to keep it open to air\". Steady on his feet. Continent. Takes meds whole. Did ask for a urinal because \"it's easier at night\". Didn't come in with any belongings. Stated his daughter would be visiting [facility] to bring him clothes. His family was not happy that he was admitted to a psychiatric unit and stated \"it usually does more harm than good\". He believed that he would be given medicine for the UTI he currently has and would be sent home. They attempted to sign him out AMA at [facility].
Behavioral Services  Medicare Certification Upon Admission    I certify that this patient's inpatient psychiatric hospital admission is medically necessary for:    [x] (1) Treatment which could reasonably be expected to improve this patient's condition,       [x] (2) Or for diagnostic study;     AND     [x](2) The inpatient psychiatric services are provided while the individual is under the care of a physician and are included in the individualized plan of care.     Estimated length of stay/service 2-3     Plan for post-hospital care incomplete     Electronically signed by Javier Mayes MD on 4/6/2023 at 2:02 PM
Jaqui Farrell arrived on the unit via stretcher with 2 medical transporters at 2240. BP slightly elevated at 146/93, but all other vital signs are stable. No signs of physical distress noted. Snack & beverage offered and Antoniette Keto accepted. Gait is steady. Natalia Dickerson is calm and cooperative.
Patient has been pleasant and cooperative this shift with not complaints. Did get some sleep this shift. Bed changed and patient utilized wipes to clean self.
Patient was a false + screen for \"lacking several teeth\" - RD cannot do anything about this and patient is on a regular diet order, without altered po consistencies, at this time.  Recommend a swallow evaluation, if deemed necessary by treatment team.     Thank you,   [address] [facility], [address] [facility]
Yes  Insight and Judgment: No  Insight and Judgment: Poor judgment, Poor insight    Tobacco Screening:  Practical Counseling, on admission, juan X, if applicable and completed (first 3 are required if patient doesn't refuse):            ( ) Recognizing danger situations (included triggers and roadblocks)                    ( ) Coping skills (new ways to manage stress,relaxation techniques, changing routine, distraction)                                                           ( ) Basic information about quitting (benefits of quitting, techniques in how to quit, available resources  ( ) Referral for counseling faxed to [facility]                                                                                                                   ( ) Patient refused counseling  (X) Patient has not smoked in the last 30 days    Metabolic Screening:    Lab Results   Component Value Date    LABA1C 4.6 09/09/2019       Lab Results   Component Value Date    CHOL 96 07/23/2019    CHOL 103 12/21/2018    CHOL 86 07/04/2017     Lab Results   Component Value Date    TRIG 112 07/23/2019    TRIG 69 12/21/2018    TRIG 324 (H) 07/07/2017    TRIG 185 (H) 07/04/2017     Lab Results   Component Value Date    HDL 26 (L) 07/23/2019    HDL 39 (L) 12/21/2018    HDL 13 (L) 07/04/2017     No components found for: LDLCAL  Lab Results   Component Value Date    LABVLDL 22 07/23/2019    LABVLDL 14 12/21/2018    LABVLDL 37 07/04/2017         Body mass index is 32.02 kg/m². BP Readings from Last 2 Encounters:   04/06/23 (!) 143/93   04/05/23 (!) 149/68           Pt admitted with followings belongings:  Dental Appliances: None  Vision - Corrective Lenses: None  Hearing Aid: None  Jewelry: None  Body Piercings Removed: N/A  Clothing: Other (Comment) (Pt didn't arrive with any personal clothing)  Other Valuables:  Other (Comment) (Pt doesn't have any valuables)    Camille Tran RN

## 2023-04-07 NOTE — GROUP NOTE
Group Therapy Note    Date: 4/7/2023    Group Start Time: 0900  Group End Time: 0945  Group Topic: Community Meeting    [address] [place], [place]        Group Therapy Note    Attendees: 8       Patient's Goal:  \"Listen to music\"    Notes: Pt stayed for the full duration of group and was appropriate. Status After Intervention:  Improved    Participation Level:  Active Listener and Interactive, monopolizing     Participation Quality: Appropriate, Attentive, Sharing, and Supportive      Speech:  normal      Thought Process/Content: Logical, tangential       Affective Functioning: Exaggerated      Mood: elevated      Level of consciousness:  Alert, Oriented x4, and Attentive      Response to Learning: Able to verbalize current knowledge/experience, Able to verbalize/acknowledge new learning, Able to retain information, and Progressing to goal      Endings: None Reported    Modes of Intervention: Socialization      Discipline Responsible: /Counselor      Signature:  BLANCA Li

## 2023-04-07 NOTE — PLAN OF CARE
[facility]  Discharge Note    Pt discharged with followings belongings:   Dental Appliances: None  Vision - Corrective Lenses: None  Hearing Aid: None  Jewelry: None  Body Piercings Removed: N/A  Clothing: Other (Comment) (Pt didn't arrive with any personal clothing)  Other Valuables: Other (Comment) (Pt doesn't have any valuables)   Valuables sent home withpatient or returned to patient. Patient educated on aftercare instructions: yes  Information faxed to HCP by LSW  at 1:38 PM .Patient verbalize understanding of AVS:  yes. Status EXAM upon discharge:  Mental Status and Behavioral Exam  Normal: No  Level of Assistance: Independent/Self  Facial Expression: Brightened, Exaggerated  Affect: Stable  Level of Consciousness: Alert  Frequency of Checks: 4 times per hour, close  Mood:Normal: No  Mood: Anxious  Motor Activity:Normal: No  Motor Activity: Decreased  Eye Contact: Good  Observed Behavior: Withdrawn, Cooperative, Friendly  Sexual Misconduct History: Current - no  Preception: [place] to person  Attention:Normal: No  Attention: Distractible  Thought Processes: Flight of ideas  Thought Content:Normal: No  Thought Content: Poverty of content  Depression Symptoms: No problems reported or observed. Anxiety Symptoms: Generalized  Dana Symptoms: Flight of ideas, Poor judgment, Pressured speech  Hallucinations:  Other (comment) (denies)  Delusions: No  Memory:Normal: Yes  Insight and Judgment: No  Insight and Judgment: Poor judgment, Poor insight    Tobacco Screening:  Practical Counseling, on admission, juan X, if applicable and completed (first 3 are required if patient doesn't refuse)n/a:            ( ) Recognizing danger situations (included triggers and roadblocks)                    ( ) Coping skills (new ways to manage stress,relaxation techniques, changing routine, distraction)                                                           ( ) Basic information about quitting (benefits of

## 2023-04-08 LAB
BACTERIA UR CULT: ABNORMAL
BACTERIA UR CULT: ABNORMAL
ORGANISM: ABNORMAL

## 2023-04-13 ENCOUNTER — APPOINTMENT (OUTPATIENT)
Dept: CT IMAGING | Age: 73
DRG: 885 | End: 2023-04-13
Payer: MEDICARE

## 2023-04-13 ENCOUNTER — HOSPITAL ENCOUNTER (INPATIENT)
Age: 73
LOS: 1 days | Discharge: HOME | DRG: 885 | End: 2023-04-14
Attending: STUDENT IN AN ORGANIZED HEALTH CARE EDUCATION/TRAINING PROGRAM | Admitting: PSYCHIATRY & NEUROLOGY
Payer: MEDICARE

## 2023-04-13 DIAGNOSIS — Z86.59 HISTORY OF BIPOLAR DISORDER: Primary | ICD-10-CM

## 2023-04-13 LAB
ALBUMIN SERPL-MCNC: 4.2 G/DL (ref 3.4–5)
ALBUMIN/GLOB SERPL: 1.5 {RATIO} (ref 1.1–2.2)
ALP SERPL-CCNC: 43 U/L (ref 40–129)
ALT SERPL-CCNC: 12 U/L (ref 10–40)
AMPHETAMINES UR QL SCN>1000 NG/ML: NORMAL
ANION GAP SERPL CALCULATED.3IONS-SCNC: 16 MMOL/L (ref 3–16)
APAP SERPL-MCNC: <5 UG/ML (ref 10–30)
AST SERPL-CCNC: 26 U/L (ref 15–37)
BACTERIA URNS QL MICRO: ABNORMAL /HPF
BARBITURATES UR QL SCN>200 NG/ML: NORMAL
BASOPHILS # BLD: 0 K/UL (ref 0–0.2)
BASOPHILS NFR BLD: 0.8 %
BENZODIAZ UR QL SCN>200 NG/ML: NORMAL
BILIRUB SERPL-MCNC: 1 MG/DL (ref 0–1)
BILIRUB UR QL STRIP.AUTO: NEGATIVE
BUN SERPL-MCNC: 18 MG/DL (ref 7–20)
CALCIUM SERPL-MCNC: 9.7 MG/DL (ref 8.3–10.6)
CANNABINOIDS UR QL SCN>50 NG/ML: NORMAL
CHLORIDE SERPL-SCNC: 102 MMOL/L (ref 99–110)
CLARITY UR: CLEAR
CO2 SERPL-SCNC: 23 MMOL/L (ref 21–32)
COCAINE UR QL SCN: NORMAL
COLOR UR: YELLOW
CREAT SERPL-MCNC: 1.8 MG/DL (ref 0.8–1.3)
DEPRECATED RDW RBC AUTO: 19.4 % (ref 12.4–15.4)
DRUG SCREEN COMMENT UR-IMP: NORMAL
EOSINOPHIL # BLD: 0.1 K/UL (ref 0–0.6)
EOSINOPHIL NFR BLD: 2.1 %
EPI CELLS #/AREA URNS HPF: ABNORMAL /HPF (ref 0–5)
ETHANOLAMINE SERPL-MCNC: NORMAL MG/DL (ref 0–0.08)
FENTANYL SCREEN, URINE: NORMAL
GFR SERPLBLD CREATININE-BSD FMLA CKD-EPI: 39 ML/MIN/{1.73_M2}
GLUCOSE BLD-MCNC: 156 MG/DL (ref 70–99)
GLUCOSE SERPL-MCNC: 132 MG/DL (ref 70–99)
GLUCOSE UR STRIP.AUTO-MCNC: NEGATIVE MG/DL
HCT VFR BLD AUTO: 32.8 % (ref 40.5–52.5)
HGB BLD-MCNC: 10.6 G/DL (ref 13.5–17.5)
HGB UR QL STRIP.AUTO: NEGATIVE
KETONES UR STRIP.AUTO-MCNC: NEGATIVE MG/DL
LEUKOCYTE ESTERASE UR QL STRIP.AUTO: NEGATIVE
LYMPHOCYTES # BLD: 1 K/UL (ref 1–5.1)
LYMPHOCYTES NFR BLD: 17.8 %
MCH RBC QN AUTO: 27.4 PG (ref 26–34)
MCHC RBC AUTO-ENTMCNC: 32.3 G/DL (ref 31–36)
MCV RBC AUTO: 84.9 FL (ref 80–100)
METHADONE UR QL SCN>300 NG/ML: NORMAL
MONOCYTES # BLD: 0.5 K/UL (ref 0–1.3)
MONOCYTES NFR BLD: 7.8 %
NEUTROPHILS # BLD: 4.2 K/UL (ref 1.7–7.7)
NEUTROPHILS NFR BLD: 71.5 %
NITRITE UR QL STRIP.AUTO: NEGATIVE
OPIATES UR QL SCN>300 NG/ML: NORMAL
OXYCODONE UR QL SCN: NORMAL
PCP UR QL SCN>25 NG/ML: NORMAL
PERFORMED ON: ABNORMAL
PH UR STRIP.AUTO: 5.5 [PH] (ref 5–8)
PH UR STRIP: 5.5 [PH]
PLATELET # BLD AUTO: 196 K/UL (ref 135–450)
PMV BLD AUTO: 8 FL (ref 5–10.5)
POTASSIUM SERPL-SCNC: 3.6 MMOL/L (ref 3.5–5.1)
PROT SERPL-MCNC: 7 G/DL (ref 6.4–8.2)
PROT UR STRIP.AUTO-MCNC: ABNORMAL MG/DL
RBC # BLD AUTO: 3.87 M/UL (ref 4.2–5.9)
RBC #/AREA URNS HPF: ABNORMAL /HPF (ref 0–4)
SALICYLATES SERPL-MCNC: <0.3 MG/DL (ref 15–30)
SODIUM SERPL-SCNC: 141 MMOL/L (ref 136–145)
SP GR UR STRIP.AUTO: 1.02 (ref 1–1.03)
TROPONIN T SERPL-MCNC: <0.01 NG/ML
UA COMPLETE W REFLEX CULTURE PNL UR: ABNORMAL
UA DIPSTICK W REFLEX MICRO PNL UR: YES
URN SPEC COLLECT METH UR: ABNORMAL
UROBILINOGEN UR STRIP-ACNC: 1 E.U./DL
WBC # BLD AUTO: 5.9 K/UL (ref 4–11)
WBC #/AREA URNS HPF: ABNORMAL /HPF (ref 0–5)

## 2023-04-13 PROCEDURE — 84443 ASSAY THYROID STIM HORMONE: CPT

## 2023-04-13 PROCEDURE — 36415 COLL VENOUS BLD VENIPUNCTURE: CPT

## 2023-04-13 PROCEDURE — 99285 EMERGENCY DEPT VISIT HI MDM: CPT

## 2023-04-13 PROCEDURE — 85025 COMPLETE CBC W/AUTO DIFF WBC: CPT

## 2023-04-13 PROCEDURE — 93005 ELECTROCARDIOGRAM TRACING: CPT | Performed by: STUDENT IN AN ORGANIZED HEALTH CARE EDUCATION/TRAINING PROGRAM

## 2023-04-13 PROCEDURE — 80053 COMPREHEN METABOLIC PANEL: CPT

## 2023-04-13 PROCEDURE — 80143 DRUG ASSAY ACETAMINOPHEN: CPT

## 2023-04-13 PROCEDURE — 84484 ASSAY OF TROPONIN QUANT: CPT

## 2023-04-13 PROCEDURE — 80179 DRUG ASSAY SALICYLATE: CPT

## 2023-04-13 PROCEDURE — 82077 ASSAY SPEC XCP UR&BREATH IA: CPT

## 2023-04-13 PROCEDURE — 81003 URINALYSIS AUTO W/O SCOPE: CPT

## 2023-04-13 PROCEDURE — 80307 DRUG TEST PRSMV CHEM ANLYZR: CPT

## 2023-04-13 PROCEDURE — 70450 CT HEAD/BRAIN W/O DYE: CPT

## 2023-04-13 RX ORDER — LORAZEPAM 0.5 MG/1
0.5 TABLET ORAL EVERY 6 HOURS PRN
Status: ON HOLD | COMMUNITY

## 2023-04-13 ASSESSMENT — PAIN - FUNCTIONAL ASSESSMENT: PAIN_FUNCTIONAL_ASSESSMENT: NONE - DENIES PAIN

## 2023-04-14 ENCOUNTER — HOSPITAL ENCOUNTER (INPATIENT)
Age: 73
LOS: 10 days | Discharge: ANOTHER HOSPITAL | End: 2023-04-24
Attending: PSYCHIATRY & NEUROLOGY | Admitting: PSYCHIATRY & NEUROLOGY
Payer: MEDICARE

## 2023-04-14 VITALS
DIASTOLIC BLOOD PRESSURE: 85 MMHG | RESPIRATION RATE: 20 BRPM | BODY MASS INDEX: 31.88 KG/M2 | SYSTOLIC BLOOD PRESSURE: 166 MMHG | HEIGHT: 77 IN | OXYGEN SATURATION: 94 % | HEART RATE: 85 BPM | TEMPERATURE: 98.1 F | WEIGHT: 270 LBS

## 2023-04-14 PROBLEM — F31.9 BIPOLAR AFFECTIVE DISORDER (HCC): Status: ACTIVE | Noted: 2023-04-14

## 2023-04-14 PROBLEM — R41.9 NEUROCOGNITIVE DISORDER: Status: ACTIVE | Noted: 2023-04-14

## 2023-04-14 PROBLEM — F39 PSYCHOSIS, AFFECTIVE (HCC): Status: ACTIVE | Noted: 2023-04-14

## 2023-04-14 PROBLEM — F31.9 BIPOLAR DISORDER (HCC): Status: ACTIVE | Noted: 2023-04-14

## 2023-04-14 LAB
EKG ATRIAL RATE: 98 BPM
EKG DIAGNOSIS: NORMAL
EKG Q-T INTERVAL: 398 MS
EKG QRS DURATION: 118 MS
EKG QTC CALCULATION (BAZETT): 505 MS
EKG R AXIS: -56 DEGREES
EKG T AXIS: 57 DEGREES
EKG VENTRICULAR RATE: 97 BPM
FLUAV RNA RESP QL NAA+PROBE: NOT DETECTED
FLUBV RNA RESP QL NAA+PROBE: NOT DETECTED
GLUCOSE BLD-MCNC: 139 MG/DL (ref 70–99)
PERFORMED ON: ABNORMAL
SARS-COV-2 RNA RESP QL NAA+PROBE: NOT DETECTED
TSH SERPL DL<=0.005 MIU/L-ACNC: 3.88 UIU/ML (ref 0.27–4.2)

## 2023-04-14 PROCEDURE — 6370000000 HC RX 637 (ALT 250 FOR IP)

## 2023-04-14 PROCEDURE — 93005 ELECTROCARDIOGRAM TRACING: CPT | Performed by: PSYCHIATRY & NEUROLOGY

## 2023-04-14 PROCEDURE — 1240000000 HC EMOTIONAL WELLNESS R&B

## 2023-04-14 PROCEDURE — 87636 SARSCOV2 & INF A&B AMP PRB: CPT

## 2023-04-14 PROCEDURE — 93010 ELECTROCARDIOGRAM REPORT: CPT | Performed by: INTERNAL MEDICINE

## 2023-04-14 RX ORDER — HYDROXYZINE 50 MG/1
50 TABLET, FILM COATED ORAL 3 TIMES DAILY PRN
Status: DISCONTINUED | OUTPATIENT
Start: 2023-04-14 | End: 2023-04-14 | Stop reason: HOSPADM

## 2023-04-14 RX ORDER — OLANZAPINE 10 MG/1
10 TABLET ORAL EVERY 4 HOURS PRN
Status: DISCONTINUED | OUTPATIENT
Start: 2023-04-14 | End: 2023-04-24 | Stop reason: HOSPADM

## 2023-04-14 RX ORDER — FENOFIBRATE 160 MG/1
160 TABLET ORAL DAILY
Status: DISCONTINUED | OUTPATIENT
Start: 2023-04-15 | End: 2023-04-24 | Stop reason: HOSPADM

## 2023-04-14 RX ORDER — LACTOBACILLUS RHAMNOSUS GG 10B CELL
1 CAPSULE ORAL DAILY
Status: DISCONTINUED | OUTPATIENT
Start: 2023-04-15 | End: 2023-04-24 | Stop reason: HOSPADM

## 2023-04-14 RX ORDER — CHOLECALCIFEROL (VITAMIN D3) 125 MCG
1000 CAPSULE ORAL DAILY
Status: DISCONTINUED | OUTPATIENT
Start: 2023-04-14 | End: 2023-04-14 | Stop reason: HOSPADM

## 2023-04-14 RX ORDER — VITAMIN B COMPLEX
1000 TABLET ORAL DAILY
Status: CANCELLED | OUTPATIENT
Start: 2023-04-15

## 2023-04-14 RX ORDER — NICOTINE 21 MG/24HR
1 PATCH, TRANSDERMAL 24 HOURS TRANSDERMAL DAILY
Status: DISCONTINUED | OUTPATIENT
Start: 2023-04-14 | End: 2023-04-14

## 2023-04-14 RX ORDER — TRAZODONE HYDROCHLORIDE 50 MG/1
50 TABLET ORAL NIGHTLY PRN
Status: CANCELLED | OUTPATIENT
Start: 2023-04-14

## 2023-04-14 RX ORDER — LORAZEPAM 0.5 MG/1
0.5 TABLET ORAL EVERY 6 HOURS PRN
Status: DISCONTINUED | OUTPATIENT
Start: 2023-04-14 | End: 2023-04-24 | Stop reason: HOSPADM

## 2023-04-14 RX ORDER — MAGNESIUM HYDROXIDE/ALUMINUM HYDROXICE/SIMETHICONE 120; 1200; 1200 MG/30ML; MG/30ML; MG/30ML
30 SUSPENSION ORAL EVERY 6 HOURS PRN
Status: DISCONTINUED | OUTPATIENT
Start: 2023-04-14 | End: 2023-04-14 | Stop reason: HOSPADM

## 2023-04-14 RX ORDER — POTASSIUM CHLORIDE 20 MEQ/1
20 TABLET, EXTENDED RELEASE ORAL 2 TIMES DAILY
Status: CANCELLED | OUTPATIENT
Start: 2023-04-14

## 2023-04-14 RX ORDER — LORAZEPAM 0.5 MG/1
0.5 TABLET ORAL EVERY 6 HOURS PRN
Status: CANCELLED | OUTPATIENT
Start: 2023-04-14

## 2023-04-14 RX ORDER — QUETIAPINE FUMARATE 25 MG/1
50 TABLET, FILM COATED ORAL 2 TIMES DAILY
Status: DISCONTINUED | OUTPATIENT
Start: 2023-04-14 | End: 2023-04-14 | Stop reason: HOSPADM

## 2023-04-14 RX ORDER — OLANZAPINE 10 MG/1
10 TABLET ORAL EVERY 4 HOURS PRN
Status: CANCELLED | OUTPATIENT
Start: 2023-04-14

## 2023-04-14 RX ORDER — DIVALPROEX SODIUM 500 MG/1
500 TABLET, DELAYED RELEASE ORAL EVERY 12 HOURS SCHEDULED
Status: DISCONTINUED | OUTPATIENT
Start: 2023-04-14 | End: 2023-04-18

## 2023-04-14 RX ORDER — POLYETHYLENE GLYCOL 3350 17 G
2 POWDER IN PACKET (EA) ORAL
Status: DISCONTINUED | OUTPATIENT
Start: 2023-04-14 | End: 2023-04-14 | Stop reason: HOSPADM

## 2023-04-14 RX ORDER — LACTOBACILLUS RHAMNOSUS GG 10B CELL
1 CAPSULE ORAL DAILY
Status: CANCELLED | OUTPATIENT
Start: 2023-04-15

## 2023-04-14 RX ORDER — ATORVASTATIN CALCIUM 40 MG/1
40 TABLET, FILM COATED ORAL DAILY
Status: CANCELLED | OUTPATIENT
Start: 2023-04-15

## 2023-04-14 RX ORDER — AMLODIPINE BESYLATE 5 MG/1
10 TABLET ORAL DAILY
Status: CANCELLED | OUTPATIENT
Start: 2023-04-15

## 2023-04-14 RX ORDER — FERROUS SULFATE 325(65) MG
325 TABLET ORAL
Status: DISCONTINUED | OUTPATIENT
Start: 2023-04-14 | End: 2023-04-14 | Stop reason: HOSPADM

## 2023-04-14 RX ORDER — CHOLECALCIFEROL (VITAMIN D3) 125 MCG
10 CAPSULE ORAL NIGHTLY
Status: DISCONTINUED | OUTPATIENT
Start: 2023-04-14 | End: 2023-04-14

## 2023-04-14 RX ORDER — MECOBALAMIN 5000 MCG
10 TABLET,DISINTEGRATING ORAL NIGHTLY
Status: DISCONTINUED | OUTPATIENT
Start: 2023-04-14 | End: 2023-04-20

## 2023-04-14 RX ORDER — VITAMIN B COMPLEX
1000 TABLET ORAL DAILY
Status: DISCONTINUED | OUTPATIENT
Start: 2023-04-15 | End: 2023-04-24 | Stop reason: HOSPADM

## 2023-04-14 RX ORDER — BENZTROPINE MESYLATE 1 MG/ML
2 INJECTION INTRAMUSCULAR; INTRAVENOUS 2 TIMES DAILY PRN
Status: DISCONTINUED | OUTPATIENT
Start: 2023-04-14 | End: 2023-04-14 | Stop reason: HOSPADM

## 2023-04-14 RX ORDER — OLANZAPINE 10 MG/1
10 TABLET ORAL EVERY 4 HOURS PRN
Status: DISCONTINUED | OUTPATIENT
Start: 2023-04-14 | End: 2023-04-14 | Stop reason: HOSPADM

## 2023-04-14 RX ORDER — TRAZODONE HYDROCHLORIDE 50 MG/1
50 TABLET ORAL NIGHTLY PRN
Status: DISCONTINUED | OUTPATIENT
Start: 2023-04-14 | End: 2023-04-14 | Stop reason: HOSPADM

## 2023-04-14 RX ORDER — LOSARTAN POTASSIUM 25 MG/1
50 TABLET ORAL DAILY
Status: DISCONTINUED | OUTPATIENT
Start: 2023-04-14 | End: 2023-04-14 | Stop reason: HOSPADM

## 2023-04-14 RX ORDER — MESALAMINE 400 MG/1
800 CAPSULE, DELAYED RELEASE ORAL 3 TIMES DAILY
Status: CANCELLED | OUTPATIENT
Start: 2023-04-14

## 2023-04-14 RX ORDER — MESALAMINE 400 MG/1
800 CAPSULE, DELAYED RELEASE ORAL 3 TIMES DAILY
Status: DISCONTINUED | OUTPATIENT
Start: 2023-04-14 | End: 2023-04-24 | Stop reason: HOSPADM

## 2023-04-14 RX ORDER — DIVALPROEX SODIUM 500 MG/1
500 TABLET, DELAYED RELEASE ORAL EVERY 12 HOURS SCHEDULED
Status: DISCONTINUED | OUTPATIENT
Start: 2023-04-14 | End: 2023-04-14 | Stop reason: HOSPADM

## 2023-04-14 RX ORDER — CHOLECALCIFEROL (VITAMIN D3) 125 MCG
1000 CAPSULE ORAL DAILY
Status: DISCONTINUED | OUTPATIENT
Start: 2023-04-15 | End: 2023-04-24 | Stop reason: HOSPADM

## 2023-04-14 RX ORDER — ATORVASTATIN CALCIUM 40 MG/1
40 TABLET, FILM COATED ORAL DAILY
Status: DISCONTINUED | OUTPATIENT
Start: 2023-04-15 | End: 2023-04-24 | Stop reason: HOSPADM

## 2023-04-14 RX ORDER — AMLODIPINE BESYLATE 5 MG/1
10 TABLET ORAL DAILY
Status: DISCONTINUED | OUTPATIENT
Start: 2023-04-14 | End: 2023-04-14 | Stop reason: HOSPADM

## 2023-04-14 RX ORDER — POTASSIUM CHLORIDE 20 MEQ/1
20 TABLET, EXTENDED RELEASE ORAL 2 TIMES DAILY
Status: DISCONTINUED | OUTPATIENT
Start: 2023-04-14 | End: 2023-04-14 | Stop reason: HOSPADM

## 2023-04-14 RX ORDER — FENOFIBRATE 160 MG/1
160 TABLET ORAL DAILY
Status: DISCONTINUED | OUTPATIENT
Start: 2023-04-14 | End: 2023-04-14 | Stop reason: HOSPADM

## 2023-04-14 RX ORDER — MECOBALAMIN 5000 MCG
10 TABLET,DISINTEGRATING ORAL NIGHTLY
Status: DISCONTINUED | OUTPATIENT
Start: 2023-04-14 | End: 2023-04-14 | Stop reason: HOSPADM

## 2023-04-14 RX ORDER — BENZTROPINE MESYLATE 1 MG/ML
2 INJECTION INTRAMUSCULAR; INTRAVENOUS 2 TIMES DAILY PRN
Status: CANCELLED | OUTPATIENT
Start: 2023-04-14

## 2023-04-14 RX ORDER — PANTOPRAZOLE SODIUM 40 MG/1
40 TABLET, DELAYED RELEASE ORAL DAILY
Status: DISCONTINUED | OUTPATIENT
Start: 2023-04-14 | End: 2023-04-14 | Stop reason: HOSPADM

## 2023-04-14 RX ORDER — PANTOPRAZOLE SODIUM 40 MG/1
40 TABLET, DELAYED RELEASE ORAL DAILY
Status: DISCONTINUED | OUTPATIENT
Start: 2023-04-15 | End: 2023-04-24 | Stop reason: HOSPADM

## 2023-04-14 RX ORDER — LOSARTAN POTASSIUM 25 MG/1
50 TABLET ORAL DAILY
Status: CANCELLED | OUTPATIENT
Start: 2023-04-15

## 2023-04-14 RX ORDER — FENOFIBRATE 160 MG/1
160 TABLET ORAL DAILY
Status: CANCELLED | OUTPATIENT
Start: 2023-04-15

## 2023-04-14 RX ORDER — ATORVASTATIN CALCIUM 40 MG/1
40 TABLET, FILM COATED ORAL DAILY
Status: DISCONTINUED | OUTPATIENT
Start: 2023-04-14 | End: 2023-04-14 | Stop reason: HOSPADM

## 2023-04-14 RX ORDER — MESALAMINE 400 MG/1
800 CAPSULE, DELAYED RELEASE ORAL 3 TIMES DAILY
Status: DISCONTINUED | OUTPATIENT
Start: 2023-04-14 | End: 2023-04-14 | Stop reason: HOSPADM

## 2023-04-14 RX ORDER — ASPIRIN 81 MG/1
81 TABLET, CHEWABLE ORAL DAILY
Status: DISCONTINUED | OUTPATIENT
Start: 2023-04-15 | End: 2023-04-24 | Stop reason: HOSPADM

## 2023-04-14 RX ORDER — METOPROLOL SUCCINATE 50 MG/1
50 TABLET, EXTENDED RELEASE ORAL DAILY
Status: CANCELLED | OUTPATIENT
Start: 2023-04-15

## 2023-04-14 RX ORDER — QUETIAPINE FUMARATE 25 MG/1
50 TABLET, FILM COATED ORAL 2 TIMES DAILY
Status: CANCELLED | OUTPATIENT
Start: 2023-04-14

## 2023-04-14 RX ORDER — MECOBALAMIN 5000 MCG
10 TABLET,DISINTEGRATING ORAL NIGHTLY
Status: CANCELLED | OUTPATIENT
Start: 2023-04-14

## 2023-04-14 RX ORDER — CHOLECALCIFEROL (VITAMIN D3) 125 MCG
1000 CAPSULE ORAL DAILY
Status: CANCELLED | OUTPATIENT
Start: 2023-04-15

## 2023-04-14 RX ORDER — LOSARTAN POTASSIUM 25 MG/1
50 TABLET ORAL DAILY
Status: DISCONTINUED | OUTPATIENT
Start: 2023-04-15 | End: 2023-04-24 | Stop reason: HOSPADM

## 2023-04-14 RX ORDER — ACETAMINOPHEN 325 MG/1
650 TABLET ORAL EVERY 4 HOURS PRN
Status: CANCELLED | OUTPATIENT
Start: 2023-04-14

## 2023-04-14 RX ORDER — FERROUS SULFATE 325(65) MG
325 TABLET ORAL
Status: CANCELLED | OUTPATIENT
Start: 2023-04-15

## 2023-04-14 RX ORDER — MAGNESIUM HYDROXIDE/ALUMINUM HYDROXICE/SIMETHICONE 120; 1200; 1200 MG/30ML; MG/30ML; MG/30ML
30 SUSPENSION ORAL EVERY 6 HOURS PRN
Status: CANCELLED | OUTPATIENT
Start: 2023-04-14

## 2023-04-14 RX ORDER — BENZTROPINE MESYLATE 1 MG/ML
2 INJECTION INTRAMUSCULAR; INTRAVENOUS 2 TIMES DAILY PRN
Status: DISCONTINUED | OUTPATIENT
Start: 2023-04-14 | End: 2023-04-24 | Stop reason: HOSPADM

## 2023-04-14 RX ORDER — ACETAMINOPHEN 325 MG/1
650 TABLET ORAL EVERY 4 HOURS PRN
Status: DISCONTINUED | OUTPATIENT
Start: 2023-04-14 | End: 2023-04-24 | Stop reason: SDUPTHER

## 2023-04-14 RX ORDER — METOPROLOL SUCCINATE 50 MG/1
50 TABLET, EXTENDED RELEASE ORAL DAILY
Status: DISCONTINUED | OUTPATIENT
Start: 2023-04-15 | End: 2023-04-24 | Stop reason: HOSPADM

## 2023-04-14 RX ORDER — LORAZEPAM 0.5 MG/1
0.5 TABLET ORAL EVERY 6 HOURS PRN
Status: DISCONTINUED | OUTPATIENT
Start: 2023-04-14 | End: 2023-04-14 | Stop reason: HOSPADM

## 2023-04-14 RX ORDER — METOPROLOL SUCCINATE 50 MG/1
50 TABLET, EXTENDED RELEASE ORAL DAILY
Status: DISCONTINUED | OUTPATIENT
Start: 2023-04-14 | End: 2023-04-14 | Stop reason: HOSPADM

## 2023-04-14 RX ORDER — VITAMIN B COMPLEX
1000 TABLET ORAL DAILY
Status: DISCONTINUED | OUTPATIENT
Start: 2023-04-14 | End: 2023-04-14 | Stop reason: HOSPADM

## 2023-04-14 RX ORDER — POTASSIUM CHLORIDE 20 MEQ/1
20 TABLET, EXTENDED RELEASE ORAL 2 TIMES DAILY
Status: DISCONTINUED | OUTPATIENT
Start: 2023-04-14 | End: 2023-04-24 | Stop reason: HOSPADM

## 2023-04-14 RX ORDER — TRAZODONE HYDROCHLORIDE 50 MG/1
50 TABLET ORAL NIGHTLY PRN
Status: DISCONTINUED | OUTPATIENT
Start: 2023-04-14 | End: 2023-04-24 | Stop reason: HOSPADM

## 2023-04-14 RX ORDER — ASPIRIN 81 MG/1
81 TABLET, CHEWABLE ORAL DAILY
Status: CANCELLED | OUTPATIENT
Start: 2023-04-15

## 2023-04-14 RX ORDER — LACTOBACILLUS RHAMNOSUS GG 10B CELL
1 CAPSULE ORAL DAILY
Status: DISCONTINUED | OUTPATIENT
Start: 2023-04-14 | End: 2023-04-14 | Stop reason: HOSPADM

## 2023-04-14 RX ORDER — ASPIRIN 81 MG/1
81 TABLET, CHEWABLE ORAL DAILY
Status: DISCONTINUED | OUTPATIENT
Start: 2023-04-14 | End: 2023-04-14 | Stop reason: HOSPADM

## 2023-04-14 RX ORDER — FERROUS SULFATE 325(65) MG
325 TABLET ORAL
Status: DISCONTINUED | OUTPATIENT
Start: 2023-04-15 | End: 2023-04-24 | Stop reason: HOSPADM

## 2023-04-14 RX ORDER — DIVALPROEX SODIUM 500 MG/1
500 TABLET, DELAYED RELEASE ORAL EVERY 12 HOURS SCHEDULED
Status: CANCELLED | OUTPATIENT
Start: 2023-04-14

## 2023-04-14 RX ORDER — AMLODIPINE BESYLATE 5 MG/1
10 TABLET ORAL DAILY
Status: DISCONTINUED | OUTPATIENT
Start: 2023-04-15 | End: 2023-04-24 | Stop reason: HOSPADM

## 2023-04-14 RX ORDER — ACETAMINOPHEN 325 MG/1
650 TABLET ORAL EVERY 4 HOURS PRN
Status: DISCONTINUED | OUTPATIENT
Start: 2023-04-14 | End: 2023-04-14 | Stop reason: HOSPADM

## 2023-04-14 RX ORDER — MAGNESIUM HYDROXIDE/ALUMINUM HYDROXICE/SIMETHICONE 120; 1200; 1200 MG/30ML; MG/30ML; MG/30ML
30 SUSPENSION ORAL EVERY 6 HOURS PRN
Status: DISCONTINUED | OUTPATIENT
Start: 2023-04-14 | End: 2023-04-24 | Stop reason: HOSPADM

## 2023-04-14 RX ORDER — QUETIAPINE FUMARATE 25 MG/1
50 TABLET, FILM COATED ORAL 2 TIMES DAILY
Status: DISCONTINUED | OUTPATIENT
Start: 2023-04-14 | End: 2023-04-20

## 2023-04-14 RX ORDER — PANTOPRAZOLE SODIUM 40 MG/1
40 TABLET, DELAYED RELEASE ORAL DAILY
Status: CANCELLED | OUTPATIENT
Start: 2023-04-15

## 2023-04-14 RX ADMIN — LOSARTAN POTASSIUM 50 MG: 25 TABLET, FILM COATED ORAL at 10:59

## 2023-04-14 RX ADMIN — MESALAMINE 800 MG: 400 CAPSULE, DELAYED RELEASE ORAL at 21:32

## 2023-04-14 RX ADMIN — CYANOCOBALAMIN TAB 500 MCG 1000 MCG: 500 TAB at 11:00

## 2023-04-14 RX ADMIN — FERROUS SULFATE TAB 325 MG (65 MG ELEMENTAL FE) 325 MG: 325 (65 FE) TAB at 11:05

## 2023-04-14 RX ADMIN — FENOFIBRATE 160 MG: 160 TABLET ORAL at 11:00

## 2023-04-14 RX ADMIN — PANTOPRAZOLE SODIUM 40 MG: 40 TABLET, DELAYED RELEASE ORAL at 11:00

## 2023-04-14 RX ADMIN — POTASSIUM CHLORIDE 20 MEQ: 1500 TABLET, EXTENDED RELEASE ORAL at 21:32

## 2023-04-14 RX ADMIN — ATORVASTATIN CALCIUM 40 MG: 40 TABLET, FILM COATED ORAL at 11:00

## 2023-04-14 RX ADMIN — METOPROLOL SUCCINATE 50 MG: 50 TABLET, EXTENDED RELEASE ORAL at 11:00

## 2023-04-14 RX ADMIN — ASPIRIN 81 MG: 81 TABLET, CHEWABLE ORAL at 11:00

## 2023-04-14 RX ADMIN — TRAZODONE HYDROCHLORIDE 50 MG: 50 TABLET ORAL at 21:32

## 2023-04-14 RX ADMIN — Medication 10 MG: at 21:32

## 2023-04-14 RX ADMIN — Medication 1000 UNITS: at 11:00

## 2023-04-14 RX ADMIN — AMLODIPINE BESYLATE 10 MG: 5 TABLET ORAL at 11:00

## 2023-04-14 RX ADMIN — QUETIAPINE FUMARATE 50 MG: 25 TABLET ORAL at 11:00

## 2023-04-14 RX ADMIN — DIVALPROEX SODIUM 500 MG: 500 TABLET, DELAYED RELEASE ORAL at 21:32

## 2023-04-14 RX ADMIN — Medication 1 CAPSULE: at 11:05

## 2023-04-14 RX ADMIN — QUETIAPINE FUMARATE 50 MG: 25 TABLET ORAL at 21:32

## 2023-04-14 RX ADMIN — DIVALPROEX SODIUM 500 MG: 500 TABLET, DELAYED RELEASE ORAL at 11:23

## 2023-04-14 RX ADMIN — B-COMPLEX W/ C & FOLIC ACID TAB 1 TABLET: TAB at 11:00

## 2023-04-14 RX ADMIN — POTASSIUM CHLORIDE 20 MEQ: 1500 TABLET, EXTENDED RELEASE ORAL at 11:00

## 2023-04-14 RX ADMIN — MESALAMINE 800 MG: 400 CAPSULE, DELAYED RELEASE ORAL at 14:29

## 2023-04-14 ASSESSMENT — SLEEP AND FATIGUE QUESTIONNAIRES
DO YOU USE A SLEEP AID: YES
DO YOU HAVE DIFFICULTY SLEEPING: YES
AVERAGE NUMBER OF SLEEP HOURS: 5
SLEEP PATTERN: DIFFICULTY FALLING ASLEEP
DO YOU HAVE DIFFICULTY SLEEPING: YES
AVERAGE NUMBER OF SLEEP HOURS: 5
DO YOU USE A SLEEP AID: YES
SLEEP PATTERN: DIFFICULTY FALLING ASLEEP;RESTFUL

## 2023-04-14 ASSESSMENT — LIFESTYLE VARIABLES
HOW OFTEN DO YOU HAVE A DRINK CONTAINING ALCOHOL: MONTHLY OR LESS
HOW OFTEN DO YOU HAVE A DRINK CONTAINING ALCOHOL: MONTHLY OR LESS
HOW MANY STANDARD DRINKS CONTAINING ALCOHOL DO YOU HAVE ON A TYPICAL DAY: 1 OR 2
HOW MANY STANDARD DRINKS CONTAINING ALCOHOL DO YOU HAVE ON A TYPICAL DAY: 1 OR 2

## 2023-04-14 ASSESSMENT — PAIN SCALES - GENERAL
PAINLEVEL_OUTOF10: 0
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
## 2023-04-14 NOTE — PROGRESS NOTES
Herbal and Nutritional Product Restrictions      The following herbal, alternative, and/or nutritional/dietary supplement product(s) has been discontinued per [facility] approved policy:    Levocarnitine 500 mg 3 times daily. Please reorder upon discharge if appropriate.     Thank you,  Toñito Horne, [facility]  4/14/2023 9:23 AM

## 2023-04-14 NOTE — ED NOTES
POCT blood sugar obtain with finger stick. Result 156. RN at bedside and notified as soon as resulted.      Otilia Ramires  04/13/23 2054
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
## 2023-04-14 NOTE — PROGRESS NOTES
04/14/23 1351   Encounter Summary   Encounter Overview/Reason  Spiritual/Emotional Needs   Service Provided For: Patient   Support System Family members   Last Encounter    (4/14: patient loudly expressing that he needed to go home, one of his daughters was sick & he wanted to be with her, requested  discharge pt, explained I didn't have power, discussed with IDT, brought bible as requested)   Begin Time 1300   End Time  1352   Total Time Calculated 52 min   Crisis   Type Emotional distress       Prayer with patient as requested. Thank you for consulting Spiritual Care    If you need a  for emotional, spiritual or comfort care,   please dial \"0\" and ask for the  on-call to be paged.     For help with [id number] or Living Will forms,you may also call us directly:  [phone]) JIGAR  [phone]) Hiral Husain  [phone] ([phone]) Outpatient    - Noam Chairez

## 2023-04-14 NOTE — H&P
Discharge Summary    Admit Date: 4/13/2023   Discharge Date:    4/14/2023    Final Dx: Bipolar affective disorder ([address]     At Discharge: 51-60 moderate symptoms   All conditions and active problems were treated while patient was hospitalized. Condition on DC  Mood and affect are stable and pt is not suicidal     VITALS:  BP (!) 166/85   Pulse 85   Temp 98.1 °F (36.7 °C) (Oral)   Resp 20   Ht 6' 5\" (1.956 m)   Wt 270 lb (122.5 kg)   SpO2 94%   BMI 32.02 kg/m²     Brief Summary Present Illness   Pt to discharge and re-admit to [facility] unit of [address] [place] [address] Patient stabilized on meds and milieu treatment. Patient was discharged to home to continue recovery in the community. PE: (reviewed) and labs (see medical H&PE)  Labs:    Admission on 04/13/2023   Component Date Value Ref Range Status    POC Glucose 04/13/2023 156 (H)  70 - 99 mg/dl Final    Performed on 04/13/2023 ACCU-CHEK   Final    Color, UA 04/13/2023 Yellow  Straw/Yellow Final    Clarity, UA 04/13/2023 Clear  Clear Final    Glucose, Ur 04/13/2023 Negative  Negative mg/dL Final    Bilirubin Urine 04/13/2023 Negative  Negative Final    Ketones, Urine 04/13/2023 Negative  Negative mg/dL Final    Specific Gravity, UA 04/13/2023 1.020  1.005 - 1.030 Final    Blood, Urine 04/13/2023 Negative  Negative Final    pH, UA 04/13/2023 5.5  5.0 - 8.0 Final    Protein, UA 04/13/2023 TRACE (A)  Negative mg/dL Final    Urobilinogen, Urine 04/13/2023 1.0  <2.0 E.U./dL Final    Nitrite, Urine 04/13/2023 Negative  Negative Final    Leukocyte Esterase, Urine 04/13/2023 Negative  Negative Final    Microscopic Examination 04/13/2023 YES   Final    Urine Type 04/13/2023 Voided   Final    Urine received in a container without preservatives.     Urine Reflex to Culture 04/13/2023 Not Indicated   Final    WBC 04/13/2023 5.9  4.0 - 11.0 K/uL Final    RBC 04/13/2023 3.87 (L)  4.20 - 5.90 M/uL Final    Hemoglobin 04/13/2023 10.6 (L)  13.5 - 17.5 g/dL Final
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
## 2023-04-14 NOTE — PROGRESS NOTES
Behavioral Services  Medicare Certification Upon Admission    I certify that this patient's inpatient psychiatric hospital admission is medically necessary for:    [x] (1) Treatment which could reasonably be expected to improve this patient's condition,       [x] (2) Or for diagnostic study;     AND     [x](2) The inpatient psychiatric services are provided while the individual is under the care of a physician and are included in the individualized plan of care.     Estimated length of stay/service 2-5 days    Plan for post-hospital care outpatient services    Electronically signed by JAMEY Perez CNP on 4/14/2023 at 8:55 AM

## 2023-04-14 NOTE — H&P
Patient has been seen and evaluated within 30 days by IM provider and medically cleared for admission to [facility]. Please refer to medical H&P from 4/6/2023. Vitals reviewed and stable. Labs reviewed and nothing to follow. Orders reviewed. Bipolar affective disorder  -management per psychiatry     Hypertension  - BP elevated  -on Losartan, Toprol-XL     Hyperlipidemia   -on Atorvastatin, Fenofibrate     Ischemic heart disease  -on Losartan     Chronic A-fib  -S/p Watchman     CKD stage 3  -sees Nephrology  -on Carnitor     GERD  -on PPI     H/o CVA  -on aspirin, Atorvastatin       Iron deficiency   -on ferrous sulfate        Please contact with IM provider with concerns.     Waldemar Diaz  04/14/23  12:44 PM

## 2023-04-14 NOTE — ED NOTES
Level of Care Disposition: Admit      Patient was seen by ED provider and [facility]IL[facility] staff. The case presented to psychiatric provider on-call Ashley López MD.  Based on the ED evaluation and information presented to the provider by Stefany Hanley it is the recommendation of the on call psychiatric provider that inpatient hospitalization is the least restrictive environment for the patient at this time. The patient will be admitted to the inpatient unit. Admitting provider did not order suicide precautions based on patient kimberly for safety.      David Cancino RN  04/14/23 9111

## 2023-04-15 LAB
EKG ATRIAL RATE: 70 BPM
EKG DIAGNOSIS: NORMAL
EKG Q-T INTERVAL: 438 MS
EKG QRS DURATION: 122 MS
EKG QTC CALCULATION (BAZETT): 495 MS
EKG R AXIS: -65 DEGREES
EKG T AXIS: 74 DEGREES
EKG VENTRICULAR RATE: 77 BPM

## 2023-04-15 PROCEDURE — 6370000000 HC RX 637 (ALT 250 FOR IP)

## 2023-04-15 PROCEDURE — 99232 SBSQ HOSP IP/OBS MODERATE 35: CPT

## 2023-04-15 PROCEDURE — 1240000000 HC EMOTIONAL WELLNESS R&B

## 2023-04-15 PROCEDURE — 93010 ELECTROCARDIOGRAM REPORT: CPT | Performed by: INTERNAL MEDICINE

## 2023-04-15 RX ADMIN — DIVALPROEX SODIUM 500 MG: 500 TABLET, DELAYED RELEASE ORAL at 21:13

## 2023-04-15 RX ADMIN — ATORVASTATIN CALCIUM 40 MG: 40 TABLET, FILM COATED ORAL at 09:21

## 2023-04-15 RX ADMIN — QUETIAPINE FUMARATE 50 MG: 25 TABLET ORAL at 09:22

## 2023-04-15 RX ADMIN — ASPIRIN 81 MG: 81 TABLET, CHEWABLE ORAL at 09:21

## 2023-04-15 RX ADMIN — Medication 1 CAPSULE: at 09:22

## 2023-04-15 RX ADMIN — TRAZODONE HYDROCHLORIDE 50 MG: 50 TABLET ORAL at 23:50

## 2023-04-15 RX ADMIN — QUETIAPINE FUMARATE 50 MG: 25 TABLET ORAL at 21:13

## 2023-04-15 RX ADMIN — PANTOPRAZOLE SODIUM 40 MG: 40 TABLET, DELAYED RELEASE ORAL at 09:22

## 2023-04-15 RX ADMIN — DIVALPROEX SODIUM 500 MG: 500 TABLET, DELAYED RELEASE ORAL at 09:22

## 2023-04-15 RX ADMIN — FERROUS SULFATE TAB 325 MG (65 MG ELEMENTAL FE) 325 MG: 325 (65 FE) TAB at 09:24

## 2023-04-15 RX ADMIN — POTASSIUM CHLORIDE 20 MEQ: 1500 TABLET, EXTENDED RELEASE ORAL at 09:21

## 2023-04-15 RX ADMIN — POTASSIUM CHLORIDE 20 MEQ: 1500 TABLET, EXTENDED RELEASE ORAL at 21:13

## 2023-04-15 RX ADMIN — MESALAMINE 800 MG: 400 CAPSULE, DELAYED RELEASE ORAL at 21:14

## 2023-04-15 RX ADMIN — LOSARTAN POTASSIUM 50 MG: 25 TABLET, FILM COATED ORAL at 09:21

## 2023-04-15 RX ADMIN — CYANOCOBALAMIN TAB 500 MCG 1000 MCG: 500 TAB at 09:22

## 2023-04-15 RX ADMIN — Medication 1000 UNITS: at 09:22

## 2023-04-15 RX ADMIN — MESALAMINE 800 MG: 400 CAPSULE, DELAYED RELEASE ORAL at 14:08

## 2023-04-15 RX ADMIN — FENOFIBRATE 160 MG: 160 TABLET ORAL at 09:21

## 2023-04-15 RX ADMIN — MESALAMINE 800 MG: 400 CAPSULE, DELAYED RELEASE ORAL at 09:22

## 2023-04-15 RX ADMIN — B-COMPLEX W/ C & FOLIC ACID TAB 1 TABLET: TAB at 09:21

## 2023-04-15 RX ADMIN — AMLODIPINE BESYLATE 10 MG: 5 TABLET ORAL at 09:22

## 2023-04-15 RX ADMIN — METOPROLOL SUCCINATE 50 MG: 50 TABLET, EXTENDED RELEASE ORAL at 09:22

## 2023-04-15 RX ADMIN — Medication 10 MG: at 21:13

## 2023-04-15 NOTE — DISCHARGE SUMMARY
<5 ng/mL Final    pH, UA 04/13/2023 5.5   Final    Comment: Urine pH less than 5.0 or greater than 8.0 may indicate sample adulteration. Another sample should be collected if clinically  indicated. Drug Screen Comment: 04/13/2023 see below   Final    Comment: This method is a screening test to detect only these drug  classes as part of a medical workup. Confirmatory testing  by another method should be ordered if clinically indicated. Ethanol Lvl 04/13/2023 None Detected  mg/dL Final    Comment:    None Detected  Conversion factor:  100 mg/dl = .100 g/dl  For Medical Purposes Only      Acetaminophen Level 04/13/2023 <5 (L)  10 - 30 ug/mL Final    Comment: Therapeutic Range: 10.0-30.0 ug/mL  Toxic: >=830 ug/mL      Salicylate, Serum 27/60/9478 <0.3 (L)  15.0 - 30.0 mg/dL Final    Comment: Therapeutic Range: 15.0-30.0 mg/dL  Toxic: >30.0 mg/dL      SARS-CoV-2 RNA, RT PCR 04/14/2023 NOT DETECTED  NOT DETECTED Final    Comment: Not Detected results do not preclude SARS-CoV-2 infection and  should not be used as the sole basis for patient management  decisions. Results must be combined with clinical observations,  patient history, and epidemiological information. Testing was performed using [employer] SARS-CoV-2 and Influenza A/B  nucleic acid assay. This test is a multiplex Real-Time Reverse  Transcriptase Polymerase Chain Reaction (RT-PCR)-based in vitro  diagnostic test intended for the qualitative detection of nucleic  acids from SARS-CoV-2, influenza A, and influenza B in nasopharyngeal  and nasal swab specimens for use under the FDAs Emergency Use  Authorization (EUA) only.     Patient Fact Sheet:  FindDrives.pl  Provider Fact Sheet: FindDrives.pl  EUA: FindDrives.pl  IFU: FindDrives.pl    Methodology:  RT-PCR      INFLUENZA A 04/14/2023 NOT DETECTED  NOT DETECTED Final    INFLUENZA B

## 2023-04-16 PROCEDURE — 1240000000 HC EMOTIONAL WELLNESS R&B

## 2023-04-16 PROCEDURE — 6370000000 HC RX 637 (ALT 250 FOR IP)

## 2023-04-16 PROCEDURE — 2500000003 HC RX 250 WO HCPCS

## 2023-04-16 RX ADMIN — METOPROLOL SUCCINATE 50 MG: 50 TABLET, EXTENDED RELEASE ORAL at 09:11

## 2023-04-16 RX ADMIN — PANTOPRAZOLE SODIUM 40 MG: 40 TABLET, DELAYED RELEASE ORAL at 09:11

## 2023-04-16 RX ADMIN — Medication 1 CAPSULE: at 09:11

## 2023-04-16 RX ADMIN — MICONAZOLE NITRATE: 20 POWDER TOPICAL at 20:40

## 2023-04-16 RX ADMIN — TRAZODONE HYDROCHLORIDE 50 MG: 50 TABLET ORAL at 20:37

## 2023-04-16 RX ADMIN — LOSARTAN POTASSIUM 50 MG: 25 TABLET, FILM COATED ORAL at 09:12

## 2023-04-16 RX ADMIN — FERROUS SULFATE TAB 325 MG (65 MG ELEMENTAL FE) 325 MG: 325 (65 FE) TAB at 09:11

## 2023-04-16 RX ADMIN — AMLODIPINE BESYLATE 10 MG: 5 TABLET ORAL at 09:11

## 2023-04-16 RX ADMIN — POTASSIUM CHLORIDE 20 MEQ: 1500 TABLET, EXTENDED RELEASE ORAL at 09:12

## 2023-04-16 RX ADMIN — Medication 10 MG: at 20:37

## 2023-04-16 RX ADMIN — POTASSIUM CHLORIDE 20 MEQ: 1500 TABLET, EXTENDED RELEASE ORAL at 20:38

## 2023-04-16 RX ADMIN — CYANOCOBALAMIN TAB 500 MCG 1000 MCG: 500 TAB at 09:12

## 2023-04-16 RX ADMIN — B-COMPLEX W/ C & FOLIC ACID TAB 1 TABLET: TAB at 09:12

## 2023-04-16 RX ADMIN — Medication 1000 UNITS: at 09:11

## 2023-04-16 RX ADMIN — FENOFIBRATE 160 MG: 160 TABLET ORAL at 09:11

## 2023-04-16 RX ADMIN — ATORVASTATIN CALCIUM 40 MG: 40 TABLET, FILM COATED ORAL at 09:12

## 2023-04-16 RX ADMIN — ASPIRIN 81 MG: 81 TABLET, CHEWABLE ORAL at 09:11

## 2023-04-16 RX ADMIN — DIVALPROEX SODIUM 500 MG: 500 TABLET, DELAYED RELEASE ORAL at 09:11

## 2023-04-16 RX ADMIN — MESALAMINE 800 MG: 400 CAPSULE, DELAYED RELEASE ORAL at 20:38

## 2023-04-16 RX ADMIN — DIVALPROEX SODIUM 500 MG: 500 TABLET, DELAYED RELEASE ORAL at 20:38

## 2023-04-16 RX ADMIN — MICONAZOLE NITRATE: 20 POWDER TOPICAL at 00:48

## 2023-04-16 RX ADMIN — QUETIAPINE FUMARATE 50 MG: 25 TABLET ORAL at 20:38

## 2023-04-16 RX ADMIN — QUETIAPINE FUMARATE 50 MG: 25 TABLET ORAL at 09:11

## 2023-04-16 RX ADMIN — MESALAMINE 800 MG: 400 CAPSULE, DELAYED RELEASE ORAL at 10:25

## 2023-04-16 RX ADMIN — MESALAMINE 800 MG: 400 CAPSULE, DELAYED RELEASE ORAL at 17:02

## 2023-04-17 PROCEDURE — 1240000000 HC EMOTIONAL WELLNESS R&B

## 2023-04-17 PROCEDURE — 99232 SBSQ HOSP IP/OBS MODERATE 35: CPT

## 2023-04-17 PROCEDURE — 6370000000 HC RX 637 (ALT 250 FOR IP)

## 2023-04-17 RX ADMIN — DIVALPROEX SODIUM 500 MG: 500 TABLET, DELAYED RELEASE ORAL at 20:43

## 2023-04-17 RX ADMIN — FERROUS SULFATE TAB 325 MG (65 MG ELEMENTAL FE) 325 MG: 325 (65 FE) TAB at 08:44

## 2023-04-17 RX ADMIN — QUETIAPINE FUMARATE 50 MG: 25 TABLET ORAL at 20:43

## 2023-04-17 RX ADMIN — Medication 1 CAPSULE: at 08:44

## 2023-04-17 RX ADMIN — POTASSIUM CHLORIDE 20 MEQ: 1500 TABLET, EXTENDED RELEASE ORAL at 08:44

## 2023-04-17 RX ADMIN — ASPIRIN 81 MG: 81 TABLET, CHEWABLE ORAL at 08:44

## 2023-04-17 RX ADMIN — POTASSIUM CHLORIDE 20 MEQ: 1500 TABLET, EXTENDED RELEASE ORAL at 20:43

## 2023-04-17 RX ADMIN — TRAZODONE HYDROCHLORIDE 50 MG: 50 TABLET ORAL at 20:43

## 2023-04-17 RX ADMIN — DIVALPROEX SODIUM 500 MG: 500 TABLET, DELAYED RELEASE ORAL at 08:44

## 2023-04-17 RX ADMIN — B-COMPLEX W/ C & FOLIC ACID TAB 1 TABLET: TAB at 08:44

## 2023-04-17 RX ADMIN — CYANOCOBALAMIN TAB 500 MCG 1000 MCG: 500 TAB at 08:45

## 2023-04-17 RX ADMIN — MESALAMINE 800 MG: 400 CAPSULE, DELAYED RELEASE ORAL at 13:29

## 2023-04-17 RX ADMIN — PANTOPRAZOLE SODIUM 40 MG: 40 TABLET, DELAYED RELEASE ORAL at 08:44

## 2023-04-17 RX ADMIN — Medication 10 MG: at 20:43

## 2023-04-17 RX ADMIN — LOSARTAN POTASSIUM 50 MG: 25 TABLET, FILM COATED ORAL at 08:44

## 2023-04-17 RX ADMIN — AMLODIPINE BESYLATE 10 MG: 5 TABLET ORAL at 08:45

## 2023-04-17 RX ADMIN — METOPROLOL SUCCINATE 50 MG: 50 TABLET, EXTENDED RELEASE ORAL at 08:45

## 2023-04-17 RX ADMIN — Medication 1000 UNITS: at 08:44

## 2023-04-17 RX ADMIN — QUETIAPINE FUMARATE 50 MG: 25 TABLET ORAL at 08:44

## 2023-04-17 RX ADMIN — ATORVASTATIN CALCIUM 40 MG: 40 TABLET, FILM COATED ORAL at 08:45

## 2023-04-17 RX ADMIN — MESALAMINE 800 MG: 400 CAPSULE, DELAYED RELEASE ORAL at 08:50

## 2023-04-17 RX ADMIN — FENOFIBRATE 160 MG: 160 TABLET ORAL at 08:44

## 2023-04-18 LAB — VALPROATE SERPL-MCNC: 66.1 UG/ML (ref 50–100)

## 2023-04-18 PROCEDURE — 6370000000 HC RX 637 (ALT 250 FOR IP)

## 2023-04-18 PROCEDURE — 80164 ASSAY DIPROPYLACETIC ACD TOT: CPT

## 2023-04-18 PROCEDURE — 36415 COLL VENOUS BLD VENIPUNCTURE: CPT

## 2023-04-18 PROCEDURE — 99232 SBSQ HOSP IP/OBS MODERATE 35: CPT

## 2023-04-18 PROCEDURE — 1240000000 HC EMOTIONAL WELLNESS R&B

## 2023-04-18 RX ADMIN — POTASSIUM CHLORIDE 20 MEQ: 1500 TABLET, EXTENDED RELEASE ORAL at 08:32

## 2023-04-18 RX ADMIN — Medication 10 MG: at 20:27

## 2023-04-18 RX ADMIN — DIVALPROEX SODIUM 500 MG: 500 TABLET, DELAYED RELEASE ORAL at 08:39

## 2023-04-18 RX ADMIN — FERROUS SULFATE TAB 325 MG (65 MG ELEMENTAL FE) 325 MG: 325 (65 FE) TAB at 08:39

## 2023-04-18 RX ADMIN — FENOFIBRATE 160 MG: 160 TABLET ORAL at 08:33

## 2023-04-18 RX ADMIN — AMLODIPINE BESYLATE 10 MG: 5 TABLET ORAL at 08:32

## 2023-04-18 RX ADMIN — B-COMPLEX W/ C & FOLIC ACID TAB 1 TABLET: TAB at 08:33

## 2023-04-18 RX ADMIN — TRAZODONE HYDROCHLORIDE 50 MG: 50 TABLET ORAL at 20:26

## 2023-04-18 RX ADMIN — METOPROLOL SUCCINATE 50 MG: 50 TABLET, EXTENDED RELEASE ORAL at 08:32

## 2023-04-18 RX ADMIN — MESALAMINE 800 MG: 400 CAPSULE, DELAYED RELEASE ORAL at 08:32

## 2023-04-18 RX ADMIN — Medication 1000 UNITS: at 08:33

## 2023-04-18 RX ADMIN — LOSARTAN POTASSIUM 50 MG: 25 TABLET, FILM COATED ORAL at 08:33

## 2023-04-18 RX ADMIN — QUETIAPINE FUMARATE 50 MG: 25 TABLET ORAL at 08:32

## 2023-04-18 RX ADMIN — ATORVASTATIN CALCIUM 40 MG: 40 TABLET, FILM COATED ORAL at 08:33

## 2023-04-18 RX ADMIN — QUETIAPINE FUMARATE 50 MG: 25 TABLET ORAL at 20:28

## 2023-04-18 RX ADMIN — MESALAMINE 800 MG: 400 CAPSULE, DELAYED RELEASE ORAL at 20:29

## 2023-04-18 RX ADMIN — POTASSIUM CHLORIDE 20 MEQ: 1500 TABLET, EXTENDED RELEASE ORAL at 20:26

## 2023-04-18 RX ADMIN — ASPIRIN 81 MG: 81 TABLET, CHEWABLE ORAL at 08:32

## 2023-04-18 RX ADMIN — PANTOPRAZOLE SODIUM 40 MG: 40 TABLET, DELAYED RELEASE ORAL at 08:33

## 2023-04-18 RX ADMIN — Medication 1 CAPSULE: at 08:33

## 2023-04-18 RX ADMIN — DIVALPROEX SODIUM 750 MG: 250 TABLET, DELAYED RELEASE ORAL at 20:25

## 2023-04-18 RX ADMIN — MESALAMINE 800 MG: 400 CAPSULE, DELAYED RELEASE ORAL at 14:38

## 2023-04-18 RX ADMIN — CYANOCOBALAMIN TAB 500 MCG 1000 MCG: 500 TAB at 08:33

## 2023-04-19 LAB — SARS-COV-2 RDRP RESP QL NAA+PROBE: NOT DETECTED

## 2023-04-19 PROCEDURE — 6370000000 HC RX 637 (ALT 250 FOR IP)

## 2023-04-19 PROCEDURE — 99232 SBSQ HOSP IP/OBS MODERATE 35: CPT

## 2023-04-19 PROCEDURE — 87635 SARS-COV-2 COVID-19 AMP PRB: CPT

## 2023-04-19 PROCEDURE — 1240000000 HC EMOTIONAL WELLNESS R&B

## 2023-04-19 RX ADMIN — QUETIAPINE FUMARATE 50 MG: 25 TABLET ORAL at 21:33

## 2023-04-19 RX ADMIN — ACETAMINOPHEN 650 MG: 325 TABLET ORAL at 21:33

## 2023-04-19 RX ADMIN — AMLODIPINE BESYLATE 10 MG: 5 TABLET ORAL at 07:40

## 2023-04-19 RX ADMIN — DIVALPROEX SODIUM 750 MG: 250 TABLET, DELAYED RELEASE ORAL at 07:40

## 2023-04-19 RX ADMIN — METOPROLOL SUCCINATE 50 MG: 50 TABLET, EXTENDED RELEASE ORAL at 07:40

## 2023-04-19 RX ADMIN — Medication 1 CAPSULE: at 07:40

## 2023-04-19 RX ADMIN — Medication 10 MG: at 21:34

## 2023-04-19 RX ADMIN — QUETIAPINE FUMARATE 50 MG: 25 TABLET ORAL at 07:40

## 2023-04-19 RX ADMIN — Medication 1000 UNITS: at 07:40

## 2023-04-19 RX ADMIN — MESALAMINE 800 MG: 400 CAPSULE, DELAYED RELEASE ORAL at 21:33

## 2023-04-19 RX ADMIN — FERROUS SULFATE TAB 325 MG (65 MG ELEMENTAL FE) 325 MG: 325 (65 FE) TAB at 07:40

## 2023-04-19 RX ADMIN — DIVALPROEX SODIUM 750 MG: 250 TABLET, DELAYED RELEASE ORAL at 21:33

## 2023-04-19 RX ADMIN — LOSARTAN POTASSIUM 50 MG: 25 TABLET, FILM COATED ORAL at 07:40

## 2023-04-19 RX ADMIN — POTASSIUM CHLORIDE 20 MEQ: 1500 TABLET, EXTENDED RELEASE ORAL at 21:34

## 2023-04-19 RX ADMIN — ALUMINUM HYDROXIDE, MAGNESIUM HYDROXIDE, AND SIMETHICONE 30 ML: 200; 200; 20 SUSPENSION ORAL at 06:08

## 2023-04-19 RX ADMIN — POTASSIUM CHLORIDE 20 MEQ: 1500 TABLET, EXTENDED RELEASE ORAL at 07:40

## 2023-04-19 RX ADMIN — FENOFIBRATE 160 MG: 160 TABLET ORAL at 07:42

## 2023-04-19 RX ADMIN — MESALAMINE 800 MG: 400 CAPSULE, DELAYED RELEASE ORAL at 14:27

## 2023-04-19 RX ADMIN — ASPIRIN 81 MG: 81 TABLET, CHEWABLE ORAL at 07:42

## 2023-04-19 RX ADMIN — MESALAMINE 800 MG: 400 CAPSULE, DELAYED RELEASE ORAL at 07:42

## 2023-04-19 RX ADMIN — B-COMPLEX W/ C & FOLIC ACID TAB 1 TABLET: TAB at 07:42

## 2023-04-19 RX ADMIN — CYANOCOBALAMIN TAB 500 MCG 1000 MCG: 500 TAB at 07:42

## 2023-04-19 RX ADMIN — ATORVASTATIN CALCIUM 40 MG: 40 TABLET, FILM COATED ORAL at 07:42

## 2023-04-19 RX ADMIN — PANTOPRAZOLE SODIUM 40 MG: 40 TABLET, DELAYED RELEASE ORAL at 07:40

## 2023-04-19 RX ADMIN — LORAZEPAM 0.5 MG: 0.5 TABLET ORAL at 21:34

## 2023-04-19 ASSESSMENT — PAIN SCALES - GENERAL
PAINLEVEL_OUTOF10: 8
PAINLEVEL_OUTOF10: 6
PAINLEVEL_OUTOF10: 10

## 2023-04-19 ASSESSMENT — PAIN DESCRIPTION - ORIENTATION
ORIENTATION: LOWER
ORIENTATION: LOWER

## 2023-04-19 ASSESSMENT — PAIN - FUNCTIONAL ASSESSMENT: PAIN_FUNCTIONAL_ASSESSMENT: ACTIVITIES ARE NOT PREVENTED

## 2023-04-19 ASSESSMENT — PAIN DESCRIPTION - LOCATION
LOCATION: BACK
LOCATION: ABDOMEN
LOCATION: BACK

## 2023-04-19 ASSESSMENT — PAIN DESCRIPTION - DESCRIPTORS: DESCRIPTORS: ACHING;DISCOMFORT

## 2023-04-20 ENCOUNTER — APPOINTMENT (OUTPATIENT)
Dept: CT IMAGING | Age: 73
End: 2023-04-20
Attending: PSYCHIATRY & NEUROLOGY
Payer: MEDICARE

## 2023-04-20 PROCEDURE — 99232 SBSQ HOSP IP/OBS MODERATE 35: CPT

## 2023-04-20 PROCEDURE — 70450 CT HEAD/BRAIN W/O DYE: CPT

## 2023-04-20 PROCEDURE — 1240000000 HC EMOTIONAL WELLNESS R&B

## 2023-04-20 PROCEDURE — 6370000000 HC RX 637 (ALT 250 FOR IP)

## 2023-04-20 RX ORDER — QUETIAPINE FUMARATE 100 MG/1
100 TABLET, FILM COATED ORAL NIGHTLY
Status: DISCONTINUED | OUTPATIENT
Start: 2023-04-20 | End: 2023-04-24 | Stop reason: HOSPADM

## 2023-04-20 RX ORDER — QUETIAPINE FUMARATE 25 MG/1
50 TABLET, FILM COATED ORAL EVERY MORNING
Status: DISCONTINUED | OUTPATIENT
Start: 2023-04-21 | End: 2023-04-24 | Stop reason: HOSPADM

## 2023-04-20 RX ORDER — MECOBALAMIN 5000 MCG
10 TABLET,DISINTEGRATING ORAL EVERY EVENING
Status: DISCONTINUED | OUTPATIENT
Start: 2023-04-20 | End: 2023-04-24 | Stop reason: HOSPADM

## 2023-04-20 RX ORDER — DIVALPROEX SODIUM 500 MG/1
500 TABLET, DELAYED RELEASE ORAL EVERY 12 HOURS SCHEDULED
Status: DISCONTINUED | OUTPATIENT
Start: 2023-04-20 | End: 2023-04-24 | Stop reason: HOSPADM

## 2023-04-20 RX ADMIN — B-COMPLEX W/ C & FOLIC ACID TAB 1 TABLET: TAB at 07:47

## 2023-04-20 RX ADMIN — POTASSIUM CHLORIDE 20 MEQ: 1500 TABLET, EXTENDED RELEASE ORAL at 07:47

## 2023-04-20 RX ADMIN — Medication 10 MG: at 18:52

## 2023-04-20 RX ADMIN — LOSARTAN POTASSIUM 50 MG: 25 TABLET, FILM COATED ORAL at 07:46

## 2023-04-20 RX ADMIN — Medication 1 CAPSULE: at 07:47

## 2023-04-20 RX ADMIN — Medication 1000 UNITS: at 07:48

## 2023-04-20 RX ADMIN — CYANOCOBALAMIN TAB 500 MCG 1000 MCG: 500 TAB at 07:47

## 2023-04-20 RX ADMIN — PANTOPRAZOLE SODIUM 40 MG: 40 TABLET, DELAYED RELEASE ORAL at 07:47

## 2023-04-20 RX ADMIN — ACETAMINOPHEN 650 MG: 325 TABLET ORAL at 05:26

## 2023-04-20 RX ADMIN — QUETIAPINE FUMARATE 100 MG: 100 TABLET ORAL at 20:43

## 2023-04-20 RX ADMIN — POTASSIUM CHLORIDE 20 MEQ: 1500 TABLET, EXTENDED RELEASE ORAL at 20:42

## 2023-04-20 RX ADMIN — MESALAMINE 800 MG: 400 CAPSULE, DELAYED RELEASE ORAL at 07:47

## 2023-04-20 RX ADMIN — DIVALPROEX SODIUM 500 MG: 500 TABLET, DELAYED RELEASE ORAL at 20:42

## 2023-04-20 RX ADMIN — LORAZEPAM 0.5 MG: 0.5 TABLET ORAL at 05:26

## 2023-04-20 RX ADMIN — ATORVASTATIN CALCIUM 40 MG: 40 TABLET, FILM COATED ORAL at 07:47

## 2023-04-20 RX ADMIN — TRAZODONE HYDROCHLORIDE 50 MG: 50 TABLET ORAL at 21:48

## 2023-04-20 RX ADMIN — ASPIRIN 81 MG: 81 TABLET, CHEWABLE ORAL at 07:47

## 2023-04-20 RX ADMIN — DIVALPROEX SODIUM 750 MG: 250 TABLET, DELAYED RELEASE ORAL at 07:48

## 2023-04-20 RX ADMIN — QUETIAPINE FUMARATE 50 MG: 25 TABLET ORAL at 07:47

## 2023-04-20 RX ADMIN — MESALAMINE 800 MG: 400 CAPSULE, DELAYED RELEASE ORAL at 20:42

## 2023-04-20 RX ADMIN — METOPROLOL SUCCINATE 50 MG: 50 TABLET, EXTENDED RELEASE ORAL at 07:48

## 2023-04-20 RX ADMIN — FERROUS SULFATE TAB 325 MG (65 MG ELEMENTAL FE) 325 MG: 325 (65 FE) TAB at 07:47

## 2023-04-20 RX ADMIN — AMLODIPINE BESYLATE 10 MG: 5 TABLET ORAL at 07:46

## 2023-04-20 RX ADMIN — ACETAMINOPHEN 650 MG: 325 TABLET ORAL at 21:48

## 2023-04-20 RX ADMIN — FENOFIBRATE 160 MG: 160 TABLET ORAL at 07:47

## 2023-04-20 ASSESSMENT — PAIN DESCRIPTION - ORIENTATION: ORIENTATION: LOWER

## 2023-04-20 ASSESSMENT — PAIN DESCRIPTION - DESCRIPTORS
DESCRIPTORS: PATIENT UNABLE TO DESCRIBE
DESCRIPTORS: ACHING

## 2023-04-20 ASSESSMENT — PAIN SCALES - GENERAL
PAINLEVEL_OUTOF10: 4
PAINLEVEL_OUTOF10: 0
PAINLEVEL_OUTOF10: 0
PAINLEVEL_OUTOF10: 10
PAINLEVEL_OUTOF10: 0

## 2023-04-20 ASSESSMENT — PAIN DESCRIPTION - LOCATION
LOCATION: BACK
LOCATION: BACK

## 2023-04-20 ASSESSMENT — PAIN - FUNCTIONAL ASSESSMENT: PAIN_FUNCTIONAL_ASSESSMENT: ACTIVITIES ARE NOT PREVENTED

## 2023-04-21 LAB
AMORPH SED URNS QL MICRO: ABNORMAL /HPF
BACTERIA URNS QL MICRO: ABNORMAL /HPF
BILIRUB UR QL STRIP.AUTO: NEGATIVE
CLARITY UR: ABNORMAL
COLOR UR: YELLOW
EPI CELLS #/AREA URNS HPF: ABNORMAL /HPF (ref 0–5)
FINE GRAN CASTS #/AREA URNS HPF: ABNORMAL /LPF (ref 0–2)
GLUCOSE UR STRIP.AUTO-MCNC: NEGATIVE MG/DL
HGB UR QL STRIP.AUTO: NEGATIVE
KETONES UR STRIP.AUTO-MCNC: NEGATIVE MG/DL
LEUKOCYTE ESTERASE UR QL STRIP.AUTO: ABNORMAL
NITRITE UR QL STRIP.AUTO: NEGATIVE
PH UR STRIP.AUTO: 6 [PH] (ref 5–8)
PROT UR STRIP.AUTO-MCNC: ABNORMAL MG/DL
RBC #/AREA URNS HPF: ABNORMAL /HPF (ref 0–4)
SP GR UR STRIP.AUTO: 1.01 (ref 1–1.03)
UA COMPLETE W REFLEX CULTURE PNL UR: YES
UA DIPSTICK W REFLEX MICRO PNL UR: YES
URN SPEC COLLECT METH UR: ABNORMAL
UROBILINOGEN UR STRIP-ACNC: 1 E.U./DL
WBC #/AREA URNS HPF: >100 /HPF (ref 0–5)

## 2023-04-21 PROCEDURE — 6370000000 HC RX 637 (ALT 250 FOR IP)

## 2023-04-21 PROCEDURE — 1240000000 HC EMOTIONAL WELLNESS R&B

## 2023-04-21 PROCEDURE — 81001 URINALYSIS AUTO W/SCOPE: CPT

## 2023-04-21 PROCEDURE — 99232 SBSQ HOSP IP/OBS MODERATE 35: CPT

## 2023-04-21 RX ORDER — CEFUROXIME AXETIL 250 MG/1
500 TABLET ORAL EVERY 12 HOURS SCHEDULED
Status: DISCONTINUED | OUTPATIENT
Start: 2023-04-21 | End: 2023-04-24 | Stop reason: HOSPADM

## 2023-04-21 RX ADMIN — FENOFIBRATE 160 MG: 160 TABLET ORAL at 08:47

## 2023-04-21 RX ADMIN — QUETIAPINE FUMARATE 50 MG: 25 TABLET ORAL at 08:46

## 2023-04-21 RX ADMIN — DIVALPROEX SODIUM 500 MG: 500 TABLET, DELAYED RELEASE ORAL at 20:02

## 2023-04-21 RX ADMIN — Medication 10 MG: at 18:26

## 2023-04-21 RX ADMIN — ATORVASTATIN CALCIUM 40 MG: 40 TABLET, FILM COATED ORAL at 08:47

## 2023-04-21 RX ADMIN — MESALAMINE 800 MG: 400 CAPSULE, DELAYED RELEASE ORAL at 20:06

## 2023-04-21 RX ADMIN — CEFUROXIME AXETIL 500 MG: 250 TABLET ORAL at 20:02

## 2023-04-21 RX ADMIN — ACETAMINOPHEN 650 MG: 325 TABLET ORAL at 20:03

## 2023-04-21 RX ADMIN — FERROUS SULFATE TAB 325 MG (65 MG ELEMENTAL FE) 325 MG: 325 (65 FE) TAB at 08:47

## 2023-04-21 RX ADMIN — MESALAMINE 800 MG: 400 CAPSULE, DELAYED RELEASE ORAL at 08:47

## 2023-04-21 RX ADMIN — AMLODIPINE BESYLATE 10 MG: 5 TABLET ORAL at 08:47

## 2023-04-21 RX ADMIN — POTASSIUM CHLORIDE 20 MEQ: 1500 TABLET, EXTENDED RELEASE ORAL at 08:48

## 2023-04-21 RX ADMIN — LOSARTAN POTASSIUM 50 MG: 25 TABLET, FILM COATED ORAL at 08:46

## 2023-04-21 RX ADMIN — PANTOPRAZOLE SODIUM 40 MG: 40 TABLET, DELAYED RELEASE ORAL at 08:46

## 2023-04-21 RX ADMIN — DIVALPROEX SODIUM 500 MG: 500 TABLET, DELAYED RELEASE ORAL at 08:47

## 2023-04-21 RX ADMIN — ACETAMINOPHEN 650 MG: 325 TABLET ORAL at 16:01

## 2023-04-21 RX ADMIN — B-COMPLEX W/ C & FOLIC ACID TAB 1 TABLET: TAB at 08:46

## 2023-04-21 RX ADMIN — Medication 1 CAPSULE: at 08:47

## 2023-04-21 RX ADMIN — CYANOCOBALAMIN TAB 500 MCG 1000 MCG: 500 TAB at 08:46

## 2023-04-21 RX ADMIN — ASPIRIN 81 MG: 81 TABLET, CHEWABLE ORAL at 08:48

## 2023-04-21 RX ADMIN — QUETIAPINE FUMARATE 100 MG: 100 TABLET ORAL at 20:02

## 2023-04-21 RX ADMIN — Medication 1000 UNITS: at 08:46

## 2023-04-21 RX ADMIN — METOPROLOL SUCCINATE 50 MG: 50 TABLET, EXTENDED RELEASE ORAL at 08:46

## 2023-04-21 RX ADMIN — MESALAMINE 800 MG: 400 CAPSULE, DELAYED RELEASE ORAL at 16:01

## 2023-04-21 ASSESSMENT — PAIN SCALES - GENERAL
PAINLEVEL_OUTOF10: 5
PAINLEVEL_OUTOF10: 0
PAINLEVEL_OUTOF10: 0

## 2023-04-21 ASSESSMENT — PAIN DESCRIPTION - ORIENTATION: ORIENTATION: LOWER

## 2023-04-21 ASSESSMENT — PAIN DESCRIPTION - DESCRIPTORS: DESCRIPTORS: ACHING

## 2023-04-21 ASSESSMENT — PAIN - FUNCTIONAL ASSESSMENT: PAIN_FUNCTIONAL_ASSESSMENT: ACTIVITIES ARE NOT PREVENTED

## 2023-04-21 ASSESSMENT — PAIN DESCRIPTION - LOCATION
LOCATION: BACK
LOCATION: OTHER (COMMENT)

## 2023-04-21 ASSESSMENT — PAIN SCALES - WONG BAKER: WONGBAKER_NUMERICALRESPONSE: 0

## 2023-04-22 PROCEDURE — 6370000000 HC RX 637 (ALT 250 FOR IP)

## 2023-04-22 PROCEDURE — 1240000000 HC EMOTIONAL WELLNESS R&B

## 2023-04-22 PROCEDURE — 6370000000 HC RX 637 (ALT 250 FOR IP): Performed by: NURSE PRACTITIONER

## 2023-04-22 RX ORDER — BENZONATATE 100 MG/1
100 CAPSULE ORAL 3 TIMES DAILY PRN
Status: DISCONTINUED | OUTPATIENT
Start: 2023-04-22 | End: 2023-04-24 | Stop reason: HOSPADM

## 2023-04-22 RX ADMIN — CEFUROXIME AXETIL 500 MG: 250 TABLET ORAL at 20:08

## 2023-04-22 RX ADMIN — ATORVASTATIN CALCIUM 40 MG: 40 TABLET, FILM COATED ORAL at 08:21

## 2023-04-22 RX ADMIN — LORAZEPAM 0.5 MG: 0.5 TABLET ORAL at 02:18

## 2023-04-22 RX ADMIN — POTASSIUM CHLORIDE 20 MEQ: 1500 TABLET, EXTENDED RELEASE ORAL at 08:20

## 2023-04-22 RX ADMIN — FENOFIBRATE 160 MG: 160 TABLET ORAL at 08:20

## 2023-04-22 RX ADMIN — DIVALPROEX SODIUM 500 MG: 500 TABLET, DELAYED RELEASE ORAL at 21:13

## 2023-04-22 RX ADMIN — METOPROLOL SUCCINATE 50 MG: 50 TABLET, EXTENDED RELEASE ORAL at 12:33

## 2023-04-22 RX ADMIN — BENZONATATE 100 MG: 100 CAPSULE ORAL at 14:37

## 2023-04-22 RX ADMIN — MESALAMINE 800 MG: 400 CAPSULE, DELAYED RELEASE ORAL at 08:20

## 2023-04-22 RX ADMIN — Medication 1000 UNITS: at 08:23

## 2023-04-22 RX ADMIN — Medication 10 MG: at 18:01

## 2023-04-22 RX ADMIN — POTASSIUM CHLORIDE 20 MEQ: 1500 TABLET, EXTENDED RELEASE ORAL at 20:08

## 2023-04-22 RX ADMIN — PANTOPRAZOLE SODIUM 40 MG: 40 TABLET, DELAYED RELEASE ORAL at 08:22

## 2023-04-22 RX ADMIN — LORAZEPAM 0.5 MG: 0.5 TABLET ORAL at 23:12

## 2023-04-22 RX ADMIN — FERROUS SULFATE TAB 325 MG (65 MG ELEMENTAL FE) 325 MG: 325 (65 FE) TAB at 08:21

## 2023-04-22 RX ADMIN — CYANOCOBALAMIN TAB 500 MCG 1000 MCG: 500 TAB at 08:21

## 2023-04-22 RX ADMIN — AMLODIPINE BESYLATE 10 MG: 5 TABLET ORAL at 08:20

## 2023-04-22 RX ADMIN — QUETIAPINE FUMARATE 100 MG: 100 TABLET ORAL at 20:08

## 2023-04-22 RX ADMIN — Medication 1 CAPSULE: at 08:21

## 2023-04-22 RX ADMIN — LOSARTAN POTASSIUM 50 MG: 25 TABLET, FILM COATED ORAL at 08:20

## 2023-04-22 RX ADMIN — MESALAMINE 800 MG: 400 CAPSULE, DELAYED RELEASE ORAL at 13:49

## 2023-04-22 RX ADMIN — DIVALPROEX SODIUM 500 MG: 500 TABLET, DELAYED RELEASE ORAL at 08:22

## 2023-04-22 RX ADMIN — ASPIRIN 81 MG: 81 TABLET, CHEWABLE ORAL at 08:25

## 2023-04-22 RX ADMIN — B-COMPLEX W/ C & FOLIC ACID TAB 1 TABLET: TAB at 08:21

## 2023-04-22 RX ADMIN — ACETAMINOPHEN 650 MG: 325 TABLET ORAL at 12:33

## 2023-04-22 RX ADMIN — CEFUROXIME AXETIL 500 MG: 250 TABLET ORAL at 08:20

## 2023-04-22 RX ADMIN — ACETAMINOPHEN 650 MG: 325 TABLET ORAL at 20:08

## 2023-04-22 RX ADMIN — QUETIAPINE FUMARATE 50 MG: 25 TABLET ORAL at 08:25

## 2023-04-22 RX ADMIN — ACETAMINOPHEN 650 MG: 325 TABLET ORAL at 02:18

## 2023-04-22 ASSESSMENT — PAIN DESCRIPTION - DESCRIPTORS
DESCRIPTORS: ACHING
DESCRIPTORS: OTHER (COMMENT)

## 2023-04-22 ASSESSMENT — PAIN - FUNCTIONAL ASSESSMENT
PAIN_FUNCTIONAL_ASSESSMENT: PREVENTS OR INTERFERES SOME ACTIVE ACTIVITIES AND ADLS
PAIN_FUNCTIONAL_ASSESSMENT: PREVENTS OR INTERFERES SOME ACTIVE ACTIVITIES AND ADLS
PAIN_FUNCTIONAL_ASSESSMENT: ACTIVITIES ARE NOT PREVENTED

## 2023-04-22 ASSESSMENT — PAIN DESCRIPTION - FREQUENCY: FREQUENCY: CONTINUOUS

## 2023-04-22 ASSESSMENT — PAIN SCALES - GENERAL
PAINLEVEL_OUTOF10: 4
PAINLEVEL_OUTOF10: 2
PAINLEVEL_OUTOF10: 4
PAINLEVEL_OUTOF10: 1
PAINLEVEL_OUTOF10: 3

## 2023-04-22 ASSESSMENT — PAIN DESCRIPTION - ONSET: ONSET: ON-GOING

## 2023-04-22 ASSESSMENT — PAIN DESCRIPTION - PAIN TYPE: TYPE: CHRONIC PAIN

## 2023-04-22 ASSESSMENT — PAIN DESCRIPTION - LOCATION
LOCATION: OTHER (COMMENT)
LOCATION: BACK
LOCATION: BACK

## 2023-04-22 ASSESSMENT — PAIN DESCRIPTION - ORIENTATION
ORIENTATION: LOWER
ORIENTATION: MID

## 2023-04-22 ASSESSMENT — PAIN SCALES - WONG BAKER
WONGBAKER_NUMERICALRESPONSE: 0
WONGBAKER_NUMERICALRESPONSE: 0
WONGBAKER_NUMERICALRESPONSE: 4

## 2023-04-23 PROCEDURE — 6370000000 HC RX 637 (ALT 250 FOR IP): Performed by: NURSE PRACTITIONER

## 2023-04-23 PROCEDURE — 6370000000 HC RX 637 (ALT 250 FOR IP)

## 2023-04-23 PROCEDURE — 1240000000 HC EMOTIONAL WELLNESS R&B

## 2023-04-23 RX ADMIN — AMLODIPINE BESYLATE 10 MG: 5 TABLET ORAL at 12:50

## 2023-04-23 RX ADMIN — METOPROLOL SUCCINATE 50 MG: 50 TABLET, EXTENDED RELEASE ORAL at 12:56

## 2023-04-23 RX ADMIN — LORAZEPAM 0.5 MG: 0.5 TABLET ORAL at 12:55

## 2023-04-23 RX ADMIN — CEFUROXIME AXETIL 500 MG: 250 TABLET ORAL at 12:53

## 2023-04-23 RX ADMIN — QUETIAPINE FUMARATE 50 MG: 25 TABLET ORAL at 12:57

## 2023-04-23 RX ADMIN — BENZONATATE 100 MG: 100 CAPSULE ORAL at 12:53

## 2023-04-23 RX ADMIN — QUETIAPINE FUMARATE 100 MG: 100 TABLET ORAL at 20:37

## 2023-04-23 RX ADMIN — DIVALPROEX SODIUM 500 MG: 500 TABLET, DELAYED RELEASE ORAL at 12:54

## 2023-04-23 RX ADMIN — OLANZAPINE 10 MG: 10 TABLET, FILM COATED ORAL at 20:37

## 2023-04-23 RX ADMIN — TRAZODONE HYDROCHLORIDE 50 MG: 50 TABLET ORAL at 01:15

## 2023-04-23 ASSESSMENT — PAIN SCALES - GENERAL: PAINLEVEL_OUTOF10: 0

## 2023-04-24 ENCOUNTER — HOSPITAL ENCOUNTER (INPATIENT)
Age: 73
LOS: 5 days | Discharge: ANOTHER HOSPITAL | End: 2023-04-29
Attending: INTERNAL MEDICINE | Admitting: INTERNAL MEDICINE
Payer: MEDICARE

## 2023-04-24 VITALS
HEART RATE: 94 BPM | WEIGHT: 270 LBS | SYSTOLIC BLOOD PRESSURE: 143 MMHG | BODY MASS INDEX: 31.88 KG/M2 | TEMPERATURE: 97.7 F | HEIGHT: 77 IN | DIASTOLIC BLOOD PRESSURE: 79 MMHG | OXYGEN SATURATION: 99 % | RESPIRATION RATE: 16 BRPM

## 2023-04-24 PROBLEM — U07.1 COVID-19: Status: ACTIVE | Noted: 2023-04-24

## 2023-04-24 LAB
ANION GAP SERPL CALCULATED.3IONS-SCNC: 11 MMOL/L (ref 3–16)
BASOPHILS # BLD: 0 K/UL (ref 0–0.2)
BASOPHILS NFR BLD: 1.1 %
BUN SERPL-MCNC: 28 MG/DL (ref 7–20)
CALCIUM SERPL-MCNC: 9.3 MG/DL (ref 8.3–10.6)
CHLORIDE SERPL-SCNC: 104 MMOL/L (ref 99–110)
CO2 SERPL-SCNC: 22 MMOL/L (ref 21–32)
CREAT SERPL-MCNC: 1.3 MG/DL (ref 0.8–1.3)
DEPRECATED RDW RBC AUTO: 18.4 % (ref 12.4–15.4)
EOSINOPHIL # BLD: 0.1 K/UL (ref 0–0.6)
EOSINOPHIL NFR BLD: 1.8 %
FLUAV RNA RESP QL NAA+PROBE: NOT DETECTED
FLUBV RNA RESP QL NAA+PROBE: NOT DETECTED
GFR SERPLBLD CREATININE-BSD FMLA CKD-EPI: 57 ML/MIN/{1.73_M2}
GLUCOSE SERPL-MCNC: 146 MG/DL (ref 70–99)
HCT VFR BLD AUTO: 33.6 % (ref 40.5–52.5)
HGB BLD-MCNC: 11 G/DL (ref 13.5–17.5)
LYMPHOCYTES # BLD: 0.7 K/UL (ref 1–5.1)
LYMPHOCYTES NFR BLD: 20.8 %
MCH RBC QN AUTO: 27.6 PG (ref 26–34)
MCHC RBC AUTO-ENTMCNC: 32.8 G/DL (ref 31–36)
MCV RBC AUTO: 84.2 FL (ref 80–100)
MONOCYTES # BLD: 0.6 K/UL (ref 0–1.3)
MONOCYTES NFR BLD: 18.8 %
NEUTROPHILS # BLD: 1.9 K/UL (ref 1.7–7.7)
NEUTROPHILS NFR BLD: 57.5 %
PLATELET # BLD AUTO: 124 K/UL (ref 135–450)
PMV BLD AUTO: 9 FL (ref 5–10.5)
POTASSIUM SERPL-SCNC: 4 MMOL/L (ref 3.5–5.1)
RBC # BLD AUTO: 3.99 M/UL (ref 4.2–5.9)
SARS-COV-2 RNA RESP QL NAA+PROBE: DETECTED
SODIUM SERPL-SCNC: 137 MMOL/L (ref 136–145)
WBC # BLD AUTO: 3.3 K/UL (ref 4–11)

## 2023-04-24 PROCEDURE — 2580000003 HC RX 258: Performed by: NURSE PRACTITIONER

## 2023-04-24 PROCEDURE — 36415 COLL VENOUS BLD VENIPUNCTURE: CPT

## 2023-04-24 PROCEDURE — 87636 SARSCOV2 & INF A&B AMP PRB: CPT

## 2023-04-24 PROCEDURE — 6370000000 HC RX 637 (ALT 250 FOR IP)

## 2023-04-24 PROCEDURE — 1200000000 HC SEMI PRIVATE

## 2023-04-24 PROCEDURE — 6360000002 HC RX W HCPCS: Performed by: NURSE PRACTITIONER

## 2023-04-24 PROCEDURE — 6370000000 HC RX 637 (ALT 250 FOR IP): Performed by: NURSE PRACTITIONER

## 2023-04-24 PROCEDURE — 99239 HOSP IP/OBS DSCHRG MGMT >30: CPT

## 2023-04-24 PROCEDURE — 2060000000 HC ICU INTERMEDIATE R&B

## 2023-04-24 PROCEDURE — 80048 BASIC METABOLIC PNL TOTAL CA: CPT

## 2023-04-24 PROCEDURE — 85025 COMPLETE CBC W/AUTO DIFF WBC: CPT

## 2023-04-24 PROCEDURE — 99223 1ST HOSP IP/OBS HIGH 75: CPT

## 2023-04-24 RX ORDER — PANTOPRAZOLE SODIUM 40 MG/1
40 TABLET, DELAYED RELEASE ORAL DAILY
Status: DISCONTINUED | OUTPATIENT
Start: 2023-04-25 | End: 2023-04-29 | Stop reason: HOSPADM

## 2023-04-24 RX ORDER — METOPROLOL SUCCINATE 50 MG/1
50 TABLET, EXTENDED RELEASE ORAL DAILY
Status: DISCONTINUED | OUTPATIENT
Start: 2023-04-25 | End: 2023-04-29 | Stop reason: HOSPADM

## 2023-04-24 RX ORDER — VITAMIN B COMPLEX
1000 TABLET ORAL DAILY
Status: CANCELLED | OUTPATIENT
Start: 2023-04-25

## 2023-04-24 RX ORDER — FENOFIBRATE 160 MG/1
160 TABLET ORAL DAILY
Status: CANCELLED | OUTPATIENT
Start: 2023-04-25

## 2023-04-24 RX ORDER — TRAZODONE HYDROCHLORIDE 50 MG/1
50 TABLET ORAL NIGHTLY PRN
Status: CANCELLED | OUTPATIENT
Start: 2023-04-24

## 2023-04-24 RX ORDER — ACETAMINOPHEN 325 MG/1
650 TABLET ORAL EVERY 4 HOURS PRN
Status: CANCELLED | OUTPATIENT
Start: 2023-04-24 | Stop reason: SDUPTHER

## 2023-04-24 RX ORDER — ACETAMINOPHEN 325 MG/1
650 TABLET ORAL EVERY 6 HOURS PRN
Status: DISCONTINUED | OUTPATIENT
Start: 2023-04-24 | End: 2023-04-29 | Stop reason: HOSPADM

## 2023-04-24 RX ORDER — LORAZEPAM 0.5 MG/1
0.5 TABLET ORAL EVERY 6 HOURS PRN
Status: DISCONTINUED | OUTPATIENT
Start: 2023-04-24 | End: 2023-04-29 | Stop reason: HOSPADM

## 2023-04-24 RX ORDER — ASPIRIN 81 MG/1
81 TABLET, CHEWABLE ORAL DAILY
Status: CANCELLED | OUTPATIENT
Start: 2023-04-25

## 2023-04-24 RX ORDER — LACTOBACILLUS RHAMNOSUS GG 10B CELL
1 CAPSULE ORAL DAILY
Status: DISCONTINUED | OUTPATIENT
Start: 2023-04-25 | End: 2023-04-29 | Stop reason: HOSPADM

## 2023-04-24 RX ORDER — QUETIAPINE FUMARATE 25 MG/1
50 TABLET, FILM COATED ORAL EVERY MORNING
Status: DISCONTINUED | OUTPATIENT
Start: 2023-04-25 | End: 2023-04-29 | Stop reason: HOSPADM

## 2023-04-24 RX ORDER — DIVALPROEX SODIUM 500 MG/1
500 TABLET, DELAYED RELEASE ORAL EVERY 12 HOURS SCHEDULED
Status: DISCONTINUED | OUTPATIENT
Start: 2023-04-24 | End: 2023-04-29 | Stop reason: HOSPADM

## 2023-04-24 RX ORDER — FERROUS SULFATE 325(65) MG
325 TABLET ORAL
Status: CANCELLED | OUTPATIENT
Start: 2023-04-25

## 2023-04-24 RX ORDER — BENZONATATE 100 MG/1
100 CAPSULE ORAL 3 TIMES DAILY PRN
Status: CANCELLED | OUTPATIENT
Start: 2023-04-24

## 2023-04-24 RX ORDER — DIVALPROEX SODIUM 500 MG/1
500 TABLET, DELAYED RELEASE ORAL EVERY 12 HOURS SCHEDULED
Status: CANCELLED | OUTPATIENT
Start: 2023-04-24

## 2023-04-24 RX ORDER — SODIUM CHLORIDE 0.9 % (FLUSH) 0.9 %
5-40 SYRINGE (ML) INJECTION PRN
Status: DISCONTINUED | OUTPATIENT
Start: 2023-04-24 | End: 2023-04-25

## 2023-04-24 RX ORDER — PANTOPRAZOLE SODIUM 40 MG/1
40 TABLET, DELAYED RELEASE ORAL DAILY
Status: CANCELLED | OUTPATIENT
Start: 2023-04-25

## 2023-04-24 RX ORDER — VITAMIN B COMPLEX
1000 TABLET ORAL DAILY
Status: DISCONTINUED | OUTPATIENT
Start: 2023-04-25 | End: 2023-04-29 | Stop reason: HOSPADM

## 2023-04-24 RX ORDER — AMLODIPINE BESYLATE 5 MG/1
10 TABLET ORAL DAILY
Status: CANCELLED | OUTPATIENT
Start: 2023-04-25

## 2023-04-24 RX ORDER — AMLODIPINE BESYLATE 5 MG/1
10 TABLET ORAL DAILY
Status: DISCONTINUED | OUTPATIENT
Start: 2023-04-25 | End: 2023-04-29 | Stop reason: HOSPADM

## 2023-04-24 RX ORDER — MESALAMINE 400 MG/1
800 CAPSULE, DELAYED RELEASE ORAL 3 TIMES DAILY
Status: CANCELLED | OUTPATIENT
Start: 2023-04-24

## 2023-04-24 RX ORDER — LOSARTAN POTASSIUM 25 MG/1
25 TABLET ORAL DAILY
Status: ON HOLD | COMMUNITY
Start: 2023-01-30 | End: 2023-05-05 | Stop reason: SDUPTHER

## 2023-04-24 RX ORDER — GUAIFENESIN/DEXTROMETHORPHAN 100-10MG/5
5 SYRUP ORAL EVERY 4 HOURS PRN
Status: DISCONTINUED | OUTPATIENT
Start: 2023-04-24 | End: 2023-04-29 | Stop reason: HOSPADM

## 2023-04-24 RX ORDER — LOSARTAN POTASSIUM 25 MG/1
50 TABLET ORAL DAILY
Status: DISCONTINUED | OUTPATIENT
Start: 2023-04-25 | End: 2023-04-29 | Stop reason: HOSPADM

## 2023-04-24 RX ORDER — QUETIAPINE FUMARATE 100 MG/1
100 TABLET, FILM COATED ORAL NIGHTLY
Status: CANCELLED | OUTPATIENT
Start: 2023-04-24

## 2023-04-24 RX ORDER — ONDANSETRON 2 MG/ML
4 INJECTION INTRAMUSCULAR; INTRAVENOUS EVERY 6 HOURS PRN
Status: DISCONTINUED | OUTPATIENT
Start: 2023-04-24 | End: 2023-04-29 | Stop reason: HOSPADM

## 2023-04-24 RX ORDER — ASPIRIN 81 MG/1
81 TABLET, CHEWABLE ORAL DAILY
Status: DISCONTINUED | OUTPATIENT
Start: 2023-04-25 | End: 2023-04-29 | Stop reason: HOSPADM

## 2023-04-24 RX ORDER — ATORVASTATIN CALCIUM 40 MG/1
40 TABLET, FILM COATED ORAL DAILY
Status: CANCELLED | OUTPATIENT
Start: 2023-04-25

## 2023-04-24 RX ORDER — BENZTROPINE MESYLATE 1 MG/ML
2 INJECTION INTRAMUSCULAR; INTRAVENOUS 2 TIMES DAILY PRN
Status: DISCONTINUED | OUTPATIENT
Start: 2023-04-24 | End: 2023-04-29 | Stop reason: HOSPADM

## 2023-04-24 RX ORDER — POTASSIUM CHLORIDE 20 MEQ/1
20 TABLET, EXTENDED RELEASE ORAL 2 TIMES DAILY
Status: CANCELLED | OUTPATIENT
Start: 2023-04-24

## 2023-04-24 RX ORDER — ONDANSETRON 4 MG/1
4 TABLET, ORALLY DISINTEGRATING ORAL EVERY 8 HOURS PRN
Status: DISCONTINUED | OUTPATIENT
Start: 2023-04-24 | End: 2023-04-29 | Stop reason: HOSPADM

## 2023-04-24 RX ORDER — METOPROLOL SUCCINATE 50 MG/1
50 TABLET, EXTENDED RELEASE ORAL DAILY
Status: CANCELLED | OUTPATIENT
Start: 2023-04-25

## 2023-04-24 RX ORDER — CHOLECALCIFEROL (VITAMIN D3) 125 MCG
1000 CAPSULE ORAL DAILY
Status: CANCELLED | OUTPATIENT
Start: 2023-04-25

## 2023-04-24 RX ORDER — SODIUM CHLORIDE 9 MG/ML
25 INJECTION, SOLUTION INTRAVENOUS PRN
Status: DISCONTINUED | OUTPATIENT
Start: 2023-04-24 | End: 2023-04-25

## 2023-04-24 RX ORDER — ENOXAPARIN SODIUM 100 MG/ML
30 INJECTION SUBCUTANEOUS 2 TIMES DAILY
Status: DISCONTINUED | OUTPATIENT
Start: 2023-04-24 | End: 2023-04-29 | Stop reason: HOSPADM

## 2023-04-24 RX ORDER — AMLODIPINE BESYLATE 5 MG/1
5 TABLET ORAL DAILY
Status: ON HOLD | COMMUNITY
Start: 2023-04-17 | End: 2023-04-24

## 2023-04-24 RX ORDER — LORAZEPAM 0.5 MG/1
0.5 TABLET ORAL EVERY 6 HOURS PRN
Status: CANCELLED | OUTPATIENT
Start: 2023-04-24

## 2023-04-24 RX ORDER — LOSARTAN POTASSIUM 25 MG/1
50 TABLET ORAL DAILY
Status: CANCELLED | OUTPATIENT
Start: 2023-04-25

## 2023-04-24 RX ORDER — BENZONATATE 100 MG/1
100 CAPSULE ORAL 3 TIMES DAILY PRN
Status: DISCONTINUED | OUTPATIENT
Start: 2023-04-24 | End: 2023-04-29 | Stop reason: HOSPADM

## 2023-04-24 RX ORDER — CEFUROXIME AXETIL 500 MG/1
500 TABLET ORAL EVERY 12 HOURS SCHEDULED
Status: DISPENSED | OUTPATIENT
Start: 2023-04-24 | End: 2023-04-28

## 2023-04-24 RX ORDER — FERROUS SULFATE 325(65) MG
325 TABLET ORAL
Status: DISCONTINUED | OUTPATIENT
Start: 2023-04-25 | End: 2023-04-29 | Stop reason: HOSPADM

## 2023-04-24 RX ORDER — MAGNESIUM HYDROXIDE/ALUMINUM HYDROXICE/SIMETHICONE 120; 1200; 1200 MG/30ML; MG/30ML; MG/30ML
30 SUSPENSION ORAL EVERY 6 HOURS PRN
Status: DISCONTINUED | OUTPATIENT
Start: 2023-04-24 | End: 2023-04-29 | Stop reason: HOSPADM

## 2023-04-24 RX ORDER — FENOFIBRATE 160 MG/1
160 TABLET ORAL DAILY
Status: DISCONTINUED | OUTPATIENT
Start: 2023-04-25 | End: 2023-04-29 | Stop reason: HOSPADM

## 2023-04-24 RX ORDER — CHOLECALCIFEROL (VITAMIN D3) 125 MCG
1000 CAPSULE ORAL DAILY
Status: DISCONTINUED | OUTPATIENT
Start: 2023-04-25 | End: 2023-04-29 | Stop reason: HOSPADM

## 2023-04-24 RX ORDER — CEFUROXIME AXETIL 500 MG/1
500 TABLET ORAL EVERY 12 HOURS SCHEDULED
Status: DISCONTINUED | OUTPATIENT
Start: 2023-04-24 | End: 2023-04-24

## 2023-04-24 RX ORDER — MECOBALAMIN 5000 MCG
10 TABLET,DISINTEGRATING ORAL EVERY EVENING
Status: CANCELLED | OUTPATIENT
Start: 2023-04-24

## 2023-04-24 RX ORDER — MECOBALAMIN 5000 MCG
10 TABLET,DISINTEGRATING ORAL EVERY EVENING
Status: DISCONTINUED | OUTPATIENT
Start: 2023-04-24 | End: 2023-04-29 | Stop reason: HOSPADM

## 2023-04-24 RX ORDER — POTASSIUM CHLORIDE 20 MEQ/1
20 TABLET, EXTENDED RELEASE ORAL 2 TIMES DAILY
Status: DISCONTINUED | OUTPATIENT
Start: 2023-04-24 | End: 2023-04-29 | Stop reason: HOSPADM

## 2023-04-24 RX ORDER — MESALAMINE 400 MG/1
800 CAPSULE, DELAYED RELEASE ORAL 3 TIMES DAILY
Status: DISCONTINUED | OUTPATIENT
Start: 2023-04-24 | End: 2023-04-29 | Stop reason: HOSPADM

## 2023-04-24 RX ORDER — QUETIAPINE FUMARATE 100 MG/1
100 TABLET, FILM COATED ORAL NIGHTLY
Status: DISCONTINUED | OUTPATIENT
Start: 2023-04-24 | End: 2023-04-29 | Stop reason: HOSPADM

## 2023-04-24 RX ORDER — SODIUM CHLORIDE 0.9 % (FLUSH) 0.9 %
5-40 SYRINGE (ML) INJECTION EVERY 12 HOURS SCHEDULED
Status: DISCONTINUED | OUTPATIENT
Start: 2023-04-24 | End: 2023-04-26

## 2023-04-24 RX ORDER — CEFUROXIME AXETIL 250 MG/1
500 TABLET ORAL EVERY 12 HOURS SCHEDULED
Status: CANCELLED | OUTPATIENT
Start: 2023-04-24 | End: 2023-04-28

## 2023-04-24 RX ORDER — TRAZODONE HYDROCHLORIDE 50 MG/1
50 TABLET ORAL NIGHTLY PRN
Status: DISCONTINUED | OUTPATIENT
Start: 2023-04-24 | End: 2023-04-29 | Stop reason: HOSPADM

## 2023-04-24 RX ORDER — QUETIAPINE FUMARATE 25 MG/1
50 TABLET, FILM COATED ORAL EVERY MORNING
Status: CANCELLED | OUTPATIENT
Start: 2023-04-25

## 2023-04-24 RX ORDER — LACTOBACILLUS RHAMNOSUS GG 10B CELL
1 CAPSULE ORAL DAILY
Status: CANCELLED | OUTPATIENT
Start: 2023-04-25

## 2023-04-24 RX ORDER — ATORVASTATIN CALCIUM 40 MG/1
40 TABLET, FILM COATED ORAL DAILY
Status: DISCONTINUED | OUTPATIENT
Start: 2023-04-25 | End: 2023-04-29 | Stop reason: HOSPADM

## 2023-04-24 RX ORDER — ACETAMINOPHEN 650 MG/1
650 SUPPOSITORY RECTAL EVERY 6 HOURS PRN
Status: DISCONTINUED | OUTPATIENT
Start: 2023-04-24 | End: 2023-04-29 | Stop reason: HOSPADM

## 2023-04-24 RX ORDER — POLYETHYLENE GLYCOL 3350 17 G/17G
17 POWDER, FOR SOLUTION ORAL DAILY PRN
Status: DISCONTINUED | OUTPATIENT
Start: 2023-04-24 | End: 2023-04-26 | Stop reason: SDUPTHER

## 2023-04-24 RX ORDER — BENZTROPINE MESYLATE 1 MG/ML
2 INJECTION INTRAMUSCULAR; INTRAVENOUS 2 TIMES DAILY PRN
Status: CANCELLED | OUTPATIENT
Start: 2023-04-24

## 2023-04-24 RX ORDER — ENOXAPARIN SODIUM 100 MG/ML
30 INJECTION SUBCUTANEOUS 2 TIMES DAILY
Status: DISCONTINUED | OUTPATIENT
Start: 2023-04-24 | End: 2023-04-24 | Stop reason: DRUGHIGH

## 2023-04-24 RX ORDER — MAGNESIUM HYDROXIDE/ALUMINUM HYDROXICE/SIMETHICONE 120; 1200; 1200 MG/30ML; MG/30ML; MG/30ML
30 SUSPENSION ORAL EVERY 6 HOURS PRN
Status: CANCELLED | OUTPATIENT
Start: 2023-04-24

## 2023-04-24 RX ADMIN — Medication 10 MG: at 17:00

## 2023-04-24 RX ADMIN — Medication 1000 UNITS: at 09:28

## 2023-04-24 RX ADMIN — ATORVASTATIN CALCIUM 40 MG: 40 TABLET, FILM COATED ORAL at 09:29

## 2023-04-24 RX ADMIN — MESALAMINE 800 MG: 400 CAPSULE, DELAYED RELEASE ORAL at 09:43

## 2023-04-24 RX ADMIN — MESALAMINE 800 MG: 400 CAPSULE, DELAYED RELEASE ORAL at 17:00

## 2023-04-24 RX ADMIN — FERROUS SULFATE TAB 325 MG (65 MG ELEMENTAL FE) 325 MG: 325 (65 FE) TAB at 09:30

## 2023-04-24 RX ADMIN — AMLODIPINE BESYLATE 10 MG: 5 TABLET ORAL at 09:28

## 2023-04-24 RX ADMIN — POTASSIUM CHLORIDE 20 MEQ: 20 TABLET, EXTENDED RELEASE ORAL at 21:42

## 2023-04-24 RX ADMIN — METOPROLOL SUCCINATE 50 MG: 50 TABLET, EXTENDED RELEASE ORAL at 09:29

## 2023-04-24 RX ADMIN — DIVALPROEX SODIUM 500 MG: 500 TABLET, DELAYED RELEASE ORAL at 09:30

## 2023-04-24 RX ADMIN — MESALAMINE 800 MG: 400 CAPSULE, DELAYED RELEASE ORAL at 21:42

## 2023-04-24 RX ADMIN — POTASSIUM CHLORIDE 20 MEQ: 1500 TABLET, EXTENDED RELEASE ORAL at 09:28

## 2023-04-24 RX ADMIN — TRAZODONE HYDROCHLORIDE 50 MG: 50 TABLET ORAL at 21:42

## 2023-04-24 RX ADMIN — DIVALPROEX SODIUM 500 MG: 500 TABLET, DELAYED RELEASE ORAL at 21:42

## 2023-04-24 RX ADMIN — CEFUROXIME AXETIL 500 MG: 250 TABLET ORAL at 09:30

## 2023-04-24 RX ADMIN — QUETIAPINE FUMARATE 50 MG: 25 TABLET ORAL at 09:29

## 2023-04-24 RX ADMIN — CEFUROXIME AXETIL 500 MG: 500 TABLET ORAL at 21:42

## 2023-04-24 RX ADMIN — LOSARTAN POTASSIUM 50 MG: 25 TABLET, FILM COATED ORAL at 09:29

## 2023-04-24 RX ADMIN — Medication 1 CAPSULE: at 09:29

## 2023-04-24 RX ADMIN — ENOXAPARIN SODIUM 30 MG: 100 INJECTION SUBCUTANEOUS at 21:43

## 2023-04-24 RX ADMIN — ACETAMINOPHEN 650 MG: 325 TABLET ORAL at 18:26

## 2023-04-24 RX ADMIN — PANTOPRAZOLE SODIUM 40 MG: 40 TABLET, DELAYED RELEASE ORAL at 09:30

## 2023-04-24 RX ADMIN — QUETIAPINE FUMARATE 100 MG: 100 TABLET ORAL at 21:42

## 2023-04-24 RX ADMIN — ASPIRIN 81 MG: 81 TABLET, CHEWABLE ORAL at 09:29

## 2023-04-24 RX ADMIN — FENOFIBRATE 160 MG: 160 TABLET ORAL at 09:29

## 2023-04-24 RX ADMIN — B-COMPLEX W/ C & FOLIC ACID TAB 1 TABLET: TAB at 09:29

## 2023-04-24 RX ADMIN — Medication 10 ML: at 21:43

## 2023-04-24 RX ADMIN — CYANOCOBALAMIN TAB 500 MCG 1000 MCG: 500 TAB at 09:28

## 2023-04-24 ASSESSMENT — LIFESTYLE VARIABLES
HOW MANY STANDARD DRINKS CONTAINING ALCOHOL DO YOU HAVE ON A TYPICAL DAY: PATIENT DECLINED
HOW OFTEN DO YOU HAVE A DRINK CONTAINING ALCOHOL: PATIENT DECLINED

## 2023-04-24 ASSESSMENT — PAIN SCALES - GENERAL
PAINLEVEL_OUTOF10: 0
PAINLEVEL_OUTOF10: 0

## 2023-04-24 NOTE — PLAN OF CARE
Problem: Discharge Planning  Goal: Discharge to home or other facility with appropriate resources  Outcome: Progressing     Problem: Safety - Adult  Goal: Free from fall injury  Outcome: Progressing     Problem: Discharge Planning  Goal: Discharge to home or other facility with appropriate resources  Outcome: Progressing     Problem: Safety - Adult  Goal: Free from fall injury  Outcome: Progressing

## 2023-04-24 NOTE — PLAN OF CARE
4/23/23 @ 2154  Pt currently sitting on the edge of his bed quietly. Pt remains very confused and not able to follow direction. 2100 meds were not able to be given. This nurse attempts multiple times and with different methods but pt ultimately spits most pills out. Pt did successfully swallow one pill but this nurse not able to identify exactly which pill it was as they were unrecognizable after he spit them out. Pt either successfully took his PRN Zyprexa or his routine 2100 Seroquel. It should be noted that pt has a UTI but didn't receive his Ceftin atb this evening. Med administration has become an issue with pt. Pt remains with a non-productive cough but is afebrile. Pt continues to be incontinent of urine. He has a pull-up on at this time. Pt alert and oriented to self only. Not noted to be RTIS this shift. No current s/s pain. Problem: Chronic Conditions and Co-morbidities  Goal: Patient's chronic conditions and co-morbidity symptoms are monitored and maintained or improved  Outcome: Progressing     Problem: Safety - Adult  Goal: Free from fall injury  Outcome: Progressing     Problem: ABCDS Injury Assessment  Goal: Absence of physical injury  Outcome: Progressing     Problem: Risk for Elopement  Goal: Patient will not exit the unit/facility without proper excort  Outcome: Progressing     Problem: Confusion  Goal: Confusion, delirium, dementia, or psychosis is improved or at baseline  Description: INTERVENTIONS:  1. Assess for possible contributors to thought disturbance, including medications, impaired vision or hearing, underlying metabolic abnormalities, dehydration, psychiatric diagnoses, and notify attending LIP  2. [place] high risk fall precautions, as indicated  3. Provide frequent short contacts to provide reality reorientation, refocusing and direction  4. Decrease environmental stimuli, including noise as appropriate  5.  Monitor and intervene to maintain adequate nutrition, hydration,
ANDREINA Paul phoned and informed of up coming transfer to medical unit and covid test results at 1015.
Nursing report called to [facility].
Problem: Confusion  Goal: Confusion, delirium, dementia, or psychosis is improved or at baseline  Description: INTERVENTIONS:  1. Assess for possible contributors to thought disturbance, including medications, impaired vision or hearing, underlying metabolic abnormalities, dehydration, psychiatric diagnoses, and notify attending LIP  2. [place] high risk fall precautions, as indicated  3. Provide frequent short contacts to provide reality reorientation, refocusing and direction  4. Decrease environmental stimuli, including noise as appropriate  5. Monitor and intervene to maintain adequate nutrition, hydration, elimination, sleep and activity  6. If unable to ensure safety without constant attention obtain sitter and review sitter guidelines with assigned personnel  7. Initiate Psychosocial CNS and Spiritual Care consult, as indicated  4/24/2023 0996 by Joann Narayanan, RN  Outcome: Progressing  4/23/2023 2153 by Racheal Paez, CARLEY  Outcome: Not Progressing   Pt up eating breakfast in his room, alert to self and hospital, confused to time and situation, delusional, calls out to mom at times. Appears to be delirious. Received medications with much encouragement to stay on task. Good PO intake. 500 mls at breakfast. Incontinent of urine. Undresses himself. Pt. Has an occasional productive cough and spits on the floor. No sexual or combative behaviors noted.
stress,relaxation techniques, changing routine, distraction)                                                           ( ) Basic information about quitting (benefits of quitting, techniques in how to quit, available resources  ( ) Referral for counseling faxed to [facility]                                                                                                                   ( ) Patient refused counseling  ( ) Patient refused referral  ( ) Patient refused prescription upon discharge  ( ) Patient has not smoked in the last 30 days    Metabolic Screening:    Lab Results   Component Value Date    LABA1C 4.6 09/09/2019       Lab Results   Component Value Date    CHOL 96 07/23/2019    CHOL 103 12/21/2018    CHOL 86 07/04/2017     Lab Results   Component Value Date    TRIG 112 07/23/2019    TRIG 69 12/21/2018    TRIG 324 (H) 07/07/2017    TRIG 185 (H) 07/04/2017     Lab Results   Component Value Date    HDL 26 (L) 07/23/2019    HDL 39 (L) 12/21/2018    HDL 13 (L) 07/04/2017     No components found for: [facility]  Lab Results   Component Value Date    LABVLDL 22 07/23/2019    LABVLDL 14 12/21/2018    LABVLDL 37 07/04/2017       Aaron Martinez RN

## 2023-04-24 NOTE — PLAN OF CARE
Problem: Safety - Adult  Goal: Free from fall injury  4/24/2023 1940 by Lord Constantino RN  Outcome: Progressing  4/24/2023 1320 by Josue Junior RN  Outcome: Progressing     Problem: Pain  Goal: Verbalizes/displays adequate comfort level or baseline comfort level  Outcome: Progressing  Flowsheets (Taken 4/24/2023 1320 by Josue Junior, RN)  Verbalizes/displays adequate comfort level or baseline comfort level: Encourage patient to monitor pain and request assistance     Problem: Risk for Elopement  Goal: Patient will not exit the unit/facility without proper excort  Outcome: Progressing  Flowsheets (Taken 4/24/2023 1411 by Josue Junior, RN)  Nursing Interventions for Elopement Risk:   Assist with personal care needs such as toileting, eating, dressing, as needed to reduce the risk of wandering   Collaborate with family members/caregivers to mitigate the elopement risk   Make sure patient has all necessary personal care items
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
## 2023-04-24 NOTE — H&P
Negative for arthralgias   Skin: Negative for rash   Neurological: Negative for syncope   Hematological: Negative for adenopathy   Psychiatric/Behavorial: Negative for anxiety    PHYSICAL EXAM:    /70   Pulse 75   Temp (!) 95.9 °F (35.5 °C) (Axillary)   Resp 16   Ht 6' 5\" (1.956 m)   Wt 247 lb 4.8 oz (112.2 kg)   SpO2 95%   BMI 29.33 kg/m²   Gen: No distress. Alert. Disheveled elderly male. Eyes: PERRL. No sclera icterus. No conjunctival injection. ENT: No discharge. Pharynx clear. Neck: No JVD. No Carotid Bruit. Trachea midline. Resp: No accessory muscle use. No crackles. No wheezes. No rhonchi. CV: Regular rate. Regular rhythm. No murmur. No rub. No edema. Peripheral Pulses: +2 palpable, equal bilaterally   GI: Non-tender. Non-distended. No masses. No organomegaly. Normal bowel sounds. No hernia. Skin: Warm and dry. No nodule on exposed extremities. No rash on exposed extremities. M/S: No cyanosis. No joint deformity. No clubbing. Neuro: Awake. Grossly nonfocal    Psych: Oriented to person and time. Not currently oriented to place. Easily agitated with questions.     CBC:   Recent Labs     04/24/23  1132   WBC 3.3*   HGB 11.0*   HCT 33.6*   MCV 84.2   *     BMP:   Recent Labs     04/24/23  1132      K 4.0      CO2 22   BUN 28*   CREATININE 1.3       U/A:    Lab Results   Component Value Date/Time    NITRITE neg 04/05/2013 05:47 AM    COLORU Yellow 04/21/2023 02:25 PM    WBCUA >100 04/21/2023 02:25 PM    RBCUA 3-4 04/21/2023 02:25 PM    MUCUS Rare 12/05/2019 11:03 PM    BACTERIA 4+ 04/21/2023 02:25 PM    CLARITYU CLOUDY 04/21/2023 02:25 PM    SPECGRAV 1.010 04/21/2023 02:25 PM    LEUKOCYTESUR LARGE 04/21/2023 02:25 PM    BLOODU Negative 04/21/2023 02:25 PM    GLUCOSEU Negative 04/21/2023 02:25 PM    AMORPHOUS 3+ 04/21/2023 02:25 PM       CULTURES  COVID+  Urine culture +proteus mirabilis       EKG:    N/A      RADIOLOGY  No orders to display

## 2023-04-24 NOTE — PROGRESS NOTES
Pt begins to yell out for his \"Mom\" several times over the last hour. It's not clear if pt is hallucinating or if he's simply confused and randomly calling out. Pt now resting in bed with his eyes closed. Pt is quiet and laying down. Resps are easy and unlabored.
Pt given PRN Zyprexa 10mg PO due to  agitation. Pt yelling out randomly and displaying some psychomotor agitation. Med given whole in a bite of pudding. Also, this nurse noted an some dried blood to pt's left lower leg, it appears that pt has suffered a small skin tear of unknown etiology. Pt does have several scabs in various stages of healing to his left lower shin. No s/s infection noted.
Pt refuses to eat his dinner this evening. Pt does drink 16oz of fruit juice so far this shift. Pt was incontinent in his bed. Linens, gown and attend changed to fresh. Will cont to monitor.
This nurse attempts to administer pt's 2100 meds. Pt continued to spit all pills out onto his gown or onto his bed. When pt asked if he would take his meds, he verbally agrees but then seems to become confused when the pills get into his mouth, he then won't swallow them and instantly spits them out. During the 2100 med administration this nurse was able to identify which pills were found on the bed or floor and this is documented accordingly on the [facility]. However, pt did successfully swallow either Zyprexa or Seroquel. These two pills (Zyprexa & Seroquel) were unrecognizable after pt spit them out and could not fully determine which one pill pt actually swallowed. At this time, pt resting in bed with eyes closed. He does continue with a dry cough. Afebrile. MD ordered Covid test but pt refusing to allow testing at this time, will re-attempt in some time.
IntraMUSCular, traZODone     ASSESSMENT AND PLAN:    Principal Problem:    Bipolar affective disorder ([address]  Active Problems:    Neurocognitive disorder  Resolved Problems:    * No resolved hospital problems. *     1. Patient s symptoms   show no change. COVID? 2.Probable discharge is undetermined  3. Discharge planning is incomplete  4. Suicidal ideation is  absent. Total time with patient was 50 minutes and more than 50 % of that time was spent counseling the patient on their symptoms, treatment, and expected goals.        Wilmar Ruiz MD

## 2023-04-25 PROBLEM — F01.518 VASCULAR DEMENTIA WITH BEHAVIOR DISTURBANCE (HCC): Status: ACTIVE | Noted: 2023-04-25

## 2023-04-25 LAB
ALBUMIN SERPL-MCNC: 3.6 G/DL (ref 3.4–5)
ALBUMIN/GLOB SERPL: 1.4 {RATIO} (ref 1.1–2.2)
ALP SERPL-CCNC: 36 U/L (ref 40–129)
ALT SERPL-CCNC: 16 U/L (ref 10–40)
ANION GAP SERPL CALCULATED.3IONS-SCNC: 10 MMOL/L (ref 3–16)
AST SERPL-CCNC: 44 U/L (ref 15–37)
BASOPHILS # BLD: 0 K/UL (ref 0–0.2)
BASOPHILS NFR BLD: 1.2 %
BILIRUB SERPL-MCNC: 0.8 MG/DL (ref 0–1)
BUN SERPL-MCNC: 25 MG/DL (ref 7–20)
CALCIUM SERPL-MCNC: 9.2 MG/DL (ref 8.3–10.6)
CHLORIDE SERPL-SCNC: 106 MMOL/L (ref 99–110)
CO2 SERPL-SCNC: 24 MMOL/L (ref 21–32)
CREAT SERPL-MCNC: 1.2 MG/DL (ref 0.8–1.3)
DEPRECATED RDW RBC AUTO: 18.4 % (ref 12.4–15.4)
EOSINOPHIL # BLD: 0.1 K/UL (ref 0–0.6)
EOSINOPHIL NFR BLD: 3.9 %
GFR SERPLBLD CREATININE-BSD FMLA CKD-EPI: >60 ML/MIN/{1.73_M2}
GLUCOSE SERPL-MCNC: 111 MG/DL (ref 70–99)
HCT VFR BLD AUTO: 34.1 % (ref 40.5–52.5)
HGB BLD-MCNC: 11.1 G/DL (ref 13.5–17.5)
LYMPHOCYTES # BLD: 1 K/UL (ref 1–5.1)
LYMPHOCYTES NFR BLD: 27.3 %
MCH RBC QN AUTO: 27.5 PG (ref 26–34)
MCHC RBC AUTO-ENTMCNC: 32.7 G/DL (ref 31–36)
MCV RBC AUTO: 84.2 FL (ref 80–100)
MONOCYTES # BLD: 0.7 K/UL (ref 0–1.3)
MONOCYTES NFR BLD: 19.6 %
NEUTROPHILS # BLD: 1.8 K/UL (ref 1.7–7.7)
NEUTROPHILS NFR BLD: 48 %
PLATELET # BLD AUTO: 125 K/UL (ref 135–450)
PMV BLD AUTO: 8.9 FL (ref 5–10.5)
POTASSIUM SERPL-SCNC: 3.9 MMOL/L (ref 3.5–5.1)
PROT SERPL-MCNC: 6.1 G/DL (ref 6.4–8.2)
RBC # BLD AUTO: 4.05 M/UL (ref 4.2–5.9)
SODIUM SERPL-SCNC: 140 MMOL/L (ref 136–145)
WBC # BLD AUTO: 3.7 K/UL (ref 4–11)

## 2023-04-25 PROCEDURE — 2580000003 HC RX 258: Performed by: NURSE PRACTITIONER

## 2023-04-25 PROCEDURE — 6370000000 HC RX 637 (ALT 250 FOR IP)

## 2023-04-25 PROCEDURE — 80053 COMPREHEN METABOLIC PANEL: CPT

## 2023-04-25 PROCEDURE — 36415 COLL VENOUS BLD VENIPUNCTURE: CPT

## 2023-04-25 PROCEDURE — 6360000002 HC RX W HCPCS: Performed by: NURSE PRACTITIONER

## 2023-04-25 PROCEDURE — 85025 COMPLETE CBC W/AUTO DIFF WBC: CPT

## 2023-04-25 PROCEDURE — 6370000000 HC RX 637 (ALT 250 FOR IP): Performed by: NURSE PRACTITIONER

## 2023-04-25 PROCEDURE — 99232 SBSQ HOSP IP/OBS MODERATE 35: CPT | Performed by: INTERNAL MEDICINE

## 2023-04-25 PROCEDURE — 1200000000 HC SEMI PRIVATE

## 2023-04-25 RX ADMIN — ASPIRIN 81 MG: 81 TABLET, CHEWABLE ORAL at 10:05

## 2023-04-25 RX ADMIN — DIVALPROEX SODIUM 500 MG: 500 TABLET, DELAYED RELEASE ORAL at 21:03

## 2023-04-25 RX ADMIN — MESALAMINE 800 MG: 400 CAPSULE, DELAYED RELEASE ORAL at 21:03

## 2023-04-25 RX ADMIN — ENOXAPARIN SODIUM 30 MG: 100 INJECTION SUBCUTANEOUS at 10:07

## 2023-04-25 RX ADMIN — TRAZODONE HYDROCHLORIDE 50 MG: 50 TABLET ORAL at 21:03

## 2023-04-25 RX ADMIN — Medication 1000 UNITS: at 10:05

## 2023-04-25 RX ADMIN — POTASSIUM CHLORIDE 20 MEQ: 20 TABLET, EXTENDED RELEASE ORAL at 10:05

## 2023-04-25 RX ADMIN — QUETIAPINE FUMARATE 50 MG: 25 TABLET ORAL at 10:04

## 2023-04-25 RX ADMIN — QUETIAPINE FUMARATE 100 MG: 100 TABLET ORAL at 21:04

## 2023-04-25 RX ADMIN — FENOFIBRATE 160 MG: 160 TABLET ORAL at 10:04

## 2023-04-25 RX ADMIN — Medication 10 MG: at 17:01

## 2023-04-25 RX ADMIN — ENOXAPARIN SODIUM 30 MG: 100 INJECTION SUBCUTANEOUS at 21:03

## 2023-04-25 RX ADMIN — POTASSIUM CHLORIDE 20 MEQ: 20 TABLET, EXTENDED RELEASE ORAL at 21:03

## 2023-04-25 RX ADMIN — B-COMPLEX W/ C & FOLIC ACID TAB 1 TABLET: TAB at 10:05

## 2023-04-25 RX ADMIN — PANTOPRAZOLE SODIUM 40 MG: 40 TABLET, DELAYED RELEASE ORAL at 10:04

## 2023-04-25 RX ADMIN — FERROUS SULFATE TAB 325 MG (65 MG ELEMENTAL FE) 325 MG: 325 (65 FE) TAB at 10:18

## 2023-04-25 RX ADMIN — LORAZEPAM 0.5 MG: 0.5 TABLET ORAL at 21:03

## 2023-04-25 RX ADMIN — DIVALPROEX SODIUM 500 MG: 500 TABLET, DELAYED RELEASE ORAL at 10:05

## 2023-04-25 RX ADMIN — CEFUROXIME AXETIL 500 MG: 500 TABLET ORAL at 21:03

## 2023-04-25 RX ADMIN — CEFUROXIME AXETIL 500 MG: 500 TABLET ORAL at 10:04

## 2023-04-25 RX ADMIN — MESALAMINE 800 MG: 400 CAPSULE, DELAYED RELEASE ORAL at 10:03

## 2023-04-25 RX ADMIN — Medication 1 CAPSULE: at 10:04

## 2023-04-25 RX ADMIN — ATORVASTATIN CALCIUM 40 MG: 40 TABLET, FILM COATED ORAL at 10:04

## 2023-04-25 RX ADMIN — METOPROLOL SUCCINATE 50 MG: 50 TABLET, EXTENDED RELEASE ORAL at 10:19

## 2023-04-25 RX ADMIN — MESALAMINE 800 MG: 400 CAPSULE, DELAYED RELEASE ORAL at 14:18

## 2023-04-25 RX ADMIN — CYANOCOBALAMIN TAB 500 MCG 1000 MCG: 500 TAB at 10:05

## 2023-04-25 RX ADMIN — Medication 10 ML: at 10:07

## 2023-04-25 NOTE — CARE COORDINATION
Chart review completed. Per MD in rounds this AM, pt will return to [address] [place] [address] once out of covid isolation. Per chart notes, pt confused. Message left for daughter Linnette Moles requesting for a call back.      HERMES Camp  Case Management Department  Phone: [phone] Fax: [phone]
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
## 2023-04-25 NOTE — FLOWSHEET NOTE
04/24/23 2012   Vital Signs   Temp 97.9 °F (36.6 °C)   Temp Source Oral   Heart Rate 83   Heart Rate Source Monitor   Resp 16   /72   MAP (Calculated) 90   BP Location Left upper arm   Patient Position Semi fowlers   Level of Consciousness 0   MEWS Score 1   Oxygen Therapy   SpO2 99 %   O2 Device None (Room air)     Assessment complete- see flowsheets. Pt resting in bed, remains agitated/impulsive, sitter now still at bedside but telesitter placed to assist in monitoring pt. Continuous pulse ox and telemetry in place, pt instructed to call for any needs or changes and call light within reach. Will continue to monitor.   Peggy Cottrell RN

## 2023-04-25 NOTE — PLAN OF CARE
Problem: Safety - Adult  Goal: Free from fall injury  Outcome: Progressing   Bed/chair alarm on for safety, due to patient impulsive.

## 2023-04-25 NOTE — PLAN OF CARE
Problem: Pain  Goal: Verbalizes/displays adequate comfort level or baseline comfort level  Outcome: Progressing     Problem: Safety - Adult  Goal: Free from fall injury  4/25/2023 1941 by Brynn Angel RN  Outcome: Progressing  4/25/2023 1915 by Vazquez Tee RN  Outcome: Progressing     Problem: Risk for Elopement  Goal: Patient will not exit the unit/facility without proper excort  Outcome: Progressing     Problem: Chronic Conditions and Co-morbidities  Goal: Patient's chronic conditions and co-morbidity symptoms are monitored and maintained or improved  Outcome: Progressing

## 2023-04-25 NOTE — FLOWSHEET NOTE
04/25/23 1956   Handoff   Communication Given Shift Handoff   Handoff Given To Yohannes ARELLANO Handoff Received From [facility] Communication Face to Face; At bedside   Time Handoff Given 1945   End of Shift Check Performed Yes

## 2023-04-26 ENCOUNTER — FOLLOWUP TELEPHONE ENCOUNTER (OUTPATIENT)
Dept: PSYCHIATRY | Age: 73
End: 2023-04-26

## 2023-04-26 LAB
ALBUMIN SERPL-MCNC: 3.7 G/DL (ref 3.4–5)
ALBUMIN/GLOB SERPL: 1.3 {RATIO} (ref 1.1–2.2)
ALP SERPL-CCNC: 40 U/L (ref 40–129)
ALT SERPL-CCNC: 16 U/L (ref 10–40)
ANION GAP SERPL CALCULATED.3IONS-SCNC: 9 MMOL/L (ref 3–16)
AST SERPL-CCNC: 39 U/L (ref 15–37)
BASOPHILS # BLD: 0 K/UL (ref 0–0.2)
BASOPHILS NFR BLD: 0.9 %
BILIRUB SERPL-MCNC: 0.7 MG/DL (ref 0–1)
BUN SERPL-MCNC: 22 MG/DL (ref 7–20)
CALCIUM SERPL-MCNC: 9.1 MG/DL (ref 8.3–10.6)
CHLORIDE SERPL-SCNC: 106 MMOL/L (ref 99–110)
CO2 SERPL-SCNC: 24 MMOL/L (ref 21–32)
CREAT SERPL-MCNC: 1.2 MG/DL (ref 0.8–1.3)
DEPRECATED RDW RBC AUTO: 18.3 % (ref 12.4–15.4)
EOSINOPHIL # BLD: 0.1 K/UL (ref 0–0.6)
EOSINOPHIL NFR BLD: 3.4 %
GFR SERPLBLD CREATININE-BSD FMLA CKD-EPI: >60 ML/MIN/{1.73_M2}
GLUCOSE SERPL-MCNC: 120 MG/DL (ref 70–99)
HCT VFR BLD AUTO: 36.5 % (ref 40.5–52.5)
HGB BLD-MCNC: 11.9 G/DL (ref 13.5–17.5)
LYMPHOCYTES # BLD: 1 K/UL (ref 1–5.1)
LYMPHOCYTES NFR BLD: 22.2 %
MCH RBC QN AUTO: 27.6 PG (ref 26–34)
MCHC RBC AUTO-ENTMCNC: 32.6 G/DL (ref 31–36)
MCV RBC AUTO: 84.7 FL (ref 80–100)
MONOCYTES # BLD: 0.5 K/UL (ref 0–1.3)
MONOCYTES NFR BLD: 11.1 %
NEUTROPHILS # BLD: 2.7 K/UL (ref 1.7–7.7)
NEUTROPHILS NFR BLD: 62.4 %
PLATELET # BLD AUTO: 134 K/UL (ref 135–450)
PMV BLD AUTO: 9.2 FL (ref 5–10.5)
POTASSIUM SERPL-SCNC: 4 MMOL/L (ref 3.5–5.1)
PROT SERPL-MCNC: 6.6 G/DL (ref 6.4–8.2)
RBC # BLD AUTO: 4.31 M/UL (ref 4.2–5.9)
SODIUM SERPL-SCNC: 139 MMOL/L (ref 136–145)
WBC # BLD AUTO: 4.3 K/UL (ref 4–11)

## 2023-04-26 PROCEDURE — 6370000000 HC RX 637 (ALT 250 FOR IP)

## 2023-04-26 PROCEDURE — 99232 SBSQ HOSP IP/OBS MODERATE 35: CPT | Performed by: INTERNAL MEDICINE

## 2023-04-26 PROCEDURE — 36415 COLL VENOUS BLD VENIPUNCTURE: CPT

## 2023-04-26 PROCEDURE — 85025 COMPLETE CBC W/AUTO DIFF WBC: CPT

## 2023-04-26 PROCEDURE — 6370000000 HC RX 637 (ALT 250 FOR IP): Performed by: NURSE PRACTITIONER

## 2023-04-26 PROCEDURE — 6360000002 HC RX W HCPCS: Performed by: NURSE PRACTITIONER

## 2023-04-26 PROCEDURE — 80053 COMPREHEN METABOLIC PANEL: CPT

## 2023-04-26 PROCEDURE — 1200000000 HC SEMI PRIVATE

## 2023-04-26 RX ADMIN — MESALAMINE 800 MG: 400 CAPSULE, DELAYED RELEASE ORAL at 23:08

## 2023-04-26 RX ADMIN — MESALAMINE 800 MG: 400 CAPSULE, DELAYED RELEASE ORAL at 13:46

## 2023-04-26 RX ADMIN — DIVALPROEX SODIUM 500 MG: 500 TABLET, DELAYED RELEASE ORAL at 22:58

## 2023-04-26 RX ADMIN — Medication 10 MG: at 16:52

## 2023-04-26 RX ADMIN — Medication 1000 UNITS: at 09:38

## 2023-04-26 RX ADMIN — DIVALPROEX SODIUM 500 MG: 500 TABLET, DELAYED RELEASE ORAL at 10:29

## 2023-04-26 RX ADMIN — QUETIAPINE FUMARATE 50 MG: 25 TABLET ORAL at 10:35

## 2023-04-26 RX ADMIN — METOPROLOL SUCCINATE 50 MG: 50 TABLET, EXTENDED RELEASE ORAL at 09:37

## 2023-04-26 RX ADMIN — CEFUROXIME AXETIL 500 MG: 500 TABLET ORAL at 23:08

## 2023-04-26 RX ADMIN — B-COMPLEX W/ C & FOLIC ACID TAB 1 TABLET: TAB at 09:38

## 2023-04-26 RX ADMIN — POTASSIUM CHLORIDE 20 MEQ: 20 TABLET, EXTENDED RELEASE ORAL at 22:58

## 2023-04-26 RX ADMIN — CYANOCOBALAMIN TAB 500 MCG 1000 MCG: 500 TAB at 09:38

## 2023-04-26 RX ADMIN — POTASSIUM CHLORIDE 20 MEQ: 20 TABLET, EXTENDED RELEASE ORAL at 10:34

## 2023-04-26 RX ADMIN — Medication 1 CAPSULE: at 10:32

## 2023-04-26 RX ADMIN — ENOXAPARIN SODIUM 30 MG: 100 INJECTION SUBCUTANEOUS at 22:57

## 2023-04-26 RX ADMIN — FENOFIBRATE 160 MG: 160 TABLET ORAL at 10:31

## 2023-04-26 RX ADMIN — ENOXAPARIN SODIUM 30 MG: 100 INJECTION SUBCUTANEOUS at 10:30

## 2023-04-26 RX ADMIN — MESALAMINE 800 MG: 400 CAPSULE, DELAYED RELEASE ORAL at 10:33

## 2023-04-26 RX ADMIN — FERROUS SULFATE TAB 325 MG (65 MG ELEMENTAL FE) 325 MG: 325 (65 FE) TAB at 10:31

## 2023-04-26 RX ADMIN — PANTOPRAZOLE SODIUM 40 MG: 40 TABLET, DELAYED RELEASE ORAL at 09:38

## 2023-04-26 RX ADMIN — QUETIAPINE FUMARATE 100 MG: 100 TABLET ORAL at 22:58

## 2023-04-26 NOTE — FLOWSHEET NOTE
04/26/23 1923   Handoff   Communication Given Shift Handoff   Handoff Given To Chris Aburto Received From [facility] Communication Face to Face; In department  (covid patient)   Time Handoff Given 1923   End of Shift Check Performed Yes

## 2023-04-26 NOTE — PLAN OF CARE
Problem: Safety - Adult  Goal: Free from fall injury  Outcome: Progressing   Patient bed alarm on for safety, due to patient impulsiveness.

## 2023-04-26 NOTE — CARE COORDINATION
Chart review completed. Reviewed healthcare POA on file that shows 1. Maria Esther Sanchez and 2. Dante Likes (see document). No numbers listed on document. Message left for daughter Dante Meraz requesting call back; message was left 4/25 with no return call. No number for Maria Esther Sanchez on file in epic. Message left for Darlene Hudson in [facility] requesting call back to inquire if she has another number for the daughter or number for Maria Esther Sanchez. Merline Poe MSW, LISW-S  Case Management Department  Phone: [phone] Fax: [phone]    Addendum at 3:49pm: Case Management Assessment  Initial Evaluation    Date/Time of Evaluation: 4/26/2023 3:49 PM  Assessment Completed by: Merline Poe MSW, LISW-S  Case Management Department  Phone: [phone] Fax: [phone]      If patient is discharged prior to next notation, then this note serves as note for discharge by case management. Patient Name: Vee Cantor                   YOB: 1950  Diagnosis: COVID-19 [U07.1]                   Date / Time: 4/24/2023  1:30 PM    Patient Admission Status: Inpatient   Readmission Risk (Low < 19, Mod (19-27), High > 27): Readmission Risk Score: 20.8    Current PCP: Lise Craig MD  PCP verified by CM? Yes    Chart Reviewed: Yes      History Provided by: Child/Family  Patient Orientation: Other (see comment) (confused)    Patient Cognition: Other (see comment) (confused)    Hospitalization in the last 30 days (Readmission):  Yes    If yes, Readmission Assessment in  Navigator will be completed. Advance Directives:      Code Status: Full Code   Patient's Primary Decision Maker is: Named in Scanned ACP Document      Discharge Planning:    Patient lives with: Children, Spouse/Significant Other Type of Home: House  Primary Care Giver: Family  Patient Support Systems include: Children   Current Financial resources:  Other (Comment) ([place] [employer])  Current community resources: None  Current services prior to admission: Durable Medical

## 2023-04-26 NOTE — FLOWSHEET NOTE
04/25/23 2047   Vital Signs   Temp 98.1 °F (36.7 °C)   Temp Source Oral   Heart Rate 77   Heart Rate Source Monitor   Resp 16   /76   MAP (Calculated) 93   BP Location Left upper arm   Patient Position Lying right side   Level of Consciousness 0   MEWS Score 1   Oxygen Therapy   SpO2 98 %   O2 Device None (Room air)     Assessment complete- see flowsheets. Pt resting in bed, very restless/agitated tonight, PRN med given to assist in this see eMAR for associated medication information. Call light within reach, pt aware to call for any needs or changes. Will continue to monitor.   Addie Rangel RN

## 2023-04-26 NOTE — ACP (ADVANCE CARE PLANNING)
Advance Care Planning     General Advance Care Planning (ACP) Conversation    Date of Conversation: 4/24/2023  Conducted with:  Healthcare Decision Maker: Named in Advance Directive or Healthcare Power of  (name) daughter ARENDAL via phone call     Healthcare Decision Maker:    Primary Decision Maker: Zahraa Martinez - Akil - [phone]  Click here to complete Healthcare Decision Makers including selection of the Healthcare Decision Maker Relationship (ie \"Primary\"). Today we documented Decision Maker(s) consistent with ACP documents on file. Spoke with daughter BRIANA. Inquired who Merry Moritz was as she is listed as first healthcare POA on documents on file. ISADORADAL states Merry Moritz is pt's wife who is non-verbal and has alzheimer's. BRIANA states Merry Moritz lives with her and so did pt prior to admission. Spoke with CM supervisor regarding this and okay to move forward with BRIANA as she is second healthcare POA on file;see documents. Awaiting return call from daughter to confirm code status.      Length of Voluntary ACP Conversation in minutes:  <16 minutes (Non-Billable)    HERMES Ramirez  Case Management Department  Phone: [phone] Fax: [phone]

## 2023-04-27 LAB
ALBUMIN SERPL-MCNC: 3.5 G/DL (ref 3.4–5)
ALBUMIN/GLOB SERPL: 1.5 {RATIO} (ref 1.1–2.2)
ALP SERPL-CCNC: 37 U/L (ref 40–129)
ALT SERPL-CCNC: 13 U/L (ref 10–40)
ANION GAP SERPL CALCULATED.3IONS-SCNC: 10 MMOL/L (ref 3–16)
AST SERPL-CCNC: 30 U/L (ref 15–37)
BASOPHILS # BLD: 0 K/UL (ref 0–0.2)
BASOPHILS NFR BLD: 0.7 %
BILIRUB SERPL-MCNC: 0.6 MG/DL (ref 0–1)
BUN SERPL-MCNC: 24 MG/DL (ref 7–20)
CALCIUM SERPL-MCNC: 9 MG/DL (ref 8.3–10.6)
CHLORIDE SERPL-SCNC: 107 MMOL/L (ref 99–110)
CO2 SERPL-SCNC: 25 MMOL/L (ref 21–32)
CREAT SERPL-MCNC: 1.2 MG/DL (ref 0.8–1.3)
DEPRECATED RDW RBC AUTO: 18.3 % (ref 12.4–15.4)
EOSINOPHIL # BLD: 0.1 K/UL (ref 0–0.6)
EOSINOPHIL NFR BLD: 3.2 %
GFR SERPLBLD CREATININE-BSD FMLA CKD-EPI: >60 ML/MIN/{1.73_M2}
GLUCOSE SERPL-MCNC: 88 MG/DL (ref 70–99)
HCT VFR BLD AUTO: 33.2 % (ref 40.5–52.5)
HGB BLD-MCNC: 10.7 G/DL (ref 13.5–17.5)
LYMPHOCYTES # BLD: 1.1 K/UL (ref 1–5.1)
LYMPHOCYTES NFR BLD: 28.9 %
MCH RBC QN AUTO: 27.4 PG (ref 26–34)
MCHC RBC AUTO-ENTMCNC: 32.1 G/DL (ref 31–36)
MCV RBC AUTO: 85.3 FL (ref 80–100)
MONOCYTES # BLD: 0.4 K/UL (ref 0–1.3)
MONOCYTES NFR BLD: 10.2 %
NEUTROPHILS # BLD: 2.2 K/UL (ref 1.7–7.7)
NEUTROPHILS NFR BLD: 57 %
PLATELET # BLD AUTO: 121 K/UL (ref 135–450)
PMV BLD AUTO: 9.3 FL (ref 5–10.5)
POTASSIUM SERPL-SCNC: 3.9 MMOL/L (ref 3.5–5.1)
PROT SERPL-MCNC: 5.9 G/DL (ref 6.4–8.2)
RBC # BLD AUTO: 3.89 M/UL (ref 4.2–5.9)
SODIUM SERPL-SCNC: 142 MMOL/L (ref 136–145)
WBC # BLD AUTO: 3.8 K/UL (ref 4–11)

## 2023-04-27 PROCEDURE — 80053 COMPREHEN METABOLIC PANEL: CPT

## 2023-04-27 PROCEDURE — 80061 LIPID PANEL: CPT

## 2023-04-27 PROCEDURE — 99232 SBSQ HOSP IP/OBS MODERATE 35: CPT | Performed by: INTERNAL MEDICINE

## 2023-04-27 PROCEDURE — 84443 ASSAY THYROID STIM HORMONE: CPT

## 2023-04-27 PROCEDURE — 6360000002 HC RX W HCPCS: Performed by: NURSE PRACTITIONER

## 2023-04-27 PROCEDURE — 83036 HEMOGLOBIN GLYCOSYLATED A1C: CPT

## 2023-04-27 PROCEDURE — 6370000000 HC RX 637 (ALT 250 FOR IP)

## 2023-04-27 PROCEDURE — 36415 COLL VENOUS BLD VENIPUNCTURE: CPT

## 2023-04-27 PROCEDURE — 85025 COMPLETE CBC W/AUTO DIFF WBC: CPT

## 2023-04-27 PROCEDURE — 1200000000 HC SEMI PRIVATE

## 2023-04-27 PROCEDURE — 6370000000 HC RX 637 (ALT 250 FOR IP): Performed by: NURSE PRACTITIONER

## 2023-04-27 RX ADMIN — CEFUROXIME AXETIL 500 MG: 500 TABLET ORAL at 12:08

## 2023-04-27 RX ADMIN — DIVALPROEX SODIUM 500 MG: 500 TABLET, DELAYED RELEASE ORAL at 22:25

## 2023-04-27 RX ADMIN — B-COMPLEX W/ C & FOLIC ACID TAB 1 TABLET: TAB at 09:25

## 2023-04-27 RX ADMIN — Medication 1000 UNITS: at 09:25

## 2023-04-27 RX ADMIN — FERROUS SULFATE TAB 325 MG (65 MG ELEMENTAL FE) 325 MG: 325 (65 FE) TAB at 12:08

## 2023-04-27 RX ADMIN — Medication 10 MG: at 18:04

## 2023-04-27 RX ADMIN — Medication 1 CAPSULE: at 09:25

## 2023-04-27 RX ADMIN — MESALAMINE 800 MG: 400 CAPSULE, DELAYED RELEASE ORAL at 12:08

## 2023-04-27 RX ADMIN — PANTOPRAZOLE SODIUM 40 MG: 40 TABLET, DELAYED RELEASE ORAL at 09:25

## 2023-04-27 RX ADMIN — TRAZODONE HYDROCHLORIDE 50 MG: 50 TABLET ORAL at 00:39

## 2023-04-27 RX ADMIN — TRAZODONE HYDROCHLORIDE 50 MG: 50 TABLET ORAL at 22:25

## 2023-04-27 RX ADMIN — MESALAMINE 800 MG: 400 CAPSULE, DELAYED RELEASE ORAL at 22:24

## 2023-04-27 RX ADMIN — ATORVASTATIN CALCIUM 40 MG: 40 TABLET, FILM COATED ORAL at 09:26

## 2023-04-27 RX ADMIN — POTASSIUM CHLORIDE 20 MEQ: 20 TABLET, EXTENDED RELEASE ORAL at 09:26

## 2023-04-27 RX ADMIN — ENOXAPARIN SODIUM 30 MG: 100 INJECTION SUBCUTANEOUS at 09:29

## 2023-04-27 RX ADMIN — POTASSIUM CHLORIDE 20 MEQ: 20 TABLET, EXTENDED RELEASE ORAL at 22:25

## 2023-04-27 RX ADMIN — ASPIRIN 81 MG: 81 TABLET, CHEWABLE ORAL at 09:26

## 2023-04-27 RX ADMIN — FENOFIBRATE 160 MG: 160 TABLET ORAL at 09:25

## 2023-04-27 RX ADMIN — CYANOCOBALAMIN TAB 500 MCG 1000 MCG: 500 TAB at 09:25

## 2023-04-27 RX ADMIN — QUETIAPINE FUMARATE 100 MG: 100 TABLET ORAL at 22:25

## 2023-04-27 RX ADMIN — CEFUROXIME AXETIL 500 MG: 500 TABLET ORAL at 22:25

## 2023-04-27 RX ADMIN — ENOXAPARIN SODIUM 30 MG: 100 INJECTION SUBCUTANEOUS at 22:25

## 2023-04-27 RX ADMIN — METOPROLOL SUCCINATE 50 MG: 50 TABLET, EXTENDED RELEASE ORAL at 09:25

## 2023-04-27 RX ADMIN — DIVALPROEX SODIUM 500 MG: 500 TABLET, DELAYED RELEASE ORAL at 09:26

## 2023-04-27 RX ADMIN — LORAZEPAM 0.5 MG: 0.5 TABLET ORAL at 00:39

## 2023-04-27 RX ADMIN — QUETIAPINE FUMARATE 50 MG: 25 TABLET ORAL at 09:25

## 2023-04-27 NOTE — PLAN OF CARE
Problem: Discharge Planning  Goal: Discharge to home or other facility with appropriate resources  Outcome: Progressing     Problem: Safety - Adult  Goal: Free from fall injury  Outcome: Progressing     Problem: Pain  Goal: Verbalizes/displays adequate comfort level or baseline comfort level  Outcome: Progressing     Problem: Risk for Elopement  Goal: Patient will not exit the unit/facility without proper excort  Outcome: Progressing  Flowsheets (Taken 4/27/2023 5745)  Nursing Interventions for Elopement Risk: Assist with personal care needs such as toileting, eating, dressing, as needed to reduce the risk of wandering     Problem: Chronic Conditions and Co-morbidities  Goal: Patient's chronic conditions and co-morbidity symptoms are monitored and maintained or improved  Outcome: Progressing

## 2023-04-27 NOTE — CARE COORDINATION
MD updated during IDR rounds that daughter stated discharge plans is pending pt's progress with his medications. MD also aware placement will be difficult due to documentation.      Per MD after rounds, Pt will return to [facility] on Saturday as he spoke with Psych MD.     HERMES Seth  Case Management Department  Phone: [phone] Fax: [phone]

## 2023-04-27 NOTE — FLOWSHEET NOTE
04/27/23 0900   Vital Signs   Temp 97.4 °F (36.3 °C)   Temp Source Oral   Heart Rate 88   Heart Rate Source Monitor   Resp 16   /76   MAP (Calculated) 94   Level of Consciousness 0   MEWS Score 1   Pain Assessment   Pain Assessment None - Denies Pain   Oxygen Therapy   SpO2 95 %   O2 Device None (Room air)       Shift assessment complete. See flow sheet. Scheduled meds given. See MAR. Patients head-toe complete, VS are logged, and active bowel sound noted in all four quadrants. Pt sitting up in bed respirations easy and unlabored. NO s/s of distress. Alert and oriented to self and month. Otherwise confused. Denies pain or SOB. No further needs  noted at this time. Call light and bedside table are within reach. The bed is locked and is in the lowest position.         Timothy Bess RN

## 2023-04-27 NOTE — PLAN OF CARE
Problem: Discharge Planning  Goal: Discharge to home or other facility with appropriate resources  Outcome: Progressing  Flowsheets (Taken 4/26/2023 2310)  Discharge to home or other facility with appropriate resources: Identify barriers to discharge with patient and caregiver     Problem: Safety - Adult  Goal: Free from fall injury  4/27/2023 0259 by Racheal Chavira RN  Outcome: Progressing  4/26/2023 1420 by Vazquez Tee RN  Outcome: Progressing     Problem: Pain  Goal: Verbalizes/displays adequate comfort level or baseline comfort level  Outcome: Progressing  Flowsheets (Taken 4/26/2023 2256)  Verbalizes/displays adequate comfort level or baseline comfort level: Encourage patient to monitor pain and request assistance     Problem: Risk for Elopement  Goal: Patient will not exit the unit/facility without proper excort  Outcome: Progressing  Flowsheets (Taken 4/26/2023 2310)  Nursing Interventions for Elopement Risk: Assist with personal care needs such as toileting, eating, dressing, as needed to reduce the risk of wandering     Problem: Chronic Conditions and Co-morbidities  Goal: Patient's chronic conditions and co-morbidity symptoms are monitored and maintained or improved  Outcome: Progressing  Flowsheets (Taken 4/26/2023 2310)  Care Plan - Patient's Chronic Conditions and Co-Morbidity Symptoms are Monitored and Maintained or Improved: Monitor and assess patient's chronic conditions and comorbid symptoms for stability, deterioration, or improvement

## 2023-04-28 PROCEDURE — 6370000000 HC RX 637 (ALT 250 FOR IP)

## 2023-04-28 PROCEDURE — 99231 SBSQ HOSP IP/OBS SF/LOW 25: CPT | Performed by: INTERNAL MEDICINE

## 2023-04-28 PROCEDURE — 6360000002 HC RX W HCPCS: Performed by: NURSE PRACTITIONER

## 2023-04-28 PROCEDURE — 1200000000 HC SEMI PRIVATE

## 2023-04-28 PROCEDURE — 6370000000 HC RX 637 (ALT 250 FOR IP): Performed by: NURSE PRACTITIONER

## 2023-04-28 RX ADMIN — QUETIAPINE FUMARATE 100 MG: 100 TABLET ORAL at 20:02

## 2023-04-28 RX ADMIN — LORAZEPAM 0.5 MG: 0.5 TABLET ORAL at 00:32

## 2023-04-28 RX ADMIN — Medication 1 CAPSULE: at 10:20

## 2023-04-28 RX ADMIN — CYANOCOBALAMIN TAB 500 MCG 1000 MCG: 500 TAB at 10:19

## 2023-04-28 RX ADMIN — FERROUS SULFATE TAB 325 MG (65 MG ELEMENTAL FE) 325 MG: 325 (65 FE) TAB at 10:20

## 2023-04-28 RX ADMIN — DIVALPROEX SODIUM 500 MG: 500 TABLET, DELAYED RELEASE ORAL at 20:02

## 2023-04-28 RX ADMIN — B-COMPLEX W/ C & FOLIC ACID TAB 1 TABLET: TAB at 10:20

## 2023-04-28 RX ADMIN — QUETIAPINE FUMARATE 50 MG: 25 TABLET ORAL at 10:20

## 2023-04-28 RX ADMIN — ENOXAPARIN SODIUM 30 MG: 100 INJECTION SUBCUTANEOUS at 10:21

## 2023-04-28 RX ADMIN — CEFUROXIME AXETIL 500 MG: 500 TABLET ORAL at 10:20

## 2023-04-28 RX ADMIN — PANTOPRAZOLE SODIUM 40 MG: 40 TABLET, DELAYED RELEASE ORAL at 10:19

## 2023-04-28 RX ADMIN — DIVALPROEX SODIUM 500 MG: 500 TABLET, DELAYED RELEASE ORAL at 10:19

## 2023-04-28 RX ADMIN — POTASSIUM CHLORIDE 20 MEQ: 20 TABLET, EXTENDED RELEASE ORAL at 20:02

## 2023-04-28 RX ADMIN — MESALAMINE 800 MG: 400 CAPSULE, DELAYED RELEASE ORAL at 14:44

## 2023-04-28 RX ADMIN — ASPIRIN 81 MG: 81 TABLET, CHEWABLE ORAL at 10:20

## 2023-04-28 RX ADMIN — MESALAMINE 800 MG: 400 CAPSULE, DELAYED RELEASE ORAL at 10:20

## 2023-04-28 RX ADMIN — FENOFIBRATE 160 MG: 160 TABLET ORAL at 10:19

## 2023-04-28 RX ADMIN — Medication 10 MG: at 17:59

## 2023-04-28 RX ADMIN — METOPROLOL SUCCINATE 50 MG: 50 TABLET, EXTENDED RELEASE ORAL at 10:20

## 2023-04-28 RX ADMIN — TRAZODONE HYDROCHLORIDE 50 MG: 50 TABLET ORAL at 20:03

## 2023-04-28 RX ADMIN — POTASSIUM CHLORIDE 20 MEQ: 20 TABLET, EXTENDED RELEASE ORAL at 10:19

## 2023-04-28 RX ADMIN — ATORVASTATIN CALCIUM 40 MG: 40 TABLET, FILM COATED ORAL at 10:19

## 2023-04-28 RX ADMIN — Medication 1000 UNITS: at 10:20

## 2023-04-28 RX ADMIN — LORAZEPAM 0.5 MG: 0.5 TABLET ORAL at 20:02

## 2023-04-28 ASSESSMENT — PAIN SCALES - GENERAL
PAINLEVEL_OUTOF10: 0
PAINLEVEL_OUTOF10: 0

## 2023-04-28 NOTE — PLAN OF CARE
Problem: Pain  Goal: Verbalizes/displays adequate comfort level or baseline comfort level  4/28/2023 1752 by Henny Edwards RN  Outcome: Progressing  4/28/2023 1745 by Henny Edwards RN  Outcome: Progressing  4/28/2023 1745 by Henny Edwards RN  Outcome: Progressing     Problem: Risk for Elopement  Goal: Patient will not exit the unit/facility without proper excort  4/28/2023 1752 by Henny Edwards RN  Outcome: Progressing  4/28/2023 1745 by Henny Edwards RN  Outcome: Progressing  4/28/2023 1745 by Henny Edwards RN  Outcome: Progressing  Flowsheets (Taken 4/28/2023 1123)  Nursing Interventions for Elopement Risk: Assist with personal care needs such as toileting, eating, dressing, as needed to reduce the risk of wandering     Problem: Chronic Conditions and Co-morbidities  Goal: Patient's chronic conditions and co-morbidity symptoms are monitored and maintained or improved  4/28/2023 1752 by Henny Edwards RN  Outcome: Progressing  4/28/2023 1745 by Henny Edwards RN  Outcome: Progressing  4/28/2023 1745 by Henny Edwards RN  Outcome: Progressing

## 2023-04-28 NOTE — PLAN OF CARE
Problem: Pain  Goal: Verbalizes/displays adequate comfort level or baseline comfort level  Outcome: Progressing     Problem: Risk for Elopement  Goal: Patient will not exit the unit/facility without proper excort  Outcome: Progressing  Flowsheets (Taken 4/28/2023 1123)  Nursing Interventions for Elopement Risk: Assist with personal care needs such as toileting, eating, dressing, as needed to reduce the risk of wandering     Problem: Chronic Conditions and Co-morbidities  Goal: Patient's chronic conditions and co-morbidity symptoms are monitored and maintained or improved  Outcome: Progressing

## 2023-04-28 NOTE — PLAN OF CARE
Problem: Discharge Planning  Goal: Discharge to home or other facility with appropriate resources  4/28/2023 0240 by Rigo Espino RN  Outcome: Progressing  Flowsheets (Taken 4/27/2023 2231)  Discharge to home or other facility with appropriate resources: Identify barriers to discharge with patient and caregiver     Problem: Safety - Adult  Goal: Free from fall injury  4/28/2023 0240 by Rigo Espino RN  Outcome: Progressing     Problem: Pain  Goal: Verbalizes/displays adequate comfort level or baseline comfort level  4/28/2023 0240 by Rigo Espino RN  Outcome: Progressing     Problem: Risk for Elopement  Goal: Patient will not exit the unit/facility without proper excort  4/28/2023 0240 by Rigo Espino RN  Outcome: Progressing  Flowsheets (Taken 4/27/2023 2231)  Nursing Interventions for Elopement Risk: Assist with personal care needs such as toileting, eating, dressing, as needed to reduce the risk of wandering     Problem: Chronic Conditions and Co-morbidities  Goal: Patient's chronic conditions and co-morbidity symptoms are monitored and maintained or improved  4/28/2023 0240 by Rigo Espino RN  Outcome: Progressing  Flowsheets (Taken 4/27/2023 2231)  Care Plan - Patient's Chronic Conditions and Co-Morbidity Symptoms are Monitored and Maintained or Improved: Monitor and assess patient's chronic conditions and comorbid symptoms for stability, deterioration, or improvement  4/27/2023 1735 by Darryl Vizcarra RN  Outcome: Progressing

## 2023-04-28 NOTE — CARE COORDINATION
INTERDISCIPLINARY PLAN OF CARE CONFERENCE    Date/Time: 4/28/2023 1:22 PM  Completed by: HERMES Hendricks  Case Management Department  Phone: [phone] Fax: [phone]  Case Management      Patient Name:  Tatiana Tamayo  YOB: 1950  Admitting Diagnosis: COVID-19 [U07.1]     Admit Date/Time:  4/24/2023  1:30 PM    Chart reviewed. Interdisciplinary team contacted or reviewed plan related to patient progress and discharge plans. Disciplines included Case Management, Nursing, and Dietitian. Current Status:ongoing   PT/OT recommendation for discharge plan of care: TBD    Expected D/C Disposition:  return to [address] [place] [address]  Confirmed plan with patient and/or family Yes confirmed with: (name) pt's daughter Yue Perez via phone    Discharge Plan Comments: Chart review completed. Received call from [facility],  in [place] who confirmed pt returning to their unit tomorrow and is aware that Financial [employer] Counselor was given referral for [facility] application for possible placement. RN stated pt's daughter Yue Perez wanted a call. Called and spoke with Yue Perez, pt's daughter who is aware pt returning to [facility] tomorrow as she spoke with RN and she stated agreement with this plan. She is aware she will continue to work with [facility] on the [address] [place] [address] unit.  She stated she spoke with [facility] Financial Counselor about the [facility]    Home O2 in place on admit: No

## 2023-04-29 ENCOUNTER — HOSPITAL ENCOUNTER (INPATIENT)
Age: 73
LOS: 6 days | Discharge: HOME | DRG: 885 | End: 2023-05-05
Attending: PSYCHIATRY & NEUROLOGY | Admitting: PSYCHIATRY & NEUROLOGY
Payer: MEDICARE

## 2023-04-29 VITALS
SYSTOLIC BLOOD PRESSURE: 102 MMHG | TEMPERATURE: 97.5 F | RESPIRATION RATE: 18 BRPM | OXYGEN SATURATION: 99 % | WEIGHT: 247.3 LBS | HEART RATE: 63 BPM | HEIGHT: 77 IN | DIASTOLIC BLOOD PRESSURE: 49 MMHG | BODY MASS INDEX: 29.2 KG/M2

## 2023-04-29 PROBLEM — F20.9 SCHIZOPHRENIA (HCC): Status: ACTIVE | Noted: 2023-04-29

## 2023-04-29 LAB — TSH SERPL DL<=0.005 MIU/L-ACNC: 2.4 UIU/ML (ref 0.27–4.2)

## 2023-04-29 PROCEDURE — 6370000000 HC RX 637 (ALT 250 FOR IP): Performed by: PSYCHIATRY & NEUROLOGY

## 2023-04-29 PROCEDURE — 93005 ELECTROCARDIOGRAM TRACING: CPT | Performed by: PSYCHIATRY & NEUROLOGY

## 2023-04-29 PROCEDURE — 1240000000 HC EMOTIONAL WELLNESS R&B

## 2023-04-29 RX ORDER — BENZTROPINE MESYLATE 1 MG/ML
2 INJECTION INTRAMUSCULAR; INTRAVENOUS 2 TIMES DAILY PRN
Status: DISCONTINUED | OUTPATIENT
Start: 2023-04-29 | End: 2023-05-05 | Stop reason: HOSPADM

## 2023-04-29 RX ORDER — GUAIFENESIN/DEXTROMETHORPHAN 100-10MG/5
5 SYRUP ORAL EVERY 4 HOURS PRN
Qty: 120 ML | Refills: 0 | Status: ON HOLD | OUTPATIENT
Start: 2023-04-29 | End: 2023-05-05 | Stop reason: HOSPADM

## 2023-04-29 RX ORDER — TRAZODONE HYDROCHLORIDE 50 MG/1
50 TABLET ORAL NIGHTLY PRN
Status: DISCONTINUED | OUTPATIENT
Start: 2023-04-29 | End: 2023-05-05 | Stop reason: HOSPADM

## 2023-04-29 RX ORDER — BENZONATATE 100 MG/1
100 CAPSULE ORAL 3 TIMES DAILY PRN
Qty: 21 CAPSULE | Refills: 0 | Status: ON HOLD | OUTPATIENT
Start: 2023-04-29 | End: 2023-05-05 | Stop reason: HOSPADM

## 2023-04-29 RX ORDER — AMLODIPINE BESYLATE 10 MG/1
5 TABLET ORAL DAILY
Qty: 30 TABLET | Refills: 3 | Status: ON HOLD | OUTPATIENT
Start: 2023-04-29 | End: 2023-05-05 | Stop reason: SDUPTHER

## 2023-04-29 RX ORDER — HYDROXYZINE 50 MG/1
50 TABLET, FILM COATED ORAL 3 TIMES DAILY PRN
Status: DISCONTINUED | OUTPATIENT
Start: 2023-04-29 | End: 2023-05-05 | Stop reason: HOSPADM

## 2023-04-29 RX ORDER — OLANZAPINE 10 MG/1
10 TABLET ORAL EVERY 4 HOURS PRN
Status: DISCONTINUED | OUTPATIENT
Start: 2023-04-29 | End: 2023-05-05 | Stop reason: HOSPADM

## 2023-04-29 RX ORDER — NICOTINE 21 MG/24HR
1 PATCH, TRANSDERMAL 24 HOURS TRANSDERMAL DAILY
Status: DISCONTINUED | OUTPATIENT
Start: 2023-04-29 | End: 2023-05-05 | Stop reason: HOSPADM

## 2023-04-29 RX ORDER — LACTOBACILLUS RHAMNOSUS GG 10B CELL
1 CAPSULE ORAL DAILY
Status: DISCONTINUED | OUTPATIENT
Start: 2023-04-29 | End: 2023-05-05 | Stop reason: HOSPADM

## 2023-04-29 RX ORDER — DIVALPROEX SODIUM 500 MG/1
500 TABLET, DELAYED RELEASE ORAL EVERY 12 HOURS SCHEDULED
Qty: 90 TABLET | Refills: 3 | Status: ON HOLD | OUTPATIENT
Start: 2023-04-29 | End: 2023-05-05

## 2023-04-29 RX ORDER — QUETIAPINE FUMARATE 25 MG/1
50 TABLET, FILM COATED ORAL 2 TIMES DAILY
Status: DISCONTINUED | OUTPATIENT
Start: 2023-04-29 | End: 2023-05-05 | Stop reason: HOSPADM

## 2023-04-29 RX ORDER — MECOBALAMIN 5000 MCG
10 TABLET,DISINTEGRATING ORAL NIGHTLY
Status: DISCONTINUED | OUTPATIENT
Start: 2023-04-29 | End: 2023-05-05 | Stop reason: HOSPADM

## 2023-04-29 RX ORDER — ASPIRIN 81 MG/1
81 TABLET, CHEWABLE ORAL DAILY
Status: DISCONTINUED | OUTPATIENT
Start: 2023-04-29 | End: 2023-05-05 | Stop reason: HOSPADM

## 2023-04-29 RX ORDER — ATORVASTATIN CALCIUM 40 MG/1
40 TABLET, FILM COATED ORAL DAILY
Status: DISCONTINUED | OUTPATIENT
Start: 2023-04-29 | End: 2023-05-05 | Stop reason: HOSPADM

## 2023-04-29 RX ORDER — POTASSIUM CHLORIDE 20 MEQ/1
20 TABLET, EXTENDED RELEASE ORAL 2 TIMES DAILY
Status: DISCONTINUED | OUTPATIENT
Start: 2023-04-29 | End: 2023-05-05 | Stop reason: HOSPADM

## 2023-04-29 RX ORDER — PANTOPRAZOLE SODIUM 40 MG/1
40 TABLET, DELAYED RELEASE ORAL DAILY
Status: DISCONTINUED | OUTPATIENT
Start: 2023-04-29 | End: 2023-05-05 | Stop reason: HOSPADM

## 2023-04-29 RX ORDER — MESALAMINE 0.38 G/1
1.5 CAPSULE, EXTENDED RELEASE ORAL DAILY
Status: DISCONTINUED | OUTPATIENT
Start: 2023-04-29 | End: 2023-05-01 | Stop reason: RX

## 2023-04-29 RX ORDER — POLYETHYLENE GLYCOL 3350 17 G
2 POWDER IN PACKET (EA) ORAL
Status: DISCONTINUED | OUTPATIENT
Start: 2023-04-29 | End: 2023-05-05 | Stop reason: HOSPADM

## 2023-04-29 RX ORDER — LOSARTAN POTASSIUM 25 MG/1
25 TABLET ORAL DAILY
Status: DISCONTINUED | OUTPATIENT
Start: 2023-04-29 | End: 2023-05-05 | Stop reason: HOSPADM

## 2023-04-29 RX ORDER — AMLODIPINE BESYLATE 5 MG/1
5 TABLET ORAL DAILY
Status: DISCONTINUED | OUTPATIENT
Start: 2023-04-29 | End: 2023-05-05 | Stop reason: HOSPADM

## 2023-04-29 RX ORDER — LEVOCARNITINE 330 MG/1
330 TABLET ORAL 3 TIMES DAILY
Status: DISCONTINUED | OUTPATIENT
Start: 2023-04-29 | End: 2023-05-04

## 2023-04-29 RX ORDER — DIVALPROEX SODIUM 500 MG/1
500 TABLET, DELAYED RELEASE ORAL EVERY 12 HOURS SCHEDULED
Status: DISCONTINUED | OUTPATIENT
Start: 2023-04-29 | End: 2023-05-05 | Stop reason: HOSPADM

## 2023-04-29 RX ORDER — GUAIFENESIN/DEXTROMETHORPHAN 100-10MG/5
5 SYRUP ORAL EVERY 4 HOURS PRN
Status: DISCONTINUED | OUTPATIENT
Start: 2023-04-29 | End: 2023-05-05 | Stop reason: HOSPADM

## 2023-04-29 RX ORDER — FERROUS SULFATE 325(65) MG
325 TABLET ORAL
Status: DISCONTINUED | OUTPATIENT
Start: 2023-04-30 | End: 2023-05-05 | Stop reason: HOSPADM

## 2023-04-29 RX ORDER — BENZONATATE 100 MG/1
100 CAPSULE ORAL 3 TIMES DAILY PRN
Status: DISCONTINUED | OUTPATIENT
Start: 2023-04-29 | End: 2023-05-05 | Stop reason: HOSPADM

## 2023-04-29 RX ORDER — LORAZEPAM 0.5 MG/1
0.5 TABLET ORAL EVERY 6 HOURS PRN
Status: DISCONTINUED | OUTPATIENT
Start: 2023-04-29 | End: 2023-05-05 | Stop reason: HOSPADM

## 2023-04-29 RX ORDER — METOPROLOL SUCCINATE 50 MG/1
50 TABLET, EXTENDED RELEASE ORAL DAILY
Status: DISCONTINUED | OUTPATIENT
Start: 2023-04-29 | End: 2023-05-05 | Stop reason: HOSPADM

## 2023-04-29 RX ORDER — FENOFIBRATE 160 MG/1
160 TABLET ORAL DAILY
Status: DISCONTINUED | OUTPATIENT
Start: 2023-04-29 | End: 2023-05-05 | Stop reason: HOSPADM

## 2023-04-29 RX ORDER — MAGNESIUM HYDROXIDE/ALUMINUM HYDROXICE/SIMETHICONE 120; 1200; 1200 MG/30ML; MG/30ML; MG/30ML
30 SUSPENSION ORAL EVERY 6 HOURS PRN
Status: DISCONTINUED | OUTPATIENT
Start: 2023-04-29 | End: 2023-05-05 | Stop reason: HOSPADM

## 2023-04-29 RX ORDER — ACETAMINOPHEN 325 MG/1
650 TABLET ORAL EVERY 4 HOURS PRN
Status: DISCONTINUED | OUTPATIENT
Start: 2023-04-29 | End: 2023-05-05 | Stop reason: HOSPADM

## 2023-04-29 RX ORDER — VITAMIN B COMPLEX
1000 TABLET ORAL DAILY
Status: DISCONTINUED | OUTPATIENT
Start: 2023-04-29 | End: 2023-05-05 | Stop reason: HOSPADM

## 2023-04-29 RX ADMIN — DIVALPROEX SODIUM 500 MG: 500 TABLET, DELAYED RELEASE ORAL at 20:55

## 2023-04-29 RX ADMIN — LOSARTAN POTASSIUM 25 MG: 25 TABLET, FILM COATED ORAL at 18:28

## 2023-04-29 RX ADMIN — METOPROLOL SUCCINATE 50 MG: 50 TABLET, EXTENDED RELEASE ORAL at 18:29

## 2023-04-29 RX ADMIN — ASPIRIN 81 MG: 81 TABLET, CHEWABLE ORAL at 18:27

## 2023-04-29 RX ADMIN — QUETIAPINE FUMARATE 50 MG: 25 TABLET ORAL at 18:27

## 2023-04-29 RX ADMIN — Medication 10 MG: at 20:53

## 2023-04-29 RX ADMIN — FENOFIBRATE 160 MG: 160 TABLET ORAL at 18:29

## 2023-04-29 RX ADMIN — ATORVASTATIN CALCIUM 40 MG: 40 TABLET, FILM COATED ORAL at 18:29

## 2023-04-29 RX ADMIN — PANTOPRAZOLE SODIUM 40 MG: 40 TABLET, DELAYED RELEASE ORAL at 18:28

## 2023-04-29 RX ADMIN — AMLODIPINE BESYLATE 5 MG: 5 TABLET ORAL at 18:29

## 2023-04-29 RX ADMIN — POTASSIUM CHLORIDE 20 MEQ: 20 TABLET, EXTENDED RELEASE ORAL at 20:54

## 2023-04-29 RX ADMIN — Medication 1000 UNITS: at 18:27

## 2023-04-29 RX ADMIN — LEVOCARNITINE 330 MG: 330 TABLET ORAL at 18:27

## 2023-04-29 RX ADMIN — QUETIAPINE FUMARATE 50 MG: 25 TABLET ORAL at 20:54

## 2023-04-29 RX ADMIN — POTASSIUM CHLORIDE 20 MEQ: 20 TABLET, EXTENDED RELEASE ORAL at 18:28

## 2023-04-29 RX ADMIN — B-COMPLEX W/ C & FOLIC ACID TAB 1 TABLET: TAB at 18:27

## 2023-04-29 RX ADMIN — Medication 1 CAPSULE: at 18:29

## 2023-04-29 RX ADMIN — LEVOCARNITINE 330 MG: 330 TABLET ORAL at 20:55

## 2023-04-29 ASSESSMENT — PAIN SCALES - GENERAL: PAINLEVEL_OUTOF10: 0

## 2023-04-29 NOTE — PLAN OF CARE
Problem: Safety - Adult  Goal: Free from fall injury  8/06/4350 [id number] by Elisa Luis RN  Outcome: Progressing  4/28/2023 2118 by Rodrigo Huff RN  Outcome: Progressing     Problem: Discharge Planning  Goal: Discharge to home or other facility with appropriate resources  Outcome: Progressing
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
## 2023-04-29 NOTE — DISCHARGE INSTRUCTIONS
FO  Your information:  Name: Yani Santoyo  : 1950    Your instructions: Follow up with PCP      What to do after you leave the hospital:    Recommended diet: regular diet    Recommended activity: activity as tolerated        The following personal items were collected during your admission and were returned to you:    Belongings  Dental Appliances: Uppers, Lowers  Vision - Corrective Lenses: None  Hearing Aid: None  Clothing: Pants, Shirt, Shorts, Footwear, Belt, Undergarments, Socks, At bedside  Jewelry: None  Body Piercings Removed: No  Electronic Devices: Cell Phone,   Weapons (Notify Protective Services/Security): None  Home Medications: None  Patient approves for provider to speak to responsible person post operatively: No    Information obtained by:  By signing below, I understand that if any problems occur once I leave the hospital I am to contact MD.  I understand and acknowledge receipt of the instructions indicated above.

## 2023-04-29 NOTE — PLAN OF CARE
Problem: Safety - Adult  Goal: Free from fall injury  Outcome: Progressing     Problem: Pain  Goal: Verbalizes/displays adequate comfort level or baseline comfort level  4/28/2023 2118 by Fili Ivey RN  Outcome: Progressing  4/28/2023 1752 by Vani Red RN  Outcome: Progressing  4/28/2023 1745 by Vani Red RN  Outcome: Progressing  4/28/2023 1745 by Vani Red RN  Outcome: Progressing

## 2023-04-30 PROBLEM — F20.9 SCHIZOPHRENIA (HCC): Status: RESOLVED | Noted: 2023-04-29 | Resolved: 2023-04-30

## 2023-04-30 LAB
CHOLEST SERPL-MCNC: 88 MG/DL (ref 0–199)
EKG ATRIAL RATE: 91 BPM
EKG DIAGNOSIS: NORMAL
EKG Q-T INTERVAL: 376 MS
EKG QRS DURATION: 112 MS
EKG QTC CALCULATION (BAZETT): 428 MS
EKG R AXIS: -44 DEGREES
EKG T AXIS: 31 DEGREES
EKG VENTRICULAR RATE: 78 BPM
EST. AVERAGE GLUCOSE BLD GHB EST-MCNC: 108.3 MG/DL
HBA1C MFR BLD: 5.4 %
HDLC SERPL-MCNC: 27 MG/DL (ref 40–60)
LDLC SERPL CALC-MCNC: 42 MG/DL
TRIGL SERPL-MCNC: 93 MG/DL (ref 0–150)
VLDLC SERPL CALC-MCNC: 19 MG/DL

## 2023-04-30 PROCEDURE — 1240000000 HC EMOTIONAL WELLNESS R&B

## 2023-04-30 PROCEDURE — 6370000000 HC RX 637 (ALT 250 FOR IP): Performed by: PSYCHIATRY & NEUROLOGY

## 2023-04-30 PROCEDURE — 99221 1ST HOSP IP/OBS SF/LOW 40: CPT | Performed by: PSYCHIATRY & NEUROLOGY

## 2023-04-30 PROCEDURE — 93010 ELECTROCARDIOGRAM REPORT: CPT | Performed by: INTERNAL MEDICINE

## 2023-04-30 RX ADMIN — LOSARTAN POTASSIUM 25 MG: 25 TABLET, FILM COATED ORAL at 10:09

## 2023-04-30 RX ADMIN — Medication 10 MG: at 20:48

## 2023-04-30 RX ADMIN — ASPIRIN 81 MG: 81 TABLET, CHEWABLE ORAL at 10:09

## 2023-04-30 RX ADMIN — AMLODIPINE BESYLATE 5 MG: 5 TABLET ORAL at 10:05

## 2023-04-30 RX ADMIN — FERROUS SULFATE TAB 325 MG (65 MG ELEMENTAL FE) 325 MG: 325 (65 FE) TAB at 10:05

## 2023-04-30 RX ADMIN — LEVOCARNITINE 330 MG: 330 TABLET ORAL at 20:50

## 2023-04-30 RX ADMIN — PANTOPRAZOLE SODIUM 40 MG: 40 TABLET, DELAYED RELEASE ORAL at 10:05

## 2023-04-30 RX ADMIN — QUETIAPINE FUMARATE 50 MG: 25 TABLET ORAL at 10:09

## 2023-04-30 RX ADMIN — DIVALPROEX SODIUM 500 MG: 500 TABLET, DELAYED RELEASE ORAL at 10:05

## 2023-04-30 RX ADMIN — Medication 1000 UNITS: at 10:06

## 2023-04-30 RX ADMIN — FENOFIBRATE 160 MG: 160 TABLET ORAL at 10:09

## 2023-04-30 RX ADMIN — LEVOCARNITINE 330 MG: 330 TABLET ORAL at 10:09

## 2023-04-30 RX ADMIN — ATORVASTATIN CALCIUM 40 MG: 40 TABLET, FILM COATED ORAL at 10:06

## 2023-04-30 RX ADMIN — TRAZODONE HYDROCHLORIDE 50 MG: 50 TABLET ORAL at 01:27

## 2023-04-30 RX ADMIN — POTASSIUM CHLORIDE 20 MEQ: 20 TABLET, EXTENDED RELEASE ORAL at 10:05

## 2023-04-30 RX ADMIN — QUETIAPINE FUMARATE 50 MG: 25 TABLET ORAL at 20:49

## 2023-04-30 RX ADMIN — LEVOCARNITINE 330 MG: 330 TABLET ORAL at 17:21

## 2023-04-30 RX ADMIN — B-COMPLEX W/ C & FOLIC ACID TAB 1 TABLET: TAB at 10:09

## 2023-04-30 RX ADMIN — METOPROLOL SUCCINATE 50 MG: 50 TABLET, EXTENDED RELEASE ORAL at 10:09

## 2023-04-30 RX ADMIN — POTASSIUM CHLORIDE 20 MEQ: 20 TABLET, EXTENDED RELEASE ORAL at 20:49

## 2023-04-30 RX ADMIN — Medication 1 CAPSULE: at 10:09

## 2023-04-30 RX ADMIN — DIVALPROEX SODIUM 500 MG: 500 TABLET, DELAYED RELEASE ORAL at 20:49

## 2023-04-30 ASSESSMENT — PAIN SCALES - GENERAL: PAINLEVEL_OUTOF10: 0

## 2023-04-30 ASSESSMENT — PAIN SCALES - WONG BAKER: WONGBAKER_NUMERICALRESPONSE: 0

## 2023-05-01 PROCEDURE — 1240000000 HC EMOTIONAL WELLNESS R&B

## 2023-05-01 PROCEDURE — 6370000000 HC RX 637 (ALT 250 FOR IP): Performed by: PSYCHIATRY & NEUROLOGY

## 2023-05-01 PROCEDURE — 99232 SBSQ HOSP IP/OBS MODERATE 35: CPT

## 2023-05-01 RX ADMIN — QUETIAPINE FUMARATE 50 MG: 25 TABLET ORAL at 10:47

## 2023-05-01 RX ADMIN — FERROUS SULFATE TAB 325 MG (65 MG ELEMENTAL FE) 325 MG: 325 (65 FE) TAB at 10:47

## 2023-05-01 RX ADMIN — METOPROLOL SUCCINATE 50 MG: 50 TABLET, EXTENDED RELEASE ORAL at 10:48

## 2023-05-01 RX ADMIN — DIVALPROEX SODIUM 500 MG: 500 TABLET, DELAYED RELEASE ORAL at 21:21

## 2023-05-01 RX ADMIN — LOSARTAN POTASSIUM 25 MG: 25 TABLET, FILM COATED ORAL at 10:47

## 2023-05-01 RX ADMIN — LEVOCARNITINE 330 MG: 330 TABLET ORAL at 21:25

## 2023-05-01 RX ADMIN — DIVALPROEX SODIUM 500 MG: 500 TABLET, DELAYED RELEASE ORAL at 10:47

## 2023-05-01 RX ADMIN — LEVOCARNITINE 330 MG: 330 TABLET ORAL at 10:48

## 2023-05-01 RX ADMIN — FENOFIBRATE 160 MG: 160 TABLET ORAL at 10:47

## 2023-05-01 RX ADMIN — Medication 1 CAPSULE: at 10:48

## 2023-05-01 RX ADMIN — Medication 1000 UNITS: at 10:48

## 2023-05-01 RX ADMIN — ATORVASTATIN CALCIUM 40 MG: 40 TABLET, FILM COATED ORAL at 10:47

## 2023-05-01 RX ADMIN — B-COMPLEX W/ C & FOLIC ACID TAB 1 TABLET: TAB at 10:48

## 2023-05-01 RX ADMIN — ASPIRIN 81 MG: 81 TABLET, CHEWABLE ORAL at 10:47

## 2023-05-01 RX ADMIN — TRAZODONE HYDROCHLORIDE 50 MG: 50 TABLET ORAL at 23:10

## 2023-05-01 RX ADMIN — QUETIAPINE FUMARATE 50 MG: 25 TABLET ORAL at 21:20

## 2023-05-01 RX ADMIN — LEVOCARNITINE 330 MG: 330 TABLET ORAL at 17:36

## 2023-05-01 RX ADMIN — Medication 10 MG: at 21:21

## 2023-05-01 RX ADMIN — POTASSIUM CHLORIDE 20 MEQ: 20 TABLET, EXTENDED RELEASE ORAL at 10:48

## 2023-05-01 RX ADMIN — POTASSIUM CHLORIDE 20 MEQ: 20 TABLET, EXTENDED RELEASE ORAL at 21:21

## 2023-05-01 RX ADMIN — PANTOPRAZOLE SODIUM 40 MG: 40 TABLET, DELAYED RELEASE ORAL at 10:48

## 2023-05-01 RX ADMIN — AMLODIPINE BESYLATE 5 MG: 5 TABLET ORAL at 10:47

## 2023-05-01 ASSESSMENT — PAIN SCALES - GENERAL: PAINLEVEL_OUTOF10: 0

## 2023-05-01 NOTE — PLAN OF CARE
Problem: Chronic Conditions and Co-morbidities  Goal: Patient's chronic conditions and co-morbidity symptoms are monitored and maintained or improved  Outcome: Progressing     Problem: Safety - Adult  Goal: Free from fall injury  5/1/2023 0009 by Renetta Antoine RN  Outcome: Progressing     Problem: Risk for Elopement  Goal: Patient will not exit the unit/facility without proper excort  Outcome: Progressing     Problem: Confusion  Goal: Confusion, delirium, dementia, or psychosis is improved or at baseline  Description: INTERVENTIONS:  1. Assess for possible contributors to thought disturbance, including medications, impaired vision or hearing, underlying metabolic abnormalities, dehydration, psychiatric diagnoses, and notify attending LIP  2. [place] high risk fall precautions, as indicated  3. Provide frequent short contacts to provide reality reorientation, refocusing and direction  4. Decrease environmental stimuli, including noise as appropriate  5. Monitor and intervene to maintain adequate nutrition, hydration, elimination, sleep and activity  6. If unable to ensure safety without constant attention obtain sitter and review sitter guidelines with assigned personnel  7. Initiate Psychosocial CNS and Spiritual Care consult, as indicated  5/1/2023 0009 by Renetta Antoine RN  Outcome: Progressing     Problem: Behavior  Goal: Pt/Family maintain appropriate behavior and adhere to behavioral management agreement, if implemented  Description: INTERVENTIONS:  1. Assess patient/family's coping skills and  non-compliant behavior (including use of illegal substances)  2. Notify security of behavior or suspected illegal substances which indicate the need for search of the family and/or belongings  3. Encourage verbalization of thoughts and concerns in a socially appropriate manner  4. Utilize positive, consistent limit setting strategies supporting safety of patient, staff and others  5.  Encourage participation

## 2023-05-01 NOTE — BH NOTE
Left vm for pt's daughter Mukesh Bower requesting return call to discuss pt's progress and after care planning.

## 2023-05-01 NOTE — BH NOTE
Ayush Ceballos had a sepsis warning on the computer. This nurse reviewed the chart. Medical provider notified. She reviewed the chart. No new orders.

## 2023-05-02 PROCEDURE — 6370000000 HC RX 637 (ALT 250 FOR IP): Performed by: PSYCHIATRY & NEUROLOGY

## 2023-05-02 PROCEDURE — 99232 SBSQ HOSP IP/OBS MODERATE 35: CPT

## 2023-05-02 PROCEDURE — 97535 SELF CARE MNGMENT TRAINING: CPT

## 2023-05-02 PROCEDURE — 97161 PT EVAL LOW COMPLEX 20 MIN: CPT

## 2023-05-02 PROCEDURE — 1240000000 HC EMOTIONAL WELLNESS R&B

## 2023-05-02 PROCEDURE — 97165 OT EVAL LOW COMPLEX 30 MIN: CPT

## 2023-05-02 PROCEDURE — 97530 THERAPEUTIC ACTIVITIES: CPT

## 2023-05-02 RX ADMIN — Medication 1 CAPSULE: at 08:50

## 2023-05-02 RX ADMIN — PANTOPRAZOLE SODIUM 40 MG: 40 TABLET, DELAYED RELEASE ORAL at 08:50

## 2023-05-02 RX ADMIN — Medication 1000 UNITS: at 08:50

## 2023-05-02 RX ADMIN — ATORVASTATIN CALCIUM 40 MG: 40 TABLET, FILM COATED ORAL at 08:50

## 2023-05-02 RX ADMIN — POTASSIUM CHLORIDE 20 MEQ: 20 TABLET, EXTENDED RELEASE ORAL at 08:51

## 2023-05-02 RX ADMIN — LEVOCARNITINE 330 MG: 330 TABLET ORAL at 20:03

## 2023-05-02 RX ADMIN — METOPROLOL SUCCINATE 50 MG: 50 TABLET, EXTENDED RELEASE ORAL at 08:51

## 2023-05-02 RX ADMIN — DIVALPROEX SODIUM 500 MG: 500 TABLET, DELAYED RELEASE ORAL at 08:51

## 2023-05-02 RX ADMIN — Medication 10 MG: at 20:03

## 2023-05-02 RX ADMIN — AMLODIPINE BESYLATE 5 MG: 5 TABLET ORAL at 08:51

## 2023-05-02 RX ADMIN — FERROUS SULFATE TAB 325 MG (65 MG ELEMENTAL FE) 325 MG: 325 (65 FE) TAB at 08:50

## 2023-05-02 RX ADMIN — DIVALPROEX SODIUM 500 MG: 500 TABLET, DELAYED RELEASE ORAL at 20:03

## 2023-05-02 RX ADMIN — LEVOCARNITINE 330 MG: 330 TABLET ORAL at 08:54

## 2023-05-02 RX ADMIN — POTASSIUM CHLORIDE 20 MEQ: 20 TABLET, EXTENDED RELEASE ORAL at 20:03

## 2023-05-02 RX ADMIN — B-COMPLEX W/ C & FOLIC ACID TAB 1 TABLET: TAB at 08:50

## 2023-05-02 RX ADMIN — LEVOCARNITINE 330 MG: 330 TABLET ORAL at 13:19

## 2023-05-02 RX ADMIN — LOSARTAN POTASSIUM 25 MG: 25 TABLET, FILM COATED ORAL at 08:51

## 2023-05-02 RX ADMIN — QUETIAPINE FUMARATE 50 MG: 25 TABLET ORAL at 08:51

## 2023-05-02 RX ADMIN — QUETIAPINE FUMARATE 50 MG: 25 TABLET ORAL at 20:03

## 2023-05-02 RX ADMIN — ASPIRIN 81 MG: 81 TABLET, CHEWABLE ORAL at 08:51

## 2023-05-02 RX ADMIN — FENOFIBRATE 160 MG: 160 TABLET ORAL at 08:51

## 2023-05-02 ASSESSMENT — PAIN SCALES - GENERAL: PAINLEVEL_OUTOF10: 0

## 2023-05-02 NOTE — GROUP NOTE
Group Therapy Note    Date: 5/2/2023    Group Start Time: 1100  Group End Time: 3840  Group Topic: Psychoeducation    [address]        Group Therapy Note    Attendees: 5    Facilitator led a social group that explored good news stories centered on individuals responding to difficult situations. Patients discussed valued characteristics and behaviors displayed by the individuals who acted heroically in the good news stories. Patients explored times where they displayed similar qualities or behaviors. Notes:  Patient was minimally engaged in group discussion.  Patient fell asleep during group    Status After Intervention:  Unchanged    Participation Level: Minimal    Participation Quality: Lethargic      Speech:  mute      Thought Process/Content: Logical      Affective Functioning: Flat      Mood: euthymic      Level of consciousness:  Drowsy      Response to Learning: Able to change behavior and Progressing to goal      Endings: None Reported    Modes of Intervention: Socialization and Reality-testing      Discipline Responsible: /Counselor      Signature:  BLANCA Huntley

## 2023-05-02 NOTE — BH NOTE
Spoke with Nani Hence with [facility] regarding referral for placement. They do not have an appropriate bed that is in network with pt's insurance for a rehab stay. They will review pt's clinical for LTC placement. Referral for SNF/rehab placement made to [facility].

## 2023-05-02 NOTE — PLAN OF CARE
[facility]  Day 3 Interdisciplinary Treatment Plan NOTE    Review Date & Time: 5/2/23 0918    Patient was not in treatment team    Estimated Length of Stay Update:  2-3 days  Estimated Discharge Date Update: 5/4/23-5/5/23    EDUCATION:   Learner Progress Toward Treatment Goals: Reviewed results and recommendations of this team    Method: Individual    Outcome: Verbalized understanding    PATIENT GOALS: none expressed    PLAN/TREATMENT RECOMMENDATIONS UPDATE:continue treatment    GOALS UPDATE:   Time frame for Short-Term Goals: 2 days      Wiliam Vo RN

## 2023-05-02 NOTE — PLAN OF CARE
Pt. Is alert to self, confused, cooperative and friendly, good appetite, received medications, tearful during music group, no aggressive behaviors noted.

## 2023-05-02 NOTE — PLAN OF CARE
Pt A+O to self only, cooperative and medication compliant. He denies SI/HI/AVH and no RTIS noted. He was provided with a snack and denies any needs at this time.

## 2023-05-02 NOTE — BH NOTE
Verbal consent for admission and treatment obtained from Dionte Harper (daughter, Alejandro Aguilar)    Spoke with pt's daughter and provided an update on pt's progress and treatment plan. She would love to have pt back at home with her, but she is still unsure if she would be able to actually provide the care he needs. Based on the set up of the home, pt would need to be able to climb a few steps and walk at least several feet with a walker/independently. The doorways are not big enough to accommodate a wheelchair, and there are a few steps to enter into the home. She expressed interest in exploring a rehab stay to increase his independence, so he would be able to return home. (Spoke with provider and new order for OT/PT eval has been placed). Daughter has also talked with [facility] regarding services available to help with home care. Will also provide daughter with a list of local day programs to help with pt's care and to provide some respite.

## 2023-05-03 LAB
BACTERIA URNS QL MICRO: ABNORMAL /HPF
BILIRUB UR QL STRIP.AUTO: NEGATIVE
CHARACTER UR: ABNORMAL
CLARITY UR: CLEAR
COLOR UR: YELLOW
GLUCOSE UR STRIP.AUTO-MCNC: NEGATIVE MG/DL
HGB UR QL STRIP.AUTO: ABNORMAL
KETONES UR STRIP.AUTO-MCNC: NEGATIVE MG/DL
LEUKOCYTE ESTERASE UR QL STRIP.AUTO: ABNORMAL
NITRITE UR QL STRIP.AUTO: NEGATIVE
PH UR STRIP.AUTO: 6.5 [PH] (ref 5–8)
PROT UR STRIP.AUTO-MCNC: NEGATIVE MG/DL
RBC #/AREA URNS HPF: ABNORMAL /HPF (ref 0–4)
SP GR UR STRIP.AUTO: 1.01 (ref 1–1.03)
UA COMPLETE W REFLEX CULTURE PNL UR: YES
UA DIPSTICK W REFLEX MICRO PNL UR: YES
URN SPEC COLLECT METH UR: ABNORMAL
UROBILINOGEN UR STRIP-ACNC: 0.2 E.U./DL
WBC #/AREA URNS HPF: >100 /HPF (ref 0–5)

## 2023-05-03 PROCEDURE — 87086 URINE CULTURE/COLONY COUNT: CPT

## 2023-05-03 PROCEDURE — 99232 SBSQ HOSP IP/OBS MODERATE 35: CPT

## 2023-05-03 PROCEDURE — 87186 SC STD MICRODIL/AGAR DIL: CPT

## 2023-05-03 PROCEDURE — 87088 URINE BACTERIA CULTURE: CPT

## 2023-05-03 PROCEDURE — 81001 URINALYSIS AUTO W/SCOPE: CPT

## 2023-05-03 PROCEDURE — 6370000000 HC RX 637 (ALT 250 FOR IP): Performed by: PSYCHIATRY & NEUROLOGY

## 2023-05-03 PROCEDURE — 1240000000 HC EMOTIONAL WELLNESS R&B

## 2023-05-03 RX ADMIN — LEVOCARNITINE 330 MG: 330 TABLET ORAL at 08:00

## 2023-05-03 RX ADMIN — POTASSIUM CHLORIDE 20 MEQ: 20 TABLET, EXTENDED RELEASE ORAL at 10:09

## 2023-05-03 RX ADMIN — FENOFIBRATE 160 MG: 160 TABLET ORAL at 08:00

## 2023-05-03 RX ADMIN — LOSARTAN POTASSIUM 25 MG: 25 TABLET, FILM COATED ORAL at 08:00

## 2023-05-03 RX ADMIN — PANTOPRAZOLE SODIUM 40 MG: 40 TABLET, DELAYED RELEASE ORAL at 08:00

## 2023-05-03 RX ADMIN — DIVALPROEX SODIUM 500 MG: 500 TABLET, DELAYED RELEASE ORAL at 20:42

## 2023-05-03 RX ADMIN — POTASSIUM CHLORIDE 20 MEQ: 20 TABLET, EXTENDED RELEASE ORAL at 20:42

## 2023-05-03 RX ADMIN — ASPIRIN 81 MG: 81 TABLET, CHEWABLE ORAL at 08:00

## 2023-05-03 RX ADMIN — ATORVASTATIN CALCIUM 40 MG: 40 TABLET, FILM COATED ORAL at 07:59

## 2023-05-03 RX ADMIN — DIVALPROEX SODIUM 500 MG: 500 TABLET, DELAYED RELEASE ORAL at 08:00

## 2023-05-03 RX ADMIN — QUETIAPINE FUMARATE 50 MG: 25 TABLET ORAL at 20:42

## 2023-05-03 RX ADMIN — AMLODIPINE BESYLATE 5 MG: 5 TABLET ORAL at 08:00

## 2023-05-03 RX ADMIN — FERROUS SULFATE TAB 325 MG (65 MG ELEMENTAL FE) 325 MG: 325 (65 FE) TAB at 08:00

## 2023-05-03 RX ADMIN — METOPROLOL SUCCINATE 50 MG: 50 TABLET, EXTENDED RELEASE ORAL at 08:00

## 2023-05-03 RX ADMIN — Medication 1000 UNITS: at 08:00

## 2023-05-03 RX ADMIN — QUETIAPINE FUMARATE 50 MG: 25 TABLET ORAL at 08:00

## 2023-05-03 RX ADMIN — LEVOCARNITINE 330 MG: 330 TABLET ORAL at 20:42

## 2023-05-03 RX ADMIN — Medication 10 MG: at 20:42

## 2023-05-03 RX ADMIN — B-COMPLEX W/ C & FOLIC ACID TAB 1 TABLET: TAB at 08:00

## 2023-05-03 RX ADMIN — ACETAMINOPHEN 650 MG: 325 TABLET ORAL at 10:37

## 2023-05-03 RX ADMIN — Medication 1 CAPSULE: at 08:00

## 2023-05-03 RX ADMIN — LEVOCARNITINE 330 MG: 330 TABLET ORAL at 13:57

## 2023-05-03 ASSESSMENT — PAIN SCALES - GENERAL: PAINLEVEL_OUTOF10: 7

## 2023-05-03 ASSESSMENT — PAIN DESCRIPTION - LOCATION: LOCATION: BACK

## 2023-05-03 ASSESSMENT — PAIN - FUNCTIONAL ASSESSMENT: PAIN_FUNCTIONAL_ASSESSMENT: ACTIVITIES ARE NOT PREVENTED

## 2023-05-03 ASSESSMENT — PAIN DESCRIPTION - DESCRIPTORS: DESCRIPTORS: ACHING

## 2023-05-03 NOTE — PLAN OF CARE
Pt. Is alert to self and place, compliant with care, received medications, good appetite, no aggressive or combative behaviors noted. Received a warm shower. Urine sent to lab for UA.

## 2023-05-03 NOTE — BH NOTE
Incoming call from Zoey with [employer]. She stated pt has been accepted for SNF/rehab admission at [facility]. Rep from  will reach out to coordinate the admission/transfer. Spoke with pt's daughter Alex Turcios and updated her on pt's progress and acceptance at [facility]. Will keep her informed regarding details of his transfer to .

## 2023-05-03 NOTE — PLAN OF CARE
Pt A+O to self only, friendly, cooperative and medication compliant. He denies SI/HI/AVH and no RTIS. Pt is visible on the unit. He was provided with a snack and denies any needs at this time.

## 2023-05-04 PROCEDURE — 99232 SBSQ HOSP IP/OBS MODERATE 35: CPT

## 2023-05-04 PROCEDURE — 6370000000 HC RX 637 (ALT 250 FOR IP): Performed by: PSYCHIATRY & NEUROLOGY

## 2023-05-04 PROCEDURE — 6370000000 HC RX 637 (ALT 250 FOR IP)

## 2023-05-04 PROCEDURE — 1240000000 HC EMOTIONAL WELLNESS R&B

## 2023-05-04 RX ORDER — NITROFURANTOIN 25; 75 MG/1; MG/1
100 CAPSULE ORAL EVERY 12 HOURS SCHEDULED
Status: DISCONTINUED | OUTPATIENT
Start: 2023-05-04 | End: 2023-05-05 | Stop reason: HOSPADM

## 2023-05-04 RX ADMIN — ASPIRIN 81 MG: 81 TABLET, CHEWABLE ORAL at 08:47

## 2023-05-04 RX ADMIN — QUETIAPINE FUMARATE 50 MG: 25 TABLET ORAL at 20:34

## 2023-05-04 RX ADMIN — AMLODIPINE BESYLATE 5 MG: 5 TABLET ORAL at 08:46

## 2023-05-04 RX ADMIN — POTASSIUM CHLORIDE 20 MEQ: 20 TABLET, EXTENDED RELEASE ORAL at 08:46

## 2023-05-04 RX ADMIN — LOSARTAN POTASSIUM 25 MG: 25 TABLET, FILM COATED ORAL at 08:47

## 2023-05-04 RX ADMIN — DIVALPROEX SODIUM 500 MG: 500 TABLET, DELAYED RELEASE ORAL at 08:46

## 2023-05-04 RX ADMIN — FENOFIBRATE 160 MG: 160 TABLET ORAL at 08:47

## 2023-05-04 RX ADMIN — PANTOPRAZOLE SODIUM 40 MG: 40 TABLET, DELAYED RELEASE ORAL at 08:46

## 2023-05-04 RX ADMIN — FERROUS SULFATE TAB 325 MG (65 MG ELEMENTAL FE) 325 MG: 325 (65 FE) TAB at 08:46

## 2023-05-04 RX ADMIN — METOPROLOL SUCCINATE 50 MG: 50 TABLET, EXTENDED RELEASE ORAL at 08:46

## 2023-05-04 RX ADMIN — LEVOCARNITINE 330 MG: 330 TABLET ORAL at 08:51

## 2023-05-04 RX ADMIN — Medication 10 MG: at 20:33

## 2023-05-04 RX ADMIN — QUETIAPINE FUMARATE 50 MG: 25 TABLET ORAL at 08:46

## 2023-05-04 RX ADMIN — POTASSIUM CHLORIDE 20 MEQ: 20 TABLET, EXTENDED RELEASE ORAL at 20:34

## 2023-05-04 RX ADMIN — Medication 1000 UNITS: at 08:47

## 2023-05-04 RX ADMIN — B-COMPLEX W/ C & FOLIC ACID TAB 1 TABLET: TAB at 08:47

## 2023-05-04 RX ADMIN — NITROFURANTOIN MONOHYDRATE/MACROCRYSTALS 100 MG: 75; 25 CAPSULE ORAL at 20:33

## 2023-05-04 RX ADMIN — DIVALPROEX SODIUM 500 MG: 500 TABLET, DELAYED RELEASE ORAL at 20:34

## 2023-05-04 RX ADMIN — ATORVASTATIN CALCIUM 40 MG: 40 TABLET, FILM COATED ORAL at 08:46

## 2023-05-04 RX ADMIN — Medication 1 CAPSULE: at 08:47

## 2023-05-04 NOTE — PLAN OF CARE
Followed up urine culture results- + e coli     -started on [facility]     -urology consulted       Will follow sensitivities     NATALIA Mendoza  05/04/23  2:13 PM

## 2023-05-04 NOTE — PLAN OF CARE
Problem: Risk for Elopement  Goal: Patient will not exit the unit/facility without proper excort  5/3/2023 2221 by Eileen Palacio LPN  Outcome: Progressing     Problem: Behavior  Goal: Pt/Family maintain appropriate behavior and adhere to behavioral management agreement, if implemented  Description: INTERVENTIONS:  1. Assess patient/family's coping skills and  non-compliant behavior (including use of illegal substances)  2. Notify security of behavior or suspected illegal substances which indicate the need for search of the family and/or belongings  3. Encourage verbalization of thoughts and concerns in a socially appropriate manner  4. Utilize positive, consistent limit setting strategies supporting safety of patient, staff and others  5. Encourage participation in the decision making process about the behavioral management agreement  6. If a visitor's behavior poses a threat to safety call refer to organization policy. 7. Initiate consult with , Psychosocial CNS, Spiritual Care as appropriate  5/3/2023 2221 by Eileen Palacio LPN  Outcome: Progressing   Patient has been visual on the unit, social with peers and staff when approached. Cooperative and pleasant with staff and with care. Denies all. No attempts to exit the floor. Has been propelling his self on the floor in his wheel chair. Will continue to monitor the patient through the shift for safety and symptoms.

## 2023-05-04 NOTE — PLAN OF CARE
Pt. Is alert and oriented x 2, received medications, good appetite, showered, attending groups, no aggressive behaviors. Cooperative no symptoms of fan noted.

## 2023-05-05 VITALS
OXYGEN SATURATION: 100 % | HEART RATE: 88 BPM | TEMPERATURE: 98.1 F | DIASTOLIC BLOOD PRESSURE: 82 MMHG | SYSTOLIC BLOOD PRESSURE: 141 MMHG | RESPIRATION RATE: 16 BRPM

## 2023-05-05 LAB
BACTERIA UR CULT: ABNORMAL
ORGANISM: ABNORMAL
SARS-COV-2 RDRP RESP QL NAA+PROBE: DETECTED

## 2023-05-05 PROCEDURE — 5130000000 HC BRIDGE APPOINTMENT

## 2023-05-05 PROCEDURE — 87635 SARS-COV-2 COVID-19 AMP PRB: CPT

## 2023-05-05 PROCEDURE — 97530 THERAPEUTIC ACTIVITIES: CPT

## 2023-05-05 PROCEDURE — 6370000000 HC RX 637 (ALT 250 FOR IP): Performed by: PSYCHIATRY & NEUROLOGY

## 2023-05-05 PROCEDURE — 99239 HOSP IP/OBS DSCHRG MGMT >30: CPT

## 2023-05-05 PROCEDURE — 6370000000 HC RX 637 (ALT 250 FOR IP)

## 2023-05-05 PROCEDURE — 97116 GAIT TRAINING THERAPY: CPT

## 2023-05-05 PROCEDURE — 51798 US URINE CAPACITY MEASURE: CPT

## 2023-05-05 RX ORDER — NITROFURANTOIN 25; 75 MG/1; MG/1
100 CAPSULE ORAL EVERY 12 HOURS SCHEDULED
Qty: 13 CAPSULE | Refills: 0 | Status: SHIPPED | OUTPATIENT
Start: 2023-05-05 | End: 2023-05-12

## 2023-05-05 RX ORDER — QUETIAPINE FUMARATE 50 MG/1
50 TABLET, FILM COATED ORAL 2 TIMES DAILY
Qty: 1 TABLET | Refills: 0 | Status: SHIPPED | OUTPATIENT
Start: 2023-05-05

## 2023-05-05 RX ORDER — FERROUS SULFATE 325(65) MG
325 TABLET ORAL
Qty: 30 TABLET | Refills: 0 | Status: SHIPPED | OUTPATIENT
Start: 2023-05-05

## 2023-05-05 RX ORDER — METOPROLOL SUCCINATE 50 MG/1
50 TABLET, EXTENDED RELEASE ORAL DAILY
Qty: 30 TABLET | Refills: 0 | Status: SHIPPED | OUTPATIENT
Start: 2023-05-05

## 2023-05-05 RX ORDER — LOSARTAN POTASSIUM 25 MG/1
25 TABLET ORAL DAILY
Qty: 30 TABLET | Refills: 0 | Status: SHIPPED | OUTPATIENT
Start: 2023-05-05

## 2023-05-05 RX ORDER — DIVALPROEX SODIUM 500 MG/1
500 TABLET, DELAYED RELEASE ORAL EVERY 12 HOURS SCHEDULED
Qty: 60 TABLET | Refills: 0 | Status: SHIPPED | OUTPATIENT
Start: 2023-05-05

## 2023-05-05 RX ORDER — MECOBALAMIN 5000 MCG
10 TABLET,DISINTEGRATING ORAL NIGHTLY
Qty: 30 TABLET | Refills: 0 | Status: ON HOLD | OUTPATIENT
Start: 2023-05-05 | End: 2023-05-17 | Stop reason: HOSPADM

## 2023-05-05 RX ORDER — AMLODIPINE BESYLATE 10 MG/1
5 TABLET ORAL DAILY
Qty: 15 TABLET | Refills: 0 | Status: SHIPPED | OUTPATIENT
Start: 2023-05-05

## 2023-05-05 RX ORDER — POTASSIUM CHLORIDE 20 MEQ/1
20 TABLET, EXTENDED RELEASE ORAL 2 TIMES DAILY
Qty: 60 TABLET | Refills: 0 | Status: SHIPPED | OUTPATIENT
Start: 2023-05-05

## 2023-05-05 RX ORDER — ASPIRIN 81 MG/1
81 TABLET, CHEWABLE ORAL DAILY
Qty: 30 TABLET | Refills: 3 | Status: SHIPPED | OUTPATIENT
Start: 2023-05-05

## 2023-05-05 RX ORDER — LACTOBACILLUS RHAMNOSUS GG 10B CELL
1 CAPSULE ORAL DAILY
Qty: 30 CAPSULE | Refills: 0 | Status: SHIPPED | OUTPATIENT
Start: 2023-05-05

## 2023-05-05 RX ORDER — PANTOPRAZOLE SODIUM 40 MG/1
40 TABLET, DELAYED RELEASE ORAL DAILY
Qty: 30 TABLET | Refills: 0 | Status: SHIPPED | OUTPATIENT
Start: 2023-05-05

## 2023-05-05 RX ORDER — ATORVASTATIN CALCIUM 40 MG/1
40 TABLET, FILM COATED ORAL DAILY
Qty: 30 TABLET | Refills: 0 | Status: SHIPPED | OUTPATIENT
Start: 2023-05-05

## 2023-05-05 RX ORDER — FENOFIBRATE 160 MG/1
160 TABLET ORAL DAILY
Qty: 30 TABLET | Refills: 0 | Status: SHIPPED | OUTPATIENT
Start: 2023-05-05

## 2023-05-05 RX ADMIN — LOSARTAN POTASSIUM 25 MG: 25 TABLET, FILM COATED ORAL at 12:31

## 2023-05-05 RX ADMIN — B-COMPLEX W/ C & FOLIC ACID TAB 1 TABLET: TAB at 12:33

## 2023-05-05 RX ADMIN — FERROUS SULFATE TAB 325 MG (65 MG ELEMENTAL FE) 325 MG: 325 (65 FE) TAB at 12:31

## 2023-05-05 RX ADMIN — FENOFIBRATE 160 MG: 160 TABLET ORAL at 12:31

## 2023-05-05 RX ADMIN — Medication 1000 UNITS: at 12:33

## 2023-05-05 RX ADMIN — PANTOPRAZOLE SODIUM 40 MG: 40 TABLET, DELAYED RELEASE ORAL at 12:33

## 2023-05-05 RX ADMIN — Medication 1 CAPSULE: at 12:33

## 2023-05-05 RX ADMIN — QUETIAPINE FUMARATE 50 MG: 25 TABLET ORAL at 12:33

## 2023-05-05 RX ADMIN — NITROFURANTOIN MONOHYDRATE/MACROCRYSTALS 100 MG: 75; 25 CAPSULE ORAL at 12:31

## 2023-05-05 RX ADMIN — DIVALPROEX SODIUM 500 MG: 500 TABLET, DELAYED RELEASE ORAL at 12:31

## 2023-05-05 RX ADMIN — ASPIRIN 81 MG: 81 TABLET, CHEWABLE ORAL at 12:31

## 2023-05-05 RX ADMIN — AMLODIPINE BESYLATE 5 MG: 5 TABLET ORAL at 12:31

## 2023-05-05 RX ADMIN — METOPROLOL SUCCINATE 50 MG: 50 TABLET, EXTENDED RELEASE ORAL at 12:33

## 2023-05-05 RX ADMIN — POTASSIUM CHLORIDE 20 MEQ: 20 TABLET, EXTENDED RELEASE ORAL at 12:33

## 2023-05-05 RX ADMIN — ATORVASTATIN CALCIUM 40 MG: 40 TABLET, FILM COATED ORAL at 12:33

## 2023-05-05 NOTE — GROUP NOTE
Group Therapy Note    Date: 5/5/2023    Group Start Time: 1000  Group End Time: 0004  Group Topic: Activity    [facility]    VIOLET Solorzano        Group Therapy Note  Clinician engaged the group members in a self-care group. Patient were able to soak their hands and clean under their nails. Clinician gave hand massages to put the lotion on the members who wanted the massage. Clinician gave shoulder rubs and brushed their hair. Attendees: 4       Patient's Goal:  Patient stated in the morning check in that he wanted to attend all of the groups today. Notes:  Patient participated in all of the self-care activities. He socialized with the other group members and requested more hair brushing. Status After Intervention:  Improved    Participation Level:  Active Listener and Interactive    Participation Quality: Appropriate, Attentive, and Sharing      Speech:  normal      Thought Process/Content: Logical      Affective Functioning: Congruent      Mood: euthymic      Level of consciousness:  Attentive      Response to Learning: Able to retain information      Endings: None Reported    Modes of Intervention: Support, Socialization, and Activity      Discipline Responsible: /Counselor      Signature:  VIOLET Solorzano

## 2023-05-05 NOTE — GROUP NOTE
Group Therapy Note    Date: 5/5/2023    Group Start Time: 4249  Group End Time: 5766  Group Topic: Relaxation    [facility] A[facility]CIA Ambrocio RN; VIOLET Harris        Group Therapy Note    Attendees: 6       Patient's Goal:  To enjoy the nice weather. Notes:  Pt was taken outside in the enclosed courtyard to enjoy the nice weather, listen to music, and socialize. Status After Intervention:  Unchanged    Participation Level:  Active Listener and Interactive    Participation Quality: Appropriate, Attentive, and Sharing      Speech:  normal      Thought Process/Content: Logical      Affective Functioning: Congruent      Mood: euthymic      Level of consciousness:  Alert      Response to Learning: Able to verbalize current knowledge/experience      Endings: None Reported    Modes of Intervention: Socialization      Discipline Responsible: Registered Nurse      Signature:  Carson Ambrocio RN

## 2023-05-05 NOTE — BH NOTE
Spoke with Amanda Aguilar in admissions at [facility] and coordinated DC for this afternoon, pending insurance prior-auth. Spoke with pt's daughter/POA, Kelsey Calderon, and provided an update on pt's progress and expected DC today.

## 2023-05-05 NOTE — BH NOTE
[facility]  Discharge Note    Pt discharged with followings belongings:   Dental Appliances: Uppers, Lowers  Vision - Corrective Lenses: Eyeglasses  Hearing Aid: None  Jewelry: None  Body Piercings Removed: No  Clothing: Footwear (2 pr shoes,)   Valuables sent home with Jose Liang or returned to patient. Patient educated on aftercare instructions: n/a Information faxed to n/a by   at 6:46 PM .Patient verbalize understanding of AVS:  n/a. Status EXAM upon discharge:  Mental Status and Behavioral Exam  Normal: No  Level of Assistance: Partial assist  Facial Expression: Brightened  Affect: Congruent  Level of Consciousness: Confused  Frequency of Checks: 4 times per hour, close  Mood:Normal: No  Mood: Anxious  Motor Activity:Normal: No  Motor Activity: Decreased  Eye Contact: Fair  Observed Behavior: Friendly, Cooperative  Sexual Misconduct History: Current - no  Involved In Any Sexual Misconduct With Others? : No  Uncontrollable/Compulsive Masturbation?: No  Difficulty Controlling Sexual Impulses?: No  Preception: [place] to person, [place] to place  Attention:Normal: No  Attention: Unable to concentrate  Thought Processes: Perseveration  Thought Content:Normal: No  Thought Content: Poverty of content  Depression Symptoms: Impaired concentration  Anxiety Symptoms: Generalized  Dana Symptoms: Poor judgment  Hallucinations: None  Delusions: No  Delusions: Other (comment)  Memory:Normal: No  Memory: Poor recent, Poor remote  Insight and Judgment: No  Insight and Judgment: Poor judgment, Poor insight    Tobacco Screening:  Practical Counseling, on admission, juan X, if applicable and completed (first 3 are required if patient doesn't refuse):             ( x) Recognizing danger situations (included triggers and roadblocks)                    ( x) Coping skills (new ways to manage stress,relaxation techniques, changing routine, distraction)                                                           (x ) Basic

## 2023-05-05 NOTE — PLAN OF CARE
Problem: Safety - Adult  Goal: Free from fall injury  Outcome: Progressing     Problem: Decision Making  Goal: Pt/Family able to effectively weigh alternatives and participate in decision making related to treatment and care  Description: INTERVENTIONS:  1. Determine when there are differences between patient's view, family's view, and healthcare provider's view of condition  2. Facilitate patient and family articulation of goals for care  3. Help patient and family identify pros/cons of alternative solutions  4. Provide information as requested by patient/family  5. Respect patient/family right to receive or not to receive information  6. Serve as a liaison between patient and family and health care team  7. Initiate Consults from Ethics, Palliative Care or initiate [facility] as is appropriate  Outcome: Progressing     Problem: Confusion  Goal: Confusion, delirium, dementia, or psychosis is improved or at baseline  Description: INTERVENTIONS:  1. Assess for possible contributors to thought disturbance, including medications, impaired vision or hearing, underlying metabolic abnormalities, dehydration, psychiatric diagnoses, and notify attending LIP  2. [place] high risk fall precautions, as indicated  3. Provide frequent short contacts to provide reality reorientation, refocusing and direction  4. Decrease environmental stimuli, including noise as appropriate  5. Monitor and intervene to maintain adequate nutrition, hydration, elimination, sleep and activity  6. If unable to ensure safety without constant attention obtain sitter and review sitter guidelines with assigned personnel  7. Initiate Psychosocial CNS and Spiritual Care consult, as indicated  Outcome: Progressing     Problem: Behavior  Goal: Pt/Family maintain appropriate behavior and adhere to behavioral management agreement, if implemented  Description: INTERVENTIONS:  1.  Assess patient/family's coping skills and  non-compliant behavior

## 2023-05-05 NOTE — CONSULTS
does have a listed allergy to sulfa and he cannot give me any details regarding this due to dementia   - He would benefit from flomax if able to use it. If medically able, he should have further evaluation of his prostate to determine need for repeat outlet procedure  - he can f/u with his primary urology team in [facility].   Will place instructions in dc paperwork  - signing off, please call with questions       Michael Davey PA-C  5/5/2023

## 2023-05-05 NOTE — DISCHARGE INSTRUCTIONS
Advanced Directives:  Patient does have a surrogate decision maker appointed   Name (if yes): Milly Vela                        Phone Number: [phone]  Patient does have a psychiatric and/ or medical advanced directive or power of . Discharge Planning is Complete. Discharge Date: 05/05/2023  Reason for Hospitalization: Inability to Care For Self   Discharge Diagnosis: Bipolar Disorder, Vascular Dementia With Behavioral Disturbance   Discharging to: [facility]    Your instructions: Your physician here was Ingris Montes MD. If you have any questions please call the unit at [phone] for the adult unit or [phone] for [facility]. Please note that we have a patient family advisory [place] that meets the second Wednesday of January and the second Wednesday of July at [address] in [place] at [facility]. Department leadership would love for you to attend to give feedback on what we are doing well and areas in which we can improve our patient care. Please come if you are able and feel free to reach out to [address] [place] [address] at [phone] with any questions. The crisis number for [facility] is [phone] (SAVE). This crisis line is available 24 hours a day, seven days a week. Please follow up with your PCP regarding any pending labs. You are being discharged to [facility]. They will provide for your physical and mental healthcare needs. [facility]  Location: [address], [place], [facility]  Phone: [phone]    Discharge Completed By: BLANCA Torres  Fax to: [facility]: Has EPIC access, no fax needed. The following personal items were collected during your admission and were returned to you:    Belongings  Dental Appliances: Uppers, Lowers  Vision - Corrective Lenses: Eyeglasses  Hearing Aid: None  Clothing:  Footwear (2 pr shoes,)  Jewelry:

## 2023-05-05 NOTE — BH NOTE
Ivar voided into a urinal with result of 350ml of thelma urine, malodorous. Bladder scan done about 30 minutes after voiding with result of 326 ml in bladder.

## 2023-05-05 NOTE — PLAN OF CARE
Jonathan Cruise has been visible on the unit. Mood is bright. No agitation noted. Med complaint. Oriented to person and place. Assisted from wheelchair to bed. Incontinence care provided for large amount of urinary incontinence. Unable to obtain PVR at this time d/t incontinence. Bed alarm on. Will continue to monitor.
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
## 2023-05-08 ENCOUNTER — FOLLOWUP TELEPHONE ENCOUNTER (OUTPATIENT)
Dept: PSYCHIATRY | Age: 73
End: 2023-05-08

## 2023-05-10 ENCOUNTER — HOSPITAL ENCOUNTER (EMERGENCY)
Age: 73
Discharge: NURSING, REHAB OR LONG-TERM CARE | End: 2023-05-10
Attending: STUDENT IN AN ORGANIZED HEALTH CARE EDUCATION/TRAINING PROGRAM
Payer: MEDICARE

## 2023-05-10 VITALS
HEART RATE: 85 BPM | OXYGEN SATURATION: 98 % | BODY MASS INDEX: 29.16 KG/M2 | WEIGHT: 247 LBS | DIASTOLIC BLOOD PRESSURE: 65 MMHG | SYSTOLIC BLOOD PRESSURE: 126 MMHG | HEIGHT: 77 IN | RESPIRATION RATE: 18 BRPM | TEMPERATURE: 97.6 F

## 2023-05-10 DIAGNOSIS — Z86.59 HISTORY OF BIPOLAR DISORDER: Primary | ICD-10-CM

## 2023-05-10 PROCEDURE — 99283 EMERGENCY DEPT VISIT LOW MDM: CPT

## 2023-05-10 ASSESSMENT — PAIN - FUNCTIONAL ASSESSMENT: PAIN_FUNCTIONAL_ASSESSMENT: NONE - DENIES PAIN
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
## 2023-05-10 NOTE — ED NOTES
Report called to [facility] spoke to [place] LPN. Pt left in stable condition via stretcher per Ambulance. Pt transported back to [facility].      [address] Jamey Siddiqi RN  05/10/23 8838

## 2023-05-14 ENCOUNTER — APPOINTMENT (OUTPATIENT)
Dept: CT IMAGING | Age: 73
DRG: 689 | End: 2023-05-14
Payer: MEDICARE

## 2023-05-14 ENCOUNTER — HOSPITAL ENCOUNTER (INPATIENT)
Age: 73
LOS: 4 days | Discharge: NURSING, REHAB OR LONG-TERM CARE | DRG: 689 | End: 2023-05-18
Attending: EMERGENCY MEDICINE | Admitting: INTERNAL MEDICINE
Payer: MEDICARE

## 2023-05-14 DIAGNOSIS — R41.82 ALTERED MENTAL STATUS, UNSPECIFIED ALTERED MENTAL STATUS TYPE: Primary | ICD-10-CM

## 2023-05-14 DIAGNOSIS — N30.00 ACUTE CYSTITIS WITHOUT HEMATURIA: ICD-10-CM

## 2023-05-14 PROBLEM — K21.9 GERD (GASTROESOPHAGEAL REFLUX DISEASE): Status: ACTIVE | Noted: 2023-05-14

## 2023-05-14 PROBLEM — N39.0 UTI (URINARY TRACT INFECTION): Status: ACTIVE | Noted: 2023-05-14

## 2023-05-14 LAB
ALBUMIN SERPL-MCNC: 3.5 G/DL (ref 3.4–5)
ALBUMIN/GLOB SERPL: 1 {RATIO} (ref 1.1–2.2)
ALP SERPL-CCNC: 36 U/L (ref 40–129)
ALT SERPL-CCNC: 12 U/L (ref 10–40)
AMPHETAMINES UR QL SCN>1000 NG/ML: NORMAL
ANION GAP SERPL CALCULATED.3IONS-SCNC: 12 MMOL/L (ref 3–16)
AST SERPL-CCNC: 25 U/L (ref 15–37)
BACTERIA URNS QL MICRO: ABNORMAL /HPF
BARBITURATES UR QL SCN>200 NG/ML: NORMAL
BASOPHILS # BLD: 0 K/UL (ref 0–0.2)
BASOPHILS NFR BLD: 0.7 %
BENZODIAZ UR QL SCN>200 NG/ML: NORMAL
BILIRUB SERPL-MCNC: 0.9 MG/DL (ref 0–1)
BILIRUB UR QL STRIP.AUTO: NEGATIVE
BUN SERPL-MCNC: 28 MG/DL (ref 7–20)
CALCIUM SERPL-MCNC: 9 MG/DL (ref 8.3–10.6)
CANNABINOIDS UR QL SCN>50 NG/ML: NORMAL
CHLORIDE SERPL-SCNC: 103 MMOL/L (ref 99–110)
CLARITY UR: ABNORMAL
CO2 SERPL-SCNC: 25 MMOL/L (ref 21–32)
COCAINE UR QL SCN: NORMAL
COLOR UR: YELLOW
CREAT SERPL-MCNC: 1.5 MG/DL (ref 0.8–1.3)
DEPRECATED RDW RBC AUTO: 19.1 % (ref 12.4–15.4)
DRUG SCREEN COMMENT UR-IMP: NORMAL
EKG ATRIAL RATE: 52 BPM
EKG DIAGNOSIS: NORMAL
EKG Q-T INTERVAL: 454 MS
EKG QRS DURATION: 112 MS
EKG QTC CALCULATION (BAZETT): 445 MS
EKG R AXIS: -59 DEGREES
EKG T AXIS: -20 DEGREES
EKG VENTRICULAR RATE: 58 BPM
EOSINOPHIL # BLD: 0.1 K/UL (ref 0–0.6)
EOSINOPHIL NFR BLD: 2.8 %
EPI CELLS #/AREA URNS HPF: ABNORMAL /HPF (ref 0–5)
ETHANOLAMINE SERPL-MCNC: NORMAL MG/DL (ref 0–0.08)
FENTANYL SCREEN, URINE: NORMAL
GFR SERPLBLD CREATININE-BSD FMLA CKD-EPI: 48 ML/MIN/{1.73_M2}
GLUCOSE BLD-MCNC: 96 MG/DL (ref 70–99)
GLUCOSE SERPL-MCNC: 107 MG/DL (ref 70–99)
GLUCOSE UR STRIP.AUTO-MCNC: NEGATIVE MG/DL
HCT VFR BLD AUTO: 30.8 % (ref 40.5–52.5)
HGB BLD-MCNC: 10.1 G/DL (ref 13.5–17.5)
HGB UR QL STRIP.AUTO: ABNORMAL
KETONES UR STRIP.AUTO-MCNC: NEGATIVE MG/DL
LEUKOCYTE ESTERASE UR QL STRIP.AUTO: ABNORMAL
LYMPHOCYTES # BLD: 0.9 K/UL (ref 1–5.1)
LYMPHOCYTES NFR BLD: 21.8 %
MCH RBC QN AUTO: 28.2 PG (ref 26–34)
MCHC RBC AUTO-ENTMCNC: 33 G/DL (ref 31–36)
MCV RBC AUTO: 85.6 FL (ref 80–100)
METHADONE UR QL SCN>300 NG/ML: NORMAL
MONOCYTES # BLD: 0.5 K/UL (ref 0–1.3)
MONOCYTES NFR BLD: 11.7 %
NEUTROPHILS # BLD: 2.6 K/UL (ref 1.7–7.7)
NEUTROPHILS NFR BLD: 63 %
NITRITE UR QL STRIP.AUTO: POSITIVE
OPIATES UR QL SCN>300 NG/ML: NORMAL
OXYCODONE UR QL SCN: NORMAL
PCP UR QL SCN>25 NG/ML: NORMAL
PERFORMED ON: NORMAL
PH UR STRIP.AUTO: 6 [PH] (ref 5–8)
PH UR STRIP: 6 [PH]
PLATELET # BLD AUTO: 143 K/UL (ref 135–450)
PMV BLD AUTO: 8.4 FL (ref 5–10.5)
POTASSIUM SERPL-SCNC: 3.9 MMOL/L (ref 3.5–5.1)
PROT SERPL-MCNC: 6.9 G/DL (ref 6.4–8.2)
PROT UR STRIP.AUTO-MCNC: NEGATIVE MG/DL
RBC # BLD AUTO: 3.6 M/UL (ref 4.2–5.9)
RBC #/AREA URNS HPF: ABNORMAL /HPF (ref 0–4)
SODIUM SERPL-SCNC: 140 MMOL/L (ref 136–145)
SP GR UR STRIP.AUTO: 1.01 (ref 1–1.03)
TROPONIN, HIGH SENSITIVITY: 16 NG/L (ref 0–22)
TROPONIN, HIGH SENSITIVITY: 18 NG/L (ref 0–22)
UA COMPLETE W REFLEX CULTURE PNL UR: YES
UA DIPSTICK W REFLEX MICRO PNL UR: YES
URN SPEC COLLECT METH UR: ABNORMAL
UROBILINOGEN UR STRIP-ACNC: 1 E.U./DL
WBC # BLD AUTO: 4.2 K/UL (ref 4–11)
WBC #/AREA URNS HPF: ABNORMAL /HPF (ref 0–5)

## 2023-05-14 PROCEDURE — 6360000002 HC RX W HCPCS: Performed by: INTERNAL MEDICINE

## 2023-05-14 PROCEDURE — 93005 ELECTROCARDIOGRAM TRACING: CPT | Performed by: EMERGENCY MEDICINE

## 2023-05-14 PROCEDURE — 85025 COMPLETE CBC W/AUTO DIFF WBC: CPT

## 2023-05-14 PROCEDURE — 70450 CT HEAD/BRAIN W/O DYE: CPT

## 2023-05-14 PROCEDURE — 81001 URINALYSIS AUTO W/SCOPE: CPT

## 2023-05-14 PROCEDURE — 87088 URINE BACTERIA CULTURE: CPT

## 2023-05-14 PROCEDURE — 93010 ELECTROCARDIOGRAM REPORT: CPT | Performed by: INTERNAL MEDICINE

## 2023-05-14 PROCEDURE — 80307 DRUG TEST PRSMV CHEM ANLYZR: CPT

## 2023-05-14 PROCEDURE — 87086 URINE CULTURE/COLONY COUNT: CPT

## 2023-05-14 PROCEDURE — 82077 ASSAY SPEC XCP UR&BREATH IA: CPT

## 2023-05-14 PROCEDURE — 87040 BLOOD CULTURE FOR BACTERIA: CPT

## 2023-05-14 PROCEDURE — 2580000003 HC RX 258: Performed by: EMERGENCY MEDICINE

## 2023-05-14 PROCEDURE — 84484 ASSAY OF TROPONIN QUANT: CPT

## 2023-05-14 PROCEDURE — 1200000000 HC SEMI PRIVATE

## 2023-05-14 PROCEDURE — 6370000000 HC RX 637 (ALT 250 FOR IP): Performed by: INTERNAL MEDICINE

## 2023-05-14 PROCEDURE — 6360000002 HC RX W HCPCS: Performed by: EMERGENCY MEDICINE

## 2023-05-14 PROCEDURE — 2580000003 HC RX 258: Performed by: INTERNAL MEDICINE

## 2023-05-14 PROCEDURE — 87186 SC STD MICRODIL/AGAR DIL: CPT

## 2023-05-14 PROCEDURE — 80053 COMPREHEN METABOLIC PANEL: CPT

## 2023-05-14 PROCEDURE — 99285 EMERGENCY DEPT VISIT HI MDM: CPT

## 2023-05-14 RX ORDER — ACETAMINOPHEN 650 MG/1
650 SUPPOSITORY RECTAL EVERY 6 HOURS PRN
Status: DISCONTINUED | OUTPATIENT
Start: 2023-05-14 | End: 2023-05-19 | Stop reason: HOSPADM

## 2023-05-14 RX ORDER — LACTOBACILLUS RHAMNOSUS GG 10B CELL
1 CAPSULE ORAL DAILY
Status: DISCONTINUED | OUTPATIENT
Start: 2023-05-14 | End: 2023-05-19 | Stop reason: HOSPADM

## 2023-05-14 RX ORDER — ACETAMINOPHEN 325 MG/1
650 TABLET ORAL EVERY 6 HOURS PRN
Status: DISCONTINUED | OUTPATIENT
Start: 2023-05-14 | End: 2023-05-19 | Stop reason: HOSPADM

## 2023-05-14 RX ORDER — ASPIRIN 81 MG/1
81 TABLET, CHEWABLE ORAL DAILY
Status: DISCONTINUED | OUTPATIENT
Start: 2023-05-14 | End: 2023-05-19 | Stop reason: HOSPADM

## 2023-05-14 RX ORDER — ATORVASTATIN CALCIUM 40 MG/1
40 TABLET, FILM COATED ORAL DAILY
Status: DISCONTINUED | OUTPATIENT
Start: 2023-05-15 | End: 2023-05-19 | Stop reason: HOSPADM

## 2023-05-14 RX ORDER — PANTOPRAZOLE SODIUM 40 MG/1
40 TABLET, DELAYED RELEASE ORAL DAILY
Status: DISCONTINUED | OUTPATIENT
Start: 2023-05-14 | End: 2023-05-19 | Stop reason: HOSPADM

## 2023-05-14 RX ORDER — QUETIAPINE FUMARATE 25 MG/1
50 TABLET, FILM COATED ORAL 2 TIMES DAILY
Status: DISCONTINUED | OUTPATIENT
Start: 2023-05-14 | End: 2023-05-19 | Stop reason: HOSPADM

## 2023-05-14 RX ORDER — DIVALPROEX SODIUM 250 MG/1
500 TABLET, DELAYED RELEASE ORAL EVERY 12 HOURS SCHEDULED
Status: DISCONTINUED | OUTPATIENT
Start: 2023-05-14 | End: 2023-05-19 | Stop reason: HOSPADM

## 2023-05-14 RX ORDER — SODIUM CHLORIDE 0.9 % (FLUSH) 0.9 %
5-40 SYRINGE (ML) INJECTION PRN
Status: DISCONTINUED | OUTPATIENT
Start: 2023-05-14 | End: 2023-05-19 | Stop reason: HOSPADM

## 2023-05-14 RX ORDER — ONDANSETRON 4 MG/1
4 TABLET, ORALLY DISINTEGRATING ORAL EVERY 8 HOURS PRN
Status: DISCONTINUED | OUTPATIENT
Start: 2023-05-14 | End: 2023-05-19 | Stop reason: HOSPADM

## 2023-05-14 RX ORDER — METOPROLOL SUCCINATE 50 MG/1
50 TABLET, EXTENDED RELEASE ORAL DAILY
Status: DISCONTINUED | OUTPATIENT
Start: 2023-05-15 | End: 2023-05-19 | Stop reason: HOSPADM

## 2023-05-14 RX ORDER — ENOXAPARIN SODIUM 100 MG/ML
40 INJECTION SUBCUTANEOUS DAILY
Status: DISCONTINUED | OUTPATIENT
Start: 2023-05-14 | End: 2023-05-19 | Stop reason: HOSPADM

## 2023-05-14 RX ORDER — MECOBALAMIN 5000 MCG
10 TABLET,DISINTEGRATING ORAL NIGHTLY
Status: DISCONTINUED | OUTPATIENT
Start: 2023-05-14 | End: 2023-05-19 | Stop reason: HOSPADM

## 2023-05-14 RX ORDER — POTASSIUM CHLORIDE 20 MEQ/1
20 TABLET, EXTENDED RELEASE ORAL 2 TIMES DAILY
Status: DISCONTINUED | OUTPATIENT
Start: 2023-05-14 | End: 2023-05-19 | Stop reason: HOSPADM

## 2023-05-14 RX ORDER — ONDANSETRON 2 MG/ML
4 INJECTION INTRAMUSCULAR; INTRAVENOUS EVERY 6 HOURS PRN
Status: DISCONTINUED | OUTPATIENT
Start: 2023-05-14 | End: 2023-05-19 | Stop reason: HOSPADM

## 2023-05-14 RX ORDER — LOSARTAN POTASSIUM 25 MG/1
25 TABLET ORAL DAILY
Status: DISCONTINUED | OUTPATIENT
Start: 2023-05-15 | End: 2023-05-19 | Stop reason: HOSPADM

## 2023-05-14 RX ORDER — FENOFIBRATE 160 MG/1
160 TABLET ORAL DAILY
Status: DISCONTINUED | OUTPATIENT
Start: 2023-05-15 | End: 2023-05-19 | Stop reason: HOSPADM

## 2023-05-14 RX ORDER — POLYETHYLENE GLYCOL 3350 17 G/17G
17 POWDER, FOR SOLUTION ORAL DAILY PRN
Status: DISCONTINUED | OUTPATIENT
Start: 2023-05-14 | End: 2023-05-19 | Stop reason: HOSPADM

## 2023-05-14 RX ORDER — SODIUM CHLORIDE 9 MG/ML
INJECTION, SOLUTION INTRAVENOUS PRN
Status: DISCONTINUED | OUTPATIENT
Start: 2023-05-14 | End: 2023-05-19 | Stop reason: HOSPADM

## 2023-05-14 RX ORDER — SODIUM CHLORIDE 0.9 % (FLUSH) 0.9 %
5-40 SYRINGE (ML) INJECTION EVERY 12 HOURS SCHEDULED
Status: DISCONTINUED | OUTPATIENT
Start: 2023-05-14 | End: 2023-05-19 | Stop reason: HOSPADM

## 2023-05-14 RX ORDER — AMLODIPINE BESYLATE 5 MG/1
5 TABLET ORAL DAILY
Status: DISCONTINUED | OUTPATIENT
Start: 2023-05-15 | End: 2023-05-19 | Stop reason: HOSPADM

## 2023-05-14 RX ORDER — FERROUS SULFATE 325(65) MG
325 TABLET ORAL
Status: DISCONTINUED | OUTPATIENT
Start: 2023-05-15 | End: 2023-05-19 | Stop reason: HOSPADM

## 2023-05-14 RX ADMIN — QUETIAPINE FUMARATE 50 MG: 25 TABLET ORAL at 22:18

## 2023-05-14 RX ADMIN — MEROPENEM 1000 MG: 1 INJECTION, POWDER, FOR SOLUTION INTRAVENOUS at 15:10

## 2023-05-14 RX ADMIN — Medication 10 MG: at 22:17

## 2023-05-14 RX ADMIN — SODIUM CHLORIDE, PRESERVATIVE FREE 10 ML: 5 INJECTION INTRAVENOUS at 22:18

## 2023-05-14 RX ADMIN — DIVALPROEX SODIUM 500 MG: 250 TABLET, DELAYED RELEASE ORAL at 22:17

## 2023-05-14 RX ADMIN — ENOXAPARIN SODIUM 40 MG: 100 INJECTION SUBCUTANEOUS at 22:17

## 2023-05-14 RX ADMIN — POTASSIUM CHLORIDE 20 MEQ: 1500 TABLET, EXTENDED RELEASE ORAL at 22:18

## 2023-05-14 RX ADMIN — MEROPENEM 1000 MG: 1 INJECTION, POWDER, FOR SOLUTION INTRAVENOUS at 22:29

## 2023-05-14 ASSESSMENT — PAIN - FUNCTIONAL ASSESSMENT: PAIN_FUNCTIONAL_ASSESSMENT: NONE - DENIES PAIN

## 2023-05-15 LAB
ALBUMIN SERPL-MCNC: 2.8 G/DL (ref 3.4–5)
ANION GAP SERPL CALCULATED.3IONS-SCNC: 11 MMOL/L (ref 3–16)
BUN SERPL-MCNC: 29 MG/DL (ref 7–20)
CALCIUM SERPL-MCNC: 8.1 MG/DL (ref 8.3–10.6)
CHLORIDE SERPL-SCNC: 105 MMOL/L (ref 99–110)
CO2 SERPL-SCNC: 24 MMOL/L (ref 21–32)
CREAT SERPL-MCNC: 1.5 MG/DL (ref 0.8–1.3)
DEPRECATED RDW RBC AUTO: 18.7 % (ref 12.4–15.4)
GFR SERPLBLD CREATININE-BSD FMLA CKD-EPI: 48 ML/MIN/{1.73_M2}
GLUCOSE SERPL-MCNC: 123 MG/DL (ref 70–99)
HCT VFR BLD AUTO: 29 % (ref 40.5–52.5)
HGB BLD-MCNC: 9.5 G/DL (ref 13.5–17.5)
MAGNESIUM SERPL-MCNC: 1.8 MG/DL (ref 1.8–2.4)
MCH RBC QN AUTO: 28.3 PG (ref 26–34)
MCHC RBC AUTO-ENTMCNC: 32.8 G/DL (ref 31–36)
MCV RBC AUTO: 86.2 FL (ref 80–100)
PHOSPHATE SERPL-MCNC: 3.1 MG/DL (ref 2.5–4.9)
PLATELET # BLD AUTO: 143 K/UL (ref 135–450)
PMV BLD AUTO: 8.5 FL (ref 5–10.5)
POTASSIUM SERPL-SCNC: 3.7 MMOL/L (ref 3.5–5.1)
RBC # BLD AUTO: 3.36 M/UL (ref 4.2–5.9)
SODIUM SERPL-SCNC: 140 MMOL/L (ref 136–145)
WBC # BLD AUTO: 3.6 K/UL (ref 4–11)

## 2023-05-15 PROCEDURE — 6360000002 HC RX W HCPCS: Performed by: INTERNAL MEDICINE

## 2023-05-15 PROCEDURE — 80069 RENAL FUNCTION PANEL: CPT

## 2023-05-15 PROCEDURE — 6370000000 HC RX 637 (ALT 250 FOR IP): Performed by: INTERNAL MEDICINE

## 2023-05-15 PROCEDURE — 36415 COLL VENOUS BLD VENIPUNCTURE: CPT

## 2023-05-15 PROCEDURE — 85027 COMPLETE CBC AUTOMATED: CPT

## 2023-05-15 PROCEDURE — 2580000003 HC RX 258: Performed by: INTERNAL MEDICINE

## 2023-05-15 PROCEDURE — 1200000000 HC SEMI PRIVATE

## 2023-05-15 PROCEDURE — 83735 ASSAY OF MAGNESIUM: CPT

## 2023-05-15 RX ADMIN — FENOFIBRATE 160 MG: 160 TABLET ORAL at 08:41

## 2023-05-15 RX ADMIN — FERROUS SULFATE TAB 325 MG (65 MG ELEMENTAL FE) 325 MG: 325 (65 FE) TAB at 08:40

## 2023-05-15 RX ADMIN — ENOXAPARIN SODIUM 40 MG: 100 INJECTION SUBCUTANEOUS at 08:39

## 2023-05-15 RX ADMIN — DIVALPROEX SODIUM 500 MG: 250 TABLET, DELAYED RELEASE ORAL at 21:44

## 2023-05-15 RX ADMIN — QUETIAPINE FUMARATE 50 MG: 25 TABLET ORAL at 21:44

## 2023-05-15 RX ADMIN — ASPIRIN 81 MG 81 MG: 81 TABLET ORAL at 08:40

## 2023-05-15 RX ADMIN — ATORVASTATIN CALCIUM 40 MG: 40 TABLET, FILM COATED ORAL at 08:40

## 2023-05-15 RX ADMIN — DIVALPROEX SODIUM 500 MG: 250 TABLET, DELAYED RELEASE ORAL at 08:40

## 2023-05-15 RX ADMIN — PANTOPRAZOLE SODIUM 40 MG: 40 TABLET, DELAYED RELEASE ORAL at 08:40

## 2023-05-15 RX ADMIN — LOSARTAN POTASSIUM 25 MG: 25 TABLET, FILM COATED ORAL at 08:40

## 2023-05-15 RX ADMIN — MEROPENEM 1000 MG: 1 INJECTION, POWDER, FOR SOLUTION INTRAVENOUS at 06:35

## 2023-05-15 RX ADMIN — Medication 1 CAPSULE: at 08:40

## 2023-05-15 RX ADMIN — METOPROLOL SUCCINATE 50 MG: 50 TABLET, EXTENDED RELEASE ORAL at 08:40

## 2023-05-15 RX ADMIN — AMLODIPINE BESYLATE 5 MG: 5 TABLET ORAL at 08:40

## 2023-05-15 RX ADMIN — MEROPENEM 1000 MG: 1 INJECTION, POWDER, FOR SOLUTION INTRAVENOUS at 17:19

## 2023-05-15 RX ADMIN — Medication 10 MG: at 21:44

## 2023-05-15 RX ADMIN — POTASSIUM CHLORIDE 20 MEQ: 1500 TABLET, EXTENDED RELEASE ORAL at 21:44

## 2023-05-15 RX ADMIN — ACETAMINOPHEN 650 MG: 325 TABLET ORAL at 03:11

## 2023-05-15 RX ADMIN — POTASSIUM CHLORIDE 20 MEQ: 1500 TABLET, EXTENDED RELEASE ORAL at 08:40

## 2023-05-15 RX ADMIN — QUETIAPINE FUMARATE 50 MG: 25 TABLET ORAL at 08:40

## 2023-05-15 ASSESSMENT — PAIN DESCRIPTION - LOCATION: LOCATION: BACK

## 2023-05-15 ASSESSMENT — PAIN SCALES - GENERAL: PAINLEVEL_OUTOF10: 7

## 2023-05-15 ASSESSMENT — PAIN DESCRIPTION - DESCRIPTORS: DESCRIPTORS: ACHING

## 2023-05-15 ASSESSMENT — PAIN DESCRIPTION - ORIENTATION: ORIENTATION: MID

## 2023-05-15 NOTE — CARE COORDINATION
Attempt to contact patients daughter Yue Perez via phone. Message left. Per notes patient is forgetful waiting return call.  Sarika Maxwell RN

## 2023-05-16 ENCOUNTER — APPOINTMENT (OUTPATIENT)
Dept: CT IMAGING | Age: 73
DRG: 689 | End: 2023-05-16
Payer: MEDICARE

## 2023-05-16 LAB
ANION GAP SERPL CALCULATED.3IONS-SCNC: 10 MMOL/L (ref 3–16)
BASOPHILS # BLD: 0 K/UL (ref 0–0.2)
BASOPHILS NFR BLD: 1 %
BUN SERPL-MCNC: 24 MG/DL (ref 7–20)
CALCIUM SERPL-MCNC: 8.3 MG/DL (ref 8.3–10.6)
CHLORIDE SERPL-SCNC: 106 MMOL/L (ref 99–110)
CO2 SERPL-SCNC: 25 MMOL/L (ref 21–32)
CREAT SERPL-MCNC: 1.5 MG/DL (ref 0.8–1.3)
DEPRECATED RDW RBC AUTO: 19 % (ref 12.4–15.4)
EOSINOPHIL # BLD: 0.2 K/UL (ref 0–0.6)
EOSINOPHIL NFR BLD: 5.3 %
GFR SERPLBLD CREATININE-BSD FMLA CKD-EPI: 48 ML/MIN/{1.73_M2}
GLUCOSE SERPL-MCNC: 116 MG/DL (ref 70–99)
HCT VFR BLD AUTO: 29.5 % (ref 40.5–52.5)
HGB BLD-MCNC: 9.8 G/DL (ref 13.5–17.5)
LYMPHOCYTES # BLD: 0.7 K/UL (ref 1–5.1)
LYMPHOCYTES NFR BLD: 17.4 %
MCH RBC QN AUTO: 28.5 PG (ref 26–34)
MCHC RBC AUTO-ENTMCNC: 33.1 G/DL (ref 31–36)
MCV RBC AUTO: 86 FL (ref 80–100)
MONOCYTES # BLD: 0.5 K/UL (ref 0–1.3)
MONOCYTES NFR BLD: 11.6 %
NEUTROPHILS # BLD: 2.6 K/UL (ref 1.7–7.7)
NEUTROPHILS NFR BLD: 64.7 %
PLATELET # BLD AUTO: 147 K/UL (ref 135–450)
PMV BLD AUTO: 8.2 FL (ref 5–10.5)
POTASSIUM SERPL-SCNC: 4.1 MMOL/L (ref 3.5–5.1)
RBC # BLD AUTO: 3.44 M/UL (ref 4.2–5.9)
SODIUM SERPL-SCNC: 141 MMOL/L (ref 136–145)
VALPROATE SERPL-MCNC: 17.1 UG/ML (ref 50–100)
WBC # BLD AUTO: 4.1 K/UL (ref 4–11)

## 2023-05-16 PROCEDURE — 2580000003 HC RX 258: Performed by: INTERNAL MEDICINE

## 2023-05-16 PROCEDURE — 97530 THERAPEUTIC ACTIVITIES: CPT

## 2023-05-16 PROCEDURE — 6370000000 HC RX 637 (ALT 250 FOR IP): Performed by: INTERNAL MEDICINE

## 2023-05-16 PROCEDURE — 85025 COMPLETE CBC W/AUTO DIFF WBC: CPT

## 2023-05-16 PROCEDURE — 36415 COLL VENOUS BLD VENIPUNCTURE: CPT

## 2023-05-16 PROCEDURE — 80164 ASSAY DIPROPYLACETIC ACD TOT: CPT

## 2023-05-16 PROCEDURE — 97167 OT EVAL HIGH COMPLEX 60 MIN: CPT

## 2023-05-16 PROCEDURE — 97535 SELF CARE MNGMENT TRAINING: CPT

## 2023-05-16 PROCEDURE — 97162 PT EVAL MOD COMPLEX 30 MIN: CPT

## 2023-05-16 PROCEDURE — 1200000000 HC SEMI PRIVATE

## 2023-05-16 PROCEDURE — 6360000002 HC RX W HCPCS: Performed by: INTERNAL MEDICINE

## 2023-05-16 PROCEDURE — 6360000004 HC RX CONTRAST MEDICATION: Performed by: INTERNAL MEDICINE

## 2023-05-16 PROCEDURE — 80048 BASIC METABOLIC PNL TOTAL CA: CPT

## 2023-05-16 PROCEDURE — 97110 THERAPEUTIC EXERCISES: CPT

## 2023-05-16 PROCEDURE — 99223 1ST HOSP IP/OBS HIGH 75: CPT | Performed by: INTERNAL MEDICINE

## 2023-05-16 PROCEDURE — 74177 CT ABD & PELVIS W/CONTRAST: CPT

## 2023-05-16 RX ADMIN — METOPROLOL SUCCINATE 50 MG: 50 TABLET, EXTENDED RELEASE ORAL at 09:14

## 2023-05-16 RX ADMIN — POTASSIUM CHLORIDE 20 MEQ: 1500 TABLET, EXTENDED RELEASE ORAL at 09:15

## 2023-05-16 RX ADMIN — Medication 1 CAPSULE: at 09:14

## 2023-05-16 RX ADMIN — SODIUM CHLORIDE, PRESERVATIVE FREE 10 ML: 5 INJECTION INTRAVENOUS at 20:08

## 2023-05-16 RX ADMIN — LOSARTAN POTASSIUM 25 MG: 25 TABLET, FILM COATED ORAL at 09:14

## 2023-05-16 RX ADMIN — QUETIAPINE FUMARATE 50 MG: 25 TABLET ORAL at 09:15

## 2023-05-16 RX ADMIN — MEROPENEM 1000 MG: 1 INJECTION, POWDER, FOR SOLUTION INTRAVENOUS at 00:43

## 2023-05-16 RX ADMIN — MEROPENEM 1000 MG: 1 INJECTION, POWDER, FOR SOLUTION INTRAVENOUS at 16:02

## 2023-05-16 RX ADMIN — FENOFIBRATE 160 MG: 160 TABLET ORAL at 09:14

## 2023-05-16 RX ADMIN — QUETIAPINE FUMARATE 50 MG: 25 TABLET ORAL at 20:18

## 2023-05-16 RX ADMIN — DIVALPROEX SODIUM 500 MG: 250 TABLET, DELAYED RELEASE ORAL at 20:18

## 2023-05-16 RX ADMIN — Medication 10 MG: at 22:01

## 2023-05-16 RX ADMIN — MEROPENEM 1000 MG: 1 INJECTION, POWDER, FOR SOLUTION INTRAVENOUS at 09:16

## 2023-05-16 RX ADMIN — IOPAMIDOL 75 ML: 755 INJECTION, SOLUTION INTRAVENOUS at 16:45

## 2023-05-16 RX ADMIN — AMLODIPINE BESYLATE 5 MG: 5 TABLET ORAL at 09:15

## 2023-05-16 RX ADMIN — DIVALPROEX SODIUM 500 MG: 250 TABLET, DELAYED RELEASE ORAL at 09:14

## 2023-05-16 RX ADMIN — ASPIRIN 81 MG 81 MG: 81 TABLET ORAL at 09:15

## 2023-05-16 RX ADMIN — ENOXAPARIN SODIUM 40 MG: 100 INJECTION SUBCUTANEOUS at 09:15

## 2023-05-16 RX ADMIN — SODIUM CHLORIDE, PRESERVATIVE FREE 10 ML: 5 INJECTION INTRAVENOUS at 09:15

## 2023-05-16 RX ADMIN — POTASSIUM CHLORIDE 20 MEQ: 1500 TABLET, EXTENDED RELEASE ORAL at 20:18

## 2023-05-16 RX ADMIN — ATORVASTATIN CALCIUM 40 MG: 40 TABLET, FILM COATED ORAL at 09:15

## 2023-05-16 RX ADMIN — PANTOPRAZOLE SODIUM 40 MG: 40 TABLET, DELAYED RELEASE ORAL at 09:14

## 2023-05-16 RX ADMIN — FERROUS SULFATE TAB 325 MG (65 MG ELEMENTAL FE) 325 MG: 325 (65 FE) TAB at 09:23

## 2023-05-16 NOTE — CONSULTS
Consult Placed     Who: Dr. Elizabeth Shultz  Date:5/16/2023    KUIS:[id number]     Electronically signed by Delfino Jaramillo on 5/16/2023 at 12:21 PM
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
## 2023-05-16 NOTE — ACP (ADVANCE CARE PLANNING)
Advance Care Planning     General Advance Care Planning (ACP) Conversation    Date of Conversation: 5/14/2023  Conducted with: Patient with Decision Making Capacity   Healthcare Decision Maker: Named in Advance Directive or Healthcare Power of  (name) [address] Decision Maker:    Primary Decision Maker: Marlen Rodríguez - Akil - [phone]  Click here to complete Healthcare Decision Makers including selection of the Healthcare Decision Maker Relationship (ie \"Primary\"). Today we documented Decision Maker(s) consistent with Legal Next of Kin hierarchy. Content/Action Overview:   Has ACP document(s) on file - reflects the patient's care preferences  Reviewed DNR/DNI and patient elects Full Code (Attempt Resuscitation)      Length of Voluntary ACP Conversation in minutes:  <16 minutes (Non-Billable)    Darryl Rodriguez, RN

## 2023-05-16 NOTE — CARE COORDINATION
CASE MANAGEMENT INITIAL ASSESSMENT      Reviewed chart and completed assessment with patient:he is confused. Family present: spoke with daughter Adali Benitez via phone  Explained Case Management role/services. Primary contact information:[facility] Decision Maker :   Primary Decision Maker: Jelena Rodriguez - Child - [phone]          Can this person be reached and be able to respond quickly, such as within a few minutes or hours? Yes      Admit date/status:5/14/23  Diagnosis:UTI   Is this a Readmission?:  Yes    Readmission Screening completed?: Yes     Insurance:BCBS   Precert required for SNF: Yes       3 night stay required: No    Living arrangements, Adls, care needs, prior to admission:lives in 2 story home with wife with Alzheimers and hospice services. Daughter Adali Benitez, her  and their 12 y.o. daughter. Has 1st floor master. Durable Medical Equipment at home:  Walker_x_Cane__RTS_x_ BSC__Shower Chair_x_  02__ HHN__ CPAP__  BiPap__  Hospital Bed__ W/C_x__ Other_grab bars____    Services in the home and/or outpatient, prior to 16 Stefany Lopez ([facility])    Current PCP:Marie Leonard [facility] [place]. Medications Prescription coverage? yes [employer] needs: tbd     PT/OT recs:pending    Hospital Exemption Notification (HEN):needed for SNF    Barriers to discharge:daughter may not be able to continue to care for in home setting. Plan/comments:spoke with chaitanya Benitez via phone. Reported lives in 2 story home with wife with Alzheimers and hospice services. Daughter Adali Benitez, her  and their 12 y.o. daughter. Has 1st floor master. Stated had recent psyche and SNF stay, was only home for 4 days and returned to hospital. Was to initiate services with [facility].  Stated has had some behavior issues, that combined with his wife with advanced dementia and hospice care in the home, she does not feel will be

## 2023-05-17 LAB
ANION GAP SERPL CALCULATED.3IONS-SCNC: 11 MMOL/L (ref 3–16)
BACTERIA UR CULT: ABNORMAL
BACTERIA UR CULT: ABNORMAL
BUN SERPL-MCNC: 21 MG/DL (ref 7–20)
CALCIUM SERPL-MCNC: 8.6 MG/DL (ref 8.3–10.6)
CHLORIDE SERPL-SCNC: 106 MMOL/L (ref 99–110)
CO2 SERPL-SCNC: 23 MMOL/L (ref 21–32)
CREAT SERPL-MCNC: 1.3 MG/DL (ref 0.8–1.3)
GFR SERPLBLD CREATININE-BSD FMLA CKD-EPI: 57 ML/MIN/{1.73_M2}
GLUCOSE SERPL-MCNC: 157 MG/DL (ref 70–99)
ORGANISM: ABNORMAL
POTASSIUM SERPL-SCNC: 4.1 MMOL/L (ref 3.5–5.1)
SODIUM SERPL-SCNC: 140 MMOL/L (ref 136–145)

## 2023-05-17 PROCEDURE — 99231 SBSQ HOSP IP/OBS SF/LOW 25: CPT | Performed by: INTERNAL MEDICINE

## 2023-05-17 PROCEDURE — 6370000000 HC RX 637 (ALT 250 FOR IP): Performed by: INTERNAL MEDICINE

## 2023-05-17 PROCEDURE — 2580000003 HC RX 258: Performed by: INTERNAL MEDICINE

## 2023-05-17 PROCEDURE — 36415 COLL VENOUS BLD VENIPUNCTURE: CPT

## 2023-05-17 PROCEDURE — 1200000000 HC SEMI PRIVATE

## 2023-05-17 PROCEDURE — 80048 BASIC METABOLIC PNL TOTAL CA: CPT

## 2023-05-17 PROCEDURE — 51798 US URINE CAPACITY MEASURE: CPT

## 2023-05-17 PROCEDURE — 6360000002 HC RX W HCPCS: Performed by: INTERNAL MEDICINE

## 2023-05-17 RX ORDER — NITROFURANTOIN 25; 75 MG/1; MG/1
100 CAPSULE ORAL 2 TIMES DAILY
Qty: 8 CAPSULE | Refills: 0 | Status: SHIPPED | OUTPATIENT
Start: 2023-05-17 | End: 2023-05-21

## 2023-05-17 RX ADMIN — ENOXAPARIN SODIUM 40 MG: 100 INJECTION SUBCUTANEOUS at 08:30

## 2023-05-17 RX ADMIN — FERROUS SULFATE TAB 325 MG (65 MG ELEMENTAL FE) 325 MG: 325 (65 FE) TAB at 08:39

## 2023-05-17 RX ADMIN — QUETIAPINE FUMARATE 50 MG: 25 TABLET ORAL at 08:39

## 2023-05-17 RX ADMIN — DIVALPROEX SODIUM 500 MG: 250 TABLET, DELAYED RELEASE ORAL at 08:38

## 2023-05-17 RX ADMIN — DIVALPROEX SODIUM 500 MG: 250 TABLET, DELAYED RELEASE ORAL at 21:03

## 2023-05-17 RX ADMIN — Medication 10 MG: at 21:03

## 2023-05-17 RX ADMIN — LOSARTAN POTASSIUM 25 MG: 25 TABLET, FILM COATED ORAL at 08:39

## 2023-05-17 RX ADMIN — AMLODIPINE BESYLATE 5 MG: 5 TABLET ORAL at 08:39

## 2023-05-17 RX ADMIN — MEROPENEM 1000 MG: 1 INJECTION, POWDER, FOR SOLUTION INTRAVENOUS at 01:28

## 2023-05-17 RX ADMIN — POTASSIUM CHLORIDE 20 MEQ: 1500 TABLET, EXTENDED RELEASE ORAL at 08:39

## 2023-05-17 RX ADMIN — METOPROLOL SUCCINATE 50 MG: 50 TABLET, EXTENDED RELEASE ORAL at 08:39

## 2023-05-17 RX ADMIN — QUETIAPINE FUMARATE 50 MG: 25 TABLET ORAL at 21:03

## 2023-05-17 RX ADMIN — PANTOPRAZOLE SODIUM 40 MG: 40 TABLET, DELAYED RELEASE ORAL at 08:39

## 2023-05-17 RX ADMIN — MEROPENEM 1000 MG: 1 INJECTION, POWDER, FOR SOLUTION INTRAVENOUS at 08:44

## 2023-05-17 RX ADMIN — FENOFIBRATE 160 MG: 160 TABLET ORAL at 08:39

## 2023-05-17 RX ADMIN — ATORVASTATIN CALCIUM 40 MG: 40 TABLET, FILM COATED ORAL at 08:39

## 2023-05-17 RX ADMIN — Medication 1 CAPSULE: at 08:39

## 2023-05-17 RX ADMIN — POTASSIUM CHLORIDE 20 MEQ: 1500 TABLET, EXTENDED RELEASE ORAL at 21:03

## 2023-05-17 RX ADMIN — MEROPENEM 1000 MG: 1 INJECTION, POWDER, FOR SOLUTION INTRAVENOUS at 18:17

## 2023-05-17 RX ADMIN — SODIUM CHLORIDE, PRESERVATIVE FREE 10 ML: 5 INJECTION INTRAVENOUS at 08:30

## 2023-05-17 RX ADMIN — ASPIRIN 81 MG 81 MG: 81 TABLET ORAL at 08:39

## 2023-05-17 NOTE — CARE COORDINATION
Chart reviewed day 3 repeat call to daughter George Paiz for SNF placement option. Spoke with patient. Stated would prefer to go home with resumption of HHC, [facility], but agreed to SNF. Waiting return call. Spoke with MD. Stated medically ready for d/c.Carlos Lawton, RN    Spoke with George Paiz, would like referral to [facility] [place]. Waiting determination. Dom Hwang RN      [facility] [place] can accept patient. D/c order noted. Pre-cert initiated, HENS completed. . Reviewed ID note, recommending post void residual monitoring. Therefore made pre-cert for start of care tomorrow.

## 2023-05-17 NOTE — DISCHARGE INSTR - COC
Continuity of Care Form    Patient Name: Brain Glez   :  1950  MRN:  [id number]    Admit date:  2023  Discharge date:  23    Code Status Order: Full Code   Advance Directives:     Admitting Physician:  Opal Elena MD  PCP: Aidee Arceo MD    Discharging Nurse: [facility] Unit/Room#: 4400/0690-45  Discharging Unit Phone Number: [phone]    Emergency Contact:   Extended Emergency Contact Information  Primary Emergency Contact: Meghann Gary [address] Phone: [phone]  Relation: Child  Secondary Emergency Contact: Andre Garner  Home Phone: [phone]  Mobile Phone: [phone]  Relation: Child    Past Surgical History:  Past Surgical History:   Procedure Laterality Date    ABDOMEN SURGERY      CARDIAC SURGERY      COLONOSCOPY      DENTAL SURGERY N/A 2019    EXTRACTION OF ALL REMAINING UPPER AND LOWER TEETH AND ALVEOLOPLASTY   performed by Khalida Bennett, ESTHER at [address] [facility], [place], DIAGNOSTIC      EYE SURGERY      KNEE SURGERY      LAPAROSCOPY N/A 2019    LAPAROSCOPIC, CONVERTED TO OPEN INCARCERATED UMBILICAL HERNIA REPAIR performed by Aarti Rapp MD at [address] [facility]  10/11/2013     CYSTOSCOPY, TRANSURETHRAL RESECTION OF PROSTATE WITH OLYMPUS    PROSTATE BIOPSY      SKIN BIOPSY      UPPER GASTROINTESTINAL ENDOSCOPY  2017       Immunization History:   Immunization History   Administered Date(s) Administered    COVID-19, PFIZER PURPLE top, DILUTE for use, (age 15 y+), 30mcg/0.3mL 2021, 2021, 2021    Influenza Virus Vaccine 2014    Pneumococcal, PPSV23, PNEUMOVAX 23, (age 2y+), SC/IM, 0.5mL 2015    TDaP, ADACEL (age 10y-63y), BOOSTRIX (age 10y+), IM, 0.5mL 2008    Td Vaccine >8yo [facility] 2008    Tetanus 2008       Active Problems:  Patient Active Problem List   Diagnosis Code    Hx BPH w/urinary retention s/p prostatectomy R33.9    Cigar smoker Z72.0

## 2023-05-18 VITALS
BODY MASS INDEX: 29.16 KG/M2 | HEART RATE: 85 BPM | WEIGHT: 247 LBS | SYSTOLIC BLOOD PRESSURE: 155 MMHG | TEMPERATURE: 99.1 F | RESPIRATION RATE: 16 BRPM | OXYGEN SATURATION: 96 % | DIASTOLIC BLOOD PRESSURE: 90 MMHG | HEIGHT: 77 IN

## 2023-05-18 LAB
BACTERIA BLD CULT ORG #2: NORMAL
BACTERIA BLD CULT: NORMAL

## 2023-05-18 PROCEDURE — 51798 US URINE CAPACITY MEASURE: CPT

## 2023-05-18 PROCEDURE — 97116 GAIT TRAINING THERAPY: CPT

## 2023-05-18 PROCEDURE — 2580000003 HC RX 258: Performed by: INTERNAL MEDICINE

## 2023-05-18 PROCEDURE — 97535 SELF CARE MNGMENT TRAINING: CPT

## 2023-05-18 PROCEDURE — 6370000000 HC RX 637 (ALT 250 FOR IP): Performed by: INTERNAL MEDICINE

## 2023-05-18 PROCEDURE — 97530 THERAPEUTIC ACTIVITIES: CPT

## 2023-05-18 PROCEDURE — 97110 THERAPEUTIC EXERCISES: CPT

## 2023-05-18 PROCEDURE — 6360000002 HC RX W HCPCS: Performed by: INTERNAL MEDICINE

## 2023-05-18 RX ORDER — LORAZEPAM 0.5 MG/1
0.5 TABLET ORAL 3 TIMES DAILY PRN
COMMUNITY

## 2023-05-18 RX ORDER — LORAZEPAM 0.5 MG/1
0.5 TABLET ORAL ONCE
Status: COMPLETED | OUTPATIENT
Start: 2023-05-18 | End: 2023-05-18

## 2023-05-18 RX ADMIN — MEROPENEM 1000 MG: 1 INJECTION, POWDER, FOR SOLUTION INTRAVENOUS at 08:25

## 2023-05-18 RX ADMIN — Medication 1 CAPSULE: at 08:26

## 2023-05-18 RX ADMIN — ENOXAPARIN SODIUM 40 MG: 100 INJECTION SUBCUTANEOUS at 08:25

## 2023-05-18 RX ADMIN — MEROPENEM 1000 MG: 1 INJECTION, POWDER, FOR SOLUTION INTRAVENOUS at 01:13

## 2023-05-18 RX ADMIN — ASPIRIN 81 MG 81 MG: 81 TABLET ORAL at 08:25

## 2023-05-18 RX ADMIN — LOSARTAN POTASSIUM 25 MG: 25 TABLET, FILM COATED ORAL at 08:28

## 2023-05-18 RX ADMIN — FENOFIBRATE 160 MG: 160 TABLET ORAL at 08:27

## 2023-05-18 RX ADMIN — PANTOPRAZOLE SODIUM 40 MG: 40 TABLET, DELAYED RELEASE ORAL at 08:27

## 2023-05-18 RX ADMIN — POTASSIUM CHLORIDE 20 MEQ: 1500 TABLET, EXTENDED RELEASE ORAL at 08:27

## 2023-05-18 RX ADMIN — QUETIAPINE FUMARATE 50 MG: 25 TABLET ORAL at 08:26

## 2023-05-18 RX ADMIN — DIVALPROEX SODIUM 500 MG: 250 TABLET, DELAYED RELEASE ORAL at 08:50

## 2023-05-18 RX ADMIN — AMLODIPINE BESYLATE 5 MG: 5 TABLET ORAL at 08:28

## 2023-05-18 RX ADMIN — FERROUS SULFATE TAB 325 MG (65 MG ELEMENTAL FE) 325 MG: 325 (65 FE) TAB at 08:27

## 2023-05-18 RX ADMIN — LORAZEPAM 0.5 MG: 0.5 TABLET ORAL at 15:52

## 2023-05-18 RX ADMIN — METOPROLOL SUCCINATE 50 MG: 50 TABLET, EXTENDED RELEASE ORAL at 08:25

## 2023-05-18 RX ADMIN — ATORVASTATIN CALCIUM 40 MG: 40 TABLET, FILM COATED ORAL at 08:25

## 2023-05-18 NOTE — PROGRESS NOTES
05/18/23 1351   Encounter Summary   Encounter Overview/Reason  Spiritual/Emotional Needs   Service Provided For: Patient   Referral/Consult From: Nurse   Support System Children   Last Encounter  05/18/23  (emotional spiritual support/ played hymns of comfort, prayers)   Complexity of Encounter Moderate   Begin Time 1320   End Time  1350   Total Time Calculated 30 min   Spiritual/Emotional needs   Type Spiritual Distress; Emotional Distress;Spiritual Support   Assessment/Intervention/Outcome   Assessment Anxious; Tearful   Intervention Active listening;Discussed belief system/Spiritism practices/jesica;Prayer (assurance of)/[place];Nurtured Hope;Sustaining Presence/Ministry of presence   Outcome Comfort;Coping;Encouraged      Deyanira Katz
05/18/23 1442   Encounter Summary   Encounter Overview/Reason  Spiritual/Emotional Needs   Service Provided For: Patient   Support System Children   Last Encounter  05/18/23  (provided CD player and Restorationist music, non anxious presence)   Begin Time 1400   End Time  1440   Total Time Calculated 40 min   Spiritual/Emotional needs   Type Spiritual Distress;Spiritual Support;Emotional Distress       rebecca Katz
Called phone report to [facility] at this time.
Hospital Medicine Progress Note      Date of Admission: 5/14/2023  Hospital Day: 2    Chief Admission Complaint:  Confusion     Subjective:  no worsening MS, alert and communicative     Presenting Admission History:     74 y.o. male w/ hx HTN/Afib and prior CVA who presented to [facility] with w/ a several day hx of progressive confusion     The patient's daughter stated to the ED staff that he was recently admitted to a mental health facility and all his medications were adjusted but she is not sure what he is on or what was adjusted. The patient denies any fever/chills or other signs/sxs of systemic illness or identifiable aggravating      Assessment/Plan:      UTI (urinary tract infection)    E coli UTI - admission U/A c/w acute cystitis. Infection evidenced by U/A, Cx results and/or signs/sxs of clinical infection despite Cx results. Started on empiric Merrem on 14 May w/ Hx of ESBL/MRDO pending Cx results. Encephalopathy - acute metabolic, 2nd to above. Will continue to follow clinical response w/ supportive care PRN. Improving      HTN - w/out known CAD and no evidence of active signs/sxs of ischemia/failure. Currently controlled on home meds w/ vitals reviewed and documented as above. HyperLipidemia - controlled on home Statin. Continue, w/ f/u and med adjustment w/ PCP     Antwan on CKD - baseline stage 3. Follow serial labs. Reviewed and documented in this note. Gentle hydration      Dementia - w/out behavioral disturbance. Controlled on home medication regimen - continued. Continue supportive care and redirection as needed. Afib - chronic paroxysmal of unspecified and clinically unable to determine etiology. Normally rate controlled on BBlocker - continued. NOT anticoagulated at baseline. Monitored on tele. GERD - w/out active signs/sxs of dysphagia/odynophagia. No evidence of active PUD or hx of GI bleed. Controlled on home PPI - continue.     H/o covid 5/5 - no resp
Hospital Medicine Progress Note      Date of Admission: 5/14/2023  Hospital Day: 4    Chief Admission Complaint:  Confusion     Subjective:   MS improved  alert and communicative     Presenting Admission History:     74 y.o. male w/ hx HTN/Afib and prior CVA who presented to [facility] with w/ a several day hx of progressive confusion     The patient's daughter stated to the ED staff that he was recently admitted to a mental health facility and all his medications were adjusted but she is not sure what he is on or what was adjusted. The patient denies any fever/chills or other signs/sxs of systemic illness or identifiable aggravating      Assessment/Plan:      UTI (urinary tract infection)    E coli UTI - admission U/A c/w acute cystitis. Infection evidenced by U/A, Cx results and/or signs/sxs of clinical infection despite Cx results. Started on empiric Merrem on 14 May w/ Hx of ESBL/MRDO p   Cx ESBL  ID eval for recurrent UTI  -input appreciated advised to continue meropenem and CT of the abdomen pelvis- ruled out urinary tract pathology  Possibly antibiotics will be switched to p.o. Macrobid, defer to ID      Encephalopathy - acute metabolic, 2nd to above. Will continue to follow clinical response w/ supportive care PRN. Improving      HTN - w/out known CAD and no evidence of active signs/sxs of ischemia/failure. Currently controlled on home meds w/ vitals reviewed and documented as above. HyperLipidemia - controlled on home Statin. Continue, w/ f/u and med adjustment w/ PCP     Antwan on CKD - baseline stage 3. Follow serial labs. Reviewed and documented in this note. Gentle hydration -creatinine 1.5  BMP pending   On losartan - may hold if worsening Cr     Dementia - w/out behavioral disturbance. Controlled on home medication regimen - continued. Continue supportive care and redirection as needed.    On Seroquel and Depakote, Depakote level -acceptable      Afib - chronic paroxysmal of
Infectious Disease Follow up Notes    CC :  MDR UTI      Antibiotics:  Meropenem 1g q8  Culturelle      Admit Date:   5/14/2023  Hospital Day: 4    Subjective:   He remains AF   No acute interval changes     Objective:     Patient Vitals for the past 8 hrs:   BP Temp Temp src Pulse Resp SpO2   05/17/23 1503 (!) 140/77 98.1 °F (36.7 °C) Oral 69 16 96 %   05/17/23 0835 (!) 167/89 98.2 °F (36.8 °C) Oral 69 16 92 %       EXAM:  General:   resting comfortably, NAD  Exposed skin without rash     Abd soft, flat, NT       LINE:     PIV in place       Scheduled Meds:   amLODIPine  5 mg Oral Daily    aspirin  81 mg Oral Daily    atorvastatin  40 mg Oral Daily    divalproex  500 mg Oral 2 times per day    fenofibrate  160 mg Oral Daily    ferrous sulfate  325 mg Oral Daily with breakfast    lactobacillus  1 capsule Oral Daily    losartan  25 mg Oral Daily    melatonin  10 mg Oral Nightly    metoprolol succinate  50 mg Oral Daily    pantoprazole  40 mg Oral Daily    potassium chloride  20 mEq Oral BID    QUEtiapine  50 mg Oral BID    meropenem  1,000 mg IntraVENous Q8H    sodium chloride flush  5-40 mL IntraVENous 2 times per day    enoxaparin  40 mg SubCUTAneous Daily       Continuous Infusions:   sodium chloride            Data Review:    Lab Results   Component Value Date    WBC 4.1 05/16/2023    HGB 9.8 (L) 05/16/2023    HCT 29.5 (L) 05/16/2023    MCV 86.0 05/16/2023     05/16/2023     Lab Results   Component Value Date    CREATININE 1.3 05/17/2023    BUN 21 (H) 05/17/2023     05/17/2023    K 4.1 05/17/2023     05/17/2023    CO2 23 05/17/2023       Hepatic Function Panel:   Lab Results   Component Value Date/Time    ALKPHOS 36 05/14/2023 01:03 PM    ALT 12 05/14/2023 01:03 PM    AST 25 05/14/2023 01:03 PM    PROT 6.9 05/14/2023 01:03 PM    BILITOT 0.9 05/14/2023 01:03 PM    BILIDIR 0.8 07/21/2017 08:05 AM    IBILI 0.2
Physical Therapy  Facility/Department: [facility] C3 TELE/MED SURG/ONC  Daily Treatment Note  NAME: Lynsey Menendez  : 1950  MRN: [id number]    Date of Service: 2023    Discharge Recommendations:  Subacute/Skilled Nursing Facility   PT Equipment Recommendations  Equipment Needed: No  Other: defer to facility    [address] - Inpatient   How much help is needed turning from your back to your side while in a flat bed without using bedrails?: A Little  How much help is needed moving from lying on your back to sitting on the side of a flat bed without using bedrails?: A Little  How much help is needed moving to and from a bed to a chair?: A Little  How much help is needed standing up from a chair using your arms?: A Little  How much help is needed walking in hospital room?: A Lot  How much help is needed climbing 3-5 steps with a railing?: A Lot  AM-PAC Inpatient Mobility Raw Score : 16  AM-PAC Inpatient T-Scale Score : 40.78  Mobility Inpatient CMS 0-100% Score: 54.16  Mobility Inpatient CMS G-Code Modifier : CK     Patient Diagnosis(es): The primary encounter diagnosis was Altered mental status, unspecified altered mental status type. A diagnosis of Acute cystitis without hematuria was also pertinent to this visit. Assessment   Assessment: Pt agreeable iwth encouragement, seems somewhat confused in conversation but answers orientation questions appropriately. Pt required min A of 1-2 STS with RW and amb with same to and from commode , 15' X 2. Pt perfomred supine exs well. Pt is recommended for con't skilled PT and SNF at D/C. Equipment Needed: No  Other: defer to facility     Plan    Physcial Therapy Plan  General Plan: 3-5 times per week  Current Treatment Recommendations: Strengthening;Balance training;Functional mobility training;Transfer training; Endurance training;Pain management; Neuromuscular re-education;Stair training;Gait training; Safety education & training;Home exercise
Post void bladder scan is 80ml.
Pt a/o, forgetful at times. VSS. Shift assessment updated and documented.
Pt is alert with intermittent confusion. VSS. RA. Pt denies pain and discomfort at this time. Pt verbally aggressive with staff. Shift assessment completed and documented. Call light within reach. Bed side table within reach. Wheels locked. Bed in lowest position. Bed check in place. Pt instructed to call out for assistance. Pt expressesed understanding & calls out appropritately. All care per orders.  Electronically signed by Luciano Bill RN on 5/18/2023 at 12:59 PM
Pt is verbally and physically aggressive. Pt threw a urinal in the direction of writer.
Pt resting in bed. Alert to self and time. Complains of back pain occasionally. Voids per urinal. On RA. VSS. Right piv in [facility] is intact and flushes well. Bed alarm on. Will continue to monitor.
Pt stated he voided about 30 min ago. Pt bladder scanned and only noted to have 21 mL in the bladder.
Pt without aggressive behavior today. Has been cooperative in taking medications and with staff today.
Shift assessment completed and charted. VSS. A&OX1 to self only. Bed alarm on. Bed locked and in lowest position. Call light within reach. Pt denies any other needs at this time. Will continue to monitor.
Shift assessment completed and charted. VSS. A&OX2. Bed alarm on. Bed locked and in lowest position. Call light within reach. Pt denies any other needs at this time. Will continue to monitor.
Shift assessment completed. Pt A&O X4, VSS on RA. Denies any pain or discomfort at this time. Bed locked and in lowest position, bed alarm on for safety. Call light & bedside table are within reach.
Shift assessment completed. Pt A&O, forgetful at times, stated year as 2024 but easily reoriented to 2023, VSS on RA. Denies any pain or discomfort at this time. Bed locked and in lowest position, bed alarm on for safety. Call light & bedside table are within reach.
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 [address] and Traumatic hemorrhagic shock ([facility] [address] Past Surgical History:  has a past surgical history that includes knee surgery; Prostate biopsy; other surgical history (10/11/2013); Upper gastrointestinal endoscopy (07/13/2017); Dental surgery (N/A, 03/28/2019); laparoscopy (N/A, 06/22/2019); Abdomen surgery; Colonoscopy; Endoscopy, colon, diagnostic; eye surgery; Cardiac surgery; and skin biopsy. Assessment   Body Structures, Functions, Activity Limitations Requiring Skilled Therapeutic Intervention: Decreased functional mobility ; Decreased cognition;Decreased sensation;Decreased endurance;Decreased balance;Decreased safe awareness;Decreased posture; Increased pain  Assessment: Pt presents to [facility] with UTI. PTA, pt has previous behavioral health and SNF stay, per CM note, only home 4 days prior to this admission. Pt reports he is typically IND with functional mobility. Pt currently appears to be functioning below baseline level; requiring increased assist for bed mobility, transfers, and short distance ambulation with SW. Pt would benefit from continued skilled PT to address current deficits. Recommend SNF upon d/c  Treatment Diagnosis: impaired functional mobility  Therapy Prognosis: Fair  Decision Making: Medium Complexity  Requires PT Follow-Up: Yes     Plan   Physcial Therapy Plan  General Plan: 3-5 times per week  Current Treatment Recommendations: Strengthening, Balance training, Functional mobility training, Transfer training, Endurance training, Pain management, Neuromuscular re-education, Stair training, Gait training, Safety education & training, Home exercise program, Patient/Caregiver education & training, Therapeutic activities  Safety Devices  Type of Devices:  All fall risk precautions in place, Call light within reach, Chair alarm in place, Left in chair, Gait belt, Nurse notified
Constant support; Fair (RW)  Standing - Dynamic: Constant support; Fair (RW)    Transfer Training  Transfer Training: Yes  Sit to Stand: Minimum assistance;Assist X1;Assist X2;Additional time; Adaptive equipment (more assist from lower surfaces)  Stand to Sit: Minimum assistance  Toilet Transfer: Minimum assistance;Assist X2;Additional time (grab bars and RW)       ADL  Grooming: Minimal assistance;Verbal cueing  Grooming Skilled Clinical Factors: handwashing in stance at sink, combing hair seated at EOB  LE Dressing: Moderate assistance  LE Dressing Skilled Clinical Factors: briefs  Toileting: Moderate assistance  Toileting Skilled Clinical Factors: decreased thoroughness, poor insight into deficits and hygiene          Safety Devices  Type of Devices: All fall risk precautions in place;Call light within reach;Nurse notified;Gait belt;Left in bed;Bed alarm in place       Patient Education  Education Given To: Patient  Education Provided: Role of Therapy;Plan of Care;ADL Adaptive Strategies;Transfer Training; Fall Prevention Strategies; Equipment;Orientation  Education Method: Demonstration;Verbal  Barriers to Learning: Cognition  Education Outcome: Verbalized understanding;Continued education needed    Goals  Short Term Goals  Time Frame for Short Term Goals: 1 week (5/23) unless noted-- goals ongoing 5/18/23  Short Term Goal 1: Perform functional transfers with SBA and RW  Short Term Goal 2: Perform bathroom mobility with SBA and RW  Short Term Goal 3: Perform UE exer 15x each for endurance  Short Term Goal 4: Perform LE dressing with min A by 5/21  Patient Goals   Patient goals :  \"Go home\"       Therapy Time   Individual Concurrent Group Co-treatment   Time In 4626         Time Out 1022         Minutes [address], VIRI/L
escalate care:  [] Major surgery/procedure with associated risk factors:  ----------------------------------------------------------------------  C. Data (any 2)  [] Discussed management of the case with:    [] Imaging personally reviewed and interpreted, includes:   [x] Data Review (any 3)  [] Collateral history obtained from:  [x] All available Consultant notes from yesterday/today were reviewed:   [x] All current labs were personally reviewed and interpreted for clinical significance   [x] Appropriate follow-up labs were ordered    Medications:  Personally reviewed in detail in conjunction w/ labs as documented for evidence of drug toxicity. Infusion Medications    sodium chloride       Scheduled Medications    amLODIPine  5 mg Oral Daily    aspirin  81 mg Oral Daily    atorvastatin  40 mg Oral Daily    divalproex  500 mg Oral 2 times per day    fenofibrate  160 mg Oral Daily    ferrous sulfate  325 mg Oral Daily with breakfast    lactobacillus  1 capsule Oral Daily    losartan  25 mg Oral Daily    melatonin  10 mg Oral Nightly    metoprolol succinate  50 mg Oral Daily    pantoprazole  40 mg Oral Daily    potassium chloride  20 mEq Oral BID    QUEtiapine  50 mg Oral BID    meropenem  1,000 mg IntraVENous Q8H    sodium chloride flush  5-40 mL IntraVENous 2 times per day    enoxaparin  40 mg SubCUTAneous Daily     PRN Meds: sodium chloride flush, sodium chloride, ondansetron **OR** ondansetron, polyethylene glycol, acetaminophen **OR** acetaminophen     Labs:  Personally reviewed and interpreted for clinical significance.      Recent Labs     05/14/23  1303 05/15/23  0725   WBC 4.2 3.6*   HGB 10.1* 9.5*   HCT 30.8* 29.0*    143     Recent Labs     05/14/23  1303 05/15/23  0725    140   K 3.9 3.7    105   CO2 25 24   BUN 28* 29*   CREATININE 1.5* 1.5*   CALCIUM 9.0 8.1*   MG  --  1.80   PHOS  --  3.1     Recent Labs     05/14/23  1303 05/14/23  1428   TROPHS 18 16     No results for input(s):
Assist X1;Additional time; Adaptive equipment  Assistive Device: Gait belt;Walker  Toilet Transfers  Toilet Transfers Comments: Pt unable to tolerate bathroom mobility d/t fatigue. AROM: Within functional limits  Strength: Within functional limits  Coordination: Within functional limits  Tone: Normal  Sensation: Impaired (numbness in hands and feet)  ADL  Grooming: Stand by assistance;Setup; Increased time to complete;Verbal cueing  Grooming Skilled Clinical Factors: Sat in chair to wash face/hands and comb hair. Unable to tolerate standing at sink today. UE Bathing: Minimal assistance  UE Bathing Skilled Clinical Factors: sponge bathing  LE Bathing: Maximum assistance  UE Dressing: Moderate assistance  UE Dressing Skilled Clinical Factors: changed gown  LE Dressing: Maximum assistance  LE Dressing Skilled Clinical Factors: To change brief, pt has poor standing balance. Toileting: Maximum assistance      Bed mobility  Supine to Sit: Minimal assistance  Transfers  Stand Pivot Transfers: Minimal assistance (RW)  Sit to stand: Minimal assistance  Stand to sit: Minimal assistance  Transfer Comments: Ambulated around bed to chair with RW and min A, max vc's for safety. Posture is forward flexed. Vision  Vision: Impaired  Vision Exceptions: Wears glasses for distance;Wears glasses for reading  Hearing  Hearing: Within functional limits  Cognition  Overall Cognitive Status: Exceptions  Arousal/Alertness: Appropriate responses to stimuli  Following Commands: Follows one step commands with repetition  Attention Span: Attends with cues to redirect  Memory: Decreased short term memory  Safety Judgement: Decreased awareness of need for safety  Problem Solving: Decreased awareness of errors  Insights: Decreased awareness of deficits  Sequencing: Requires cues for some  Cognition Comment: Impulsive behavior but redirectable.   Orientation  Overall Orientation Status: Within Functional Limits  Orientation Level: Oriented to

## 2023-05-18 NOTE — DISCHARGE SUMMARY
V2.0  Discharge Summary    Name:  Vee Cantor /Age/Sex: 1950 (74 y.o. male)   Admit Date: 2023  Discharge Date: 23    MRN & CSN:  [id number] & [id number] Encounter Date and Time 23 1:48 PM EDT    Attending:  Jena Roberson MD Discharging Provider: Jena Roberson MD       Discharge Diagnosis:   ESBL E. coli UTI  Acute metabolic encephalopathy secondary to above improved  Hypertension  Hyperlipidemia  CKD stage IIIb  Dementia without behavioral disturbance  Chronic paroxysmal A-fib not anticoagulated at baseline  GERD          Hospital Course:     Brief HPI: Vee Cantor is a 74 y.o. male who presented with several days of progressive confusion/AMS    Brief Problem Based Course:   74 y.o. male w/ hx HTN/Afib and prior CVA who presented to [facility] with w/ a several day hx of progressive confusion  The patient's daughter stated to the ED staff that he was recently admitted to a mental health facility and all his medications were adjusted but she is not sure what he is on or what was adjusted. Initial impression through the ED was possible UTI/acute cystitis and started empirically on IV antibiotic and cultures was followed which later grew ESBL E. coli sensitive to meropenem. His mental status improved back to baseline. ID was consulted and switched his meropenem after 4 days to p.o. Macrobid and cleared for discharge. Patient was evaluated by PT /OT and recommended ECF. He is being discharged with appropriate follow-up      The patient expressed appropriate understanding of, and agreement with the discharge recommendations, medications, and plan.      Consults this admission:  IP CONSULT TO INFECTIOUS DISEASES  IP CONSULT TO Shane      Discharge Instruction:   Follow up appointments: PCP  Primary care physician: Lise Craig MD within 2 weeks  Diet: regular diet   Activity: activity as tolerated  Disposition: Discharged to:   []Home, []HHC, [x]SNF, []Acute

## 2023-05-18 NOTE — PLAN OF CARE
Increase patients ADLs/functional status to baseline.
PT eval complete. Increase function to baseline.
Problem: Chronic Conditions and Co-morbidities  Goal: Patient's chronic conditions and co-morbidity symptoms are monitored and maintained or improved  5/17/2023 0144 by Esperanza Alves RN  Outcome: Progressing  Flowsheets (Taken 5/17/2023 0144)  Care Plan - Patient's Chronic Conditions and Co-Morbidity Symptoms are Monitored and Maintained or Improved: Monitor and assess patient's chronic conditions and comorbid symptoms for stability, deterioration, or improvement
Problem: Confusion  Goal: Confusion, delirium, dementia, or psychosis is improved or at baseline  Description: INTERVENTIONS:  1. Assess for possible contributors to thought disturbance, including medications, impaired vision or hearing, underlying metabolic abnormalities, dehydration, psychiatric diagnoses, and notify attending LIP  2. [place] high risk fall precautions, as indicated  3. Provide frequent short contacts to provide reality reorientation, refocusing and direction  4. Decrease environmental stimuli, including noise as appropriate  5. Monitor and intervene to maintain adequate nutrition, hydration, elimination, sleep and activity  6. If unable to ensure safety without constant attention obtain sitter and review sitter guidelines with assigned personnel  7. Initiate Psychosocial CNS and Spiritual Care consult, as indicated  Outcome: Progressing     Problem: Skin/Tissue Integrity  Goal: Absence of new skin breakdown  Description: 1. Monitor for areas of redness and/or skin breakdown  2. Assess vascular access sites hourly  3. Every 4-6 hours minimum:  Change oxygen saturation probe site  4. Every 4-6 hours:  If on nasal continuous positive airway pressure, respiratory therapy assess nares and determine need for appliance change or resting period.   Outcome: Progressing     Problem: Safety - Adult  Goal: Free from fall injury  Outcome: Progressing     Problem: Chronic Conditions and Co-morbidities  Goal: Patient's chronic conditions and co-morbidity symptoms are monitored and maintained or improved  Outcome: Progressing     Problem: Risk for Elopement  Goal: Patient will not exit the unit/facility without proper excort  Outcome: Progressing
Problem: Confusion  Goal: Confusion, delirium, dementia, or psychosis is improved or at baseline  Description: INTERVENTIONS:  1. Assess for possible contributors to thought disturbance, including medications, impaired vision or hearing, underlying metabolic abnormalities, dehydration, psychiatric diagnoses, and notify attending LIP  2. [place] high risk fall precautions, as indicated  3. Provide frequent short contacts to provide reality reorientation, refocusing and direction  4. Decrease environmental stimuli, including noise as appropriate  5. Monitor and intervene to maintain adequate nutrition, hydration, elimination, sleep and activity  6. If unable to ensure safety without constant attention obtain sitter and review sitter guidelines with assigned personnel  7. Initiate Psychosocial CNS and Spiritual Care consult, as indicated  5/18/2023 1650 by Marce Leonard RN  Outcome: Completed     Problem: Skin/Tissue Integrity  Goal: Absence of new skin breakdown  Description: 1. Monitor for areas of redness and/or skin breakdown  2. Assess vascular access sites hourly  3. Every 4-6 hours minimum:  Change oxygen saturation probe site  4. Every 4-6 hours:  If on nasal continuous positive airway pressure, respiratory therapy assess nares and determine need for appliance change or resting period.   5/18/2023 1650 by Marce Leonard RN  Outcome: Completed     Problem: Safety - Adult  Goal: Free from fall injury  5/18/2023 1650 by Marce Leonard RN  Outcome: Completed     Problem: Chronic Conditions and Co-morbidities  Goal: Patient's chronic conditions and co-morbidity symptoms are monitored and maintained or improved  Outcome: Completed     Problem: Risk for Elopement  Goal: Patient will not exit the unit/facility without proper excort  5/18/2023 1650 by Marce Leonard RN  Outcome: Completed
Problem: Confusion  Goal: Confusion, delirium, dementia, or psychosis is improved or at baseline  Outcome: Progressing  Flowsheets (Taken 5/16/2023 0246)  Effect of thought disturbance (confusion, delirium, dementia, or psychosis) are managed with adequate functional status:   [place] high risk fall precautions, as indicated   Provide frequent short contacts to provide reality reorientation, refocusing and direction
Problem: Risk for Elopement  Goal: Patient will not exit the unit/facility without proper excort  Outcome: Progressing  Flowsheets  Taken 5/16/2023 0246  Nursing Interventions for Elopement Risk: Assist with personal care needs such as toileting, eating, dressing, as needed to reduce the risk of wandering
Problem: Safety - Adult  Goal: Free from fall injury  Outcome: Progressing
Problem: Safety - Adult  Goal: Free from fall injury  Outcome: Progressing  Flowsheets (Taken 5/15/2023 8075)  Free From Fall Injury: Instruct family/caregiver on patient safety
Problem: Safety - Adult  Goal: Free from fall injury  Outcome: Progressing  Note: Pt remained free of falls. Call light within reach. Bed alarm on. Non skid footwear in place. Bed locked and in lowest position. Will continue to monitor.
Problem: Safety - Adult  Goal: Free from fall injury  5/18/2023 1255 by Esperanza Mejia RN  Outcome: Progressing  Flowsheets (Taken 5/18/2023 1255)  Free From Fall Injury:   [place] family/caregiver on patient safety   Based on caregiver fall risk screen, instruct family/caregiver to ask for assistance with transferring infant if caregiver noted to have fall risk factors     Problem: Risk for Elopement  Goal: Patient will not exit the unit/facility without proper excort  5/18/2023 1255 by Esperanza Mejia RN  Outcome: Progressing  Flowsheets (Taken 5/18/2023 1255)  Nursing Interventions for Elopement Risk:   Assist with personal care needs such as toileting, eating, dressing, as needed to reduce the risk of wandering   Collaborate with family members/caregivers to mitigate the elopement risk   Collaborate with treatment team for drug withdrawal symptoms treatment

## 2023-05-18 NOTE — CARE COORDINATION
Received cert for admission to [address] [place] [address]      Discharge to: [facility] completed: yes  Hospital Exemption Notification (HENS) completed: yes    IMM given: 5/18/23    New Durable Medical Equipment ordered/agency: none    Transportation:    Family/car:   Medical Transport explained to [place]. Pt/family voice no agency preference. Agency used:[facility]   time:8pm   Ambulance form completed: Yes    Confirmed discharge plan with:     Patient: yes     Family:  yes Star Lona notified via phone. Facility/Agency, name:  [facility] faxed [phone]   Phone number for report to facility: [phone]     RN, name: Lydia    Note: Discharging nurse to complete [facility], reconcile [facility], and place final copy with patient's discharge packet. RN to ensure that written prescriptions for  Level II medications are sent with patient to the facility as per protocol.      Demetrius Cao, RN

## 2023-05-18 NOTE — CARE COORDINATION
Chart reviewed day 4. Spoke with Lydia HOWE. Discussed PVR, none documented. Requested we evaluate. Cert remains pending for [facility]. Anticipate d/c today pending PVR. Plan to transition to macrobid to complete 7 day course, on day 4. Ludivina Hernández RN      Spoke with Lydia HOWE.  Reported PVR 21. Ludivina Hernández RN

## 2023-06-13 PROBLEM — N39.0 UTI (URINARY TRACT INFECTION): Status: RESOLVED | Noted: 2023-05-14 | Resolved: 2023-06-13

## 2023-08-31 ENCOUNTER — APPOINTMENT (OUTPATIENT)
Dept: CT IMAGING | Age: 73
DRG: 690 | End: 2023-08-31
Payer: MEDICARE

## 2023-08-31 ENCOUNTER — HOSPITAL ENCOUNTER (INPATIENT)
Age: 73
LOS: 4 days | Discharge: NURSING, REHAB OR LONG-TERM CARE | DRG: 690 | End: 2023-09-05
Attending: EMERGENCY MEDICINE | Admitting: PEDIATRICS
Payer: MEDICARE

## 2023-08-31 ENCOUNTER — APPOINTMENT (OUTPATIENT)
Dept: GENERAL RADIOLOGY | Age: 73
DRG: 690 | End: 2023-08-31
Payer: MEDICARE
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
## 2023-08-31 DIAGNOSIS — N30.00 ACUTE CYSTITIS WITHOUT HEMATURIA: ICD-10-CM

## 2023-08-31 DIAGNOSIS — N17.9 AKI (ACUTE KIDNEY INJURY) (HCC): ICD-10-CM

## 2023-08-31 LAB
ALBUMIN SERPL-MCNC: 3.4 G/DL (ref 3.4–5)
ALBUMIN/GLOB SERPL: 0.9 {RATIO} (ref 1.1–2.2)
ALP SERPL-CCNC: 37 U/L (ref 40–129)
ALT SERPL-CCNC: 15 U/L (ref 10–40)
ANION GAP SERPL CALCULATED.3IONS-SCNC: 12 MMOL/L (ref 3–16)
APTT BLD: 24.7 SEC (ref 22.7–35.9)
AST SERPL-CCNC: 51 U/L (ref 15–37)
BILIRUB SERPL-MCNC: 0.9 MG/DL (ref 0–1)
BUN SERPL-MCNC: 33 MG/DL (ref 7–20)
CALCIUM SERPL-MCNC: 8.8 MG/DL (ref 8.3–10.6)
CHLORIDE SERPL-SCNC: 102 MMOL/L (ref 99–110)
CK SERPL-CCNC: 189 U/L (ref 39–308)
CO2 SERPL-SCNC: 23 MMOL/L (ref 21–32)
CREAT SERPL-MCNC: 1.6 MG/DL (ref 0.8–1.3)
GFR SERPLBLD CREATININE-BSD FMLA CKD-EPI: 45 ML/MIN/{1.73_M2}
GLUCOSE SERPL-MCNC: 138 MG/DL (ref 70–99)
INR PPP: 1.4 (ref 0.84–1.16)
LIPASE SERPL-CCNC: 32 U/L (ref 13–60)
POTASSIUM SERPL-SCNC: 4.7 MMOL/L (ref 3.5–5.1)
PROT SERPL-MCNC: 7 G/DL (ref 6.4–8.2)
PROTHROMBIN TIME: 17.2 SEC (ref 11.5–14.8)
SODIUM SERPL-SCNC: 137 MMOL/L (ref 136–145)
TROPONIN, HIGH SENSITIVITY: 24 NG/L (ref 0–22)

## 2023-08-31 PROCEDURE — 6360000004 HC RX CONTRAST MEDICATION: Performed by: EMERGENCY MEDICINE

## 2023-08-31 PROCEDURE — 2580000003 HC RX 258: Performed by: EMERGENCY MEDICINE

## 2023-08-31 PROCEDURE — 85610 PROTHROMBIN TIME: CPT

## 2023-08-31 PROCEDURE — 93005 ELECTROCARDIOGRAM TRACING: CPT | Performed by: EMERGENCY MEDICINE

## 2023-08-31 PROCEDURE — 86900 BLOOD TYPING SEROLOGIC ABO: CPT

## 2023-08-31 PROCEDURE — 70450 CT HEAD/BRAIN W/O DYE: CPT

## 2023-08-31 PROCEDURE — 72125 CT NECK SPINE W/O DYE: CPT

## 2023-08-31 PROCEDURE — 99285 EMERGENCY DEPT VISIT HI MDM: CPT

## 2023-08-31 PROCEDURE — 82550 ASSAY OF CK (CPK): CPT

## 2023-08-31 PROCEDURE — 96374 THER/PROPH/DIAG INJ IV PUSH: CPT

## 2023-08-31 PROCEDURE — 80053 COMPREHEN METABOLIC PANEL: CPT

## 2023-08-31 PROCEDURE — 72170 X-RAY EXAM OF PELVIS: CPT

## 2023-08-31 PROCEDURE — 83690 ASSAY OF LIPASE: CPT

## 2023-08-31 PROCEDURE — 84484 ASSAY OF TROPONIN QUANT: CPT

## 2023-08-31 PROCEDURE — 86850 RBC ANTIBODY SCREEN: CPT

## 2023-08-31 PROCEDURE — 73560 X-RAY EXAM OF KNEE 1 OR 2: CPT

## 2023-08-31 PROCEDURE — 85730 THROMBOPLASTIN TIME PARTIAL: CPT

## 2023-08-31 PROCEDURE — 71260 CT THORAX DX C+: CPT

## 2023-08-31 PROCEDURE — 71045 X-RAY EXAM CHEST 1 VIEW: CPT

## 2023-08-31 PROCEDURE — 86901 BLOOD TYPING SEROLOGIC RH(D): CPT

## 2023-08-31 PROCEDURE — 6360000002 HC RX W HCPCS: Performed by: EMERGENCY MEDICINE

## 2023-08-31 RX ORDER — ONDANSETRON 2 MG/ML
4 INJECTION INTRAMUSCULAR; INTRAVENOUS ONCE
Status: COMPLETED | OUTPATIENT
Start: 2023-08-31 | End: 2023-09-01

## 2023-08-31 RX ORDER — 0.9 % SODIUM CHLORIDE 0.9 %
1000 INTRAVENOUS SOLUTION INTRAVENOUS ONCE
Status: COMPLETED | OUTPATIENT
Start: 2023-08-31 | End: 2023-09-01

## 2023-08-31 RX ORDER — FENTANYL CITRATE 50 UG/ML
100 INJECTION, SOLUTION INTRAMUSCULAR; INTRAVENOUS ONCE
Status: COMPLETED | OUTPATIENT
Start: 2023-08-31 | End: 2023-08-31

## 2023-08-31 RX ADMIN — SODIUM CHLORIDE 1000 ML: 9 INJECTION, SOLUTION INTRAVENOUS at 23:27

## 2023-08-31 RX ADMIN — IOPAMIDOL 75 ML: 755 INJECTION, SOLUTION INTRAVENOUS at 23:55

## 2023-08-31 RX ADMIN — FENTANYL CITRATE 100 MCG: 50 INJECTION INTRAMUSCULAR; INTRAVENOUS at 23:26

## 2023-08-31 ASSESSMENT — PAIN SCALES - GENERAL
PAINLEVEL_OUTOF10: 10
PAINLEVEL_OUTOF10: 10

## 2023-08-31 ASSESSMENT — PAIN - FUNCTIONAL ASSESSMENT: PAIN_FUNCTIONAL_ASSESSMENT: 0-10

## 2023-09-01 ENCOUNTER — APPOINTMENT (OUTPATIENT)
Dept: GENERAL RADIOLOGY | Age: 73
DRG: 690 | End: 2023-09-01
Payer: MEDICARE

## 2023-09-01 PROBLEM — N39.0 UTI (URINARY TRACT INFECTION): Status: ACTIVE | Noted: 2023-09-01

## 2023-09-01 LAB
ABO + RH BLD: NORMAL
ANION GAP SERPL CALCULATED.3IONS-SCNC: 9 MMOL/L (ref 3–16)
BACTERIA URNS QL MICRO: ABNORMAL /HPF
BASOPHILS # BLD: 0.1 K/UL (ref 0–0.2)
BASOPHILS NFR BLD: 0.9 %
BILIRUB UR QL STRIP.AUTO: NEGATIVE
BLD GP AB SCN SERPL QL: NORMAL
BUN SERPL-MCNC: 34 MG/DL (ref 7–20)
CALCIUM SERPL-MCNC: 8.9 MG/DL (ref 8.3–10.6)
CHLORIDE SERPL-SCNC: 105 MMOL/L (ref 99–110)
CLARITY UR: CLEAR
CO2 SERPL-SCNC: 25 MMOL/L (ref 21–32)
COLOR UR: YELLOW
CREAT SERPL-MCNC: 1.6 MG/DL (ref 0.8–1.3)
DEPRECATED RDW RBC AUTO: 17 % (ref 12.4–15.4)
EKG ATRIAL RATE: 100 BPM
EKG DIAGNOSIS: NORMAL
EKG Q-T INTERVAL: 382 MS
EKG QRS DURATION: 124 MS
EKG QTC CALCULATION (BAZETT): 474 MS
EKG R AXIS: -58 DEGREES
EKG T AXIS: 48 DEGREES
EKG VENTRICULAR RATE: 93 BPM
EOSINOPHIL # BLD: 0 K/UL (ref 0–0.6)
EOSINOPHIL NFR BLD: 0.4 %
EPI CELLS #/AREA URNS HPF: ABNORMAL /HPF (ref 0–5)
GFR SERPLBLD CREATININE-BSD FMLA CKD-EPI: 45 ML/MIN/{1.73_M2}
GLUCOSE SERPL-MCNC: 129 MG/DL (ref 70–99)
GLUCOSE UR STRIP.AUTO-MCNC: NEGATIVE MG/DL
HCT VFR BLD AUTO: 25 % (ref 40.5–52.5)
HGB BLD-MCNC: 8.4 G/DL (ref 13.5–17.5)
HGB UR QL STRIP.AUTO: ABNORMAL
KETONES UR STRIP.AUTO-MCNC: NEGATIVE MG/DL
LEUKOCYTE ESTERASE UR QL STRIP.AUTO: ABNORMAL
LYMPHOCYTES # BLD: 0.7 K/UL (ref 1–5.1)
LYMPHOCYTES NFR BLD: 8.2 %
MCH RBC QN AUTO: 28 PG (ref 26–34)
MCHC RBC AUTO-ENTMCNC: 33.4 G/DL (ref 31–36)
MCV RBC AUTO: 83.9 FL (ref 80–100)
MONOCYTES # BLD: 0.9 K/UL (ref 0–1.3)
MONOCYTES NFR BLD: 10.7 %
NEUTROPHILS # BLD: 6.5 K/UL (ref 1.7–7.7)
NEUTROPHILS NFR BLD: 79.8 %
NITRITE UR QL STRIP.AUTO: NEGATIVE
PH UR STRIP.AUTO: 8.5 [PH] (ref 5–8)
PLATELET # BLD AUTO: 151 K/UL (ref 135–450)
PMV BLD AUTO: 8.3 FL (ref 5–10.5)
POTASSIUM SERPL-SCNC: 3.8 MMOL/L (ref 3.5–5.1)
PROT UR STRIP.AUTO-MCNC: NEGATIVE MG/DL
RBC # BLD AUTO: 2.98 M/UL (ref 4.2–5.9)
RBC #/AREA URNS HPF: ABNORMAL /HPF (ref 0–4)
REASON FOR REJECTION: NORMAL
REJECTED TEST: NORMAL
SODIUM SERPL-SCNC: 139 MMOL/L (ref 136–145)
SP GR UR STRIP.AUTO: 1.01 (ref 1–1.03)
UA COMPLETE W REFLEX CULTURE PNL UR: YES
UA DIPSTICK W REFLEX MICRO PNL UR: YES
URN SPEC COLLECT METH UR: ABNORMAL
UROBILINOGEN UR STRIP-ACNC: 1 E.U./DL
WBC # BLD AUTO: 8.2 K/UL (ref 4–11)
WBC #/AREA URNS HPF: ABNORMAL /HPF (ref 0–5)

## 2023-09-01 PROCEDURE — 73552 X-RAY EXAM OF FEMUR 2/>: CPT

## 2023-09-01 PROCEDURE — 87186 SC STD MICRODIL/AGAR DIL: CPT

## 2023-09-01 PROCEDURE — 90715 TDAP VACCINE 7 YRS/> IM: CPT | Performed by: EMERGENCY MEDICINE

## 2023-09-01 PROCEDURE — 36415 COLL VENOUS BLD VENIPUNCTURE: CPT

## 2023-09-01 PROCEDURE — 93010 ELECTROCARDIOGRAM REPORT: CPT | Performed by: INTERNAL MEDICINE

## 2023-09-01 PROCEDURE — 6360000002 HC RX W HCPCS: Performed by: PEDIATRICS

## 2023-09-01 PROCEDURE — 90471 IMMUNIZATION ADMIN: CPT | Performed by: EMERGENCY MEDICINE

## 2023-09-01 PROCEDURE — 85025 COMPLETE CBC W/AUTO DIFF WBC: CPT

## 2023-09-01 PROCEDURE — 80048 BASIC METABOLIC PNL TOTAL CA: CPT

## 2023-09-01 PROCEDURE — 96375 TX/PRO/DX INJ NEW DRUG ADDON: CPT

## 2023-09-01 PROCEDURE — 1200000000 HC SEMI PRIVATE

## 2023-09-01 PROCEDURE — 6370000000 HC RX 637 (ALT 250 FOR IP): Performed by: PEDIATRICS

## 2023-09-01 PROCEDURE — 2580000003 HC RX 258: Performed by: PEDIATRICS

## 2023-09-01 PROCEDURE — 87077 CULTURE AEROBIC IDENTIFY: CPT

## 2023-09-01 PROCEDURE — 87086 URINE CULTURE/COLONY COUNT: CPT

## 2023-09-01 PROCEDURE — 81001 URINALYSIS AUTO W/SCOPE: CPT

## 2023-09-01 PROCEDURE — 6360000002 HC RX W HCPCS: Performed by: EMERGENCY MEDICINE

## 2023-09-01 PROCEDURE — 2580000003 HC RX 258: Performed by: EMERGENCY MEDICINE

## 2023-09-01 RX ORDER — SODIUM CHLORIDE 0.9 % (FLUSH) 0.9 %
5-40 SYRINGE (ML) INJECTION EVERY 12 HOURS SCHEDULED
Status: DISCONTINUED | OUTPATIENT
Start: 2023-09-01 | End: 2023-09-05 | Stop reason: HOSPADM

## 2023-09-01 RX ORDER — PANTOPRAZOLE SODIUM 40 MG/1
40 TABLET, DELAYED RELEASE ORAL DAILY
Status: DISCONTINUED | OUTPATIENT
Start: 2023-09-01 | End: 2023-09-05 | Stop reason: HOSPADM

## 2023-09-01 RX ORDER — VITAMIN B COMPLEX
1000 TABLET ORAL DAILY
Status: DISCONTINUED | OUTPATIENT
Start: 2023-09-01 | End: 2023-09-05 | Stop reason: HOSPADM

## 2023-09-01 RX ORDER — OXYCODONE HYDROCHLORIDE 5 MG/1
10 TABLET ORAL EVERY 4 HOURS PRN
Status: DISCONTINUED | OUTPATIENT
Start: 2023-09-01 | End: 2023-09-05 | Stop reason: HOSPADM

## 2023-09-01 RX ORDER — ATORVASTATIN CALCIUM 40 MG/1
40 TABLET, FILM COATED ORAL DAILY
Status: DISCONTINUED | OUTPATIENT
Start: 2023-09-01 | End: 2023-09-05 | Stop reason: HOSPADM

## 2023-09-01 RX ORDER — METOPROLOL SUCCINATE 50 MG/1
50 TABLET, EXTENDED RELEASE ORAL DAILY
Status: DISCONTINUED | OUTPATIENT
Start: 2023-09-01 | End: 2023-09-05 | Stop reason: HOSPADM

## 2023-09-01 RX ORDER — FERROUS SULFATE 325(65) MG
325 TABLET ORAL
Status: DISCONTINUED | OUTPATIENT
Start: 2023-09-01 | End: 2023-09-05 | Stop reason: HOSPADM

## 2023-09-01 RX ORDER — LORAZEPAM 0.5 MG/1
0.5 TABLET ORAL 3 TIMES DAILY PRN
Status: DISCONTINUED | OUTPATIENT
Start: 2023-09-01 | End: 2023-09-05 | Stop reason: HOSPADM

## 2023-09-01 RX ORDER — POLYETHYLENE GLYCOL 3350 17 G/17G
17 POWDER, FOR SOLUTION ORAL DAILY PRN
Status: DISCONTINUED | OUTPATIENT
Start: 2023-09-01 | End: 2023-09-05 | Stop reason: HOSPADM

## 2023-09-01 RX ORDER — ONDANSETRON 2 MG/ML
4 INJECTION INTRAMUSCULAR; INTRAVENOUS EVERY 6 HOURS PRN
Status: DISCONTINUED | OUTPATIENT
Start: 2023-09-01 | End: 2023-09-05 | Stop reason: HOSPADM

## 2023-09-01 RX ORDER — SODIUM CHLORIDE 9 MG/ML
INJECTION, SOLUTION INTRAVENOUS PRN
Status: DISCONTINUED | OUTPATIENT
Start: 2023-09-01 | End: 2023-09-05 | Stop reason: HOSPADM

## 2023-09-01 RX ORDER — SODIUM CHLORIDE 9 MG/ML
INJECTION, SOLUTION INTRAVENOUS CONTINUOUS
Status: DISCONTINUED | OUTPATIENT
Start: 2023-09-01 | End: 2023-09-05 | Stop reason: HOSPADM

## 2023-09-01 RX ORDER — ENOXAPARIN SODIUM 100 MG/ML
30 INJECTION SUBCUTANEOUS 2 TIMES DAILY
Status: DISCONTINUED | OUTPATIENT
Start: 2023-09-01 | End: 2023-09-05 | Stop reason: HOSPADM

## 2023-09-01 RX ORDER — OXYCODONE HYDROCHLORIDE 5 MG/1
5 TABLET ORAL EVERY 4 HOURS PRN
Status: DISCONTINUED | OUTPATIENT
Start: 2023-09-01 | End: 2023-09-05 | Stop reason: HOSPADM

## 2023-09-01 RX ORDER — AMLODIPINE BESYLATE 5 MG/1
5 TABLET ORAL DAILY
Status: DISCONTINUED | OUTPATIENT
Start: 2023-09-01 | End: 2023-09-05 | Stop reason: HOSPADM

## 2023-09-01 RX ORDER — FENOFIBRATE 160 MG/1
160 TABLET ORAL DAILY
Status: DISCONTINUED | OUTPATIENT
Start: 2023-09-01 | End: 2023-09-05 | Stop reason: HOSPADM

## 2023-09-01 RX ORDER — POTASSIUM CHLORIDE 20 MEQ/1
20 TABLET, EXTENDED RELEASE ORAL 2 TIMES DAILY
Status: DISCONTINUED | OUTPATIENT
Start: 2023-09-01 | End: 2023-09-05 | Stop reason: HOSPADM

## 2023-09-01 RX ORDER — ACETAMINOPHEN 650 MG/1
650 SUPPOSITORY RECTAL EVERY 6 HOURS PRN
Status: DISCONTINUED | OUTPATIENT
Start: 2023-09-01 | End: 2023-09-05 | Stop reason: HOSPADM

## 2023-09-01 RX ORDER — QUETIAPINE FUMARATE 25 MG/1
50 TABLET, FILM COATED ORAL 2 TIMES DAILY
Status: DISCONTINUED | OUTPATIENT
Start: 2023-09-01 | End: 2023-09-05 | Stop reason: HOSPADM

## 2023-09-01 RX ORDER — ASPIRIN 81 MG/1
81 TABLET, CHEWABLE ORAL DAILY
Status: DISCONTINUED | OUTPATIENT
Start: 2023-09-01 | End: 2023-09-05 | Stop reason: HOSPADM

## 2023-09-01 RX ORDER — SODIUM CHLORIDE 0.9 % (FLUSH) 0.9 %
5-40 SYRINGE (ML) INJECTION PRN
Status: DISCONTINUED | OUTPATIENT
Start: 2023-09-01 | End: 2023-09-05 | Stop reason: HOSPADM

## 2023-09-01 RX ORDER — ONDANSETRON 4 MG/1
4 TABLET, ORALLY DISINTEGRATING ORAL EVERY 8 HOURS PRN
Status: DISCONTINUED | OUTPATIENT
Start: 2023-09-01 | End: 2023-09-05 | Stop reason: HOSPADM

## 2023-09-01 RX ORDER — ACETAMINOPHEN 325 MG/1
650 TABLET ORAL EVERY 6 HOURS PRN
Status: DISCONTINUED | OUTPATIENT
Start: 2023-09-01 | End: 2023-09-05 | Stop reason: HOSPADM

## 2023-09-01 RX ORDER — OXYCODONE HYDROCHLORIDE 5 MG/1
5 TABLET ORAL EVERY 4 HOURS PRN
Status: DISCONTINUED | OUTPATIENT
Start: 2023-09-01 | End: 2023-09-01

## 2023-09-01 RX ADMIN — OXYCODONE HYDROCHLORIDE 10 MG: 5 TABLET ORAL at 07:50

## 2023-09-01 RX ADMIN — POTASSIUM CHLORIDE 20 MEQ: 1500 TABLET, EXTENDED RELEASE ORAL at 10:08

## 2023-09-01 RX ADMIN — POTASSIUM CHLORIDE 20 MEQ: 1500 TABLET, EXTENDED RELEASE ORAL at 19:44

## 2023-09-01 RX ADMIN — PANTOPRAZOLE SODIUM 40 MG: 40 TABLET, DELAYED RELEASE ORAL at 10:09

## 2023-09-01 RX ADMIN — METOPROLOL SUCCINATE 50 MG: 50 TABLET, EXTENDED RELEASE ORAL at 10:08

## 2023-09-01 RX ADMIN — FERROUS SULFATE TAB 325 MG (65 MG ELEMENTAL FE) 325 MG: 325 (65 FE) TAB at 10:08

## 2023-09-01 RX ADMIN — LORAZEPAM 0.5 MG: 0.5 TABLET ORAL at 22:28

## 2023-09-01 RX ADMIN — ACETAMINOPHEN 650 MG: 325 TABLET ORAL at 10:13

## 2023-09-01 RX ADMIN — QUETIAPINE FUMARATE 50 MG: 25 TABLET ORAL at 10:08

## 2023-09-01 RX ADMIN — QUETIAPINE FUMARATE 50 MG: 25 TABLET ORAL at 19:44

## 2023-09-01 RX ADMIN — TETANUS TOXOID, REDUCED DIPHTHERIA TOXOID AND ACELLULAR PERTUSSIS VACCINE, ADSORBED 0.5 ML: 5; 2.5; 8; 8; 2.5 SUSPENSION INTRAMUSCULAR at 03:32

## 2023-09-01 RX ADMIN — ATORVASTATIN CALCIUM 40 MG: 40 TABLET, FILM COATED ORAL at 10:08

## 2023-09-01 RX ADMIN — ONDANSETRON 4 MG: 2 INJECTION INTRAMUSCULAR; INTRAVENOUS at 00:22

## 2023-09-01 RX ADMIN — FENOFIBRATE 160 MG: 160 TABLET ORAL at 10:08

## 2023-09-01 RX ADMIN — SODIUM CHLORIDE: 9 INJECTION, SOLUTION INTRAVENOUS at 10:05

## 2023-09-01 RX ADMIN — ENOXAPARIN SODIUM 30 MG: 100 INJECTION SUBCUTANEOUS at 19:44

## 2023-09-01 RX ADMIN — Medication 1000 UNITS: at 10:09

## 2023-09-01 RX ADMIN — MEROPENEM 1000 MG: 1 INJECTION, POWDER, FOR SOLUTION INTRAVENOUS at 03:27

## 2023-09-01 RX ADMIN — ENOXAPARIN SODIUM 30 MG: 100 INJECTION SUBCUTANEOUS at 10:09

## 2023-09-01 RX ADMIN — MEROPENEM 1000 MG: 1 INJECTION, POWDER, FOR SOLUTION INTRAVENOUS at 19:46

## 2023-09-01 RX ADMIN — AMLODIPINE BESYLATE 5 MG: 5 TABLET ORAL at 10:08

## 2023-09-01 RX ADMIN — ASPIRIN 81 MG 81 MG: 81 TABLET ORAL at 10:08

## 2023-09-01 RX ADMIN — MEROPENEM 1000 MG: 1 INJECTION, POWDER, FOR SOLUTION INTRAVENOUS at 10:08

## 2023-09-01 ASSESSMENT — PAIN DESCRIPTION - ORIENTATION
ORIENTATION: RIGHT;LEFT
ORIENTATION: POSTERIOR

## 2023-09-01 ASSESSMENT — PAIN DESCRIPTION - LOCATION
LOCATION: HEAD;NECK
LOCATION: LEG

## 2023-09-01 ASSESSMENT — PAIN SCALES - GENERAL
PAINLEVEL_OUTOF10: 4
PAINLEVEL_OUTOF10: 8
PAINLEVEL_OUTOF10: 8
PAINLEVEL_OUTOF10: 2

## 2023-09-01 ASSESSMENT — PAIN DESCRIPTION - DESCRIPTORS: DESCRIPTORS: ACHING
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
## 2023-09-01 NOTE — ACP (ADVANCE CARE PLANNING)
Advance Care Planning     General Advance Care Planning (ACP) Conversation    Date of Conversation: 8/31/2023  Conducted with:  Healthcare Decision Maker: Court-Appointed Legal Guardian (name) Daughter - Yazmin Vaughan Decision Maker:    Primary Decision Maker: Amish Mckeon - Child - [phone]  Click here to complete Healthcare Decision Makers including selection of the Healthcare Decision Maker Relationship (ie \"Primary\"). Today we  verified Alma Delia Munoz is his legal guardian and pt is to remain a full code per pt wishes.       Content/Action Overview:  PT is full code per pt and daughter verified  Reviewed DNR/DNI and patient elects Full Code (Attempt Resuscitation)        Length of Voluntary ACP Conversation in minutes:  <16 minutes (Non-Billable)    Darryle Pod, RN

## 2023-09-02 LAB
ANION GAP SERPL CALCULATED.3IONS-SCNC: 8 MMOL/L (ref 3–16)
BASOPHILS # BLD: 0 K/UL (ref 0–0.2)
BASOPHILS NFR BLD: 0.6 %
BUN SERPL-MCNC: 38 MG/DL (ref 7–20)
CALCIUM SERPL-MCNC: 8.4 MG/DL (ref 8.3–10.6)
CHLORIDE SERPL-SCNC: 105 MMOL/L (ref 99–110)
CO2 SERPL-SCNC: 23 MMOL/L (ref 21–32)
CREAT SERPL-MCNC: 1.9 MG/DL (ref 0.8–1.3)
DEPRECATED RDW RBC AUTO: 17.4 % (ref 12.4–15.4)
EOSINOPHIL # BLD: 0.1 K/UL (ref 0–0.6)
EOSINOPHIL NFR BLD: 2.1 %
GFR SERPLBLD CREATININE-BSD FMLA CKD-EPI: 36 ML/MIN/{1.73_M2}
GLUCOSE SERPL-MCNC: 159 MG/DL (ref 70–99)
HCT VFR BLD AUTO: 28.5 % (ref 40.5–52.5)
HGB BLD-MCNC: 9.4 G/DL (ref 13.5–17.5)
LYMPHOCYTES # BLD: 0.6 K/UL (ref 1–5.1)
LYMPHOCYTES NFR BLD: 11.5 %
MCH RBC QN AUTO: 28 PG (ref 26–34)
MCHC RBC AUTO-ENTMCNC: 32.9 G/DL (ref 31–36)
MCV RBC AUTO: 84.9 FL (ref 80–100)
MONOCYTES # BLD: 0.5 K/UL (ref 0–1.3)
MONOCYTES NFR BLD: 9.3 %
NEUTROPHILS # BLD: 4.1 K/UL (ref 1.7–7.7)
NEUTROPHILS NFR BLD: 76.5 %
PLATELET # BLD AUTO: 159 K/UL (ref 135–450)
PMV BLD AUTO: 8.5 FL (ref 5–10.5)
POTASSIUM SERPL-SCNC: 4.6 MMOL/L (ref 3.5–5.1)
RBC # BLD AUTO: 3.36 M/UL (ref 4.2–5.9)
SODIUM SERPL-SCNC: 136 MMOL/L (ref 136–145)
WBC # BLD AUTO: 5.4 K/UL (ref 4–11)

## 2023-09-02 PROCEDURE — 85025 COMPLETE CBC W/AUTO DIFF WBC: CPT

## 2023-09-02 PROCEDURE — 6370000000 HC RX 637 (ALT 250 FOR IP): Performed by: PEDIATRICS

## 2023-09-02 PROCEDURE — 94761 N-INVAS EAR/PLS OXIMETRY MLT: CPT

## 2023-09-02 PROCEDURE — 80048 BASIC METABOLIC PNL TOTAL CA: CPT

## 2023-09-02 PROCEDURE — 2580000003 HC RX 258: Performed by: PEDIATRICS

## 2023-09-02 PROCEDURE — 36415 COLL VENOUS BLD VENIPUNCTURE: CPT

## 2023-09-02 PROCEDURE — 2700000000 HC OXYGEN THERAPY PER DAY

## 2023-09-02 PROCEDURE — 1200000000 HC SEMI PRIVATE

## 2023-09-02 PROCEDURE — 6360000002 HC RX W HCPCS: Performed by: PEDIATRICS

## 2023-09-02 RX ADMIN — OXYCODONE HYDROCHLORIDE 10 MG: 5 TABLET ORAL at 08:13

## 2023-09-02 RX ADMIN — QUETIAPINE FUMARATE 50 MG: 25 TABLET ORAL at 21:47

## 2023-09-02 RX ADMIN — POTASSIUM CHLORIDE 20 MEQ: 1500 TABLET, EXTENDED RELEASE ORAL at 21:48

## 2023-09-02 RX ADMIN — QUETIAPINE FUMARATE 50 MG: 25 TABLET ORAL at 08:13

## 2023-09-02 RX ADMIN — FENOFIBRATE 160 MG: 160 TABLET ORAL at 08:14

## 2023-09-02 RX ADMIN — FERROUS SULFATE TAB 325 MG (65 MG ELEMENTAL FE) 325 MG: 325 (65 FE) TAB at 08:14

## 2023-09-02 RX ADMIN — MEROPENEM 1000 MG: 1 INJECTION, POWDER, FOR SOLUTION INTRAVENOUS at 02:50

## 2023-09-02 RX ADMIN — ATORVASTATIN CALCIUM 40 MG: 40 TABLET, FILM COATED ORAL at 08:14

## 2023-09-02 RX ADMIN — METOPROLOL SUCCINATE 50 MG: 50 TABLET, EXTENDED RELEASE ORAL at 08:14

## 2023-09-02 RX ADMIN — ENOXAPARIN SODIUM 30 MG: 100 INJECTION SUBCUTANEOUS at 08:14

## 2023-09-02 RX ADMIN — Medication 10 ML: at 21:48

## 2023-09-02 RX ADMIN — B-COMPLEX W/ C & FOLIC ACID TAB 1 TABLET: TAB at 08:14

## 2023-09-02 RX ADMIN — ASPIRIN 81 MG 81 MG: 81 TABLET ORAL at 08:13

## 2023-09-02 RX ADMIN — ACETAMINOPHEN 650 MG: 325 TABLET ORAL at 11:44

## 2023-09-02 RX ADMIN — AMLODIPINE BESYLATE 5 MG: 5 TABLET ORAL at 08:14

## 2023-09-02 RX ADMIN — POTASSIUM CHLORIDE 20 MEQ: 1500 TABLET, EXTENDED RELEASE ORAL at 08:14

## 2023-09-02 RX ADMIN — MEROPENEM 1000 MG: 1 INJECTION, POWDER, FOR SOLUTION INTRAVENOUS at 11:52

## 2023-09-02 RX ADMIN — Medication 10 ML: at 08:15

## 2023-09-02 RX ADMIN — OXYCODONE HYDROCHLORIDE 10 MG: 5 TABLET ORAL at 13:51

## 2023-09-02 RX ADMIN — Medication 1000 UNITS: at 08:13

## 2023-09-02 RX ADMIN — ACETAMINOPHEN 650 MG: 325 TABLET ORAL at 23:29

## 2023-09-02 RX ADMIN — SODIUM CHLORIDE: 9 INJECTION, SOLUTION INTRAVENOUS at 19:14

## 2023-09-02 RX ADMIN — SODIUM CHLORIDE: 9 INJECTION, SOLUTION INTRAVENOUS at 02:49

## 2023-09-02 RX ADMIN — ENOXAPARIN SODIUM 30 MG: 100 INJECTION SUBCUTANEOUS at 21:48

## 2023-09-02 RX ADMIN — PANTOPRAZOLE SODIUM 40 MG: 40 TABLET, DELAYED RELEASE ORAL at 08:14

## 2023-09-02 ASSESSMENT — PAIN SCALES - GENERAL
PAINLEVEL_OUTOF10: 8
PAINLEVEL_OUTOF10: 3
PAINLEVEL_OUTOF10: 8
PAINLEVEL_OUTOF10: 4

## 2023-09-02 ASSESSMENT — PAIN DESCRIPTION - LOCATION
LOCATION: GENERALIZED
LOCATION: NECK
LOCATION: HEAD
LOCATION: GENERALIZED;ABDOMEN

## 2023-09-02 ASSESSMENT — PAIN DESCRIPTION - DESCRIPTORS
DESCRIPTORS: ACHING;CRAMPING
DESCRIPTORS: ACHING
DESCRIPTORS: ACHING;DISCOMFORT

## 2023-09-02 ASSESSMENT — PAIN DESCRIPTION - ORIENTATION
ORIENTATION: ANTERIOR;POSTERIOR
ORIENTATION: ANTERIOR

## 2023-09-02 NOTE — PLAN OF CARE
Problem: Pain  Goal: Verbalizes/displays adequate comfort level or baseline comfort level  Flowsheets (Taken 9/2/2023 1221)  Verbalizes/displays adequate comfort level or baseline comfort level:   Encourage patient to monitor pain and request assistance   Assess pain using appropriate pain scale   Administer analgesics based on type and severity of pain and evaluate response   Implement non-pharmacological measures as appropriate and evaluate response

## 2023-09-03 LAB
ANION GAP SERPL CALCULATED.3IONS-SCNC: 6 MMOL/L (ref 3–16)
BACTERIA UR CULT: ABNORMAL
BASOPHILS # BLD: 0 K/UL (ref 0–0.2)
BASOPHILS NFR BLD: 0.5 %
BUN SERPL-MCNC: 40 MG/DL (ref 7–20)
CALCIUM SERPL-MCNC: 8.4 MG/DL (ref 8.3–10.6)
CHLORIDE SERPL-SCNC: 109 MMOL/L (ref 99–110)
CO2 SERPL-SCNC: 24 MMOL/L (ref 21–32)
CREAT SERPL-MCNC: 1.8 MG/DL (ref 0.8–1.3)
DEPRECATED RDW RBC AUTO: 17.1 % (ref 12.4–15.4)
EOSINOPHIL # BLD: 0.1 K/UL (ref 0–0.6)
EOSINOPHIL NFR BLD: 2.2 %
GFR SERPLBLD CREATININE-BSD FMLA CKD-EPI: 39 ML/MIN/{1.73_M2}
GLUCOSE SERPL-MCNC: 117 MG/DL (ref 70–99)
HCT VFR BLD AUTO: 26.3 % (ref 40.5–52.5)
HGB BLD-MCNC: 8.7 G/DL (ref 13.5–17.5)
LYMPHOCYTES # BLD: 0.6 K/UL (ref 1–5.1)
LYMPHOCYTES NFR BLD: 14.8 %
MCH RBC QN AUTO: 28.2 PG (ref 26–34)
MCHC RBC AUTO-ENTMCNC: 33.3 G/DL (ref 31–36)
MCV RBC AUTO: 84.8 FL (ref 80–100)
MONOCYTES # BLD: 0.5 K/UL (ref 0–1.3)
MONOCYTES NFR BLD: 13.2 %
NEUTROPHILS # BLD: 2.6 K/UL (ref 1.7–7.7)
NEUTROPHILS NFR BLD: 69.3 %
ORGANISM: ABNORMAL
PLATELET # BLD AUTO: 133 K/UL (ref 135–450)
PMV BLD AUTO: 8.3 FL (ref 5–10.5)
POTASSIUM SERPL-SCNC: 4.8 MMOL/L (ref 3.5–5.1)
RBC # BLD AUTO: 3.1 M/UL (ref 4.2–5.9)
SODIUM SERPL-SCNC: 139 MMOL/L (ref 136–145)
WBC # BLD AUTO: 3.8 K/UL (ref 4–11)

## 2023-09-03 PROCEDURE — 97530 THERAPEUTIC ACTIVITIES: CPT

## 2023-09-03 PROCEDURE — 97166 OT EVAL MOD COMPLEX 45 MIN: CPT

## 2023-09-03 PROCEDURE — 80048 BASIC METABOLIC PNL TOTAL CA: CPT

## 2023-09-03 PROCEDURE — 6360000002 HC RX W HCPCS: Performed by: INTERNAL MEDICINE

## 2023-09-03 PROCEDURE — 85025 COMPLETE CBC W/AUTO DIFF WBC: CPT

## 2023-09-03 PROCEDURE — 2580000003 HC RX 258: Performed by: PEDIATRICS

## 2023-09-03 PROCEDURE — 2580000003 HC RX 258: Performed by: INTERNAL MEDICINE

## 2023-09-03 PROCEDURE — 6370000000 HC RX 637 (ALT 250 FOR IP): Performed by: PEDIATRICS

## 2023-09-03 PROCEDURE — 97161 PT EVAL LOW COMPLEX 20 MIN: CPT

## 2023-09-03 PROCEDURE — 2700000000 HC OXYGEN THERAPY PER DAY

## 2023-09-03 PROCEDURE — 94761 N-INVAS EAR/PLS OXIMETRY MLT: CPT

## 2023-09-03 PROCEDURE — 6360000002 HC RX W HCPCS: Performed by: PEDIATRICS

## 2023-09-03 PROCEDURE — 1200000000 HC SEMI PRIVATE

## 2023-09-03 PROCEDURE — 36415 COLL VENOUS BLD VENIPUNCTURE: CPT

## 2023-09-03 RX ADMIN — OXYCODONE HYDROCHLORIDE 5 MG: 5 TABLET ORAL at 06:03

## 2023-09-03 RX ADMIN — PANTOPRAZOLE SODIUM 40 MG: 40 TABLET, DELAYED RELEASE ORAL at 08:43

## 2023-09-03 RX ADMIN — Medication 1000 UNITS: at 08:43

## 2023-09-03 RX ADMIN — SODIUM CHLORIDE: 9 INJECTION, SOLUTION INTRAVENOUS at 08:39

## 2023-09-03 RX ADMIN — QUETIAPINE FUMARATE 50 MG: 25 TABLET ORAL at 21:29

## 2023-09-03 RX ADMIN — ATORVASTATIN CALCIUM 40 MG: 40 TABLET, FILM COATED ORAL at 08:43

## 2023-09-03 RX ADMIN — SODIUM CHLORIDE: 9 INJECTION, SOLUTION INTRAVENOUS at 21:29

## 2023-09-03 RX ADMIN — METOPROLOL SUCCINATE 50 MG: 50 TABLET, EXTENDED RELEASE ORAL at 08:43

## 2023-09-03 RX ADMIN — OXYCODONE HYDROCHLORIDE 10 MG: 5 TABLET ORAL at 16:48

## 2023-09-03 RX ADMIN — MEROPENEM 1000 MG: 1 INJECTION, POWDER, FOR SOLUTION INTRAVENOUS at 10:54

## 2023-09-03 RX ADMIN — ENOXAPARIN SODIUM 30 MG: 100 INJECTION SUBCUTANEOUS at 08:43

## 2023-09-03 RX ADMIN — QUETIAPINE FUMARATE 50 MG: 25 TABLET ORAL at 08:43

## 2023-09-03 RX ADMIN — FENOFIBRATE 160 MG: 160 TABLET ORAL at 08:43

## 2023-09-03 RX ADMIN — MEROPENEM 1000 MG: 1 INJECTION, POWDER, FOR SOLUTION INTRAVENOUS at 00:24

## 2023-09-03 RX ADMIN — ACETAMINOPHEN 650 MG: 325 TABLET ORAL at 08:42

## 2023-09-03 RX ADMIN — POTASSIUM CHLORIDE 20 MEQ: 1500 TABLET, EXTENDED RELEASE ORAL at 21:29

## 2023-09-03 RX ADMIN — FERROUS SULFATE TAB 325 MG (65 MG ELEMENTAL FE) 325 MG: 325 (65 FE) TAB at 08:43

## 2023-09-03 RX ADMIN — OXYCODONE HYDROCHLORIDE 10 MG: 5 TABLET ORAL at 10:51

## 2023-09-03 RX ADMIN — AMLODIPINE BESYLATE 5 MG: 5 TABLET ORAL at 08:42

## 2023-09-03 RX ADMIN — POTASSIUM CHLORIDE 20 MEQ: 1500 TABLET, EXTENDED RELEASE ORAL at 08:43

## 2023-09-03 RX ADMIN — ASPIRIN 81 MG 81 MG: 81 TABLET ORAL at 08:42

## 2023-09-03 RX ADMIN — ENOXAPARIN SODIUM 30 MG: 100 INJECTION SUBCUTANEOUS at 21:29

## 2023-09-03 ASSESSMENT — PAIN SCALES - GENERAL
PAINLEVEL_OUTOF10: 10
PAINLEVEL_OUTOF10: 8
PAINLEVEL_OUTOF10: 8
PAINLEVEL_OUTOF10: 5

## 2023-09-03 ASSESSMENT — PAIN DESCRIPTION - DESCRIPTORS
DESCRIPTORS: ACHING;CRAMPING
DESCRIPTORS: CRAMPING;ACHING
DESCRIPTORS: ACHING
DESCRIPTORS: ACHING;CRAMPING

## 2023-09-03 ASSESSMENT — PAIN DESCRIPTION - LOCATION
LOCATION: NECK;GENERALIZED
LOCATION: GENERALIZED

## 2023-09-03 ASSESSMENT — PAIN DESCRIPTION - ORIENTATION
ORIENTATION: ANTERIOR;POSTERIOR

## 2023-09-03 NOTE — PLAN OF CARE
Problem: Pain  Goal: Verbalizes/displays adequate comfort level or baseline comfort level  9/3/2023 0901 by Jackie Gan RN  Flowsheets (Taken 9/3/2023 0901)  Verbalizes/displays adequate comfort level or baseline comfort level:   Encourage patient to monitor pain and request assistance   Assess pain using appropriate pain scale   Administer analgesics based on type and severity of pain and evaluate response   Implement non-pharmacological measures as appropriate and evaluate response  9/2/2023 2257 by Surekha Patton RN  Outcome: Progressing  Flowsheets (Taken 9/2/2023 2257)  Verbalizes/displays adequate comfort level or baseline comfort level:   Encourage patient to monitor pain and request assistance   Assess pain using appropriate pain scale   Administer analgesics based on type and severity of pain and evaluate response   Implement non-pharmacological measures as appropriate and evaluate response     Problem: Safety - Adult  Goal: Free from fall injury  9/2/2023 2257 by Surekha Patton RN  Outcome: Progressing  Flowsheets (Taken 9/2/2023 2257)  Free From Fall Injury: Instruct family/caregiver on patient safety     Problem: Skin/Tissue Integrity  Goal: Absence of new skin breakdown  Description: 1. Monitor for areas of redness and/or skin breakdown  2. Assess vascular access sites hourly  3. Every 4-6 hours minimum:  Change oxygen saturation probe site  4. Every 4-6 hours:  If on nasal continuous positive airway pressure, respiratory therapy assess nares and determine need for appliance change or resting period.   9/2/2023 2257 by Surekha Patton RN  Outcome: Progressing

## 2023-09-03 NOTE — PLAN OF CARE
Problem: Pain  Goal: Verbalizes/displays adequate comfort level or baseline comfort level  9/2/2023 2257 by Ashok Flannery RN  Outcome: Progressing  Flowsheets (Taken 9/2/2023 2257)  Verbalizes/displays adequate comfort level or baseline comfort level:   Encourage patient to monitor pain and request assistance   Assess pain using appropriate pain scale   Administer analgesics based on type and severity of pain and evaluate response   Implement non-pharmacological measures as appropriate and evaluate response    Problem: Safety - Adult  Goal: Free from fall injury  Outcome: Progressing  Flowsheets (Taken 9/2/2023 2257)  Free From Fall Injury: Instruct family/caregiver on patient safety     Problem: Skin/Tissue Integrity  Goal: Absence of new skin breakdown  Description: 1. Monitor for areas of redness and/or skin breakdown  2. Assess vascular access sites hourly  3. Every 4-6 hours minimum:  Change oxygen saturation probe site  4. Every 4-6 hours:  If on nasal continuous positive airway pressure, respiratory therapy assess nares and determine need for appliance change or resting period.   Outcome: Progressing

## 2023-09-04 LAB
BASOPHILS # BLD: 0 K/UL (ref 0–0.2)
BASOPHILS NFR BLD: 0.8 %
DEPRECATED RDW RBC AUTO: 17.5 % (ref 12.4–15.4)
EOSINOPHIL # BLD: 0.2 K/UL (ref 0–0.6)
EOSINOPHIL NFR BLD: 3.8 %
HCT VFR BLD AUTO: 25.9 % (ref 40.5–52.5)
HGB BLD-MCNC: 8.6 G/DL (ref 13.5–17.5)
LYMPHOCYTES # BLD: 0.8 K/UL (ref 1–5.1)
LYMPHOCYTES NFR BLD: 19.3 %
MCH RBC QN AUTO: 28.2 PG (ref 26–34)
MCHC RBC AUTO-ENTMCNC: 33.2 G/DL (ref 31–36)
MCV RBC AUTO: 84.9 FL (ref 80–100)
MONOCYTES # BLD: 0.5 K/UL (ref 0–1.3)
MONOCYTES NFR BLD: 12.8 %
NEUTROPHILS # BLD: 2.5 K/UL (ref 1.7–7.7)
NEUTROPHILS NFR BLD: 63.3 %
PLATELET # BLD AUTO: 184 K/UL (ref 135–450)
PMV BLD AUTO: 8.2 FL (ref 5–10.5)
RBC # BLD AUTO: 3.05 M/UL (ref 4.2–5.9)
WBC # BLD AUTO: 4 K/UL (ref 4–11)

## 2023-09-04 PROCEDURE — 97530 THERAPEUTIC ACTIVITIES: CPT

## 2023-09-04 PROCEDURE — 97164 PT RE-EVAL EST PLAN CARE: CPT

## 2023-09-04 PROCEDURE — 85025 COMPLETE CBC W/AUTO DIFF WBC: CPT

## 2023-09-04 PROCEDURE — 6370000000 HC RX 637 (ALT 250 FOR IP): Performed by: PEDIATRICS

## 2023-09-04 PROCEDURE — 97168 OT RE-EVAL EST PLAN CARE: CPT

## 2023-09-04 PROCEDURE — 6360000002 HC RX W HCPCS: Performed by: PEDIATRICS

## 2023-09-04 PROCEDURE — 94761 N-INVAS EAR/PLS OXIMETRY MLT: CPT

## 2023-09-04 PROCEDURE — 2580000003 HC RX 258: Performed by: INTERNAL MEDICINE

## 2023-09-04 PROCEDURE — 6370000000 HC RX 637 (ALT 250 FOR IP): Performed by: INTERNAL MEDICINE

## 2023-09-04 PROCEDURE — 2700000000 HC OXYGEN THERAPY PER DAY

## 2023-09-04 PROCEDURE — 1200000000 HC SEMI PRIVATE

## 2023-09-04 PROCEDURE — 6360000002 HC RX W HCPCS: Performed by: INTERNAL MEDICINE

## 2023-09-04 PROCEDURE — 2580000003 HC RX 258: Performed by: PEDIATRICS

## 2023-09-04 RX ORDER — CALCIUM POLYCARBOPHIL 625 MG 625 MG/1
625 TABLET ORAL EVERY EVENING
Status: DISCONTINUED | OUTPATIENT
Start: 2023-09-04 | End: 2023-09-05 | Stop reason: HOSPADM

## 2023-09-04 RX ORDER — DOCUSATE SODIUM 100 MG/1
100 CAPSULE, LIQUID FILLED ORAL DAILY
Status: DISCONTINUED | OUTPATIENT
Start: 2023-09-04 | End: 2023-09-05 | Stop reason: HOSPADM

## 2023-09-04 RX ORDER — CASTOR OIL AND BALSAM, PERU 788; 87 MG/G; MG/G
OINTMENT TOPICAL 2 TIMES DAILY
Status: DISCONTINUED | OUTPATIENT
Start: 2023-09-04 | End: 2023-09-05 | Stop reason: HOSPADM

## 2023-09-04 RX ADMIN — CALCIUM POLYCARBOPHIL 625 MG: 625 TABLET, FILM COATED ORAL at 20:09

## 2023-09-04 RX ADMIN — POTASSIUM CHLORIDE 20 MEQ: 1500 TABLET, EXTENDED RELEASE ORAL at 07:52

## 2023-09-04 RX ADMIN — ENOXAPARIN SODIUM 30 MG: 100 INJECTION SUBCUTANEOUS at 20:08

## 2023-09-04 RX ADMIN — ATORVASTATIN CALCIUM 40 MG: 40 TABLET, FILM COATED ORAL at 07:52

## 2023-09-04 RX ADMIN — OXYCODONE HYDROCHLORIDE 10 MG: 5 TABLET ORAL at 02:52

## 2023-09-04 RX ADMIN — DOCUSATE SODIUM 100 MG: 100 CAPSULE, LIQUID FILLED ORAL at 12:42

## 2023-09-04 RX ADMIN — OXYCODONE HYDROCHLORIDE 10 MG: 5 TABLET ORAL at 07:52

## 2023-09-04 RX ADMIN — POTASSIUM CHLORIDE 20 MEQ: 1500 TABLET, EXTENDED RELEASE ORAL at 20:09

## 2023-09-04 RX ADMIN — MEROPENEM 1000 MG: 1 INJECTION, POWDER, FOR SOLUTION INTRAVENOUS at 00:21

## 2023-09-04 RX ADMIN — PANTOPRAZOLE SODIUM 40 MG: 40 TABLET, DELAYED RELEASE ORAL at 07:52

## 2023-09-04 RX ADMIN — CASTOR OIL AND BALSAM, PERU: 788; 87 OINTMENT TOPICAL at 20:07

## 2023-09-04 RX ADMIN — Medication 1000 UNITS: at 07:53

## 2023-09-04 RX ADMIN — AMLODIPINE BESYLATE 5 MG: 5 TABLET ORAL at 07:52

## 2023-09-04 RX ADMIN — QUETIAPINE FUMARATE 50 MG: 25 TABLET ORAL at 20:09

## 2023-09-04 RX ADMIN — CEFTRIAXONE SODIUM 1000 MG: 1 INJECTION, POWDER, FOR SOLUTION INTRAMUSCULAR; INTRAVENOUS at 14:29

## 2023-09-04 RX ADMIN — Medication 10 ML: at 20:08

## 2023-09-04 RX ADMIN — OXYCODONE HYDROCHLORIDE 10 MG: 5 TABLET ORAL at 15:28

## 2023-09-04 RX ADMIN — SODIUM CHLORIDE: 9 INJECTION, SOLUTION INTRAVENOUS at 07:52

## 2023-09-04 RX ADMIN — FENOFIBRATE 160 MG: 160 TABLET ORAL at 07:52

## 2023-09-04 RX ADMIN — CASTOR OIL AND BALSAM, PERU: 788; 87 OINTMENT TOPICAL at 12:43

## 2023-09-04 RX ADMIN — ASPIRIN 81 MG 81 MG: 81 TABLET ORAL at 07:52

## 2023-09-04 RX ADMIN — QUETIAPINE FUMARATE 50 MG: 25 TABLET ORAL at 07:52

## 2023-09-04 RX ADMIN — FERROUS SULFATE TAB 325 MG (65 MG ELEMENTAL FE) 325 MG: 325 (65 FE) TAB at 07:52

## 2023-09-04 RX ADMIN — METOPROLOL SUCCINATE 50 MG: 50 TABLET, EXTENDED RELEASE ORAL at 07:52

## 2023-09-04 RX ADMIN — ENOXAPARIN SODIUM 30 MG: 100 INJECTION SUBCUTANEOUS at 07:53

## 2023-09-04 ASSESSMENT — PAIN DESCRIPTION - FREQUENCY: FREQUENCY: CONTINUOUS

## 2023-09-04 ASSESSMENT — PAIN - FUNCTIONAL ASSESSMENT
PAIN_FUNCTIONAL_ASSESSMENT: PREVENTS OR INTERFERES WITH ALL ACTIVE AND SOME PASSIVE ACTIVITIES
PAIN_FUNCTIONAL_ASSESSMENT: ACTIVITIES ARE NOT PREVENTED

## 2023-09-04 ASSESSMENT — PAIN SCALES - WONG BAKER
WONGBAKER_NUMERICALRESPONSE: 0

## 2023-09-04 ASSESSMENT — PAIN SCALES - GENERAL
PAINLEVEL_OUTOF10: 10

## 2023-09-04 ASSESSMENT — PAIN DESCRIPTION - LOCATION
LOCATION: GENERALIZED

## 2023-09-04 ASSESSMENT — PAIN DESCRIPTION - DESCRIPTORS
DESCRIPTORS: ACHING
DESCRIPTORS: ACHING

## 2023-09-04 ASSESSMENT — PAIN DESCRIPTION - PAIN TYPE: TYPE: ACUTE PAIN

## 2023-09-04 ASSESSMENT — PAIN DESCRIPTION - ONSET: ONSET: ON-GOING

## 2023-09-04 ASSESSMENT — PAIN DESCRIPTION - ORIENTATION: ORIENTATION: ANTERIOR;POSTERIOR

## 2023-09-04 NOTE — PLAN OF CARE
Problem: Pain  Goal: Verbalizes/displays adequate comfort level or baseline comfort level  9/4/2023 1237 by Maria Esther Child RN  Outcome: Progressing  Flowsheets (Taken 9/4/2023 1237)  Verbalizes/displays adequate comfort level or baseline comfort level:   Encourage patient to monitor pain and request assistance   Assess pain using appropriate pain scale   Administer analgesics based on type and severity of pain and evaluate response   Implement non-pharmacological measures as appropriate and evaluate response  9/4/2023 0137 by Ashlee Lopez RN  Outcome: Progressing     Problem: Skin/Tissue Integrity  Goal: Absence of new skin breakdown  Description: 1. Monitor for areas of redness and/or skin breakdown  2. Assess vascular access sites hourly  3. Every 4-6 hours minimum:  Change oxygen saturation probe site  4. Every 4-6 hours:  If on nasal continuous positive airway pressure, respiratory therapy assess nares and determine need for appliance change or resting period. 9/4/2023 1237 by Maria Esther Child RN  Outcome: Progressing  Note: Encouraged pt to shift weight in bed since he has been refusing turns since admission.    9/4/2023 0137 by Ashlee Lopez RN  Outcome: Progressing
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
## 2023-09-04 NOTE — PLAN OF CARE
Problem: Pain  Goal: Verbalizes/displays adequate comfort level or baseline comfort level  Outcome: Progressing     Problem: Safety - Adult  Goal: Free from fall injury  Outcome: Progressing     Problem: Skin/Tissue Integrity  Goal: Absence of new skin breakdown  Description: 1. Monitor for areas of redness and/or skin breakdown  2. Assess vascular access sites hourly  3. Every 4-6 hours minimum:  Change oxygen saturation probe site  4. Every 4-6 hours:  If on nasal continuous positive airway pressure, respiratory therapy assess nares and determine need for appliance change or resting period. Outcome: Progressing     Problem: Chronic Conditions and Co-morbidities  Goal: Patient's chronic conditions and co-morbidity symptoms are monitored and maintained or improved  Outcome: Progressing     Continue with plan of care.

## 2023-09-05 VITALS
OXYGEN SATURATION: 94 % | RESPIRATION RATE: 18 BRPM | HEART RATE: 69 BPM | BODY MASS INDEX: 29.52 KG/M2 | SYSTOLIC BLOOD PRESSURE: 156 MMHG | TEMPERATURE: 97.2 F | DIASTOLIC BLOOD PRESSURE: 98 MMHG | WEIGHT: 250 LBS | HEIGHT: 77 IN

## 2023-09-05 PROCEDURE — 2580000003 HC RX 258: Performed by: INTERNAL MEDICINE

## 2023-09-05 PROCEDURE — 6370000000 HC RX 637 (ALT 250 FOR IP): Performed by: INTERNAL MEDICINE

## 2023-09-05 PROCEDURE — 6370000000 HC RX 637 (ALT 250 FOR IP): Performed by: PEDIATRICS

## 2023-09-05 PROCEDURE — 6360000002 HC RX W HCPCS: Performed by: INTERNAL MEDICINE

## 2023-09-05 PROCEDURE — 6360000002 HC RX W HCPCS: Performed by: PEDIATRICS

## 2023-09-05 RX ORDER — CEFDINIR 300 MG/1
300 CAPSULE ORAL EVERY 12 HOURS SCHEDULED
Qty: 5 CAPSULE | Refills: 0
Start: 2023-09-06 | End: 2023-09-09

## 2023-09-05 RX ORDER — LIDOCAINE 4 G/G
1 PATCH TOPICAL DAILY
Status: DISCONTINUED | OUTPATIENT
Start: 2023-09-05 | End: 2023-09-05 | Stop reason: HOSPADM

## 2023-09-05 RX ORDER — LIDOCAINE 4 G/G
1 PATCH TOPICAL DAILY
Qty: 5 PATCH | Refills: 0 | Status: SHIPPED | OUTPATIENT
Start: 2023-09-06

## 2023-09-05 RX ORDER — CASTOR OIL AND BALSAM, PERU 788; 87 MG/G; MG/G
OINTMENT TOPICAL 2 TIMES DAILY
Qty: 1 EACH | Refills: 0
Start: 2023-09-05

## 2023-09-05 RX ORDER — SACCHAROMYCES BOULARDII 250 MG
250 CAPSULE ORAL 2 TIMES DAILY
Status: DISCONTINUED | OUTPATIENT
Start: 2023-09-05 | End: 2023-09-05 | Stop reason: HOSPADM

## 2023-09-05 RX ORDER — OXYCODONE HYDROCHLORIDE 5 MG/1
5 TABLET ORAL EVERY 8 HOURS PRN
Qty: 10 TABLET | Refills: 0 | Status: SHIPPED | OUTPATIENT
Start: 2023-09-05 | End: 2023-09-05 | Stop reason: SDUPTHER

## 2023-09-05 RX ORDER — SACCHAROMYCES BOULARDII 250 MG
250 CAPSULE ORAL 2 TIMES DAILY
Qty: 14 CAPSULE | Refills: 0 | Status: SHIPPED | OUTPATIENT
Start: 2023-09-05 | End: 2023-09-12

## 2023-09-05 RX ORDER — CEFDINIR 300 MG/1
300 CAPSULE ORAL EVERY 12 HOURS SCHEDULED
Status: DISCONTINUED | OUTPATIENT
Start: 2023-09-06 | End: 2023-09-05 | Stop reason: HOSPADM

## 2023-09-05 RX ORDER — CALCIUM POLYCARBOPHIL 625 MG 625 MG/1
625 TABLET ORAL EVERY EVENING
Refills: 4 | COMMUNITY
Start: 2023-09-05

## 2023-09-05 RX ORDER — POLYETHYLENE GLYCOL 3350 17 G/17G
17 POWDER, FOR SOLUTION ORAL DAILY PRN
Qty: 527 G | Refills: 0
Start: 2023-09-05

## 2023-09-05 RX ORDER — OXYCODONE HYDROCHLORIDE 5 MG/1
5 TABLET ORAL EVERY 8 HOURS PRN
Qty: 10 TABLET | Refills: 0 | Status: SHIPPED | OUTPATIENT
Start: 2023-09-05 | End: 2023-09-08

## 2023-09-05 RX ORDER — PSEUDOEPHEDRINE HCL 30 MG
100 TABLET ORAL DAILY
Qty: 30 CAPSULE | Refills: 0
Start: 2023-09-06

## 2023-09-05 RX ADMIN — FERROUS SULFATE TAB 325 MG (65 MG ELEMENTAL FE) 325 MG: 325 (65 FE) TAB at 09:42

## 2023-09-05 RX ADMIN — B-COMPLEX W/ C & FOLIC ACID TAB 1 TABLET: TAB at 09:40

## 2023-09-05 RX ADMIN — ATORVASTATIN CALCIUM 40 MG: 40 TABLET, FILM COATED ORAL at 09:42

## 2023-09-05 RX ADMIN — QUETIAPINE FUMARATE 50 MG: 25 TABLET ORAL at 09:42

## 2023-09-05 RX ADMIN — POTASSIUM CHLORIDE 20 MEQ: 1500 TABLET, EXTENDED RELEASE ORAL at 09:42

## 2023-09-05 RX ADMIN — METOPROLOL SUCCINATE 50 MG: 50 TABLET, EXTENDED RELEASE ORAL at 09:42

## 2023-09-05 RX ADMIN — FENOFIBRATE 160 MG: 160 TABLET ORAL at 09:42

## 2023-09-05 RX ADMIN — OXYCODONE HYDROCHLORIDE 10 MG: 5 TABLET ORAL at 09:47

## 2023-09-05 RX ADMIN — DOCUSATE SODIUM 100 MG: 100 CAPSULE, LIQUID FILLED ORAL at 09:42

## 2023-09-05 RX ADMIN — CEFTRIAXONE SODIUM 1000 MG: 1 INJECTION, POWDER, FOR SOLUTION INTRAMUSCULAR; INTRAVENOUS at 14:59

## 2023-09-05 RX ADMIN — SODIUM CHLORIDE: 9 INJECTION, SOLUTION INTRAVENOUS at 00:26

## 2023-09-05 RX ADMIN — AMLODIPINE BESYLATE 5 MG: 5 TABLET ORAL at 09:42

## 2023-09-05 RX ADMIN — PANTOPRAZOLE SODIUM 40 MG: 40 TABLET, DELAYED RELEASE ORAL at 09:42

## 2023-09-05 RX ADMIN — ENOXAPARIN SODIUM 30 MG: 100 INJECTION SUBCUTANEOUS at 09:43

## 2023-09-05 RX ADMIN — Medication 1000 UNITS: at 09:42

## 2023-09-05 RX ADMIN — CASTOR OIL AND BALSAM, PERU: 788; 87 OINTMENT TOPICAL at 09:55

## 2023-09-05 RX ADMIN — ASPIRIN 81 MG 81 MG: 81 TABLET ORAL at 09:42

## 2023-09-05 ASSESSMENT — PAIN SCALES - GENERAL
PAINLEVEL_OUTOF10: 10
PAINLEVEL_OUTOF10: 9

## 2023-09-05 ASSESSMENT — PAIN DESCRIPTION - LOCATION: LOCATION: ABDOMEN
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
## 2023-09-05 NOTE — PLAN OF CARE
Problem: Pain  Goal: Verbalizes/displays adequate comfort level or baseline comfort level  9/5/2023 1725 by Janice Raerdon RN  Outcome: Completed     Problem: Safety - Adult  Goal: Free from fall injury  9/5/2023 1725 by Janice Reardon RN  Outcome: Completed     Problem: Skin/Tissue Integrity  Goal: Absence of new skin breakdown  Description: 1. Monitor for areas of redness and/or skin breakdown  2. Assess vascular access sites hourly  3. Every 4-6 hours minimum:  Change oxygen saturation probe site  4. Every 4-6 hours:  If on nasal continuous positive airway pressure, respiratory therapy assess nares and determine need for appliance change or resting period.   Outcome: Completed     Problem: Chronic Conditions and Co-morbidities  Goal: Patient's chronic conditions and co-morbidity symptoms are monitored and maintained or improved  Outcome: Completed
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
## 2023-09-05 NOTE — PLAN OF CARE
Problem: Pain  Goal: Verbalizes/displays adequate comfort level or baseline comfort level  Outcome: Progressing  Flowsheets (Taken 9/5/2023 1231)  Verbalizes/displays adequate comfort level or baseline comfort level:   Encourage patient to monitor pain and request assistance   Assess pain using appropriate pain scale   Implement non-pharmacological measures as appropriate and evaluate response   Administer analgesics based on type and severity of pain and evaluate response     Problem: Safety - Adult  Goal: Free from fall injury  Outcome: Progressing  Flowsheets (Taken 9/5/2023 1231)  Free From Fall Injury:   Instruct family/caregiver on patient safety   Based on caregiver fall risk screen, instruct family/caregiver to ask for assistance with transferring infant if caregiver noted to have fall risk factors

## 2023-09-05 NOTE — DISCHARGE INSTR - COC
Cleansed 09/04/23 0859   Dressing/Treatment Pharmaceutical agent (see MAR) 09/04/23 0859   Dressing Change Due 09/04/23 09/04/23 0859   Wound Length (cm) 0.7 cm 09/04/23 0859   Wound Width (cm) 1.5 cm 09/04/23 0859   Wound Depth (cm) 0 cm 09/04/23 0859   Wound Surface Area (cm^2) 1.05 cm^2 09/04/23 0859   Change in Wound Size % (l*w) -2525 09/04/23 0859   Wound Volume (cm^3) 0 cm^3 09/04/23 0859   Wound Assessment Purple/maroon 09/04/23 0859   Drainage Amount None (dry) 09/04/23 0859   Odor None 09/04/23 0859   Tasia-wound Assessment Blanchable erythema 09/04/23 0859   Margins Attached edges 09/04/23 0859   Wound Thickness Description not for Pressure Injury Full thickness 09/04/23 0859   Number of days: 1       Wound 09/04/23 Pretibial Right;Lateral (Active)   Wound Image   09/04/23 0859   Wound Etiology Venous 09/05/23 0938   Dressing Status Clean;Dry; Intact 09/05/23 0938   Wound Cleansed Cleansed with saline 09/04/23 0859   Dressing/Treatment Xeroform 09/04/23 0859   Offloading for Diabetic Foot Ulcers Offloading ordered 09/04/23 0859   Dressing Change Due 09/06/23 09/04/23 0859   Wound Length (cm) 3 cm 09/04/23 0859   Wound Width (cm) 2 cm 09/04/23 0859   Wound Depth (cm) 0.1 cm 09/04/23 0859   Wound Surface Area (cm^2) 6 cm^2 09/04/23 0859   Change in Wound Size % (l*w) -2400 09/04/23 0859   Wound Volume (cm^3) 0.6 cm^3 09/04/23 0859   Wound Assessment [place]/red;Slough 09/04/23 0859   Drainage Amount Scant (moist but unmeasurable) 09/04/23 0859   Drainage Description Serous 09/04/23 0859   Odor None 09/04/23 0859   Tasia-wound Assessment Blanchable erythema 09/04/23 0859   Margins Unattached edges 09/04/23 0859   Wound Thickness Description not for Pressure Injury Partial thickness 09/04/23 0859   Number of days: 1       Wound 07/26/19 Arm Left; Lower;Proximal;Posterior bleeding noted, 1.5 inches long, 0.5 inches wide  (Active)   Number of days: 1501       Wound 09/18/19 Pretibial Left (Active)   Number of days:

## 2023-09-11 ENCOUNTER — TELEPHONE (OUTPATIENT)
Dept: CASE MANAGEMENT | Age: 73
End: 2023-09-11

## 2023-09-11 NOTE — TELEPHONE ENCOUNTER
Imaging report CT Chest 8/31/23 with f/u imaging recommendations sent to Zaid Varma MD     May consider pulmonology referral for nodules greater than or equal to 6 mm    [address] [facility]  [email or web address]

## 2023-10-01 PROBLEM — N39.0 UTI (URINARY TRACT INFECTION): Status: RESOLVED | Noted: 2023-09-01 | Resolved: 2023-10-01

## 2024-05-04 ENCOUNTER — APPOINTMENT (OUTPATIENT)
Dept: GENERAL RADIOLOGY | Age: 74
End: 2024-05-04
Payer: MEDICARE

## 2024-05-04 ENCOUNTER — APPOINTMENT (OUTPATIENT)
Dept: CT IMAGING | Age: 74
End: 2024-05-04
Payer: MEDICARE

## 2024-05-04 ENCOUNTER — HOSPITAL ENCOUNTER (INPATIENT)
Age: 74
LOS: 6 days | Discharge: NURSING, REHAB OR LONG-TERM CARE | End: 2024-05-10
Attending: STUDENT IN AN ORGANIZED HEALTH CARE EDUCATION/TRAINING PROGRAM | Admitting: STUDENT IN AN ORGANIZED HEALTH CARE EDUCATION/TRAINING PROGRAM
Payer: MEDICARE

## 2024-05-04 DIAGNOSIS — I50.9 NEW ONSET OF CONGESTIVE HEART FAILURE (HCC): ICD-10-CM

## 2024-05-04 DIAGNOSIS — R07.9 CHEST PAIN, UNSPECIFIED TYPE: ICD-10-CM

## 2024-05-04 DIAGNOSIS — R07.9 ACUTE CHEST PAIN: ICD-10-CM

## 2024-05-04 DIAGNOSIS — J96.01 ACUTE HYPOXEMIC RESPIRATORY FAILURE (HCC): Primary | ICD-10-CM

## 2024-05-04 DIAGNOSIS — R79.89 ELEVATED TROPONIN: ICD-10-CM

## 2024-05-04 DIAGNOSIS — I50.1 PULMONARY EDEMA CARDIAC CAUSE (HCC): ICD-10-CM

## 2024-05-04 PROBLEM — J81.0 FLASH PULMONARY EDEMA (HCC): Status: ACTIVE | Noted: 2024-05-04

## 2024-05-04 LAB
ALBUMIN SERPL-MCNC: 2.8 G/DL (ref 3.4–5)
ALBUMIN/GLOB SERPL: 0.8 {RATIO} (ref 1.1–2.2)
ALP SERPL-CCNC: 70 U/L (ref 40–129)
ALT SERPL-CCNC: <5 U/L (ref 10–40)
ANION GAP SERPL CALCULATED.3IONS-SCNC: 10 MMOL/L (ref 3–16)
AST SERPL-CCNC: 11 U/L (ref 15–37)
BASE EXCESS BLDV CALC-SCNC: 3.6 MMOL/L (ref -3–3)
BASOPHILS # BLD: 0 K/UL (ref 0–0.2)
BASOPHILS NFR BLD: 0.7 %
BILIRUB SERPL-MCNC: 0.8 MG/DL (ref 0–1)
BUN SERPL-MCNC: 27 MG/DL (ref 7–20)
CALCIUM SERPL-MCNC: 8.6 MG/DL (ref 8.3–10.6)
CHLORIDE SERPL-SCNC: 101 MMOL/L (ref 99–110)
CO2 BLDV-SCNC: 31 MMOL/L
CO2 SERPL-SCNC: 29 MMOL/L (ref 21–32)
COHGB MFR BLDV: 2.9 % (ref 0–1.5)
CREAT SERPL-MCNC: 1 MG/DL (ref 0.8–1.3)
DEPRECATED RDW RBC AUTO: 18.8 % (ref 12.4–15.4)
EKG ATRIAL RATE: 127 BPM
EKG ATRIAL RATE: 60 BPM
EKG DIAGNOSIS: NORMAL
EKG DIAGNOSIS: NORMAL
EKG Q-T INTERVAL: 406 MS
EKG Q-T INTERVAL: 504 MS
EKG QRS DURATION: 120 MS
EKG QRS DURATION: 122 MS
EKG QTC CALCULATION (BAZETT): 494 MS
EKG QTC CALCULATION (BAZETT): 504 MS
EKG R AXIS: -49 DEGREES
EKG R AXIS: -51 DEGREES
EKG T AXIS: 1 DEGREES
EKG T AXIS: 45 DEGREES
EKG VENTRICULAR RATE: 58 BPM
EKG VENTRICULAR RATE: 93 BPM
EOSINOPHIL # BLD: 0.1 K/UL (ref 0–0.6)
EOSINOPHIL NFR BLD: 2.4 %
GFR SERPLBLD CREATININE-BSD FMLA CKD-EPI: 78 ML/MIN/{1.73_M2}
GLUCOSE BLD-MCNC: 117 MG/DL (ref 70–99)
GLUCOSE BLD-MCNC: 118 MG/DL (ref 70–99)
GLUCOSE BLD-MCNC: 124 MG/DL (ref 70–99)
GLUCOSE BLD-MCNC: 151 MG/DL (ref 70–99)
GLUCOSE SERPL-MCNC: 142 MG/DL (ref 70–99)
HCO3 BLDV-SCNC: 29.1 MMOL/L (ref 23–29)
HCT VFR BLD AUTO: 31.1 % (ref 40.5–52.5)
HGB BLD-MCNC: 10 G/DL (ref 13.5–17.5)
IRON SATN MFR SERPL: 20 % (ref 20–50)
IRON SERPL-MCNC: 43 UG/DL (ref 59–158)
LACTATE BLDV-SCNC: 1.3 MMOL/L (ref 0.4–1.9)
LYMPHOCYTES # BLD: 1 K/UL (ref 1–5.1)
LYMPHOCYTES NFR BLD: 17.6 %
MAGNESIUM SERPL-MCNC: 1.5 MG/DL (ref 1.8–2.4)
MCH RBC QN AUTO: 26.8 PG (ref 26–34)
MCHC RBC AUTO-ENTMCNC: 32.2 G/DL (ref 31–36)
MCV RBC AUTO: 83.2 FL (ref 80–100)
METHGB MFR BLDV: 0.3 %
MONOCYTES # BLD: 0.4 K/UL (ref 0–1.3)
MONOCYTES NFR BLD: 7 %
NEUTROPHILS # BLD: 4.2 K/UL (ref 1.7–7.7)
NEUTROPHILS NFR BLD: 72.3 %
NT-PROBNP SERPL-MCNC: ABNORMAL PG/ML (ref 0–449)
O2 THERAPY: ABNORMAL
PCO2 BLDV: 48.3 MMHG (ref 40–50)
PERFORMED ON: ABNORMAL
PH BLDV: 7.4 [PH] (ref 7.35–7.45)
PLATELET # BLD AUTO: 185 K/UL (ref 135–450)
PMV BLD AUTO: 8.3 FL (ref 5–10.5)
PO2 BLDV: 30 MMHG (ref 25–40)
POTASSIUM SERPL-SCNC: 3.4 MMOL/L (ref 3.5–5.1)
PROT SERPL-MCNC: 6.2 G/DL (ref 6.4–8.2)
RBC # BLD AUTO: 3.74 M/UL (ref 4.2–5.9)
SAO2 % BLDV: 51 %
SODIUM SERPL-SCNC: 140 MMOL/L (ref 136–145)
TIBC SERPL-MCNC: 210 UG/DL (ref 260–445)
TROPONIN, HIGH SENSITIVITY: 22 NG/L (ref 0–22)
TROPONIN, HIGH SENSITIVITY: 31 NG/L (ref 0–22)
WBC # BLD AUTO: 5.8 K/UL (ref 4–11)

## 2024-05-04 PROCEDURE — 85025 COMPLETE CBC W/AUTO DIFF WBC: CPT

## 2024-05-04 PROCEDURE — 99285 EMERGENCY DEPT VISIT HI MDM: CPT

## 2024-05-04 PROCEDURE — 6370000000 HC RX 637 (ALT 250 FOR IP): Performed by: NURSE PRACTITIONER

## 2024-05-04 PROCEDURE — 6360000002 HC RX W HCPCS: Performed by: NURSE PRACTITIONER

## 2024-05-04 PROCEDURE — 80053 COMPREHEN METABOLIC PANEL: CPT

## 2024-05-04 PROCEDURE — 83880 ASSAY OF NATRIURETIC PEPTIDE: CPT

## 2024-05-04 PROCEDURE — 96366 THER/PROPH/DIAG IV INF ADDON: CPT

## 2024-05-04 PROCEDURE — 97162 PT EVAL MOD COMPLEX 30 MIN: CPT

## 2024-05-04 PROCEDURE — 6370000000 HC RX 637 (ALT 250 FOR IP): Performed by: STUDENT IN AN ORGANIZED HEALTH CARE EDUCATION/TRAINING PROGRAM

## 2024-05-04 PROCEDURE — 2060000000 HC ICU INTERMEDIATE R&B

## 2024-05-04 PROCEDURE — 6360000002 HC RX W HCPCS: Performed by: STUDENT IN AN ORGANIZED HEALTH CARE EDUCATION/TRAINING PROGRAM

## 2024-05-04 PROCEDURE — 92610 EVALUATE SWALLOWING FUNCTION: CPT

## 2024-05-04 PROCEDURE — 2700000000 HC OXYGEN THERAPY PER DAY

## 2024-05-04 PROCEDURE — 2580000003 HC RX 258: Performed by: STUDENT IN AN ORGANIZED HEALTH CARE EDUCATION/TRAINING PROGRAM

## 2024-05-04 PROCEDURE — 94761 N-INVAS EAR/PLS OXIMETRY MLT: CPT

## 2024-05-04 PROCEDURE — 97530 THERAPEUTIC ACTIVITIES: CPT

## 2024-05-04 PROCEDURE — 96375 TX/PRO/DX INJ NEW DRUG ADDON: CPT

## 2024-05-04 PROCEDURE — 83735 ASSAY OF MAGNESIUM: CPT

## 2024-05-04 PROCEDURE — 96365 THER/PROPH/DIAG IV INF INIT: CPT

## 2024-05-04 PROCEDURE — 93010 ELECTROCARDIOGRAM REPORT: CPT | Performed by: INTERNAL MEDICINE

## 2024-05-04 PROCEDURE — 97166 OT EVAL MOD COMPLEX 45 MIN: CPT

## 2024-05-04 PROCEDURE — 71045 X-RAY EXAM CHEST 1 VIEW: CPT

## 2024-05-04 PROCEDURE — 83540 ASSAY OF IRON: CPT

## 2024-05-04 PROCEDURE — 84484 ASSAY OF TROPONIN QUANT: CPT

## 2024-05-04 PROCEDURE — 6360000004 HC RX CONTRAST MEDICATION: Performed by: STUDENT IN AN ORGANIZED HEALTH CARE EDUCATION/TRAINING PROGRAM

## 2024-05-04 PROCEDURE — 82803 BLOOD GASES ANY COMBINATION: CPT

## 2024-05-04 PROCEDURE — 97535 SELF CARE MNGMENT TRAINING: CPT

## 2024-05-04 PROCEDURE — 83550 IRON BINDING TEST: CPT

## 2024-05-04 PROCEDURE — 93005 ELECTROCARDIOGRAM TRACING: CPT | Performed by: STUDENT IN AN ORGANIZED HEALTH CARE EDUCATION/TRAINING PROGRAM

## 2024-05-04 PROCEDURE — 71260 CT THORAX DX C+: CPT

## 2024-05-04 PROCEDURE — 93005 ELECTROCARDIOGRAM TRACING: CPT | Performed by: NURSE PRACTITIONER

## 2024-05-04 PROCEDURE — 87040 BLOOD CULTURE FOR BACTERIA: CPT

## 2024-05-04 PROCEDURE — 83605 ASSAY OF LACTIC ACID: CPT

## 2024-05-04 PROCEDURE — 6370000000 HC RX 637 (ALT 250 FOR IP): Performed by: INTERNAL MEDICINE

## 2024-05-04 PROCEDURE — 36415 COLL VENOUS BLD VENIPUNCTURE: CPT

## 2024-05-04 PROCEDURE — 99223 1ST HOSP IP/OBS HIGH 75: CPT | Performed by: INTERNAL MEDICINE

## 2024-05-04 RX ORDER — FUROSEMIDE 10 MG/ML
40 INJECTION INTRAMUSCULAR; INTRAVENOUS ONCE
Status: COMPLETED | OUTPATIENT
Start: 2024-05-04 | End: 2024-05-04

## 2024-05-04 RX ORDER — INSULIN LISPRO 100 [IU]/ML
0-4 INJECTION, SOLUTION INTRAVENOUS; SUBCUTANEOUS NIGHTLY
Status: DISCONTINUED | OUTPATIENT
Start: 2024-05-04 | End: 2024-05-10 | Stop reason: HOSPADM

## 2024-05-04 RX ORDER — LIDOCAINE 4 G/G
1 PATCH TOPICAL DAILY
Status: DISCONTINUED | OUTPATIENT
Start: 2024-05-04 | End: 2024-05-10 | Stop reason: HOSPADM

## 2024-05-04 RX ORDER — SODIUM CHLORIDE 9 MG/ML
INJECTION, SOLUTION INTRAVENOUS PRN
Status: DISCONTINUED | OUTPATIENT
Start: 2024-05-04 | End: 2024-05-10 | Stop reason: HOSPADM

## 2024-05-04 RX ORDER — ENOXAPARIN SODIUM 100 MG/ML
40 INJECTION SUBCUTANEOUS
Status: DISCONTINUED | OUTPATIENT
Start: 2024-05-05 | End: 2024-05-06

## 2024-05-04 RX ORDER — LOSARTAN POTASSIUM 25 MG/1
25 TABLET ORAL DAILY
Status: DISCONTINUED | OUTPATIENT
Start: 2024-05-04 | End: 2024-05-04

## 2024-05-04 RX ORDER — LANOLIN ALCOHOL/MO/W.PET/CERES
3 CREAM (GRAM) TOPICAL NIGHTLY PRN
Status: DISCONTINUED | OUTPATIENT
Start: 2024-05-04 | End: 2024-05-10 | Stop reason: HOSPADM

## 2024-05-04 RX ORDER — GLUCAGON 1 MG/ML
1 KIT INJECTION PRN
Status: DISCONTINUED | OUTPATIENT
Start: 2024-05-04 | End: 2024-05-10 | Stop reason: HOSPADM

## 2024-05-04 RX ORDER — ACETAMINOPHEN 650 MG/1
650 SUPPOSITORY RECTAL EVERY 6 HOURS PRN
Status: DISCONTINUED | OUTPATIENT
Start: 2024-05-04 | End: 2024-05-10 | Stop reason: HOSPADM

## 2024-05-04 RX ORDER — ONDANSETRON 2 MG/ML
4 INJECTION INTRAMUSCULAR; INTRAVENOUS EVERY 6 HOURS PRN
Status: DISCONTINUED | OUTPATIENT
Start: 2024-05-04 | End: 2024-05-10 | Stop reason: HOSPADM

## 2024-05-04 RX ORDER — RIVASTIGMINE 4.6 MG/24H
1 PATCH, EXTENDED RELEASE TRANSDERMAL DAILY
COMMUNITY

## 2024-05-04 RX ORDER — LACTOBACILLUS RHAMNOSUS GG 10B CELL
1 CAPSULE ORAL DAILY
Status: DISCONTINUED | OUTPATIENT
Start: 2024-05-04 | End: 2024-05-10 | Stop reason: HOSPADM

## 2024-05-04 RX ORDER — POTASSIUM CHLORIDE 20 MEQ/1
40 TABLET, EXTENDED RELEASE ORAL ONCE
Status: DISCONTINUED | OUTPATIENT
Start: 2024-05-04 | End: 2024-05-04

## 2024-05-04 RX ORDER — INSULIN LISPRO 100 [IU]/ML
0-4 INJECTION, SOLUTION INTRAVENOUS; SUBCUTANEOUS
Status: DISCONTINUED | OUTPATIENT
Start: 2024-05-04 | End: 2024-05-10 | Stop reason: HOSPADM

## 2024-05-04 RX ORDER — ASPIRIN 81 MG/1
324 TABLET, CHEWABLE ORAL ONCE
Status: COMPLETED | OUTPATIENT
Start: 2024-05-04 | End: 2024-05-04

## 2024-05-04 RX ORDER — SODIUM CHLORIDE 0.9 % (FLUSH) 0.9 %
5-40 SYRINGE (ML) INJECTION EVERY 12 HOURS SCHEDULED
Status: DISCONTINUED | OUTPATIENT
Start: 2024-05-04 | End: 2024-05-10 | Stop reason: HOSPADM

## 2024-05-04 RX ORDER — ATORVASTATIN CALCIUM 40 MG/1
40 TABLET, FILM COATED ORAL DAILY
Status: DISCONTINUED | OUTPATIENT
Start: 2024-05-04 | End: 2024-05-10 | Stop reason: HOSPADM

## 2024-05-04 RX ORDER — FUROSEMIDE 10 MG/ML
40 INJECTION INTRAMUSCULAR; INTRAVENOUS 2 TIMES DAILY
Status: DISCONTINUED | OUTPATIENT
Start: 2024-05-04 | End: 2024-05-04

## 2024-05-04 RX ORDER — DOCUSATE SODIUM 100 MG/1
100 CAPSULE, LIQUID FILLED ORAL DAILY
Status: DISCONTINUED | OUTPATIENT
Start: 2024-05-04 | End: 2024-05-10 | Stop reason: HOSPADM

## 2024-05-04 RX ORDER — HYDRALAZINE HYDROCHLORIDE 50 MG/1
50 TABLET, FILM COATED ORAL EVERY 8 HOURS SCHEDULED
Status: DISCONTINUED | OUTPATIENT
Start: 2024-05-04 | End: 2024-05-05

## 2024-05-04 RX ORDER — DEXTROSE MONOHYDRATE 100 MG/ML
INJECTION, SOLUTION INTRAVENOUS CONTINUOUS PRN
Status: DISCONTINUED | OUTPATIENT
Start: 2024-05-04 | End: 2024-05-10 | Stop reason: HOSPADM

## 2024-05-04 RX ORDER — POTASSIUM CHLORIDE 20 MEQ/1
20 TABLET, EXTENDED RELEASE ORAL 2 TIMES DAILY
Status: DISCONTINUED | OUTPATIENT
Start: 2024-05-04 | End: 2024-05-04

## 2024-05-04 RX ORDER — POLYETHYLENE GLYCOL 3350 17 G/17G
17 POWDER, FOR SOLUTION ORAL DAILY PRN
Status: DISCONTINUED | OUTPATIENT
Start: 2024-05-04 | End: 2024-05-04 | Stop reason: SDUPTHER

## 2024-05-04 RX ORDER — ISOSORBIDE MONONITRATE 30 MG/1
30 TABLET, EXTENDED RELEASE ORAL DAILY
Status: DISCONTINUED | OUTPATIENT
Start: 2024-05-04 | End: 2024-05-05

## 2024-05-04 RX ORDER — FUROSEMIDE 10 MG/ML
40 INJECTION INTRAMUSCULAR; INTRAVENOUS DAILY
Status: DISCONTINUED | OUTPATIENT
Start: 2024-05-05 | End: 2024-05-06

## 2024-05-04 RX ORDER — CALCIUM POLYCARBOPHIL 625 MG 625 MG/1
625 TABLET ORAL EVERY EVENING
Status: DISCONTINUED | OUTPATIENT
Start: 2024-05-04 | End: 2024-05-10 | Stop reason: HOSPADM

## 2024-05-04 RX ORDER — POLYETHYLENE GLYCOL 3350 17 G/17G
17 POWDER, FOR SOLUTION ORAL DAILY PRN
Status: DISCONTINUED | OUTPATIENT
Start: 2024-05-04 | End: 2024-05-10 | Stop reason: HOSPADM

## 2024-05-04 RX ORDER — ASPIRIN 81 MG/1
81 TABLET, CHEWABLE ORAL DAILY
Status: DISCONTINUED | OUTPATIENT
Start: 2024-05-04 | End: 2024-05-10 | Stop reason: HOSPADM

## 2024-05-04 RX ORDER — DIVALPROEX SODIUM 250 MG/1
500 TABLET, DELAYED RELEASE ORAL EVERY 12 HOURS SCHEDULED
Status: DISCONTINUED | OUTPATIENT
Start: 2024-05-04 | End: 2024-05-04

## 2024-05-04 RX ORDER — RIVASTIGMINE 4.6 MG/24H
1 PATCH, EXTENDED RELEASE TRANSDERMAL DAILY
Status: DISCONTINUED | OUTPATIENT
Start: 2024-05-04 | End: 2024-05-10 | Stop reason: HOSPADM

## 2024-05-04 RX ORDER — ACETAMINOPHEN 325 MG/1
650 TABLET ORAL EVERY 6 HOURS PRN
Status: DISCONTINUED | OUTPATIENT
Start: 2024-05-04 | End: 2024-05-10 | Stop reason: HOSPADM

## 2024-05-04 RX ORDER — ONDANSETRON 4 MG/1
4 TABLET, ORALLY DISINTEGRATING ORAL EVERY 8 HOURS PRN
Status: DISCONTINUED | OUTPATIENT
Start: 2024-05-04 | End: 2024-05-10 | Stop reason: HOSPADM

## 2024-05-04 RX ORDER — AMLODIPINE BESYLATE 5 MG/1
5 TABLET ORAL DAILY
Status: DISCONTINUED | OUTPATIENT
Start: 2024-05-04 | End: 2024-05-04

## 2024-05-04 RX ORDER — METOPROLOL SUCCINATE 50 MG/1
50 TABLET, EXTENDED RELEASE ORAL DAILY
Status: DISCONTINUED | OUTPATIENT
Start: 2024-05-04 | End: 2024-05-04

## 2024-05-04 RX ORDER — POTASSIUM CHLORIDE 20 MEQ/1
20 TABLET, EXTENDED RELEASE ORAL 2 TIMES DAILY
Status: DISCONTINUED | OUTPATIENT
Start: 2024-05-05 | End: 2024-05-04

## 2024-05-04 RX ORDER — CLONIDINE 0.2 MG/24H
1 PATCH, EXTENDED RELEASE TRANSDERMAL WEEKLY
Status: DISCONTINUED | OUTPATIENT
Start: 2024-05-04 | End: 2024-05-09

## 2024-05-04 RX ORDER — PANTOPRAZOLE SODIUM 40 MG/1
40 TABLET, DELAYED RELEASE ORAL DAILY
Status: DISCONTINUED | OUTPATIENT
Start: 2024-05-04 | End: 2024-05-10 | Stop reason: HOSPADM

## 2024-05-04 RX ORDER — METOPROLOL SUCCINATE 50 MG/1
50 TABLET, EXTENDED RELEASE ORAL 2 TIMES DAILY
Status: DISCONTINUED | OUTPATIENT
Start: 2024-05-04 | End: 2024-05-04

## 2024-05-04 RX ORDER — DIVALPROEX SODIUM 125 MG/1
375 CAPSULE, COATED PELLETS ORAL EVERY 12 HOURS SCHEDULED
Status: DISCONTINUED | OUTPATIENT
Start: 2024-05-04 | End: 2024-05-06

## 2024-05-04 RX ORDER — MAGNESIUM SULFATE IN WATER 40 MG/ML
2000 INJECTION, SOLUTION INTRAVENOUS ONCE
Status: COMPLETED | OUTPATIENT
Start: 2024-05-04 | End: 2024-05-04

## 2024-05-04 RX ORDER — SODIUM CHLORIDE 0.9 % (FLUSH) 0.9 %
5-40 SYRINGE (ML) INJECTION PRN
Status: DISCONTINUED | OUTPATIENT
Start: 2024-05-04 | End: 2024-05-10 | Stop reason: HOSPADM

## 2024-05-04 RX ORDER — METOPROLOL SUCCINATE 25 MG/1
25 TABLET, EXTENDED RELEASE ORAL DAILY
Status: DISCONTINUED | OUTPATIENT
Start: 2024-05-05 | End: 2024-05-04

## 2024-05-04 RX ADMIN — DIVALPROEX SODIUM 375 MG: 125 CAPSULE ORAL at 19:44

## 2024-05-04 RX ADMIN — DOCUSATE SODIUM 100 MG: 100 CAPSULE, LIQUID FILLED ORAL at 12:33

## 2024-05-04 RX ADMIN — HYDRALAZINE HYDROCHLORIDE 50 MG: 50 TABLET ORAL at 23:06

## 2024-05-04 RX ADMIN — ASPIRIN 81 MG 324 MG: 81 TABLET ORAL at 05:16

## 2024-05-04 RX ADMIN — DIVALPROEX SODIUM 375 MG: 125 CAPSULE ORAL at 12:31

## 2024-05-04 RX ADMIN — LOSARTAN POTASSIUM 25 MG: 25 TABLET, FILM COATED ORAL at 09:03

## 2024-05-04 RX ADMIN — CALCIUM POLYCARBOPHIL 625 MG: 625 TABLET ORAL at 18:52

## 2024-05-04 RX ADMIN — FUROSEMIDE 40 MG: 10 INJECTION, SOLUTION INTRAMUSCULAR; INTRAVENOUS at 05:15

## 2024-05-04 RX ADMIN — SODIUM CHLORIDE, PRESERVATIVE FREE 10 ML: 5 INJECTION INTRAVENOUS at 12:32

## 2024-05-04 RX ADMIN — FUROSEMIDE 40 MG: 10 INJECTION, SOLUTION INTRAMUSCULAR; INTRAVENOUS at 12:32

## 2024-05-04 RX ADMIN — B-COMPLEX W/ C & FOLIC ACID TAB 1 TABLET: TAB at 12:33

## 2024-05-04 RX ADMIN — Medication 1 CAPSULE: at 12:33

## 2024-05-04 RX ADMIN — MAGNESIUM SULFATE HEPTAHYDRATE 2000 MG: 40 INJECTION, SOLUTION INTRAVENOUS at 03:42

## 2024-05-04 RX ADMIN — AMLODIPINE BESYLATE 5 MG: 5 TABLET ORAL at 09:03

## 2024-05-04 RX ADMIN — PANTOPRAZOLE SODIUM 40 MG: 40 TABLET, DELAYED RELEASE ORAL at 12:33

## 2024-05-04 RX ADMIN — IOPAMIDOL 75 ML: 755 INJECTION, SOLUTION INTRAVENOUS at 02:24

## 2024-05-04 RX ADMIN — ISOSORBIDE MONONITRATE 30 MG: 30 TABLET, EXTENDED RELEASE ORAL at 12:31

## 2024-05-04 RX ADMIN — SODIUM CHLORIDE, PRESERVATIVE FREE 10 ML: 5 INJECTION INTRAVENOUS at 19:39

## 2024-05-04 RX ADMIN — ATORVASTATIN CALCIUM 40 MG: 40 TABLET, FILM COATED ORAL at 19:39

## 2024-05-04 RX ADMIN — HYDRALAZINE HYDROCHLORIDE 50 MG: 50 TABLET ORAL at 17:08

## 2024-05-04 RX ADMIN — METOPROLOL SUCCINATE 50 MG: 50 TABLET, EXTENDED RELEASE ORAL at 09:03

## 2024-05-04 ASSESSMENT — LIFESTYLE VARIABLES
HOW OFTEN DO YOU HAVE A DRINK CONTAINING ALCOHOL: NEVER
HOW MANY STANDARD DRINKS CONTAINING ALCOHOL DO YOU HAVE ON A TYPICAL DAY: PATIENT DOES NOT DRINK

## 2024-05-04 ASSESSMENT — PAIN SCALES - GENERAL
PAINLEVEL_OUTOF10: 5
PAINLEVEL_OUTOF10: 0

## 2024-05-04 ASSESSMENT — PAIN - FUNCTIONAL ASSESSMENT: PAIN_FUNCTIONAL_ASSESSMENT: 0-10

## 2024-05-04 NOTE — PROGRESS NOTES
Patient admitted to room 427 from emergency department. Patient oriented to room, call light, bed rails, phone, lights and bathroom. Patient instructed about the schedule of the day including: vital sign frequency, lab draws, possible tests, frequency of MD and staff rounds, daily weights, I &O's and prescribed diet.  Telemetry box in place, patient aware of placement and reason. Bed locked, in lowest position, side rails up 2/4, call light within reach.               4 Eyes Skin Assessment     NAME:  Alma Delia Garner  YOB: 1950  MEDICAL RECORD NUMBER:  [id number]    The patient is being assessed for  Admission    I agree that at least one RN has performed a thorough Head to Toe Skin Assessment on the patient. ALL assessment sites listed below have been assessed.      Areas assessed by both nurses:    Head, Face, Ears, Shoulders, Back, Chest, Arms, Elbows, Hands, Sacrum. Buttock, Coccyx, Ischium, Legs. Feet and Heels, and Under Medical Devices   Blanchable redness to coccyx.  Scattered bruising and abrasions to extremities.  Skin dry and flaky.  Vascular discoloration to BLE.      Does the Patient have a Wound? No noted wound(s)       [place] Prevention initiated by RN: Yes  Wound Care Orders initiated by RN: No    Pressure Injury (Stage 3,4, Unstageable, DTI, NWPT, and Complex wounds) if present, place Wound referral order by RN under : No    New Ostomies, if present place, Ostomy referral order under : No     Nurse 1 eSignature: Electronically signed by Kareen Sánchez RN on 5/4/24 at 6:53 AM EDT    **SHARE this note so that the co-signing nurse can place an eSignature**    Nurse 2 eSignature: Electronically signed by Michelle Robison RN on 5/4/24 at 7:11 AM EDT

## 2024-05-04 NOTE — PLAN OF CARE
Bedside swallow evaluation completed.    Shanel Andersen M.A. CCC-SLP  Speech Language Pathologist

## 2024-05-04 NOTE — CONSULTS
tablet Take 1 tablet by mouth daily  Patient not taking: Reported on 5/4/2024 5/5/23   Gisselle Bedoya APRN - CNP   pantoprazole (PROTONIX) 40 MG tablet Take 1 tablet by mouth daily 5/5/23   Gisselle Bedoya APRN - CNP   aspirin 81 MG chewable tablet Take 1 tablet by mouth daily 5/5/23   Gisselle Bedoya APRN - CNP   Multiple Vitamin (VITAMIN B COMPLEX W/C) TABS Take 1 tablet by mouth daily 5/5/23   Gisselle Bedoya APRN - CNP   lactobacillus (CULTURELLE) capsule Take 1 capsule by mouth daily 5/5/23   Gisselle Bedoya APRN - CNP   potassium chloride (KLOR-CON M) 20 MEQ extended release tablet Take 1 tablet by mouth 2 times daily 5/5/23   Gisselle Bedoya APRN - CNP   ferrous sulfate (IRON 325) 325 (65 Fe) MG tablet Take 1 tablet by mouth daily (with breakfast)  Patient not taking: Reported on 5/4/2024 5/5/23   Gisselle Bedoya APRN - CNP   metoprolol succinate (TOPROL XL) 50 MG extended release tablet Take 1 tablet by mouth daily 5/5/23   Gisselle Bedoya APRN - CNP   divalproex (DEPAKOTE) 500 MG DR tablet Take 1 tablet by mouth every 12 hours 5/5/23   Gisselle Bedoya APRN - CNP   atorvastatin (LIPITOR) 40 MG tablet Take 1 tablet by mouth daily 5/5/23   Gisselle Bedoya APRN - CNP   losartan (COZAAR) 25 MG tablet Take 1 tablet by mouth daily 5/5/23   Gisselle Bedoya APRN - CNP   QUEtiapine (SEROQUEL) 50 MG tablet Take 1 tablet by mouth 2 times daily  Patient not taking: Reported on 5/4/2024 5/5/23   Gisselle Bedoya APRN - CNP   amLODIPine (NORVASC) 10 MG tablet Take 0.5 tablets by mouth daily  Patient not taking: Reported on 5/4/2024 5/5/23   Gisselle Bedoya APRN - CNP   ONE TOUCH LANCETS MISC Check glucose daily 9/9/19   Provider, MD Oksana   Elastic Bandages & Supports (JOBST KNEE HIGH COMPRESSION SM) MISC 1 each by Does not apply route once for 1 dose Thigh high or knee high 10/3/19 10/3/19  Areli Olivas APRN - KALI       Physical Examination:  Vitals:    05/04/24 1415   BP: (!) 161/91   Pulse: 78   Resp:    Temp:    SpO2:  ALL REMAINING UPPER AND LOWER TEETH AND ALVEOLOPLASTY   performed by Richy Cooper Jr., DMD at [facility] OR    ENDOSCOPY, COLON, DIAGNOSTIC      EYE SURGERY      KNEE SURGERY      LAPAROSCOPY N/A 06/22/2019    LAPAROSCOPIC, CONVERTED TO OPEN INCARCERATED UMBILICAL HERNIA REPAIR performed by Nawaf Rodriguez MD at [facility] OR    OTHER SURGICAL HISTORY  10/11/2013     CYSTOSCOPY, TRANSURETHRAL RESECTION OF PROSTATE WITH OLYMPUS    PROSTATE BIOPSY      SKIN BIOPSY      UPPER GASTROINTESTINAL ENDOSCOPY  07/13/2017       Allergies   Allergen Reactions    Sulfamethoxazole-Trimethoprim Other (See Comments)     CKD  Do not prescribe per Nephrologist.        reports that he has been smoking cigars. He has never used smokeless tobacco. He reports current alcohol use. He reports that he does not use drugs.     I independently reviewed / interpreted ECG, cardiac tracings/rhythm ECGs strips    Independently reviewed/interpreted CXR     All questions and concerns were addressed to the patient/family. Alternatives to my treatment were discussed. I have discussed the above stated plan and the patient verbalized understanding and agreed with the plan.    NOTE: This report was transcribed using voice recognition software. Every effort was made to ensure accuracy, however, inadvertent computerized transcription errors may be present.     Sandro Woodard MD, MPH  [facility]   Office: [phone]  Fax: [phone]

## 2024-05-04 NOTE — PROGRESS NOTES
from a chair using your arms?: Total  How much help is needed walking in hospital room?: Total  How much help is needed climbing 3-5 steps with a railing?: Total  AM-PAC Inpatient Mobility Raw Score : 9  AM-PAC Inpatient T-Scale Score : 30.55  Mobility Inpatient CMS 0-100% Score: 81.38  Mobility Inpatient CMS G-Code Modifier : CM         Tinneti Score       Goals  Short Term Goals  Time Frame for Short Term Goals: 5/11  Short Term Goal 1: Pt will transfer from supine to sit with minimal assistance  Short Term Goal 2: Pt will transfer from sit to stand with maximum assistance  Short Term Goal 3: Pt will transfer from bed> chair with maximum assistance  Short Term Goal 4: Pt will complete 12-15 reps BLE strengthening exercises by 5/13  Patient Goals   Patient Goals : \"to get out of here\"       Education  Patient Education  Education Given To: Patient  Education Provided: Role of Therapy;Plan of Care;Transfer Training;Orientation;Equipment  Education Method: Demonstration;Verbal  Barriers to Learning: Cognition  Education Outcome: Continued education needed      Therapy Time   Individual Concurrent Group Co-treatment   Time In 1312         Time Out 1400         Minutes 48         Timed Code Treatment Minutes: 38 Minutes + 10 minute SCOTT Light, PT

## 2024-05-04 NOTE — CONSULTS
Consult Placed     Who: [facility]   Date:  Time:     Electronically signed by Ellyn Santana on 5/4/2024 at 9:49 AM

## 2024-05-04 NOTE — H&P
Surgical History:        Procedure Laterality Date    ABDOMEN SURGERY      CARDIAC SURGERY      COLONOSCOPY      DENTAL SURGERY N/A 03/28/2019    EXTRACTION OF ALL REMAINING UPPER AND LOWER TEETH AND ALVEOLOPLASTY   performed by Richy Cooper Jr., DMD at [facility] OR    ENDOSCOPY, COLON, DIAGNOSTIC      EYE SURGERY      KNEE SURGERY      LAPAROSCOPY N/A 06/22/2019    LAPAROSCOPIC, CONVERTED TO OPEN INCARCERATED UMBILICAL HERNIA REPAIR performed by Nawaf Rodriguez MD at [facility] OR    OTHER SURGICAL HISTORY  10/11/2013     CYSTOSCOPY, TRANSURETHRAL RESECTION OF PROSTATE WITH OLYMPUS    PROSTATE BIOPSY      SKIN BIOPSY      UPPER GASTROINTESTINAL ENDOSCOPY  07/13/2017       Medications Prior to Admission:   Prior to Admission medications    Medication Sig Start Date End Date Taking? Authorizing Provider   rivastigmine (EXELON) 4.6 MG/24HR Place 1 patch onto the skin daily   Yes Oksana Hood MD   lidocaine 4 % external patch Place 1 patch onto the skin daily 9/6/23   Christos Flood MD   mineral oil-hydrophilic petrolatum (AQUAPHOR) ointment Apply to bi lateral tibias.. Apply topically as needed. 9/5/23   Christos Flood MD   balsum peru-castor oil (VENELEX) OINT ointment Apply topically 2 times daily Apply to left sacrum. 9/5/23   Christos Flood MD   docusate sodium (COLACE, DULCOLAX) 100 MG CAPS Take 100 mg by mouth daily 9/6/23   Christos Flood MD   polycarbophil (FIBERCON) 625 MG tablet Take 1 tablet by mouth every evening 9/5/23   Christos Flood MD   polyethylene glycol (GLYCOLAX) 17 g packet Take 1 packet by mouth daily as needed for Constipation 9/5/23   Christos Flood MD   LORazepam (ATIVAN) 0.5 MG tablet Take 1 tablet by mouth 3 times daily as needed for Anxiety. Max Daily Amount: 1.5 mg    Oksana Hood MD   fenofibrate (TRIGLIDE) 160 MG tablet Take 1 tablet by mouth daily  Patient not taking: Reported on 5/4/2024 5/5/23   Gisselle Bedoya, APRN - CNP   pantoprazole (PROTONIX) 40    CALCIUM 8.6   MG 1.50*     Recent Labs     05/04/24  0127 05/04/24  0246   PROBNP 22,316*  --    TROPHS 31* 22     No results for input(s): \"LABA1C\" in the last 72 hours.  Recent Labs     05/04/24  0127   AST 11*   ALT <5*   BILITOT 0.8   ALKPHOS 70     No results for input(s): \"INR\", \"LACTA\", \"TSH\" in the last 72 hours.     Karen Carver, APRN
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
## 2024-05-04 NOTE — PROGRESS NOTES
Occupational Therapy  [address]  Occupational Therapy Initial Assessment, Treatment, and Discharge    Name: Alma Delia Garner  : 1950  MRN: [id number]  Date of Service: 2024    Discharge Recommendations:  Long Term Care with OT  OT Equipment Recommendations  Equipment Needed: No  Other: defer     Therapy discharge recommendations take into account each patient's current medical complexities and are subject to input/oversight from the patient's healthcare team.   Barriers to Home Discharge:   [] Steps to access home entry or bed/bath:   [x] Unable to transfer, ambulate, or propel wheelchair household distances without assist   [] Limited available assist at home upon discharge    [x] Patient or family requests d/c to post-acute facility    [x] Poor cognition/safety awareness for d/c to home alone    []Unable to maintain ordered weight bearing status    [x] Patient with salient signs of long-standing immobility   [x] Patient is at risk for falls    [x] Other: Decreased IND with ADLs    If pt is unable to be seen after this session, please let this note serve as discharge summary.  Please see case management note for discharge disposition.  Thank you.    Patient Diagnosis(es): The primary encounter diagnosis was Acute hypoxemic respiratory failure (HCC). Diagnoses of New onset of congestive heart failure (HCC), Pulmonary edema cardiac cause (HCC), Elevated troponin, Acute chest pain, and Chest pain, unspecified type were also pertinent to this visit.  Past Medical History:  has a past medical history of Acute gastric ulcer with bleeding, Acute metabolic encephalopathy, MAISHA (acute kidney injury) (HCC), ATN (acute tubular necrosis) (HCC), Atrial fib/flutter, transient, Atrial fibrillation (HCC), BPH (benign prostatic hyperplasia), CAD (coronary artery disease), Cerebral artery occlusion with cerebral infarction (HCC), CHF (congestive heart failure) (HCC), CKD (chronic kidney disease),  eating meals?: A Little  AM-[facility] Inpatient Daily Activity Raw Score: 11  AM-PAC Inpatient ADL T-Scale Score : 29.04  ADL Inpatient CMS 0-100% Score: 70.42  ADL Inpatient CMS G-Code Modifier : CL      Goals  Short Term Goals  Time Frame for Short Term Goals: 1 session (5/4)  Short Term Goal 1: Pt will complete bed mobility with Mod A x2-MET  Short Term Goal 2: Pt will complete toileting DEP-MET  Short Term Goal 3: Pt will sit EOB>5 mins with SBA-MET       Therapy Time   Individual Concurrent Group Co-treatment   Time In 1312         Time Out 1400         Minutes 48         Timed Code Treatment Minutes: 38 Minutes (10 min eval)       Sharon Ivey, OT

## 2024-05-04 NOTE — CONSULTS
[address] [address]                              CONSULTATION      PATIENT NAME: PETER COYLE               : 1950  MED REC NO: [id number]                      ROOM: 0427  ACCOUNT NO: [id number]                       ADMIT DATE: 2024  PROVIDER: Dc Bloand MD      REASON FOR CONSULTATION:  Hypertension.    HISTORY OF PRESENT ILLNESS:  The patient is a 74-year-old  male patient who presented to [facility] complaining of chest discomfort.  Upon presentation, he was noted to have a severe hypertension with associated bilateral lower extremities edema, which prompted Nephrology consultation.    PAST MEDICAL HISTORY:    1. Atrial fibrillation.  2. Benign prostatic hyperplasia.  3. Coronary artery disease.  4. Cerebrovascular accident.  5. Diastolic heart failure.  6. Diabetes mellitus.  7. Hypertension.  8. Hyperlipidemia.    PAST SURGICAL HISTORY:    1. Colonoscopy.  2. Cystoscopy.  3. Prostate biopsy.  4. EGD.    ALLERGIES:  THE PATIENT IS ALLERGIC TO BACTRIM.      SOCIAL HISTORY:  The patient does not smoke or drink alcohol.    FAMILY HISTORY:  Negative for kidney disease.    REVIEW OF SYSTEMS:  The patient denied any nausea, vomiting, or abdominal pain.  Otherwise, a 10-point review of system was relatively unremarkable.    PHYSICAL EXAMINATION:  VITAL SIGNS:  Blood pressure 187/85, heart rate 73, respirations 20, temperature 97.8 Fahrenheit. The patient is saturating 99% on room air.  GENERAL APPEARANCE: The patient is alert, oriented x3; not in acute distress. EYES: Reveal normal conjunctivae.  Reactive pupils.  NECK: Reveals midline trachea. Nonpalpable thyroid.  LUNGS: Clear to anterior auscultation bilaterally. Nonlabored breathing.  CARDIOVASCULAR: Revealed S1, S2.  Regular rate and rhythm.  No added murmurs or rubs.  No peripheral edema.  ABDOMEN: Soft abdomen. No organomegaly.  SKIN:

## 2024-05-04 NOTE — PROGRESS NOTES
Speech Language Pathology  Clinical Bedside Swallow Assessment  Facility/Department: [place] [address] [facility]U        Recommendations:  Diet recommendation: IDDSI 7 Regular Solids; IDDSI 0 Thin Liquids; Meds PO as tolerated  Instrumentation: Not clinically indicated at this time  Risk management: upright for all intake, small bites/sips, oral care 2-3x/day to reduce adverse affects in the event of aspiration, alternate bites/sips, and slow rate of intake      NAME:Alma Delia Garner  : 1950 (74 y.o.)   MRN: [id number]  ROOM: [address] [place] [address]  ADMISSION DATE: 2024  PATIENT DIAGNOSIS(ES): Pulmonary edema cardiac cause (HCC) [I50.1]  Flash pulmonary edema (HCC) [J81.0]  Elevated troponin [R79.89]  Acute chest pain [R07.9]  Acute hypoxemic respiratory failure (HCC) [J96.01]  New onset of congestive heart failure (HCC) [I50.9]  Chief Complaint   Patient presents with    Chest Pain     Chest pain X 1 week and now states he is having some SOB now.       Patient Active Problem List    Diagnosis Date Noted    Elevated lactic acid level     Flash pulmonary edema (HCC) 2024    GERD (gastroesophageal reflux disease) 2023    Vascular dementia with behavioral disturbance ([facility]) 2023    COVID-19 2023    Bipolar affective disorder ([facility]) 2023    Psychosis, affective ([facility]) 2023    Bipolar disorder ([facility]) 2023    Neurocognitive disorder 2023    Bipolar affective disorder, current episode manic, current episode severity unspecified ([facility]) 2023    Chronic ischemic heart disease 2023    Hypertensive urgency 2019    Dysthymia 2019    Acute encephalopathy     Remote history of stroke     Mild malnutrition ([facility]) 2019    Fluid overload 2019    Syncope and collapse 2019    Bilateral pleural effusion     Atelectasis of right lung     Former smoker     Small bowel obstruction (HCC) 2019    Acute metabolic encephalopathy 2018    Impulse control disorder in    BMP:  Recent Labs     05/04/24  0127      K 3.4*      CO2 29   BUN 27*   CREATININE 1.0   GLUCOSE 142*          Cranial nerve exam:   CN V (trigeminal): ophthalmic, maxillary, and mandibular facial sensation- Reduced  CN VII (facial): Reduced  CN IX/X (glossopharyngeal/vagus): vocal quality: WFL; cough: Weak- perceptually  CN XII (hypoglossal): WFL    Laryngeal function exam:   Secretions: pink, moist  Vocal quality: See CN exam above  Cough: See CN exam above    Oral Care Status:    [] Oral Care WFL  [] Poor oral care status  [x] Edentulous  [] Upper Dentures  [] Lower Dentures  [] Missing/Broken Teeth  [] Evidence of dental cavities/carries    PO trials:   IDDSI 0 (thin): via cup and straw; no overt s/s aspiration    IDDSI 7 (regular): mildly prolonged but functional mastication d/t edentulous status, trace oral residue cleared w/ liquid wash, no overt s/s aspiration    3 oz water: PASS      Impressions:  RN okayed SLP entry. Pt oriented x4. Pt w/ h/o dementia and CVAs. Pt denied swallowing concerns. Mildly prolonged but functional mastication w/ solid d/t edentulous status. No overt s/s aspiration.  Recommend regular diet with thin liquids and meds PO as tolerated. No further ST intervention indicated.    Barriers to home discharge:   [x] severity of cognition (mild, mod, severe) with reduced insight negatively impacting safety/independence; pt w/ h/o dementia      Recommendations:  Diet recommendation: IDDSI 7 Regular Solids; IDDSI 0 Thin Liquids; Meds PO as tolerated  Instrumentation: Not clinically indicated at this time  Risk management: upright for all intake, small bites/sips, oral care 2-3x/day to reduce adverse affects in the event of aspiration, alternate bites/sips, and slow rate of intake    Prognosis: Good    Recommended Intervention:  No further ST intervention indicated    Referrals:   [] ENT    []   [] Pulmonology [] GI  [] Neurology  [] RD  [] OT/ PT  [x] N/A    Goals:

## 2024-05-04 NOTE — CONSULTS
Patient seen and examined, consult note dictated.    Assessment and Plan:    1- Hypertension: Patient has primary essential hypertension and is currently volume overload. We will adjust his antihypertensive regimen as below and will order a secondary work-up.  - Discontinue Losartan in the setting of active diuresis.  - Discontinue Toprol XL as patient is prone for bradycardia.  - Start Hydralazine/Imdur for better control.  - Apply Clonidine patch 0.2 mg once a week.  - Change Lasix to 40 mg IV once daily.  - Check serum Aldosterone and Renin activity.    2- Hypokalemia: PRN potassium replacement.    3- Anemia: Stable hemoglobin, monitor.

## 2024-05-04 NOTE — ED NOTES
TRANSFER - OUT REPORT:    Verbal report given to CARLEY Mathur on Alma Delia Garner  being transferred to  for routine progression of patient care       Report consisted of patient's Situation, Background, Assessment and   Recommendations(SBAR).     Information from the following report(s) Nurse Handoff Report, ED Encounter Summary, ED SBAR, Adult Overview, MAR, Recent Results, Cardiac Rhythm Afib, and Neuro Assessment was reviewed with the receiving nurse.    [place] Fall Assessment:    Presents to emergency department  because of falls (Syncope, seizure, or loss of consciousness): No  Age > 70: Yes  Altered Mental Status, Intoxication with alcohol or substance confusion (Disorientation, impaired judgment, poor safety awaremess, or inability to follow instructions): No  Impaired Mobility: Ambulates or transfers with assistive devices or assistance; Unable to ambulate or transer.: Yes  Nursing Judgement: No          Lines:   Peripheral IV 05/04/24 Right Antecubital (Active)        Opportunity for questions and clarification was provided.      Patient transported with:  Monitor, O2 @ 2lpm, and Registered Nurse

## 2024-05-05 LAB
ALBUMIN SERPL-MCNC: 2.5 G/DL (ref 3.4–5)
ALBUMIN/GLOB SERPL: 0.8 {RATIO} (ref 1.1–2.2)
ALP SERPL-CCNC: 62 U/L (ref 40–129)
ALT SERPL-CCNC: <5 U/L (ref 10–40)
ANION GAP SERPL CALCULATED.3IONS-SCNC: 12 MMOL/L (ref 3–16)
AST SERPL-CCNC: 11 U/L (ref 15–37)
BASOPHILS # BLD: 0 K/UL (ref 0–0.2)
BASOPHILS NFR BLD: 0.9 %
BILIRUB SERPL-MCNC: 0.6 MG/DL (ref 0–1)
BUN SERPL-MCNC: 25 MG/DL (ref 7–20)
CALCIUM SERPL-MCNC: 8.3 MG/DL (ref 8.3–10.6)
CHLORIDE SERPL-SCNC: 101 MMOL/L (ref 99–110)
CO2 SERPL-SCNC: 28 MMOL/L (ref 21–32)
CREAT SERPL-MCNC: 0.8 MG/DL (ref 0.8–1.3)
DEPRECATED RDW RBC AUTO: 18.8 % (ref 12.4–15.4)
EOSINOPHIL # BLD: 0.2 K/UL (ref 0–0.6)
EOSINOPHIL NFR BLD: 3.8 %
GFR SERPLBLD CREATININE-BSD FMLA CKD-EPI: >90 ML/MIN/{1.73_M2}
GLUCOSE BLD-MCNC: 129 MG/DL (ref 70–99)
GLUCOSE BLD-MCNC: 131 MG/DL (ref 70–99)
GLUCOSE BLD-MCNC: 134 MG/DL (ref 70–99)
GLUCOSE BLD-MCNC: 271 MG/DL (ref 70–99)
GLUCOSE SERPL-MCNC: 127 MG/DL (ref 70–99)
HCT VFR BLD AUTO: 29.9 % (ref 40.5–52.5)
HGB BLD-MCNC: 9.6 G/DL (ref 13.5–17.5)
LYMPHOCYTES # BLD: 0.9 K/UL (ref 1–5.1)
LYMPHOCYTES NFR BLD: 20.8 %
MAGNESIUM SERPL-MCNC: 1.7 MG/DL (ref 1.8–2.4)
MCH RBC QN AUTO: 27 PG (ref 26–34)
MCHC RBC AUTO-ENTMCNC: 32.3 G/DL (ref 31–36)
MCV RBC AUTO: 83.8 FL (ref 80–100)
MONOCYTES # BLD: 0.4 K/UL (ref 0–1.3)
MONOCYTES NFR BLD: 8.4 %
NEUTROPHILS # BLD: 2.9 K/UL (ref 1.7–7.7)
NEUTROPHILS NFR BLD: 66.1 %
PERFORMED ON: ABNORMAL
PLATELET # BLD AUTO: 156 K/UL (ref 135–450)
PMV BLD AUTO: 8.2 FL (ref 5–10.5)
POTASSIUM SERPL-SCNC: 3.3 MMOL/L (ref 3.5–5.1)
PROT SERPL-MCNC: 5.8 G/DL (ref 6.4–8.2)
RBC # BLD AUTO: 3.56 M/UL (ref 4.2–5.9)
SODIUM SERPL-SCNC: 141 MMOL/L (ref 136–145)
WBC # BLD AUTO: 4.4 K/UL (ref 4–11)

## 2024-05-05 PROCEDURE — 85025 COMPLETE CBC W/AUTO DIFF WBC: CPT

## 2024-05-05 PROCEDURE — 6360000002 HC RX W HCPCS: Performed by: INTERNAL MEDICINE

## 2024-05-05 PROCEDURE — 6370000000 HC RX 637 (ALT 250 FOR IP): Performed by: INTERNAL MEDICINE

## 2024-05-05 PROCEDURE — 83735 ASSAY OF MAGNESIUM: CPT

## 2024-05-05 PROCEDURE — 6370000000 HC RX 637 (ALT 250 FOR IP): Performed by: NURSE PRACTITIONER

## 2024-05-05 PROCEDURE — 6360000002 HC RX W HCPCS: Performed by: STUDENT IN AN ORGANIZED HEALTH CARE EDUCATION/TRAINING PROGRAM

## 2024-05-05 PROCEDURE — 99232 SBSQ HOSP IP/OBS MODERATE 35: CPT | Performed by: NURSE PRACTITIONER

## 2024-05-05 PROCEDURE — 6360000002 HC RX W HCPCS: Performed by: NURSE PRACTITIONER

## 2024-05-05 PROCEDURE — 2580000003 HC RX 258: Performed by: STUDENT IN AN ORGANIZED HEALTH CARE EDUCATION/TRAINING PROGRAM

## 2024-05-05 PROCEDURE — 80053 COMPREHEN METABOLIC PANEL: CPT

## 2024-05-05 PROCEDURE — 83036 HEMOGLOBIN GLYCOSYLATED A1C: CPT

## 2024-05-05 PROCEDURE — 2060000000 HC ICU INTERMEDIATE R&B

## 2024-05-05 RX ORDER — POTASSIUM CHLORIDE 20 MEQ/1
40 TABLET, EXTENDED RELEASE ORAL ONCE
Status: COMPLETED | OUTPATIENT
Start: 2024-05-05 | End: 2024-05-05

## 2024-05-05 RX ORDER — MAGNESIUM SULFATE IN WATER 40 MG/ML
2000 INJECTION, SOLUTION INTRAVENOUS ONCE
Status: COMPLETED | OUTPATIENT
Start: 2024-05-05 | End: 2024-05-05

## 2024-05-05 RX ORDER — HYDRALAZINE HYDROCHLORIDE 50 MG/1
100 TABLET, FILM COATED ORAL EVERY 8 HOURS SCHEDULED
Status: DISCONTINUED | OUTPATIENT
Start: 2024-05-05 | End: 2024-05-10 | Stop reason: HOSPADM

## 2024-05-05 RX ORDER — ISOSORBIDE MONONITRATE 30 MG/1
30 TABLET, EXTENDED RELEASE ORAL 2 TIMES DAILY
Status: DISCONTINUED | OUTPATIENT
Start: 2024-05-05 | End: 2024-05-10 | Stop reason: HOSPADM

## 2024-05-05 RX ADMIN — HYDRALAZINE HYDROCHLORIDE 100 MG: 50 TABLET ORAL at 14:35

## 2024-05-05 RX ADMIN — POTASSIUM CHLORIDE 40 MEQ: 1500 TABLET, EXTENDED RELEASE ORAL at 07:59

## 2024-05-05 RX ADMIN — ATORVASTATIN CALCIUM 40 MG: 40 TABLET, FILM COATED ORAL at 19:42

## 2024-05-05 RX ADMIN — B-COMPLEX W/ C & FOLIC ACID TAB 1 TABLET: TAB at 09:42

## 2024-05-05 RX ADMIN — DIVALPROEX SODIUM 375 MG: 125 CAPSULE ORAL at 19:42

## 2024-05-05 RX ADMIN — CALCIUM POLYCARBOPHIL 625 MG: 625 TABLET ORAL at 17:32

## 2024-05-05 RX ADMIN — HYDRALAZINE HYDROCHLORIDE 100 MG: 50 TABLET ORAL at 23:06

## 2024-05-05 RX ADMIN — ISOSORBIDE MONONITRATE 30 MG: 30 TABLET, EXTENDED RELEASE ORAL at 09:42

## 2024-05-05 RX ADMIN — ISOSORBIDE MONONITRATE 30 MG: 30 TABLET, EXTENDED RELEASE ORAL at 19:42

## 2024-05-05 RX ADMIN — ASPIRIN 81 MG 81 MG: 81 TABLET ORAL at 09:42

## 2024-05-05 RX ADMIN — DIVALPROEX SODIUM 375 MG: 125 CAPSULE ORAL at 09:49

## 2024-05-05 RX ADMIN — INSULIN LISPRO 2 UNITS: 100 INJECTION, SOLUTION INTRAVENOUS; SUBCUTANEOUS at 12:29

## 2024-05-05 RX ADMIN — MAGNESIUM SULFATE HEPTAHYDRATE 2000 MG: 40 INJECTION, SOLUTION INTRAVENOUS at 07:59

## 2024-05-05 RX ADMIN — HYDRALAZINE HYDROCHLORIDE 50 MG: 50 TABLET ORAL at 05:08

## 2024-05-05 RX ADMIN — ENOXAPARIN SODIUM 40 MG: 100 INJECTION SUBCUTANEOUS at 17:32

## 2024-05-05 RX ADMIN — SODIUM CHLORIDE, PRESERVATIVE FREE 10 ML: 5 INJECTION INTRAVENOUS at 19:43

## 2024-05-05 RX ADMIN — PANTOPRAZOLE SODIUM 40 MG: 40 TABLET, DELAYED RELEASE ORAL at 09:42

## 2024-05-05 RX ADMIN — Medication 1 CAPSULE: at 09:42

## 2024-05-05 RX ADMIN — SODIUM CHLORIDE, PRESERVATIVE FREE 10 ML: 5 INJECTION INTRAVENOUS at 09:44

## 2024-05-05 RX ADMIN — FUROSEMIDE 40 MG: 10 INJECTION, SOLUTION INTRAMUSCULAR; INTRAVENOUS at 09:42

## 2024-05-05 RX ADMIN — DOCUSATE SODIUM 100 MG: 100 CAPSULE, LIQUID FILLED ORAL at 09:42

## 2024-05-05 ASSESSMENT — PAIN SCALES - GENERAL
PAINLEVEL_OUTOF10: 0

## 2024-05-05 NOTE — PLAN OF CARE
Problem: Discharge Planning  Goal: Discharge to home or other facility with appropriate resources  Outcome: Progressing     Problem: Chronic Conditions and Co-morbidities  Goal: Patient's chronic conditions and co-morbidity symptoms are monitored and maintained or improved  Outcome: Progressing     Problem: Safety - Adult  Goal: Free from fall injury  Outcome: Progressing   CHF Care Plan      Patient's EF (Ejection Fraction) is greater than 40%    Heart Failure Medications:  Diuretics:: Furosemide    (One of the following REQUIRED for EF </= 40%/SYSTOLIC FAILURE but MAY be used in EF% >40%/DIASTOLIC FAILURE)        ACE:: None        ARB:: None         ARNI:: None    (Beta Blockers)  NON- Evidenced Based Beta Blocker (for EF% >40%/DIASTOLIC FAILURE): None    Evidenced Based Beta Blocker::(REQUIRED for EF% <40%/SYSTOLIC FAILURE) Metoprolol SUCCinate- Toprol XL  ...................................................................................................................................................    Failed to redirect to the Timeline version of the [employer].      Patient's weights and intake/output reviewed    Daily Weight log at bedside, patient/family participation in use of log: \"yes    Patient's current weight today shows a difference of 0 lbs less than last documented weight.      Intake/Output Summary (Last 24 hours) at 5/4/2024 2020  Last data filed at 5/4/2024 1705  Gross per 24 hour   Intake 1040 ml   Output --   Net 1040 ml   ** 3 large urine voids in incontinence briefs this shift.    Education Booklet Provided: yes    Comorbidities Reviewed Yes    Patient has a past medical history of Acute gastric ulcer with bleeding, Acute metabolic encephalopathy, MAISHA (acute kidney injury) (HCC), ATN (acute tubular necrosis) (HCC), Atrial fib/flutter, transient, Atrial fibrillation (HCC), BPH (benign prostatic hyperplasia), CAD (coronary artery disease), Cerebral artery occlusion with cerebral  infarction (HCC), CHF (congestive heart failure) (HCC), CKD (chronic kidney disease), Dementia (HCC), Diabetes mellitus (HCC), GI bleed, Glaucoma, History of blood transfusion, Hx of diabetic neuropathy, Hyperlipidemia, Hypertension, Mitral insufficiency, Mobility impaired, 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     >>For CHF and Comorbidity documentation on Education Time and Topics, please see Education Tab      Pt resting in bed at this time on room air. Pt denies shortness of breath. Pt with nonpitting lower extremity edema.     Patient and/or Family's stated Goal of Care this Admission: reduce shortness of breath, increase activity tolerance, better understand heart failure and disease management, be more comfortable, and reduce lower extremity edema prior to discharge        :

## 2024-05-05 NOTE — PLAN OF CARE
Problem: Discharge Planning  Goal: Discharge to home or other facility with appropriate resources  5/5/2024 0542 by Rufina Smith RN  Outcome: Progressing  5/4/2024 2020 by Lacie Jimenez RN  Outcome: Progressing     Problem: Chronic Conditions and Co-morbidities  Goal: Patient's chronic conditions and co-morbidity symptoms are monitored and maintained or improved  5/5/2024 0542 by Rufina Smith RN  Outcome: Progressing  5/4/2024 2020 by Lacie Jimenez RN  Outcome: Progressing     Problem: Safety - Adult  Goal: Free from fall injury  5/5/2024 0542 by Rufina Smith RN  Outcome: Progressing  5/4/2024 2020 by Lacie Jimenez RN  Outcome: Progressing

## 2024-05-05 NOTE — PROGRESS NOTES
Hospital Medicine Progress Note      Date of Admission: 5/4/2024  Hospital Day: 2    Chief Admission Complaint:    Chief Complaint   Patient presents with    Chest Pain     Chest pain X 1 week and now states he is having some SOB now.       Subjective:    Pt is much more alert today, but has some mild confusion. He tells me that he came into the hospital because he was having chest pain and because his blood pressure was janet high and he was concerned he could have a stroke. He states he is no longer having chest pain now and feeling less short of breath. Currently on 1L O2.     Presenting Admission History:       74 y.o. male who presented to [facility] with chest pain and shortness of breath.  PMHx significant for AFIB, DM, dementia, CKD, HTN. History was obtained from the patient and review of the EMR. The patient states that he was having shortness of breath and having some swelling and decided to come to the hospital for further evaluation. Pt was unable to elaborate any further than this. I attempted to call his daughter and left a VM. He will be admitted for further evaluation.     Assessment/Plan:       Current Principal Problem:  Flash pulmonary edema (HCC)     Chest pain and dyspnea in setting of suspect CHF exacerbation, also with bilateral pleural effusions  - CXR with central predominant of biliary and interstitial edema potentially CHF given mild cardiomegaly, at least small loculated right pleural effusion, and at least trace left pleural effusion  - Chest CT with diffuse pulmonary edema with suspected assymetric alveolar edema in the right lung, consider CHF given moderate cardiomegaly. Loculated small to moderate left and trace right pleural effusions.   - Pro-BNP on admission 47305  - TTE on 12/30/20 with LVEF 55-60%. No regional WMAs. LV diastolic filling pressure elevated. RV systolic function mildly reduced. Mild pulmonary hypertension  - Repeat TTE pending  - IV lasix daily per nephro  -

## 2024-05-05 NOTE — PROGRESS NOTES
Department of Internal Medicine  Nephrology Progress Note        SUBJECTIVE:    We are following this patient for difficult to control Hypertension.  The patient was seen and examined; he feels well today with no CP, SOB, nausea or vomiting.    ROS: No fever or chills.  Social: No family at bedside.    Physical Exam:    VITALS:  BP (!) 163/78   Pulse 82   Temp 97.3 °F (36.3 °C) (Oral)   Resp 20   Ht 1.93 m (6' 4\")   Wt 115.3 kg (254 lb 3.1 oz)   SpO2 96%   BMI 30.94 kg/m²     General appearance: Seems comfortable, no acute distress.  Neck: Trachea midline, thyroid normal.   Lungs:  Non labored breathing, CTA to anterior auscultation.  Heart:  S1S2 normal, rub or gallop. + peripheral edema.  Abdomen: Soft, non-tender, no organomegaly.   Skin: No lesions or rashes, warm to touch.     DATA:    CBC with Differential:    Lab Results   Component Value Date/Time    WBC 4.4 05/05/2024 04:48 AM    RBC 3.56 05/05/2024 04:48 AM    HGB 9.6 05/05/2024 04:48 AM    HCT 29.9 05/05/2024 04:48 AM     05/05/2024 04:48 AM    MCV 83.8 05/05/2024 04:48 AM    MCH 27.0 05/05/2024 04:48 AM    MCHC 32.3 05/05/2024 04:48 AM    RDW 18.8 05/05/2024 04:48 AM    NRBC 1 07/09/2017 05:14 AM    BANDSPCT 3 07/12/2017 05:25 AM    METASPCT 1 07/11/2017 06:45 AM    LYMPHOPCT 20.8 05/05/2024 04:48 AM    MONOPCT 8.4 05/05/2024 04:48 AM    MYELOPCT 1 07/11/2017 06:45 AM    EOSPCT 3.8 05/05/2024 04:48 AM    BASOPCT 0.9 05/05/2024 04:48 AM    MONOSABS 0.4 05/05/2024 04:48 AM    EOSABS 0.2 05/05/2024 04:48 AM    BASOSABS 0.0 05/05/2024 04:48 AM    DIFFTYPE Auto 04/05/2013 05:50 AM     BMP:    Lab Results   Component Value Date/Time     05/05/2024 04:47 AM    K 3.3 05/05/2024 04:47 AM     05/05/2024 04:47 AM    CO2 28 05/05/2024 04:47 AM    BUN 25 05/05/2024 04:47 AM    CREATININE 0.8 05/05/2024 04:47 AM    CALCIUM 8.3 05/05/2024 04:47 AM    GFRAA 48 08/03/2022 05:06 PM    GFRAA >60 04/05/2013 05:50 AM    LABGLOM >90 05/05/2024 04:47

## 2024-05-05 NOTE — PROGRESS NOTES
[facility]   Daily Progress Note      Admit Date:  5/4/2024    Reason for follow up visit: Chest pain and shortness of breath    CC: \"My BP has been really high today.\"    75 y/o male with PMH notable for HTN, CKD, dementia, CVA, nonobstructive CAD, HFpEF, permanent atrial fib and S/P Watchman d/t GI bleeding admitted to [facility] with c/o SOB and chest pain. Cardiology asked to evaluate    Primary Cardiologist: Dr. Plaza  Nephrology: Dr. Ambrosio    Interval History:  Pt. seen and examined; records reviewed  BP elevated this AM; received IV hydralazine  O2 @ @L; Hgb stable  ProBNP > 22,000  K+ and magnesium low this AM (replacement given)  Denies chest pain, SOB, cough, palpitations or dizziness    Subjective:  Pt with no acute overnight cardiac events.   Pt poor historian; has some baseline dementia    Objective:   BP (!) 163/78   Pulse 82   Temp 97.3 °F (36.3 °C) (Oral)   Resp 20   Ht 1.93 m (6' 4\")   Wt 115.3 kg (254 lb 3.1 oz)   SpO2 96%   BMI 30.94 kg/m²     Intake/Output Summary (Last 24 hours) at 5/5/2024 0844  Last data filed at 5/5/2024 0507  Gross per 24 hour   Intake 1160 ml   Output 400 ml   Net 760 ml     Wt Readings from Last 3 Encounters:   05/05/24 115.3 kg (254 lb 3.1 oz)   08/31/23 113.4 kg (250 lb)   05/15/23 112 kg (247 lb)       Physical Exam:  General: In no acute distress. Oriented to person and place. Resting comfortably flat in bed  Skin:  Warm and dry.    Neck:  Supple.JVD hard to assess d/t body habitus  Chest:  Lungs with fine crackles in R posterior base  Cardiovascular:  irreg; normal S1 and S2; no murmur/gallop or rub  Abdomen:  soft, nontender, nondistended, +bowel sounds.   Extremities:  1+ bilateral lower leg edema; 2+ bilateral radial and DP pulses. Cap refill brisk     Medications:    magnesium sulfate  2,000 mg IntraVENous Once    sodium chloride flush  5-40 mL IntraVENous 2 times per day    enoxaparin  40 mg SubCUTAneous Dinner    aspirin  81 mg Oral Daily     Additional incidental findings as above for which no dedicated follow-up  is recommended.    5/4/24 CXR  IMPRESSION:  1. Central predominant of biliary and interstitial edema potentially  congestive heart failure given mild cardiomegaly, at least small loculated  right pleural effusion, and at least trace left pleural effusion.  Pneumonia  could appear similar, and chronic interstitial changes could contribute to  the appearance.  2. Bibasilar airspace opacities due in part to passive atelectasis with  superimposed pneumonia or aspiration not excluded    12/30/20 Echo:   Normal left ventricular systolic function with ejection fraction of 55-60%.   No regional wall motion abnormalites are seen.   Left ventricular diastolic filling pressure is elevated.   Compared to previous study from 6- no changes noted in left   ventricular function.   Mild mitral and tricuspid regurgitation.   Moderate Bi-atrial enlargement.   Mild aortic stenosis.   The right ventricle is mildly enlarged.   Right ventricular systolic function is visually mildly reduced .   Systolic pulmonic artery pressure (SPAP) is estimated at 55 mmHg consistent   with mild pulmonary hypertension (Right atrial pressure of 8 mmHg).    ASHWINI 3/7/2018: ([facility])  Study Conclusions     - Left ventricle: The cavity size was normal. Wall thickness was     normal. Systolic function was normal. The estimated ejection     fraction was in the range of 50% to 55%.   - Mitral valve: No evidence of vegetation. There was mild     regurgitation.   - Left atrium: No evidence of thrombus in the atrial cavity or     appendage. ANGELITA appendage closure device was in good position with     trivial peridevice flow.   - Atrial septum: There was a small left-to-right shunt, in the     baseline state.   - Tricuspid valve: No evidence of vegetation.   - Pulmonic valve: No evidence of vegetation.   - Pericardium, extracardiac: A trivial pericardial effusion was     identified.

## 2024-05-05 NOTE — PLAN OF CARE
Problem: Discharge Planning  Goal: Discharge to home or other facility with appropriate resources  5/5/2024 1826 by Lacie Jimenez RN  Outcome: Progressing  5/5/2024 0542 by Rufina Smith RN  Outcome: Progressing     Problem: Chronic Conditions and Co-morbidities  Goal: Patient's chronic conditions and co-morbidity symptoms are monitored and maintained or improved  5/5/2024 1826 by Lacie Jimenez RN  Outcome: Progressing  5/5/2024 0542 by Rufina Smith, RN  Outcome: Progressing     Problem: Safety - Adult  Goal: Free from fall injury  5/5/2024 1826 by Lacie Jimenez RN  Outcome: Progressing  5/5/2024 0542 by Rufina Smith, RN  Outcome: Progressing

## 2024-05-05 NOTE — CARE COORDINATION
Case Management Assessment  Initial Evaluation    Date/Time of Evaluation: 5/5/2024 2:05 PM  Assessment Completed by: Nyasia Fiore RN    If patient is discharged prior to next notation, then this note serves as note for discharge by case management.    Patient Name: Alma Delia Garner                   YOB: 1950  Diagnosis: Pulmonary edema cardiac cause (HCC) [I50.1]  Flash pulmonary edema (HCC) [J81.0]  Elevated troponin [R79.89]  Acute chest pain [R07.9]  Acute hypoxemic respiratory failure (HCC) [J96.01]  New onset of congestive heart failure (HCC) [I50.9]                   Date / Time: 5/4/2024 12:56 AM    Patient Admission Status: Inpatient   Readmission Risk (Low < 19, Mod (19-27), High > 27): Readmission Risk Score: 19.9    Current PCP: Yvette Leonard MD  PCP verified by CM? Yes    Chart Reviewed: Yes      History Provided by: Patient  Patient Orientation: Alert and Oriented (with confusion at times)    Patient Cognition: Long Term Memory Deficit    Hospitalization in the last 30 days (Readmission):  No    If yes, Readmission Assessment in CM Navigator will be completed.    Advance Directives:      Code Status: Full Code   Patient's Primary Decision Maker is:      Primary Decision Maker: Joselin Garner - Child - [phone]    Discharge Planning:    Patient lives with:   Type of Home: Long-Term Care  Primary Care Giver: Other (Comment) ([facility])  Patient Support Systems include: Children   Current Financial resources:    Current community resources: ECF/Home Care  Current services prior to admission: Long Term Acute Care            Current DME:              Type of Home Care services:       ADLS  Prior functional level: Assistance with the following:, Bathing, Dressing, Toileting, Mobility  Current functional level: Assistance with the following:, Bathing, Dressing, Toileting, Mobility    PT AM-PAC: 9 /24  OT AM-PAC: 11 /24    Family can provide assistance at DC: No  Would  related to the following treatment goals of Pulmonary edema cardiac cause (HCC) [I50.1]  Flash pulmonary edema (HCC) [J81.0]  Elevated troponin [R79.89]  Acute chest pain [R07.9]  Acute hypoxemic respiratory failure (HCC) [J96.01]  New onset of congestive heart failure (HCC) [I50.9]    IF APPLICABLE: The Patient and/or patient representative Alma Delia and his family were provided with a choice of provider and agrees with the discharge plan. Freedom of choice list with basic dialogue that supports the patient's individualized plan of care/goals and shares the quality data associated with the providers was provided to:     Patient Representative Name:       The Patient and/or Patient Representative Agree with the Discharge Plan?      Nyasia Fiore RN  Case Management Department  Ph:    Fax:

## 2024-05-05 NOTE — PLAN OF CARE
CHF Care Plan      Patient's EF (Ejection Fraction) is greater than 40%    Heart Failure Medications:  Diuretics:: Furosemide    (One of the following REQUIRED for EF </= 40%/SYSTOLIC FAILURE but MAY be used in EF% >40%/DIASTOLIC FAILURE)        ACE:: None        ARB:: None         ARNI:: None    (Beta Blockers)  NON- Evidenced Based Beta Blocker (for EF% >40%/DIASTOLIC FAILURE): None    Evidenced Based Beta Blocker::(REQUIRED for EF% <40%/SYSTOLIC FAILURE) Metoprolol SUCCinate- Toprol XL  ...................................................................................................................................................    Failed to redirect to the Timeline version of the [employer] SmartLink.      Patient's weights and intake/output reviewed    Daily Weight log at bedside, patient/family participation in use of log: \"yes    Patient's current weight today shows a difference of 4.3lbs greater than last documented weight.      Intake/Output Summary (Last 24 hours) at 5/5/2024 0544  Last data filed at 5/4/2024 1705  Gross per 24 hour   Intake 1040 ml   Output --   Net 1040 ml     ** 3 large urine voids in incontinence briefs this shift.    Education Booklet Provided: yes    Comorbidities Reviewed Yes    Patient has a past medical history of Acute gastric ulcer with bleeding, Acute metabolic encephalopathy, MAISHA (acute kidney injury) ([facility]), ATN (acute tubular necrosis) (HCC), Atrial fib/flutter, transient, Atrial fibrillation (HCC), BPH (benign prostatic hyperplasia), CAD (coronary artery disease), Cerebral artery occlusion with cerebral infarction (HCC), CHF (congestive heart failure) ([facility]), CKD (chronic kidney disease), Dementia (HCC), Diabetes mellitus (HCC), GI bleed, Glaucoma, History of blood transfusion, Hx of diabetic neuropathy, Hyperlipidemia, Hypertension, Mitral insufficiency, Mobility impaired, 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

## 2024-05-06 ENCOUNTER — APPOINTMENT (OUTPATIENT)
Age: 74
End: 2024-05-06
Payer: MEDICARE

## 2024-05-06 LAB
ANION GAP SERPL CALCULATED.3IONS-SCNC: 14 MMOL/L (ref 3–16)
BUN SERPL-MCNC: 27 MG/DL (ref 7–20)
CALCIUM SERPL-MCNC: 8.5 MG/DL (ref 8.3–10.6)
CHLORIDE SERPL-SCNC: 102 MMOL/L (ref 99–110)
CO2 SERPL-SCNC: 26 MMOL/L (ref 21–32)
CREAT SERPL-MCNC: 0.9 MG/DL (ref 0.8–1.3)
ECHO AO ASC DIAM: 3.6 CM
ECHO AO ASCENDING AORTA INDEX: 1.48 CM/M2
ECHO AO ROOT DIAM: 2.9 CM
ECHO AO ROOT INDEX: 1.19 CM/M2
ECHO AV AREA PEAK VELOCITY: 1.8 CM2
ECHO AV AREA VTI: 1.8 CM2
ECHO AV AREA/BSA PEAK VELOCITY: 0.7 CM2/M2
ECHO AV AREA/BSA VTI: 0.7 CM2/M2
ECHO AV MEAN GRADIENT: 12 MMHG
ECHO AV MEAN VELOCITY: 1.6 M/S
ECHO AV PEAK GRADIENT: 22 MMHG
ECHO AV PEAK VELOCITY: 2.4 M/S
ECHO AV VELOCITY RATIO: 0.46
ECHO AV VTI: 43 CM
ECHO BSA: 2.47 M2
ECHO EST RA PRESSURE: 8 MMHG
ECHO LA AREA 2C: 55.5 CM2
ECHO LA AREA 4C: 40.7 CM2
ECHO LA DIAMETER INDEX: 2.5 CM/M2
ECHO LA DIAMETER: 6.1 CM
ECHO LA MAJOR AXIS: 8.7 CM
ECHO LA MINOR AXIS: 8.5 CM
ECHO LA TO AORTIC ROOT RATIO: 2.1
ECHO LA VOL BP: 211 ML (ref 18–58)
ECHO LA VOL MOD A2C: 284 ML (ref 18–58)
ECHO LA VOL MOD A4C: 154 ML (ref 18–58)
ECHO LA VOL/BSA BIPLANE: 86 ML/M2 (ref 16–34)
ECHO LA VOLUME INDEX MOD A2C: 116 ML/M2 (ref 16–34)
ECHO LA VOLUME INDEX MOD A4C: 63 ML/M2 (ref 16–34)
ECHO LV FRACTIONAL SHORTENING: 31 % (ref 28–44)
ECHO LV INTERNAL DIMENSION DIASTOLE INDEX: 1.97 CM/M2
ECHO LV INTERNAL DIMENSION DIASTOLIC: 4.8 CM (ref 4.2–5.9)
ECHO LV INTERNAL DIMENSION SYSTOLIC INDEX: 1.35 CM/M2
ECHO LV INTERNAL DIMENSION SYSTOLIC: 3.3 CM
ECHO LV IVSD: 1.4 CM (ref 0.6–1)
ECHO LV MASS 2D: 245.7 G (ref 88–224)
ECHO LV MASS INDEX 2D: 100.7 G/M2 (ref 49–115)
ECHO LV POSTERIOR WALL DIASTOLIC: 1.2 CM (ref 0.6–1)
ECHO LV RELATIVE WALL THICKNESS RATIO: 0.5
ECHO LVOT AREA: 3.8 CM2
ECHO LVOT AV VTI INDEX: 0.46
ECHO LVOT DIAM: 2.2 CM
ECHO LVOT MEAN GRADIENT: 3 MMHG
ECHO LVOT PEAK GRADIENT: 5 MMHG
ECHO LVOT PEAK VELOCITY: 1.1 M/S
ECHO LVOT STROKE VOLUME INDEX: 31 ML/M2
ECHO LVOT SV: 75.6 ML
ECHO LVOT VTI: 19.9 CM
ECHO MV E DECELERATION TIME (DT): 182 MS
ECHO MV E VELOCITY: 1.07 M/S
ECHO RIGHT VENTRICULAR SYSTOLIC PRESSURE (RVSP): 60 MMHG
ECHO RV TAPSE: 1 CM (ref 1.7–?)
ECHO TV REGURGITANT MAX VELOCITY: 3.61 M/S
ECHO TV REGURGITANT PEAK GRADIENT: 52 MMHG
EST. AVERAGE GLUCOSE BLD GHB EST-MCNC: 99.7 MG/DL
GFR SERPLBLD CREATININE-BSD FMLA CKD-EPI: 88 ML/MIN/{1.73_M2}
GLUCOSE BLD-MCNC: 111 MG/DL (ref 70–99)
GLUCOSE BLD-MCNC: 112 MG/DL (ref 70–99)
GLUCOSE BLD-MCNC: 118 MG/DL (ref 70–99)
GLUCOSE BLD-MCNC: 151 MG/DL (ref 70–99)
GLUCOSE SERPL-MCNC: 110 MG/DL (ref 70–99)
HBA1C MFR BLD: 5.1 %
MAGNESIUM SERPL-MCNC: 1.9 MG/DL (ref 1.8–2.4)
PERFORMED ON: ABNORMAL
POTASSIUM SERPL-SCNC: 3.2 MMOL/L (ref 3.5–5.1)
SODIUM SERPL-SCNC: 142 MMOL/L (ref 136–145)

## 2024-05-06 PROCEDURE — 6370000000 HC RX 637 (ALT 250 FOR IP): Performed by: NURSE PRACTITIONER

## 2024-05-06 PROCEDURE — 6360000002 HC RX W HCPCS: Performed by: INTERNAL MEDICINE

## 2024-05-06 PROCEDURE — 97110 THERAPEUTIC EXERCISES: CPT

## 2024-05-06 PROCEDURE — 93306 TTE W/DOPPLER COMPLETE: CPT

## 2024-05-06 PROCEDURE — 80048 BASIC METABOLIC PNL TOTAL CA: CPT

## 2024-05-06 PROCEDURE — 99232 SBSQ HOSP IP/OBS MODERATE 35: CPT | Performed by: NURSE PRACTITIONER

## 2024-05-06 PROCEDURE — 2060000000 HC ICU INTERMEDIATE R&B

## 2024-05-06 PROCEDURE — 2580000003 HC RX 258: Performed by: STUDENT IN AN ORGANIZED HEALTH CARE EDUCATION/TRAINING PROGRAM

## 2024-05-06 PROCEDURE — 97530 THERAPEUTIC ACTIVITIES: CPT

## 2024-05-06 PROCEDURE — 83735 ASSAY OF MAGNESIUM: CPT

## 2024-05-06 PROCEDURE — 36415 COLL VENOUS BLD VENIPUNCTURE: CPT

## 2024-05-06 PROCEDURE — 93306 TTE W/DOPPLER COMPLETE: CPT | Performed by: INTERNAL MEDICINE

## 2024-05-06 PROCEDURE — 6360000002 HC RX W HCPCS: Performed by: NURSE PRACTITIONER

## 2024-05-06 PROCEDURE — 6370000000 HC RX 637 (ALT 250 FOR IP): Performed by: INTERNAL MEDICINE

## 2024-05-06 RX ORDER — ESCITALOPRAM OXALATE 10 MG/1
10 TABLET ORAL DAILY
COMMUNITY

## 2024-05-06 RX ORDER — POTASSIUM CHLORIDE 20 MEQ/1
40 TABLET, EXTENDED RELEASE ORAL 2 TIMES DAILY
Status: COMPLETED | OUTPATIENT
Start: 2024-05-06 | End: 2024-05-06

## 2024-05-06 RX ORDER — AMLODIPINE BESYLATE 2.5 MG/1
2.5 TABLET ORAL DAILY
Status: ON HOLD | COMMUNITY
End: 2024-05-10 | Stop reason: HOSPADM

## 2024-05-06 RX ORDER — CLOTRIMAZOLE AND BETAMETHASONE DIPROPIONATE 10; .64 MG/G; MG/G
1 CREAM TOPICAL 2 TIMES DAILY PRN
Status: ON HOLD | COMMUNITY
End: 2024-05-10 | Stop reason: HOSPADM

## 2024-05-06 RX ORDER — ENOXAPARIN SODIUM 100 MG/ML
30 INJECTION SUBCUTANEOUS 2 TIMES DAILY
Status: DISCONTINUED | OUTPATIENT
Start: 2024-05-06 | End: 2024-05-10 | Stop reason: HOSPADM

## 2024-05-06 RX ORDER — DIVALPROEX SODIUM 125 MG/1
375 CAPSULE, COATED PELLETS ORAL NIGHTLY
Status: DISCONTINUED | OUTPATIENT
Start: 2024-05-07 | End: 2024-05-10 | Stop reason: HOSPADM

## 2024-05-06 RX ORDER — METOPROLOL SUCCINATE 25 MG/1
25 TABLET, EXTENDED RELEASE ORAL DAILY
Status: ON HOLD | COMMUNITY
End: 2024-05-10 | Stop reason: HOSPADM

## 2024-05-06 RX ORDER — TRAZODONE HYDROCHLORIDE 50 MG/1
50 TABLET ORAL NIGHTLY
COMMUNITY

## 2024-05-06 RX ORDER — PANTOPRAZOLE SODIUM 20 MG/1
20 TABLET, DELAYED RELEASE ORAL DAILY
COMMUNITY

## 2024-05-06 RX ORDER — DIVALPROEX SODIUM 125 MG/1
375 CAPSULE, COATED PELLETS ORAL NIGHTLY
COMMUNITY

## 2024-05-06 RX ORDER — FUROSEMIDE 10 MG/ML
40 INJECTION INTRAMUSCULAR; INTRAVENOUS 2 TIMES DAILY
Status: DISCONTINUED | OUTPATIENT
Start: 2024-05-06 | End: 2024-05-08

## 2024-05-06 RX ORDER — ATORVASTATIN CALCIUM 20 MG/1
20 TABLET, FILM COATED ORAL DAILY
COMMUNITY

## 2024-05-06 RX ADMIN — HYDRALAZINE HYDROCHLORIDE 100 MG: 50 TABLET ORAL at 05:29

## 2024-05-06 RX ADMIN — HYDRALAZINE HYDROCHLORIDE 100 MG: 50 TABLET ORAL at 21:02

## 2024-05-06 RX ADMIN — HYDRALAZINE HYDROCHLORIDE 100 MG: 50 TABLET ORAL at 14:52

## 2024-05-06 RX ADMIN — CALCIUM POLYCARBOPHIL 625 MG: 625 TABLET ORAL at 18:04

## 2024-05-06 RX ADMIN — ATORVASTATIN CALCIUM 40 MG: 40 TABLET, FILM COATED ORAL at 21:02

## 2024-05-06 RX ADMIN — Medication 1 CAPSULE: at 10:07

## 2024-05-06 RX ADMIN — POTASSIUM CHLORIDE 40 MEQ: 1500 TABLET, EXTENDED RELEASE ORAL at 21:01

## 2024-05-06 RX ADMIN — SODIUM CHLORIDE, PRESERVATIVE FREE 10 ML: 5 INJECTION INTRAVENOUS at 21:02

## 2024-05-06 RX ADMIN — ASPIRIN 81 MG 81 MG: 81 TABLET ORAL at 10:07

## 2024-05-06 RX ADMIN — PANTOPRAZOLE SODIUM 40 MG: 40 TABLET, DELAYED RELEASE ORAL at 10:07

## 2024-05-06 RX ADMIN — DOCUSATE SODIUM 100 MG: 100 CAPSULE, LIQUID FILLED ORAL at 10:07

## 2024-05-06 RX ADMIN — ISOSORBIDE MONONITRATE 30 MG: 30 TABLET, EXTENDED RELEASE ORAL at 10:08

## 2024-05-06 RX ADMIN — B-COMPLEX W/ C & FOLIC ACID TAB 1 TABLET: TAB at 10:07

## 2024-05-06 RX ADMIN — POTASSIUM CHLORIDE 40 MEQ: 1500 TABLET, EXTENDED RELEASE ORAL at 14:52

## 2024-05-06 RX ADMIN — FUROSEMIDE 40 MG: 10 INJECTION, SOLUTION INTRAMUSCULAR; INTRAVENOUS at 18:04

## 2024-05-06 RX ADMIN — DIVALPROEX SODIUM 375 MG: 125 CAPSULE ORAL at 12:53

## 2024-05-06 RX ADMIN — FUROSEMIDE 40 MG: 10 INJECTION, SOLUTION INTRAMUSCULAR; INTRAVENOUS at 10:09

## 2024-05-06 RX ADMIN — ISOSORBIDE MONONITRATE 30 MG: 30 TABLET, EXTENDED RELEASE ORAL at 21:02

## 2024-05-06 RX ADMIN — ENOXAPARIN SODIUM 30 MG: 100 INJECTION SUBCUTANEOUS at 21:02

## 2024-05-06 ASSESSMENT — PAIN SCALES - GENERAL
PAINLEVEL_OUTOF10: 0
PAINLEVEL_OUTOF10: 0

## 2024-05-06 NOTE — CONSULTS
Comprehensive Nutrition Assessment    Type and Reason for Visit:  Initial, Consult    Nutrition Recommendations/Plan:   Recommend no added salt diet  Encourage po intakes  Monitor BG and need for carb restriction  Monitor po intakes, nutrition adequacy, weights, pertinent labs, BMs     Malnutrition Assessment:  Malnutrition Status:  At risk for malnutrition (Comment) (05/06/24 1311)      Nutrition Assessment:    Consult for CHF diet education. Pt with PMHx of CAD, CHF, CKD, DM, dementia, HTN, HLD, admitted with chest pain and shortness of breath. Currently on a regular diet with po intakes % per EMR. Pt reports that his appetite is okay. Pt endorses having some weight gain, possibly 2/2 fluid retention. Pt noted to have some confusion. Left handout on CHF diet education at . Pt reports that he does not add salt to his food. Pt denied having any nutrition questions or needs. Will monitor.    Nutrition Related Findings:    +2 non-pitting LLE, +1 non-pitting RLE, +3 pitting LUE edema. No edema. +BM on 5/6. Wound Type: None       Current Nutrition Intake & Therapies:    Average Meal Intake: 26-50%, 51-75%, %  Average Supplements Intake: None Ordered  ADULT DIET; Regular    Anthropometric Measures:  Height: 193 cm (6' 3.98\")  Ideal Body Weight (IBW): 202 lbs (92 kg)       Current Body Weight: 113.4 kg (250 lb),   IBW. Weight Source: Bed Scale  Current BMI (kg/m2): 30.4                          BMI Categories: Obese Class 1 (BMI 30.0-34.9)    Estimated Daily Nutrient Needs:  Energy Requirements Based On: Kcal/kg  Weight Used for Energy Requirements: Ideal  Energy (kcal/day): 7123-2326  Weight Used for Protein Requirements: Ideal  Protein (g/day):   Method Used for Fluid Requirements: Other (Comment)  Fluid (ml/day): Less than 2000 ml 2/2 CHF    Nutrition Diagnosis:   Predicted inadequate energy intake related to inadequate protein-energy intake as evidenced by intake 26-50%, intake 51-75%    Nutrition

## 2024-05-06 NOTE — PLAN OF CARE
CHF Care Plan      Patient's EF (Ejection Fraction) is greater than 40%    Heart Failure Medications:  Diuretics:: Furosemide    (One of the following REQUIRED for EF </= 40%/SYSTOLIC FAILURE but MAY be used in EF% >40%/DIASTOLIC FAILURE)        ACE:: None        ARB:: None         ARNI:: None    (Beta Blockers)  NON- Evidenced Based Beta Blocker (for EF% >40%/DIASTOLIC FAILURE): None    Evidenced Based Beta Blocker::(REQUIRED for EF% <40%/SYSTOLIC FAILURE) Metoprolol SUCCinate- Toprol XL  ...................................................................................................................................................    Failed to redirect to the Timeline version of the [employer] SmartLink.      Patient's weights and intake/output reviewed    Daily Weight log at bedside, patient/family participation in use of log: \"yes    Patient's current weight today shows a difference of 40.3 KGS greater than last documented weight.      Intake/Output Summary (Last 24 hours) at 5/6/2024 0543  Last data filed at 5/5/2024 2305  Gross per 24 hour   Intake 600 ml   Output 1900 ml   Net -1300 ml     ** 3 large urine voids in incontinence briefs this shift.    Education Booklet Provided: yes    Comorbidities Reviewed Yes    Patient has a past medical history of Acute gastric ulcer with bleeding, Acute metabolic encephalopathy, MAISHA (acute kidney injury) ([facility]), ATN (acute tubular necrosis) ([facility]), Atrial fib/flutter, transient, Atrial fibrillation (HCC), BPH (benign prostatic hyperplasia), CAD (coronary artery disease), Cerebral artery occlusion with cerebral infarction (HCC), CHF (congestive heart failure) ([facility]), CKD (chronic kidney disease), Dementia (HCC), Diabetes mellitus (HCC), GI bleed, Glaucoma, History of blood transfusion, Hx of diabetic neuropathy, Hyperlipidemia, Hypertension, Mitral insufficiency, Mobility impaired, 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     >>For CHF and Comorbidity documentation on Education Time and Topics, please see Education Tab      Pt resting in bed at this time on room air. Pt denies shortness of breath. Pt with nonpitting lower extremity edema.     Patient and/or Family's stated Goal of Care this Admission: reduce shortness of breath, increase activity tolerance, better understand heart failure and disease management, be more comfortable, and reduce lower extremity edema prior to discharge        :

## 2024-05-06 NOTE — PROGRESS NOTES
Department of Internal Medicine  Nephrology Progress Note        SUBJECTIVE:    We are following this patient for difficult to control Hypertension.  The patient was seen and examined; he feels well today with no CP, SOB, nausea or vomiting.  Denies any new complaints.    ROS: No fever or chills.  Social: No family at bedside.    Physical Exam:    VITALS:  BP (!) 142/76   Pulse 77   Temp 97.8 °F (36.6 °C) (Oral)   Resp 17   Ht 1.93 m (6' 3.98\")   Wt 113.4 kg (250 lb)   SpO2 94%   BMI 30.44 kg/m²     General appearance: Seems comfortable, no acute distress.  Neck: Trachea midline, thyroid normal.   Lungs:  Non labored breathing, CTA to anterior auscultation.  Heart:  S1S2 normal, rub or gallop. + peripheral edema.  Abdomen: Soft, non-tender, no organomegaly.   Skin: No lesions or rashes, warm to touch.     DATA:    CBC with Differential:    Lab Results   Component Value Date/Time    WBC 4.4 05/05/2024 04:48 AM    RBC 3.56 05/05/2024 04:48 AM    HGB 9.6 05/05/2024 04:48 AM    HCT 29.9 05/05/2024 04:48 AM     05/05/2024 04:48 AM    MCV 83.8 05/05/2024 04:48 AM    MCH 27.0 05/05/2024 04:48 AM    MCHC 32.3 05/05/2024 04:48 AM    RDW 18.8 05/05/2024 04:48 AM    NRBC 1 07/09/2017 05:14 AM    BANDSPCT 3 07/12/2017 05:25 AM    METASPCT 1 07/11/2017 06:45 AM    LYMPHOPCT 20.8 05/05/2024 04:48 AM    MONOPCT 8.4 05/05/2024 04:48 AM    MYELOPCT 1 07/11/2017 06:45 AM    EOSPCT 3.8 05/05/2024 04:48 AM    BASOPCT 0.9 05/05/2024 04:48 AM    MONOSABS 0.4 05/05/2024 04:48 AM    EOSABS 0.2 05/05/2024 04:48 AM    BASOSABS 0.0 05/05/2024 04:48 AM    DIFFTYPE Auto 04/05/2013 05:50 AM     BMP:    Lab Results   Component Value Date/Time     05/06/2024 05:37 AM    K 3.2 05/06/2024 05:37 AM    K 3.3 05/05/2024 04:47 AM     05/06/2024 05:37 AM    CO2 26 05/06/2024 05:37 AM    BUN 27 05/06/2024 05:37 AM    CREATININE 0.9 05/06/2024 05:37 AM    CALCIUM 8.5 05/06/2024 05:37 AM    GFRAA 48 08/03/2022 05:06 PM    GFRAA >60

## 2024-05-06 NOTE — DISCHARGE INSTRUCTIONS
Heart Failure Resources:  Heart Failure Interactive Workbook:  Go to [email or web address]/[email or web address] for a Free Heart Failure Interactive Workbook provided by The American [employer] Association. This interactive workbook will provide information on Healthier Living with Heart Failure. Please copy and paste link into search bar. Use your mouse to scroll through the pages.    HF Beeler gisselle:   Heart Failure Free smart phone gisselle available for iPhone and Android download. Use your phone to track sodium intake, fluid intake, symptoms, and weight.     Low Sodium Diet / Recipes:  Go to www.[employer] website for “renal” diet which is Low Sodium! [employer] is a dialysis company, but this website offers free seasonal cookbooks. Each quarter, they will release 25-30 new recipes with a breakdown of calories, sodium, and glucose. You can also go to www.[employer]/recipes website for free recipes.   Home Exercise Program:   Identification of Green/Yellow/Red zones:  You should be able to identify when you feel good (green zone), if you have 1-2 symptoms of HF (yellow zone), or if you are in need of medical attention (red zone).  In your CHF education folder you were provided a “stop light tool” to outline this information.     We want to you to rate your exertion levels:    Our therapy team has discussed means of identification with you such as the \"Olena scale.\"  The Olena rating scale ranges from 6 to 20, where 6 means \"no exertion at all\" and 20 means \"maximal exertion.\" The goal is to use this to gauge how much effort it is taking for you to do your normal daily tasks.   You should be able to recognize when too much exertion is being expended.    Elements of Energy Conservation:   Prioritize/Plan: Decide what needs to be done today, and what can wait for a later date, write to do lists, plan ahead to avoid extra trips, and gather supplies and equipment needed before starting an activity.   Position: Avoid

## 2024-05-06 NOTE — PROGRESS NOTES
Pharmacy Medication History Note      List of current medications patient is taking is complete.   Prior to Admission medications    Medication Sig Start Date End Date Taking? Authorizing Provider   amLODIPine (NORVASC) 2.5 MG tablet Take 1 tablet by mouth daily   Yes Oksana Hood MD   atorvastatin (LIPITOR) 20 MG tablet Take 1 tablet by mouth daily   Yes Oksana Hood MD   clotrimazole-betamethasone (LOTRISONE) 1-0.05 % cream Apply 1 Application topically 2 times daily as needed   Yes Oksana Hood MD   escitalopram (LEXAPRO) 10 MG tablet Take 1 tablet by mouth daily   Yes Oksana Hood MD   rivastigmine (EXELON) 4.6 MG/24HR Place 1 patch onto the skin daily   Yes Oksana Hood MD   lidocaine 4 % external patch Place 1 patch onto the skin daily 9/6/23   Christos Flood MD   mineral oil-hydrophilic petrolatum (AQUAPHOR) ointment Apply to bi lateral tibias.. Apply topically as needed. 9/5/23   Christos Flood MD   balsum peru-castor oil (VENELEX) OINT ointment Apply topically 2 times daily Apply to left sacrum. 9/5/23   Christos Flood MD   docusate sodium (COLACE, DULCOLAX) 100 MG CAPS Take 100 mg by mouth daily 9/6/23   Christos Flood MD   polycarbophil (FIBERCON) 625 MG tablet Take 1 tablet by mouth every evening 9/5/23   Christos Flood MD   polyethylene glycol (GLYCOLAX) 17 g packet Take 1 packet by mouth daily as needed for Constipation 9/5/23   Christos Flood MD   LORazepam (ATIVAN) 0.5 MG tablet Take 1 tablet by mouth 3 times daily as needed for Anxiety. Max Daily Amount: 1.5 mg    Oksana Hood MD   fenofibrate (TRIGLIDE) 160 MG tablet Take 1 tablet by mouth daily  Patient not taking: Reported on 5/4/2024 5/5/23   Gisselle Bedoya APRN - CNP   pantoprazole (PROTONIX) 40 MG tablet Take 1 tablet by mouth daily 5/5/23   Gisselle Bedoya APRN - CNP   aspirin 81 MG chewable tablet Take 1 tablet by mouth daily 5/5/23   Gisselle Bedoya APRN - CNP

## 2024-05-06 NOTE — PROGRESS NOTES
Hospital Medicine Progress Note      Date of Admission: 5/4/2024  Hospital Day: 3    Chief Admission Complaint:    Chief Complaint   Patient presents with    Chest Pain     Chest pain X 1 week and now states he is having some SOB now.       Subjective:    Pt states he is feeling much better today. TTE still pending    Presenting Admission History:       74 y.o. male who presented to [facility] with chest pain and shortness of breath.  PMHx significant for AFIB, DM, dementia, CKD, HTN. History was obtained from the patient and review of the EMR. The patient states that he was having shortness of breath and having some swelling and decided to come to the hospital for further evaluation. Pt was unable to elaborate any further than this. I attempted to call his daughter and left a VM. He will be admitted for further evaluation.    05/05: Pt is much more alert today, but has some mild confusion. He tells me that he came into the hospital because he was having chest pain and because his blood pressure was janet high and he was concerned he could have a stroke. He states he is no longer having chest pain now and feeling less short of breath. Currently on 1L O2.      Assessment/Plan:       Current Principal Problem:  Flash pulmonary edema (HCC)     Chest pain and dyspnea in setting of suspect CHF exacerbation, also with bilateral pleural effusions  - CXR with central predominant of biliary and interstitial edema potentially CHF given mild cardiomegaly, at least small loculated right pleural effusion, and at least trace left pleural effusion  - Chest CT with diffuse pulmonary edema with suspected assymetric alveolar edema in the right lung, consider CHF given moderate cardiomegaly. Loculated small to moderate left and trace right pleural effusions.   - Pro-BNP on admission 27160  - TTE on 12/30/20 with LVEF 55-60%. No regional WMAs. LV diastolic filling pressure elevated. RV systolic function mildly reduced. Mild

## 2024-05-06 NOTE — PLAN OF CARE
Problem: Discharge Planning  Goal: Discharge to home or other facility with appropriate resources  5/6/2024 0544 by Rufina Smith RN  Outcome: Progressing  5/5/2024 1826 by Lacie Jimenez RN  Outcome: Progressing     Problem: Chronic Conditions and Co-morbidities  Goal: Patient's chronic conditions and co-morbidity symptoms are monitored and maintained or improved  5/6/2024 0544 by Rufina Smith RN  Outcome: Progressing  Flowsheets (Taken 5/5/2024 1935)  Care Plan - Patient's Chronic Conditions and Co-Morbidity Symptoms are Monitored and Maintained or Improved: Monitor and assess patient's chronic conditions and comorbid symptoms for stability, deterioration, or improvement  5/5/2024 1826 by Lacie Jimenez RN  Outcome: Progressing     Problem: Safety - Adult  Goal: Free from fall injury  5/6/2024 0544 by Rufina Smith RN  Outcome: Progressing  5/5/2024 1826 by Lacie Jimenez RN  Outcome: Progressing

## 2024-05-06 NOTE — CARE COORDINATION
Patient is LTC at [facility] and can return when stable. Nephrology and cardiology following for afib RVR and CHF, anemia, and abnormal electrolytes. CM will follow and assist with d/c plan back to LTC when stable.

## 2024-05-06 NOTE — PROGRESS NOTES
Physical Therapy  Facility/Department: [place] [address] [facility]  Daily Treatment Note  NAME: Alma Delia Garner  : 1950  MRN: [id number]    Date of Service: 2024    Discharge Recommendations:  Long Term Care with PT   PT Equipment Recommendations  Equipment Needed: No  Other: defer    Patient Diagnosis(es): The primary encounter diagnosis was Acute hypoxemic respiratory failure (HCC). Diagnoses of New onset of congestive heart failure (HCC), Pulmonary edema cardiac cause (HCC), Elevated troponin, Acute chest pain, and Chest pain, unspecified type were also pertinent to this visit.    Assessment   Assessment: Pt seen in am for PT tx, pt agreeable with encouragement for trial of OOB mobility. Pt is tangntial in conversation and demonstrates poor safety awareness despite cues. Pt is oriented x 4 but questionable regarding mobility. Pt requires modA for bed mobility and tolerates sitting EOB x 10 minutes with SBA to sup with participation in seated TE. Pt is limited by cognition, safety awarness and decreased strength. Pt will benefit from continued skilled PT in acute care to address deficits. Continue to recommend pt return to LTC with PT.  Activity Tolerance: Treatment limited secondary to decreased cognition;Patient limited by fatigue;Patient limited by pain;Patient limited by endurance  Equipment Needed: No  Other: defer     Plan    Physical Therapy Plan  General Plan: 2-3 times per week  Current Treatment Recommendations: Strengthening;Balance training;Functional mobility training;Transfer training;Wheelchair mobility training;Neuromuscular re-education;Home exercise program;Safety education & training;Therapeutic activities;Co-Treatment;Cognitive reorientation;ROM;Manual;Patient/Caregiver education & training;Equipment evaluation, education, & procurement;Endurance training;Positioning     Restrictions  Restrictions/Precautions  Restrictions/Precautions: General Precautions, Fall Risk  Required Braces or Orthoses?:  Pt will complete 12-15 reps BLE strengthening exercises by 5/13 -- 5/06: GOAL MET 2 x 10  Patient Goals   Patient Goals : \"to get out of here\"    Education  Patient Education  Education Given To: Patient  Education Provided: Role of Therapy;Plan of Care;Transfer Training;Orientation;Equipment  Education Provided Comments: Role of PT, importance of participation in OOB mobility and positional changes  Education Method: Demonstration;Verbal  Barriers to Learning: Cognition  Education Outcome: Continued education needed    AM-PAC - Mobility    AM-PAC Basic Mobility - Inpatient   How much help is needed turning from your back to your side while in a flat bed without using bedrails?: A Little  How much help is needed moving from lying on your back to sitting on the side of a flat bed without using bedrails?: A Lot  How much help is needed standing up from a chair using your arms?: Total  How much help is needed walking in hospital room?: Total  How much help is needed climbing 3-5 steps with a railing?: Total         Therapy Time   Individual Concurrent Group Co-treatment   Time In 0845         Time Out 0910         Minutes 25         Timed Code Treatment Minutes: 25 Minutes     If pt is unable to be seen after this session, please let this note serve as discharge summary.  Please see case management note for discharge disposition.  Thank you.      Chelita Temple, PT, DPT

## 2024-05-06 NOTE — PROGRESS NOTES
[facility]   Heart Failure Daily Progress Note    Admit Date:  5/4/2024  HPI:    Chief Complaint   Patient presents with    Chest Pain     Chest pain X 1 week and now states he is having some SOB now.          Alma Delia Garner is being followed for chest pain and shortness of breath; being treated for acute on chronic diastolic heart failure.   Hx of HTN, CKD, Dementia, CAD, HFpEF, Afib s/p watchman.   Lives at [facility]    Interval history: weight is down 4lbs.   Remains on IV lasix; 1.9L UOP last 24 hours (I/O's incomplete)   Echo results pending     Subjective:  Mr. Garner no shortness of breath.   Continues with edema.     Objective:   BP (!) 146/83   Pulse 86   Temp 98 °F (36.7 °C) (Oral)   Resp 17   Ht 1.93 m (6' 3.98\")   Wt 113.4 kg (250 lb)   SpO2 97%   BMI 30.44 kg/m²     Intake/Output Summary (Last 24 hours) at 5/6/2024 1305  Last data filed at 5/6/2024 1259  Gross per 24 hour   Intake 720 ml   Output 1900 ml   Net -1180 ml       NYHA: IV    Physical Exam:  General:  Awake, alert, NAD  Skin:  Warm and dry  Neck:  JVD Elevated   Chest:  dim  to auscultation, no wheezes/rhonchi/rales  Cardiovascular:  irregular  S1S2, no m/r/g   Abdomen:  Soft, nontender, +bowel sounds  Extremities:  ++ bilateral lower extremity edema, + RICO edema     Medications:    enoxaparin  30 mg SubCUTAneous BID    hydrALAZINE  100 mg Oral 3 times per day    isosorbide mononitrate  30 mg Oral BID    sodium chloride flush  5-40 mL IntraVENous 2 times per day    aspirin  81 mg Oral Daily    atorvastatin  40 mg Oral Daily    docusate sodium  100 mg Oral Daily    lactobacillus  1 capsule Oral Daily    lidocaine  1 patch TransDERmal Daily    vitamin B complex w/C  1 tablet Oral Daily    pantoprazole  40 mg Oral Daily    polycarbophil  625 mg Oral QPM    rivastigmine  1 patch TransDERmal Daily    insulin lispro  0-4 Units SubCUTAneous TID WC    insulin lispro  0-4 Units SubCUTAneous Nightly

## 2024-05-06 NOTE — PLAN OF CARE
CHF Care Plan      Patient's EF (Ejection Fraction) is greater than 40%awaiting echo results     Heart Failure Medications:  Diuretics:: Furosemide    (One of the following REQUIRED for EF </= 40%/SYSTOLIC FAILURE but MAY be used in EF% >40%/DIASTOLIC FAILURE)        ACE:: None        ARB:: None         ARNI:: None    (Beta Blockers)  NON- Evidenced Based Beta Blocker (for EF% >40%/DIASTOLIC FAILURE): None    Evidenced Based Beta Blocker::(REQUIRED for EF% <40%/SYSTOLIC FAILURE) None  ...................................................................................................................................................    Failed to redirect to the Timeline version of the [employer] SmartLink.      Patient's weights and intake/output reviewed    Daily Weight log at bedside, patient/family participation in use of log: \"yes    Patient's current weight today shows a difference of 4 lbs less than last documented weight.      Intake/Output Summary (Last 24 hours) at 5/6/2024 1711  Last data filed at 5/6/2024 1523  Gross per 24 hour   Intake 720 ml   Output 1400 ml   Net -680 ml       Education Booklet Provided: yes    Comorbidities Reviewed Yes    Patient has a past medical history of Acute gastric ulcer with bleeding, Acute metabolic encephalopathy, MAISHA (acute kidney injury) ([facility]), ATN (acute tubular necrosis) ([facility]), Atrial fib/flutter, transient, Atrial fibrillation ([facility]), BPH (benign prostatic hyperplasia), CAD (coronary artery disease), Cerebral artery occlusion with cerebral infarction ([facility]), CHF (congestive heart failure) ([facility]), CKD (chronic kidney disease), Dementia ([facility]), Diabetes mellitus ([facility]), GI bleed, Glaucoma, History of blood transfusion, Hx of diabetic neuropathy, Hyperlipidemia, Hypertension, Mitral insufficiency, Mobility impaired, 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     >>For CHF and Comorbidity

## 2024-05-07 LAB
ANION GAP SERPL CALCULATED.3IONS-SCNC: 10 MMOL/L (ref 3–16)
BASOPHILS # BLD: 0 K/UL (ref 0–0.2)
BASOPHILS NFR BLD: 0.7 %
BUN SERPL-MCNC: 25 MG/DL (ref 7–20)
CALCIUM SERPL-MCNC: 8.4 MG/DL (ref 8.3–10.6)
CHLORIDE SERPL-SCNC: 101 MMOL/L (ref 99–110)
CO2 SERPL-SCNC: 29 MMOL/L (ref 21–32)
CREAT SERPL-MCNC: 0.9 MG/DL (ref 0.8–1.3)
DEPRECATED RDW RBC AUTO: 19.5 % (ref 12.4–15.4)
EOSINOPHIL # BLD: 0.1 K/UL (ref 0–0.6)
EOSINOPHIL NFR BLD: 3.6 %
GFR SERPLBLD CREATININE-BSD FMLA CKD-EPI: 88 ML/MIN/{1.73_M2}
GLUCOSE BLD-MCNC: 100 MG/DL (ref 70–99)
GLUCOSE BLD-MCNC: 103 MG/DL (ref 70–99)
GLUCOSE BLD-MCNC: 129 MG/DL (ref 70–99)
GLUCOSE BLD-MCNC: 138 MG/DL (ref 70–99)
GLUCOSE SERPL-MCNC: 92 MG/DL (ref 70–99)
HCT VFR BLD AUTO: 27.6 % (ref 40.5–52.5)
HGB BLD-MCNC: 9 G/DL (ref 13.5–17.5)
LYMPHOCYTES # BLD: 0.6 K/UL (ref 1–5.1)
LYMPHOCYTES NFR BLD: 16.3 %
MAGNESIUM SERPL-MCNC: 1.7 MG/DL (ref 1.8–2.4)
MCH RBC QN AUTO: 27.1 PG (ref 26–34)
MCHC RBC AUTO-ENTMCNC: 32.7 G/DL (ref 31–36)
MCV RBC AUTO: 82.9 FL (ref 80–100)
MONOCYTES # BLD: 0.4 K/UL (ref 0–1.3)
MONOCYTES NFR BLD: 9.8 %
NEUTROPHILS # BLD: 2.7 K/UL (ref 1.7–7.7)
NEUTROPHILS NFR BLD: 69.6 %
NT-PROBNP SERPL-MCNC: ABNORMAL PG/ML (ref 0–449)
PERFORMED ON: ABNORMAL
PLATELET # BLD AUTO: 164 K/UL (ref 135–450)
PMV BLD AUTO: 8.2 FL (ref 5–10.5)
POTASSIUM SERPL-SCNC: 3.6 MMOL/L (ref 3.5–5.1)
RBC # BLD AUTO: 3.33 M/UL (ref 4.2–5.9)
SODIUM SERPL-SCNC: 140 MMOL/L (ref 136–145)
WBC # BLD AUTO: 3.9 K/UL (ref 4–11)

## 2024-05-07 PROCEDURE — 6360000002 HC RX W HCPCS: Performed by: INTERNAL MEDICINE

## 2024-05-07 PROCEDURE — 6370000000 HC RX 637 (ALT 250 FOR IP): Performed by: NURSE PRACTITIONER

## 2024-05-07 PROCEDURE — 85025 COMPLETE CBC W/AUTO DIFF WBC: CPT

## 2024-05-07 PROCEDURE — 2500000003 HC RX 250 WO HCPCS: Performed by: INTERNAL MEDICINE

## 2024-05-07 PROCEDURE — 36415 COLL VENOUS BLD VENIPUNCTURE: CPT

## 2024-05-07 PROCEDURE — 83880 ASSAY OF NATRIURETIC PEPTIDE: CPT

## 2024-05-07 PROCEDURE — 2060000000 HC ICU INTERMEDIATE R&B

## 2024-05-07 PROCEDURE — 6370000000 HC RX 637 (ALT 250 FOR IP): Performed by: INTERNAL MEDICINE

## 2024-05-07 PROCEDURE — 80048 BASIC METABOLIC PNL TOTAL CA: CPT

## 2024-05-07 PROCEDURE — 6360000002 HC RX W HCPCS: Performed by: NURSE PRACTITIONER

## 2024-05-07 PROCEDURE — 99232 SBSQ HOSP IP/OBS MODERATE 35: CPT | Performed by: NURSE PRACTITIONER

## 2024-05-07 PROCEDURE — 83735 ASSAY OF MAGNESIUM: CPT

## 2024-05-07 PROCEDURE — 2580000003 HC RX 258: Performed by: STUDENT IN AN ORGANIZED HEALTH CARE EDUCATION/TRAINING PROGRAM

## 2024-05-07 RX ORDER — EPLERENONE 25 MG/1
25 TABLET, FILM COATED ORAL DAILY
Status: DISCONTINUED | OUTPATIENT
Start: 2024-05-07 | End: 2024-05-10 | Stop reason: HOSPADM

## 2024-05-07 RX ADMIN — PANTOPRAZOLE SODIUM 40 MG: 40 TABLET, DELAYED RELEASE ORAL at 09:09

## 2024-05-07 RX ADMIN — B-COMPLEX W/ C & FOLIC ACID TAB 1 TABLET: TAB at 09:09

## 2024-05-07 RX ADMIN — ENOXAPARIN SODIUM 30 MG: 100 INJECTION SUBCUTANEOUS at 21:17

## 2024-05-07 RX ADMIN — ISOSORBIDE MONONITRATE 30 MG: 30 TABLET, EXTENDED RELEASE ORAL at 21:16

## 2024-05-07 RX ADMIN — ASPIRIN 81 MG 81 MG: 81 TABLET ORAL at 09:09

## 2024-05-07 RX ADMIN — SODIUM CHLORIDE, PRESERVATIVE FREE 10 ML: 5 INJECTION INTRAVENOUS at 09:08

## 2024-05-07 RX ADMIN — Medication 10 ML: at 17:26

## 2024-05-07 RX ADMIN — MICONAZOLE NITRATE: 2 POWDER TOPICAL at 22:00

## 2024-05-07 RX ADMIN — HYDRALAZINE HYDROCHLORIDE 100 MG: 50 TABLET ORAL at 14:03

## 2024-05-07 RX ADMIN — DOCUSATE SODIUM 100 MG: 100 CAPSULE, LIQUID FILLED ORAL at 09:09

## 2024-05-07 RX ADMIN — DIVALPROEX SODIUM 375 MG: 125 CAPSULE ORAL at 21:21

## 2024-05-07 RX ADMIN — EPLERENONE 25 MG: 25 TABLET, FILM COATED ORAL at 13:12

## 2024-05-07 RX ADMIN — CALCIUM POLYCARBOPHIL 625 MG: 625 TABLET ORAL at 17:26

## 2024-05-07 RX ADMIN — Medication 1 CAPSULE: at 09:09

## 2024-05-07 RX ADMIN — SODIUM CHLORIDE, PRESERVATIVE FREE 10 ML: 5 INJECTION INTRAVENOUS at 21:17

## 2024-05-07 RX ADMIN — HYDRALAZINE HYDROCHLORIDE 100 MG: 50 TABLET ORAL at 06:42

## 2024-05-07 RX ADMIN — ENOXAPARIN SODIUM 30 MG: 100 INJECTION SUBCUTANEOUS at 09:07

## 2024-05-07 RX ADMIN — FUROSEMIDE 40 MG: 10 INJECTION, SOLUTION INTRAMUSCULAR; INTRAVENOUS at 17:25

## 2024-05-07 RX ADMIN — FUROSEMIDE 40 MG: 10 INJECTION, SOLUTION INTRAMUSCULAR; INTRAVENOUS at 09:07

## 2024-05-07 RX ADMIN — HYDRALAZINE HYDROCHLORIDE 100 MG: 50 TABLET ORAL at 21:16

## 2024-05-07 RX ADMIN — ISOSORBIDE MONONITRATE 30 MG: 30 TABLET, EXTENDED RELEASE ORAL at 09:09

## 2024-05-07 RX ADMIN — ATORVASTATIN CALCIUM 40 MG: 40 TABLET, FILM COATED ORAL at 21:16

## 2024-05-07 NOTE — PROGRESS NOTES
Department of Internal Medicine  Nephrology Progress Note        SUBJECTIVE:    We are following this patient for difficult to control Hypertension.  The patient was seen and examined; he feels well today with no CP, SOB, nausea or vomiting.  Denies any new complaints.  Blood pressure still in the 150-170 range.    UOP of 2.6 L    ROS: No fever or chills.  Social: No family at bedside.    Physical Exam:    VITALS:  BP (!) 172/81   Pulse 78   Temp 97.8 °F (36.6 °C)   Resp 18   Ht 1.93 m (6' 3.98\")   Wt 114.2 kg (251 lb 12.3 oz)   SpO2 97%   BMI 30.66 kg/m²     General appearance: Seems comfortable, no acute distress.  Neck: Trachea midline, thyroid normal.   Lungs:  Non labored breathing, CTA to anterior auscultation.  Heart:  S1S2 normal, rub or gallop. + peripheral edema.  Abdomen: Soft, non-tender, no organomegaly.   Skin: No lesions or rashes, warm to touch.     DATA:    CBC with Differential:    Lab Results   Component Value Date/Time    WBC 3.9 05/07/2024 04:47 AM    RBC 3.33 05/07/2024 04:47 AM    HGB 9.0 05/07/2024 04:47 AM    HCT 27.6 05/07/2024 04:47 AM     05/07/2024 04:47 AM    MCV 82.9 05/07/2024 04:47 AM    MCH 27.1 05/07/2024 04:47 AM    MCHC 32.7 05/07/2024 04:47 AM    RDW 19.5 05/07/2024 04:47 AM    NRBC 1 07/09/2017 05:14 AM    BANDSPCT 3 07/12/2017 05:25 AM    METASPCT 1 07/11/2017 06:45 AM    LYMPHOPCT 16.3 05/07/2024 04:47 AM    MONOPCT 9.8 05/07/2024 04:47 AM    MYELOPCT 1 07/11/2017 06:45 AM    EOSPCT 3.6 05/07/2024 04:47 AM    BASOPCT 0.7 05/07/2024 04:47 AM    MONOSABS 0.4 05/07/2024 04:47 AM    EOSABS 0.1 05/07/2024 04:47 AM    BASOSABS 0.0 05/07/2024 04:47 AM    DIFFTYPE Auto 04/05/2013 05:50 AM     BMP:    Lab Results   Component Value Date/Time     05/07/2024 04:48 AM    K 3.6 05/07/2024 04:48 AM     05/07/2024 04:48 AM    CO2 29 05/07/2024 04:48 AM    BUN 25 05/07/2024 04:48 AM    CREATININE 0.9 05/07/2024 04:48 AM    CALCIUM 8.4 05/07/2024 04:48 AM    GFRAA 48

## 2024-05-07 NOTE — PROGRESS NOTES
[facility]   Heart Failure Daily Progress Note    Admit Date:  5/4/2024  HPI:    Chief Complaint   Patient presents with    Chest Pain     Chest pain X 1 week and now states he is having some SOB now.          Alma Delia Garner is being followed for chest pain and shortness of breath; being treated for acute on chronic diastolic heart failure.   Hx of HTN, CKD, Dementia, CAD, HFpEF, Afib s/p watchman.   Lives at [facility]    Interval history:   Lasix increased to BID on 5/6/24  2.6L UOP last 24 hours     Subjective:  Mr. Garner did weill with increase lasix. Mild improvement in left arm edema. Continues with BLE edema. Asking about his echo and when he can go home.   No shortness of breath.     Objective:   BP (!) 172/81   Pulse 78   Temp 97.8 °F (36.6 °C)   Resp 18   Ht 1.93 m (6' 3.98\")   Wt 114.2 kg (251 lb 12.3 oz)   SpO2 97%   BMI 30.66 kg/m²     Intake/Output Summary (Last 24 hours) at 5/7/2024 0859  Last data filed at 5/7/2024 0445  Gross per 24 hour   Intake 480 ml   Output 2600 ml   Net -2120 ml       NYHA: IV    Physical Exam:  General:  Awake, alert, NAD  Skin:  Warm and dry  Neck:  JVD improved   Chest:  dim  to auscultation, no wheezes/rhonchi/rales  Cardiovascular:  irregular  S1S2, no m/r/g   Abdomen:  Soft, nontender, +bowel sounds  Extremities:  ++ bilateral lower extremity edema, + RICO edema     Medications:    enoxaparin  30 mg SubCUTAneous BID    furosemide  40 mg IntraVENous BID    divalproex  375 mg Oral Nightly    hydrALAZINE  100 mg Oral 3 times per day    isosorbide mononitrate  30 mg Oral BID    sodium chloride flush  5-40 mL IntraVENous 2 times per day    aspirin  81 mg Oral Daily    atorvastatin  40 mg Oral Daily    docusate sodium  100 mg Oral Daily    lactobacillus  1 capsule Oral Daily    lidocaine  1 patch TransDERmal Daily    vitamin B complex w/C  1 tablet Oral Daily    pantoprazole  40 mg Oral Daily    polycarbophil  625 mg Oral QPM

## 2024-05-07 NOTE — PLAN OF CARE
CHF Care Plan      Patient's EF (Ejection Fraction) is greater than 40%    Heart Failure Medications:  Diuretics:: Furosemide and Other    (One of the following REQUIRED for EF </= 40%/SYSTOLIC FAILURE but MAY be used in EF% >40%/DIASTOLIC FAILURE)        ACE:: None        ARB:: None         ARNI:: None    (Beta Blockers)  NON- Evidenced Based Beta Blocker (for EF% >40%/DIASTOLIC FAILURE): None    Evidenced Based Beta Blocker::(REQUIRED for EF% <40%/SYSTOLIC FAILURE) None  ...................................................................................................................................................    Failed to redirect to the Timeline version of the [employer] SmartLink.      Patient's weights and intake/output reviewed    Daily Weight log at bedside, patient/family participation in use of log: \"yes    Patient's current weight today shows a difference of 2 lbs more than last documented weight.      Intake/Output Summary (Last 24 hours) at 5/7/2024 1637  Last data filed at 5/7/2024 1545  Gross per 24 hour   Intake 600 ml   Output 2400 ml   Net -1800 ml       Education Booklet Provided: yes    Comorbidities Reviewed Yes    Patient has a past medical history of Acute gastric ulcer with bleeding, Acute metabolic encephalopathy, MAISHA (acute kidney injury) ([facility]), ATN (acute tubular necrosis) ([facility]), Atrial fib/flutter, transient, Atrial fibrillation ([facility]), BPH (benign prostatic hyperplasia), CAD (coronary artery disease), Cerebral artery occlusion with cerebral infarction ([facility]), CHF (congestive heart failure) ([facility]), CKD (chronic kidney disease), Dementia ([facility]), Diabetes mellitus ([facility]), GI bleed, Glaucoma, History of blood transfusion, Hx of diabetic neuropathy, Hyperlipidemia, Hypertension, Mitral insufficiency, Mobility impaired, 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     >>For CHF and Comorbidity documentation on

## 2024-05-07 NOTE — PROGRESS NOTES
Hospital Medicine Progress Note      Date of Admission: 5/4/2024  Hospital Day: 4    Chief Admission Complaint:    Chief Complaint   Patient presents with    Chest Pain     Chest pain X 1 week and now states he is having some SOB now.       Subjective:      Patient resting when I arrived. He has no complaints.     Presenting Admission History:       This is a 74 y.o. male who presented to [facility] with chest pain and shortness of breath.  PMHx significant for AFIB, DM, dementia, CKD, HTN. History was obtained from the patient and review of the EMR. The patient states that he was having shortness of breath and having some swelling and decided to come to the hospital for further evaluation. Pt was unable to elaborate any further than this. I attempted to call his daughter and left a VM. He will be admitted for further evaluation.    05/05: Pt is much more alert today, but has some mild confusion. He tells me that he came into the hospital because he was having chest pain and because his blood pressure was janet high and he was concerned he could have a stroke. He states he is no longer having chest pain now and feeling less short of breath. Currently on 1L O2.      Assessment/Plan:       Current Principal Problem:  Flash pulmonary edema (HCC)     Chest pain and dyspnea in setting of suspect CHF exacerbation, also with bilateral pleural effusions  - CXR with central predominant of biliary and interstitial edema potentially CHF given mild cardiomegaly, at least small loculated right pleural effusion, and at least trace left pleural effusion  - Chest CT with diffuse pulmonary edema with suspected assymetric alveolar edema in the right lung, consider CHF given moderate cardiomegaly. Loculated small to moderate left and trace right pleural effusions.   - Pro-BNP on admission 22,316, BNP 12,354 today  - TTE on 12/30/20 with LVEF 55-60%. No regional WMAs. LV diastolic filling pressure elevated. RV systolic function  requiring serial renal monitoring for nephrotoxicity:     [] Post-Cath/Contrast study requiring serial renal monitoring for Contrast Induced Nephropathy  [] IV Narcotic analgesia for ADR   [x] Aggressive IV diuresis requiring serial monitoring for renal impairment and electrolyte derangements  [] Hypertonic Saline requiring serial renal monitoring for appropriate electrolyte correction rate   [] Critical electrolyte abnormalities requiring IV replacement and close serial monitoring  [] Insulin - monitoring FSBS for Hypoglycemic ADR  [] Anticoagulation requiring close serial monitoring and dose adjustments at high risk of ADR   [] HD requiring close serial monitoring of electrolytes and fluid status  [] Other -  [] Change in code status:    [] Decision to escalate care:    [] Major surgery/procedure with associated risk factors:    ----------------------------------------------------------------------  C. Data (any 2)  [] Discussed management of the case with consultants as follows:   [x] Discussed the discharge plan in detail with case mgt including timing/barriers to discharge, need for support services and placement decision   [x] Imaging personally reviewed and interpreted, includes: Ct chest  [] Telemetry monitoring as noted above  [x] Data Review (any 3)  [x] Collateral history obtained from:  pt, review of emr  [x] All available Consultant notes from yesterday/today were reviewed  [x] All current labs were reviewed and interpreted for clinical significance   [x] Appropriate follow-up labs were ordered    Medications:  Personally reviewed in detail in conjunction w/ labs as documented for evidence of drug toxicity.     Infusion Medications    sodium chloride      dextrose       Scheduled Medications    enoxaparin  30 mg SubCUTAneous BID    furosemide  40 mg IntraVENous BID    divalproex  375 mg Oral Nightly    hydrALAZINE  100 mg Oral 3 times per day    isosorbide mononitrate  30 mg Oral BID    sodium chloride

## 2024-05-07 NOTE — CARE COORDINATION
Palliative care consult noted for GOC. Patient is currently a full code and is LTC at [facility]. Will await outcome.

## 2024-05-08 LAB
AMMONIA PLAS-SCNC: 26 UMOL/L (ref 16–60)
ANION GAP SERPL CALCULATED.3IONS-SCNC: 10 MMOL/L (ref 3–16)
BACTERIA BLD CULT ORG #2: NORMAL
BACTERIA BLD CULT: NORMAL
BASOPHILS # BLD: 0 K/UL (ref 0–0.2)
BASOPHILS NFR BLD: 1 %
BUN SERPL-MCNC: 24 MG/DL (ref 7–20)
CALCIUM SERPL-MCNC: 8.3 MG/DL (ref 8.3–10.6)
CHLORIDE SERPL-SCNC: 100 MMOL/L (ref 99–110)
CO2 SERPL-SCNC: 27 MMOL/L (ref 21–32)
CREAT SERPL-MCNC: 1.1 MG/DL (ref 0.8–1.3)
DEPRECATED RDW RBC AUTO: 19.7 % (ref 12.4–15.4)
EOSINOPHIL # BLD: 0.2 K/UL (ref 0–0.6)
EOSINOPHIL NFR BLD: 4.4 %
GFR SERPLBLD CREATININE-BSD FMLA CKD-EPI: 69 ML/MIN/{1.73_M2}
GLUCOSE BLD-MCNC: 107 MG/DL (ref 70–99)
GLUCOSE BLD-MCNC: 129 MG/DL (ref 70–99)
GLUCOSE BLD-MCNC: 132 MG/DL (ref 70–99)
GLUCOSE BLD-MCNC: 96 MG/DL (ref 70–99)
GLUCOSE SERPL-MCNC: 93 MG/DL (ref 70–99)
HCT VFR BLD AUTO: 27.7 % (ref 40.5–52.5)
HGB BLD-MCNC: 8.8 G/DL (ref 13.5–17.5)
LYMPHOCYTES # BLD: 0.8 K/UL (ref 1–5.1)
LYMPHOCYTES NFR BLD: 20.7 %
MAGNESIUM SERPL-MCNC: 1.6 MG/DL (ref 1.8–2.4)
MCH RBC QN AUTO: 26.5 PG (ref 26–34)
MCHC RBC AUTO-ENTMCNC: 31.8 G/DL (ref 31–36)
MCV RBC AUTO: 83.4 FL (ref 80–100)
MONOCYTES # BLD: 0.4 K/UL (ref 0–1.3)
MONOCYTES NFR BLD: 11.8 %
NEUTROPHILS # BLD: 2.3 K/UL (ref 1.7–7.7)
NEUTROPHILS NFR BLD: 62.1 %
PERFORMED ON: ABNORMAL
PERFORMED ON: NORMAL
PLATELET # BLD AUTO: 157 K/UL (ref 135–450)
PMV BLD AUTO: 8.3 FL (ref 5–10.5)
POTASSIUM SERPL-SCNC: 3.3 MMOL/L (ref 3.5–5.1)
RBC # BLD AUTO: 3.32 M/UL (ref 4.2–5.9)
SODIUM SERPL-SCNC: 137 MMOL/L (ref 136–145)
VALPROATE SERPL-MCNC: 28.7 UG/ML (ref 50–100)
WBC # BLD AUTO: 3.7 K/UL (ref 4–11)

## 2024-05-08 PROCEDURE — 6370000000 HC RX 637 (ALT 250 FOR IP): Performed by: NURSE PRACTITIONER

## 2024-05-08 PROCEDURE — 85025 COMPLETE CBC W/AUTO DIFF WBC: CPT

## 2024-05-08 PROCEDURE — 97535 SELF CARE MNGMENT TRAINING: CPT

## 2024-05-08 PROCEDURE — 6370000000 HC RX 637 (ALT 250 FOR IP): Performed by: INTERNAL MEDICINE

## 2024-05-08 PROCEDURE — 6370000000 HC RX 637 (ALT 250 FOR IP)

## 2024-05-08 PROCEDURE — 97530 THERAPEUTIC ACTIVITIES: CPT

## 2024-05-08 PROCEDURE — 97110 THERAPEUTIC EXERCISES: CPT

## 2024-05-08 PROCEDURE — 6360000002 HC RX W HCPCS: Performed by: NURSE PRACTITIONER

## 2024-05-08 PROCEDURE — 36415 COLL VENOUS BLD VENIPUNCTURE: CPT

## 2024-05-08 PROCEDURE — 80164 ASSAY DIPROPYLACETIC ACD TOT: CPT

## 2024-05-08 PROCEDURE — 6370000000 HC RX 637 (ALT 250 FOR IP): Performed by: STUDENT IN AN ORGANIZED HEALTH CARE EDUCATION/TRAINING PROGRAM

## 2024-05-08 PROCEDURE — 99232 SBSQ HOSP IP/OBS MODERATE 35: CPT | Performed by: NURSE PRACTITIONER

## 2024-05-08 PROCEDURE — 2580000003 HC RX 258: Performed by: STUDENT IN AN ORGANIZED HEALTH CARE EDUCATION/TRAINING PROGRAM

## 2024-05-08 PROCEDURE — 2060000000 HC ICU INTERMEDIATE R&B

## 2024-05-08 PROCEDURE — 6360000002 HC RX W HCPCS: Performed by: INTERNAL MEDICINE

## 2024-05-08 PROCEDURE — 80048 BASIC METABOLIC PNL TOTAL CA: CPT

## 2024-05-08 PROCEDURE — 97168 OT RE-EVAL EST PLAN CARE: CPT

## 2024-05-08 PROCEDURE — 82140 ASSAY OF AMMONIA: CPT

## 2024-05-08 PROCEDURE — 83735 ASSAY OF MAGNESIUM: CPT

## 2024-05-08 RX ORDER — QUETIAPINE FUMARATE 25 MG/1
50 TABLET, FILM COATED ORAL 2 TIMES DAILY
Status: DISCONTINUED | OUTPATIENT
Start: 2024-05-08 | End: 2024-05-10 | Stop reason: HOSPADM

## 2024-05-08 RX ORDER — FUROSEMIDE 40 MG/1
40 TABLET ORAL 2 TIMES DAILY
Status: DISCONTINUED | OUTPATIENT
Start: 2024-05-08 | End: 2024-05-09

## 2024-05-08 RX ORDER — POTASSIUM CHLORIDE 7.45 MG/ML
10 INJECTION INTRAVENOUS PRN
Status: DISCONTINUED | OUTPATIENT
Start: 2024-05-08 | End: 2024-05-10 | Stop reason: HOSPADM

## 2024-05-08 RX ORDER — MAGNESIUM SULFATE IN WATER 40 MG/ML
2000 INJECTION, SOLUTION INTRAVENOUS PRN
Status: DISCONTINUED | OUTPATIENT
Start: 2024-05-08 | End: 2024-05-10 | Stop reason: HOSPADM

## 2024-05-08 RX ORDER — POTASSIUM CHLORIDE 20 MEQ/1
40 TABLET, EXTENDED RELEASE ORAL PRN
Status: DISCONTINUED | OUTPATIENT
Start: 2024-05-08 | End: 2024-05-10 | Stop reason: HOSPADM

## 2024-05-08 RX ADMIN — FUROSEMIDE 40 MG: 10 INJECTION, SOLUTION INTRAMUSCULAR; INTRAVENOUS at 08:53

## 2024-05-08 RX ADMIN — FUROSEMIDE 40 MG: 40 TABLET ORAL at 17:21

## 2024-05-08 RX ADMIN — ASPIRIN 81 MG 81 MG: 81 TABLET ORAL at 08:53

## 2024-05-08 RX ADMIN — POTASSIUM BICARBONATE 40 MEQ: 782 TABLET, EFFERVESCENT ORAL at 08:53

## 2024-05-08 RX ADMIN — MICONAZOLE NITRATE: 2 POWDER TOPICAL at 21:15

## 2024-05-08 RX ADMIN — ENOXAPARIN SODIUM 30 MG: 100 INJECTION SUBCUTANEOUS at 21:16

## 2024-05-08 RX ADMIN — ISOSORBIDE MONONITRATE 30 MG: 30 TABLET, EXTENDED RELEASE ORAL at 08:53

## 2024-05-08 RX ADMIN — MAGNESIUM SULFATE HEPTAHYDRATE 2000 MG: 40 INJECTION, SOLUTION INTRAVENOUS at 09:14

## 2024-05-08 RX ADMIN — SODIUM CHLORIDE, PRESERVATIVE FREE 10 ML: 5 INJECTION INTRAVENOUS at 21:17

## 2024-05-08 RX ADMIN — DIVALPROEX SODIUM 375 MG: 125 CAPSULE ORAL at 21:15

## 2024-05-08 RX ADMIN — EPLERENONE 25 MG: 25 TABLET, FILM COATED ORAL at 08:59

## 2024-05-08 RX ADMIN — QUETIAPINE FUMARATE 50 MG: 25 TABLET ORAL at 11:40

## 2024-05-08 RX ADMIN — QUETIAPINE FUMARATE 50 MG: 25 TABLET ORAL at 21:15

## 2024-05-08 RX ADMIN — ENOXAPARIN SODIUM 30 MG: 100 INJECTION SUBCUTANEOUS at 08:53

## 2024-05-08 RX ADMIN — PANTOPRAZOLE SODIUM 40 MG: 40 TABLET, DELAYED RELEASE ORAL at 08:54

## 2024-05-08 RX ADMIN — ACETAMINOPHEN 650 MG: 325 TABLET ORAL at 11:01

## 2024-05-08 RX ADMIN — CALCIUM POLYCARBOPHIL 625 MG: 625 TABLET ORAL at 17:22

## 2024-05-08 RX ADMIN — ISOSORBIDE MONONITRATE 30 MG: 30 TABLET, EXTENDED RELEASE ORAL at 21:15

## 2024-05-08 RX ADMIN — DOCUSATE SODIUM 100 MG: 100 CAPSULE, LIQUID FILLED ORAL at 08:53

## 2024-05-08 RX ADMIN — MICONAZOLE NITRATE: 2 POWDER TOPICAL at 08:52

## 2024-05-08 RX ADMIN — B-COMPLEX W/ C & FOLIC ACID TAB 1 TABLET: TAB at 08:53

## 2024-05-08 RX ADMIN — ATORVASTATIN CALCIUM 40 MG: 40 TABLET, FILM COATED ORAL at 21:15

## 2024-05-08 RX ADMIN — Medication 1 CAPSULE: at 08:53

## 2024-05-08 RX ADMIN — HYDRALAZINE HYDROCHLORIDE 100 MG: 50 TABLET ORAL at 21:15

## 2024-05-08 RX ADMIN — HYDRALAZINE HYDROCHLORIDE 100 MG: 50 TABLET ORAL at 06:26

## 2024-05-08 RX ADMIN — SODIUM CHLORIDE, PRESERVATIVE FREE 10 ML: 5 INJECTION INTRAVENOUS at 08:52

## 2024-05-08 ASSESSMENT — PAIN - FUNCTIONAL ASSESSMENT: PAIN_FUNCTIONAL_ASSESSMENT: ACTIVITIES ARE NOT PREVENTED

## 2024-05-08 ASSESSMENT — PAIN DESCRIPTION - LOCATION: LOCATION: COCCYX

## 2024-05-08 ASSESSMENT — PAIN SCALES - GENERAL
PAINLEVEL_OUTOF10: 2
PAINLEVEL_OUTOF10: 3

## 2024-05-08 ASSESSMENT — PAIN DESCRIPTION - DESCRIPTORS: DESCRIPTORS: ACHING

## 2024-05-08 NOTE — PROGRESS NOTES
Department of Internal Medicine  Nephrology Progress Note        SUBJECTIVE:    We are following this patient for difficult to control Hypertension.  The patient was seen and examined; he feels well today with no CP, SOB, nausea or vomiting.  Denies any new complaints.  Blood pressure is improving with the eplerenone    UOP of 1.6 L  Weight is down to 112 kg close to his admission weight    ROS: No fever or chills.  Social: No family at bedside.    Physical Exam:    VITALS:  /71   Pulse 97   Temp 98.3 °F (36.8 °C)   Resp 18   Ht 1.93 m (6' 3.98\")   Wt 112.7 kg (248 lb 7.3 oz)   SpO2 95%   BMI 30.26 kg/m²     General appearance: Seems comfortable, no acute distress.  Neck: Trachea midline, thyroid normal.   Lungs:  Non labored breathing, CTA to anterior auscultation.  Heart:  S1S2 normal, rub or gallop. + peripheral edema.  Abdomen: Soft, non-tender, no organomegaly.   Skin: No lesions or rashes, warm to touch.     DATA:    CBC with Differential:    Lab Results   Component Value Date/Time    WBC 3.7 05/08/2024 04:40 AM    RBC 3.32 05/08/2024 04:40 AM    HGB 8.8 05/08/2024 04:40 AM    HCT 27.7 05/08/2024 04:40 AM     05/08/2024 04:40 AM    MCV 83.4 05/08/2024 04:40 AM    MCH 26.5 05/08/2024 04:40 AM    MCHC 31.8 05/08/2024 04:40 AM    RDW 19.7 05/08/2024 04:40 AM    NRBC 1 07/09/2017 05:14 AM    BANDSPCT 3 07/12/2017 05:25 AM    METASPCT 1 07/11/2017 06:45 AM    LYMPHOPCT 20.7 05/08/2024 04:40 AM    MONOPCT 11.8 05/08/2024 04:40 AM    MYELOPCT 1 07/11/2017 06:45 AM    EOSPCT 4.4 05/08/2024 04:40 AM    BASOPCT 1.0 05/08/2024 04:40 AM    MONOSABS 0.4 05/08/2024 04:40 AM    EOSABS 0.2 05/08/2024 04:40 AM    BASOSABS 0.0 05/08/2024 04:40 AM    DIFFTYPE Auto 04/05/2013 05:50 AM     BMP:    Lab Results   Component Value Date/Time     05/08/2024 04:40 AM    K 3.3 05/08/2024 04:40 AM     05/08/2024 04:40 AM    CO2 27 05/08/2024 04:40 AM    BUN 24 05/08/2024 04:40 AM    CREATININE 1.1 05/08/2024  04:40 AM    CALCIUM 8.3 05/08/2024 04:40 AM    GFRAA 48 08/03/2022 05:06 PM    GFRAA >60 04/05/2013 05:50 AM    LABGLOM 69 05/08/2024 04:40 AM    LABGLOM 39 09/03/2023 06:40 AM    GLUCOSE 93 05/08/2024 04:40 AM       IMPRESSION/RECOMMENDATIONS:      1- Hypertension: Patient has primary essential hypertension   Can switch to p.o. diuresis, is on Hydralazine/Imdur for better control.  I do not see the results back from serum Aldosterone and Renin activity for work-up completion.  Patient responding well to eplerenone.  Continue  If bp goes too low, would try to take off clonidine     2- Hypokalemia: PRN potassium replacement.  Pending renin and Spencer.       3- Anemia: Stable hemoglobin, monitor.    Aliza Trevizo MD

## 2024-05-08 NOTE — CONSULTS
Palliative Care Initial Note  Palliative Care Admit date:  5/8/24    ACP/palcare referral noted

## 2024-05-08 NOTE — PROGRESS NOTES
Hospital Medicine Progress Note      Date of Admission: 5/4/2024  Hospital Day: 5    Chief Admission Complaint:    Chief Complaint   Patient presents with    Chest Pain     Chest pain X 1 week and now states he is having some SOB now.       Subjective:       Patient ells me today he feels very sad and his mood is worse. I ask if he would like to see Psychiatry and he thinks this is a good idea. Otherwise feeling better than admission.    Presenting Admission History:       This is a 74 y.o. male who presented to [facility] with chest pain and shortness of breath.  PMHx significant for AFIB, DM, dementia, CKD, HTN. History was obtained from the patient and review of the EMR. The patient states that he was having shortness of breath and having some swelling and decided to come to the hospital for further evaluation. Pt was unable to elaborate any further than this. I attempted to call his daughter and left a VM. He will be admitted for further evaluation.    05/05: Pt is much more alert today, but has some mild confusion. He tells me that he came into the hospital because he was having chest pain and because his blood pressure was janet high and he was concerned he could have a stroke. He states he is no longer having chest pain now and feeling less short of breath. Currently on 1L O2.      Assessment/Plan:       Current Principal Problem:  Flash pulmonary edema (HCC)     Chest pain and dyspnea in setting of suspect CHF exacerbation, also with bilateral pleural effusions  - CXR with central predominant of biliary and interstitial edema potentially CHF given mild cardiomegaly, at least small loculated right pleural effusion, and at least trace left pleural effusion  - Chest CT with diffuse pulmonary edema with suspected assymetric alveolar edema in the right lung, consider CHF given moderate cardiomegaly. Loculated small to moderate left and trace right pleural effusions.   - Pro-BNP on admission 22,316, BNP 12,354

## 2024-05-08 NOTE — CARE COORDINATION
Psychiatry consult related to mood disorder and anxiety. Medication adjustments made and seroquel  50 MG twice daily restarted. From [facility] long term and no barrier to returning. Patient has legal guardian and palliative care os following and trying to reach guardian to engage in code status discussion. Will assist with d/c back to [facility]CF when cleared for discharge.

## 2024-05-08 NOTE — ACP (ADVANCE CARE PLANNING)
Advance Care Planning     Palliative Team Advance Care Planning (ACP) Conversation    Date of Conversation: 05/08/24    Individuals present for the conversation: Legal Guardian Joselin Garner     ACP documents on file prior to discussion:  -Legal Guardianship Document    Previously completed document/s not on file: N/A    Healthcare Decision Maker:    Primary Decision Maker: Joselin Garner - Child - [phone]     Resuscitation Status:   Code Status: Full Code     Documentation Completed:  -No new documents completed.    Conversation Summary:    Palliative Care Initial Note  Palliative Care Admit date: 5/8/24  Reason for c/s:  GOC    Advance Directives:  Reviewed the Guardianship paperwork in this EMR.  It was obtained in 2023 and Joselin Garner was appointed as guardian.  Attempted to reach Joselin but went into vmail.  Will continue to make efforts to reach her to engage in Code status discussion.  Mr. Garner has multiple co-morbidities, his albumin is 2.5, total protein is 5.8.  Will broach go discussion, as well.           I spent 20 minutes with the patient and/or surrogate decision maker discussing the patient's wishes and goals.                                                                                                                                                                                                                                  Madonna Oleary RN

## 2024-05-08 NOTE — PLAN OF CARE
CHF Care Plan      Patient's EF (Ejection Fraction) is greater than 40%    Heart Failure Medications:  Diuretics:: Furosemide and Other    (One of the following REQUIRED for EF </= 40%/SYSTOLIC FAILURE but MAY be used in EF% >40%/DIASTOLIC FAILURE)        ACE:: None        ARB:: None         ARNI:: None    (Beta Blockers)  NON- Evidenced Based Beta Blocker (for EF% >40%/DIASTOLIC FAILURE): None    Evidenced Based Beta Blocker::(REQUIRED for EF% <40%/SYSTOLIC FAILURE) None  ...................................................................................................................................................    Failed to redirect to the Timeline version of the [employer] SmartLink.      Patient's weights and intake/output reviewed    Daily Weight log at bedside, patient/family participation in use of log: \"yes    Patient's current weight today shows a difference of 2 lbs more than last documented weight.      Intake/Output Summary (Last 24 hours) at 5/8/2024 1052  Last data filed at 5/8/2024 0914  Gross per 24 hour   Intake 600 ml   Output 1600 ml   Net -1000 ml         Education Booklet Provided: yes    Comorbidities Reviewed Yes    Patient has a past medical history of Acute gastric ulcer with bleeding, Acute metabolic encephalopathy, MAISHA (acute kidney injury) ([facility]), ATN (acute tubular necrosis) ([facility]), Atrial fib/flutter, transient, Atrial fibrillation ([facility]), BPH (benign prostatic hyperplasia), CAD (coronary artery disease), Cerebral artery occlusion with cerebral infarction ([facility]), CHF (congestive heart failure) ([facility]), CKD (chronic kidney disease), Dementia ([facility]), Diabetes mellitus ([facility]), GI bleed, Glaucoma, History of blood transfusion, Hx of diabetic neuropathy, Hyperlipidemia, Hypertension, Mitral insufficiency, Mobility impaired, 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     >>For CHF and Comorbidity documentation on

## 2024-05-08 NOTE — CONSULTS
place, time/date, and situation  Hallucinations Absent   Thought Processes: Goal-Directed  Memory intact.  Able to name presidents back to [place]  Suicidal ideations not present  Homicidal ideations not present  Patient was alert and oriented x 3.  Patient was able to do serial sevens and spell the word world backwards with only 1 mistake.    Data  Labs:   No results displayed because visit has over 200 results.               Medications  Current Facility-Administered Medications: potassium chloride (KLOR-CON M) extended release tablet 40 mEq, 40 mEq, Oral, PRN **OR** potassium bicarb-citric acid (EFFER-K) effervescent tablet 40 mEq, 40 mEq, Oral, PRN **OR** potassium chloride 10 mEq/100 mL IVPB (Peripheral Line), 10 mEq, IntraVENous, PRN  magnesium sulfate 2000 mg in 50 mL IVPB premix, 2,000 mg, IntraVENous, PRN  sodium phosphate 15 mmol in sodium chloride 0.9 % 250 mL IVPB, 15 mmol, IntraVENous, PRN  QUEtiapine (SEROQUEL) tablet 50 mg, 50 mg, Oral, BID  eplerenone (INSPRA) tablet 25 mg, 25 mg, Oral, Daily  miconazole (MICOTIN) 2 % powder, , Topical, BID  enoxaparin Sodium (LOVENOX) injection 30 mg, 30 mg, SubCUTAneous, BID  furosemide (LASIX) injection 40 mg, 40 mg, IntraVENous, BID  divalproex (DEPAKOTE SPRINKLE) DR capsule 375 mg, 375 mg, Oral, Nightly  hydrALAZINE (APRESOLINE) tablet 100 mg, 100 mg, Oral, 3 times per day  isosorbide mononitrate (IMDUR) extended release tablet 30 mg, 30 mg, Oral, BID  sodium chloride flush 0.9 % injection 5-40 mL, 5-40 mL, IntraVENous, 2 times per day  sodium chloride flush 0.9 % injection 5-40 mL, 5-40 mL, IntraVENous, PRN  0.9 % sodium chloride infusion, , IntraVENous, PRN  ondansetron (ZOFRAN-ODT) disintegrating tablet 4 mg, 4 mg, Oral, Q8H PRN **OR** ondansetron (ZOFRAN) injection 4 mg, 4 mg, IntraVENous, Q6H PRN  melatonin tablet 3 mg, 3 mg, Oral, Nightly PRN  acetaminophen (TYLENOL) tablet 650 mg, 650 mg, Oral, Q6H PRN **OR** acetaminophen (TYLENOL) suppository 650 mg, 650  depressed and anxious at this time.  Will continue Depakote and restart Seroquel 50 mg twice daily per last psychiatric discharge note.  Will check Depakote level and ammonia level.  Patient appears to have mild cognitive impairment but not significant enough to his impact his daily function.  Probable discharge per primary medical  Discharge planning is per primary medical team  Suicidal ideation is not present  More than 80 minutes was spent on the consultation more than half of which was in direct patient care and included care coordination and treatment planning.     Sridhar Benito DO, PGY-1  [facility] and [facility] Medicine Res[facility]

## 2024-05-08 NOTE — PROGRESS NOTES
decreased, functional  Tone: Normal  ADL  Grooming: Increased time to complete;Stand by assistance  Grooming Skilled Clinical Factors: provided washcloth to wash face  Toileting: Maximum assistance  Toileting Skilled Clinical Factors: purewick              Vision  Vision: Impaired  Vision Exceptions: Wears glasses at all times  Hearing  Hearing: Within functional limits  Orientation  Overall Orientation Status: Within Functional Limits  Orientation Level: Oriented to person;Oriented to place;Oriented to time;Oriented to situation                  Education Given To: Patient  Education Provided: Role of Therapy;Plan of Care;Transfer Training;Equipment;Precautions;Orientation  Education Provided Comments: importance of OOB ax  Education Method: Verbal  Barriers to Learning: Cognition  Education Outcome: Verbalized understanding;Continued education needed        AM-PAC - ADL  AM-PAC Daily Activity - Inpatient   How much help is needed for putting on and taking off regular lower body clothing?: Total  How much help is needed for bathing (which includes washing, rinsing, drying)?: A Lot  How much help is needed for toileting (which includes using toilet, bedpan, or urinal)?: Total  How much help is needed for putting on and taking off regular upper body clothing?: A Lot  How much help is needed for taking care of personal grooming?: A Lot  How much help for eating meals?: A Little  AM-PAC Inpatient Daily Activity Raw Score: 11  AM-PAC Inpatient ADL T-Scale Score : 29.04  ADL Inpatient CMS 0-100% Score: 70.42  ADL Inpatient CMS G-Code Modifier : CL    Goals  Short Term Goals  Time Frame for Short Term Goals: 5/15, unless otherwise noted  Short Term Goal 1: Pt will complete stand with Mod A x 2 in prep for functional transfer training  Short Term Goal 2: Pt will completed seated grooming tasks with set up  Short Term Goal 3: Pt will UE HEP 15x reps each by 5/16       Therapy Time   Individual Concurrent Group Co-treatment

## 2024-05-08 NOTE — PROGRESS NOTES
Physical Therapy  Facility/Department: [place] [address] [facility]  Daily Treatment Note  NAME: Alma Delia Garner  : 1950  MRN: [id number]    Date of Service: 2024    Discharge Recommendations:  Long Term Care with PT   PT Equipment Recommendations  Equipment Needed: No  Other: TBD at facility    Patient Diagnosis(es): The primary encounter diagnosis was Acute hypoxemic respiratory failure (HCC). Diagnoses of New onset of congestive heart failure (HCC), Pulmonary edema cardiac cause (HCC), Elevated troponin, Acute chest pain, and Chest pain, unspecified type were also pertinent to this visit.    Assessment   Assessment: Pt participated well with PT this session; seen in conjunction with OT for co-treat in light of current assist requirements, in order to safely progress functional mobility.  Pt more alert and appropriate with conversation today with improved focus and command-following.  Pt able to transfer supine>sit with Markus x 1 + modA x 1 and performed 3 sit<>stand attempts with Katie-Stedy.  Despite being given maxA x 2, pt unable to fully stand with Katie-Stedy, so Maxi Move ultimately used for bed>chair.  Recommend pt return to his LTC facility at D/C with \"Part B\" PT services to continue to build strength and functional (I).  Activity Tolerance: Patient tolerated treatment well;Patient limited by endurance  Equipment Needed: No  Other: TBD at facility     Plan    Physical Therapy Plan  General Plan: 2-3 times per week  Specific Instructions for Next Treatment: Progress ther ex and mobility as tolerated  Current Treatment Recommendations: Strengthening;Balance training;Functional mobility training;Transfer training;Wheelchair mobility training;Neuromuscular re-education;Home exercise program;Safety education & training;Therapeutic activities;Co-Treatment;Cognitive reorientation;ROM;Manual;Patient/Caregiver education & training;Equipment evaluation, education, & procurement;Endurance training;Positioning

## 2024-05-08 NOTE — PLAN OF CARE
CHF Care Plan      Patient's EF (Ejection Fraction) is greater than 40%    Heart Failure Medications:  Diuretics:: Furosemide and Other    (One of the following REQUIRED for EF </= 40%/SYSTOLIC FAILURE but MAY be used in EF% >40%/DIASTOLIC FAILURE)        ACE:: None        ARB:: None         ARNI:: None    (Beta Blockers)  NON- Evidenced Based Beta Blocker (for EF% >40%/DIASTOLIC FAILURE): None    Evidenced Based Beta Blocker::(REQUIRED for EF% <40%/SYSTOLIC FAILURE) None  ...................................................................................................................................................    Failed to redirect to the Timeline version of the [employer] SmartLink.      Patient's weights and intake/output reviewed    Daily Weight log at bedside, patient/family participation in use of log: \"yes    Patient's current weight today shows a difference of 2 lbs more than last documented weight.      Intake/Output Summary (Last 24 hours) at 5/8/2024 0256  Last data filed at 5/7/2024 1545  Gross per 24 hour   Intake 600 ml   Output 2000 ml   Net -1400 ml         Education Booklet Provided: yes    Comorbidities Reviewed Yes    Patient has a past medical history of Acute gastric ulcer with bleeding, Acute metabolic encephalopathy, MAISHA (acute kidney injury) ([facility]), ATN (acute tubular necrosis) ([facility]), Atrial fib/flutter, transient, Atrial fibrillation ([facility]), BPH (benign prostatic hyperplasia), CAD (coronary artery disease), Cerebral artery occlusion with cerebral infarction ([facility]), CHF (congestive heart failure) ([facility]), CKD (chronic kidney disease), Dementia ([facility]), Diabetes mellitus ([facility]), GI bleed, Glaucoma, History of blood transfusion, Hx of diabetic neuropathy, Hyperlipidemia, Hypertension, Mitral insufficiency, Mobility impaired, 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     >>For CHF and Comorbidity documentation on

## 2024-05-08 NOTE — PROGRESS NOTES
/    [facility]   Heart Failure Daily Progress Note    Admit Date:  5/4/2024  HPI:    Chief Complaint   Patient presents with    Chest Pain     Chest pain X 1 week and now states he is having some SOB now.          Alma Delia Garner is being followed for chest pain and shortness of breath; being treated for acute on chronic diastolic heart failure.   Hx of HTN, CKD, Dementia, CAD, HFpEF, Afib s/p watchman.   Lives at [facility]    Interval history:   IV Lasix increased to BID on 5/6/24  1.6L UOP last 24 hours  Left arm edema nearly resolved.      Subjective:  Mr. Garner received Seroquel this afternoon. B/p now 96/62. No dizziness. Up in the chair via timmy lift, wants to get back to the bed.      Objective:   BP (!) 155/76   Pulse 80   Temp 98.3 °F (36.8 °C) (Oral)   Resp 16   Ht 1.93 m (6' 3.98\")   Wt 112.7 kg (248 lb 7.3 oz)   SpO2 96%   BMI 30.26 kg/m²     Intake/Output Summary (Last 24 hours) at 5/8/2024 0725  Last data filed at 5/8/2024 0620  Gross per 24 hour   Intake 600 ml   Output 1600 ml   Net -1000 ml       NYHA: IV    Physical Exam:  General:  Awake, alert, NAD, sitting up in the chair   Skin:  Warm and dry  Chest:  dim  to auscultation, no wheezes/rhonchi/rales  Cardiovascular:  irregular  S1S2, no m/r/g   Abdomen:  Soft, nontender, +bowel sounds  Extremities:  ++ bilateral lower extremity edema, + RICO edema     Medications:    eplerenone  25 mg Oral Daily    miconazole   Topical BID    enoxaparin  30 mg SubCUTAneous BID    furosemide  40 mg IntraVENous BID    divalproex  375 mg Oral Nightly    hydrALAZINE  100 mg Oral 3 times per day    isosorbide mononitrate  30 mg Oral BID    sodium chloride flush  5-40 mL IntraVENous 2 times per day    aspirin  81 mg Oral Daily    atorvastatin  40 mg Oral Daily    docusate sodium  100 mg Oral Daily    lactobacillus  1 capsule Oral Daily    lidocaine  1 patch TransDERmal Daily    vitamin B complex w/C  1 tablet Oral Daily     pantoprazole  40 mg Oral Daily    polycarbophil  625 mg Oral QPM    rivastigmine  1 patch TransDERmal Daily    insulin lispro  0-4 Units SubCUTAneous TID WC    insulin lispro  0-4 Units SubCUTAneous Nightly    cloNIDine  1 patch TransDERmal Weekly      sodium chloride      dextrose         Lab Data:  CBC:   Recent Labs     05/07/24  0447 05/08/24  0440   WBC 3.9* 3.7*   HGB 9.0* 8.8*    157     BMP:    Recent Labs     05/06/24  0537 05/07/24  0448 05/08/24  0440    140 137   K 3.2* 3.6 3.3*   CO2 26 29 27   BUN 27* 25* 24*   CREATININE 0.9 0.9 1.1     INR:  No results for input(s): \"INR\" in the last 72 hours.  BNP:    Recent Labs     05/07/24 0448   PROBNP 12,354*     Lab Results   Component Value Date    LVEF 58 12/30/2020     Testing:    Echo 5/6/24  Left ventricle is normal in size with low normal systolic function and moderate concentric hypertrophy.  LVEF is estimated at 55-59% with normal wall motion and indeterminate relaxation.  Right ventricle is normal in size and systolic function.  Severe biatrial dilatation.  Calcified aortic valve with mild stenosis, CARMEN: 1.6 cm2 and mean gradient 13 mmHg.  Mild to moderate tricuspid valve regurgitation.  Unable to estimate RVSP due to poor visualization of IVC.  Small pericardial effusion seen posteriorly with no echo evidence of tamponade.     Compared to prior exam in 12/2020, the LVEF is unchanged.    5/4/24 CTPA:  IMPRESSION:  1. No findings of pulmonary embolism.  2. Cardiovascular findings that can be seen with pulmonary hypertension and  right heart dysfunction.  3. Diffuse interstitial pulmonary edema with suspected asymmetric alveolar  edema in the right lung.  Consider congestive heart failure given moderate  cardiomegaly.  Of note, pneumonia could result in a similar appearance.  4. Loculated small to moderate left and trace right pleural effusions.  5. Additional incidental findings as above for which no dedicated follow-up  is recommended.

## 2024-05-09 LAB
ANION GAP SERPL CALCULATED.3IONS-SCNC: 9 MMOL/L (ref 3–16)
BASOPHILS # BLD: 0 K/UL (ref 0–0.2)
BASOPHILS NFR BLD: 0.8 %
BUN SERPL-MCNC: 25 MG/DL (ref 7–20)
CALCIUM SERPL-MCNC: 8.3 MG/DL (ref 8.3–10.6)
CHLORIDE SERPL-SCNC: 101 MMOL/L (ref 99–110)
CO2 SERPL-SCNC: 29 MMOL/L (ref 21–32)
CREAT SERPL-MCNC: 1.2 MG/DL (ref 0.8–1.3)
DEPRECATED RDW RBC AUTO: 19.2 % (ref 12.4–15.4)
EOSINOPHIL # BLD: 0.1 K/UL (ref 0–0.6)
EOSINOPHIL NFR BLD: 3.5 %
GFR SERPLBLD CREATININE-BSD FMLA CKD-EPI: 63 ML/MIN/{1.73_M2}
GLUCOSE BLD-MCNC: 107 MG/DL (ref 70–99)
GLUCOSE BLD-MCNC: 108 MG/DL (ref 70–99)
GLUCOSE BLD-MCNC: 114 MG/DL (ref 70–99)
GLUCOSE BLD-MCNC: 115 MG/DL (ref 70–99)
GLUCOSE SERPL-MCNC: 92 MG/DL (ref 70–99)
HCT VFR BLD AUTO: 27.6 % (ref 40.5–52.5)
HGB BLD-MCNC: 8.9 G/DL (ref 13.5–17.5)
LYMPHOCYTES # BLD: 0.8 K/UL (ref 1–5.1)
LYMPHOCYTES NFR BLD: 19.3 %
MAGNESIUM SERPL-MCNC: 2 MG/DL (ref 1.8–2.4)
MCH RBC QN AUTO: 27 PG (ref 26–34)
MCHC RBC AUTO-ENTMCNC: 32.3 G/DL (ref 31–36)
MCV RBC AUTO: 83.5 FL (ref 80–100)
MONOCYTES # BLD: 0.4 K/UL (ref 0–1.3)
MONOCYTES NFR BLD: 10.6 %
NEUTROPHILS # BLD: 2.8 K/UL (ref 1.7–7.7)
NEUTROPHILS NFR BLD: 65.8 %
PERFORMED ON: ABNORMAL
PLATELET # BLD AUTO: 156 K/UL (ref 135–450)
PMV BLD AUTO: 8.5 FL (ref 5–10.5)
POTASSIUM SERPL-SCNC: 3.4 MMOL/L (ref 3.5–5.1)
RBC # BLD AUTO: 3.3 M/UL (ref 4.2–5.9)
SODIUM SERPL-SCNC: 139 MMOL/L (ref 136–145)
WBC # BLD AUTO: 4.2 K/UL (ref 4–11)

## 2024-05-09 PROCEDURE — 2060000000 HC ICU INTERMEDIATE R&B

## 2024-05-09 PROCEDURE — 6370000000 HC RX 637 (ALT 250 FOR IP): Performed by: NURSE PRACTITIONER

## 2024-05-09 PROCEDURE — 80048 BASIC METABOLIC PNL TOTAL CA: CPT

## 2024-05-09 PROCEDURE — 6360000002 HC RX W HCPCS: Performed by: INTERNAL MEDICINE

## 2024-05-09 PROCEDURE — 36415 COLL VENOUS BLD VENIPUNCTURE: CPT

## 2024-05-09 PROCEDURE — 2580000003 HC RX 258: Performed by: STUDENT IN AN ORGANIZED HEALTH CARE EDUCATION/TRAINING PROGRAM

## 2024-05-09 PROCEDURE — 6370000000 HC RX 637 (ALT 250 FOR IP)

## 2024-05-09 PROCEDURE — 97110 THERAPEUTIC EXERCISES: CPT

## 2024-05-09 PROCEDURE — 83735 ASSAY OF MAGNESIUM: CPT

## 2024-05-09 PROCEDURE — 6370000000 HC RX 637 (ALT 250 FOR IP): Performed by: INTERNAL MEDICINE

## 2024-05-09 PROCEDURE — 85025 COMPLETE CBC W/AUTO DIFF WBC: CPT

## 2024-05-09 RX ORDER — POTASSIUM CHLORIDE 750 MG/1
10 TABLET, EXTENDED RELEASE ORAL DAILY
Status: DISCONTINUED | OUTPATIENT
Start: 2024-05-09 | End: 2024-05-09

## 2024-05-09 RX ORDER — FUROSEMIDE 40 MG/1
40 TABLET ORAL DAILY
Status: DISCONTINUED | OUTPATIENT
Start: 2024-05-09 | End: 2024-05-09

## 2024-05-09 RX ORDER — FUROSEMIDE 40 MG/1
40 TABLET ORAL DAILY
Status: DISCONTINUED | OUTPATIENT
Start: 2024-05-10 | End: 2024-05-10

## 2024-05-09 RX ADMIN — ISOSORBIDE MONONITRATE 30 MG: 30 TABLET, EXTENDED RELEASE ORAL at 08:54

## 2024-05-09 RX ADMIN — MICONAZOLE NITRATE: 2 POWDER TOPICAL at 21:49

## 2024-05-09 RX ADMIN — ENOXAPARIN SODIUM 30 MG: 100 INJECTION SUBCUTANEOUS at 08:54

## 2024-05-09 RX ADMIN — Medication 1 CAPSULE: at 08:54

## 2024-05-09 RX ADMIN — B-COMPLEX W/ C & FOLIC ACID TAB 1 TABLET: TAB at 08:54

## 2024-05-09 RX ADMIN — DOCUSATE SODIUM 100 MG: 100 CAPSULE, LIQUID FILLED ORAL at 08:54

## 2024-05-09 RX ADMIN — ASPIRIN 81 MG 81 MG: 81 TABLET ORAL at 08:54

## 2024-05-09 RX ADMIN — QUETIAPINE FUMARATE 50 MG: 25 TABLET ORAL at 21:49

## 2024-05-09 RX ADMIN — ATORVASTATIN CALCIUM 40 MG: 40 TABLET, FILM COATED ORAL at 21:49

## 2024-05-09 RX ADMIN — HYDRALAZINE HYDROCHLORIDE 100 MG: 50 TABLET ORAL at 14:14

## 2024-05-09 RX ADMIN — HYDRALAZINE HYDROCHLORIDE 100 MG: 50 TABLET ORAL at 06:21

## 2024-05-09 RX ADMIN — PANTOPRAZOLE SODIUM 40 MG: 40 TABLET, DELAYED RELEASE ORAL at 08:54

## 2024-05-09 RX ADMIN — SODIUM CHLORIDE, PRESERVATIVE FREE 10 ML: 5 INJECTION INTRAVENOUS at 21:50

## 2024-05-09 RX ADMIN — HYDRALAZINE HYDROCHLORIDE 100 MG: 50 TABLET ORAL at 21:49

## 2024-05-09 RX ADMIN — ENOXAPARIN SODIUM 30 MG: 100 INJECTION SUBCUTANEOUS at 21:48

## 2024-05-09 RX ADMIN — ISOSORBIDE MONONITRATE 30 MG: 30 TABLET, EXTENDED RELEASE ORAL at 21:48

## 2024-05-09 RX ADMIN — DIVALPROEX SODIUM 375 MG: 125 CAPSULE ORAL at 21:48

## 2024-05-09 RX ADMIN — SODIUM CHLORIDE, PRESERVATIVE FREE 10 ML: 5 INJECTION INTRAVENOUS at 09:00

## 2024-05-09 RX ADMIN — EPLERENONE 25 MG: 25 TABLET, FILM COATED ORAL at 08:55

## 2024-05-09 RX ADMIN — MICONAZOLE NITRATE: 2 POWDER TOPICAL at 08:55

## 2024-05-09 RX ADMIN — QUETIAPINE FUMARATE 50 MG: 25 TABLET ORAL at 08:54

## 2024-05-09 RX ADMIN — POTASSIUM CHLORIDE 40 MEQ: 1500 TABLET, EXTENDED RELEASE ORAL at 06:21

## 2024-05-09 NOTE — PLAN OF CARE
Problem: Discharge Planning  Goal: Discharge to home or other facility with appropriate resources  Outcome: Progressing     Problem: Chronic Conditions and Co-morbidities  Goal: Patient's chronic conditions and co-morbidity symptoms are monitored and maintained or improved  Outcome: Progressing  Flowsheets (Taken 5/9/2024 0900)  Care Plan - Patient's Chronic Conditions and Co-Morbidity Symptoms are Monitored and Maintained or Improved: Monitor and assess patient's chronic conditions and comorbid symptoms for stability, deterioration, or improvement     Problem: Safety - Adult  Goal: Free from fall injury  Outcome: Progressing     Problem: Skin/Tissue Integrity  Goal: Absence of new skin breakdown  Description: 1.  Monitor for areas of redness and/or skin breakdown  2.  Assess vascular access sites hourly  3.  Every 4-6 hours minimum:  Change oxygen saturation probe site  4.  Every 4-6 hours:  If on nasal continuous positive airway pressure, respiratory therapy assess nares and determine need for appliance change or resting period.  Outcome: Progressing     Problem: Nutrition Deficit:  Goal: Optimize nutritional status  Outcome: Progressing   CHF Care Plan      Patient's EF (Ejection Fraction) is greater than 40%    Heart Failure Medications:  Diuretics:: other potassium sparing diuretic.    (One of the following REQUIRED for EF </= 40%/SYSTOLIC FAILURE but MAY be used in EF% >40%/DIASTOLIC FAILURE)        ACE:: None        ARB:: None         ARNI:: None    (Beta Blockers)  NON- Evidenced Based Beta Blocker (for EF% >40%/DIASTOLIC FAILURE): None    Evidenced Based Beta Blocker::(REQUIRED for EF% <40%/SYSTOLIC FAILURE) None  ...................................................................................................................................................    Failed to redirect to the Timeline version of the [employer] SmartLink.      Patient's weights and intake/output reviewed    Daily Weight log at

## 2024-05-09 NOTE — PROGRESS NOTES
V2.0    [facility] [place]      Name:  Alma Delia Garner /Age/Sex: 1950  (74 y.o. male)   MRN & CSN:  [id number] & [id number] Encounter Date/Time: 2024 5:05 PM EDT   Location:  [facility]01 PCP: Yvette Leonard MD     Attending:Christos Flood MD       Hospital Day: 6    Assessment and Recommendations   Alma Delia Garner is a 74 y.o. male with pmh of AFIB, DM, dementia, CKD, HTN who presents with Flash pulmonary edema (HCC)      Plan:     Chest pain and dyspnea in setting of suspect CHF exacerbation, also with bilateral pleural effusions  - CXR with central predominant of biliary and interstitial edema potentially CHF given mild cardiomegaly, at least small loculated right pleural effusion, and at least trace left pleural effusion  - Chest CT with diffuse pulmonary edema with suspected assymetric alveolar edema in the right lung, consider CHF given moderate cardiomegaly. Loculated small to moderate left and trace right pleural effusions.   - Pro-BNP on admission 22,316, BNP 12,354 today  - TTE on 20 with LVEF 55-60%. No regional WMAs. LV diastolic filling pressure elevated. RV systolic function mildly reduced. Mild pulmonary hypertension  - Echo :  Left ventricle is normal in size with low normal systolic function and moderate concentric hypertrophy. LVEF is estimated at 55-59% with normal wall motion and indeterminate relaxation.  Right ventricle is normal in size and systolic function. Severe biatrial dilatation. Calcified aortic valve with mild stenosis, CARMEN: 1.6 cm2 and mean gradient 13 mmHg. Mild to moderate tricuspid valve regurgitation. Unable to estimate RVSP due to poor visualization of IVC. Small pericardial effusion seen posteriorly with no echo evidence of tamponade.  - PO lasix BID today  - Toprol XL and losartan on hold  - Daily weights, strict I's and O's, CHF order set  - Cardiology consulted, appreciate their input  - Inspira started  per Nephrology   -Plan to D/C in am

## 2024-05-09 NOTE — PROGRESS NOTES
Department of Psychiatry  Consultant Progress Note    Chief Complaint: follow-up   Patient's chart was reviewed and collaborated with  about the treatment plan.    SUBJECTIVE: No acute overnight events.  Patient was seen and examined at bedside this morning.  He went into further detail about his childhood, and reported another childhood event that triggered anxiety symptoms.  He stated that at 1 point he was at [place] with some friends and on a merry-go-round when he became incredibly concerned that the merry-go-round was going too fast and that he was too high up and would fall.  He stated that he never sought out any psychiatric help for his anxiety because he was scared.  Today, he states that his mood is improved.  He denies any side effects to Seroquel and states that he has been sleeping well.  ROS: Patient has new complaints: no  Sleeping adequately:  Yes  Appetite adequate: yes  Attending groups: N/A  Visitors:no    OBJECTIVE    Physical  VITALS:  /62   Pulse 95   Temp 98.3 °F (36.8 °C)   Resp 16   Ht 1.93 m (6' 3.98\")   Wt 113.3 kg (249 lb 12.5 oz)   SpO2 97%   BMI 30.42 kg/m²   Patient's appearance well-appearing, hospital attire, and in chair.     Musculoskeletal  Patient gait did not assess  STRENGTH: muscle bulk was normal   TONE normal     Mental Status Examination:   No current loose associations  Concentration Intact   Thoughts are goal directed and within normal limits  Insight and Judgement: normal insight and judgment  Knowledge: adequate  Language: 0 - no aphasia, normal  Speech: appropriate  Mood within normal limits, affect congruent with mood  Orientation: oriented to person, place, time/date, and situation  Hallucinations Absent   Thought Processes: Goal-Directed  Memory intact.   Suicidal ideations not present  Homicidal ideations not present    Data  Labs:   No results displayed because visit has over 200 results.               Medications  Current

## 2024-05-09 NOTE — PROGRESS NOTES
Physical Therapy  Facility/Department: [place] [address] [facility]  Daily Treatment Note  NAME: Alma Delia Garner  : 1950  MRN: [id number]    Date of Service: 2024    Discharge Recommendations:  Long Term Care with PT   PT Equipment Recommendations  Equipment Needed: No  Other: TBD at facility    Patient Diagnosis(es): The primary encounter diagnosis was Acute hypoxemic respiratory failure (HCC). Diagnoses of New onset of congestive heart failure (HCC), Pulmonary edema cardiac cause (HCC), Elevated troponin, Acute chest pain, and Chest pain, unspecified type were also pertinent to this visit.    Assessment   Assessment: Pt seen for PT session this morning focusing on LE exercises. Pt drowsy and c/o fatigue this morning. He declines EOB activity d/t fatigue, however is agreeable to LE exercises with min encouragement. He requires increased time and cues for sequencing/technique throughout, limited by fatigue and cognition. Continue to recommend return to LTC with PT upon d/c.  Activity Tolerance: Patient tolerated treatment well;Patient limited by endurance;Patient limited by fatigue  Equipment Needed: No  Other: TBD at facility     Plan    Physical Therapy Plan  General Plan: 2-3 times per week  Current Treatment Recommendations: Strengthening;Balance training;Functional mobility training;Transfer training;Wheelchair mobility training;Neuromuscular re-education;Home exercise program;Safety education & training;Therapeutic activities;Co-Treatment;Cognitive reorientation;ROM;Manual;Patient/Caregiver education & training;Equipment evaluation, education, & procurement;Endurance training;Positioning     Restrictions  Restrictions/Precautions  Restrictions/Precautions: General Precautions, Fall Risk  Required Braces or Orthoses?: No  Position Activity Restriction  Other position/activity restrictions: male purewick     Subjective    Subjective  Subjective: Pt resting in bed upon arrival and agreeable to PT session with min  encouragement d/t fatigue. RN cleared pt for PT  Pain: Pt denies pain  Orientation  Orientation Level: Oriented to person;Oriented to place;Oriented to time;Oriented to situation     Objective   Vitals  Pulse: 95  Heart Rate Source: Monitor  BP: 102/62  BP Location: Left upper arm  BP Method: Automatic  MAP (Calculated): 75  SpO2: 97 %  O2 Device: None (Room air)    Bed Mobility Training  Bed Mobility Training: No (pt declining EOB mobility this date d/t increased fatigue)       PT Exercises  Exercise Treatment: Supine BLE exercises: ankle pumps x 10, glute sets x 10, heelslides x 10 (AAROM), hip abd/add x 10 (AAROM), quad sets x 10. Pt requires increased time for exercises and cues for sequence and technique, limited by cognition, requires cues for continued reps throughout       Safety Devices  Type of Devices: All neeraj prominences offloaded;All fall risk precautions in place;Bed alarm in place;Call light within reach;Patient at risk for falls;Left in bed;Nurse notified       Goals  Short Term Goals  Time Frame for Short Term Goals: 5/11/24 unless otherwise specified  Short Term Goal 1: Pt will transfer from supine to sit with minimal assistance  Short Term Goal 2: Pt will transfer from sit to stand with maximum assistance  Short Term Goal 3: Pt will transfer from bed> chair with maximum assistance  Short Term Goal 4: Pt will complete 10-15 reps BLE strengthening exercises by 5/13 - MET 5/06/24 for 2 x 10 reps of LE ther ex  Patient Goals   Patient Goals : \"to get out of here\"    Education  Patient Education  Education Given To: Patient  Education Provided: Role of Therapy;Plan of Care;Home Exercise Program;Fall Prevention Strategies  Education Method: Demonstration;Verbal  Barriers to Learning: Cognition  Education Outcome: Verbalized understanding;Demonstrated understanding;Continued education needed    Attempted Therapy Heart Failure Education this date.   Pt inappropriate for education at this time due to

## 2024-05-09 NOTE — CARE COORDINATION
LTC at [facility] and can return when ready . Psych following for mood disorder and anxiety and adjusting medications. CM following fo d/c planning.

## 2024-05-09 NOTE — PROGRESS NOTES
Occupational Therapy  Facility/Department: [facility]  Daily Treatment Note  NAME: Alma Delia Garner  : 1950  MRN: [id number]    Date of Service: 2024    Discharge Recommendations:  Long Term Care with OT         Patient Diagnosis(es): The primary encounter diagnosis was Acute hypoxemic respiratory failure (HCC). Diagnoses of New onset of congestive heart failure (HCC), Pulmonary edema cardiac cause (HCC), Elevated troponin, Acute chest pain, and Chest pain, unspecified type were also pertinent to this visit.     Assessment    Assessment: Pt tolerated UE therex with rest breaks. Overall tolerated session fair, requiring encouragement for participation in bed level activity. Continue to recommend pt return to LTC facility with OT as participation allows. Continue OT per POC.   Activity Tolerance: Patient limited by fatigue  Discharge Recommendations: Long Term Care with OT      Plan   Occupational Therapy Plan  Times Per Week: 2-3x/week     Restrictions  Restrictions/Precautions  Restrictions/Precautions: General Precautions;Fall Risk  Position Activity Restriction  Other position/activity restrictions: male purewick    Subjective   Subjective  Subjective: Pt asleep at entry, awakened easily and agreeable to bed level therex with encouragement.    Orientation  Overall Orientation Status: Within Functional Limits  Orientation Level: Oriented to person;Oriented to place;Oriented to time;Oriented to situation    Pain: Pt denies pain    Cognition  Overall Cognitive Status: Exceptions  Following Commands: Follows one step commands with repetition;Follows one step commands with increased time  Attention Span: Attends with cues to redirect  Memory: Decreased short term memory;Decreased recall of recent events  Insights: Decreased awareness of deficits        Objective    Vitals  Vitals:    24 1045   BP: (!) 120/90   Pulse: 100   Resp:    Temp:    SpO2: 96%          Bed Mobility Training  Bed Mobility  Training: No (pt declining EOB mobility this date d/t increased fatigue)       ADL  Additional Comments: Pt declined ADLs.    OT Exercises  Pt performed the following BUE exercises:  [x]Grasp/release x15  [x]Wrist flexion/extension x15  [x]Forearm pronation/supination x10  [x]Elbow flexion/extension x10  [x]Shoulder flexion/extension 2x10  []Chest press  [x]Shoulder horizontal Abduction/Adduction x10  []Scapular elevation/retraction   Verbal and tactile cues       Safety Devices  Type of Devices: Left in bed;Bed alarm in place;Nurse notified;Gait belt;Call light within reach     Patient Education  Education Given To: Patient  Education Provided: Role of Therapy;Plan of Care;Home Exercise Program;Orientation  Education Method: Demonstration;Verbal  Barriers to Learning: Cognition  Education Outcome: Verbalized understanding;Demonstrated understanding;Continued education needed    Goals  Short Term Goals  Time Frame for Short Term Goals: 5/15, unless otherwise noted-- goals ongoing 5/9/24  Short Term Goal 1: Pt will complete stand with Mod A x 2 in prep for functional transfer training  Short Term Goal 2: Pt will completed seated grooming tasks with set up  Short Term Goal 3: Pt will UE HEP 15x reps each by 5/16    AM-PAC - ADL  AM-PAC Daily Activity - Inpatient   How much help is needed for putting on and taking off regular lower body clothing?: Total  How much help is needed for bathing (which includes washing, rinsing, drying)?: A Lot  How much help is needed for toileting (which includes using toilet, bedpan, or urinal)?: Total  How much help is needed for putting on and taking off regular upper body clothing?: A Lot  How much help is needed for taking care of personal grooming?: A Lot  How much help for eating meals?: A Little  AM-PAC Inpatient Daily Activity Raw Score: 11  AM-PAC Inpatient ADL T-Scale Score : 29.04  ADL Inpatient CMS 0-100% Score: 70.42  ADL Inpatient CMS G-Code Modifier : CL    Therapy Time

## 2024-05-09 NOTE — PROGRESS NOTES
Department of Internal Medicine  Nephrology Progress Note        SUBJECTIVE:    We are following this patient for difficult to control Hypertension.  The patient was seen and examined; he feels well today with no CP, SOB, nausea or vomiting.  Denies any new complaints.  Blood pressure is improving with the eplerenone , down to 90s     UOP of 1.4 L  Weight is down to 112-113 kg close to his admission weight    ROS: No fever or chills.  Social: No family at bedside.    Physical Exam:    VITALS:  /62   Pulse 95   Temp 98.3 °F (36.8 °C)   Resp 16   Ht 1.93 m (6' 3.98\")   Wt 113.3 kg (249 lb 12.5 oz)   SpO2 97%   BMI 30.42 kg/m²     General appearance: Seems comfortable, no acute distress.  Neck: Trachea midline, thyroid normal.   Lungs:  Non labored breathing, CTA to anterior auscultation.  Heart:  S1S2 normal, rub or gallop. + peripheral edema.  Abdomen: Soft, non-tender, no organomegaly.   Skin: No lesions or rashes, warm to touch.     DATA:    CBC with Differential:    Lab Results   Component Value Date/Time    WBC 4.2 05/09/2024 04:34 AM    RBC 3.30 05/09/2024 04:34 AM    HGB 8.9 05/09/2024 04:34 AM    HCT 27.6 05/09/2024 04:34 AM     05/09/2024 04:34 AM    MCV 83.5 05/09/2024 04:34 AM    MCH 27.0 05/09/2024 04:34 AM    MCHC 32.3 05/09/2024 04:34 AM    RDW 19.2 05/09/2024 04:34 AM    NRBC 1 07/09/2017 05:14 AM    BANDSPCT 3 07/12/2017 05:25 AM    METASPCT 1 07/11/2017 06:45 AM    LYMPHOPCT 19.3 05/09/2024 04:34 AM    MONOPCT 10.6 05/09/2024 04:34 AM    MYELOPCT 1 07/11/2017 06:45 AM    EOSPCT 3.5 05/09/2024 04:34 AM    BASOPCT 0.8 05/09/2024 04:34 AM    MONOSABS 0.4 05/09/2024 04:34 AM    EOSABS 0.1 05/09/2024 04:34 AM    BASOSABS 0.0 05/09/2024 04:34 AM    DIFFTYPE Auto 04/05/2013 05:50 AM     BMP:    Lab Results   Component Value Date/Time     05/09/2024 04:34 AM    K 3.4 05/09/2024 04:34 AM     05/09/2024 04:34 AM    CO2 29 05/09/2024 04:34 AM    BUN 25 05/09/2024 04:34 AM

## 2024-05-10 VITALS
HEART RATE: 100 BPM | DIASTOLIC BLOOD PRESSURE: 82 MMHG | WEIGHT: 255.51 LBS | RESPIRATION RATE: 18 BRPM | TEMPERATURE: 97.5 F | HEIGHT: 76 IN | OXYGEN SATURATION: 95 % | BODY MASS INDEX: 31.11 KG/M2 | SYSTOLIC BLOOD PRESSURE: 133 MMHG

## 2024-05-10 LAB
ANION GAP SERPL CALCULATED.3IONS-SCNC: 8 MMOL/L (ref 3–16)
BASOPHILS # BLD: 0 K/UL (ref 0–0.2)
BASOPHILS NFR BLD: 0.8 %
BUN SERPL-MCNC: 24 MG/DL (ref 7–20)
CALCIUM SERPL-MCNC: 8.2 MG/DL (ref 8.3–10.6)
CHLORIDE SERPL-SCNC: 101 MMOL/L (ref 99–110)
CO2 SERPL-SCNC: 29 MMOL/L (ref 21–32)
CREAT SERPL-MCNC: 1.1 MG/DL (ref 0.8–1.3)
DEPRECATED RDW RBC AUTO: 18.8 % (ref 12.4–15.4)
EOSINOPHIL # BLD: 0.1 K/UL (ref 0–0.6)
EOSINOPHIL NFR BLD: 4.2 %
GFR SERPLBLD CREATININE-BSD FMLA CKD-EPI: 69 ML/MIN/{1.73_M2}
GLUCOSE BLD-MCNC: 115 MG/DL (ref 70–99)
GLUCOSE BLD-MCNC: 97 MG/DL (ref 70–99)
GLUCOSE SERPL-MCNC: 89 MG/DL (ref 70–99)
HCT VFR BLD AUTO: 28.4 % (ref 40.5–52.5)
HGB BLD-MCNC: 9.2 G/DL (ref 13.5–17.5)
LYMPHOCYTES # BLD: 0.8 K/UL (ref 1–5.1)
LYMPHOCYTES NFR BLD: 23.2 %
MAGNESIUM SERPL-MCNC: 1.9 MG/DL (ref 1.8–2.4)
MCH RBC QN AUTO: 26.9 PG (ref 26–34)
MCHC RBC AUTO-ENTMCNC: 32.3 G/DL (ref 31–36)
MCV RBC AUTO: 83.3 FL (ref 80–100)
MONOCYTES # BLD: 0.4 K/UL (ref 0–1.3)
MONOCYTES NFR BLD: 11.4 %
NEUTROPHILS # BLD: 2.1 K/UL (ref 1.7–7.7)
NEUTROPHILS NFR BLD: 60.4 %
NT-PROBNP SERPL-MCNC: 5054 PG/ML (ref 0–449)
PERFORMED ON: ABNORMAL
PERFORMED ON: NORMAL
PLATELET # BLD AUTO: 154 K/UL (ref 135–450)
PMV BLD AUTO: 8.3 FL (ref 5–10.5)
POTASSIUM SERPL-SCNC: 3.3 MMOL/L (ref 3.5–5.1)
RBC # BLD AUTO: 3.42 M/UL (ref 4.2–5.9)
SODIUM SERPL-SCNC: 138 MMOL/L (ref 136–145)
WBC # BLD AUTO: 3.4 K/UL (ref 4–11)

## 2024-05-10 PROCEDURE — 6370000000 HC RX 637 (ALT 250 FOR IP): Performed by: INTERNAL MEDICINE

## 2024-05-10 PROCEDURE — 80048 BASIC METABOLIC PNL TOTAL CA: CPT

## 2024-05-10 PROCEDURE — 83735 ASSAY OF MAGNESIUM: CPT

## 2024-05-10 PROCEDURE — 6370000000 HC RX 637 (ALT 250 FOR IP): Performed by: NURSE PRACTITIONER

## 2024-05-10 PROCEDURE — 85025 COMPLETE CBC W/AUTO DIFF WBC: CPT

## 2024-05-10 PROCEDURE — 97110 THERAPEUTIC EXERCISES: CPT

## 2024-05-10 PROCEDURE — 6360000002 HC RX W HCPCS: Performed by: INTERNAL MEDICINE

## 2024-05-10 PROCEDURE — 83880 ASSAY OF NATRIURETIC PEPTIDE: CPT

## 2024-05-10 PROCEDURE — 6370000000 HC RX 637 (ALT 250 FOR IP)

## 2024-05-10 RX ORDER — HYDRALAZINE HYDROCHLORIDE 100 MG/1
100 TABLET, FILM COATED ORAL EVERY 8 HOURS SCHEDULED
Qty: 90 TABLET | Refills: 3 | Status: SHIPPED | OUTPATIENT
Start: 2024-05-10

## 2024-05-10 RX ORDER — POTASSIUM CHLORIDE 750 MG/1
10 TABLET, EXTENDED RELEASE ORAL 2 TIMES DAILY
Status: DISCONTINUED | OUTPATIENT
Start: 2024-05-10 | End: 2024-05-10 | Stop reason: HOSPADM

## 2024-05-10 RX ORDER — LACTOBACILLUS RHAMNOSUS GG 10B CELL
1 CAPSULE ORAL DAILY
Qty: 30 CAPSULE | Refills: 0 | Status: SHIPPED | OUTPATIENT
Start: 2024-05-11 | End: 2024-06-10

## 2024-05-10 RX ORDER — EPLERENONE 25 MG/1
25 TABLET, FILM COATED ORAL DAILY
Qty: 30 TABLET | Refills: 3 | Status: SHIPPED | OUTPATIENT
Start: 2024-05-11

## 2024-05-10 RX ORDER — POTASSIUM CHLORIDE 20 MEQ/1
40 TABLET, EXTENDED RELEASE ORAL ONCE
Status: COMPLETED | OUTPATIENT
Start: 2024-05-10 | End: 2024-05-10

## 2024-05-10 RX ORDER — ISOSORBIDE MONONITRATE 30 MG/1
30 TABLET, EXTENDED RELEASE ORAL 2 TIMES DAILY
Qty: 30 TABLET | Refills: 3 | Status: SHIPPED | OUTPATIENT
Start: 2024-05-10

## 2024-05-10 RX ORDER — FUROSEMIDE 40 MG/1
40 TABLET ORAL 2 TIMES DAILY
Qty: 60 TABLET | Refills: 3 | Status: SHIPPED | OUTPATIENT
Start: 2024-05-10

## 2024-05-10 RX ORDER — FUROSEMIDE 40 MG/1
40 TABLET ORAL 2 TIMES DAILY
Status: DISCONTINUED | OUTPATIENT
Start: 2024-05-10 | End: 2024-05-10 | Stop reason: HOSPADM

## 2024-05-10 RX ORDER — FUROSEMIDE 40 MG/1
40 TABLET ORAL DAILY
Qty: 60 TABLET | Refills: 3 | Status: CANCELLED | OUTPATIENT
Start: 2024-05-11

## 2024-05-10 RX ADMIN — DOCUSATE SODIUM 100 MG: 100 CAPSULE, LIQUID FILLED ORAL at 08:29

## 2024-05-10 RX ADMIN — Medication 1 CAPSULE: at 08:29

## 2024-05-10 RX ADMIN — PANTOPRAZOLE SODIUM 40 MG: 40 TABLET, DELAYED RELEASE ORAL at 08:29

## 2024-05-10 RX ADMIN — FUROSEMIDE 40 MG: 40 TABLET ORAL at 08:28

## 2024-05-10 RX ADMIN — ENOXAPARIN SODIUM 30 MG: 100 INJECTION SUBCUTANEOUS at 08:29

## 2024-05-10 RX ADMIN — HYDRALAZINE HYDROCHLORIDE 100 MG: 50 TABLET ORAL at 14:00

## 2024-05-10 RX ADMIN — POTASSIUM CHLORIDE 40 MEQ: 1500 TABLET, EXTENDED RELEASE ORAL at 12:18

## 2024-05-10 RX ADMIN — ISOSORBIDE MONONITRATE 30 MG: 30 TABLET, EXTENDED RELEASE ORAL at 08:29

## 2024-05-10 RX ADMIN — B-COMPLEX W/ C & FOLIC ACID TAB 1 TABLET: TAB at 08:28

## 2024-05-10 RX ADMIN — EPLERENONE 25 MG: 25 TABLET, FILM COATED ORAL at 08:50

## 2024-05-10 RX ADMIN — ASPIRIN 81 MG 81 MG: 81 TABLET ORAL at 08:29

## 2024-05-10 RX ADMIN — QUETIAPINE FUMARATE 50 MG: 25 TABLET ORAL at 08:29

## 2024-05-10 NOTE — PROGRESS NOTES
Occupational Therapy  Facility/Department: [facility] C4 PCU  Daily Treatment Note  NAME: Alma Delia Garner  : 1950  MRN: [id number]    Date of Service: 5/10/2024    Discharge Recommendations:  Long Term Care with OT         Patient Diagnosis(es): The primary encounter diagnosis was Acute hypoxemic respiratory failure (HCC). Diagnoses of New onset of congestive heart failure (HCC), Pulmonary edema cardiac cause (HCC), Elevated troponin, Acute chest pain, and Chest pain, unspecified type were also pertinent to this visit.     Assessment    Assessment: Pt tolerated session well overall. Pt completed bed level therex with minimal rest breaks, however pt easily distracted this date, pausing therex multiple times for different requests. Pt unsafe to attempt OOB activity without 2nd person for assistance Continue to recommend pt return to LTC with OT.  Activity Tolerance: Patient tolerated treatment well  Discharge Recommendations: Long Term Care with OT      Plan   Occupational Therapy Plan  Times Per Week: 2-3x/week     Restrictions  Restrictions/Precautions  Restrictions/Precautions: General Precautions;Fall Risk  Position Activity Restriction  Other position/activity restrictions: male purewick    Subjective   Subjective  Subjective: Pt in bed at approach, agreeable to OT treatment with encouragement. Pt requesting vitals to be taken, also requesting blood sugar and weight (RN notified); pt also asking for cross to be removed from wall for him to hold and pray with.    Pain: Pt denies pain.    Orientation  Overall Orientation Status: Within Functional Limits    Cognition  Overall Cognitive Status: Exceptions  Following Commands: Follows one step commands with repetition;Follows one step commands with increased time  Attention Span: Difficulty dividing attention;Attends with cues to redirect  Safety Judgement: Decreased awareness of need for safety;Decreased awareness of need for assistance  Insights: Decreased

## 2024-05-10 NOTE — PROGRESS NOTES
Report received from day shift RN. Patient resting comfortably in bed. No signs of discomfort or distress. Bed is in lowest position, wheels locked, 2/2 side rails up. Bedside table and call light within reach. Bed alarm in place for patient safety. White board updated. Will continue to monitor patient. Rhonda Dominguez RN    11:31 PM  Shift assessment complete. (See findings in flowsheet). Med pass complete. (See MAR). VSS. Patient with no complaints at this time. Rhonda Dominguez RN    4:40 AM  No new events overnight. Patient resting quietly in bed. Denies pain. No additional needs at this time. Rhonda Dominguez RN

## 2024-05-10 NOTE — PROGRESS NOTES
Department of Internal Medicine  Nephrology Progress Note        SUBJECTIVE:    We are following this patient for difficult to control Hypertension.  The patient was seen and examined; he feels well today with no CP, SOB, nausea or vomiting.  Denies any new complaints.  Blood pressure is improving with the eplerenone   Denies any new complaints.    UOP of 0.9 L  Weight is starting to uptrend    ROS: No fever or chills.  Social: No family at bedside.    Physical Exam:    VITALS:  /81   Pulse 98   Temp 97.5 °F (36.4 °C) (Oral)   Resp 18   Ht 1.93 m (6' 3.98\")   Wt 115.9 kg (255 lb 8.2 oz)   SpO2 96%   BMI 31.12 kg/m²     General appearance: Seems comfortable, no acute distress.  Neck: Trachea midline, thyroid normal.   Lungs:  Non labored breathing, CTA to anterior auscultation.  Heart:  S1S2 normal, rub or gallop. + peripheral edema.  Abdomen: Soft, non-tender, no organomegaly.   Skin: No lesions or rashes, warm to touch.     DATA:    CBC with Differential:    Lab Results   Component Value Date/Time    WBC 3.4 05/10/2024 04:25 AM    RBC 3.42 05/10/2024 04:25 AM    HGB 9.2 05/10/2024 04:25 AM    HCT 28.4 05/10/2024 04:25 AM     05/10/2024 04:25 AM    MCV 83.3 05/10/2024 04:25 AM    MCH 26.9 05/10/2024 04:25 AM    MCHC 32.3 05/10/2024 04:25 AM    RDW 18.8 05/10/2024 04:25 AM    NRBC 1 07/09/2017 05:14 AM    BANDSPCT 3 07/12/2017 05:25 AM    METASPCT 1 07/11/2017 06:45 AM    LYMPHOPCT 23.2 05/10/2024 04:25 AM    MONOPCT 11.4 05/10/2024 04:25 AM    MYELOPCT 1 07/11/2017 06:45 AM    EOSPCT 4.2 05/10/2024 04:25 AM    BASOPCT 0.8 05/10/2024 04:25 AM    MONOSABS 0.4 05/10/2024 04:25 AM    EOSABS 0.1 05/10/2024 04:25 AM    BASOSABS 0.0 05/10/2024 04:25 AM    DIFFTYPE Auto 04/05/2013 05:50 AM     BMP:    Lab Results   Component Value Date/Time     05/10/2024 04:25 AM    K 3.3 05/10/2024 04:25 AM     05/10/2024 04:25 AM    CO2 29 05/10/2024 04:25 AM    BUN 24 05/10/2024 04:25 AM    CREATININE 1.1

## 2024-05-10 NOTE — PROGRESS NOTES
Orders to discharge patient home. Removed heplock. Patient tolerated well. Gave prescriptions to patient and provided written lexicomp handouts on each prescription. Reviewed AVS summary with patient including home medications, new prescriptions, diet, activity, follow up appointments and when to call the doctor. Patient verbalized understanding of all material. Awaiting transportation home.      This RN called Report to [phone].

## 2024-05-10 NOTE — DISCHARGE SUMMARY
V2.0  Discharge Summary    Name:  Alma Delia Garner /Age/Sex: 1950 (74 y.o. male)   Admit Date: 2024  Discharge Date: 5/10/24    MRN & CSN:  [id number] & [id number] Encounter Date and Time 5/10/24 2:41 PM EDT    Attending:  Christos Flood MD Discharging Provider: JAMEY Monge [facility]       Hospital Course:     Brief HPI: Alma Delia Garner is a 74 y.o. male who presented with  PMHx significant for AFIB, DM, dementia, CKD, HTN. History was obtained from the patient and review of the EMR. The patient states that he was having shortness of breath and having some swelling and decided to come to the hospital for further evaluation. Pt was unable to elaborate any further than this. I attempted to call his daughter and left a VM. He will be admitted for further evaluation.     Brief Problem Based Course:     Chest pain and dyspnea in setting of suspect CHF exacerbation, also with bilateral pleural effusions  - CXR with central predominant of biliary and interstitial edema potentially CHF given mild cardiomegaly, at least small loculated right pleural effusion, and at least trace left pleural effusion  - Chest CT with diffuse pulmonary edema with suspected assymetric alveolar edema in the right lung, consider CHF given moderate cardiomegaly. Loculated small to moderate left and trace right pleural effusions.   - Pro-BNP on admission 22,316, BNP 12,354 today  - TTE on 20 with LVEF 55-60%. No regional WMAs. LV diastolic filling pressure elevated. RV systolic function mildly reduced. Mild pulmonary hypertension  - Echo :  Left ventricle is normal in size with low normal systolic function and moderate concentric hypertrophy. LVEF is estimated at 55-59% with normal wall motion and indeterminate relaxation.  Right ventricle is normal in size and systolic function. Severe biatrial dilatation. Calcified aortic valve with mild stenosis, CARMEN: 1.6 cm2 and mean gradient 13 mmHg. Mild to moderate tricuspid  05/08/24  0440 05/09/24  0434 05/10/24  0425    139 138   K 3.3* 3.4* 3.3*    101 101   CO2 27 29 29   BUN 24* 25* 24*   CREATININE 1.1 1.2 1.1   GLUCOSE 93 92 89     Hepatic: No results for input(s): \"AST\", \"ALT\", \"BILITOT\", \"ALKPHOS\" in the last 72 hours.    Invalid input(s): \"ALB\"  Lipids:   Lab Results   Component Value Date/Time    CHOL 88 04/27/2023 05:37 AM    HDL 27 04/27/2023 05:37 AM    TRIG 93 04/27/2023 05:37 AM     Hemoglobin A1C:   Lab Results   Component Value Date/Time    LABA1C 5.1 05/05/2024 04:48 AM     TSH:   Lab Results   Component Value Date/Time    TSH 2.40 04/27/2023 05:37 AM    TSH 3.11 12/28/2020 10:30 PM     Troponin: No results found for: \"TROPONINT\"  Lactic Acid: No results for input(s): \"LACTA\" in the last 72 hours.  BNP:   Recent Labs     05/10/24  0425   PROBNP 5,054*     UA:  Lab Results   Component Value Date/Time    NITRU Negative 09/01/2023 12:53 AM    COLORU Yellow 09/01/2023 12:53 AM    PHUR 8.5 09/01/2023 12:53 AM    LABCAST 3-5 Hyaline 09/18/2019 02:20 PM    WBCUA 21-50 09/01/2023 12:53 AM    RBCUA 0-2 09/01/2023 12:53 AM    MUCUS Rare 12/05/2019 11:03 PM    YEAST Present 07/26/2019 01:06 PM    BACTERIA 2+ 09/01/2023 12:53 AM    CLARITYU Clear 09/01/2023 12:53 AM    SPECGRAV 1.010 04/05/2013 05:47 AM    LEUKOCYTESUR LARGE 09/01/2023 12:53 AM    UROBILINOGEN 1.0 09/01/2023 12:53 AM    BILIRUBINUR Negative 09/01/2023 12:53 AM    BILIRUBINUR neg 04/05/2013 05:47 AM    BLOODU TRACE-INTACT 09/01/2023 12:53 AM    GLUCOSEU Negative 09/01/2023 12:53 AM    KETUA Negative 09/01/2023 12:53 AM    AMORPHOUS 3+ 04/21/2023 02:25 PM     Urine Cultures:   Lab Results   Component Value Date/Time    LABURIN >100,000 CFU/ml 09/01/2023 12:53 AM     Blood Cultures:   Lab Results   Component Value Date/Time    BC No Growth after 4 days of incubation. 05/04/2024 01:26 AM     Lab Results   Component Value Date/Time    BLOODCULT2 No Growth after 4 days of incubation. 05/04/2024 01:26 AM

## 2024-05-10 NOTE — PROGRESS NOTES
05/10/24 1410   Encounter Summary   Encounter Overview/Reason Spiritual/Emotional Needs   Service Provided For Patient   Referral/Consult From Nurse   Support System Children   Last Encounter  05/10/24  ( visited; provided pastoral support and encouragement; promised ongoing prayers for loved one)   Begin Time 1335   End Time  1347   Total Time Calculated 12 min   Assessment/Intervention/Outcome   Intervention Sustaining Presence/Ministry of presence   Plan and Referrals   Plan/Referrals Referred to (Comment)  (Patient wanted to see a )

## 2024-05-10 NOTE — CARE COORDINATION
CASE MANAGEMENT DISCHARGE SUMMARY      Discharge to: [facility]      IMM given: (date) 5/10/2024    New Durable Medical Equipment ordered/agency: defer to facility    Transportation:       Medical Transport explained to pt/family. Pt/family voice no agency preference.    Agency used:[facility] up time:4:30 pm    Ambulance form completed: Yes    Confirmed discharge plan with:MD/RN/[facility]     Patient: yes     Family:  yes   Name:  Joselin Garner Contact number: [phone]     Facility/Agency, name:  [facility] at facility pulled orders from [employer]   Phone number for report to facility: [phone]     RN, name: Gianfranco

## 2024-05-10 NOTE — PLAN OF CARE
Problem: Discharge Planning  Goal: Discharge to home or other facility with appropriate resources  5/10/2024 0113 by Rhonda Dominguez RN  Outcome: Progressing  Flowsheets (Taken 5/10/2024 0113)  Discharge to home or other facility with appropriate resources:   Identify barriers to discharge with patient and caregiver   Identify discharge learning needs (meds, wound care, etc)   Arrange for needed discharge resources and transportation as appropriate  5/9/2024 1429 by Adri Rodriguez RN  Outcome: Progressing     Problem: Chronic Conditions and Co-morbidities  Goal: Patient's chronic conditions and co-morbidity symptoms are monitored and maintained or improved  5/10/2024 0113 by Rhonda Dominguez RN  Outcome: Progressing  Flowsheets (Taken 5/9/2024 0900 by Adri Rodriguez RN)  Care Plan - Patient's Chronic Conditions and Co-Morbidity Symptoms are Monitored and Maintained or Improved: Monitor and assess patient's chronic conditions and comorbid symptoms for stability, deterioration, or improvement  5/9/2024 1429 by Adri Rodriguez RN  Outcome: Progressing  Flowsheets (Taken 5/9/2024 0900)  Care Plan - Patient's Chronic Conditions and Co-Morbidity Symptoms are Monitored and Maintained or Improved: Monitor and assess patient's chronic conditions and comorbid symptoms for stability, deterioration, or improvement     Problem: Safety - Adult  Goal: Free from fall injury  5/10/2024 0113 by Rhonda Dominguez RN  Outcome: Progressing  Flowsheets (Taken 5/10/2024 0113)  Free From Fall Injury: Instruct family/caregiver on patient safety  5/9/2024 1429 by Adri Rodriguez RN  Outcome: Progressing

## 2024-05-10 NOTE — PLAN OF CARE
Problem: Discharge Planning  Goal: Discharge to home or other facility with appropriate resources  5/10/2024 1501 by Gianfranco Da Silva, RN  Outcome: Progressing  5/10/2024 0113 by Rhonda Dominguez RN  Outcome: Progressing  Flowsheets (Taken 5/10/2024 0113)  Discharge to home or other facility with appropriate resources:   Identify barriers to discharge with patient and caregiver   Identify discharge learning needs (meds, wound care, etc)   Arrange for needed discharge resources and transportation as appropriate     Problem: Chronic Conditions and Co-morbidities  Goal: Patient's chronic conditions and co-morbidity symptoms are monitored and maintained or improved  5/10/2024 1501 by Gianfranco Da Silva RN  Outcome: Progressing  5/10/2024 0113 by Rhonda Dominguez RN  Outcome: Progressing  Flowsheets (Taken 5/9/2024 0900 by Adri Rodriguez, RN)  Care Plan - Patient's Chronic Conditions and Co-Morbidity Symptoms are Monitored and Maintained or Improved: Monitor and assess patient's chronic conditions and comorbid symptoms for stability, deterioration, or improvement     Problem: Safety - Adult  Goal: Free from fall injury  5/10/2024 1501 by Gianfranco Da Silva RN  Outcome: Progressing  5/10/2024 0113 by Rhonda Dominguez RN  Outcome: Progressing  Flowsheets (Taken 5/10/2024 0113)  Free From Fall Injury: Instruct family/caregiver on patient safety     Problem: Skin/Tissue Integrity  Goal: Absence of new skin breakdown  Description: 1.  Monitor for areas of redness and/or skin breakdown  2.  Assess vascular access sites hourly  3.  Every 4-6 hours minimum:  Change oxygen saturation probe site  4.  Every 4-6 hours:  If on nasal continuous positive airway pressure, respiratory therapy assess nares and determine need for appliance change or resting period.  Outcome: Progressing     Problem: Nutrition Deficit:  Goal: Optimize nutritional status  Outcome: Progressing

## 2024-05-10 NOTE — DISCHARGE INSTR - COC
Continuity of Care Form    Patient Name: Alma Delia Garner   :  1950  MRN:  [id number]    Admit date:  2024  Discharge date:  5/10/2024    Code Status Order: Full Code   Advance Directives:     Admitting Physician:  Casimiro West MD  PCP: Yvette Leonard MD    Discharging Nurse: Gianfranco HOWE  Discharging Hospital Unit/Room#: 0427/0427-01  Discharging Unit Phone Number: [phone]    Emergency Contact:   Extended Emergency Contact Information  Primary Emergency Contact: Joselin Garner  Home Phone: [phone]  Relation: Child  Secondary Emergency Contact: Andre Garner  Home Phone: [phone]  Mobile Phone: [phone]  Relation: Child    Past Surgical History:  Past Surgical History:   Procedure Laterality Date    ABDOMEN SURGERY      CARDIAC SURGERY      COLONOSCOPY      DENTAL SURGERY N/A 2019    EXTRACTION OF ALL REMAINING UPPER AND LOWER TEETH AND ALVEOLOPLASTY   performed by Richy Cooper Jr., DMD at [facility] OR    ENDOSCOPY, COLON, DIAGNOSTIC      EYE SURGERY      KNEE SURGERY      LAPAROSCOPY N/A 2019    LAPAROSCOPIC, CONVERTED TO OPEN INCARCERATED UMBILICAL HERNIA REPAIR performed by Nawaf Rodriguez MD at [facility] OR    OTHER SURGICAL HISTORY  10/11/2013     CYSTOSCOPY, TRANSURETHRAL RESECTION OF PROSTATE WITH OLYMPUS    PROSTATE BIOPSY      SKIN BIOPSY      UPPER GASTROINTESTINAL ENDOSCOPY  2017       Immunization History:   Immunization History   Administered Date(s) Administered    COVID-19, PFIZER PURPLE top, DILUTE for use, (age 12 y+), 30mcg/0.3mL 2021, 2021, 2021    Influenza Virus Vaccine 2014    Pneumococcal, PPSV23, PNEUMOVAX 23, (age 2y+), SC/IM, 0.5mL 2015    TDaP, ADACEL (age 10y-64y), BOOSTRIX (age 10y+), IM, 0.5mL 2008, 2023    Td Vaccine >8yo [facility] 2008    Tetanus 2008       Active Problems:  Patient Active Problem List   Diagnosis Code    Hx BPH w/urinary retention s/p prostatectomy R33.9    Cigar smoker  EDT    PHYSICIAN SECTION    Prognosis: Fair    Condition at Discharge: Stable    Rehab Potential (if transferring to Rehab): Fair    Recommended Labs or Other Treatments After Discharge: CMP, CBC, renin, aldesterone    Physician Certification: I certify the above information and transfer of Alma Delia Garner  is necessary for the continuing treatment of the diagnosis listed and that he requires Skilled Nursing Facility for greater 30 days.     Update Admission H&P: No change in H&P    PHYSICIAN SIGNATURE:  Electronically signed by JAMEY Monge CNP on 5/10/24 at 2:40 PM EDT

## 2024-05-10 NOTE — CONSULTS
Comprehensive Nutrition Assessment    Type and Reason for Visit:  Initial, Consult    Nutrition Recommendations/Plan:           Continue low sodium diet, monitor and encourage intake  Encourage po intakes  Monitor po intakes, nutrition adequacy, weights, pertinent labs, BMs     Malnutrition Assessment:  Malnutrition Status:  At risk for malnutrition (Comment) (05/06/24 1311)      Nutrition Assessment:    Patient appears nutritionally stable as he is tolerating a low sodium diet with good po intake. Noted fluid restriction put in place 5/6, pt continues with generalized pitting edema. RD will continue to monitor per SOC.    Nutrition Related Findings:    +2 pitting edema BLE, generalized pitting edema,  Wound Type: None       Current Nutrition Intake & Therapies:    Average Meal Intake: %  Average Supplements Intake: None Ordered  ADULT DIET; Regular; Low Sodium (2 gm); 2000 ml    Anthropometric Measures:  Height: 193 cm (6' 3.98\")  Ideal Body Weight (IBW): 202 lbs (92 kg)    Current Body Weight: 113.4 kg (250 lb),   IBW. Weight Source: Bed Scale  Current BMI (kg/m2): 30.4  BMI Categories: Obese Class 1 (BMI 30.0-34.9)    Estimated Daily Nutrient Needs:  Energy Requirements Based On: Kcal/kg  Weight Used for Energy Requirements: Ideal  Energy (kcal/day): 9121-5778  Weight Used for Protein Requirements: Ideal  Protein (g/day):   Method Used for Fluid Requirements: Other (Comment)  Fluid (ml/day): Less than 2000 ml 2/2 CHF    Nutrition Diagnosis:   Predicted inadequate energy intake related to inadequate protein-energy intake as evidenced by intake 26-50%, intake 51-75%    Nutrition Interventions:   Food and/or Nutrient Delivery: Continue Current Diet  Nutrition Education/Counseling: Education completed  Coordination of Nutrition Care: Continue to monitor while inpatient     Goals:  Previous Goal Met: Progressing toward Goal(s)  Goals: PO intake 50% or greater, prior to discharge     Nutrition

## 2024-07-16 ENCOUNTER — APPOINTMENT (OUTPATIENT)
Dept: GENERAL RADIOLOGY | Age: 74
DRG: 315 | End: 2024-07-16
Payer: MEDICARE

## 2024-07-16 ENCOUNTER — HOSPITAL ENCOUNTER (INPATIENT)
Age: 74
LOS: 3 days | Discharge: NURSING, REHAB OR LONG-TERM CARE | DRG: 315 | End: 2024-07-19
Attending: EMERGENCY MEDICINE | Admitting: INTERNAL MEDICINE
Payer: MEDICARE

## 2024-07-16 DIAGNOSIS — N17.9 AKI (ACUTE KIDNEY INJURY) (HCC): ICD-10-CM

## 2024-07-16 DIAGNOSIS — I50.9 CONGESTIVE HEART FAILURE, UNSPECIFIED HF CHRONICITY, UNSPECIFIED HEART FAILURE TYPE (HCC): ICD-10-CM

## 2024-07-16 DIAGNOSIS — I95.9 HYPOTENSION, UNSPECIFIED HYPOTENSION TYPE: Primary | ICD-10-CM

## 2024-07-16 LAB
ALBUMIN SERPL-MCNC: 3 G/DL (ref 3.4–5)
ALBUMIN/GLOB SERPL: 0.6 {RATIO} (ref 1.1–2.2)
ALP SERPL-CCNC: 73 U/L (ref 40–129)
ALT SERPL-CCNC: 10 U/L (ref 10–40)
ANION GAP SERPL CALCULATED.3IONS-SCNC: 12 MMOL/L (ref 3–16)
AST SERPL-CCNC: 21 U/L (ref 15–37)
BACTERIA URNS QL MICRO: ABNORMAL /HPF
BASOPHILS # BLD: 0 K/UL (ref 0–0.2)
BASOPHILS NFR BLD: 0.5 %
BILIRUB SERPL-MCNC: 0.7 MG/DL (ref 0–1)
BILIRUB UR QL STRIP.AUTO: NEGATIVE
BUN SERPL-MCNC: 48 MG/DL (ref 7–20)
CALCIUM SERPL-MCNC: 9.2 MG/DL (ref 8.3–10.6)
CHLORIDE SERPL-SCNC: 99 MMOL/L (ref 99–110)
CLARITY UR: CLEAR
CO2 SERPL-SCNC: 23 MMOL/L (ref 21–32)
COLOR UR: YELLOW
CREAT SERPL-MCNC: 1.8 MG/DL (ref 0.8–1.3)
DEPRECATED RDW RBC AUTO: 17.4 % (ref 12.4–15.4)
EOSINOPHIL # BLD: 0 K/UL (ref 0–0.6)
EOSINOPHIL NFR BLD: 0.4 %
EPI CELLS #/AREA URNS HPF: ABNORMAL /HPF (ref 0–5)
GFR SERPLBLD CREATININE-BSD FMLA CKD-EPI: 38 ML/MIN/{1.73_M2}
GLUCOSE SERPL-MCNC: 157 MG/DL (ref 70–99)
GLUCOSE UR STRIP.AUTO-MCNC: 100 MG/DL
HCT VFR BLD AUTO: 35.1 % (ref 40.5–52.5)
HGB BLD-MCNC: 11.7 G/DL (ref 13.5–17.5)
HGB UR QL STRIP.AUTO: ABNORMAL
KETONES UR STRIP.AUTO-MCNC: NEGATIVE MG/DL
LACTATE BLDV-SCNC: 1.8 MMOL/L (ref 0.4–1.9)
LEUKOCYTE ESTERASE UR QL STRIP.AUTO: ABNORMAL
LIPASE SERPL-CCNC: 20 U/L (ref 13–60)
LYMPHOCYTES # BLD: 0.6 K/UL (ref 1–5.1)
LYMPHOCYTES NFR BLD: 8.3 %
MCH RBC QN AUTO: 28.2 PG (ref 26–34)
MCHC RBC AUTO-ENTMCNC: 33.2 G/DL (ref 31–36)
MCV RBC AUTO: 84.9 FL (ref 80–100)
MONOCYTES # BLD: 0.8 K/UL (ref 0–1.3)
MONOCYTES NFR BLD: 11.2 %
NEUTROPHILS # BLD: 5.9 K/UL (ref 1.7–7.7)
NEUTROPHILS NFR BLD: 79.6 %
NITRITE UR QL STRIP.AUTO: NEGATIVE
NT-PROBNP SERPL-MCNC: ABNORMAL PG/ML (ref 0–449)
PH UR STRIP.AUTO: 5.5 [PH] (ref 5–8)
PLATELET # BLD AUTO: 167 K/UL (ref 135–450)
PMV BLD AUTO: 8.3 FL (ref 5–10.5)
POTASSIUM SERPL-SCNC: 4.4 MMOL/L (ref 3.5–5.1)
PROT SERPL-MCNC: 7.8 G/DL (ref 6.4–8.2)
PROT UR STRIP.AUTO-MCNC: NEGATIVE MG/DL
RBC # BLD AUTO: 4.13 M/UL (ref 4.2–5.9)
RBC #/AREA URNS HPF: >100 /HPF (ref 0–4)
SARS-COV-2 RDRP RESP QL NAA+PROBE: NOT DETECTED
SODIUM SERPL-SCNC: 134 MMOL/L (ref 136–145)
SP GR UR STRIP.AUTO: 1.02 (ref 1–1.03)
TROPONIN, HIGH SENSITIVITY: 39 NG/L (ref 0–22)
UA COMPLETE W REFLEX CULTURE PNL UR: ABNORMAL
UA DIPSTICK W REFLEX MICRO PNL UR: YES
URN SPEC COLLECT METH UR: ABNORMAL
UROBILINOGEN UR STRIP-ACNC: 0.2 E.U./DL
WBC # BLD AUTO: 7.4 K/UL (ref 4–11)
WBC #/AREA URNS HPF: ABNORMAL /HPF (ref 0–5)

## 2024-07-16 PROCEDURE — 83605 ASSAY OF LACTIC ACID: CPT

## 2024-07-16 PROCEDURE — 6360000002 HC RX W HCPCS: Performed by: INTERNAL MEDICINE

## 2024-07-16 PROCEDURE — 80053 COMPREHEN METABOLIC PANEL: CPT

## 2024-07-16 PROCEDURE — 36415 COLL VENOUS BLD VENIPUNCTURE: CPT

## 2024-07-16 PROCEDURE — 84484 ASSAY OF TROPONIN QUANT: CPT

## 2024-07-16 PROCEDURE — 87040 BLOOD CULTURE FOR BACTERIA: CPT

## 2024-07-16 PROCEDURE — 83690 ASSAY OF LIPASE: CPT

## 2024-07-16 PROCEDURE — 71045 X-RAY EXAM CHEST 1 VIEW: CPT

## 2024-07-16 PROCEDURE — 6360000002 HC RX W HCPCS: Performed by: NURSE PRACTITIONER

## 2024-07-16 PROCEDURE — 6370000000 HC RX 637 (ALT 250 FOR IP): Performed by: INTERNAL MEDICINE

## 2024-07-16 PROCEDURE — 83880 ASSAY OF NATRIURETIC PEPTIDE: CPT

## 2024-07-16 PROCEDURE — 93005 ELECTROCARDIOGRAM TRACING: CPT | Performed by: EMERGENCY MEDICINE

## 2024-07-16 PROCEDURE — 87635 SARS-COV-2 COVID-19 AMP PRB: CPT

## 2024-07-16 PROCEDURE — 1200000000 HC SEMI PRIVATE

## 2024-07-16 PROCEDURE — 87150 DNA/RNA AMPLIFIED PROBE: CPT

## 2024-07-16 PROCEDURE — 2580000003 HC RX 258: Performed by: INTERNAL MEDICINE

## 2024-07-16 PROCEDURE — P9045 ALBUMIN (HUMAN), 5%, 250 ML: HCPCS | Performed by: NURSE PRACTITIONER

## 2024-07-16 PROCEDURE — 85025 COMPLETE CBC W/AUTO DIFF WBC: CPT

## 2024-07-16 PROCEDURE — 81001 URINALYSIS AUTO W/SCOPE: CPT

## 2024-07-16 PROCEDURE — 2580000003 HC RX 258: Performed by: EMERGENCY MEDICINE

## 2024-07-16 PROCEDURE — 99285 EMERGENCY DEPT VISIT HI MDM: CPT

## 2024-07-16 RX ORDER — ATORVASTATIN CALCIUM 10 MG/1
20 TABLET, FILM COATED ORAL DAILY
Status: DISCONTINUED | OUTPATIENT
Start: 2024-07-16 | End: 2024-07-19 | Stop reason: HOSPADM

## 2024-07-16 RX ORDER — SODIUM CHLORIDE 0.9 % (FLUSH) 0.9 %
5-40 SYRINGE (ML) INJECTION PRN
Status: DISCONTINUED | OUTPATIENT
Start: 2024-07-16 | End: 2024-07-19 | Stop reason: HOSPADM

## 2024-07-16 RX ORDER — ESCITALOPRAM OXALATE 10 MG/1
10 TABLET ORAL DAILY
Status: DISCONTINUED | OUTPATIENT
Start: 2024-07-16 | End: 2024-07-19 | Stop reason: HOSPADM

## 2024-07-16 RX ORDER — ACETAMINOPHEN 325 MG/1
650 TABLET ORAL EVERY 6 HOURS PRN
Status: DISCONTINUED | OUTPATIENT
Start: 2024-07-16 | End: 2024-07-19 | Stop reason: HOSPADM

## 2024-07-16 RX ORDER — SODIUM CHLORIDE, SODIUM LACTATE, POTASSIUM CHLORIDE, AND CALCIUM CHLORIDE .6; .31; .03; .02 G/100ML; G/100ML; G/100ML; G/100ML
500 INJECTION, SOLUTION INTRAVENOUS ONCE
Status: COMPLETED | OUTPATIENT
Start: 2024-07-16 | End: 2024-07-16

## 2024-07-16 RX ORDER — RIVASTIGMINE 4.6 MG/24H
1 PATCH, EXTENDED RELEASE TRANSDERMAL DAILY
Status: DISCONTINUED | OUTPATIENT
Start: 2024-07-16 | End: 2024-07-19 | Stop reason: HOSPADM

## 2024-07-16 RX ORDER — ASPIRIN 81 MG/1
81 TABLET, CHEWABLE ORAL DAILY
Status: DISCONTINUED | OUTPATIENT
Start: 2024-07-16 | End: 2024-07-19 | Stop reason: HOSPADM

## 2024-07-16 RX ORDER — PANTOPRAZOLE SODIUM 20 MG/1
20 TABLET, DELAYED RELEASE ORAL DAILY
Status: DISCONTINUED | OUTPATIENT
Start: 2024-07-17 | End: 2024-07-19 | Stop reason: HOSPADM

## 2024-07-16 RX ORDER — SODIUM CHLORIDE 9 MG/ML
INJECTION, SOLUTION INTRAVENOUS PRN
Status: DISCONTINUED | OUTPATIENT
Start: 2024-07-16 | End: 2024-07-19 | Stop reason: HOSPADM

## 2024-07-16 RX ORDER — HEPARIN SODIUM 5000 [USP'U]/ML
5000 INJECTION, SOLUTION INTRAVENOUS; SUBCUTANEOUS EVERY 8 HOURS SCHEDULED
Status: DISCONTINUED | OUTPATIENT
Start: 2024-07-16 | End: 2024-07-19 | Stop reason: HOSPADM

## 2024-07-16 RX ORDER — SODIUM CHLORIDE 0.9 % (FLUSH) 0.9 %
5-40 SYRINGE (ML) INJECTION EVERY 12 HOURS SCHEDULED
Status: DISCONTINUED | OUTPATIENT
Start: 2024-07-16 | End: 2024-07-19 | Stop reason: HOSPADM

## 2024-07-16 RX ORDER — ALBUMIN, HUMAN INJ 5% 5 %
25 SOLUTION INTRAVENOUS ONCE
Status: COMPLETED | OUTPATIENT
Start: 2024-07-17 | End: 2024-07-17

## 2024-07-16 RX ORDER — ACETAMINOPHEN 650 MG/1
650 SUPPOSITORY RECTAL EVERY 6 HOURS PRN
Status: DISCONTINUED | OUTPATIENT
Start: 2024-07-16 | End: 2024-07-19 | Stop reason: HOSPADM

## 2024-07-16 RX ORDER — ONDANSETRON 4 MG/1
4 TABLET, ORALLY DISINTEGRATING ORAL EVERY 8 HOURS PRN
Status: DISCONTINUED | OUTPATIENT
Start: 2024-07-16 | End: 2024-07-19 | Stop reason: HOSPADM

## 2024-07-16 RX ORDER — QUETIAPINE FUMARATE 25 MG/1
50 TABLET, FILM COATED ORAL 2 TIMES DAILY
Status: DISCONTINUED | OUTPATIENT
Start: 2024-07-16 | End: 2024-07-16

## 2024-07-16 RX ORDER — ONDANSETRON 2 MG/ML
4 INJECTION INTRAMUSCULAR; INTRAVENOUS EVERY 6 HOURS PRN
Status: DISCONTINUED | OUTPATIENT
Start: 2024-07-16 | End: 2024-07-19 | Stop reason: HOSPADM

## 2024-07-16 RX ORDER — MIDODRINE HYDROCHLORIDE 5 MG/1
5 TABLET ORAL
Status: DISCONTINUED | OUTPATIENT
Start: 2024-07-16 | End: 2024-07-18

## 2024-07-16 RX ORDER — DIVALPROEX SODIUM 125 MG/1
375 CAPSULE, COATED PELLETS ORAL NIGHTLY
Status: DISCONTINUED | OUTPATIENT
Start: 2024-07-16 | End: 2024-07-19 | Stop reason: HOSPADM

## 2024-07-16 RX ORDER — POLYETHYLENE GLYCOL 3350 17 G/17G
17 POWDER, FOR SOLUTION ORAL DAILY PRN
Status: DISCONTINUED | OUTPATIENT
Start: 2024-07-16 | End: 2024-07-19 | Stop reason: HOSPADM

## 2024-07-16 RX ADMIN — Medication 10 ML: at 20:41

## 2024-07-16 RX ADMIN — MIDODRINE HYDROCHLORIDE 5 MG: 5 TABLET ORAL at 17:00

## 2024-07-16 RX ADMIN — SODIUM CHLORIDE, POTASSIUM CHLORIDE, SODIUM LACTATE AND CALCIUM CHLORIDE 500 ML: 600; 310; 30; 20 INJECTION, SOLUTION INTRAVENOUS at 13:24

## 2024-07-16 RX ADMIN — ALBUMIN (HUMAN) 25 G: 12.5 INJECTION, SOLUTION INTRAVENOUS at 23:53

## 2024-07-16 RX ADMIN — HEPARIN SODIUM 5000 UNITS: 5000 INJECTION INTRAVENOUS; SUBCUTANEOUS at 17:03

## 2024-07-16 RX ADMIN — ATORVASTATIN CALCIUM 20 MG: 10 TABLET, FILM COATED ORAL at 17:01

## 2024-07-16 RX ADMIN — DIVALPROEX SODIUM 375 MG: 125 CAPSULE ORAL at 20:41

## 2024-07-16 RX ADMIN — ASPIRIN 81 MG 81 MG: 81 TABLET ORAL at 17:00

## 2024-07-16 RX ADMIN — HEPARIN SODIUM 5000 UNITS: 5000 INJECTION INTRAVENOUS; SUBCUTANEOUS at 21:42

## 2024-07-16 ASSESSMENT — PAIN SCALES - GENERAL
PAINLEVEL_OUTOF10: 0
PAINLEVEL_OUTOF10: 0

## 2024-07-16 ASSESSMENT — PAIN - FUNCTIONAL ASSESSMENT: PAIN_FUNCTIONAL_ASSESSMENT: 0-10

## 2024-07-16 NOTE — H&P
Hospital Medicine History & Physical      Date of Admission: 7/16/2024    Date of Service:  Pt seen/examined on 07/16/24     [x]Admitted to Inpatient with expected LOS greater than two midnights due to medical therapy.  []Placed in Observation status.    Chief Admission Complaint:  hypotension    Presenting Admission History:      74 y.o. male who presented to [facility] with hypotension.  PMHx significant for CHF, CAD, CKD, HTN, DMII, Afib, HLD, dementia. Patient is a poor historian. Patient presented to his nephrologist today for routine follow up. He was found to have significant hypotension but seemingly no symptoms. In the past, he had had his BP meds reduced due to orthostatic changes but he has never had hypotension like this before. There is no clear evidence of any other acute changes. He appears to be at mental baseline per family.    Assessment/Plan:      Current Principal Problem:  Hypotension    Hypotension  - nephrology consulted  - likely iatrogenic   - started on midodrine   - Holding imdur, inspra, lasix, hydralazine    Chronic diastolic heart failure   - appears euvolemic  - echo 5/24 with EF 55-60%  - holding lasix for now    CAD  - no active chest pain  - continue ASA, statin. Holding imdur    CKD III  - Cr at baseline  - continue to monitor    DMII  - well controlled  - SSI ordered    Afib  - rate controlled  - holding     HLD  - continue statin    Dementia  - at mental baseline  - continue exelon, depakote, seroquel    Discussed management and the need for Hospitalization of the patient w/ the Emergency Department Provider: Dr. Cohn    CXR: I have reviewed the CXR with the following interpretation: No acute disease  EKG:  I have reviewed the EKG with the following interpretation: Afb    Physical Exam Performed:      BP 95/60   Pulse 95   Temp 98.4 °F (36.9 °C) (Oral)   Resp 26   Ht 1.93 m (6' 4\")   Wt 102.5 kg (226 lb)   SpO2 98%   BMI 27.51 kg/m²     General appearance:  No

## 2024-07-16 NOTE — ED PROVIDER NOTES
Emergency Department Provider Note  Location: [facility]  ED  7/16/2024     Patient Identification  Alma Delia Garner is a 74 y.o. male    Chief Complaint  Hypotension (Patient was at nephrology for routine checkup and had multiple low BP readings. /Denies any complaints)          HPI  (History provided by patient and family member patient and daughter)  Patient presents from nephrology clinic with hypotension.  He is accompanied by his daughter.  He lives in a nursing facility.  Per the daughter the patient denies any recent illness, fever, vomiting, diarrhea, urine changes, cough, shortness of breath.  As far as we know patient is compliant with his medications.  No history of hypotension, he is typically hypertensive.  In the ER the patient is confused.  He denies physical complaints.      Nursing Notes reviewed.    Allergies:   Allergies   Allergen Reactions    Sulfamethoxazole-Trimethoprim Other (See Comments)     CKD  Do not prescribe per Nephrologist.       Past medical history:  has a past medical history of Acute gastric ulcer with bleeding, Acute metabolic encephalopathy, MAISHA (acute kidney injury) ([facility]), ATN (acute tubular necrosis) ([facility]), Atrial fib/flutter, transient, Atrial fibrillation ([facility]), BPH (benign prostatic hyperplasia), CAD (coronary artery disease), Cerebral artery occlusion with cerebral infarction ([facility]) (08/05/2017), CHF (congestive heart failure) ([facility]) (05/04/2024), CKD (chronic kidney disease), Dementia ([facility]), Diabetes mellitus ([facility]), GI bleed, Glaucoma, History of blood transfusion, diabetic neuropathy, Hyperlipidemia, Hypertension, Mitral insufficiency, Mobility impaired, 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    Past surgical history:  has a past surgical history that includes knee surgery; Prostate biopsy; other surgical history (10/11/2013); Upper gastrointestinal  APRN - CNP   Multiple Vitamin (VITAMIN B COMPLEX W/C) TABS Take 1 tablet by mouth daily 5/5/23   Gisselle Bedoya APRN - CNP   potassium chloride (KLOR-CON M) 20 MEQ extended release tablet Take 1 tablet by mouth 2 times daily 5/5/23   Gisselle Bedoya APRN - CNP   QUEtiapine (SEROQUEL) 50 MG tablet Take 1 tablet by mouth 2 times daily 5/5/23   Gisselle Bedoya APRN - CNP   ONE TOUCH LANCETS MISC Check glucose daily  Patient not taking: Reported on 5/6/2024 9/9/19   Provider, Oksana, MD       Social history:  reports that he has been smoking cigars. He has never used smokeless tobacco. He reports current alcohol use. He reports that he does not use drugs.      Exam  ED Triage Vitals   BP Temp Temp Source Pulse Respirations SpO2 Height Weight - Scale   07/16/24 1308 07/16/24 1258 07/16/24 1258 07/16/24 1258 07/16/24 1258 07/16/24 1258 07/16/24 1258 07/16/24 1258   (!) 85/55 98.4 °F (36.9 °C) Oral (!) 129 20 98 % 1.93 m (6' 4\") 102.5 kg (226 lb)       GENERAL: Alert, disheveled, confused, nontoxic  PSYCH: Orientation, insight appropriate to patient's age/baseline  HEAD: Normocephalic atraumatic  EYES: Pupils equal and reactive, Extraocular movements intact  EARS/NOSE/MOUTH/THROAT: Atraumatic external nose and ears. Mucous membranes normal  NECK: Supple, No tracheal deviation  RESPIRATORY: Normal respiratory effort, Breath sounds equal bilateral  CARDIOVASCULAR: Regular rhythm, no murmurs  ABDOMINAL: Soft. Not Distended, no tenderness to palpation  NEUROLOGICAL: CN 2-12 grossly intact. Sensation intact to light touch , normal speech, alert and oriented to name \"1924\" [facility]  MUSCULOSKELETAL: No deformity. No tenderness to palpation  SKIN: Warm and dry      MDM/ED Course      EKG  Rhythm is a flutter with largely 3-1 block  Rate is 106  Axis is right axis  Conduction Abnormalities: Artifact present, no significant ST changes,  No STEMI criteria      Radiology  XR CHEST PORTABLE    Result Date:

## 2024-07-16 NOTE — PROGRESS NOTES
4 Eyes Skin Assessment     NAME:  Alma Delia Garner  YOB: 1950  MEDICAL RECORD NUMBER:  [id number]    The patient is being assessed for  Admission    I agree that at least one RN has performed a thorough Head to Toe Skin Assessment on the patient. ALL assessment sites listed below have been assessed.      Areas assessed by both nurses:    Head, Face, Ears, Shoulders, Back, Chest, Arms, Elbows, Hands, Sacrum. Buttock, Coccyx, Ischium, Legs. Feet and Heels, and Under Medical Devices         Does the Patient have a Wound? No noted wound(s)       Davidson Prevention initiated by RN: yes  Wound Care Orders initiated by RN: No    Pressure Injury (Stage 3,4, Unstageable, DTI, NWPT, and Complex wounds) if present, place Wound referral order by RN under : No    New Ostomies, if present place, Ostomy referral order under : No     Nurse 1 eSignature: Electronically signed by Gabbie Castañeda RN on 7/16/24 at 7:28 PM EDT    **SHARE this note so that the co-signing nurse can place an eSignature**    Nurse 2 eSignature: Electronically signed by Loretta Shannon RN on 7/16/24 at 7:30 PM EDT

## 2024-07-16 NOTE — CONSULTS
Consult Placed     Who: VIJAY NAYLOR   Date:7/16/2024  Time:1811     Electronically signed by Mike Jerome on 7/16/2024 at 6:11 PM

## 2024-07-16 NOTE — ED NOTES
Patient Name: Alma Delia Garner  :  1950  74 y.o.  MRN:  [id number]  Preferred Name  Alma Delia  ED Room #:    Family/Caregiver Present yes  Restraints no  Sitter no  Sepsis Risk Score      Situation  Code Status: Full Code No additional code details.    Allergies: Sulfamethoxazole-trimethoprim  Weight: Patient Vitals for the past 96 hrs (Last 3 readings):   Weight   24 1258 102.5 kg (226 lb)     Arrived from: nursing home  Chief Complaint:   Chief Complaint   Patient presents with    Hypotension     Patient was at nephrology for routine checkup and had multiple low BP readings.   Denies any complaints     Hospital Problem/Diagnosis:  Principal Problem:    Hypotension  Resolved Problems:    * No resolved hospital problems. *    Imaging:   XR CHEST PORTABLE   Final Result   No acute cardiopulmonary process.           Abnormal labs:   Abnormal Labs Reviewed   CBC WITH AUTO DIFFERENTIAL - Abnormal; Notable for the following components:       Result Value    RBC 4.13 (*)     Hemoglobin 11.7 (*)     Hematocrit 35.1 (*)     RDW 17.4 (*)     Lymphocytes Absolute 0.6 (*)     All other components within normal limits   COMPREHENSIVE METABOLIC PANEL W/ REFLEX TO MG FOR LOW K - Abnormal; Notable for the following components:    Sodium 134 (*)     Glucose 157 (*)     BUN 48 (*)     Creatinine 1.8 (*)     Est, Glom Filt Rate 38 (*)     Albumin 3.0 (*)     Albumin/Globulin Ratio 0.6 (*)     All other components within normal limits   TROPONIN - Abnormal; Notable for the following components:    Troponin, High Sensitivity 39 (*)     All other components within normal limits   BRAIN NATRIURETIC PEPTIDE - Abnormal; Notable for the following components:    Pro-BNP 11,900 (*)     All other components within normal limits     Critical values: yes    Abnormal Assessment Findings: MAISHA, NON AMBULATORY, INCONTINENT, DENIES PAIN OR SYMPTOMS, HYPOTENSIVE     Background  History:   Past Medical History:   Diagnosis Date    Acute

## 2024-07-17 LAB
ANION GAP SERPL CALCULATED.3IONS-SCNC: 11 MMOL/L (ref 3–16)
BASOPHILS # BLD: 0 K/UL (ref 0–0.2)
BASOPHILS NFR BLD: 0.3 %
BUN SERPL-MCNC: 52 MG/DL (ref 7–20)
CALCIUM SERPL-MCNC: 8.6 MG/DL (ref 8.3–10.6)
CHLORIDE SERPL-SCNC: 97 MMOL/L (ref 99–110)
CO2 SERPL-SCNC: 24 MMOL/L (ref 21–32)
CREAT SERPL-MCNC: 2 MG/DL (ref 0.8–1.3)
DEPRECATED RDW RBC AUTO: 17.4 % (ref 12.4–15.4)
EKG ATRIAL RATE: 375 BPM
EKG DIAGNOSIS: NORMAL
EKG Q-T INTERVAL: 330 MS
EKG QRS DURATION: 104 MS
EKG QTC CALCULATION (BAZETT): 438 MS
EKG R AXIS: -88 DEGREES
EKG T AXIS: 14 DEGREES
EKG VENTRICULAR RATE: 106 BPM
EOSINOPHIL # BLD: 0 K/UL (ref 0–0.6)
EOSINOPHIL NFR BLD: 0.8 %
GFR SERPLBLD CREATININE-BSD FMLA CKD-EPI: 34 ML/MIN/{1.73_M2}
GLUCOSE SERPL-MCNC: 122 MG/DL (ref 70–99)
HCT VFR BLD AUTO: 28.1 % (ref 40.5–52.5)
HGB BLD-MCNC: 9.2 G/DL (ref 13.5–17.5)
LYMPHOCYTES # BLD: 1 K/UL (ref 1–5.1)
LYMPHOCYTES NFR BLD: 18.4 %
MCH RBC QN AUTO: 27.7 PG (ref 26–34)
MCHC RBC AUTO-ENTMCNC: 32.8 G/DL (ref 31–36)
MCV RBC AUTO: 84.4 FL (ref 80–100)
MONOCYTES # BLD: 0.8 K/UL (ref 0–1.3)
MONOCYTES NFR BLD: 15.4 %
NEUTROPHILS # BLD: 3.5 K/UL (ref 1.7–7.7)
NEUTROPHILS NFR BLD: 65.1 %
PLATELET # BLD AUTO: 141 K/UL (ref 135–450)
PMV BLD AUTO: 8.1 FL (ref 5–10.5)
POTASSIUM SERPL-SCNC: 4.5 MMOL/L (ref 3.5–5.1)
RBC # BLD AUTO: 3.33 M/UL (ref 4.2–5.9)
SODIUM SERPL-SCNC: 132 MMOL/L (ref 136–145)
WBC # BLD AUTO: 5.3 K/UL (ref 4–11)

## 2024-07-17 PROCEDURE — 2580000003 HC RX 258: Performed by: INTERNAL MEDICINE

## 2024-07-17 PROCEDURE — 80048 BASIC METABOLIC PNL TOTAL CA: CPT

## 2024-07-17 PROCEDURE — 1200000000 HC SEMI PRIVATE

## 2024-07-17 PROCEDURE — 93010 ELECTROCARDIOGRAM REPORT: CPT | Performed by: INTERNAL MEDICINE

## 2024-07-17 PROCEDURE — 6360000002 HC RX W HCPCS: Performed by: INTERNAL MEDICINE

## 2024-07-17 PROCEDURE — 36415 COLL VENOUS BLD VENIPUNCTURE: CPT

## 2024-07-17 PROCEDURE — 6370000000 HC RX 637 (ALT 250 FOR IP): Performed by: INTERNAL MEDICINE

## 2024-07-17 PROCEDURE — 85025 COMPLETE CBC W/AUTO DIFF WBC: CPT

## 2024-07-17 RX ORDER — SODIUM CHLORIDE, SODIUM LACTATE, POTASSIUM CHLORIDE, AND CALCIUM CHLORIDE .6; .31; .03; .02 G/100ML; G/100ML; G/100ML; G/100ML
500 INJECTION, SOLUTION INTRAVENOUS ONCE
Status: COMPLETED | OUTPATIENT
Start: 2024-07-17 | End: 2024-07-17

## 2024-07-17 RX ADMIN — DIVALPROEX SODIUM 375 MG: 125 CAPSULE ORAL at 21:30

## 2024-07-17 RX ADMIN — ATORVASTATIN CALCIUM 20 MG: 10 TABLET, FILM COATED ORAL at 09:38

## 2024-07-17 RX ADMIN — ACETAMINOPHEN 650 MG: 325 TABLET ORAL at 10:43

## 2024-07-17 RX ADMIN — SODIUM CHLORIDE, POTASSIUM CHLORIDE, SODIUM LACTATE AND CALCIUM CHLORIDE 500 ML: 600; 310; 30; 20 INJECTION, SOLUTION INTRAVENOUS at 10:54

## 2024-07-17 RX ADMIN — ESCITALOPRAM OXALATE 10 MG: 10 TABLET ORAL at 09:39

## 2024-07-17 RX ADMIN — MIDODRINE HYDROCHLORIDE 5 MG: 5 TABLET ORAL at 10:48

## 2024-07-17 RX ADMIN — Medication 10 ML: at 09:39

## 2024-07-17 RX ADMIN — ACETAMINOPHEN 650 MG: 325 TABLET ORAL at 17:54

## 2024-07-17 RX ADMIN — DICLOFENAC SODIUM 2 G: 10 GEL TOPICAL at 15:07

## 2024-07-17 RX ADMIN — Medication 10 ML: at 21:32

## 2024-07-17 RX ADMIN — HEPARIN SODIUM 5000 UNITS: 5000 INJECTION INTRAVENOUS; SUBCUTANEOUS at 21:31

## 2024-07-17 RX ADMIN — MIDODRINE HYDROCHLORIDE 5 MG: 5 TABLET ORAL at 09:39

## 2024-07-17 RX ADMIN — HEPARIN SODIUM 5000 UNITS: 5000 INJECTION INTRAVENOUS; SUBCUTANEOUS at 06:15

## 2024-07-17 RX ADMIN — PANTOPRAZOLE SODIUM 20 MG: 20 TABLET, DELAYED RELEASE ORAL at 09:39

## 2024-07-17 RX ADMIN — MIDODRINE HYDROCHLORIDE 5 MG: 5 TABLET ORAL at 17:54

## 2024-07-17 RX ADMIN — ASPIRIN 81 MG 81 MG: 81 TABLET ORAL at 09:39

## 2024-07-17 RX ADMIN — DICLOFENAC SODIUM 2 G: 10 GEL TOPICAL at 20:13

## 2024-07-17 RX ADMIN — HEPARIN SODIUM 5000 UNITS: 5000 INJECTION INTRAVENOUS; SUBCUTANEOUS at 15:03

## 2024-07-17 ASSESSMENT — PAIN SCALES - GENERAL
PAINLEVEL_OUTOF10: 10
PAINLEVEL_OUTOF10: 10
PAINLEVEL_OUTOF10: 5
PAINLEVEL_OUTOF10: 4
PAINLEVEL_OUTOF10: 10
PAINLEVEL_OUTOF10: 10
PAINLEVEL_OUTOF10: 5
PAINLEVEL_OUTOF10: 10

## 2024-07-17 ASSESSMENT — PAIN DESCRIPTION - LOCATION
LOCATION: SHOULDER
LOCATION: ARM
LOCATION: SHOULDER

## 2024-07-17 ASSESSMENT — PAIN DESCRIPTION - ORIENTATION
ORIENTATION: LEFT

## 2024-07-17 ASSESSMENT — PAIN DESCRIPTION - DESCRIPTORS
DESCRIPTORS: ACHING
DESCRIPTORS: ACHING

## 2024-07-17 NOTE — PLAN OF CARE
New consult for MAISHA , hypotension  Labs reviewed  Getting IVF bolus  Full consult to follow in am    Manasa Mays MD  [facility]

## 2024-07-17 NOTE — PROGRESS NOTES
07/17/24 1149   Encounter Summary   Encounter Overview/Reason Spiritual/Emotional Needs   Service Provided For Patient   Referral/Consult From Patient   Last Encounter    (7/17 Epic consult, sppt and prayer, cross)   Complexity of Encounter Low   Begin Time 1132   End Time  1149   Total Time Calculated 17 min

## 2024-07-17 NOTE — PLAN OF CARE
Problem: Discharge Planning  Goal: Discharge to home or other facility with appropriate resources  7/17/2024 1410 by Gabbie Castañeda RN  Outcome: Progressing  7/17/2024 0514 by Iggy Alexandra RN  Outcome: Progressing     Problem: Skin/Tissue Integrity  Goal: Absence of new skin breakdown  Description: 1.  Monitor for areas of redness and/or skin breakdown  2.  Assess vascular access sites hourly  3.  Every 4-6 hours minimum:  Change oxygen saturation probe site  4.  Every 4-6 hours:  If on nasal continuous positive airway pressure, respiratory therapy assess nares and determine need for appliance change or resting period.  7/17/2024 1410 by Gabbie Castañeda RN  Outcome: Progressing  7/17/2024 0514 by Iggy Alexandra RN  Outcome: Progressing     Problem: ABCDS Injury Assessment  Goal: Absence of physical injury  7/17/2024 1410 by Gabbie Castañeda RN  Outcome: Progressing  7/17/2024 0514 by Iggy Alexandra RN  Outcome: Progressing     Problem: Safety - Adult  Goal: Free from fall injury  7/17/2024 1410 by Gabbie Castañeda RN  Outcome: Progressing  7/17/2024 0514 by Iggy Alexandra RN  Outcome: Progressing     Problem: Neurosensory - Adult  Goal: Achieves stable or improved neurological status  7/17/2024 1410 by Gabbie Castañeda RN  Outcome: Progressing  7/17/2024 0514 by Iggy Alexandra RN  Outcome: Progressing  Goal: Achieves maximal functionality and self care  7/17/2024 1410 by Gabbie Castañeda RN  Outcome: Progressing  7/17/2024 0514 by Iggy Alexandra RN  Outcome: Progressing     Problem: Cardiovascular - Adult  Goal: Maintains optimal cardiac output and hemodynamic stability  7/17/2024 1410 by Gabbie Castañeda RN  Outcome: Progressing  7/17/2024 0514 by Iggy Alexandra RN  Outcome: Progressing  Goal: Absence of cardiac dysrhythmias or at baseline  7/17/2024 1410 by Gabbie Castañeda RN  Outcome: Progressing  7/17/2024 0514 by Iggy Alexandra RN  Outcome: Progressing

## 2024-07-17 NOTE — CONSULTS
[facility]           Reason for Consult: MAISHA on CKD 3  Requesting Physician:  Dr. Bermeo    Chief Complaint:    Chief Complaint   Patient presents with    Hypotension     Patient was at nephrology for routine checkup and had multiple low BP readings.   Denies any complaints     History of Present Illness on 7/17/2024:    74 y.o. yo male with PMH of hypertension, CHFpEF, atrial flutter, CKD 3A/B [with history of MAISHA needing renal replacement therapy in the setting of hemorrhagic shock in 2017], dementia, history of recurrent syncope, history of hypertension with fluctuating blood pressure who is admitted for hypotension  Patient was in my office on 7/16 when he was less responsive than usual.  Blood pressure initially was noted to be 51 and up to 70 when I use the palpatory method.  Manual muscle was called and patient was sent to the emergency room.  Initial blood pressure in the emergency room was noted to be 85/55 and patient received 500 mL of LR bolus along with IV albumin later in the evening.  He was started on midodrine 5 mg 3 times daily.  Blood pressure on 7/17 a.m. increased to 127/75.  Patient is back to his baseline mentation and is eating his breakfast during my rounds.  His creatinine is noted to be 2 on 7/17  In May 2024, patient had hypertension with volume overload at which time hydralazine and Imdur was added along with clonidine.    Past Medical History:        Diagnosis Date    Acute gastric ulcer with bleeding     Acute metabolic encephalopathy     MAISHA (acute kidney injury) (HCC)     Requiring dialysis since 7/4/17 in setting of hemorrhagic shock    ATN (acute tubular necrosis) (HCC)     Atrial fib/flutter, transient     Atrial fibrillation (HCC)     BPH (benign prostatic hyperplasia)     CAD (coronary artery disease)     Cerebral artery occlusion with cerebral infarction ([facility]) 08/05/2017    weakness and aphasia    CHF (congestive heart failure) ([facility]) 05/04/2024     9.2*   HCT 35.1* 28.1*   MCV 84.9 84.4    141     Recent Labs     07/16/24  1323 07/17/24  0607   * 132*   K 4.4 4.5   CL 99 97*   CO2 23 24   GLUCOSE 157* 122*   BUN 48* 52*   CREATININE 1.8* 2.0*   LABGLOM 38* 34*     UA with 100 glucose trace leukocyte esterase 6-9 WBCs more than 100 RBCs    Assessment  -MAISHA on CKD 3A/B [baseline creatinine in the low ones] in the setting of hypotension    -Hypotension likely hypovolemic in nature   Patient on multiple antihypertensives and diuretics including Inspra 25 mg daily and Lasix 40 mg twice daily, hydralazine 100 mg 3 times daily and Imdur 30 mg daily    -CHF with preserved EF    -Hypovolemic hyponatremia    -History of hypertension with significant fluctuations    -Dementia    -Anemia per primary team    Plan  -Continue to encourage p.o. intake  -Another LR bolus of 500 mL  -Midodrine as ordered, hold for systolic blood pressure more than 110  -All home antihypertensives, diuretics as above has been on hold  -Pancultures in process  -Serial renal panel  -daily wts and strict i/o  -renal dose medications   -avoid nephrotoxins      Thank you for the consultation.  Please do not hesitate to call with questions.    Brandi Ambrosio MD  Office: [phone]  Fax:    [phone]  [facility]

## 2024-07-17 NOTE — PLAN OF CARE
Problem: Discharge Planning  Goal: Discharge to home or other facility with appropriate resources  Outcome: Progressing     Problem: Skin/Tissue Integrity  Goal: Absence of new skin breakdown  Description: 1.  Monitor for areas of redness and/or skin breakdown  2.  Assess vascular access sites hourly  3.  Every 4-6 hours minimum:  Change oxygen saturation probe site  4.  Every 4-6 hours:  If on nasal continuous positive airway pressure, respiratory therapy assess nares and determine need for appliance change or resting period.  Outcome: Progressing     Problem: ABCDS Injury Assessment  Goal: Absence of physical injury  Outcome: Progressing     Problem: Safety - Adult  Goal: Free from fall injury  Outcome: Progressing     Problem: Neurosensory - Adult  Goal: Achieves stable or improved neurological status  Outcome: Progressing  Goal: Achieves maximal functionality and self care  Outcome: Progressing     Problem: Cardiovascular - Adult  Goal: Maintains optimal cardiac output and hemodynamic stability  Outcome: Progressing  Goal: Absence of cardiac dysrhythmias or at baseline  Outcome: Progressing     Problem: Hematologic - Adult  Goal: Maintains hematologic stability  Outcome: Progressing

## 2024-07-17 NOTE — PROGRESS NOTES
Hospital Medicine Progress Note      Date of Admission: 7/16/2024  Hospital Day: 2    Chief Admission Complaint:  hypotension     Subjective:  confused    Presenting Admission History:       74 y.o. male who presented to [facility] with hypotension.  PMHx significant for CHF, CAD, CKD, HTN,  Afib, HLD, dementia. Patient is a poor historian. Patient presented to his nephrologist today for routine follow up. He was found to have significant hypotension but seemingly no symptoms. In the past, he had had his BP meds reduced due to orthostatic changes but he has never had hypotension like this before. There is no clear evidence of any other acute changes. He appears to be at mental baseline per family.     Assessment/Plan:      Current Principal Problem:  Hypotension    Hypotension  - nephrology consulted  - likely iatrogenic  - started on midodrine   - Holding imdur, inspra, lasix, hydralazine    MAISHA on CKD III  - nephrology consulted  - holding lasix, inspra  - additional IVF bolus ordered today  - continue to monitor     Chronic diastolic heart failure   - appears euvolemic  - echo 5/24 with EF 55-60%  - holding lasix, inspra     CAD  - no active chest pain  - continue ASA, statin. Holding imdur     Afib  - rate controlled  - holding      HLD  - continue statin     Dementia  - at mental baseline  - continue exelon, depakote, seroquel    Physical Exam Performed:      General appearance:  No apparent distress  Respiratory:  Normal respiratory effort.   Cardiovascular:  Regular rate and rhythm.  Abdomen:  Soft, non-tender, non-distended.  Musculoskeletal:  No edema  Neurologic:  Non-focal  Psychiatric:  Alert and oriented    /65   Pulse 89   Temp 98.3 °F (36.8 °C) (Oral)   Resp 18   Ht 1.93 m (6' 4\")   Wt 102.5 kg (226 lb)   SpO2 95%   BMI 27.51 kg/m²     Diet: ADULT DIET; Regular  DVT Prophylaxis: []PPx LMWH  [x]SQ Heparin  []IPC/SCDs  []Eliquis  []Xarelto  []Coumadin  []Other -      Code status: Full

## 2024-07-18 LAB
ALBUMIN SERPL-MCNC: 2.8 G/DL (ref 3.4–5)
ANION GAP SERPL CALCULATED.3IONS-SCNC: 11 MMOL/L (ref 3–16)
BUN SERPL-MCNC: 58 MG/DL (ref 7–20)
CALCIUM SERPL-MCNC: 8.7 MG/DL (ref 8.3–10.6)
CHLORIDE SERPL-SCNC: 97 MMOL/L (ref 99–110)
CO2 SERPL-SCNC: 24 MMOL/L (ref 21–32)
CREAT SERPL-MCNC: 2.3 MG/DL (ref 0.8–1.3)
GFR SERPLBLD CREATININE-BSD FMLA CKD-EPI: 29 ML/MIN/{1.73_M2}
GLUCOSE SERPL-MCNC: 113 MG/DL (ref 70–99)
PHOSPHATE SERPL-MCNC: 4.8 MG/DL (ref 2.5–4.9)
POTASSIUM SERPL-SCNC: 4.5 MMOL/L (ref 3.5–5.1)
REPORT: NORMAL
SODIUM SERPL-SCNC: 132 MMOL/L (ref 136–145)

## 2024-07-18 PROCEDURE — 1200000000 HC SEMI PRIVATE

## 2024-07-18 PROCEDURE — 80069 RENAL FUNCTION PANEL: CPT

## 2024-07-18 PROCEDURE — 6370000000 HC RX 637 (ALT 250 FOR IP): Performed by: INTERNAL MEDICINE

## 2024-07-18 PROCEDURE — 6360000002 HC RX W HCPCS: Performed by: INTERNAL MEDICINE

## 2024-07-18 PROCEDURE — 36415 COLL VENOUS BLD VENIPUNCTURE: CPT

## 2024-07-18 PROCEDURE — 2580000003 HC RX 258: Performed by: INTERNAL MEDICINE

## 2024-07-18 PROCEDURE — 99222 1ST HOSP IP/OBS MODERATE 55: CPT | Performed by: INTERNAL MEDICINE

## 2024-07-18 RX ORDER — HYDRALAZINE HYDROCHLORIDE 25 MG/1
12.5 TABLET, FILM COATED ORAL EVERY 12 HOURS SCHEDULED
Status: DISCONTINUED | OUTPATIENT
Start: 2024-07-18 | End: 2024-07-19 | Stop reason: HOSPADM

## 2024-07-18 RX ORDER — HYDRALAZINE HYDROCHLORIDE 25 MG/1
25 TABLET, FILM COATED ORAL EVERY 8 HOURS SCHEDULED
Status: DISCONTINUED | OUTPATIENT
Start: 2024-07-18 | End: 2024-07-18

## 2024-07-18 RX ORDER — SODIUM CHLORIDE 9 MG/ML
INJECTION, SOLUTION INTRAVENOUS CONTINUOUS
Status: DISCONTINUED | OUTPATIENT
Start: 2024-07-18 | End: 2024-07-19 | Stop reason: HOSPADM

## 2024-07-18 RX ADMIN — HEPARIN SODIUM 5000 UNITS: 5000 INJECTION INTRAVENOUS; SUBCUTANEOUS at 15:00

## 2024-07-18 RX ADMIN — HEPARIN SODIUM 5000 UNITS: 5000 INJECTION INTRAVENOUS; SUBCUTANEOUS at 05:42

## 2024-07-18 RX ADMIN — SODIUM CHLORIDE 980 ML: 9 INJECTION, SOLUTION INTRAVENOUS at 12:00

## 2024-07-18 RX ADMIN — ESCITALOPRAM OXALATE 10 MG: 10 TABLET ORAL at 09:27

## 2024-07-18 RX ADMIN — ACETAMINOPHEN 650 MG: 325 TABLET ORAL at 05:41

## 2024-07-18 RX ADMIN — DICLOFENAC SODIUM 2 G: 10 GEL TOPICAL at 20:30

## 2024-07-18 RX ADMIN — PANTOPRAZOLE SODIUM 20 MG: 20 TABLET, DELAYED RELEASE ORAL at 09:27

## 2024-07-18 RX ADMIN — ACETAMINOPHEN 650 MG: 325 TABLET ORAL at 11:53

## 2024-07-18 RX ADMIN — DICLOFENAC SODIUM 2 G: 10 GEL TOPICAL at 09:27

## 2024-07-18 RX ADMIN — ASPIRIN 81 MG 81 MG: 81 TABLET ORAL at 09:27

## 2024-07-18 RX ADMIN — HEPARIN SODIUM 5000 UNITS: 5000 INJECTION INTRAVENOUS; SUBCUTANEOUS at 20:31

## 2024-07-18 RX ADMIN — DIVALPROEX SODIUM 375 MG: 125 CAPSULE ORAL at 20:30

## 2024-07-18 RX ADMIN — HYDRALAZINE HYDROCHLORIDE 12.5 MG: 25 TABLET ORAL at 20:30

## 2024-07-18 RX ADMIN — ATORVASTATIN CALCIUM 20 MG: 10 TABLET, FILM COATED ORAL at 09:26

## 2024-07-18 ASSESSMENT — PAIN SCALES - GENERAL
PAINLEVEL_OUTOF10: 3

## 2024-07-18 ASSESSMENT — PAIN DESCRIPTION - ORIENTATION: ORIENTATION: LEFT

## 2024-07-18 ASSESSMENT — PAIN DESCRIPTION - LOCATION: LOCATION: SHOULDER

## 2024-07-18 NOTE — CARE COORDINATION
Case Management Assessment  Initial Evaluation    Date/Time of Evaluation: 7/18/2024 2:27 PM  Assessment Completed by: Terri Panda RN    If patient is discharged prior to next notation, then this note serves as note for discharge by case management.    Patient Name: Alma Delia Garner                   YOB: 1950  Diagnosis: Hypotension [I95.9]  MAISHA (acute kidney injury) (HCC) [N17.9]  Hypotension, unspecified hypotension type [I95.9]  Congestive heart failure, unspecified HF chronicity, unspecified heart failure type (HCC) [I50.9]                   Date / Time: 7/16/2024 12:54 PM    Patient Admission Status: Inpatient   Readmission Risk (Low < 19, Mod (19-27), High > 27): Readmission Risk Score: 23.3    Current PCP: Yvette Leonard MD  PCP verified by CM?      Chart Reviewed: Yes      History Provided by: Medical Record  Patient Orientation: Alert and Oriented, Person    Patient Cognition: Dementia / Early Alzheimer's    Hospitalization in the last 30 days (Readmission):  No    If yes, Readmission Assessment in  Navigator will be completed.    Advance Directives:      Code Status: Full Code   Patient's Primary Decision Maker is: Legal Next of Kin    Primary Decision Maker: Joselin Garner - [place] - [phone]    Discharge Planning:    Patient lives with: Other (Comment) (LTC) Type of Home: Long-Term Care  Primary Care Giver: Other (Comment) (staff at [facility])  Patient Support Systems include: Children   Current Financial resources:    Current community resources:    Current services prior to admission: Long Term Acute Care, Transportation            Current DME:              Type of Home Care services:  None    ADLS  Prior functional level:    Current functional level:      PT AM-PAC:   /24  OT AM-PAC:   /24    Family can provide assistance at DC:    Would you like Case Management to discuss the discharge plan with any other family members/significant others, and if so, who?   Hypotension [I95.9]  MAISHA (acute kidney injury) (HCC) [N17.9]  Hypotension, unspecified hypotension type [I95.9]  Congestive heart failure, unspecified HF chronicity, unspecified heart failure type (HCC) [I50.9]    IF APPLICABLE: The Patient and/or patient representative Alma Delia and his family were provided with a choice of provider and agrees with the discharge plan. Freedom of choice list with basic dialogue that supports the patient's individualized plan of care/goals and shares the quality data associated with the providers was provided to:     Patient Representative Name:       The Patient and/or Patient Representative Agree with the Discharge Plan?      Terri Panda RN  Case Management Department

## 2024-07-18 NOTE — PROGRESS NOTES
[place]  Nephrology Follow up Note           Reason for Consult: MAISHA on CKD 3  Requesting Physician:  Dr. Bermeo    Sub/interval history  Pt asked if he can go back to his NH today  BP up   Cr 2.3    Last 24 h uop 925 ml    ROS: UTO  PSFH: No visitor    Scheduled Meds:   hydrALAZINE  25 mg Oral 3 times per day    diclofenac sodium  2 g Topical BID    aspirin  81 mg Oral Daily    atorvastatin  20 mg Oral Daily    divalproex  375 mg Oral Nightly    escitalopram  10 mg Oral Daily    pantoprazole  20 mg Oral Daily    rivastigmine  1 patch TransDERmal Daily    sodium chloride flush  5-40 mL IntraVENous 2 times per day    heparin (porcine)  5,000 Units SubCUTAneous 3 times per day     Continuous Infusions:   sodium chloride 980 mL (07/18/24 1200)    sodium chloride       PRN Meds:.sodium chloride flush, sodium chloride, ondansetron **OR** ondansetron, polyethylene glycol, acetaminophen **OR** acetaminophen    History of Present Illness on 7/17/2024:    74 y.o. yo male with PMH of hypertension, CHFpEF, atrial flutter, CKD 3A/B [with history of MAISHA needing renal replacement therapy in the setting of hemorrhagic shock in 2017], dementia, history of recurrent syncope, history of hypertension with fluctuating blood pressure who is admitted for hypotension  Patient was in my office on 7/16 when he was less responsive than usual.  Blood pressure initially was noted to be 51 and up to 70 when I use the palpatory method.  Manual muscle was called and patient was sent to the emergency room.  Initial blood pressure in the emergency room was noted to be 85/55 and patient received 500 mL of LR bolus along with IV albumin later in the evening.  He was started on midodrine 5 mg 3 times daily.  Blood pressure on 7/17 a.m. increased to 127/75.  Patient is back to his baseline mentation and is eating his breakfast during my rounds.  His creatinine is noted to be 2 on 7/17  In May 2024, patient had hypertension with volume overload

## 2024-07-18 NOTE — CONSULTS
Infectious Diseases   Consult Note      Reason for Consult:  hypotension and positive BC   Requesting Physician:  Dr. Bermeo       Date of Admission: 7/16/2024    Subjective:   CHIEF COMPLAINT:  none given       HPI:    Alma Delia Garner is a 74yoM with history of CHF, CAD, CKD, HTN, DM, afin, HLD, dementia   He is in LTC    He was sent to ED 7/16/24 for evaluation of hypotension.    He had no focal complaints and was afebrile.   Initial evaluation notable for -  Normal lactic, slightly elevated troponin, WBC was 7.4  He was admitted for evaluation.    One of 2 sets of BC collected on admission is positive for CoNS.  ID is consulted to address potential significance of this in the context of hypotension.      He voices no specific complaints today.  BP remains soft  No diarrhea  Voiding without difficulty   He denies chest pain, SOB  No significant wounds noted.       Current abx:  None        Past Surgical History:       Diagnosis Date    Acute gastric ulcer with bleeding     Acute metabolic encephalopathy     MAISHA (acute kidney injury) ([facility])     Requiring dialysis since 7/4/17 in setting of hemorrhagic shock    ATN (acute tubular necrosis) ([facility])     Atrial fib/flutter, transient     Atrial fibrillation ([facility])     BPH (benign prostatic hyperplasia)     CAD (coronary artery disease)     Cerebral artery occlusion with cerebral infarction ([facility]) 08/05/2017    weakness and aphasia    CHF (congestive heart failure) ([facility]) 05/04/2024    CKD (chronic kidney disease)     Dr. Ambrosio    Dementia ([facility])     Diabetes mellitus ([facility])     GI bleed     Glaucoma     History of blood transfusion     Hx of diabetic neuropathy     Hyperlipidemia     Hypertension     Mitral insufficiency     Mobility impaired     uses walker and W/C    Multiple drug resistant organism (MDRO) culture positive 12/29/2020    urine    Pulmonary hypertension (HCC)     Traumatic hemorrhagic shock ([facility])          Procedure Laterality Date    ABDOMEN SURGERY      CARDIAC     141   MCV 84.9 84.4   MCH 28.2 27.7   MCHC 33.2 32.8   RDW 17.4* 17.4*      BMP:  Recent Labs     07/16/24  1323 07/17/24  0607 07/18/24  0536   * 132* 132*   K 4.4 4.5 4.5   CL 99 97* 97*   CO2 23 24 24   BUN 48* 52* 58*   CREATININE 1.8* 2.0* 2.3*   CALCIUM 9.2 8.6 8.7   GLUCOSE 157* 122* 113*      Sed Rate, Automated   Date Value Ref Range Status   12/21/2018 13 0 - 20 mm/Hr Final     No results found for: \"CRP\"      Cultures:   7/16 BC #1 NGTD   BC #2 CoNS by DNA detection    COVID NAAT neg    UA neg       Radiology Review:  All pertinent images / reports were reviewed as a part of this visit.   CXR NACPD     Assessment:     Patient Active Problem List   Diagnosis    Hx BPH w/urinary retention s/p prostatectomy    Cigar smoker    Obesity (BMI 30-39.9)    Diabetic ketoacidosis without coma associated with type 1 diabetes mellitus (HCC)    Lactic acidosis    Hyperkalemia    MAISHA (acute kidney injury) (HCC)    SIRS (systemic inflammatory response syndrome) (HCC)    Volume depletion    Hypotension    Orthostatic dizziness    Hemorrhagic shock    Upper GI bleed    Chronic a-fib (HCC)    Acute blood loss anemia    Metabolic acidosis with respiratory acidosis    Hyperglycemia    Elevated lactic acid level    NSVT (nonsustained ventricular tachycardia) (HCC)    DM type 2, controlled, with complication (HCC)    Hyponatremia    Anemia    Metabolic encephalopathy    Acute cystitis without hematuria    Altered mental status    Stage 3 chronic kidney disease (HCC)    Encephalopathy, metabolic    Hypertension, essential    CKD (chronic kidney disease)    Dyslipidemia    Acute metabolic encephalopathy    Impulse control disorder in adult    Dementia due to Alzheimer's disease (HCC)    Small bowel obstruction (HCC)    Bilateral pleural effusion    Atelectasis of right lung    Former smoker    Syncope and collapse    Fluid overload    Mild malnutrition (HCC)    Acute encephalopathy    Remote history of stroke

## 2024-07-18 NOTE — CARE COORDINATION
Spoke with daughter on the phone and she confirmed all information in the initial assessment and code status. See ACP note. Terri Panda RN

## 2024-07-18 NOTE — PROGRESS NOTES
Hospital Medicine Progress Note      Date of Admission: 7/16/2024  Hospital Day: 3    Chief Admission Complaint:  hypotension     Subjective:  confused    Presenting Admission History:       74 y.o. male [facility].  PMHx significant for CHF, CAD, CKD, HTN,  Afib, HLD, dementia. Patient is a poor historian. Patient presented to his nephrologist today for routine follow up. He was found to have significant hypotension but seemingly no symptoms. In the past, he had had his BP meds reduced due to orthostatic changes but he has never had hypotension like this before. There is no clear evidence of any other acute changes. He appears to be at mental baseline per family.     Assessment/Plan:      Current Principal Problem:  Hypotension    Hypotension  - nephrology consulted  - likely iatrogenic  - stopped midodrine  - restarted hydralazine. Holding imdur, inspra, lasix    MAISHA on CKD III  - nephrology consulted  - holding lasix, inspra  - continue IVF  - continue to monitor    Bacteremia  - ID consulted  - blood cultures with CoNS in 1/4 bottles  - unclear clinical signficance with associated hypotension  - not currently on antibiotics  - further recs per ID     Chronic diastolic heart failure   - appears euvolemic  - echo 5/24 with EF 55-60%  - holding lasix, inspra     CAD  - no active chest pain  - continue ASA, statin. Holding imdur     Afib  - rate controlled  - no AC. S/P watchman    HLD  - continue statin     Dementia  - at mental baseline  - continue exelon, depakote, seroquel    Physical Exam Performed:      General appearance:  No apparent distress  Respiratory:  Normal respiratory effort.   Cardiovascular:  Regular rate and rhythm.  Abdomen:  Soft, non-tender, non-distended.  Musculoskeletal:  No edema  Neurologic:  Non-focal  Psychiatric:  Alert and oriented    BP (!) 162/92   Pulse 75   Temp 98.2 °F (36.8 °C) (Oral)   Resp 18   Ht 1.93 m (6' 4\")   Wt 102.9 kg (226 lb  07/16/24  1323   AST 21   ALT 10   BILITOT 0.7   ALKPHOS 73     No results for input(s): \"INR\", \"LACTA\", \"TSH\" in the last 72 hours.    Urine Cultures:   Lab Results   Component Value Date/Time    LABURIN >100,000 CFU/ml 09/01/2023 12:53 AM     Blood Cultures:   Lab Results   Component Value Date/Time    BC  07/16/2024 01:18 PM     No Growth to date.  Any change in status will be called.     Lab Results   Component Value Date/Time    BLOODCULT2  07/16/2024 01:18 PM     Gram stain Aerobic bottle:  Gram positive cocci in clusters  resembling Staphylococcus  Information to follow      BLOODCULT2 See additional report for complete BCID panel. 07/16/2024 01:18 PM     Organism:   Lab Results   Component Value Date/Time    ORG Staphylococcus coagulase negative DNA Detected 07/16/2024 01:18 PM         Sukhjinder Bermeo MD

## 2024-07-18 NOTE — PLAN OF CARE
Problem: Safety - Adult  Goal: Free from fall injury  7/18/2024 0150 by Lulu Waite, RN  Outcome: Progressing   Bed alarm on & in place. 2/4 side rails up. Pt wearing non skid footwear. Bed locked & in lowest position. Call light within reach.

## 2024-07-18 NOTE — ACP (ADVANCE CARE PLANNING)
Advance Care Planning     General Advance Care Planning (ACP) Conversation    Date of Conversation: 7/16/2024  Conducted with: daughter POA and guardian  Other persons present: Zhang Olivera    Healthcare Decision Maker:    Primary Decision Maker: Joselin Garner - Child - [phone]  Click here to complete Healthcare Decision Makers including selection of the Healthcare Decision Maker Relationship (ie \"Primary\").  Today we documented Decision Maker(s) consistent with ACP documents on file.    Content/Action Overview:  Has ACP document(s) on file - reflects the patient's care preferences  Reviewed DNR/DNI and patient elects Full Code (Attempt Resuscitation)      Length of Voluntary ACP Conversation in minutes:  <16 minutes (Non-Billable)    Terri Panda RN

## 2024-07-18 NOTE — CONSULTS
Consult Placed     Who: JESSICA VÁZQUEZ   Date:7/18/24  Time:1059     Electronically signed by FORTUNATO STEWART on 7/18/2024 at 10:59 AM

## 2024-07-18 NOTE — PLAN OF CARE
Problem: Discharge Planning  Goal: Discharge to home or other facility with appropriate resources  Outcome: Progressing     Problem: Skin/Tissue Integrity  Goal: Absence of new skin breakdown  Description: 1.  Monitor for areas of redness and/or skin breakdown  Outcome: Progressing     Problem: ABCDS Injury Assessment  Goal: Absence of physical injury  Outcome: Progressing     Problem: Safety - Adult  Goal: Free from fall injury  7/18/2024 1540 by Jose Guadalupe Hayes, RN  Outcome: Progressing  7/18/2024 0150 by Lulu Waite, RN  Outcome: Progressing

## 2024-07-19 VITALS
OXYGEN SATURATION: 94 % | HEIGHT: 76 IN | SYSTOLIC BLOOD PRESSURE: 146 MMHG | WEIGHT: 226.85 LBS | DIASTOLIC BLOOD PRESSURE: 91 MMHG | BODY MASS INDEX: 27.62 KG/M2 | HEART RATE: 86 BPM | TEMPERATURE: 99.2 F | RESPIRATION RATE: 18 BRPM

## 2024-07-19 LAB
ALBUMIN SERPL-MCNC: 2.6 G/DL (ref 3.4–5)
ANION GAP SERPL CALCULATED.3IONS-SCNC: 11 MMOL/L (ref 3–16)
BUN SERPL-MCNC: 56 MG/DL (ref 7–20)
CALCIUM SERPL-MCNC: 8.2 MG/DL (ref 8.3–10.6)
CHLORIDE SERPL-SCNC: 99 MMOL/L (ref 99–110)
CO2 SERPL-SCNC: 24 MMOL/L (ref 21–32)
CREAT SERPL-MCNC: 2.1 MG/DL (ref 0.8–1.3)
GFR SERPLBLD CREATININE-BSD FMLA CKD-EPI: 32 ML/MIN/{1.73_M2}
GLUCOSE SERPL-MCNC: 95 MG/DL (ref 70–99)
PHOSPHATE SERPL-MCNC: 4 MG/DL (ref 2.5–4.9)
POTASSIUM SERPL-SCNC: 4.3 MMOL/L (ref 3.5–5.1)
SODIUM SERPL-SCNC: 134 MMOL/L (ref 136–145)

## 2024-07-19 PROCEDURE — 6370000000 HC RX 637 (ALT 250 FOR IP): Performed by: INTERNAL MEDICINE

## 2024-07-19 PROCEDURE — 80069 RENAL FUNCTION PANEL: CPT

## 2024-07-19 PROCEDURE — 2580000003 HC RX 258: Performed by: INTERNAL MEDICINE

## 2024-07-19 PROCEDURE — 6360000002 HC RX W HCPCS: Performed by: INTERNAL MEDICINE

## 2024-07-19 RX ORDER — HYDRALAZINE HYDROCHLORIDE 25 MG/1
12.5 TABLET, FILM COATED ORAL EVERY 12 HOURS SCHEDULED
Qty: 30 TABLET | Refills: 0
Start: 2024-07-19

## 2024-07-19 RX ADMIN — HEPARIN SODIUM 5000 UNITS: 5000 INJECTION INTRAVENOUS; SUBCUTANEOUS at 05:34

## 2024-07-19 RX ADMIN — ESCITALOPRAM OXALATE 10 MG: 10 TABLET ORAL at 09:57

## 2024-07-19 RX ADMIN — ASPIRIN 81 MG 81 MG: 81 TABLET ORAL at 09:57

## 2024-07-19 RX ADMIN — PANTOPRAZOLE SODIUM 20 MG: 20 TABLET, DELAYED RELEASE ORAL at 09:57

## 2024-07-19 RX ADMIN — SODIUM CHLORIDE: 9 INJECTION, SOLUTION INTRAVENOUS at 01:19

## 2024-07-19 RX ADMIN — HYDRALAZINE HYDROCHLORIDE 12.5 MG: 25 TABLET ORAL at 09:56

## 2024-07-19 RX ADMIN — ATORVASTATIN CALCIUM 20 MG: 10 TABLET, FILM COATED ORAL at 09:57

## 2024-07-19 RX ADMIN — DICLOFENAC SODIUM 2 G: 10 GEL TOPICAL at 09:56

## 2024-07-19 RX ADMIN — HEPARIN SODIUM 5000 UNITS: 5000 INJECTION INTRAVENOUS; SUBCUTANEOUS at 14:02

## 2024-07-19 ASSESSMENT — PAIN SCALES - GENERAL: PAINLEVEL_OUTOF10: 3

## 2024-07-19 NOTE — CONSULTS
[facility] Nurse  Consult Note       NAME:  Alma Delia Garner  MEDICAL RECORD NUMBER:  [id number]  AGE: 74 y.o.   GENDER: male  : 1950  TODAY'S DATE:  2024    Subjective; I'm alight.   Did I have a bowel?   Reason for WOCN Evaluation and Assessment:     pressure injury/shearing in Left outer side of thigh         Alma Delia Graner is a 74 y.o. male referred by:   [] Physician  [x] Nursing  [] Other:     Wound Identification:  Wound Type: pressure and skin tear  Contributing Factors: diabetes, poor glucose control, chronic pressure, decreased mobility, shear force, and obesity    Wound History: 74 y.o. male who presented to [facility] with hypotension.  PMHx significant for CHF, CAD, CKD, HTN, DMII, Afib, HLD, dementia.     Current Wound Care Treatment:  foam    Patient Goal of Care:  [x] Wound Healing  [] Odor Control  [] Palliative Care  [x] Pain Control   [] Other:         PAST MEDICAL HISTORY        Diagnosis Date    Acute gastric ulcer with bleeding     Acute metabolic encephalopathy     MAISHA (acute kidney injury) ([facility])     Requiring dialysis since 17 in setting of hemorrhagic shock    ATN (acute tubular necrosis) ([facility])     Atrial fib/flutter, transient     Atrial fibrillation ([facility])     BPH (benign prostatic hyperplasia)     CAD (coronary artery disease)     Cerebral artery occlusion with cerebral infarction ([facility]) 2017    weakness and aphasia    CHF (congestive heart failure) ([facility]) 2024    CKD (chronic kidney disease)     Dr. Ambrosio    Dementia ([facility])     Diabetes mellitus ([facility])     GI bleed     Glaucoma     History of blood transfusion     Hx of diabetic neuropathy     Hyperlipidemia     Hypertension     Mitral insufficiency     Mobility impaired     uses walker and W/C    Multiple drug resistant organism (MDRO) culture positive 2020    urine    Pulmonary hypertension (HCC)     Traumatic hemorrhagic shock ([facility])        PAST SURGICAL HISTORY    Past Surgical

## 2024-07-19 NOTE — PLAN OF CARE
Problem: Discharge Planning  Goal: Discharge to home or other facility with appropriate resources  7/18/2024 2213 by Lidia Proctor, RN  Outcome: Progressing     Problem: Skin/Tissue Integrity  Goal: Absence of new skin breakdown  Description: 1.  Monitor for areas of redness and/or skin breakdown  2.  Assess vascular access sites hourly  3.  Every 4-6 hours minimum:  Change oxygen saturation probe site  4.  Every 4-6 hours:  If on nasal continuous positive airway pressure, respiratory therapy assess nares and determine need for appliance change or resting period.  7/18/2024 2213 by Lidia Proctor, RN  Outcome: Progressing     Problem: ABCDS Injury Assessment  Goal: Absence of physical injury  7/18/2024 2213 by Lidia Proctor RN  Outcome: Progressing     Problem: Safety - Adult  Goal: Free from fall injury  7/18/2024 2213 by Lidia Proctor RN  Outcome: Progressing     Problem: Neurosensory - Adult  Goal: Achieves stable or improved neurological status  Outcome: Progressing     Problem: Cardiovascular - Adult  Goal: Absence of cardiac dysrhythmias or at baseline  Outcome: Progressing     Problem: Pain  Goal: Verbalizes/displays adequate comfort level or baseline comfort level  Outcome: Progressing

## 2024-07-19 NOTE — PROGRESS NOTES
[facility]  Nephrology Follow up Note           Reason for Consult: MAISHA on CKD 3  Requesting Physician:  Dr. Bermeo    Sub/interval history  No new complaints  BP better  Cr 2.3--> 2.1    Last 24 h uop 700 ml, 1l this shift    ROS: UTO  PSFH: No visitor    Scheduled Meds:   hydrALAZINE  12.5 mg Oral 2 times per day    diclofenac sodium  2 g Topical BID    aspirin  81 mg Oral Daily    atorvastatin  20 mg Oral Daily    divalproex  375 mg Oral Nightly    escitalopram  10 mg Oral Daily    pantoprazole  20 mg Oral Daily    rivastigmine  1 patch TransDERmal Daily    sodium chloride flush  5-40 mL IntraVENous 2 times per day    heparin (porcine)  5,000 Units SubCUTAneous 3 times per day     Continuous Infusions:   sodium chloride 75 mL/hr at 07/19/24 0119    sodium chloride       PRN Meds:.sodium chloride flush, sodium chloride, ondansetron **OR** ondansetron, polyethylene glycol, acetaminophen **OR** acetaminophen    History of Present Illness on 7/17/2024:    74 y.o. yo male with PMH of hypertension, CHFpEF, atrial flutter, CKD 3A/B [with history of MAISHA needing renal replacement therapy in the setting of hemorrhagic shock in 2017], dementia, history of recurrent syncope, history of hypertension with fluctuating blood pressure who is admitted for hypotension  Patient was in my office on 7/16 when he was less responsive than usual.  Blood pressure initially was noted to be 51 and up to 70 when I use the palpatory method.  Manual muscle was called and patient was sent to the emergency room.  Initial blood pressure in the emergency room was noted to be 85/55 and patient received 500 mL of LR bolus along with IV albumin later in the evening.  He was started on midodrine 5 mg 3 times daily.  Blood pressure on 7/17 a.m. increased to 127/75.  Patient is back to his baseline mentation and is eating his breakfast during my rounds.  His creatinine is noted to be 2 on 7/17  In May 2024, patient had hypertension with volume

## 2024-07-19 NOTE — PLAN OF CARE
Problem: Discharge Planning  Goal: Discharge to home or other facility with appropriate resources  Outcome: Progressing     Problem: Skin/Tissue Integrity  Goal: Absence of new skin breakdown  Description: 1.  Monitor for areas of redness and/or skin breakdown  Outcome: Progressing     Problem: ABCDS Injury Assessment  Goal: Absence of physical injury  Outcome: Progressing     Problem: Safety - Adult  Goal: Free from fall injury  Outcome: Progressing     Problem: Neurosensory - Adult  Goal: Achieves stable or improved neurological status  Outcome: Progressing

## 2024-07-19 NOTE — CARE COORDINATION
Chart reviewed day 3. Care provided by nephro, ID and IM. Pt is from [facility]. He is wheelchair bound at baseline. BUN and creat are still elevated but improving at 56 and 2.1. Will continue to follow course for needs. Terri Panda RN

## 2024-07-19 NOTE — CARE COORDINATION
CASE MANAGEMENT DISCHARGE SUMMARY      Discharge to: [facility]    IMM given: 7/19/2024     Transportation:       Medical Transport explained to pt/family. Pt/family voice no agency preference.    Agency used:[facility]   time:1730   Ambulance form completed: Yes    Confirmed discharge plan with:     Patient: yes     Family:  yes, message left for [facility]     Facility/Agency, name:  [facility] faxed   Phone number for report to facility: [phone]     RN, name: Jose Guadalupe    Note: Discharging nurse to complete [facility], reconcile [facility], and place final copy with patient's discharge packet. RN to ensure that written prescriptions for  Level II medications are sent with patient to the facility as per protocol.

## 2024-07-19 NOTE — DISCHARGE SUMMARY
Take 1 packet by mouth daily as needed for Constipation  Qty: 527 g, Refills: 0      aspirin 81 MG chewable tablet Take 1 tablet by mouth daily  Qty: 30 tablet, Refills: 3      Multiple Vitamin (VITAMIN B COMPLEX W/C) TABS Take 1 tablet by mouth daily  Qty: 30 tablet, Refills: 0      potassium chloride (KLOR-CON M) 20 MEQ extended release tablet Take 1 tablet by mouth 2 times daily  Qty: 60 tablet, Refills: 0      ONE TOUCH LANCETS MISC Check glucose daily           Current Discharge Medication List        STOP taking these medications       isosorbide mononitrate (IMDUR) 30 MG extended release tablet Comments:   Reason for Stopping:         eplerenone (INSPRA) 25 MG tablet Comments:   Reason for Stopping:         miconazole (MICOTIN) 2 % powder Comments:   Reason for Stopping:         furosemide (LASIX) 40 MG tablet Comments:   Reason for Stopping:         lidocaine 4 % external patch Comments:   Reason for Stopping:         QUEtiapine (SEROQUEL) 50 MG tablet Comments:   Reason for Stopping:               Significant Test Results    XR CHEST PORTABLE    Result Date: 7/16/2024  EXAMINATION: ONE XRAY VIEW OF THE CHEST 7/16/2024 12:18 pm COMPARISON: None. HISTORY: ORDERING SYSTEM PROVIDED HISTORY: hypotension TECHNOLOGIST PROVIDED HISTORY: Reason for exam:->hypotension Reason for Exam: hypotension FINDINGS: The lungs appear clear.  The heart and mediastinal structures appear normal. There are no acute infiltrates, CHF or pneumothorax.  Bony structures are unremarkable.     No acute cardiopulmonary process.       Consults:     IP CONSULT TO NEPHROLOGY  IP CONSULT TO SPIRITUAL SERVICES  IP CONSULT TO SOCIAL WORK  IP CONSULT TO INFECTIOUS DISEASES    Labs:     Recent Labs     07/17/24  0607   WBC 5.3   HGB 9.2*   HCT 28.1*        Recent Labs     07/17/24  0607 07/18/24  0536 07/19/24  0639   * 132* 134*   K 4.5 4.5 4.3   CL 97* 97* 99   CO2 24 24 24   BUN 52* 58* 56*   CREATININE 2.0* 2.3* 2.1*   CALCIUM 8.6  8.7 8.2*   PHOS  --  4.8 4.0     No results for input(s): \"PROBNP\", \"TROPHS\" in the last 72 hours.  No results for input(s): \"LABA1C\" in the last 72 hours.  No results for input(s): \"AST\", \"ALT\", \"BILIDIR\", \"BILITOT\", \"ALKPHOS\" in the last 72 hours.  No results for input(s): \"INR\", \"LACTA\", \"TSH\" in the last 72 hours.    Urine Cultures:   Lab Results   Component Value Date/Time    LABURIN >100,000 CFU/ml 09/01/2023 12:53 AM     Blood Cultures:   Lab Results   Component Value Date/Time    BC  07/16/2024 01:18 PM     No Growth to date.  Any change in status will be called.     Lab Results   Component Value Date/Time    BLOODCULT2  07/16/2024 01:18 PM     Gram stain Aerobic bottle:  Gram positive cocci in clusters  resembling Staphylococcus  Information to follow      BLOODCULT2 See additional report for complete BCID panel. 07/16/2024 01:18 PM    BLOODCULT2  07/16/2024 01:18 PM     POSITIVE for  This organism was isolated in one set.  Susceptibility testing is not routinely done as this  organism frequently represents skin contamination.  Additional testing can be ordered by calling the  Microbiology Department.       Organism:   Lab Results   Component Value Date/Time    ORG Staphylococcus coagulase negative DNA Detected 07/16/2024 01:18 PM    ORG Staphylococcus coagulase-negative 07/16/2024 01:18 PM       Signed:    Sukhjinder Bermeo MD

## 2024-07-19 NOTE — DISCHARGE INSTR - COC
Continuity of Care Form    Patient Name: Alma Delia Garner   :  1950  MRN:  [id number]    Admit date:  2024  Discharge date:  24    Code Status Order: Full Code   Advance Directives:     Admitting Physician:  Sukhjinder Bermeo MD  PCP: Yvette Leonard MD    Discharging Nurse: Jose Guadalupe Hayes RN  Discharging Hospital Unit/Room#: 0353/0353-01  Discharging Unit Phone Number: [phone]    Emergency Contact:   Extended Emergency Contact Information  Primary Emergency Contact: Joselin Garner  Home Phone: [phone]  Relation: Child  Secondary Emergency Contact: Andre Garner  Home Phone: [phone]  Mobile Phone: [phone]  Relation: Child    Past Surgical History:  Past Surgical History:   Procedure Laterality Date    ABDOMEN SURGERY      CARDIAC SURGERY      COLONOSCOPY      DENTAL SURGERY N/A 2019    EXTRACTION OF ALL REMAINING UPPER AND LOWER TEETH AND ALVEOLOPLASTY   performed by Richy Cooper Jr., DMD at [facility] OR    ENDOSCOPY, COLON, DIAGNOSTIC      EYE SURGERY      KNEE SURGERY      LAPAROSCOPY N/A 2019    LAPAROSCOPIC, CONVERTED TO OPEN INCARCERATED UMBILICAL HERNIA REPAIR performed by Nawaf Rodriguez MD at [facility] OR    OTHER SURGICAL HISTORY  10/11/2013     CYSTOSCOPY, TRANSURETHRAL RESECTION OF PROSTATE WITH OLYMPUS    PROSTATE BIOPSY      SKIN BIOPSY      UPPER GASTROINTESTINAL ENDOSCOPY  2017       Immunization History:   Immunization History   Administered Date(s) Administered    COVID-19, PFIZER PURPLE top, DILUTE for use, (age 12 y+), 30mcg/0.3mL 2021, 2021, 2021    Influenza Virus Vaccine 2014    Pneumococcal, PPSV23, PNEUMOVAX 23, (age 2y+), SC/IM, 0.5mL 2015    TDaP, ADACEL (age 10y-64y), BOOSTRIX (age 10y+), IM, 0.5mL 2008, 2023    Td Vaccine >8yo [facility] 2008    Tetanus 2008       Active Problems:  Patient Active Problem List   Diagnosis Code    Hx BPH w/urinary retention s/p prostatectomy R33.9    Cigar  smoker Z72.0    Obesity (BMI 30-39.9) E66.9    Diabetic ketoacidosis without coma associated with type 1 diabetes mellitus ([facility]) E10.10    Lactic acidosis E87.20    Hyperkalemia E87.5    MAISHA (acute kidney injury) ([facility]) N17.9    SIRS (systemic inflammatory response syndrome) ([facility]) R65.10    Volume depletion E86.9    Hypotension I95.9    Orthostatic dizziness R42    Hemorrhagic shock T81.19XA    Upper GI bleed K92.2    Chronic a-fib ([facility]) I48.20    Acute blood loss anemia D62    Metabolic acidosis with respiratory acidosis E87.4    Hyperglycemia R73.9    Elevated lactic acid level R79.89    NSVT (nonsustained ventricular tachycardia) ([facility]) I47.29    DM type 2, controlled, with complication ([facility]) E11.8    Hyponatremia E87.1    Anemia D64.9    Metabolic encephalopathy G93.41    Acute cystitis without hematuria N30.00    Altered mental status R41.82    Stage 3 chronic kidney disease ([facility]) N18.30    Encephalopathy, metabolic G93.41    Hypertension, essential I10    CKD (chronic kidney disease) N18.9    Dyslipidemia E78.5    Acute metabolic encephalopathy G93.41    Impulse control disorder in adult F63.9    Dementia due to Alzheimer's disease ([facility]) G30.9, F02.80    Small bowel obstruction ([facility]) K56.609    Bilateral pleural effusion J90    Atelectasis of right lung J98.11    Former smoker Z87.891    Syncope and collapse R55    Fluid overload E87.70    Mild malnutrition ([facility]) E44.1    Acute encephalopathy G93.40    Remote history of stroke Z86.73    Dysthymia F34.1    Hypertensive urgency I16.0    Bipolar affective disorder, current episode manic, current episode severity unspecified ([facility]) F31.10    Chronic ischemic heart disease I25.9    Bipolar affective disorder ([facility]) F31.9    Psychosis, affective ([facility]) F39    Bipolar disorder ([facility]) F31.9    Neurocognitive disorder R41.9    COVID-19 U07.1    Vascular dementia with behavioral disturbance ([facility]) F01.518    GERD (gastroesophageal reflux disease) K21.9    Flash pulmonary edema

## 2024-07-20 LAB
BACTERIA BLD CULT ORG #2: ABNORMAL
BACTERIA BLD CULT ORG #2: ABNORMAL
BACTERIA BLD CULT: NORMAL
ORGANISM: ABNORMAL
ORGANISM: ABNORMAL

## 2024-08-07 ENCOUNTER — APPOINTMENT (OUTPATIENT)
Dept: GENERAL RADIOLOGY | Age: 74
End: 2024-08-07
Payer: MEDICARE

## 2024-08-07 ENCOUNTER — HOSPITAL ENCOUNTER (EMERGENCY)
Age: 74
Discharge: HOME | End: 2024-08-07
Attending: STUDENT IN AN ORGANIZED HEALTH CARE EDUCATION/TRAINING PROGRAM
Payer: MEDICARE

## 2024-08-07 VITALS
HEART RATE: 92 BPM | TEMPERATURE: 98.7 F | RESPIRATION RATE: 25 BRPM | BODY MASS INDEX: 27.69 KG/M2 | HEIGHT: 76 IN | DIASTOLIC BLOOD PRESSURE: 92 MMHG | WEIGHT: 227.4 LBS | OXYGEN SATURATION: 95 % | SYSTOLIC BLOOD PRESSURE: 162 MMHG

## 2024-08-07 DIAGNOSIS — N30.01 ACUTE CYSTITIS WITH HEMATURIA: Primary | ICD-10-CM

## 2024-08-07 DIAGNOSIS — J81.1 CHRONIC PULMONARY EDEMA: ICD-10-CM

## 2024-08-07 LAB
ANION GAP SERPL CALCULATED.3IONS-SCNC: 14 MMOL/L (ref 3–16)
BACTERIA URNS QL MICRO: ABNORMAL /HPF
BASOPHILS # BLD: 0 K/UL (ref 0–0.2)
BASOPHILS NFR BLD: 0.7 %
BILIRUB UR QL STRIP.AUTO: ABNORMAL
BUN SERPL-MCNC: 40 MG/DL (ref 7–20)
CALCIUM SERPL-MCNC: 8.9 MG/DL (ref 8.3–10.6)
CHLORIDE SERPL-SCNC: 104 MMOL/L (ref 99–110)
CLARITY UR: CLEAR
CO2 SERPL-SCNC: 19 MMOL/L (ref 21–32)
COLOR UR: ABNORMAL
CREAT SERPL-MCNC: 2.5 MG/DL (ref 0.8–1.3)
DEPRECATED RDW RBC AUTO: 16.6 % (ref 12.4–15.4)
EKG ATRIAL RATE: 125 BPM
EKG DIAGNOSIS: NORMAL
EKG Q-T INTERVAL: 398 MS
EKG QRS DURATION: 122 MS
EKG QTC CALCULATION (BAZETT): 470 MS
EKG R AXIS: -51 DEGREES
EKG T AXIS: 58 DEGREES
EKG VENTRICULAR RATE: 84 BPM
EOSINOPHIL NFR BLD: 2.7 %
GFR SERPLBLD CREATININE-BSD FMLA CKD-EPI: 26 ML/MIN/{1.73_M2}
GLUCOSE SERPL-MCNC: 115 MG/DL (ref 70–99)
GLUCOSE UR STRIP.AUTO-MCNC: NEGATIVE MG/DL
HCT VFR BLD AUTO: 28.5 % (ref 40.5–52.5)
HGB BLD-MCNC: 9.2 G/DL (ref 13.5–17.5)
HGB UR QL STRIP.AUTO: ABNORMAL
KETONES UR STRIP.AUTO-MCNC: NEGATIVE MG/DL
LEUKOCYTE ESTERASE UR QL STRIP.AUTO: ABNORMAL
LYMPHOCYTES # BLD: 0.8 K/UL (ref 1–5.1)
LYMPHOCYTES NFR BLD: 15.9 %
MCH RBC QN AUTO: 27.4 PG (ref 26–34)
MCHC RBC AUTO-ENTMCNC: 32.2 G/DL (ref 31–36)
MCV RBC AUTO: 85.1 FL (ref 80–100)
MONOCYTES # BLD: 0.6 K/UL (ref 0–1.3)
MONOCYTES NFR BLD: 11.4 %
NEUTROPHILS # BLD: 3.4 K/UL (ref 1.7–7.7)
NEUTROPHILS NFR BLD: 69.3 %
NITRITE UR QL STRIP.AUTO: POSITIVE
PH UR STRIP.AUTO: 6 [PH] (ref 5–8)
PLATELET # BLD AUTO: 232 K/UL (ref 135–450)
PMV BLD AUTO: 8 FL (ref 5–10.5)
POTASSIUM SERPL-SCNC: 4.7 MMOL/L (ref 3.5–5.1)
PROT UR STRIP.AUTO-MCNC: 100 MG/DL
RBC # BLD AUTO: 3.35 M/UL (ref 4.2–5.9)
RBC #/AREA URNS HPF: ABNORMAL /HPF (ref 0–4)
SODIUM SERPL-SCNC: 137 MMOL/L (ref 136–145)
SP GR UR STRIP.AUTO: 1.02 (ref 1–1.03)
UA COMPLETE W REFLEX CULTURE PNL UR: ABNORMAL
UA DIPSTICK W REFLEX MICRO PNL UR: YES
URN SPEC COLLECT METH UR: ABNORMAL
UROBILINOGEN UR STRIP-ACNC: 0.2 E.U./DL
WBC # BLD AUTO: 4.9 K/UL (ref 4–11)
WBC #/AREA URNS HPF: ABNORMAL /HPF (ref 0–5)

## 2024-08-07 PROCEDURE — 85025 COMPLETE CBC W/AUTO DIFF WBC: CPT

## 2024-08-07 PROCEDURE — 80048 BASIC METABOLIC PNL TOTAL CA: CPT

## 2024-08-07 PROCEDURE — 2580000003 HC RX 258: Performed by: STUDENT IN AN ORGANIZED HEALTH CARE EDUCATION/TRAINING PROGRAM

## 2024-08-07 PROCEDURE — 93005 ELECTROCARDIOGRAM TRACING: CPT | Performed by: STUDENT IN AN ORGANIZED HEALTH CARE EDUCATION/TRAINING PROGRAM

## 2024-08-07 PROCEDURE — 71045 X-RAY EXAM CHEST 1 VIEW: CPT

## 2024-08-07 PROCEDURE — 96374 THER/PROPH/DIAG INJ IV PUSH: CPT

## 2024-08-07 PROCEDURE — 6360000002 HC RX W HCPCS: Performed by: STUDENT IN AN ORGANIZED HEALTH CARE EDUCATION/TRAINING PROGRAM

## 2024-08-07 PROCEDURE — 81001 URINALYSIS AUTO W/SCOPE: CPT

## 2024-08-07 PROCEDURE — 73030 X-RAY EXAM OF SHOULDER: CPT

## 2024-08-07 PROCEDURE — 99285 EMERGENCY DEPT VISIT HI MDM: CPT

## 2024-08-07 RX ORDER — NITROFURANTOIN 25; 75 MG/1; MG/1
100 CAPSULE ORAL ONCE
Status: DISCONTINUED | OUTPATIENT
Start: 2024-08-07 | End: 2024-08-07

## 2024-08-07 RX ORDER — FUROSEMIDE 20 MG/1
20 TABLET ORAL 2 TIMES DAILY
Qty: 6 TABLET | Refills: 0 | Status: SHIPPED | OUTPATIENT
Start: 2024-08-07 | End: 2024-08-10

## 2024-08-07 RX ORDER — CEFUROXIME AXETIL 250 MG/1
250 TABLET ORAL 2 TIMES DAILY
Qty: 14 TABLET | Refills: 0 | Status: SHIPPED | OUTPATIENT
Start: 2024-08-07 | End: 2024-08-14

## 2024-08-07 RX ADMIN — WATER 1000 MG: 1 INJECTION INTRAMUSCULAR; INTRAVENOUS; SUBCUTANEOUS at 19:51

## 2024-08-07 ASSESSMENT — PAIN - FUNCTIONAL ASSESSMENT: PAIN_FUNCTIONAL_ASSESSMENT: NONE - DENIES PAIN

## 2024-08-07 NOTE — CARE COORDINATION
3:33 PM  Pt will be a readmission and will require a readmission assessment if admitted.       Pt is from [facility]. SW spoke to dez in admissions, pt is able to return when able. SW informed her of pt's current compliants regardign the facility. Dispo pending       Electronically signed by VIOLTE Fajardo, ERIKAW on 8/7/2024 at 3:33 PM  [phone]

## 2024-08-07 NOTE — CARE COORDINATION
3:33 PM    Pt is from [facility].       Electronically signed by VIOLET Fajardo, LSW on 8/7/2024 at 3:33 PM  [phone]

## 2024-08-07 NOTE — DISCHARGE INSTRUCTIONS
We saw you in the hospital for confusion and were diagnosed with a urinary tract infection and a mild amount of fluid in your lungs.     You were treated with ceftriaxone.     You need to take a pill called cefuroxime (an antibiotic) when you go home. We have provided you a prescription for it, please fill it at the pharmacy and take it as written on the bottle. If you have questions about how to take the medicine, please be sure to ask the pharmacist when you pick it up. You were also given a short course of lasix (a diuretic) to help remove the fluid from your lungs.     You need to see your regular doctor in 7 days to be checked. Please contact your regular doctor and schedule an appointment.    You should return to the emergency department if your symptoms worsen or do not resolve. In addition, return if:  - You have a fever (greater than 101 degrees)  - You have chest pain, shortness of breath, abdominal pain, or uncontrollable vomiting  - You are unable to eat or drink  - You pass out  - You have difficulty moving your arms or legs   - You have difficulty speaking or slurred speech  - Or you have any concern that you feel needs immediate physician evaluation.
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
## 2024-08-22 ENCOUNTER — HOSPITAL ENCOUNTER (EMERGENCY)
Age: 74
Discharge: HOME | End: 2024-08-22
Attending: EMERGENCY MEDICINE
Payer: MEDICARE

## 2024-08-22 VITALS
OXYGEN SATURATION: 100 % | BODY MASS INDEX: 26.3 KG/M2 | HEART RATE: 90 BPM | HEIGHT: 76 IN | TEMPERATURE: 98.1 F | DIASTOLIC BLOOD PRESSURE: 98 MMHG | SYSTOLIC BLOOD PRESSURE: 186 MMHG | WEIGHT: 216 LBS | RESPIRATION RATE: 14 BRPM

## 2024-08-22 DIAGNOSIS — N17.9 ACUTE RENAL FAILURE SUPERIMPOSED ON CHRONIC KIDNEY DISEASE, UNSPECIFIED ACUTE RENAL FAILURE TYPE, UNSPECIFIED CKD STAGE (HCC): Primary | ICD-10-CM

## 2024-08-22 DIAGNOSIS — N18.9 ACUTE RENAL FAILURE SUPERIMPOSED ON CHRONIC KIDNEY DISEASE, UNSPECIFIED ACUTE RENAL FAILURE TYPE, UNSPECIFIED CKD STAGE (HCC): Primary | ICD-10-CM

## 2024-08-22 LAB
ALBUMIN SERPL-MCNC: 2.9 G/DL (ref 3.4–5)
ALBUMIN/GLOB SERPL: 0.7 {RATIO} (ref 1.1–2.2)
ALP SERPL-CCNC: 50 U/L (ref 40–129)
ALT SERPL-CCNC: <5 U/L (ref 10–40)
ANION GAP SERPL CALCULATED.3IONS-SCNC: 10 MMOL/L (ref 3–16)
AST SERPL-CCNC: 10 U/L (ref 15–37)
BACTERIA URNS QL MICRO: ABNORMAL /HPF
BASOPHILS # BLD: 0 K/UL (ref 0–0.2)
BASOPHILS NFR BLD: 0.8 %
BILIRUB SERPL-MCNC: 0.5 MG/DL (ref 0–1)
BILIRUB UR QL STRIP.AUTO: ABNORMAL
BUN SERPL-MCNC: 53 MG/DL (ref 7–20)
CALCIUM SERPL-MCNC: 8.8 MG/DL (ref 8.3–10.6)
CHLORIDE SERPL-SCNC: 101 MMOL/L (ref 99–110)
CK SERPL-CCNC: 18 U/L (ref 39–308)
CLARITY UR: ABNORMAL
CO2 SERPL-SCNC: 24 MMOL/L (ref 21–32)
COARSE GRAN CASTS #/AREA URNS LPF: ABNORMAL /LPF (ref 0–2)
COLOR UR: ABNORMAL
CREAT SERPL-MCNC: 3.3 MG/DL (ref 0.8–1.3)
DEPRECATED RDW RBC AUTO: 16.6 % (ref 12.4–15.4)
EOSINOPHIL # BLD: 0.1 K/UL (ref 0–0.6)
EOSINOPHIL NFR BLD: 1.4 %
GFR SERPLBLD CREATININE-BSD FMLA CKD-EPI: 19 ML/MIN/{1.73_M2}
GLUCOSE SERPL-MCNC: 148 MG/DL (ref 70–99)
GLUCOSE UR STRIP.AUTO-MCNC: NEGATIVE MG/DL
HCT VFR BLD AUTO: 26.6 % (ref 40.5–52.5)
HGB BLD-MCNC: 8.7 G/DL (ref 13.5–17.5)
HGB UR QL STRIP.AUTO: ABNORMAL
KETONES UR STRIP.AUTO-MCNC: NEGATIVE MG/DL
LEUKOCYTE ESTERASE UR QL STRIP.AUTO: ABNORMAL
LYMPHOCYTES # BLD: 0.6 K/UL (ref 1–5.1)
LYMPHOCYTES NFR BLD: 10.1 %
MCH RBC QN AUTO: 27.6 PG (ref 26–34)
MCHC RBC AUTO-ENTMCNC: 32.6 G/DL (ref 31–36)
MCV RBC AUTO: 84.5 FL (ref 80–100)
MONOCYTES # BLD: 0.4 K/UL (ref 0–1.3)
MONOCYTES NFR BLD: 7 %
NEUTROPHILS # BLD: 4.5 K/UL (ref 1.7–7.7)
NEUTROPHILS NFR BLD: 80.7 %
NITRITE UR QL STRIP.AUTO: NEGATIVE
PH UR STRIP.AUTO: 5.5 [PH] (ref 5–8)
PLATELET # BLD AUTO: 137 K/UL (ref 135–450)
PMV BLD AUTO: 8.5 FL (ref 5–10.5)
POTASSIUM SERPL-SCNC: 3.7 MMOL/L (ref 3.5–5.1)
PROT SERPL-MCNC: 7 G/DL (ref 6.4–8.2)
PROT UR STRIP.AUTO-MCNC: 100 MG/DL
RBC # BLD AUTO: 3.14 M/UL (ref 4.2–5.9)
RBC #/AREA URNS HPF: >100 /HPF (ref 0–4)
SODIUM SERPL-SCNC: 135 MMOL/L (ref 136–145)
SP GR UR STRIP.AUTO: 1.02 (ref 1–1.03)
UA DIPSTICK W REFLEX MICRO PNL UR: YES
URN SPEC COLLECT METH UR: ABNORMAL
UROBILINOGEN UR STRIP-ACNC: 0.2 E.U./DL
WBC # BLD AUTO: 5.6 K/UL (ref 4–11)
WBC #/AREA URNS HPF: ABNORMAL /HPF (ref 0–5)

## 2024-08-22 PROCEDURE — 2580000003 HC RX 258: Performed by: NURSE PRACTITIONER

## 2024-08-22 PROCEDURE — 36415 COLL VENOUS BLD VENIPUNCTURE: CPT

## 2024-08-22 PROCEDURE — 82550 ASSAY OF CK (CPK): CPT

## 2024-08-22 PROCEDURE — 80053 COMPREHEN METABOLIC PANEL: CPT

## 2024-08-22 PROCEDURE — 81001 URINALYSIS AUTO W/SCOPE: CPT

## 2024-08-22 PROCEDURE — 99284 EMERGENCY DEPT VISIT MOD MDM: CPT

## 2024-08-22 PROCEDURE — 85025 COMPLETE CBC W/AUTO DIFF WBC: CPT

## 2024-08-22 RX ORDER — CLONIDINE HYDROCHLORIDE 0.1 MG/1
0.1 TABLET ORAL 2 TIMES DAILY
COMMUNITY
Start: 2024-07-24

## 2024-08-22 RX ORDER — 0.9 % SODIUM CHLORIDE 0.9 %
1000 INTRAVENOUS SOLUTION INTRAVENOUS ONCE
Status: COMPLETED | OUTPATIENT
Start: 2024-08-22 | End: 2024-08-22

## 2024-08-22 RX ORDER — SENNOSIDES A AND B 8.6 MG/1
1 TABLET, FILM COATED ORAL 2 TIMES DAILY
COMMUNITY
Start: 2024-06-05

## 2024-08-22 RX ORDER — ISOSORBIDE MONONITRATE 60 MG/1
60 TABLET, EXTENDED RELEASE ORAL DAILY
COMMUNITY
Start: 2024-07-11

## 2024-08-22 RX ORDER — TRAMADOL HYDROCHLORIDE 50 MG/1
50 TABLET ORAL EVERY 6 HOURS PRN
COMMUNITY
Start: 2024-08-02

## 2024-08-22 RX ADMIN — SODIUM CHLORIDE 1000 ML: 9 INJECTION, SOLUTION INTRAVENOUS at 17:00

## 2024-08-22 ASSESSMENT — PAIN - FUNCTIONAL ASSESSMENT: PAIN_FUNCTIONAL_ASSESSMENT: NONE - DENIES PAIN

## 2024-08-24 NOTE — ED PROVIDER NOTES
Emergency Department Attending SUPERVISORY Provider Note  Location: [facility]  ED  8/22/2024     Chief Complaint   Patient presents with    Abnormal Lab     Patient from the [facility]. Got labs drawn yesterday and was told to come to ED for abnormal labs.         PATIENT ID:  Alma Delia Garner is a 74 y.o. male    Past Medical History:   Diagnosis Date    Acute gastric ulcer with bleeding     Acute metabolic encephalopathy     MAISHA (acute kidney injury) ([facility])     Requiring dialysis since 7/4/17 in setting of hemorrhagic shock    ATN (acute tubular necrosis) ([facility])     Atrial fib/flutter, transient     Atrial fibrillation (HCC)     BPH (benign prostatic hyperplasia)     CAD (coronary artery disease)     Cerebral artery occlusion with cerebral infarction ([facility]) 08/05/2017    weakness and aphasia    CHF (congestive heart failure) ([facility]) 05/04/2024    CKD (chronic kidney disease)     Dr. Ambrosio    Dementia ([facility])     Diabetes mellitus ([facility])     GI bleed     Glaucoma     History of blood transfusion     Hx of diabetic neuropathy     Hyperlipidemia     Hypertension     Mitral insufficiency     Mobility impaired     uses walker and W/C    Multiple drug resistant organism (MDRO) culture positive 12/29/2020    urine    Pulmonary hypertension (HCC)     Traumatic hemorrhagic shock ([facility])        Alma Delia Garner was evaluated in the Emergency Department for concerns of abnormal renal labs outpatient.  Comes from [facility], patient is quite peculiar, has dementia, is quite fixated on seeing his renal doctor, however per NP documentation they would like patient to get some IV fluids and they will follow-up with him outpatient.  Patient denies any pain.  He is eating and drinking here in the emergency department.  No falls or injuries.  Patient says \"I just feel fine, now can you get me my hat?.\"   Does not really offer much other history.    Vitals:    08/22/24 1959   BP: (!) 186/98   Pulse:    Resp:  
blood transfusion, diabetic neuropathy, Hyperlipidemia, Hypertension, Mitral insufficiency, Mobility impaired, Multiple drug resistant organism (MDRO) culture positive (12/29/2020), Pulmonary hypertension (HCC), and Traumatic hemorrhagic shock (HCC).     EMERGENCY DEPARTMENT COURSE and DIFFERENTIAL DIAGNOSIS/MDM:   Vitals:    Vitals:    08/22/24 1258 08/22/24 1414 08/22/24 1518 08/22/24 1758   BP: (!) 175/151 (!) 169/87 (!) 169/87 (!) 182/93   Pulse:  79 78 92   Resp:  15 14    Temp:   98.1 °F (36.7 °C)    TempSrc:       SpO2:  98% 96% 100%   Weight:       Height:           Patient was given the following medications:  Medications   sodium chloride 0.9 % bolus 1,000 mL (0 mLs IntraVENous Stopped 8/22/24 1805)             Is this patient to be included in the SEP-1 core measure? No   Exclusion criteria - the patient is NOT to be included for SEP-1 Core Measure due to:  Infection is not suspected     Chronic Conditions affecting care:    has a past medical history of Acute gastric ulcer with bleeding, Acute metabolic encephalopathy, MAISHA (acute kidney injury) ([facility]), ATN (acute tubular necrosis) ([facility]), Atrial fib/flutter, transient, Atrial fibrillation ([facility]), BPH (benign prostatic hyperplasia), CAD (coronary artery disease), Cerebral artery occlusion with cerebral infarction ([facility]) (08/05/2017), CHF (congestive heart failure) ([facility]) (05/04/2024), CKD (chronic kidney disease), Dementia ([facility]), Diabetes mellitus ([facility]), GI bleed, Glaucoma, History of blood transfusion, diabetic neuropathy, Hyperlipidemia, Hypertension, Mitral insufficiency, Mobility impaired, Multiple drug resistant organism (MDRO) culture positive (12/29/2020), Pulmonary hypertension (HCC), and Traumatic hemorrhagic shock ([facility]).    CONSULTS: (Who and What was discussed)  IP CONSULT TO NEPHROLOGY      Social Determinants Significantly Affecting Health : None    Records Reviewed (External and Source)     CC/HPI Summary, DDx, ED Course, and Reassessment:

## 2024-09-23 ENCOUNTER — HOSPITAL ENCOUNTER (INPATIENT)
Age: 74
LOS: 8 days | Discharge: NURSING, REHAB OR LONG-TERM CARE | DRG: 673 | End: 2024-10-01
Attending: EMERGENCY MEDICINE | Admitting: INTERNAL MEDICINE
Payer: MEDICARE

## 2024-09-23 ENCOUNTER — APPOINTMENT (OUTPATIENT)
Dept: GENERAL RADIOLOGY | Age: 74
DRG: 673 | End: 2024-09-23
Payer: MEDICARE

## 2024-09-23 DIAGNOSIS — J90 PLEURAL EFFUSION ON LEFT: ICD-10-CM

## 2024-09-23 DIAGNOSIS — R31.9 HEMATURIA, UNSPECIFIED TYPE: ICD-10-CM

## 2024-09-23 DIAGNOSIS — I48.20 CHRONIC ATRIAL FIBRILLATION (HCC): ICD-10-CM

## 2024-09-23 DIAGNOSIS — N17.9 ACUTE KIDNEY INJURY SUPERIMPOSED ON CKD (HCC): Primary | ICD-10-CM

## 2024-09-23 DIAGNOSIS — N18.9 ACUTE KIDNEY INJURY SUPERIMPOSED ON CKD (HCC): Primary | ICD-10-CM

## 2024-09-23 DIAGNOSIS — E87.6 HYPOKALEMIA: ICD-10-CM

## 2024-09-23 DIAGNOSIS — D64.9 ANEMIA, UNSPECIFIED TYPE: ICD-10-CM

## 2024-09-23 DIAGNOSIS — E83.42 HYPOMAGNESEMIA: ICD-10-CM

## 2024-09-23 LAB
ALBUMIN SERPL-MCNC: 3.2 G/DL (ref 3.4–5)
ALBUMIN/GLOB SERPL: 0.8 {RATIO} (ref 1.1–2.2)
ALP SERPL-CCNC: 50 U/L (ref 40–129)
ALT SERPL-CCNC: <5 U/L (ref 10–40)
ANION GAP SERPL CALCULATED.3IONS-SCNC: 17 MMOL/L (ref 3–16)
AST SERPL-CCNC: 11 U/L (ref 15–37)
BACTERIA URNS QL MICRO: ABNORMAL /HPF
BASOPHILS # BLD: 0 K/UL (ref 0–0.2)
BASOPHILS NFR BLD: 0.6 %
BILIRUB SERPL-MCNC: 0.6 MG/DL (ref 0–1)
BILIRUB UR QL STRIP.AUTO: NEGATIVE
BUN SERPL-MCNC: 72 MG/DL (ref 7–20)
CALCIUM SERPL-MCNC: 8.8 MG/DL (ref 8.3–10.6)
CHLORIDE SERPL-SCNC: 100 MMOL/L (ref 99–110)
CLARITY UR: ABNORMAL
CO2 SERPL-SCNC: 20 MMOL/L (ref 21–32)
COLOR UR: YELLOW
CREAT SERPL-MCNC: 5 MG/DL (ref 0.8–1.3)
DEPRECATED RDW RBC AUTO: 17.6 % (ref 12.4–15.4)
EOSINOPHIL # BLD: 0 K/UL (ref 0–0.6)
EOSINOPHIL NFR BLD: 0.4 %
GFR SERPLBLD CREATININE-BSD FMLA CKD-EPI: 11 ML/MIN/{1.73_M2}
GLUCOSE BLD-MCNC: 108 MG/DL (ref 70–99)
GLUCOSE SERPL-MCNC: 177 MG/DL (ref 70–99)
GLUCOSE UR STRIP.AUTO-MCNC: NEGATIVE MG/DL
HCT VFR BLD AUTO: 23.9 % (ref 40.5–52.5)
HGB BLD-MCNC: 7.7 G/DL (ref 13.5–17.5)
HGB UR QL STRIP.AUTO: ABNORMAL
KETONES UR STRIP.AUTO-MCNC: NEGATIVE MG/DL
LEUKOCYTE ESTERASE UR QL STRIP.AUTO: NEGATIVE
LYMPHOCYTES # BLD: 0.5 K/UL (ref 1–5.1)
LYMPHOCYTES NFR BLD: 8.8 %
MAGNESIUM SERPL-MCNC: 1.5 MG/DL (ref 1.8–2.4)
MCH RBC QN AUTO: 26.6 PG (ref 26–34)
MCHC RBC AUTO-ENTMCNC: 32.5 G/DL (ref 31–36)
MCV RBC AUTO: 82 FL (ref 80–100)
MONOCYTES # BLD: 0.3 K/UL (ref 0–1.3)
MONOCYTES NFR BLD: 5.3 %
NEUTROPHILS # BLD: 4.8 K/UL (ref 1.7–7.7)
NEUTROPHILS NFR BLD: 84.9 %
NITRITE UR QL STRIP.AUTO: NEGATIVE
NT-PROBNP SERPL-MCNC: ABNORMAL PG/ML (ref 0–449)
PERFORMED ON: ABNORMAL
PH UR STRIP.AUTO: 5.5 [PH] (ref 5–8)
PHOSPHATE SERPL-MCNC: 6.5 MG/DL (ref 2.5–4.9)
PLATELET # BLD AUTO: 167 K/UL (ref 135–450)
PMV BLD AUTO: 7.1 FL (ref 5–10.5)
POTASSIUM SERPL-SCNC: 3.1 MMOL/L (ref 3.5–5.1)
PROT SERPL-MCNC: 7.4 G/DL (ref 6.4–8.2)
PROT UR STRIP.AUTO-MCNC: 100 MG/DL
RBC # BLD AUTO: 2.91 M/UL (ref 4.2–5.9)
RBC #/AREA URNS HPF: >100 /HPF (ref 0–4)
SODIUM SERPL-SCNC: 137 MMOL/L (ref 136–145)
SP GR UR STRIP.AUTO: 1.02 (ref 1–1.03)
TROPONIN, HIGH SENSITIVITY: 65 NG/L (ref 0–22)
UA COMPLETE W REFLEX CULTURE PNL UR: ABNORMAL
UA DIPSTICK W REFLEX MICRO PNL UR: YES
URN SPEC COLLECT METH UR: ABNORMAL
UROBILINOGEN UR STRIP-ACNC: 0.2 E.U./DL
WBC # BLD AUTO: 5.6 K/UL (ref 4–11)
WBC #/AREA URNS HPF: ABNORMAL /HPF (ref 0–5)

## 2024-09-23 PROCEDURE — 99285 EMERGENCY DEPT VISIT HI MDM: CPT

## 2024-09-23 PROCEDURE — 96365 THER/PROPH/DIAG IV INF INIT: CPT

## 2024-09-23 PROCEDURE — 2580000003 HC RX 258: Performed by: INTERNAL MEDICINE

## 2024-09-23 PROCEDURE — 81001 URINALYSIS AUTO W/SCOPE: CPT

## 2024-09-23 PROCEDURE — 80053 COMPREHEN METABOLIC PANEL: CPT

## 2024-09-23 PROCEDURE — 71045 X-RAY EXAM CHEST 1 VIEW: CPT

## 2024-09-23 PROCEDURE — 96375 TX/PRO/DX INJ NEW DRUG ADDON: CPT

## 2024-09-23 PROCEDURE — 84484 ASSAY OF TROPONIN QUANT: CPT

## 2024-09-23 PROCEDURE — 96366 THER/PROPH/DIAG IV INF ADDON: CPT

## 2024-09-23 PROCEDURE — 93005 ELECTROCARDIOGRAM TRACING: CPT | Performed by: PHYSICIAN ASSISTANT

## 2024-09-23 PROCEDURE — 51702 INSERT TEMP BLADDER CATH: CPT

## 2024-09-23 PROCEDURE — 83880 ASSAY OF NATRIURETIC PEPTIDE: CPT

## 2024-09-23 PROCEDURE — 94761 N-INVAS EAR/PLS OXIMETRY MLT: CPT

## 2024-09-23 PROCEDURE — 1200000000 HC SEMI PRIVATE

## 2024-09-23 PROCEDURE — 84100 ASSAY OF PHOSPHORUS: CPT

## 2024-09-23 PROCEDURE — 6370000000 HC RX 637 (ALT 250 FOR IP): Performed by: PHYSICIAN ASSISTANT

## 2024-09-23 PROCEDURE — 6360000002 HC RX W HCPCS: Performed by: PHYSICIAN ASSISTANT

## 2024-09-23 PROCEDURE — 83735 ASSAY OF MAGNESIUM: CPT

## 2024-09-23 PROCEDURE — 2700000000 HC OXYGEN THERAPY PER DAY

## 2024-09-23 PROCEDURE — 6370000000 HC RX 637 (ALT 250 FOR IP): Performed by: INTERNAL MEDICINE

## 2024-09-23 PROCEDURE — 85025 COMPLETE CBC W/AUTO DIFF WBC: CPT

## 2024-09-23 RX ORDER — SODIUM CHLORIDE 9 MG/ML
INJECTION, SOLUTION INTRAVENOUS PRN
Status: DISCONTINUED | OUTPATIENT
Start: 2024-09-23 | End: 2024-10-01 | Stop reason: HOSPADM

## 2024-09-23 RX ORDER — ACETAMINOPHEN 650 MG/1
650 SUPPOSITORY RECTAL EVERY 6 HOURS PRN
Status: DISCONTINUED | OUTPATIENT
Start: 2024-09-23 | End: 2024-10-01 | Stop reason: HOSPADM

## 2024-09-23 RX ORDER — HEPARIN SODIUM 5000 [USP'U]/ML
5000 INJECTION, SOLUTION INTRAVENOUS; SUBCUTANEOUS EVERY 8 HOURS SCHEDULED
Status: DISCONTINUED | OUTPATIENT
Start: 2024-09-24 | End: 2024-10-01 | Stop reason: HOSPADM

## 2024-09-23 RX ORDER — POTASSIUM CHLORIDE 1500 MG/1
40 TABLET, EXTENDED RELEASE ORAL ONCE
Status: COMPLETED | OUTPATIENT
Start: 2024-09-23 | End: 2024-09-23

## 2024-09-23 RX ORDER — ATORVASTATIN CALCIUM 10 MG/1
10 TABLET, FILM COATED ORAL NIGHTLY
Status: DISCONTINUED | OUTPATIENT
Start: 2024-09-24 | End: 2024-10-01 | Stop reason: HOSPADM

## 2024-09-23 RX ORDER — ONDANSETRON 2 MG/ML
4 INJECTION INTRAMUSCULAR; INTRAVENOUS EVERY 6 HOURS PRN
Status: DISCONTINUED | OUTPATIENT
Start: 2024-09-23 | End: 2024-10-01 | Stop reason: HOSPADM

## 2024-09-23 RX ORDER — SODIUM CHLORIDE 0.9 % (FLUSH) 0.9 %
5-40 SYRINGE (ML) INJECTION PRN
Status: DISCONTINUED | OUTPATIENT
Start: 2024-09-23 | End: 2024-10-01 | Stop reason: HOSPADM

## 2024-09-23 RX ORDER — SENNOSIDES A AND B 8.6 MG/1
1 TABLET, FILM COATED ORAL 2 TIMES DAILY
Status: DISCONTINUED | OUTPATIENT
Start: 2024-09-24 | End: 2024-10-01 | Stop reason: HOSPADM

## 2024-09-23 RX ORDER — POTASSIUM CHLORIDE 7.45 MG/ML
10 INJECTION INTRAVENOUS ONCE
Status: DISCONTINUED | OUTPATIENT
Start: 2024-09-23 | End: 2024-09-23

## 2024-09-23 RX ORDER — TRAMADOL HYDROCHLORIDE 50 MG/1
50 TABLET ORAL EVERY 8 HOURS PRN
Status: DISCONTINUED | OUTPATIENT
Start: 2024-09-23 | End: 2024-10-01 | Stop reason: HOSPADM

## 2024-09-23 RX ORDER — ACETAMINOPHEN 325 MG/1
650 TABLET ORAL EVERY 6 HOURS PRN
Status: DISCONTINUED | OUTPATIENT
Start: 2024-09-23 | End: 2024-10-01 | Stop reason: HOSPADM

## 2024-09-23 RX ORDER — ONDANSETRON 4 MG/1
4 TABLET, ORALLY DISINTEGRATING ORAL EVERY 8 HOURS PRN
Status: DISCONTINUED | OUTPATIENT
Start: 2024-09-23 | End: 2024-10-01 | Stop reason: HOSPADM

## 2024-09-23 RX ORDER — TRAMADOL HYDROCHLORIDE 50 MG/1
50 TABLET ORAL ONCE
Status: COMPLETED | OUTPATIENT
Start: 2024-09-23 | End: 2024-09-23

## 2024-09-23 RX ORDER — CALCIUM POLYCARBOPHIL 625 MG 625 MG/1
625 TABLET ORAL EVERY EVENING
Status: DISCONTINUED | OUTPATIENT
Start: 2024-09-24 | End: 2024-10-01 | Stop reason: HOSPADM

## 2024-09-23 RX ORDER — ASPIRIN 81 MG/1
81 TABLET, CHEWABLE ORAL DAILY
Status: DISCONTINUED | OUTPATIENT
Start: 2024-09-24 | End: 2024-10-01 | Stop reason: HOSPADM

## 2024-09-23 RX ORDER — RIVASTIGMINE 4.6 MG/24H
1 PATCH, EXTENDED RELEASE TRANSDERMAL NIGHTLY
Status: DISCONTINUED | OUTPATIENT
Start: 2024-09-23 | End: 2024-10-01 | Stop reason: HOSPADM

## 2024-09-23 RX ORDER — HYDRALAZINE HYDROCHLORIDE 25 MG/1
25 TABLET, FILM COATED ORAL EVERY 12 HOURS SCHEDULED
Status: DISCONTINUED | OUTPATIENT
Start: 2024-09-24 | End: 2024-10-01

## 2024-09-23 RX ORDER — POLYETHYLENE GLYCOL 3350 17 G/17G
17 POWDER, FOR SOLUTION ORAL DAILY PRN
Status: DISCONTINUED | OUTPATIENT
Start: 2024-09-23 | End: 2024-10-01 | Stop reason: HOSPADM

## 2024-09-23 RX ORDER — TRAZODONE HYDROCHLORIDE 50 MG/1
50 TABLET, FILM COATED ORAL NIGHTLY
Status: DISCONTINUED | OUTPATIENT
Start: 2024-09-24 | End: 2024-10-01 | Stop reason: HOSPADM

## 2024-09-23 RX ORDER — DEXTROSE MONOHYDRATE 100 MG/ML
INJECTION, SOLUTION INTRAVENOUS CONTINUOUS PRN
Status: DISCONTINUED | OUTPATIENT
Start: 2024-09-23 | End: 2024-10-01 | Stop reason: HOSPADM

## 2024-09-23 RX ORDER — INSULIN LISPRO 100 [IU]/ML
0-4 INJECTION, SOLUTION INTRAVENOUS; SUBCUTANEOUS NIGHTLY
Status: DISCONTINUED | OUTPATIENT
Start: 2024-09-23 | End: 2024-10-01 | Stop reason: HOSPADM

## 2024-09-23 RX ORDER — SODIUM CHLORIDE 0.9 % (FLUSH) 0.9 %
5-40 SYRINGE (ML) INJECTION EVERY 12 HOURS SCHEDULED
Status: DISCONTINUED | OUTPATIENT
Start: 2024-09-23 | End: 2024-10-01 | Stop reason: HOSPADM

## 2024-09-23 RX ORDER — SODIUM CHLORIDE, SODIUM LACTATE, POTASSIUM CHLORIDE, CALCIUM CHLORIDE 600; 310; 30; 20 MG/100ML; MG/100ML; MG/100ML; MG/100ML
INJECTION, SOLUTION INTRAVENOUS CONTINUOUS
Status: DISCONTINUED | OUTPATIENT
Start: 2024-09-23 | End: 2024-09-25

## 2024-09-23 RX ORDER — CLONIDINE HYDROCHLORIDE 0.1 MG/1
0.2 TABLET ORAL EVERY 8 HOURS PRN
Status: DISCONTINUED | OUTPATIENT
Start: 2024-09-23 | End: 2024-10-01 | Stop reason: HOSPADM

## 2024-09-23 RX ORDER — MAGNESIUM SULFATE IN WATER 40 MG/ML
2000 INJECTION, SOLUTION INTRAVENOUS ONCE
Status: COMPLETED | OUTPATIENT
Start: 2024-09-23 | End: 2024-09-23

## 2024-09-23 RX ORDER — GLUCAGON 1 MG/ML
1 KIT INJECTION PRN
Status: DISCONTINUED | OUTPATIENT
Start: 2024-09-23 | End: 2024-10-01 | Stop reason: HOSPADM

## 2024-09-23 RX ORDER — DIVALPROEX SODIUM 125 MG/1
250 CAPSULE, COATED PELLETS ORAL EVERY 12 HOURS SCHEDULED
Status: DISCONTINUED | OUTPATIENT
Start: 2024-09-24 | End: 2024-10-01 | Stop reason: HOSPADM

## 2024-09-23 RX ORDER — PANTOPRAZOLE SODIUM 20 MG/1
20 TABLET, DELAYED RELEASE ORAL DAILY
Status: DISCONTINUED | OUTPATIENT
Start: 2024-09-24 | End: 2024-10-01 | Stop reason: HOSPADM

## 2024-09-23 RX ORDER — INSULIN LISPRO 100 [IU]/ML
0-4 INJECTION, SOLUTION INTRAVENOUS; SUBCUTANEOUS
Status: DISCONTINUED | OUTPATIENT
Start: 2024-09-24 | End: 2024-10-01 | Stop reason: HOSPADM

## 2024-09-23 RX ORDER — ONDANSETRON 2 MG/ML
4 INJECTION INTRAMUSCULAR; INTRAVENOUS ONCE
Status: COMPLETED | OUTPATIENT
Start: 2024-09-23 | End: 2024-09-23

## 2024-09-23 RX ADMIN — TRAMADOL HYDROCHLORIDE 50 MG: 50 TABLET ORAL at 16:41

## 2024-09-23 RX ADMIN — POTASSIUM CHLORIDE 40 MEQ: 1500 TABLET, EXTENDED RELEASE ORAL at 16:42

## 2024-09-23 RX ADMIN — SODIUM CHLORIDE, POTASSIUM CHLORIDE, SODIUM LACTATE AND CALCIUM CHLORIDE: 600; 310; 30; 20 INJECTION, SOLUTION INTRAVENOUS at 19:24

## 2024-09-23 RX ADMIN — MAGNESIUM SULFATE HEPTAHYDRATE 2000 MG: 40 INJECTION, SOLUTION INTRAVENOUS at 16:56

## 2024-09-23 RX ADMIN — ONDANSETRON 4 MG: 2 INJECTION INTRAMUSCULAR; INTRAVENOUS at 16:49

## 2024-09-23 ASSESSMENT — PAIN SCALES - GENERAL
PAINLEVEL_OUTOF10: 8
PAINLEVEL_OUTOF10: 6
PAINLEVEL_OUTOF10: 0

## 2024-09-23 ASSESSMENT — PAIN - FUNCTIONAL ASSESSMENT: PAIN_FUNCTIONAL_ASSESSMENT: 0-10

## 2024-09-23 NOTE — ED PROVIDER NOTES
Emergency Department Attending SUPERVISORY Provider Note  Location: [facility]  ED  9/23/2024     Chief Complaint   Patient presents with    Abnormal Lab     Sent from the [facility] for elevated creatinine. Had labs drawn on the 20th, cr 4.8        PATIENT ID:  Alma Delia Garner is a 74 y.o. male    Past Medical History:   Diagnosis Date    Acute gastric ulcer with bleeding     Acute metabolic encephalopathy     MAISHA (acute kidney injury) ([facility])     Requiring dialysis since 7/4/17 in setting of hemorrhagic shock    ATN (acute tubular necrosis) ([facility])     Atrial fib/flutter, transient     Atrial fibrillation (HCC)     BPH (benign prostatic hyperplasia)     CAD (coronary artery disease)     Cerebral artery occlusion with cerebral infarction ([facility]) 08/05/2017    weakness and aphasia    CHF (congestive heart failure) ([facility]) 05/04/2024    CKD (chronic kidney disease)     Dr. Ambrosio    Dementia ([facility])     Diabetes mellitus ([facility])     GI bleed     Glaucoma     History of blood transfusion     Hx of diabetic neuropathy     Hyperlipidemia     Hypertension     Mitral insufficiency     Mobility impaired     uses walker and W/C    Multiple drug resistant organism (MDRO) culture positive 12/29/2020    urine    Pulmonary hypertension ([facility])     Traumatic hemorrhagic shock ([facility])        Alma Delia Garner was evaluated in the Emergency Department for concerns of abnormal labs outpatient sounds like CKD, and now has quite impressive renal failure.  Here in the emergency department, vital signs stable.  He does have large blood on urinalysis.  He has absolutely no flank pain on my evaluation and when I ask him about his urination, he says \"it is fine.\"    Otherwise, patient very much just wants me to turn off his light.  He denies any other concerns right now.  Nephrology has already evaluated this patient.        Vitals:    09/23/24 1549   BP: (!) 166/101   Pulse: 83   Resp: 18   Temp:    SpO2: 91%        BASIC EXAM:  No

## 2024-09-23 NOTE — ED NOTES
Patient noted to have O2 sat in 60's on room air. Pt seen in bed, not distressed. Respirations unlabored. Pt denies trouble breathing. Saturation appears reliable, did try multiple fingers. Placed patient on 6 liters nasal cannula, instructed on deep breathing. Pt now 95% on 5 liters cannula.

## 2024-09-23 NOTE — ED NOTES
@1530 called Nephrology per  Ernesto Monahan PA  RE: Dr. Ambrosio -- MAISHA, hypokalemia, hypomagnesmia  @1548 Brandi Ambrosio MD called back and spoke with Ernesto Monahan, PA

## 2024-09-23 NOTE — ED PROVIDER NOTES
[facility]  ED  EMERGENCY DEPARTMENT ENCOUNTER        Pt Name: Alma Delia Garner  MRN: [id number]  Birthdate 1950  Date of evaluation: 9/23/2024  Provider: YADY PERKINS PA-C  PCP: Yvette Leonard MD  ED Attending: DO [place]       I have seen and evaluated this patient with my supervising physician Letty Corrales DO.    CHIEF COMPLAINT:     Chief Complaint   Patient presents with    Abnormal Lab     Sent from the [facility] for elevated creatinine. Had labs drawn on the 20th, cr 4.8       HISTORY OF PRESENT ILLNESS:      History provided by the patient. No limitations.    Alma Delia Garner is a 74 y.o. male who arrives to the ED by EMS from The [facility] due to outpatient labs showing an elevated creatinine.  Patient does have chronic kidney disease and follows with Dr. Ambrosio.  However, they have watched his creatinine trend up lately and after they received a result today showing BUN 69 and creatinine 4.8, patient is sent here.  The patient had a hospitalization in July and a couple ED visits in August.  In addition to acute on chronic kidney disease, he has had some shortness of breath and has a known large left pleural effusion.  He denies chest pain today.  He reports some chronic, unchanged abdominal discomfort but denies any GI upset.  He has had decreased urine output.    Nursing Notes were reviewed     REVIEW OF SYSTEMS:     Review of Systems  Positives and pertinent negatives as per HPI.      PAST MEDICAL HISTORY:     Past Medical History:   Diagnosis Date    Acute gastric ulcer with bleeding     Acute metabolic encephalopathy     MAISHA (acute kidney injury) (HCC)     Requiring dialysis since 7/4/17 in setting of hemorrhagic shock    ATN (acute tubular necrosis) (HCC)     Atrial fib/flutter, transient     Atrial fibrillation (HCC)     BPH (benign prostatic hyperplasia)     CAD (coronary artery disease)     Cerebral artery occlusion with cerebral infarction

## 2024-09-24 LAB
ANION GAP SERPL CALCULATED.3IONS-SCNC: 16 MMOL/L (ref 3–16)
BASOPHILS # BLD: 0 K/UL (ref 0–0.2)
BASOPHILS NFR BLD: 1 %
BUN SERPL-MCNC: 74 MG/DL (ref 7–20)
CALCIUM SERPL-MCNC: 8.6 MG/DL (ref 8.3–10.6)
CHLORIDE SERPL-SCNC: 104 MMOL/L (ref 99–110)
CO2 SERPL-SCNC: 22 MMOL/L (ref 21–32)
CREAT SERPL-MCNC: 5 MG/DL (ref 0.8–1.3)
DEPRECATED RDW RBC AUTO: 17.4 % (ref 12.4–15.4)
EKG DIAGNOSIS: NORMAL
EKG Q-T INTERVAL: 392 MS
EKG QRS DURATION: 128 MS
EKG QTC CALCULATION (BAZETT): 484 MS
EKG R AXIS: -46 DEGREES
EKG T AXIS: 96 DEGREES
EKG VENTRICULAR RATE: 92 BPM
EOSINOPHIL # BLD: 0.1 K/UL (ref 0–0.6)
EOSINOPHIL NFR BLD: 1.7 %
GFR SERPLBLD CREATININE-BSD FMLA CKD-EPI: 11 ML/MIN/{1.73_M2}
GLUCOSE BLD-MCNC: 81 MG/DL (ref 70–99)
GLUCOSE BLD-MCNC: 96 MG/DL (ref 70–99)
GLUCOSE BLD-MCNC: 97 MG/DL (ref 70–99)
GLUCOSE BLD-MCNC: 97 MG/DL (ref 70–99)
GLUCOSE SERPL-MCNC: 88 MG/DL (ref 70–99)
HCT VFR BLD AUTO: 23.3 % (ref 40.5–52.5)
HGB BLD-MCNC: 7.5 G/DL (ref 13.5–17.5)
LYMPHOCYTES # BLD: 0.7 K/UL (ref 1–5.1)
LYMPHOCYTES NFR BLD: 15 %
MAGNESIUM SERPL-MCNC: 1.8 MG/DL (ref 1.8–2.4)
MCH RBC QN AUTO: 26.7 PG (ref 26–34)
MCHC RBC AUTO-ENTMCNC: 32.1 G/DL (ref 31–36)
MCV RBC AUTO: 83.2 FL (ref 80–100)
MONOCYTES # BLD: 0.4 K/UL (ref 0–1.3)
MONOCYTES NFR BLD: 7.9 %
NEUTROPHILS # BLD: 3.5 K/UL (ref 1.7–7.7)
NEUTROPHILS NFR BLD: 74.4 %
PERFORMED ON: NORMAL
PLATELET # BLD AUTO: 149 K/UL (ref 135–450)
PMV BLD AUTO: 7.3 FL (ref 5–10.5)
POTASSIUM SERPL-SCNC: 3.2 MMOL/L (ref 3.5–5.1)
RBC # BLD AUTO: 2.8 M/UL (ref 4.2–5.9)
SODIUM SERPL-SCNC: 142 MMOL/L (ref 136–145)
WBC # BLD AUTO: 4.7 K/UL (ref 4–11)

## 2024-09-24 PROCEDURE — 6370000000 HC RX 637 (ALT 250 FOR IP): Performed by: STUDENT IN AN ORGANIZED HEALTH CARE EDUCATION/TRAINING PROGRAM

## 2024-09-24 PROCEDURE — 36415 COLL VENOUS BLD VENIPUNCTURE: CPT

## 2024-09-24 PROCEDURE — 2700000000 HC OXYGEN THERAPY PER DAY

## 2024-09-24 PROCEDURE — 1200000000 HC SEMI PRIVATE

## 2024-09-24 PROCEDURE — 6370000000 HC RX 637 (ALT 250 FOR IP): Performed by: INTERNAL MEDICINE

## 2024-09-24 PROCEDURE — 80048 BASIC METABOLIC PNL TOTAL CA: CPT

## 2024-09-24 PROCEDURE — 6360000002 HC RX W HCPCS: Performed by: INTERNAL MEDICINE

## 2024-09-24 PROCEDURE — 99222 1ST HOSP IP/OBS MODERATE 55: CPT | Performed by: STUDENT IN AN ORGANIZED HEALTH CARE EDUCATION/TRAINING PROGRAM

## 2024-09-24 PROCEDURE — 94640 AIRWAY INHALATION TREATMENT: CPT

## 2024-09-24 PROCEDURE — 2580000003 HC RX 258: Performed by: INTERNAL MEDICINE

## 2024-09-24 PROCEDURE — 85025 COMPLETE CBC W/AUTO DIFF WBC: CPT

## 2024-09-24 PROCEDURE — 83735 ASSAY OF MAGNESIUM: CPT

## 2024-09-24 PROCEDURE — 93010 ELECTROCARDIOGRAM REPORT: CPT | Performed by: INTERNAL MEDICINE

## 2024-09-24 PROCEDURE — 94761 N-INVAS EAR/PLS OXIMETRY MLT: CPT

## 2024-09-24 RX ORDER — DOXYCYCLINE HYCLATE 100 MG
100 TABLET ORAL EVERY 12 HOURS SCHEDULED
Status: COMPLETED | OUTPATIENT
Start: 2024-09-24 | End: 2024-09-29

## 2024-09-24 RX ORDER — POTASSIUM CHLORIDE 1500 MG/1
20 TABLET, EXTENDED RELEASE ORAL
Status: COMPLETED | OUTPATIENT
Start: 2024-09-24 | End: 2024-09-24

## 2024-09-24 RX ORDER — IPRATROPIUM BROMIDE AND ALBUTEROL SULFATE 2.5; .5 MG/3ML; MG/3ML
1 SOLUTION RESPIRATORY (INHALATION)
Status: DISCONTINUED | OUTPATIENT
Start: 2024-09-24 | End: 2024-09-28

## 2024-09-24 RX ORDER — POTASSIUM CHLORIDE 750 MG/1
10 TABLET, EXTENDED RELEASE ORAL ONCE
Status: DISCONTINUED | OUTPATIENT
Start: 2024-09-24 | End: 2024-09-24

## 2024-09-24 RX ADMIN — ATORVASTATIN CALCIUM 10 MG: 10 TABLET, FILM COATED ORAL at 20:21

## 2024-09-24 RX ADMIN — IPRATROPIUM BROMIDE AND ALBUTEROL SULFATE 1 DOSE: 2.5; .5 SOLUTION RESPIRATORY (INHALATION) at 19:24

## 2024-09-24 RX ADMIN — POTASSIUM CHLORIDE 20 MEQ: 1500 TABLET, EXTENDED RELEASE ORAL at 10:24

## 2024-09-24 RX ADMIN — SODIUM CHLORIDE, PRESERVATIVE FREE 10 ML: 5 INJECTION INTRAVENOUS at 20:54

## 2024-09-24 RX ADMIN — HYDRALAZINE HYDROCHLORIDE 25 MG: 25 TABLET ORAL at 20:21

## 2024-09-24 RX ADMIN — HEPARIN SODIUM 5000 UNITS: 5000 INJECTION INTRAVENOUS; SUBCUTANEOUS at 20:53

## 2024-09-24 RX ADMIN — POTASSIUM CHLORIDE 20 MEQ: 1500 TABLET, EXTENDED RELEASE ORAL at 12:12

## 2024-09-24 RX ADMIN — ASPIRIN 81 MG 81 MG: 81 TABLET ORAL at 10:23

## 2024-09-24 RX ADMIN — CALCIUM POLYCARBOPHIL 625 MG: 625 TABLET ORAL at 18:36

## 2024-09-24 RX ADMIN — TRAZODONE HYDROCHLORIDE 50 MG: 50 TABLET ORAL at 20:21

## 2024-09-24 RX ADMIN — PANTOPRAZOLE SODIUM 20 MG: 20 TABLET, DELAYED RELEASE ORAL at 10:23

## 2024-09-24 RX ADMIN — POTASSIUM CHLORIDE 20 MEQ: 1500 TABLET, EXTENDED RELEASE ORAL at 12:11

## 2024-09-24 RX ADMIN — DIVALPROEX SODIUM 250 MG: 125 CAPSULE, COATED PELLETS ORAL at 10:23

## 2024-09-24 RX ADMIN — DOXYCYCLINE HYCLATE 100 MG: 100 TABLET, COATED ORAL at 20:21

## 2024-09-24 RX ADMIN — SENNOSIDES 8.6 MG: 8.6 TABLET, FILM COATED ORAL at 10:24

## 2024-09-24 RX ADMIN — HEPARIN SODIUM 5000 UNITS: 5000 INJECTION INTRAVENOUS; SUBCUTANEOUS at 05:28

## 2024-09-24 RX ADMIN — HEPARIN SODIUM 5000 UNITS: 5000 INJECTION INTRAVENOUS; SUBCUTANEOUS at 12:11

## 2024-09-24 RX ADMIN — HYDRALAZINE HYDROCHLORIDE 25 MG: 25 TABLET ORAL at 10:23

## 2024-09-24 RX ADMIN — B-COMPLEX W/ C & FOLIC ACID TAB 1 TABLET: TAB at 10:23

## 2024-09-24 RX ADMIN — DIVALPROEX SODIUM 250 MG: 125 CAPSULE, COATED PELLETS ORAL at 20:20

## 2024-09-24 RX ADMIN — TRAMADOL HYDROCHLORIDE 50 MG: 50 TABLET ORAL at 12:13

## 2024-09-24 RX ADMIN — SENNOSIDES 8.6 MG: 8.6 TABLET, FILM COATED ORAL at 20:21

## 2024-09-24 ASSESSMENT — PAIN DESCRIPTION - LOCATION: LOCATION: COCCYX;BACK

## 2024-09-24 ASSESSMENT — PAIN SCALES - GENERAL
PAINLEVEL_OUTOF10: 0
PAINLEVEL_OUTOF10: 0
PAINLEVEL_OUTOF10: 10
PAINLEVEL_OUTOF10: 0

## 2024-09-24 ASSESSMENT — PAIN DESCRIPTION - ORIENTATION: ORIENTATION: POSTERIOR

## 2024-09-24 ASSESSMENT — PAIN DESCRIPTION - DESCRIPTORS: DESCRIPTORS: ACHING;DISCOMFORT

## 2024-09-24 NOTE — H&P
Hospital Medicine History & Physical      Date of Admission: 9/23/2024    Date of Service:  Pt seen/examined on 9/23/24     [x]Admitted to Inpatient with expected LOS greater than two midnights due to medical therapy.  []Placed in Observation status.    Chief Admission Complaint:  abn labs    Presenting Admission History:  (per emr as pt demented)    74 y.o. male with bipolar d/o , dementia, anemia, DAYANA, hld, dm2 who presented to [facility] with abn labs from nursing home.  Pt was sent in today as recent labs that were monitoring renal fcn have been progressively worsening.  Pt has no complaints currently and is sleeping soundly.    ER course: labs obtained, nephro consulted, jones placed, mag iv given, kcl once, ultram once,zofran once    Assessment/Plan:      Current Principal Problem:  ANTWAN (acute kidney injury) (HCC)    Antwan on ckd- with hx of bph  -Nephro consulted  -Jones placed in ER  -Trended labs  -Ivfs ordered  -Held home lasix    Left pleural effusion- large size on imaging  -pulm consulted    Dm2-per emr  -Ac/hs bs  -Ssi    Anemia- with hb of 7.7  -Check occult    Dementia- aox 2 at baseline per ER nurse on arrival  -Depakote  -Exelon patch    Bipolar/DAYANA -per emr   -mgmt as outpt    Htn-stable  -hydralazine  -on home prn clonidine    Hld-statin continued    Prior cva--per emr  -Statin/asa    Afib-stable, rate controlled  -on tele    Discussed management and the need for Hospitalization of the patient w/ the Emergency Department Provider: Ernesto FISHER    CXR: I have reviewed the CXR with the following interpretation: large left effusion    EKG:  I have reviewed the EKG with the following interpretation: afib, rate of 92, left axis, possible inferior infarct pattern    Physical Exam Performed:      /84   Pulse 81   Temp 97 °F (36.1 °C) (Axillary)   Resp 17   Wt 104.8 kg (231 lb)   SpO2 100%   BMI 28.12 kg/m²     General appearance:  No apparent distress, appears stated age and

## 2024-09-24 NOTE — ACP (ADVANCE CARE PLANNING)
Can't do hobbies/housework; Intake normal or reduced, Occasional assist; LOC full/confusion   [] 50%  Mainly sit/lie; Extensive disease. Mainly assist, Intake normal or reduced; Occasional assist; LOC full/confusion   [] 40%  Mainly in bed; Extensive disease; Mainly assist; Intake normal or reduced; Occasional assist; LOC full/confusion   [x] 30%  Bed bound, Extensive disease; Total care; Intake reduced; LOC full/confusion   [] 20%  Bed bound; Extensive disease; Total care; Intake minimal; Drowsy/coma   [] 10%  Bed bound; Extensive disease; Total care; Mouth care only; Drowsy/coma   []  0%   Death     Reason for consult:  _X_ Advance Care Planning  ___ Transition of Care Planning  _X_ Psychosocial/Spiritual Support  ___ Symptom Management                      I spent 50 minutes with the patient and/or surrogate decision maker discussing the patient's wishes and goals.                                                                                                                                                                                            Madonna Oleary RN

## 2024-09-24 NOTE — ED NOTES
Alma Delia Garner is a 74 y.o. male admitted for  Principal Problem:    MAISHA (acute kidney injury) (HCC)  Resolved Problems:    * No resolved hospital problems. *  .   Patient SNF LTC via EMS transportation with   Chief Complaint   Patient presents with    Abnormal Lab     Sent from the [facility] for elevated creatinine. Had labs drawn on the 20th, cr 4.8   .  Patient is alert and Person  Patient's baseline mobility: Baseline Mobility: unknown baseline  Code Status: Prior   Cardiac Rhythm: Cardiac Rhythm: Sinus rhythm     Is patient on baseline Oxygen: no how many Liters3:   Abnormal Assessment Findings: Jones with 200 mL out since placed, tea colored urine.     Isolation: MDRO urine       NIH Score:    C-SSRS: Risk of Suicide: No Risk  Bedside swallow:        Active LDA's:   Peripheral IV 09/23/24 Right Antecubital (Active)     Patient admitted with a jones: yes,  If the jones is chronic was it exchanged:Yes  Reason for jones: Strict I&O  Patient admitted with Central Line:  NA . PICC line placement confirmed: YES OR NO:[id number]}   Reason for Central line: HD/Apheresis  Was central line Inserted from an outside facility: no       Family/Caregiver Present no Any Concerns: no   Restraints no  Sitter no         Vitals:      Vitals:    09/23/24 1729 09/23/24 1845 09/23/24 1945 09/23/24 2030   BP: 117/79 (!) 135/100 137/87 (!) 155/86   Pulse: 83 81 83 96   Resp: 14 14 14 15   Temp:       TempSrc:       SpO2: 94% 95% 95% (!) 89%   Weight:           Last documented pain score (0-10 scale) Pain Level: 6  Pain medication administered No.    Pertinent or High Risk Medications/Drips: Yes- see MAR. NO     Pending Blood Product Administration: no    Abnormal labs:   Abnormal Labs Reviewed   CBC WITH AUTO DIFFERENTIAL - Abnormal; Notable for the following components:       Result Value    RBC 2.91 (*)     Hemoglobin 7.7 (*)     Hematocrit 23.9 (*)     RDW 17.6 (*)     Lymphocytes Absolute 0.5 (*)     All other components

## 2024-09-24 NOTE — RT PROTOCOL NOTE
RT Inhaler-Nebulizer Bronchodilator Protocol Note    There is a bronchodilator order in the chart from a provider indicating to follow the RT Bronchodilator Protocol and there is an “Initiate RT Inhaler-Nebulizer Bronchodilator Protocol” order as well (see protocol at bottom of note).    CXR Findings:  XR CHEST PORTABLE    Result Date: 9/23/2024  Redemonstration of left mid and lower lung consolidation reflecting a large pleural effusion, atelectasis and or airspace disease, grossly unchanged from prior study.       The findings from the last RT Protocol Assessment were as follows:   History Pulmonary Disease: Smoker 15 pack years or more  Respiratory Pattern: Regular pattern and RR 12-20 bpm  Breath Sounds: Slightly diminished and/or crackles  Cough: Strong, spontaneous, non-productive  Indication for Bronchodilator Therapy: None  Bronchodilator Assessment Score: 3    Aerosolized bronchodilator medication orders have been revised according to the RT Inhaler-Nebulizer Bronchodilator Protocol below.    Respiratory Therapist to perform RT Therapy Protocol Assessment initially then follow the protocol.  Repeat RT Therapy Protocol Assessment PRN for score 0-3 or on second treatment, BID, and PRN for scores above 3.    No Indications - adjust the frequency to every 6 hours PRN wheezing or bronchospasm, if no treatments needed after 48 hours then discontinue using Per Protocol order mode.     If indication present, adjust the RT bronchodilator orders based on the Bronchodilator Assessment Score as indicated below.  Use Inhaler orders unless patient has one or more of the following: on home nebulizer, not able to hold breath for 10 seconds, is not alert and oriented, cannot activate and use MDI correctly, or respiratory rate 25 breaths per minute or more, then use the equivalent nebulizer order(s) with same Frequency and PRN reasons based on the score.  If a patient is on this medication at home then do not decrease

## 2024-09-24 NOTE — CARE COORDINATION
Case Management Assessment  Initial Evaluation    Date/Time of Evaluation: 9/24/2024 3:20 PM  Assessment Completed by: Terri Panda RN    If patient is discharged prior to next notation, then this note serves as note for discharge by case management.    Patient Name: Alma Delia Garner                   YOB: 1950  Diagnosis: Hypokalemia [E87.6]  Hypomagnesemia [E83.42]  Chronic atrial fibrillation (HCC) [I48.20]  Pleural effusion on left [J90]  MAISHA (acute kidney injury) (HCC) [N17.9]  Acute kidney injury superimposed on CKD (HCC) [N17.9, N18.9]  Anemia, unspecified type [D64.9]  Hematuria, unspecified type [R31.9]                   Date / Time: 9/23/2024  2:18 PM    Patient Admission Status: Inpatient   Readmission Risk (Low < 19, Mod (19-27), High > 27): Readmission Risk Score: 27.4    Current PCP: Yvette Leonard MD  PCP verified by CM? Yes (MD at [facility])    Chart Reviewed: Yes      History Provided by: Medical Record  Patient Orientation: Alert and Oriented, Person    Patient Cognition: Dementia / Early Alzheimer's    Hospitalization in the last 30 days (Readmission):  No    If yes, Readmission Assessment in CM Navigator will be completed.    Advance Directives:      Code Status: Full Code   Patient's Primary Decision Maker is: Legal Next of Kin    Primary Decision Maker: Joselin Garner - Child - [phone]    Discharge Planning:    Patient lives with:   Type of Home: Long-Term Care  Primary Care Giver: Other (Comment) ([facility])  Patient Support Systems include: Family Members   Current Financial resources: Medicare  Current community resources: None  Current services prior to admission:              Current DME:              Type of Home Care services:  None    ADLS  Prior functional level: Assistance with the following:, Bathing, Dressing, Toileting, Cooking, Housework, Shopping, Mobility  Current functional level: Assistance with the following:, Bathing, Dressing, Toileting, Cooking,

## 2024-09-25 ENCOUNTER — APPOINTMENT (OUTPATIENT)
Dept: ULTRASOUND IMAGING | Age: 74
DRG: 673 | End: 2024-09-25
Attending: STUDENT IN AN ORGANIZED HEALTH CARE EDUCATION/TRAINING PROGRAM
Payer: MEDICARE

## 2024-09-25 ENCOUNTER — APPOINTMENT (OUTPATIENT)
Dept: GENERAL RADIOLOGY | Age: 74
DRG: 673 | End: 2024-09-25
Payer: MEDICARE

## 2024-09-25 LAB
ANION GAP SERPL CALCULATED.3IONS-SCNC: 15 MMOL/L (ref 3–16)
APPEARANCE FLUID: NORMAL
BASOPHILS # BLD: 0 K/UL (ref 0–0.2)
BASOPHILS NFR BLD: 0.4 %
BDY FLUID QUALITY: NORMAL
BUN SERPL-MCNC: 75 MG/DL (ref 7–20)
CALCIUM SERPL-MCNC: 8.5 MG/DL (ref 8.3–10.6)
CELL COUNT FLUID TYPE: NORMAL
CHLORIDE SERPL-SCNC: 103 MMOL/L (ref 99–110)
CO2 SERPL-SCNC: 21 MMOL/L (ref 21–32)
COLOR FLUID: NORMAL
CREAT SERPL-MCNC: 5.3 MG/DL (ref 0.8–1.3)
DEPRECATED RDW RBC AUTO: 18.2 % (ref 12.4–15.4)
EOSINOPHIL # BLD: 0 K/UL (ref 0–0.6)
EOSINOPHIL NFR BLD: 0.4 %
GFR SERPLBLD CREATININE-BSD FMLA CKD-EPI: 11 ML/MIN/{1.73_M2}
GLUCOSE BLD-MCNC: 112 MG/DL (ref 70–99)
GLUCOSE BLD-MCNC: 121 MG/DL (ref 70–99)
GLUCOSE BLD-MCNC: 79 MG/DL (ref 70–99)
GLUCOSE BLD-MCNC: 87 MG/DL (ref 70–99)
GLUCOSE BLD-MCNC: 90 MG/DL (ref 70–99)
GLUCOSE FLD-MCNC: 78 MG/DL
GLUCOSE SERPL-MCNC: 75 MG/DL (ref 70–99)
HCT VFR BLD AUTO: 24.6 % (ref 40.5–52.5)
HGB BLD-MCNC: 7.6 G/DL (ref 13.5–17.5)
INR PPP: 1.38 (ref 0.85–1.15)
LDH FLD L TO P-CCNC: 160 U/L
LYMPHOCYTES # BLD: 0.5 K/UL (ref 1–5.1)
LYMPHOCYTES NFR BLD: 6.8 %
MACROPHAGES # FLD: 14 %
MCH RBC QN AUTO: 26.1 PG (ref 26–34)
MCHC RBC AUTO-ENTMCNC: 30.9 G/DL (ref 31–36)
MCV RBC AUTO: 84.5 FL (ref 80–100)
MESOTHL CELL NFR FLD: 3 %
MONOCYTES # BLD: 0.5 K/UL (ref 0–1.3)
MONOCYTES NFR BLD: 6.2 %
NEUTROPHIL, FLUID: 83 %
NEUTROPHILS # BLD: 6.7 K/UL (ref 1.7–7.7)
NEUTROPHILS NFR BLD: 86.2 %
NUC CELL # FLD: 1336 /CUMM
PERFORMED ON: ABNORMAL
PERFORMED ON: ABNORMAL
PERFORMED ON: NORMAL
PLATELET # BLD AUTO: 125 K/UL (ref 135–450)
PMV BLD AUTO: 7.5 FL (ref 5–10.5)
POTASSIUM SERPL-SCNC: 4 MMOL/L (ref 3.5–5.1)
PROT FLD-MCNC: 3.1 G/DL
PROTHROMBIN TIME: 17.2 SEC (ref 11.9–14.9)
RBC # BLD AUTO: 2.91 M/UL (ref 4.2–5.9)
RBC FLUID: 700 /CUMM
SODIUM SERPL-SCNC: 139 MMOL/L (ref 136–145)
TOTAL CELLS COUNTED FLD: 100
WBC # BLD AUTO: 7.8 K/UL (ref 4–11)

## 2024-09-25 PROCEDURE — 84157 ASSAY OF PROTEIN OTHER: CPT

## 2024-09-25 PROCEDURE — 83615 LACTATE (LD) (LDH) ENZYME: CPT

## 2024-09-25 PROCEDURE — 6370000000 HC RX 637 (ALT 250 FOR IP): Performed by: STUDENT IN AN ORGANIZED HEALTH CARE EDUCATION/TRAINING PROGRAM

## 2024-09-25 PROCEDURE — 6360000002 HC RX W HCPCS: Performed by: INTERNAL MEDICINE

## 2024-09-25 PROCEDURE — 85025 COMPLETE CBC W/AUTO DIFF WBC: CPT

## 2024-09-25 PROCEDURE — 94761 N-INVAS EAR/PLS OXIMETRY MLT: CPT

## 2024-09-25 PROCEDURE — 82945 GLUCOSE OTHER FLUID: CPT

## 2024-09-25 PROCEDURE — 85610 PROTHROMBIN TIME: CPT

## 2024-09-25 PROCEDURE — 2700000000 HC OXYGEN THERAPY PER DAY

## 2024-09-25 PROCEDURE — 99232 SBSQ HOSP IP/OBS MODERATE 35: CPT | Performed by: STUDENT IN AN ORGANIZED HEALTH CARE EDUCATION/TRAINING PROGRAM

## 2024-09-25 PROCEDURE — 6370000000 HC RX 637 (ALT 250 FOR IP): Performed by: INTERNAL MEDICINE

## 2024-09-25 PROCEDURE — 1200000000 HC SEMI PRIVATE

## 2024-09-25 PROCEDURE — 2580000003 HC RX 258: Performed by: INTERNAL MEDICINE

## 2024-09-25 PROCEDURE — 88112 CYTOPATH CELL ENHANCE TECH: CPT

## 2024-09-25 PROCEDURE — 82042 OTHER SOURCE ALBUMIN QUAN EA: CPT

## 2024-09-25 PROCEDURE — 80048 BASIC METABOLIC PNL TOTAL CA: CPT

## 2024-09-25 PROCEDURE — 36415 COLL VENOUS BLD VENIPUNCTURE: CPT

## 2024-09-25 PROCEDURE — 87205 SMEAR GRAM STAIN: CPT

## 2024-09-25 PROCEDURE — 0W9B3ZZ DRAINAGE OF LEFT PLEURAL CAVITY, PERCUTANEOUS APPROACH: ICD-10-PCS | Performed by: STUDENT IN AN ORGANIZED HEALTH CARE EDUCATION/TRAINING PROGRAM

## 2024-09-25 PROCEDURE — 89051 BODY FLUID CELL COUNT: CPT

## 2024-09-25 PROCEDURE — 71045 X-RAY EXAM CHEST 1 VIEW: CPT

## 2024-09-25 PROCEDURE — 32555 ASPIRATE PLEURA W/ IMAGING: CPT

## 2024-09-25 PROCEDURE — 88305 TISSUE EXAM BY PATHOLOGIST: CPT

## 2024-09-25 PROCEDURE — 94640 AIRWAY INHALATION TREATMENT: CPT

## 2024-09-25 PROCEDURE — 87070 CULTURE OTHR SPECIMN AEROBIC: CPT

## 2024-09-25 RX ORDER — FUROSEMIDE 10 MG/ML
80 INJECTION INTRAMUSCULAR; INTRAVENOUS ONCE
Status: COMPLETED | OUTPATIENT
Start: 2024-09-25 | End: 2024-09-25

## 2024-09-25 RX ADMIN — TRAMADOL HYDROCHLORIDE 50 MG: 50 TABLET ORAL at 17:51

## 2024-09-25 RX ADMIN — HEPARIN SODIUM 5000 UNITS: 5000 INJECTION INTRAVENOUS; SUBCUTANEOUS at 21:01

## 2024-09-25 RX ADMIN — TRAZODONE HYDROCHLORIDE 50 MG: 50 TABLET ORAL at 21:00

## 2024-09-25 RX ADMIN — HEPARIN SODIUM 5000 UNITS: 5000 INJECTION INTRAVENOUS; SUBCUTANEOUS at 05:37

## 2024-09-25 RX ADMIN — FUROSEMIDE 80 MG: 10 INJECTION, SOLUTION INTRAMUSCULAR; INTRAVENOUS at 15:37

## 2024-09-25 RX ADMIN — SENNOSIDES 8.6 MG: 8.6 TABLET, FILM COATED ORAL at 21:01

## 2024-09-25 RX ADMIN — CALCIUM POLYCARBOPHIL 625 MG: 625 TABLET ORAL at 17:51

## 2024-09-25 RX ADMIN — DIVALPROEX SODIUM 250 MG: 125 CAPSULE, COATED PELLETS ORAL at 21:01

## 2024-09-25 RX ADMIN — DOXYCYCLINE HYCLATE 100 MG: 100 TABLET, COATED ORAL at 10:01

## 2024-09-25 RX ADMIN — SODIUM CHLORIDE, PRESERVATIVE FREE 10 ML: 5 INJECTION INTRAVENOUS at 21:01

## 2024-09-25 RX ADMIN — HYDRALAZINE HYDROCHLORIDE 25 MG: 25 TABLET ORAL at 21:00

## 2024-09-25 RX ADMIN — HYDRALAZINE HYDROCHLORIDE 25 MG: 25 TABLET ORAL at 10:00

## 2024-09-25 RX ADMIN — TRAMADOL HYDROCHLORIDE 50 MG: 50 TABLET ORAL at 10:00

## 2024-09-25 RX ADMIN — IPRATROPIUM BROMIDE AND ALBUTEROL SULFATE 1 DOSE: 2.5; .5 SOLUTION RESPIRATORY (INHALATION) at 11:37

## 2024-09-25 RX ADMIN — ASPIRIN 81 MG 81 MG: 81 TABLET ORAL at 10:00

## 2024-09-25 RX ADMIN — HEPARIN SODIUM 5000 UNITS: 5000 INJECTION INTRAVENOUS; SUBCUTANEOUS at 15:38

## 2024-09-25 RX ADMIN — PANTOPRAZOLE SODIUM 20 MG: 20 TABLET, DELAYED RELEASE ORAL at 10:01

## 2024-09-25 RX ADMIN — IPRATROPIUM BROMIDE AND ALBUTEROL SULFATE 1 DOSE: 2.5; .5 SOLUTION RESPIRATORY (INHALATION) at 16:02

## 2024-09-25 RX ADMIN — IPRATROPIUM BROMIDE AND ALBUTEROL SULFATE 1 DOSE: 2.5; .5 SOLUTION RESPIRATORY (INHALATION) at 07:45

## 2024-09-25 RX ADMIN — B-COMPLEX W/ C & FOLIC ACID TAB 1 TABLET: TAB at 10:01

## 2024-09-25 RX ADMIN — DOXYCYCLINE HYCLATE 100 MG: 100 TABLET, COATED ORAL at 21:00

## 2024-09-25 RX ADMIN — DIVALPROEX SODIUM 250 MG: 125 CAPSULE, COATED PELLETS ORAL at 10:00

## 2024-09-25 RX ADMIN — ATORVASTATIN CALCIUM 10 MG: 10 TABLET, FILM COATED ORAL at 21:00

## 2024-09-25 RX ADMIN — SENNOSIDES 8.6 MG: 8.6 TABLET, FILM COATED ORAL at 10:00

## 2024-09-25 ASSESSMENT — PAIN SCALES - GENERAL: PAINLEVEL_OUTOF10: 1

## 2024-09-25 NOTE — CARE COORDINATION
Chart reviewed day 2. Care provided by nephro, pulm and IM. Pt is from [facility]. He is wheelchair bound at baseline. Palliative Care has been speaking with daughter. Pt had a thoracentesis today. Creat 5.3 today. Pt received one time lasix today. Per nephro note pt may need HD. Will continue to follow course for needs. Terri Panda RN

## 2024-09-25 NOTE — RT PROTOCOL NOTE
RT Inhaler-Nebulizer Bronchodilator Protocol Note    There is a bronchodilator order in the chart from a provider indicating to follow the RT Bronchodilator Protocol and there is an “Initiate RT Inhaler-Nebulizer Bronchodilator Protocol” order as well (see protocol at bottom of note).    CXR Findings:  XR CHEST PORTABLE    Result Date: 9/25/2024  Improved aeration of the left lung status post thoracentesis with no evidence of pneumothorax. Persistent left basilar pleuroparenchymal disease. Small nonspecific nodular density along the left upper lung zone.  Recommend attention on follow-up examinations.       The findings from the last RT Protocol Assessment were as follows:   History Pulmonary Disease: Smoker 15 pack years or more  Respiratory Pattern: Dyspnea on exertion or RR 21-25 bpm  Breath Sounds: Slightly diminished and/or crackles  Cough: Strong, productive  Indication for Bronchodilator Therapy: Decreased or absent breath sounds  Bronchodilator Assessment Score: 6    Aerosolized bronchodilator medication orders have been revised according to the RT Inhaler-Nebulizer Bronchodilator Protocol below.    Respiratory Therapist to perform RT Therapy Protocol Assessment initially then follow the protocol.  Repeat RT Therapy Protocol Assessment PRN for score 0-3 or on second treatment, BID, and PRN for scores above 3.    No Indications - adjust the frequency to every 6 hours PRN wheezing or bronchospasm, if no treatments needed after 48 hours then discontinue using Per Protocol order mode.     If indication present, adjust the RT bronchodilator orders based on the Bronchodilator Assessment Score as indicated below.  Use Inhaler orders unless patient has one or more of the following: on home nebulizer, not able to hold breath for 10 seconds, is not alert and oriented, cannot activate and use MDI correctly, or respiratory rate 25 breaths per minute or more, then use the equivalent nebulizer order(s) with same Frequency

## 2024-09-26 ENCOUNTER — APPOINTMENT (OUTPATIENT)
Dept: INTERVENTIONAL RADIOLOGY/VASCULAR | Age: 74
DRG: 673 | End: 2024-09-26
Attending: INTERNAL MEDICINE
Payer: MEDICARE

## 2024-09-26 LAB
ALBUMIN FLD-MCNC: 1.5 G/DL
ANION GAP SERPL CALCULATED.3IONS-SCNC: 17 MMOL/L (ref 3–16)
BASOPHILS # BLD: 0 K/UL (ref 0–0.2)
BASOPHILS NFR BLD: 0.5 %
BUN SERPL-MCNC: 82 MG/DL (ref 7–20)
CALCIUM SERPL-MCNC: 8.5 MG/DL (ref 8.3–10.6)
CHLORIDE SERPL-SCNC: 102 MMOL/L (ref 99–110)
CO2 SERPL-SCNC: 19 MMOL/L (ref 21–32)
CREAT SERPL-MCNC: 5.8 MG/DL (ref 0.8–1.3)
DEPRECATED RDW RBC AUTO: 17.9 % (ref 12.4–15.4)
EOSINOPHIL # BLD: 0.1 K/UL (ref 0–0.6)
EOSINOPHIL NFR BLD: 0.8 %
GFR SERPLBLD CREATININE-BSD FMLA CKD-EPI: 9 ML/MIN/{1.73_M2}
GLUCOSE BLD-MCNC: 109 MG/DL (ref 70–99)
GLUCOSE BLD-MCNC: 110 MG/DL (ref 70–99)
GLUCOSE BLD-MCNC: 113 MG/DL (ref 70–99)
GLUCOSE BLD-MCNC: 117 MG/DL (ref 70–99)
GLUCOSE SERPL-MCNC: 112 MG/DL (ref 70–99)
HCT VFR BLD AUTO: 22.9 % (ref 40.5–52.5)
HGB BLD-MCNC: 7.3 G/DL (ref 13.5–17.5)
LYMPHOCYTES # BLD: 0.7 K/UL (ref 1–5.1)
LYMPHOCYTES NFR BLD: 9.8 %
MCH RBC QN AUTO: 26.7 PG (ref 26–34)
MCHC RBC AUTO-ENTMCNC: 31.9 G/DL (ref 31–36)
MCV RBC AUTO: 83.8 FL (ref 80–100)
MONOCYTES # BLD: 0.6 K/UL (ref 0–1.3)
MONOCYTES NFR BLD: 8.4 %
NEUTROPHILS # BLD: 5.7 K/UL (ref 1.7–7.7)
NEUTROPHILS NFR BLD: 80.5 %
PERFORMED ON: ABNORMAL
PLATELET # BLD AUTO: 124 K/UL (ref 135–450)
PMV BLD AUTO: 7.5 FL (ref 5–10.5)
POTASSIUM SERPL-SCNC: 4.4 MMOL/L (ref 3.5–5.1)
RBC # BLD AUTO: 2.73 M/UL (ref 4.2–5.9)
SODIUM SERPL-SCNC: 138 MMOL/L (ref 136–145)
SPECIMEN SOURCE FLD: NORMAL
WBC # BLD AUTO: 7.1 K/UL (ref 4–11)

## 2024-09-26 PROCEDURE — 6360000002 HC RX W HCPCS: Performed by: INTERNAL MEDICINE

## 2024-09-26 PROCEDURE — 77001 FLUOROGUIDE FOR VEIN DEVICE: CPT

## 2024-09-26 PROCEDURE — 2500000003 HC RX 250 WO HCPCS: Performed by: RADIOLOGY

## 2024-09-26 PROCEDURE — 2580000003 HC RX 258: Performed by: INTERNAL MEDICINE

## 2024-09-26 PROCEDURE — 1200000000 HC SEMI PRIVATE

## 2024-09-26 PROCEDURE — 76937 US GUIDE VASCULAR ACCESS: CPT

## 2024-09-26 PROCEDURE — 86803 HEPATITIS C AB TEST: CPT

## 2024-09-26 PROCEDURE — 80048 BASIC METABOLIC PNL TOTAL CA: CPT

## 2024-09-26 PROCEDURE — 6370000000 HC RX 637 (ALT 250 FOR IP): Performed by: STUDENT IN AN ORGANIZED HEALTH CARE EDUCATION/TRAINING PROGRAM

## 2024-09-26 PROCEDURE — 86704 HEP B CORE ANTIBODY TOTAL: CPT

## 2024-09-26 PROCEDURE — 87340 HEPATITIS B SURFACE AG IA: CPT

## 2024-09-26 PROCEDURE — 6360000002 HC RX W HCPCS

## 2024-09-26 PROCEDURE — 6370000000 HC RX 637 (ALT 250 FOR IP): Performed by: INTERNAL MEDICINE

## 2024-09-26 PROCEDURE — 85025 COMPLETE CBC W/AUTO DIFF WBC: CPT

## 2024-09-26 PROCEDURE — 94761 N-INVAS EAR/PLS OXIMETRY MLT: CPT

## 2024-09-26 PROCEDURE — 94640 AIRWAY INHALATION TREATMENT: CPT

## 2024-09-26 PROCEDURE — 36415 COLL VENOUS BLD VENIPUNCTURE: CPT

## 2024-09-26 PROCEDURE — 2700000000 HC OXYGEN THERAPY PER DAY

## 2024-09-26 PROCEDURE — 90935 HEMODIALYSIS ONE EVALUATION: CPT

## 2024-09-26 PROCEDURE — 99232 SBSQ HOSP IP/OBS MODERATE 35: CPT | Performed by: STUDENT IN AN ORGANIZED HEALTH CARE EDUCATION/TRAINING PROGRAM

## 2024-09-26 PROCEDURE — C1894 INTRO/SHEATH, NON-LASER: HCPCS

## 2024-09-26 PROCEDURE — 86706 HEP B SURFACE ANTIBODY: CPT

## 2024-09-26 PROCEDURE — 36556 INSERT NON-TUNNEL CV CATH: CPT

## 2024-09-26 RX ORDER — LIDOCAINE HYDROCHLORIDE 20 MG/ML
INJECTION, SOLUTION EPIDURAL; INFILTRATION; INTRACAUDAL; PERINEURAL PRN
Status: COMPLETED | OUTPATIENT
Start: 2024-09-26 | End: 2024-09-26

## 2024-09-26 RX ORDER — HEPARIN SODIUM 1000 [USP'U]/ML
2800 INJECTION, SOLUTION INTRAVENOUS; SUBCUTANEOUS PRN
Status: DISCONTINUED | OUTPATIENT
Start: 2024-09-26 | End: 2024-10-01 | Stop reason: HOSPADM

## 2024-09-26 RX ADMIN — HEPARIN SODIUM 5000 UNITS: 5000 INJECTION INTRAVENOUS; SUBCUTANEOUS at 05:58

## 2024-09-26 RX ADMIN — HEPARIN SODIUM 5000 UNITS: 5000 INJECTION INTRAVENOUS; SUBCUTANEOUS at 15:12

## 2024-09-26 RX ADMIN — HEPARIN SODIUM 5000 UNITS: 5000 INJECTION INTRAVENOUS; SUBCUTANEOUS at 21:39

## 2024-09-26 RX ADMIN — PANTOPRAZOLE SODIUM 20 MG: 20 TABLET, DELAYED RELEASE ORAL at 15:12

## 2024-09-26 RX ADMIN — CLONIDINE HYDROCHLORIDE 0.2 MG: 0.1 TABLET ORAL at 23:43

## 2024-09-26 RX ADMIN — HYDRALAZINE HYDROCHLORIDE 25 MG: 25 TABLET ORAL at 21:40

## 2024-09-26 RX ADMIN — SENNOSIDES 8.6 MG: 8.6 TABLET, FILM COATED ORAL at 15:12

## 2024-09-26 RX ADMIN — TRAZODONE HYDROCHLORIDE 50 MG: 50 TABLET ORAL at 21:40

## 2024-09-26 RX ADMIN — HEPARIN SODIUM 2800 UNITS: 1000 INJECTION INTRAVENOUS; SUBCUTANEOUS at 12:50

## 2024-09-26 RX ADMIN — HYDRALAZINE HYDROCHLORIDE 25 MG: 25 TABLET ORAL at 15:12

## 2024-09-26 RX ADMIN — DIVALPROEX SODIUM 250 MG: 125 CAPSULE, COATED PELLETS ORAL at 15:12

## 2024-09-26 RX ADMIN — DOXYCYCLINE HYCLATE 100 MG: 100 TABLET, COATED ORAL at 15:12

## 2024-09-26 RX ADMIN — EPOETIN ALFA-EPBX 10000 UNITS: 10000 INJECTION, SOLUTION INTRAVENOUS; SUBCUTANEOUS at 11:41

## 2024-09-26 RX ADMIN — IPRATROPIUM BROMIDE AND ALBUTEROL SULFATE 1 DOSE: 2.5; .5 SOLUTION RESPIRATORY (INHALATION) at 08:38

## 2024-09-26 RX ADMIN — B-COMPLEX W/ C & FOLIC ACID TAB 1 TABLET: TAB at 15:12

## 2024-09-26 RX ADMIN — LIDOCAINE HYDROCHLORIDE 10 ML: 20 INJECTION, SOLUTION EPIDURAL; INFILTRATION; INTRACAUDAL; PERINEURAL at 10:16

## 2024-09-26 RX ADMIN — SENNOSIDES 8.6 MG: 8.6 TABLET, FILM COATED ORAL at 21:40

## 2024-09-26 RX ADMIN — IPRATROPIUM BROMIDE AND ALBUTEROL SULFATE 1 DOSE: 2.5; .5 SOLUTION RESPIRATORY (INHALATION) at 16:08

## 2024-09-26 RX ADMIN — ATORVASTATIN CALCIUM 10 MG: 10 TABLET, FILM COATED ORAL at 21:40

## 2024-09-26 RX ADMIN — SODIUM CHLORIDE, PRESERVATIVE FREE 10 ML: 5 INJECTION INTRAVENOUS at 21:40

## 2024-09-26 ASSESSMENT — PAIN SCALES - GENERAL
PAINLEVEL_OUTOF10: 0
PAINLEVEL_OUTOF10: 0

## 2024-09-26 NOTE — CARE COORDINATION
Chart reviewed day 3. Care provided by nephro, pulm and IM. Pt is from [facility]. He is wheelchair bound at baseline. Pt had thoracentesis 9/24. Pt is on RA. Pt is getting a temporary HD line placed and first HD today. Luke from [facility] has been made aware of need for long term HD. He will likely get his tunneled line tomorrow and can start outpt next week. She states most of their residents use HD in [facility] as it is the closest to their facility. I will set him up for [facility]. Luke states the easiest for transport is M-W-F late morning. Will start profile in portal. Terri Panda RN

## 2024-09-26 NOTE — CONSULTS
Palliative Care Initial Note  Palliative Care Admit date:  9/24/24    ACP/palcare referral noted  
Consult Placed     Who: GABBY MCELROY   Date:9/24/2024  Time:0758     Electronically signed by Mike Jerome on 9/24/2024 at 7:57 AM    
Consult Placed     Who: SAMMY KOO   Date:9/24/2024  Time:1224     Electronically signed by Mike Jerome on 9/24/2024 at 12:23 PM    
Pulmonary New Consultation       Patient Name:  Alma Delia Garner    REASONFOR ADMISSION/CC: Shortness of breath    PCP: Yvette Leonard MD    HISTORY OF PRESENT ILLNESS:   74 y.o. year old male with significant past medical history of A-fib, CAD, CKD that presents to [facility] for shortness of breath.  History is limited due to patient's mental status.  Patient is not on oxygen therapy at home but requiring up to 2 L O2.  He has had a bilateral pleural effusions which his left side is getting worse.  He denies any fever, chills, cough, chest pain, nausea, vomiting, abdominal pain.  Nephrology is following patient because of CKD and is at high risk for needing dialysis.    Patient has a history of dementia.  He reports being a never smoker, no illicit drug use, no alcohol use.  Pulmonary is consulted for pleural effusion.    Past Medical History:   Diagnosis Date    Acute gastric ulcer with bleeding     Acute metabolic encephalopathy     MAISHA (acute kidney injury) ([facility])     Requiring dialysis since 7/4/17 in setting of hemorrhagic shock    ATN (acute tubular necrosis) ([facility])     Atrial fib/flutter, transient     Atrial fibrillation ([facility])     BPH (benign prostatic hyperplasia)     CAD (coronary artery disease)     Cerebral artery occlusion with cerebral infarction ([facility]) 08/05/2017    weakness and aphasia    CHF (congestive heart failure) ([facility]) 05/04/2024    CKD (chronic kidney disease)     Dr. Ambrosio    Dementia ([facility])     Diabetes mellitus ([facility])     GI bleed     Glaucoma     History of blood transfusion     Hx of diabetic neuropathy     Hyperlipidemia     Hypertension     Mitral insufficiency     Mobility impaired     uses walker and W/C    Multiple drug resistant organism (MDRO) culture positive 12/29/2020    urine    Pulmonary hypertension (HCC)     Traumatic hemorrhagic shock ([facility])        Past Surgical History:   Procedure Laterality Date    ABDOMEN SURGERY      CARDIAC SURGERY      COLONOSCOPY      DENTAL 
Referral made for [facility]  
catheter for accurate output  -Consider thoracentesis  -Replace potassium, magnesium  -Hold Lasix  -Serial renal panel  -daily wts and strict i/o  -renal dose medications   -avoid nephrotoxins        Thank you for the consultation.  Please do not hesitate to call with questions.    Brandi Ambrosio MD  Office: [phone]  Fax:    [phone]  [email or web address]

## 2024-09-26 NOTE — FLOWSHEET NOTE
09/26/24 1104 09/26/24 1337   Vital Signs   /67 116/73   Temp 97.6 °F (36.4 °C) 97 °F (36.1 °C)   Pulse 95 (!) 115   Respirations 18 20       Treatment time: 2.5 hours  Net UF: 500 ml    Pre weight: 108.3 kg (bed scale)  Post weight: 107.8 kg (bed scale)  EDW: TBD    Access used: NT RIJ cath  Access function: none    Medications or blood products given: Retacrit 38725 units IVP.    Regular outpatient schedule: TBD/new start    Summary of response to treatment: 1st HD tx.  2.5 hour HD tx.  Net  ml.  Tolerated tx without difficulty.  Net  ml.  Confused, frequently would yell out, redirects briefly.     Crit Line: H1=23.6, H2=23.5, difference of .1, no refill present.  Ending profile A.    Copy of dialysis treatment record placed in chart, to be scanned into EMR.    Report called to Bernice Robin RN

## 2024-09-26 NOTE — OR NURSING
Pt arrived for image guided temporary dialysis catheter insertion right IJ . Procedure explained including the risk and benefits of the procedure. All questions answered. Pt verbalizes understanding of the procedure and states no more questions. Consent confirmed. Vital signs stable. Labs, allergies, medications, and code status reviewed. No contraindications noted.     Vital Signs  Vitals:    09/26/24 0957   BP: (!) 150/106   Pulse: (!) 104   Resp: 22   Temp:    SpO2: 94%    (vital signs in table format)    Pre Gabriela Score  2 - Able to move 4 extremities voluntarily on command  2 - BP+/- 20mmHg of normal  2 - Fully awake  2 - Able to maintain oxygen saturation >92% on room air  2 - Able to breathe deeply and cough freely    Allergies  Sulfamethoxazole-trimethoprim (allergies)    Labs  Lab Results   Component Value Date    INR 1.38 (H) 09/25/2024    PROTIME 17.2 (H) 09/25/2024     Lab Results   Component Value Date    CREATININE 5.8 (HH) 09/26/2024    BUN 82 (HH) 09/26/2024     09/26/2024    K 4.4 09/26/2024     09/26/2024    CO2 19 (L) 09/26/2024     Lab Results   Component Value Date    WBC 7.1 09/26/2024    HGB 7.3 (L) 09/26/2024    HCT 22.9 (L) 09/26/2024    MCV 83.8 09/26/2024     (L) 09/26/2024

## 2024-09-26 NOTE — OR NURSING
Image guided temporary dialysis catheter insertion right IJ completed. Dr. Chauhan placed 13 Syrian 20 cm [employer] short term straight dialysis catheter LOT # [id number] EXP 03/31/25 in the right IJ. Drain/tube secured line with sutures. Drain/tube dressing clean, dry, and intact. Pt tolerated procedure without any signs or symptoms of distress. Vital signs stable. Report called to RN. Pt transported back to dialysis in stable condition via bed by transport.       Vital Signs  Vitals:    09/26/24 1041   BP: 139/88   Pulse: (!) 107   Resp: 18   Temp:    SpO2: 99%    (vital signs in table format)

## 2024-09-27 ENCOUNTER — APPOINTMENT (OUTPATIENT)
Dept: INTERVENTIONAL RADIOLOGY/VASCULAR | Age: 74
DRG: 673 | End: 2024-09-27
Attending: INTERNAL MEDICINE
Payer: MEDICARE

## 2024-09-27 LAB
ABO + RH BLD: NORMAL
ANION GAP SERPL CALCULATED.3IONS-SCNC: 10 MMOL/L (ref 3–16)
BLD GP AB SCN SERPL QL: NORMAL
BLOOD BANK DISPENSE STATUS: NORMAL
BLOOD BANK PRODUCT CODE: NORMAL
BPU ID: NORMAL
BUN SERPL-MCNC: 55 MG/DL (ref 7–20)
CALCIUM SERPL-MCNC: 8.4 MG/DL (ref 8.3–10.6)
CHLORIDE SERPL-SCNC: 99 MMOL/L (ref 99–110)
CO2 SERPL-SCNC: 26 MMOL/L (ref 21–32)
CREAT SERPL-MCNC: 4 MG/DL (ref 0.8–1.3)
DEPRECATED RDW RBC AUTO: 17.6 % (ref 12.4–15.4)
DESCRIPTION BLOOD BANK: NORMAL
GFR SERPLBLD CREATININE-BSD FMLA CKD-EPI: 15 ML/MIN/{1.73_M2}
GLUCOSE BLD-MCNC: 100 MG/DL (ref 70–99)
GLUCOSE BLD-MCNC: 112 MG/DL (ref 70–99)
GLUCOSE BLD-MCNC: 98 MG/DL (ref 70–99)
GLUCOSE SERPL-MCNC: 107 MG/DL (ref 70–99)
HBV SURFACE AB SERPL IA-ACNC: 7.32 MIU/ML
HBV SURFACE AG SERPL QL IA: NORMAL
HCT VFR BLD AUTO: 21.2 % (ref 40.5–52.5)
HCT VFR BLD AUTO: 23.9 % (ref 40.5–52.5)
HCV AB SERPL QL IA: NORMAL
HGB BLD-MCNC: 6.8 G/DL (ref 13.5–17.5)
HGB BLD-MCNC: 7.6 G/DL (ref 13.5–17.5)
MCH RBC QN AUTO: 26.4 PG (ref 26–34)
MCHC RBC AUTO-ENTMCNC: 32.1 G/DL (ref 31–36)
MCV RBC AUTO: 82.5 FL (ref 80–100)
PERFORMED ON: ABNORMAL
PERFORMED ON: ABNORMAL
PERFORMED ON: NORMAL
PLATELET # BLD AUTO: 115 K/UL (ref 135–450)
PMV BLD AUTO: 7.5 FL (ref 5–10.5)
POTASSIUM SERPL-SCNC: 4 MMOL/L (ref 3.5–5.1)
POTASSIUM SERPL-SCNC: 4 MMOL/L (ref 3.5–5.1)
RBC # BLD AUTO: 2.57 M/UL (ref 4.2–5.9)
SODIUM SERPL-SCNC: 135 MMOL/L (ref 136–145)
WBC # BLD AUTO: 5.1 K/UL (ref 4–11)

## 2024-09-27 PROCEDURE — 0JH63XZ INSERTION OF TUNNELED VASCULAR ACCESS DEVICE INTO CHEST SUBCUTANEOUS TISSUE AND FASCIA, PERCUTANEOUS APPROACH: ICD-10-PCS | Performed by: INTERNAL MEDICINE

## 2024-09-27 PROCEDURE — B5131ZA FLUOROSCOPY OF RIGHT JUGULAR VEINS USING LOW OSMOLAR CONTRAST, GUIDANCE: ICD-10-PCS | Performed by: INTERNAL MEDICINE

## 2024-09-27 PROCEDURE — 6370000000 HC RX 637 (ALT 250 FOR IP): Performed by: STUDENT IN AN ORGANIZED HEALTH CARE EDUCATION/TRAINING PROGRAM

## 2024-09-27 PROCEDURE — 86923 COMPATIBILITY TEST ELECTRIC: CPT

## 2024-09-27 PROCEDURE — 2580000003 HC RX 258: Performed by: STUDENT IN AN ORGANIZED HEALTH CARE EDUCATION/TRAINING PROGRAM

## 2024-09-27 PROCEDURE — 6360000002 HC RX W HCPCS: Performed by: INTERNAL MEDICINE

## 2024-09-27 PROCEDURE — 30233N1 TRANSFUSION OF NONAUTOLOGOUS RED BLOOD CELLS INTO PERIPHERAL VEIN, PERCUTANEOUS APPROACH: ICD-10-PCS | Performed by: INTERNAL MEDICINE

## 2024-09-27 PROCEDURE — 86850 RBC ANTIBODY SCREEN: CPT

## 2024-09-27 PROCEDURE — 6360000002 HC RX W HCPCS: Performed by: STUDENT IN AN ORGANIZED HEALTH CARE EDUCATION/TRAINING PROGRAM

## 2024-09-27 PROCEDURE — 2700000000 HC OXYGEN THERAPY PER DAY

## 2024-09-27 PROCEDURE — 94640 AIRWAY INHALATION TREATMENT: CPT

## 2024-09-27 PROCEDURE — 36430 TRANSFUSION BLD/BLD COMPNT: CPT

## 2024-09-27 PROCEDURE — 94761 N-INVAS EAR/PLS OXIMETRY MLT: CPT

## 2024-09-27 PROCEDURE — 5A1D70Z PERFORMANCE OF URINARY FILTRATION, INTERMITTENT, LESS THAN 6 HOURS PER DAY: ICD-10-PCS | Performed by: INTERNAL MEDICINE

## 2024-09-27 PROCEDURE — 99152 MOD SED SAME PHYS/QHP 5/>YRS: CPT

## 2024-09-27 PROCEDURE — 36558 INSERT TUNNELED CV CATH: CPT

## 2024-09-27 PROCEDURE — 85027 COMPLETE CBC AUTOMATED: CPT

## 2024-09-27 PROCEDURE — P9016 RBC LEUKOCYTES REDUCED: HCPCS

## 2024-09-27 PROCEDURE — 36415 COLL VENOUS BLD VENIPUNCTURE: CPT

## 2024-09-27 PROCEDURE — 77001 FLUOROGUIDE FOR VEIN DEVICE: CPT

## 2024-09-27 PROCEDURE — C1769 GUIDE WIRE: HCPCS

## 2024-09-27 PROCEDURE — 85014 HEMATOCRIT: CPT

## 2024-09-27 PROCEDURE — 90935 HEMODIALYSIS ONE EVALUATION: CPT

## 2024-09-27 PROCEDURE — 86900 BLOOD TYPING SEROLOGIC ABO: CPT

## 2024-09-27 PROCEDURE — 85018 HEMOGLOBIN: CPT

## 2024-09-27 PROCEDURE — 80048 BASIC METABOLIC PNL TOTAL CA: CPT

## 2024-09-27 PROCEDURE — 05HM33Z INSERTION OF INFUSION DEVICE INTO RIGHT INTERNAL JUGULAR VEIN, PERCUTANEOUS APPROACH: ICD-10-PCS | Performed by: INTERNAL MEDICINE

## 2024-09-27 PROCEDURE — 6370000000 HC RX 637 (ALT 250 FOR IP): Performed by: INTERNAL MEDICINE

## 2024-09-27 PROCEDURE — 2580000003 HC RX 258: Performed by: INTERNAL MEDICINE

## 2024-09-27 PROCEDURE — 1200000000 HC SEMI PRIVATE

## 2024-09-27 PROCEDURE — 86901 BLOOD TYPING SEROLOGIC RH(D): CPT

## 2024-09-27 RX ORDER — FENTANYL CITRATE 50 UG/ML
INJECTION, SOLUTION INTRAMUSCULAR; INTRAVENOUS PRN
Status: COMPLETED | OUTPATIENT
Start: 2024-09-27 | End: 2024-09-27

## 2024-09-27 RX ORDER — CEFAZOLIN SODIUM IN 0.9 % NACL 2 G/100 ML
2000 PLASTIC BAG, INJECTION (ML) INTRAVENOUS ONCE
Status: DISCONTINUED | OUTPATIENT
Start: 2024-09-27 | End: 2024-10-01 | Stop reason: HOSPADM

## 2024-09-27 RX ORDER — SODIUM CHLORIDE 9 MG/ML
INJECTION, SOLUTION INTRAVENOUS PRN
Status: DISCONTINUED | OUTPATIENT
Start: 2024-09-27 | End: 2024-10-01 | Stop reason: HOSPADM

## 2024-09-27 RX ORDER — MIDAZOLAM HYDROCHLORIDE 5 MG/ML
INJECTION INTRAMUSCULAR; INTRAVENOUS PRN
Status: COMPLETED | OUTPATIENT
Start: 2024-09-27 | End: 2024-09-27

## 2024-09-27 RX ADMIN — IPRATROPIUM BROMIDE AND ALBUTEROL SULFATE 1 DOSE: 2.5; .5 SOLUTION RESPIRATORY (INHALATION) at 16:21

## 2024-09-27 RX ADMIN — CALCIUM POLYCARBOPHIL 625 MG: 625 TABLET ORAL at 21:36

## 2024-09-27 RX ADMIN — DOXYCYCLINE HYCLATE 100 MG: 100 TABLET, COATED ORAL at 03:29

## 2024-09-27 RX ADMIN — MIDAZOLAM HYDROCHLORIDE 0.5 MG: 5 INJECTION, SOLUTION INTRAMUSCULAR; INTRAVENOUS at 11:06

## 2024-09-27 RX ADMIN — FENTANYL CITRATE 25 MCG: 50 INJECTION, SOLUTION INTRAMUSCULAR; INTRAVENOUS at 10:56

## 2024-09-27 RX ADMIN — SODIUM CHLORIDE, PRESERVATIVE FREE 10 ML: 5 INJECTION INTRAVENOUS at 21:37

## 2024-09-27 RX ADMIN — HEPARIN SODIUM 5000 UNITS: 5000 INJECTION INTRAVENOUS; SUBCUTANEOUS at 15:43

## 2024-09-27 RX ADMIN — DIVALPROEX SODIUM 250 MG: 125 CAPSULE, COATED PELLETS ORAL at 03:29

## 2024-09-27 RX ADMIN — SODIUM CHLORIDE, PRESERVATIVE FREE 10 ML: 5 INJECTION INTRAVENOUS at 08:53

## 2024-09-27 RX ADMIN — EPOETIN ALFA-EPBX 10000 UNITS: 10000 INJECTION, SOLUTION INTRAVENOUS; SUBCUTANEOUS at 21:37

## 2024-09-27 RX ADMIN — HYDRALAZINE HYDROCHLORIDE 25 MG: 25 TABLET ORAL at 21:36

## 2024-09-27 RX ADMIN — MIDAZOLAM HYDROCHLORIDE 1 MG: 5 INJECTION, SOLUTION INTRAMUSCULAR; INTRAVENOUS at 10:56

## 2024-09-27 RX ADMIN — PANTOPRAZOLE SODIUM 20 MG: 20 TABLET, DELAYED RELEASE ORAL at 15:43

## 2024-09-27 RX ADMIN — TRAMADOL HYDROCHLORIDE 50 MG: 50 TABLET ORAL at 15:43

## 2024-09-27 RX ADMIN — FENTANYL CITRATE 25 MCG: 50 INJECTION, SOLUTION INTRAMUSCULAR; INTRAVENOUS at 11:02

## 2024-09-27 RX ADMIN — SENNOSIDES 8.6 MG: 8.6 TABLET, FILM COATED ORAL at 21:36

## 2024-09-27 RX ADMIN — CEFAZOLIN 2000 MG: 1 INJECTION, POWDER, FOR SOLUTION INTRAMUSCULAR; INTRAVENOUS at 10:53

## 2024-09-27 RX ADMIN — DIVALPROEX SODIUM 250 MG: 125 CAPSULE, COATED PELLETS ORAL at 15:43

## 2024-09-27 RX ADMIN — FENTANYL CITRATE 25 MCG: 50 INJECTION, SOLUTION INTRAMUSCULAR; INTRAVENOUS at 11:06

## 2024-09-27 RX ADMIN — DOXYCYCLINE HYCLATE 100 MG: 100 TABLET, COATED ORAL at 15:42

## 2024-09-27 RX ADMIN — IPRATROPIUM BROMIDE AND ALBUTEROL SULFATE 1 DOSE: 2.5; .5 SOLUTION RESPIRATORY (INHALATION) at 20:19

## 2024-09-27 RX ADMIN — B-COMPLEX W/ C & FOLIC ACID TAB 1 TABLET: TAB at 15:42

## 2024-09-27 RX ADMIN — HEPARIN SODIUM 5000 UNITS: 5000 INJECTION INTRAVENOUS; SUBCUTANEOUS at 05:51

## 2024-09-27 RX ADMIN — HEPARIN SODIUM 5000 UNITS: 5000 INJECTION INTRAVENOUS; SUBCUTANEOUS at 21:37

## 2024-09-27 RX ADMIN — TRAZODONE HYDROCHLORIDE 50 MG: 50 TABLET ORAL at 21:36

## 2024-09-27 RX ADMIN — ATORVASTATIN CALCIUM 10 MG: 10 TABLET, FILM COATED ORAL at 21:36

## 2024-09-27 RX ADMIN — SENNOSIDES 8.6 MG: 8.6 TABLET, FILM COATED ORAL at 15:42

## 2024-09-27 ASSESSMENT — PAIN SCALES - GENERAL: PAINLEVEL_OUTOF10: 0

## 2024-09-27 NOTE — PRE SEDATION
Sedation Pre-Procedure Note    Patient Name: Alma Delia aGrner   YOB: 1950  Room/Bed: 0365/0365-01  Medical Record Number: [id number]  Date: 9/27/2024   Time: 10:47 AM       Indication:  Karishma failure, long term HD access needed prior to discharge. Conversion from temporary to tunneled HD catheter requested.     Consent: I have discussed with the patient and/or the patient representative the indication, alternatives, and the possible risks and/or complications of the planned procedure and the anesthesia methods. The patient and/or patient representative appear to understand and agree to proceed.    Vital Signs:   Vitals:    09/27/24 1001   BP: (!) 152/84   Pulse: 92   Resp: 16   Temp: 97.5 °F (36.4 °C)   SpO2: 98%       Past Medical History:   has a past medical history of Acute gastric ulcer with bleeding, Acute metabolic encephalopathy, MAISHA (acute kidney injury) ([facility]), ATN (acute tubular necrosis) ([facility]), Atrial fib/flutter, transient, Atrial fibrillation ([facility]), BPH (benign prostatic hyperplasia), CAD (coronary artery disease), Cerebral artery occlusion with cerebral infarction ([facility]), CHF (congestive heart failure) ([facility]), CKD (chronic kidney disease), Dementia ([facility]), Diabetes mellitus (HCC), GI bleed, Glaucoma, History of blood transfusion, Hx of diabetic neuropathy, Hyperlipidemia, Hypertension, Mitral insufficiency, Mobility impaired, 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 Traumatic hemorrhagic shock ([facility]).    Past Surgical History:   has a past surgical history that includes knee surgery; Prostate biopsy; other surgical history (10/11/2013); Upper gastrointestinal endoscopy (07/13/2017); Dental surgery (N/A, 03/28/2019); laparoscopy (N/A, 06/22/2019); Abdomen surgery; Colonoscopy; Endoscopy, colon, diagnostic; eye surgery; Cardiac surgery; skin biopsy; and IR NONTUNNELED VASCULAR CATHETER > 5 YEARS (9/26/2024).    Medications:   Scheduled Meds:

## 2024-09-27 NOTE — OR NURSING
Pt arrived for image guided tunneled dialysis catheter insertion right . Procedure explained including the risk and benefits of the procedure. All questions answered. Pt verbalizes understanding of the procedure and states no more questions. Consent confirmed. Vital signs stable. Labs, allergies, medications, and code status reviewed. No contraindications noted.     Vital Signs  Vitals:    09/27/24 1118   BP: (!) 105/52   Pulse: (!) 109   Resp: 20   Temp:    SpO2: 94%    (vital signs in table format)    Pre Gabriela Score  2 - Able to move 4 extremities voluntarily on command  2 - BP+/- 20mmHg of normal  2 - Fully awake  2 - Able to maintain oxygen saturation >92% on room air  2 - Able to breathe deeply and cough freely    Allergies  Sulfamethoxazole-trimethoprim (allergies)    Labs  Lab Results   Component Value Date    INR 1.38 (H) 09/25/2024    PROTIME 17.2 (H) 09/25/2024     Lab Results   Component Value Date    CREATININE 4.0 (H) 09/27/2024    BUN 55 (H) 09/27/2024     (L) 09/27/2024    K 4.0 09/27/2024    K 4.0 09/27/2024    CL 99 09/27/2024    CO2 26 09/27/2024     Lab Results   Component Value Date    WBC 5.1 09/27/2024    HGB 6.8 (LL) 09/27/2024    HCT 21.2 (L) 09/27/2024    MCV 82.5 09/27/2024     (L) 09/27/2024

## 2024-09-27 NOTE — CARE COORDINATION
Chart reviewed day 4. Care provided by nephro and IM. Pt is from LTC at [facility]. Pt will be getting a tunneled line placed today. He has been set [facility] HD [facility] mornings in [place]. Pts H&H 6.8 and 21.2 today. Pt is getting 1 U PRBC this morning. Will continue to follow course for needs. Terri Panda RN

## 2024-09-27 NOTE — BRIEF OP NOTE
Brief Postoperative Note    Patient: Alma Delia Garner  YOB: 1950  MRN: [id number]      Pre-operative Diagnosis: Renal failure    Post-operative Diagnosis: Same    Procedure: Tunneled HD catheter placement    Anesthesia: Local and Moderate Sedation    Surgeons/Assistants: Mulugeta Hernández DO    Estimated Blood Loss: less than 50     Complications: None    Infection Present At Time Of Surgery (PATOS) (choose all levels that have infection present):  No infection present    Specimens: Was Not Obtained    Findings:   Successful conversion of a temporary HD catheter to a tunneled HD catheter, ready for immediate use.       Mulugeta Hernández DO  9/27/2024, 11:13 AM  Interventional Radiology  [phone])

## 2024-09-27 NOTE — CONSENT
Informed Consent for Blood Component Transfusion Note    I have discussed with the patient the rationale for blood component transfusion; its benefits in treating or preventing fatigue, organ damage, or death; and its risk which includes mild transfusion reactions, rare risk of blood borne infection, or more serious but rare reactions. I have discussed the alternatives to transfusion, including the risk and consequences of not receiving transfusion. The patient had an opportunity to ask questions and had agreed to proceed with transfusion of blood components.    Electronically signed by JIA AUSTIN MD on 9/27/24 at 8:22 AM EDT

## 2024-09-27 NOTE — CONSENT
Informed Consent for Blood Component Transfusion Note    I have discussed with the patient the rationale for blood component transfusion; its benefits in treating or preventing fatigue, organ damage, or death; and its risk which includes mild transfusion reactions, rare risk of blood borne infection, or more serious but rare reactions. I have discussed the alternatives to transfusion, including the risk and consequences of not receiving transfusion. The patient had an opportunity to ask questions and had agreed to proceed with transfusion of blood components.    Electronically signed by JIA AUSTIN MD on 9/27/24 at 7:53 AM EDT

## 2024-09-27 NOTE — OR NURSING
Image guided tunneled dialysis catheter insertion right completed. Dr. Hernández placed 14.5 Salvadorean 23 cm Bard [employer] tunneled dialysis catheter LOT # [id number] EXP 2/28/26 in the right. tube dressing clean, dry, and intact. Pt tolerated procedure without any signs or symptoms of distress. Vital signs stable. Report given to dialysis RN. Pt transported back to dialysis in stable condition .    Medications  Versed: 1.5 mg  Fentanyl: 75 mcg    Vital Signs  Vitals:    09/27/24 1118   BP: (!) 105/52   Pulse: (!) 109   Resp: 20   Temp:    SpO2: 94%    (vital signs in table format)    Post Gabriela  2 - Able to move 4 extremities voluntarily on command  2 - BP+/- 20mmHg of normal  1 - Arousable on calling  1 - Needs oxygen to maintain oxygen saturation >90%  2 - Able to breathe deeply and cough freely

## 2024-09-28 LAB
ANION GAP SERPL CALCULATED.3IONS-SCNC: 10 MMOL/L (ref 3–16)
BACTERIA FLD AEROBE CULT: NORMAL
BUN SERPL-MCNC: 31 MG/DL (ref 7–20)
CALCIUM SERPL-MCNC: 8.2 MG/DL (ref 8.3–10.6)
CHLORIDE SERPL-SCNC: 100 MMOL/L (ref 99–110)
CO2 SERPL-SCNC: 26 MMOL/L (ref 21–32)
CREAT SERPL-MCNC: 2.9 MG/DL (ref 0.8–1.3)
DEPRECATED RDW RBC AUTO: 18.1 % (ref 12.4–15.4)
GFR SERPLBLD CREATININE-BSD FMLA CKD-EPI: 22 ML/MIN/{1.73_M2}
GLUCOSE BLD-MCNC: 94 MG/DL (ref 70–99)
GLUCOSE BLD-MCNC: 97 MG/DL (ref 70–99)
GLUCOSE BLD-MCNC: 97 MG/DL (ref 70–99)
GLUCOSE BLD-MCNC: 99 MG/DL (ref 70–99)
GLUCOSE SERPL-MCNC: 84 MG/DL (ref 70–99)
GRAM STN SPEC: NORMAL
HCT VFR BLD AUTO: 22.4 % (ref 40.5–52.5)
HEMOCCULT SP1 STL QL: NORMAL
HGB BLD-MCNC: 7.4 G/DL (ref 13.5–17.5)
MAGNESIUM SERPL-MCNC: 1.7 MG/DL (ref 1.8–2.4)
MCH RBC QN AUTO: 27.7 PG (ref 26–34)
MCHC RBC AUTO-ENTMCNC: 33.2 G/DL (ref 31–36)
MCV RBC AUTO: 83.3 FL (ref 80–100)
PERFORMED ON: NORMAL
PLATELET # BLD AUTO: 108 K/UL (ref 135–450)
PMV BLD AUTO: 7.4 FL (ref 5–10.5)
POTASSIUM SERPL-SCNC: 3.5 MMOL/L (ref 3.5–5.1)
POTASSIUM SERPL-SCNC: 3.5 MMOL/L (ref 3.5–5.1)
RBC # BLD AUTO: 2.69 M/UL (ref 4.2–5.9)
SODIUM SERPL-SCNC: 136 MMOL/L (ref 136–145)
WBC # BLD AUTO: 4.1 K/UL (ref 4–11)

## 2024-09-28 PROCEDURE — 83735 ASSAY OF MAGNESIUM: CPT

## 2024-09-28 PROCEDURE — 80048 BASIC METABOLIC PNL TOTAL CA: CPT

## 2024-09-28 PROCEDURE — 2580000003 HC RX 258: Performed by: INTERNAL MEDICINE

## 2024-09-28 PROCEDURE — 6370000000 HC RX 637 (ALT 250 FOR IP): Performed by: INTERNAL MEDICINE

## 2024-09-28 PROCEDURE — 2700000000 HC OXYGEN THERAPY PER DAY

## 2024-09-28 PROCEDURE — 94761 N-INVAS EAR/PLS OXIMETRY MLT: CPT

## 2024-09-28 PROCEDURE — 6360000002 HC RX W HCPCS: Performed by: INTERNAL MEDICINE

## 2024-09-28 PROCEDURE — 6370000000 HC RX 637 (ALT 250 FOR IP): Performed by: STUDENT IN AN ORGANIZED HEALTH CARE EDUCATION/TRAINING PROGRAM

## 2024-09-28 PROCEDURE — 36415 COLL VENOUS BLD VENIPUNCTURE: CPT

## 2024-09-28 PROCEDURE — 94640 AIRWAY INHALATION TREATMENT: CPT

## 2024-09-28 PROCEDURE — 1200000000 HC SEMI PRIVATE

## 2024-09-28 PROCEDURE — 85027 COMPLETE CBC AUTOMATED: CPT

## 2024-09-28 PROCEDURE — 82270 OCCULT BLOOD FECES: CPT

## 2024-09-28 RX ORDER — POTASSIUM CHLORIDE 1500 MG/1
20 TABLET, EXTENDED RELEASE ORAL ONCE
Status: COMPLETED | OUTPATIENT
Start: 2024-09-28 | End: 2024-09-28

## 2024-09-28 RX ORDER — MAGNESIUM SULFATE 1 G/100ML
1000 INJECTION INTRAVENOUS ONCE
Status: COMPLETED | OUTPATIENT
Start: 2024-09-28 | End: 2024-09-28

## 2024-09-28 RX ORDER — IPRATROPIUM BROMIDE AND ALBUTEROL SULFATE 2.5; .5 MG/3ML; MG/3ML
1 SOLUTION RESPIRATORY (INHALATION) EVERY 4 HOURS PRN
Status: DISCONTINUED | OUTPATIENT
Start: 2024-09-28 | End: 2024-10-01 | Stop reason: HOSPADM

## 2024-09-28 RX ORDER — IPRATROPIUM BROMIDE AND ALBUTEROL SULFATE 2.5; .5 MG/3ML; MG/3ML
1 SOLUTION RESPIRATORY (INHALATION)
Status: DISCONTINUED | OUTPATIENT
Start: 2024-09-28 | End: 2024-10-01 | Stop reason: HOSPADM

## 2024-09-28 RX ORDER — HYDRALAZINE HYDROCHLORIDE 25 MG/1
25 TABLET, FILM COATED ORAL EVERY 12 HOURS SCHEDULED
Qty: 30 TABLET | Refills: 0 | Status: SHIPPED | OUTPATIENT
Start: 2024-09-28

## 2024-09-28 RX ADMIN — HEPARIN SODIUM 5000 UNITS: 5000 INJECTION INTRAVENOUS; SUBCUTANEOUS at 06:26

## 2024-09-28 RX ADMIN — MAGNESIUM SULFATE HEPTAHYDRATE 1000 MG: 1 INJECTION, SOLUTION INTRAVENOUS at 18:47

## 2024-09-28 RX ADMIN — SENNOSIDES 8.6 MG: 8.6 TABLET, FILM COATED ORAL at 21:07

## 2024-09-28 RX ADMIN — IPRATROPIUM BROMIDE AND ALBUTEROL SULFATE 1 DOSE: 2.5; .5 SOLUTION RESPIRATORY (INHALATION) at 20:14

## 2024-09-28 RX ADMIN — B-COMPLEX W/ C & FOLIC ACID TAB 1 TABLET: TAB at 08:20

## 2024-09-28 RX ADMIN — HEPARIN SODIUM 5000 UNITS: 5000 INJECTION INTRAVENOUS; SUBCUTANEOUS at 13:53

## 2024-09-28 RX ADMIN — CLONIDINE HYDROCHLORIDE 0.2 MG: 0.1 TABLET ORAL at 03:18

## 2024-09-28 RX ADMIN — PANTOPRAZOLE SODIUM 20 MG: 20 TABLET, DELAYED RELEASE ORAL at 08:20

## 2024-09-28 RX ADMIN — TRAMADOL HYDROCHLORIDE 50 MG: 50 TABLET ORAL at 12:05

## 2024-09-28 RX ADMIN — DIVALPROEX SODIUM 250 MG: 125 CAPSULE, COATED PELLETS ORAL at 03:18

## 2024-09-28 RX ADMIN — POTASSIUM CHLORIDE 20 MEQ: 1500 TABLET, EXTENDED RELEASE ORAL at 18:45

## 2024-09-28 RX ADMIN — IPRATROPIUM BROMIDE AND ALBUTEROL SULFATE 1 DOSE: 2.5; .5 SOLUTION RESPIRATORY (INHALATION) at 08:39

## 2024-09-28 RX ADMIN — DIVALPROEX SODIUM 250 MG: 125 CAPSULE, COATED PELLETS ORAL at 15:57

## 2024-09-28 RX ADMIN — SENNOSIDES 8.6 MG: 8.6 TABLET, FILM COATED ORAL at 08:20

## 2024-09-28 RX ADMIN — CALCIUM POLYCARBOPHIL 625 MG: 625 TABLET ORAL at 17:47

## 2024-09-28 RX ADMIN — ATORVASTATIN CALCIUM 10 MG: 10 TABLET, FILM COATED ORAL at 21:07

## 2024-09-28 RX ADMIN — DOXYCYCLINE HYCLATE 100 MG: 100 TABLET, COATED ORAL at 03:18

## 2024-09-28 RX ADMIN — DOXYCYCLINE HYCLATE 100 MG: 100 TABLET, COATED ORAL at 15:57

## 2024-09-28 RX ADMIN — HEPARIN SODIUM 5000 UNITS: 5000 INJECTION INTRAVENOUS; SUBCUTANEOUS at 22:25

## 2024-09-28 RX ADMIN — SODIUM CHLORIDE, PRESERVATIVE FREE 10 ML: 5 INJECTION INTRAVENOUS at 08:21

## 2024-09-28 RX ADMIN — TRAZODONE HYDROCHLORIDE 50 MG: 50 TABLET ORAL at 21:07

## 2024-09-28 RX ADMIN — SODIUM CHLORIDE, PRESERVATIVE FREE 10 ML: 5 INJECTION INTRAVENOUS at 21:18

## 2024-09-28 RX ADMIN — HYDRALAZINE HYDROCHLORIDE 25 MG: 25 TABLET ORAL at 21:07

## 2024-09-28 ASSESSMENT — PAIN SCALES - GENERAL
PAINLEVEL_OUTOF10: 4
PAINLEVEL_OUTOF10: 0

## 2024-09-28 ASSESSMENT — PAIN DESCRIPTION - LOCATION: LOCATION: BACK

## 2024-09-28 NOTE — DISCHARGE INSTR - DIET

## 2024-09-28 NOTE — DISCHARGE INSTR - COC
Please fax 11/19 note and dexcom application to [facility] today,as [employer] did not process patient's dexcom prescription.  
order(s) to equivalent RT Bronchodilator order with Frequency of every 4 hours PRN for wheezing or increased work of breathing   Oxygen Therapy:  is not on home oxygen therapy. Pt sating 94-99% SPO2 on room air. Pt will occasionally yell out \"I Can't breath\" pt with cognitive deficits post CVA and dementia history. Pt finds comfort in having a nasal canula with no oxygen at these times.   Ventilator:    - No ventilator support    Rehab Therapies: Physical Therapy, Occupational Therapy, and Speech/Language Therapy  Weight Bearing Status/Restrictions: No weight bearing restrictions Pt limited by weakness  Other Medical Equipment (for information only, NOT a DME order):  wheelchair and hospital bed  Other Treatments: n/a    Patient's personal belongings (please select all that are sent with patient):  Glasses    RN SIGNATURE:  Electronically signed by Kalyani Sanz RN on 10/1/24 at 3:28 PM EDT    CASE MANAGEMENT/SOCIAL WORK SECTION    Inpatient Status Date: ***    Readmission Risk Assessment Score:  Readmission Risk              Risk of Unplanned Readmission:  39           Discharging to Facility/ Agency   Name:   Address:  Phone:  Fax:    Dialysis Facility (if applicable)   Name:  Address:  Dialysis Schedule:  Phone:  Fax:    / signature: {Esignature:[id number]}    PHYSICIAN SECTION    Prognosis: Fair    Condition at Discharge: Stable    Rehab Potential (if transferring to Rehab): Fair    Recommended Labs or Other Treatments After Discharge:   check cbc in 2 to 3 days    He is now on hemodialyses  3 times a week on Monday, Wednesday and Friday     Physician Certification: I certify the above information and transfer of Alma Delia Garner  is necessary for the continuing treatment of the diagnosis listed and that he requires Skilled Nursing Facility for less 30 days.     Update Admission H&P: No change in H&P    PHYSICIAN SIGNATURE:  Electronically signed by JIA AUSTIN MD on 9/28/24 at 2:21 PM

## 2024-09-28 NOTE — CARE COORDINATION
CM noted discharge order. CM spoke with Kalyn from [facility]  who states they are unable to take patient back until they know chair time and get transport arranged from him to and from dialysis. CM updated MD and weekday CM.

## 2024-09-29 LAB
GLUCOSE BLD-MCNC: 101 MG/DL (ref 70–99)
GLUCOSE BLD-MCNC: 89 MG/DL (ref 70–99)
GLUCOSE BLD-MCNC: 90 MG/DL (ref 70–99)
GLUCOSE BLD-MCNC: 92 MG/DL (ref 70–99)
PERFORMED ON: ABNORMAL
PERFORMED ON: NORMAL

## 2024-09-29 PROCEDURE — 2580000003 HC RX 258: Performed by: INTERNAL MEDICINE

## 2024-09-29 PROCEDURE — 1200000000 HC SEMI PRIVATE

## 2024-09-29 PROCEDURE — 6370000000 HC RX 637 (ALT 250 FOR IP): Performed by: STUDENT IN AN ORGANIZED HEALTH CARE EDUCATION/TRAINING PROGRAM

## 2024-09-29 PROCEDURE — 94640 AIRWAY INHALATION TREATMENT: CPT

## 2024-09-29 PROCEDURE — 6370000000 HC RX 637 (ALT 250 FOR IP): Performed by: INTERNAL MEDICINE

## 2024-09-29 PROCEDURE — 51798 US URINE CAPACITY MEASURE: CPT

## 2024-09-29 PROCEDURE — 6360000002 HC RX W HCPCS: Performed by: INTERNAL MEDICINE

## 2024-09-29 PROCEDURE — 94761 N-INVAS EAR/PLS OXIMETRY MLT: CPT

## 2024-09-29 RX ADMIN — B-COMPLEX W/ C & FOLIC ACID TAB 1 TABLET: TAB at 09:26

## 2024-09-29 RX ADMIN — DIVALPROEX SODIUM 250 MG: 125 CAPSULE, COATED PELLETS ORAL at 14:42

## 2024-09-29 RX ADMIN — HEPARIN SODIUM 5000 UNITS: 5000 INJECTION INTRAVENOUS; SUBCUTANEOUS at 20:59

## 2024-09-29 RX ADMIN — ATORVASTATIN CALCIUM 10 MG: 10 TABLET, FILM COATED ORAL at 20:59

## 2024-09-29 RX ADMIN — DIVALPROEX SODIUM 250 MG: 125 CAPSULE, COATED PELLETS ORAL at 03:23

## 2024-09-29 RX ADMIN — SODIUM CHLORIDE, PRESERVATIVE FREE 10 ML: 5 INJECTION INTRAVENOUS at 20:59

## 2024-09-29 RX ADMIN — HEPARIN SODIUM 5000 UNITS: 5000 INJECTION INTRAVENOUS; SUBCUTANEOUS at 14:42

## 2024-09-29 RX ADMIN — PANTOPRAZOLE SODIUM 20 MG: 20 TABLET, DELAYED RELEASE ORAL at 09:26

## 2024-09-29 RX ADMIN — IPRATROPIUM BROMIDE AND ALBUTEROL SULFATE 1 DOSE: 2.5; .5 SOLUTION RESPIRATORY (INHALATION) at 11:40

## 2024-09-29 RX ADMIN — SODIUM CHLORIDE, PRESERVATIVE FREE 10 ML: 5 INJECTION INTRAVENOUS at 09:27

## 2024-09-29 RX ADMIN — CALCIUM POLYCARBOPHIL 625 MG: 625 TABLET ORAL at 17:30

## 2024-09-29 RX ADMIN — IPRATROPIUM BROMIDE AND ALBUTEROL SULFATE 1 DOSE: 2.5; .5 SOLUTION RESPIRATORY (INHALATION) at 19:36

## 2024-09-29 RX ADMIN — DOXYCYCLINE HYCLATE 100 MG: 100 TABLET, COATED ORAL at 03:23

## 2024-09-29 RX ADMIN — SENNOSIDES 8.6 MG: 8.6 TABLET, FILM COATED ORAL at 09:26

## 2024-09-29 RX ADMIN — DOXYCYCLINE HYCLATE 100 MG: 100 TABLET, COATED ORAL at 14:42

## 2024-09-29 RX ADMIN — HEPARIN SODIUM 5000 UNITS: 5000 INJECTION INTRAVENOUS; SUBCUTANEOUS at 06:03

## 2024-09-29 RX ADMIN — ASPIRIN 81 MG 81 MG: 81 TABLET ORAL at 09:26

## 2024-09-29 RX ADMIN — SENNOSIDES 8.6 MG: 8.6 TABLET, FILM COATED ORAL at 20:59

## 2024-09-29 RX ADMIN — HYDRALAZINE HYDROCHLORIDE 25 MG: 25 TABLET ORAL at 20:59

## 2024-09-29 RX ADMIN — CLONIDINE HYDROCHLORIDE 0.2 MG: 0.1 TABLET ORAL at 17:34

## 2024-09-29 RX ADMIN — TRAZODONE HYDROCHLORIDE 50 MG: 50 TABLET ORAL at 20:59

## 2024-09-29 RX ADMIN — HYDRALAZINE HYDROCHLORIDE 25 MG: 25 TABLET ORAL at 09:26

## 2024-09-29 RX ADMIN — TRAMADOL HYDROCHLORIDE 50 MG: 50 TABLET ORAL at 09:26

## 2024-09-29 ASSESSMENT — PAIN DESCRIPTION - LOCATION: LOCATION: BACK

## 2024-09-29 ASSESSMENT — PAIN SCALES - GENERAL
PAINLEVEL_OUTOF10: 4
PAINLEVEL_OUTOF10: 0

## 2024-09-30 LAB
ALBUMIN SERPL-MCNC: 2.4 G/DL (ref 3.4–5)
ANION GAP SERPL CALCULATED.3IONS-SCNC: 9 MMOL/L (ref 3–16)
BUN SERPL-MCNC: 41 MG/DL (ref 7–20)
CALCIUM SERPL-MCNC: 8.5 MG/DL (ref 8.3–10.6)
CHLORIDE SERPL-SCNC: 99 MMOL/L (ref 99–110)
CO2 SERPL-SCNC: 26 MMOL/L (ref 21–32)
CREAT SERPL-MCNC: 3.7 MG/DL (ref 0.8–1.3)
DEPRECATED RDW RBC AUTO: 18.7 % (ref 12.4–15.4)
GFR SERPLBLD CREATININE-BSD FMLA CKD-EPI: 16 ML/MIN/{1.73_M2}
GLUCOSE BLD-MCNC: 105 MG/DL (ref 70–99)
GLUCOSE BLD-MCNC: 91 MG/DL (ref 70–99)
GLUCOSE BLD-MCNC: 94 MG/DL (ref 70–99)
GLUCOSE BLD-MCNC: 95 MG/DL (ref 70–99)
GLUCOSE SERPL-MCNC: 93 MG/DL (ref 70–99)
HCT VFR BLD AUTO: 25.8 % (ref 40.5–52.5)
HGB BLD-MCNC: 8.2 G/DL (ref 13.5–17.5)
MCH RBC QN AUTO: 27.1 PG (ref 26–34)
MCHC RBC AUTO-ENTMCNC: 31.9 G/DL (ref 31–36)
MCV RBC AUTO: 84.9 FL (ref 80–100)
PERFORMED ON: ABNORMAL
PERFORMED ON: NORMAL
PHOSPHATE SERPL-MCNC: 3.9 MG/DL (ref 2.5–4.9)
PLATELET # BLD AUTO: 137 K/UL (ref 135–450)
PMV BLD AUTO: 7.3 FL (ref 5–10.5)
POTASSIUM SERPL-SCNC: 3.9 MMOL/L (ref 3.5–5.1)
RBC # BLD AUTO: 3.04 M/UL (ref 4.2–5.9)
SODIUM SERPL-SCNC: 134 MMOL/L (ref 136–145)
WBC # BLD AUTO: 4.4 K/UL (ref 4–11)

## 2024-09-30 PROCEDURE — 1200000000 HC SEMI PRIVATE

## 2024-09-30 PROCEDURE — 2700000000 HC OXYGEN THERAPY PER DAY

## 2024-09-30 PROCEDURE — 85027 COMPLETE CBC AUTOMATED: CPT

## 2024-09-30 PROCEDURE — 36415 COLL VENOUS BLD VENIPUNCTURE: CPT

## 2024-09-30 PROCEDURE — 6370000000 HC RX 637 (ALT 250 FOR IP): Performed by: INTERNAL MEDICINE

## 2024-09-30 PROCEDURE — 80069 RENAL FUNCTION PANEL: CPT

## 2024-09-30 PROCEDURE — 94640 AIRWAY INHALATION TREATMENT: CPT

## 2024-09-30 PROCEDURE — 90935 HEMODIALYSIS ONE EVALUATION: CPT

## 2024-09-30 PROCEDURE — 6360000002 HC RX W HCPCS

## 2024-09-30 PROCEDURE — 2580000003 HC RX 258: Performed by: INTERNAL MEDICINE

## 2024-09-30 PROCEDURE — 94761 N-INVAS EAR/PLS OXIMETRY MLT: CPT

## 2024-09-30 PROCEDURE — 6360000002 HC RX W HCPCS: Performed by: INTERNAL MEDICINE

## 2024-09-30 RX ORDER — HEPARIN SODIUM 1000 [USP'U]/ML
INJECTION, SOLUTION INTRAVENOUS; SUBCUTANEOUS
Status: COMPLETED
Start: 2024-09-30 | End: 2024-09-30

## 2024-09-30 RX ADMIN — SENNOSIDES 8.6 MG: 8.6 TABLET, FILM COATED ORAL at 20:14

## 2024-09-30 RX ADMIN — SODIUM CHLORIDE, PRESERVATIVE FREE 10 ML: 5 INJECTION INTRAVENOUS at 08:31

## 2024-09-30 RX ADMIN — HEPARIN SODIUM 2800 UNITS: 1000 INJECTION INTRAVENOUS; SUBCUTANEOUS at 17:15

## 2024-09-30 RX ADMIN — DIVALPROEX SODIUM 250 MG: 125 CAPSULE, COATED PELLETS ORAL at 03:26

## 2024-09-30 RX ADMIN — DIVALPROEX SODIUM 250 MG: 125 CAPSULE, COATED PELLETS ORAL at 14:08

## 2024-09-30 RX ADMIN — IPRATROPIUM BROMIDE AND ALBUTEROL SULFATE 1 DOSE: 2.5; .5 SOLUTION RESPIRATORY (INHALATION) at 08:50

## 2024-09-30 RX ADMIN — EPOETIN ALFA-EPBX 10000 UNITS: 10000 INJECTION, SOLUTION INTRAVENOUS; SUBCUTANEOUS at 17:14

## 2024-09-30 RX ADMIN — HEPARIN SODIUM 5000 UNITS: 5000 INJECTION INTRAVENOUS; SUBCUTANEOUS at 20:13

## 2024-09-30 RX ADMIN — TRAMADOL HYDROCHLORIDE 50 MG: 50 TABLET ORAL at 22:30

## 2024-09-30 RX ADMIN — CALCIUM POLYCARBOPHIL 625 MG: 625 TABLET ORAL at 18:03

## 2024-09-30 RX ADMIN — CLONIDINE HYDROCHLORIDE 0.2 MG: 0.1 TABLET ORAL at 18:06

## 2024-09-30 RX ADMIN — HYDRALAZINE HYDROCHLORIDE 25 MG: 25 TABLET ORAL at 20:14

## 2024-09-30 RX ADMIN — ASPIRIN 81 MG 81 MG: 81 TABLET ORAL at 08:30

## 2024-09-30 RX ADMIN — ATORVASTATIN CALCIUM 10 MG: 10 TABLET, FILM COATED ORAL at 20:13

## 2024-09-30 RX ADMIN — B-COMPLEX W/ C & FOLIC ACID TAB 1 TABLET: TAB at 08:30

## 2024-09-30 RX ADMIN — IPRATROPIUM BROMIDE AND ALBUTEROL SULFATE 1 DOSE: 2.5; .5 SOLUTION RESPIRATORY (INHALATION) at 21:17

## 2024-09-30 RX ADMIN — PANTOPRAZOLE SODIUM 20 MG: 20 TABLET, DELAYED RELEASE ORAL at 08:30

## 2024-09-30 RX ADMIN — TRAZODONE HYDROCHLORIDE 50 MG: 50 TABLET ORAL at 20:13

## 2024-09-30 RX ADMIN — HEPARIN SODIUM 5000 UNITS: 5000 INJECTION INTRAVENOUS; SUBCUTANEOUS at 05:46

## 2024-09-30 RX ADMIN — SODIUM CHLORIDE, PRESERVATIVE FREE 10 ML: 5 INJECTION INTRAVENOUS at 20:14

## 2024-09-30 ASSESSMENT — PAIN DESCRIPTION - LOCATION: LOCATION: SHOULDER

## 2024-09-30 ASSESSMENT — PAIN SCALES - GENERAL: PAINLEVEL_OUTOF10: 0

## 2024-09-30 ASSESSMENT — PAIN DESCRIPTION - ORIENTATION: ORIENTATION: RIGHT

## 2024-09-30 NOTE — CARE COORDINATION
Chart reviewed day 7. Care provided by nephro and IM. Pt is from [facility]. Pt will be discharging with new HD. He has been accepted at [facility] M-W-F. They has not given me a chairtime yet as they are waiting for Hep B core to be resulted before giving me a chairtime. I spoke to [facility] for rene tiday and asked for a call back from rep that has been assigned to Mr. Garner. No call back, just a message on their portal stating they are waiting for Hep B Core. This was ordered last week and was drawn today. Pt had HD today. Will continue to follow course for needs. Terri Panda RN

## 2024-09-30 NOTE — DIALYSIS
reatment time: 3.5 hours    Net UF: 2000 ml     Pre weight: 108   kg  Post weight: 106  kg  EDW: TBD     Access used: Rt.IJ tunneled CVC  Access function: well     Medications or blood products given: NA heparin dwells to line, depakote by floor RN for anxiety and restlessness.     Regular outpatient schedule: MWF     Summary of response to treatment: Patient has completed 3.5 hour hemodialysis treatment without any hemodynamic problems.  Remained restless and calling out most of tx and wanting to know when he was going to be done and wanting to be \"knocked out\"  Depakote somewhat helpful for short while.  All arterial blood returned but only portion of venous blood returned due to clotting at venous chamber, blood very dark.    Crit Line: Unable to determine refill, most of tx in profile A and B last hour, pre tx HCT 22.2 post tx HCT 24.2, max %BV tolerated 6.0     Copy of dialysis treatment record placed in chart, to be scanned into EMR.  Report given to floor RNCarlene

## 2024-10-01 VITALS
TEMPERATURE: 97.4 F | HEIGHT: 76 IN | RESPIRATION RATE: 18 BRPM | OXYGEN SATURATION: 92 % | BODY MASS INDEX: 28.18 KG/M2 | SYSTOLIC BLOOD PRESSURE: 154 MMHG | WEIGHT: 231.4 LBS | HEART RATE: 98 BPM | DIASTOLIC BLOOD PRESSURE: 92 MMHG

## 2024-10-01 LAB
ANION GAP SERPL CALCULATED.3IONS-SCNC: 9 MMOL/L (ref 3–16)
BUN SERPL-MCNC: 23 MG/DL (ref 7–20)
CALCIUM SERPL-MCNC: 8.3 MG/DL (ref 8.3–10.6)
CHLORIDE SERPL-SCNC: 100 MMOL/L (ref 99–110)
CO2 SERPL-SCNC: 26 MMOL/L (ref 21–32)
CREAT SERPL-MCNC: 2.5 MG/DL (ref 0.8–1.3)
GFR SERPLBLD CREATININE-BSD FMLA CKD-EPI: 26 ML/MIN/{1.73_M2}
GLUCOSE BLD-MCNC: 84 MG/DL (ref 70–99)
GLUCOSE BLD-MCNC: 88 MG/DL (ref 70–99)
GLUCOSE SERPL-MCNC: 82 MG/DL (ref 70–99)
MAGNESIUM SERPL-MCNC: 1.7 MG/DL (ref 1.8–2.4)
PERFORMED ON: NORMAL
PERFORMED ON: NORMAL
POTASSIUM SERPL-SCNC: 4.1 MMOL/L (ref 3.5–5.1)
SODIUM SERPL-SCNC: 135 MMOL/L (ref 136–145)

## 2024-10-01 PROCEDURE — 6370000000 HC RX 637 (ALT 250 FOR IP): Performed by: INTERNAL MEDICINE

## 2024-10-01 PROCEDURE — 2580000003 HC RX 258: Performed by: INTERNAL MEDICINE

## 2024-10-01 PROCEDURE — 94640 AIRWAY INHALATION TREATMENT: CPT

## 2024-10-01 PROCEDURE — 80048 BASIC METABOLIC PNL TOTAL CA: CPT

## 2024-10-01 PROCEDURE — 36415 COLL VENOUS BLD VENIPUNCTURE: CPT

## 2024-10-01 PROCEDURE — 6360000002 HC RX W HCPCS: Performed by: INTERNAL MEDICINE

## 2024-10-01 PROCEDURE — 83735 ASSAY OF MAGNESIUM: CPT

## 2024-10-01 RX ORDER — LIDOCAINE 4 G/G
1 PATCH TOPICAL DAILY
Status: DISCONTINUED | OUTPATIENT
Start: 2024-10-01 | End: 2024-10-01 | Stop reason: HOSPADM

## 2024-10-01 RX ORDER — HYDRALAZINE HYDROCHLORIDE 25 MG/1
25 TABLET, FILM COATED ORAL EVERY 8 HOURS SCHEDULED
Qty: 90 TABLET | Refills: 3 | Status: SHIPPED | OUTPATIENT
Start: 2024-10-01

## 2024-10-01 RX ORDER — HYDRALAZINE HYDROCHLORIDE 25 MG/1
25 TABLET, FILM COATED ORAL EVERY 8 HOURS SCHEDULED
Status: DISCONTINUED | OUTPATIENT
Start: 2024-10-01 | End: 2024-10-01 | Stop reason: HOSPADM

## 2024-10-01 RX ORDER — LIDOCAINE 4 G/G
1 PATCH TOPICAL DAILY
Qty: 5 EACH | Refills: 0 | Status: SHIPPED | OUTPATIENT
Start: 2024-10-01

## 2024-10-01 RX ADMIN — PANTOPRAZOLE SODIUM 20 MG: 20 TABLET, DELAYED RELEASE ORAL at 09:16

## 2024-10-01 RX ADMIN — ASPIRIN 81 MG 81 MG: 81 TABLET ORAL at 09:16

## 2024-10-01 RX ADMIN — DIVALPROEX SODIUM 250 MG: 125 CAPSULE, COATED PELLETS ORAL at 14:50

## 2024-10-01 RX ADMIN — TRAMADOL HYDROCHLORIDE 50 MG: 50 TABLET ORAL at 09:16

## 2024-10-01 RX ADMIN — IPRATROPIUM BROMIDE AND ALBUTEROL SULFATE 1 DOSE: 2.5; .5 SOLUTION RESPIRATORY (INHALATION) at 07:52

## 2024-10-01 RX ADMIN — SODIUM CHLORIDE, PRESERVATIVE FREE 10 ML: 5 INJECTION INTRAVENOUS at 09:16

## 2024-10-01 RX ADMIN — B-COMPLEX W/ C & FOLIC ACID TAB 1 TABLET: TAB at 09:16

## 2024-10-01 RX ADMIN — DIVALPROEX SODIUM 250 MG: 125 CAPSULE, COATED PELLETS ORAL at 03:33

## 2024-10-01 RX ADMIN — HEPARIN SODIUM 5000 UNITS: 5000 INJECTION INTRAVENOUS; SUBCUTANEOUS at 15:01

## 2024-10-01 RX ADMIN — SENNOSIDES 8.6 MG: 8.6 TABLET, FILM COATED ORAL at 09:16

## 2024-10-01 RX ADMIN — HEPARIN SODIUM 5000 UNITS: 5000 INJECTION INTRAVENOUS; SUBCUTANEOUS at 06:19

## 2024-10-01 RX ADMIN — HYDRALAZINE HYDROCHLORIDE 25 MG: 25 TABLET ORAL at 09:16

## 2024-10-01 ASSESSMENT — PAIN SCALES - GENERAL
PAINLEVEL_OUTOF10: 4
PAINLEVEL_OUTOF10: 2

## 2024-10-01 ASSESSMENT — PAIN DESCRIPTION - LOCATION
LOCATION: SCROTUM;BACK
LOCATION: BACK

## 2024-10-01 NOTE — CARE COORDINATION
CASE MANAGEMENT DISCHARGE SUMMARY      Discharge to: [facility]    IMM given: 10/1/2024    Transportation:       Medical Transport explained to pt/family. Pt/family voice no agency preference.    Agency used: [facility]    time: 1530   Ambulance form completed: Yes    Confirmed discharge plan with:     Patient: yes     Family:  yes, daughter reagan aware     Facility/Agency, name:  [facility] faxed   Phone number for report to facility: [phone]     RN, name: Kalyani    Note: Discharging nurse to complete [facility], reconcile [facility], and place final copy with patient's discharge packet. RN to ensure that written prescriptions for  Level II medications are sent with patient to the facility as per protocol.    Follow-Up copy of Important Message from Medicare (IMM2) has been explained to patient and/or designated healthcare decision maker (HCDM). Pt and/or HCDM aware that patient is permitted to stay an additional 4 hours prior to discharge to consider an appeal if they feel as though they are being discharged too soon. Patient may discharge as planned if chooses to do so.  Patient/HCDM voice no other concerns or questions regarding this process.      Terri Panda RN

## 2024-10-01 NOTE — PROGRESS NOTES
[email or web address]  Nephrology progress note           Reason for Consult: MAISHA  Requesting Physician:  Dr. Kraus    Sub/interval history  Resting    HD on 9/27 with 1 L net UF over 2.5 hours, postweight 107 kg  HD on 9/26 w net 500 ml UF over 2.5 h, post wt 107.8 kg     ROS: denies sob or chest pain  PSFH: No visitor     Scheduled Meds:   ipratropium 0.5 mg-albuterol 2.5 mg  1 Dose Inhalation BID RT    epoetin micki-epbx  10,000 Units IntraVENous Once per day on Monday Wednesday Friday    ceFAZolin  2,000 mg IntraVENous Once    doxycycline hyclate  100 mg Oral 2 times per day    [Held by provider] aspirin  81 mg Oral Daily    atorvastatin  10 mg Oral Nightly    divalproex  250 mg Oral 2 times per day    vitamin B complex w/C  1 tablet Oral Daily    pantoprazole  20 mg Oral Daily    polycarbophil  625 mg Oral QPM    rivastigmine  1 patch TransDERmal Nightly    senna  1 tablet Oral BID    traZODone  50 mg Oral Nightly    hydrALAZINE  25 mg Oral 2 times per day    sodium chloride flush  5-40 mL IntraVENous 2 times per day    heparin (porcine)  5,000 Units SubCUTAneous 3 times per day    insulin lispro  0-4 Units SubCUTAneous TID WC    insulin lispro  0-4 Units SubCUTAneous Nightly     Continuous Infusions:   sodium chloride      sodium chloride      dextrose       PRN Meds:.ipratropium 0.5 mg-albuterol 2.5 mg, sodium chloride, heparin (porcine), cloNIDine, polyethylene glycol, traMADol, sodium chloride flush, sodium chloride, ondansetron **OR** ondansetron, acetaminophen **OR** acetaminophen, glucose, dextrose bolus **OR** dextrose bolus, glucagon (rDNA), dextrose    History of Present Illness on 9/23/2024:    74 y.o. yo male with PMH of CHFpEF, atrial flutter, CKD 3A/B [with history of MAISHA needing renal replacement therapy in the setting of hemorrhagic shock in 2017], dementia, history of recurrent syncope, history of hypertension with fluctuating blood pressure who is admitted for MAISHA  Patient resides at [facility] 
    [email or web address]  Nephrology progress note           Reason for Consult: MAISHA  Requesting Physician:  Dr. Kraus    Sub/interval history  Patient is resting in bed, is congested and is on oxygen    Last 24 h uop 400 mL    ROS: No chest pain/fever/nausea/vomiting  PSFH: No visitor    Scheduled Meds:   aspirin  81 mg Oral Daily    atorvastatin  10 mg Oral Nightly    divalproex  250 mg Oral 2 times per day    vitamin B complex w/C  1 tablet Oral Daily    pantoprazole  20 mg Oral Daily    polycarbophil  625 mg Oral QPM    rivastigmine  1 patch TransDERmal Nightly    senna  1 tablet Oral BID    traZODone  50 mg Oral Nightly    hydrALAZINE  25 mg Oral 2 times per day    sodium chloride flush  5-40 mL IntraVENous 2 times per day    heparin (porcine)  5,000 Units SubCUTAneous 3 times per day    insulin lispro  0-4 Units SubCUTAneous TID WC    insulin lispro  0-4 Units SubCUTAneous Nightly     Continuous Infusions:   [Held by provider] lactated ringers IV soln Stopped (09/24/24 0930)    sodium chloride      dextrose       PRN Meds:.cloNIDine, polyethylene glycol, traMADol, sodium chloride flush, sodium chloride, ondansetron **OR** ondansetron, acetaminophen **OR** acetaminophen, glucose, dextrose bolus **OR** dextrose bolus, glucagon (rDNA), dextrose    History of Present Illness on 9/23/2024:    74 y.o. yo male with PMH of CHFpEF, atrial flutter, CKD 3A/B [with history of MAISHA needing renal replacement therapy in the setting of hemorrhagic shock in 2017], dementia, history of recurrent syncope, history of hypertension with fluctuating blood pressure who is admitted for MAISHA  Patient resides at nursing home and during routine workup patient was found to have creatinine of almost 5 and was transferred to the hospital  Earlier in the month, patient had CT scan of the chest which showed bilateral pleural effusion, Lasix was started at 40 mg daily on 9/18/2024.  Patient was supposed to see pulmonary, Dr. David on 9/25/2024 to 
    [email or web address]  Nephrology progress note           Reason for Consult: MAISHA  Requesting Physician:  Dr. Kraus    Sub/interval history  Resting    HD on 9/27 with 1 L net UF over 2.5 hours, postweight 107 kg  HD on 9/26 w net 500 ml UF over 2.5 h, post wt 107.8 kg     ROS: denies sob or chest pain  PSFH: No visitor     Scheduled Meds:   ipratropium 0.5 mg-albuterol 2.5 mg  1 Dose Inhalation BID RT    epoetin micki-epbx  10,000 Units IntraVENous Once per day on Monday Wednesday Friday    ceFAZolin  2,000 mg IntraVENous Once    doxycycline hyclate  100 mg Oral 2 times per day    aspirin  81 mg Oral Daily    atorvastatin  10 mg Oral Nightly    divalproex  250 mg Oral 2 times per day    vitamin B complex w/C  1 tablet Oral Daily    pantoprazole  20 mg Oral Daily    polycarbophil  625 mg Oral QPM    rivastigmine  1 patch TransDERmal Nightly    senna  1 tablet Oral BID    traZODone  50 mg Oral Nightly    hydrALAZINE  25 mg Oral 2 times per day    sodium chloride flush  5-40 mL IntraVENous 2 times per day    heparin (porcine)  5,000 Units SubCUTAneous 3 times per day    insulin lispro  0-4 Units SubCUTAneous TID WC    insulin lispro  0-4 Units SubCUTAneous Nightly     Continuous Infusions:   sodium chloride      sodium chloride      dextrose       PRN Meds:.ipratropium 0.5 mg-albuterol 2.5 mg, sodium chloride, heparin (porcine), cloNIDine, polyethylene glycol, traMADol, sodium chloride flush, sodium chloride, ondansetron **OR** ondansetron, acetaminophen **OR** acetaminophen, glucose, dextrose bolus **OR** dextrose bolus, glucagon (rDNA), dextrose    History of Present Illness on 9/23/2024:    74 y.o. yo male with PMH of CHFpEF, atrial flutter, CKD 3A/B [with history of MAISHA needing renal replacement therapy in the setting of hemorrhagic shock in 2017], dementia, history of recurrent syncope, history of hypertension with fluctuating blood pressure who is admitted for MAISHA  Patient resides at nursing home and during routine 
    [facility]  Nephrology progress note           Reason for Consult: MAISHA  Requesting Physician:  Dr. Kraus    Sub/interval history  Feels abut the same  Getting thoracentesis on 9/25  Cr 5.3    Last 24 h uop 400 mL    ROS: denies sob or chest pain  PSFH: +visitor     Scheduled Meds:   doxycycline hyclate  100 mg Oral 2 times per day    ipratropium 0.5 mg-albuterol 2.5 mg  1 Dose Inhalation Q4H WA RT    aspirin  81 mg Oral Daily    atorvastatin  10 mg Oral Nightly    divalproex  250 mg Oral 2 times per day    vitamin B complex w/C  1 tablet Oral Daily    pantoprazole  20 mg Oral Daily    polycarbophil  625 mg Oral QPM    rivastigmine  1 patch TransDERmal Nightly    senna  1 tablet Oral BID    traZODone  50 mg Oral Nightly    hydrALAZINE  25 mg Oral 2 times per day    sodium chloride flush  5-40 mL IntraVENous 2 times per day    heparin (porcine)  5,000 Units SubCUTAneous 3 times per day    insulin lispro  0-4 Units SubCUTAneous TID WC    insulin lispro  0-4 Units SubCUTAneous Nightly     Continuous Infusions:   [Held by provider] lactated ringers IV soln Stopped (09/24/24 0930)    sodium chloride      dextrose       PRN Meds:.cloNIDine, polyethylene glycol, traMADol, sodium chloride flush, sodium chloride, ondansetron **OR** ondansetron, acetaminophen **OR** acetaminophen, glucose, dextrose bolus **OR** dextrose bolus, glucagon (rDNA), dextrose    History of Present Illness on 9/23/2024:    74 y.o. yo male with PMH of CHFpEF, atrial flutter, CKD 3A/B [with history of MAISHA needing renal replacement therapy in the setting of hemorrhagic shock in 2017], dementia, history of recurrent syncope, history of hypertension with fluctuating blood pressure who is admitted for MAISHA  Patient resides at nursing home and during routine workup patient was found to have creatinine of almost 5 and was transferred to the hospital  Earlier in the month, patient had CT scan of the chest which showed bilateral pleural effusion, Lasix 
    [employer]  Nephrology progress note           Reason for Consult: MAISHA  Requesting Physician:  Dr. Kraus    Sub/interval history  Resting  Getting a 1 PRBC    HD on 9/26 w net 500 ml UF over 2.5 h, post wt 107.8 kg     ROS: denies sob or chest pain  PSFH: No visitor     Scheduled Meds:   epoetin micki-epbx  10,000 Units IntraVENous Once per day on Tuesday Thursday Saturday    doxycycline hyclate  100 mg Oral 2 times per day    ipratropium 0.5 mg-albuterol 2.5 mg  1 Dose Inhalation Q4H WA RT    [Held by provider] aspirin  81 mg Oral Daily    atorvastatin  10 mg Oral Nightly    divalproex  250 mg Oral 2 times per day    vitamin B complex w/C  1 tablet Oral Daily    pantoprazole  20 mg Oral Daily    polycarbophil  625 mg Oral QPM    rivastigmine  1 patch TransDERmal Nightly    senna  1 tablet Oral BID    traZODone  50 mg Oral Nightly    hydrALAZINE  25 mg Oral 2 times per day    sodium chloride flush  5-40 mL IntraVENous 2 times per day    heparin (porcine)  5,000 Units SubCUTAneous 3 times per day    insulin lispro  0-4 Units SubCUTAneous TID WC    insulin lispro  0-4 Units SubCUTAneous Nightly     Continuous Infusions:   sodium chloride      sodium chloride      dextrose       PRN Meds:.sodium chloride, heparin (porcine), cloNIDine, polyethylene glycol, traMADol, sodium chloride flush, sodium chloride, ondansetron **OR** ondansetron, acetaminophen **OR** acetaminophen, glucose, dextrose bolus **OR** dextrose bolus, glucagon (rDNA), dextrose    History of Present Illness on 9/23/2024:    74 y.o. yo male with PMH of CHFpEF, atrial flutter, CKD 3A/B [with history of MAISHA needing renal replacement therapy in the setting of hemorrhagic shock in 2017], dementia, history of recurrent syncope, history of hypertension with fluctuating blood pressure who is admitted for MAISHA  Patient resides at nursing home and during routine workup patient was found to have creatinine of almost 5 and was transferred to the 
  Hospital Medicine Progress Note      Date of Admission: 9/23/2024  Hospital Day: 2    Chief Admission Complaint: He was sent in from ECF due to abnormal lab     Subjective: He is lying in bed, he looks weak and tired.  He is confused has no complaints at this time.    Presenting Admission History:       74 y.o. male with PMH of bipolar d/o , dementia, anemia, hld, dm2, CAD, CHF, Atrial fibrillation.  He presented to [facility] with abn labs from nursing home.  Pt was sent in today as recent labs that were monitoring renal fcn have been progressively worsening.      Assessment/Plan:      Current Principal Problem:  MAISHA (acute kidney injury) (HCC)    MAISHA on CKD III    Noted worsening renal functions.  Seems usual creatinine= 2.5-3.  Will follow renal functions.  Follow urine output and avoid nephrotoxins.  Nephrology consulted for further input.      Chronic diastolic heart failure     - echo 5/24 with EF 55-60%  Severe biatrial dilatation.  Chest x-ray did reveal large left pleural effusion.  Will follow volume status  Pulmonary also consulted further input.     CAD  History is noted.  no active chest pain  - continue ASA, statin.      Afib  - rate controlled  - no AC.  He is S/P watchman     HyperLipidemia - normally controlled on home Statin. Continued.  F/U w/ PCP outpt for medication initiation/adjustment as needed.       Dementia  Seems to have moderate to severe dementia.  He seems to be close to his baseline status  - continue exelon, depakote     Diabetes mellitus type 2:  Currenlty on sliding scale insulin, follow blood sugars, adjust as needed to optimize blood sugar control      Will also ask palliative care to see to determine goals of care    Physical Exam Performed:      General appearance: He is lying in bed, looks weak and tired, he is confused but in no acute distress   Respiratory: Bilateral breath sounds, decreased both bases, normal respiratory effort.   Cardiovascular: S1-S2 irregularly 
  Hospital Medicine Progress Note      Date of Admission: 9/23/2024  Hospital Day: 4    Chief Admission Complaint:  He was sent in from ECF due to abnormal lab      Subjective: Sitting up in bed, looks weak and frail.  He is quite confused no complaints at this time.    Presenting Admission History:       74 y.o. male with PMH of bipolar d/o , dementia, anemia, hld, dm2, CAD, CHF, Atrial fibrillation.  He presented to [facility] with abn labs from nursing home.  Pt was sent in today as recent labs that were monitoring renal functions had  been progressively worsening.    He was admitted for further evaluation and management.  He has been seen by nephrology in consultation       Assessment/Plan:      Current Principal Problem:  MAISHA (acute kidney injury) (HCC)    MAISHA on CKD III     Noted worsening renal functions.  Seems usual creatinine= 2.5-3.  Current cr=5.3  Will follow renal functions.  Possibly  in the setting of diuretic use, decreased p.o. intake   Follow urine output and avoid nephrotoxins.  Nephrology consulted and input is appreciated.  Renal functions are worsening, it was decided he would need hemodialysis and he did begin hemodialysis on 9/26/2024        Chronic diastolic heart failure      - echo 5/24 with EF 55-60%  Severe biatrial dilatation.  Chest x-ray did reveal large left pleural effusion.  Will follow volume status    Pleural effusion :   Pulmonary also consulted further input.  He did go for thorocentesis  with interventional radiology on 9/25/24 will follow up on studies of pleural fluid.  Continue on doxycycline for possible pneumonia/bronchitis.     CAD  History is noted.  no active chest pain  - continue ASA, statin.      Afib  - rate controlled  - no AC.  He is S/P watchman     HyperLipidemia - normally controlled on home Statin. Continued.  F/U w/ PCP outpt for medication initiation/adjustment as needed.       Dementia  Seems to have moderate to severe dementia.  He seems to be close 
  Hospital Medicine Progress Note      Date of Admission: 9/23/2024  Hospital Day: 7    Chief Admission Complaint:  sent in from ECF due to abnormal labs         Subjective: He is up in bed, seems much more alert this morning.  States he is feeling better, has no complaints at this time    Presenting Admission History:       74 y.o. male with PMH of bipolar d/o , dementia, anemia, hld, dm2, CAD, CHF, Atrial fibrillation.  He presented to [facility] with abn labs from nursing home.  Pt was sent in today as recent labs that were monitoring renal functions had  been progressively worsening.  T  He was admitted for further evaluation and management.  He has been seen by nephrology in consultation.  He has now started on hemodialysis       Assessment/Plan:      Current Principal Problem:  MAISHA (acute kidney injury) (HCC)    MAISHA on CKD III     Noted worsening renal functions at time of admission..  Seems usual creatinine= 2.5-3.     Renal functions  have continued to worsen  Possibly  in the setting of diuretic use, decreased p.o. intake   Following  urine output and avoid nephrotoxins.  Nephrology consulted and input is appreciated.  Renal functions continued to worsen, it was decided he would need hemodialysis and he did begin hemodialysis on 9/26/2024.  He has been tolerating hemodialysis without any issues.  He is now on a Monday Wednesday Friday dialysis schedule.         Chronic diastolic heart failure      - echo 5/24 with EF 55-60%  Severe biatrial dilatation.  Chest x-ray did reveal large left pleural effusion.  Will follow volume status which seems to have stabllized     Pleural effusion :   Pulmonary also consulted further input.  He did go for thorocentesis  with interventional radiology on 9/25/24.   Continue on doxycycline for possible pneumonia/bronchitis.     CAD  History is noted.  no active chest pain  - continue ASA, statin.      Afib  - rate controlled  - no AC.  He is S/P watchman   
  [facility] Progress Note           Subjective/   74 y.o. year old male who we are seeing in consultation for MAISHA on CKD stage 3.     Patient seen and examined at bedside.  Patient is a poor historian and lethargic this morning, was not able to provide much history.  Patient on 2L NC and resting comfortably, afebrile, and BP stable overnight.   - No family at bedside   - Labs and ON events reviewed     ROS: 12 point review of systems negative unless mentioned above    Objective/   GEN:  Chronically ill, /70   Pulse 88   Temp 97 °F (36.1 °C) (Axillary)   Resp 18   Wt 109.8 kg (242 lb)   SpO2 98%   BMI 29.46 kg/m²     GEN: Lethargic, arouses with stimulus then back to sleep  HEENT: non-icteric, no JVD  CV: RRR, S1, S2 without m/r/g; Trace edema  RESP: CTA B without w/r/r; breathing wnl  ABD: +bs, soft, nt  SKIN: warm, no rashes    Data/  Recent Labs     09/23/24  1441 09/24/24  0546   WBC 5.6 4.7   HGB 7.7* 7.5*   HCT 23.9* 23.3*   MCV 82.0 83.2    149     Recent Labs     09/23/24  1441 09/24/24  0546    142   K 3.1* 3.2*    104   CO2 20* 22   GLUCOSE 177* 88   PHOS 6.5*  --    MG 1.50* 1.80   BUN 72* 74*   CREATININE 5.0* 5.0*   LABGLOM 11* 11*       Assessment/Plan:   MAISHA on CKD: Likely related to over diuresis and poor oral intake   - Progressing CKD; previous Cre 2-2.5  - Admission Cre 5.0  - Hypokalemia and Hypomagnesemia on admission  - العراقي Placed in ED for retention   Plan:  - IVF 75ml/hr  - Continue العراقي to monitor Urine OP  - Hold Diuretics for now  - Daily labs and weights     Hypokalemia  - improving, K 3.2 today   - continue to replace as needed    - daily labs     Hypomagnesemia  - improved, Mag 1.8 today   - s/p 2g Mag replacements yesterday  - daily labs     Bilateral Pleural effusion  - CXR yesterday unchanged from previous study  - Pulmonary consulted     Anemia   - Hgb stable at 7.5    HFpEF  - Holding diuretics for now 
  [facility] Progress Note           Subjective/   74 y.o. year old male who we are seeing in consultation for MAISHA on CKD stage 3.     Patient seen and examined at bedside.  Patient says he feels better today.  He thinks he tolerated HD well yesterday.  He has been afebrile, and BP slightly elevated overnight.   - No family at bedside   - S/p thoracentesis 9/25/24  - First HD on 9/26/24  - Labs and ON events reviewed     ROS: 12 point review of systems negative unless mentioned above    Objective/   GEN:  Chronically ill, /67   Pulse 94   Temp 97.4 °F (36.3 °C) (Oral)   Resp 16   Ht 1.93 m (6' 4\")   Wt 107.9 kg (237 lb 12.8 oz)   SpO2 96%   BMI 28.95 kg/m²     GEN: Lethargic, arouses with stimulus then back to sleep  HEENT: non-icteric, no JVD  CV: RRR, S1, S2 without m/r/g; Trace edema  RESP: CTA B without w/r/r; breathing wnl  ABD: +bs, soft, nt  SKIN: warm, no rashes    Data/  Recent Labs     09/25/24  0652 09/26/24  0551 09/27/24  0629   WBC 7.8 7.1 5.1   HGB 7.6* 7.3* 6.8*   HCT 24.6* 22.9* 21.2*   MCV 84.5 83.8 82.5   * 124* 115*     Recent Labs     09/25/24  0652 09/26/24  0551 09/27/24  0629    138 135*   K 4.0 4.4 4.0  4.0    102 99   CO2 21 19* 26   GLUCOSE 75 112* 107*   BUN 75* 82* 55*   CREATININE 5.3* 5.8* 4.0*   LABGLOM 11* 9* 15*       Assessment/Plan:   MAISHA on CKD: Likely related to over diuresis and poor oral intake   - Progressing CKD; previous Cre 2-2.5  - Admission Cre 5.0 today 4.0  - iHD initiated on 9/26 w net 500 ml UF over 2.5 h, post wt 107.8 kg   - Access RIJ NT VasCath  - Electrolytes stable   - Hypervolemic   - العراقي for retention     Plan:  - HD again today  - Challenge UF as tolerated   - Continue العراقي to monitor Urine OP  - Daily labs and weights   - plan for Tunneled dialysis catheter, IR consulted   - renal dose medications   - avoid nephrotoxins  - ongoing palliative care discussion     Anemia   - Hgb 6.8 this morning  - 
4 Eyes Admission Assessment     I agree as the admission nurse that 2 RN's have performed a thorough Head to Toe Skin Assessment on the patient. ALL assessment sites listed below have been assessed for low davidson     Areas assessed by both nurses: yes  [x]   Head, Face, and Ears   [x]   Shoulders, Back, and Chest  [x]   Arms, Elbows, and Hands   [x]   Coccyx, Sacrum, and Ischum  [x]   Legs, Feet, and Heels        Does the Patient have Skin Breakdown?  Yes a wound was noted on the Admission Assessment and an LDA was Initiated documentation include the Tasia-wound, Wound Assessment, Measurements, Dressing Treatment, Drainage, and Color\",       Stage 2 on coccyx  Davidson Prevention initiated:  Yes   Wound Care Orders initiated:  Yes      WOC nurse consulted for Pressure Injury (Stage 3,4, Unstageable, DTI, NWPT, and Complex wounds):no    Nurse 1 eSignature: Electronically signed by HMAMAD KAYE RN on 9/29/24 at 3:43 AM EDT    **SHARE this note so that the co-signing nurse is able to place an eSignature**    Nurse 2 eSignature: {Esignature:[id number]}            
4 Eyes Skin Assessment     NAME:  Alma Delia Garner  YOB: 1950  MEDICAL RECORD NUMBER:  [id number]    The patient is being assessed for  Other davidson score 13    I agree that at least one RN has performed a thorough Head to Toe Skin Assessment on the patient. ALL assessment sites listed below have been assessed.      Areas assessed by both nurses:    Head, Face, Ears, Shoulders, Back, Chest, Arms, Elbows, Hands, Sacrum. Buttock, Coccyx, Ischium, Legs. Feet and Heels, and Under Medical Devices         Does the Patient have a Wound? Yes wound(s) were present on assessment. LDA wound assessment was Initiated and completed by RN      -scattered bruising and abrasions  -redness coccyx/buttocks & stage 1 buttocks  -Tear LUE  -Abrasion/redness scrotum  -Redness/petechiae L hip  -Scab R 2nd/3rd toes     Davidson Prevention initiated by RN: Yes already completed  Wound Care Orders initiated by RN: Yes already completed    Pressure Injury (Stage 3,4, Unstageable, DTI, NWPT, and Complex wounds) if present, place Wound referral order by RN under : No    New Ostomies, if present place, Ostomy referral order under : No     Nurse 1 eSignature: {Esignature:[id number]}    **SHARE this note so that the co-signing nurse can place an eSignature**    Nurse 2 eSignature: {Esignature:[id number]}   
4 Eyes Skin Assessment     NAME:  Alma Delia Garner  YOB: 1950  MEDICAL RECORD NUMBER:  [id number]    The patient is being assessed for  Other leslie score 14    I agree that at least one RN has performed a thorough Head to Toe Skin Assessment on the patient. ALL assessment sites listed below have been assessed.      Areas assessed by both nurses:    Head, Face, Ears, Shoulders, Back, Chest, Arms, Elbows, Hands, Sacrum. Buttock, Coccyx, Ischium, Legs. Feet and Heels, and Under Medical Devices         Does the Patient have a Wound? Yes wound(s) were present on assessment. LDA wound assessment was Initiated and completed by RN    -scattered bruising and abrasions  -abrasion scrotum  -stage 1/redness coccyx/buttocks  -scattered scabs, scab R 2nd and 3rd toes  -Redness R shoulder/surrounding dialysis access  -Tear/wound LUE       Leslie Prevention initiated by RN: Yes already completed  Wound Care Orders initiated by RN: Yes already completed    Pressure Injury (Stage 3,4, Unstageable, DTI, NWPT, and Complex wounds) if present, place Wound referral order by RN under : No    New Ostomies, if present place, Ostomy referral order under : No     Nurse 1 eSignature: Electronically signed by Guillermina Goff RN on 9/30/24 at 4:32 AM EDT    **SHARE this note so that the co-signing nurse can place an eSignature**    Nurse 2 eSignature: Electronically signed by Darya Townsend RN on 9/30/24 at 4:34 AM EDT   
4 Eyes Skin Assessment     NAME:  Alma Delia Garner  YOB: 1950  MEDICAL RECORD NUMBER:  [id number]    The patient is being assessed for  Other daily 4-eyes    I agree that at least one RN has performed a thorough Head to Toe Skin Assessment on the patient. ALL assessment sites listed below have been assessed.      Areas assessed by both nurses:    Head, Face, Ears, Shoulders, Back, Chest, Arms, Elbows, Hands, Sacrum. Buttock, Coccyx, Ischium, Legs. Feet and Heels, and Under Medical Devices         Does the Patient have a Wound? Yes wound(s) were present on assessment. LDA wound assessment was Initiated and completed by RN       Davidson Prevention initiated by RN: Yes  Wound Care Orders initiated by RN: No    Pressure Injury (Stage 3,4, Unstageable, DTI, NWPT, and Complex wounds) if present, place Wound referral order by RN under : No    New Ostomies, if present place, Ostomy referral order under : No     Nurse 1 eSignature: Electronically signed by Bernice Robin RN on 9/24/24 at 5:12 PM EDT    **SHARE this note so that the co-signing nurse can place an eSignature**    Nurse 2 eSignature: {Esignature:[id number]}   
4 Eyes Skin Assessment     NAME:  Alma Delia Garner  YOB: 1950  MEDICAL RECORD NUMBER:  [id number]    The patient is being assessed for  Other Low leslie    I agree that at least one RN has performed a thorough Head to Toe Skin Assessment on the patient. ALL assessment sites listed below have been assessed.      Areas assessed by both nurses:    Head, Face, Ears, Shoulders, Back, Chest, Arms, Elbows, Hands, Sacrum. Buttock, Coccyx, Ischium, Legs. Feet and Heels, and Under Medical Devices         Does the Patient have a Wound? Yes wound(s) were present on assessment. LDA wound assessment was Initiated and completed by RN  Stage 1-coccyx       Leslie Prevention initiated by RN: Yes  Wound Care Orders initiated by RN: No    Pressure Injury (Stage 3,4, Unstageable, DTI, NWPT, and Complex wounds) if present, place Wound referral order by RN under : No    New Ostomies, if present place, Ostomy referral order under : No     Nurse 1 eSignature: Electronically signed by Bernice Robin RN on 9/26/24 at 5:52 PM EDT    **SHARE this note so that the co-signing nurse can place an eSignature**    Nurse 2 eSignature: Electronically signed by Loretta Shannon RN on 9/26/24 at 5:54 PM EDT   
4 Eyes Skin Assessment     NAME:  Alma Delia Garner  YOB: 1950  MEDICAL RECORD NUMBER:  [id number]    The patient is being assessed for  Other leslie score 14    I agree that at least one RN has performed a thorough Head to Toe Skin Assessment on the patient. ALL assessment sites listed below have been assessed.      Areas assessed by both nurses:    Head, Face, Ears, Shoulders, Back, Chest, Arms, Elbows, Hands, Sacrum. Buttock, Coccyx, Ischium, Legs. Feet and Heels, and Under Medical Devices         Does the Patient have a Wound? Yes wound(s) were present on assessment. LDA wound assessment was Initiated and completed by RN    -scattered bruising and abrasions  -abrasion scrotum  -stage 1/redness coccyx/buttocks  -scattered scabs, scab R 2nd and 3rd toes  -Redness R shoulder/surrounding dialysis access  -Tear/wound LUE       Leslie Prevention initiated by RN: Yes already completed  Wound Care Orders initiated by RN: Yes already completed    Pressure Injury (Stage 3,4, Unstageable, DTI, NWPT, and Complex wounds) if present, place Wound referral order by RN under : No    New Ostomies, if present place, Ostomy referral order under : No     Nurse 1 eSignature: Electronically signed by Guillermina Goff RN on 10/1/24 at 6:03 AM EDT    **SHARE this note so that the co-signing nurse can place an eSignature**    Nurse 2 eSignature: Electronically signed by Iggy Alexandra RN on 10/1/24 at 6:06 AM EDT   
4 Eyes Skin Assessment     NAME:  Alma Delia Garner  YOB: 1950  MEDICAL RECORD NUMBER:  [id number]    The patient is being assessed for  Other low lelsie    I agree that at least one RN has performed a thorough Head to Toe Skin Assessment on the patient. ALL assessment sites listed below have been assessed.      Areas assessed by both nurses:    Head, Face, Ears, Shoulders, Back, Chest, Arms, Elbows, Hands, Sacrum. Buttock, Coccyx, Ischium, and Legs. Feet and Heels        Does the Patient have a Wound? Yes wound(s) were present on assessment. LDA wound assessment was Initiated and completed by RN     -scattered bruising and abrasions  -redness coccyx/buttocks & stage 1 buttocks  -Tear LUE  -Abrasion/redness scrotum  -Redness/petechiae L hip  -Scab R 2nd/3rd toes      Leslie Prevention initiated by RN: Yes  Wound Care Orders initiated by RN: Yes    Pressure Injury (Stage 3,4, Unstageable, DTI, NWPT, and Complex wounds) if present, place Wound referral order by RN under : No    New Ostomies, if present place, Ostomy referral order under : No     Nurse 1 eSignature: Electronically signed by Lidia Proctor RN on 9/27/24 at 3:52 AM EDT    **SHARE this note so that the co-signing nurse can place an eSignature**    Nurse 2 eSignature: Electronically signed by ELLEN LAN RN on 9/27/24 at 5:01 AM EDT   
4 Eyes Skin Assessment     NAME:  Alma Delia Garner  YOB: 1950  MEDICAL RECORD NUMBER:  [id number]    The patient is being assessed for  Other leslie score 13    I agree that at least one RN has performed a thorough Head to Toe Skin Assessment on the patient. ALL assessment sites listed below have been assessed.      Areas assessed by both nurses:    Head, Face, Ears, Shoulders, Back, Chest, Arms, Elbows, Hands, Sacrum. Buttock, Coccyx, Ischium, Legs. Feet and Heels, and Under Medical Devices         Does the Patient have a Wound? Yes wound(s) were present on assessment. LDA wound assessment was Initiated and completed by RN       -scattered bruising and abrasions  -redness coccyx/buttocks & stage 1 buttocks  -Tear LUE  -Abrasion/redness scrotum  -Redness/petechiae L hip  -Scab R 2nd/3rd toes    Leslie Prevention initiated by RN: Yes already completed  Wound Care Orders initiated by RN: Yes already completed    Pressure Injury (Stage 3,4, Unstageable, DTI, NWPT, and Complex wounds) if present, place Wound referral order by RN under : No    New Ostomies, if present place, Ostomy referral order under : No     Nurse 1 eSignature: Electronically signed by Guillermina Goff RN on 9/26/24 at 3:46 AM EDT    **SHARE this note so that the co-signing nurse can place an eSignature**    Nurse 2 eSignature: Electronically signed by Lulu Waite RN on 9/26/24 at 3:47 AM EDT   
4 Eyes Skin Assessment     NAME:  Alma Delia Garner  YOB: 1950  MEDICAL RECORD NUMBER:  [id number]    The patient is being assessed for  Shift Handoff    I agree that at least one RN has performed a thorough Head to Toe Skin Assessment on the patient. ALL assessment sites listed below have been assessed.      Areas assessed by both nurses:    Head, Face, Ears, Shoulders, Back, Chest, Arms, Elbows, Hands, Sacrum. Buttock, Coccyx, Ischium, Legs. Feet and Heels, and Under Medical Devices         Does the Patient have a Wound? Yes wound(s) were present on assessment. LDA wound assessment was Initiated and completed by RN       Davidson Prevention initiated by RN: Yes  Wound Care Orders initiated by RN: No    Pressure Injury (Stage 3,4, Unstageable, DTI, NWPT, and Complex wounds) if present, place Wound referral order by RN under : No    New Ostomies, if present place, Ostomy referral order under : No     Nurse 1 eSignature: Electronically signed by Iggy Alexandra RN on 9/27/24 at 10:37 PM EDT    **SHARE this note so that the co-signing nurse can place an eSignature**    Nurse 2 eSignature: Electronically signed by Rhonda Kearns RN on 9/28/24 at 3:05 AM EDT   
4 Eyes Skin Assessment and Patient belongings     The patient is being assess for  Admission    I agree that 2 Nurses have performed a thorough Head to Toe Skin Assessment on the patient. ALL assessment sites listed below have been assessed.       Areas assessed by both nurses:   [x]   Head, Face, and Ears   [x]   Shoulders, Back, and Chest  [x]   Arms, Elbows, and Hands   [x]   Coccyx, Sacrum, and IschIum  [x]   Legs, Feet, and Heels        Does the Patient have Skin Breakdown?  Yes LDA WOUND CARE was Initiated documentation include the Tasia-wound, Wound Assessment, Measurements, Dressing Treatment, Drainage, and Color\",         Dvaidson Prevention initiated:  Yes   Wound Care Orders initiated:  Yes      [facility] nurse consulted for Pressure Injury (Stage 3,4, Unstageable, DTI, NWPT, and Complex wounds), New and Established Ostomies:  No      I agree that 2 Nurses have reviewed patient belongings with the patient/family and documented in the flowsheet upon admission or transfer to the unit.     Belongings  Dental Appliances: Other (Comment) (patient does not answer properly)  Vision - Corrective Lenses: Eyeglasses  Hearing Aid: None  Clothing: Shorts, Shirt, Socks  Jewelry: None  Electronic Devices: None  Weapons (Notify Protective Services/Security): None  Home Medications: None  Valuables Given To: Patient  Provide Name(s) of Who Valuable(s) Were Given To: Alma Delia Garner       Nurse 1 eSignature: Electronically signed by Lidia Proctor RN on 9/24/24 at 3:04 AM EDT    **SHARE this note so that the co-signing nurse is able to place an eSignature**    Nurse 2 eSignature: Electronically signed by Darya Townsend RN on 9/24/24 at 3:55 AM EDT   
Attempted to call contact reagan for blood transfusion consent. No answer and mailbox was full. Next contact was pt's son Andre. Andre consented to transfusion for pt. Andre aware pt will be receiving blood during HD today. 2nd nurse present for the verbal consent of blood transfusion was Rachel FOSTER.   
Called the [facility] where he came from. I was able to talk to his nurse. Requested her if she can fax the list of home medications that he is currently taking in the facility, she said she will try to fax it. As per nurse, patient is bed bound as baseline and they uses timmy lift to lift/move him around. Takes pills whole with apple sauce. Normally know what is happening around him but was more confused the past couple of days. He is room air as baseline. He also has chronic Right shoulder pain that he takes tramadol for. As per nurse he can be demanding. They are encouraging him to use his hand since he can move it on his own.  
Comprehensive Nutrition Assessment    Type and Reason for Visit:  Reassess    Nutrition Recommendations/Plan:   Continue minced and moist diet when medically appropriate  Encourage po intakes  Ensure TID - monitor labs and need to adjust  Monitor po intakes, nutrition adequacy, weights, pertinent labs, BMs     Malnutrition Assessment:  Malnutrition Status:  At risk for malnutrition (Comment) (d/t 0-25% intake in EMR) (09/25/24 1534)      Nutrition Assessment:    Follow up: Pt out of room for tunneled HD cath placement and HD today. Currently NPO. Pt previously on a minced and moist diet with Ensure TID. PO intakes 0-75% per EMR. Labs reviewed. Will continue to monitor.    Nutrition Related Findings:    Labs reviewed. BUN 55. Cr 4.0. Non-pitting BUE, +3 RLE, and + 2 pitting LLE edema. Last BM on 9/24 (smear) Wound Type: None       Current Nutrition Intake & Therapies:    Average Meal Intake: NPO  Average Supplements Intake: NPO  Diet NPO    Anthropometric Measures:  Height: 193 cm (6' 4\")  Ideal Body Weight (IBW): 202 lbs (92 kg)       Current Body Weight: 107.9 kg (237 lb 14 oz), 118.3 % IBW. Weight Source: Lift Scale  Current BMI (kg/m2): 29        Weight Adjustment For: No Adjustment                 BMI Categories: Overweight (BMI 25.0-29.9)    Estimated Daily Nutrient Needs:     Weight Used for Energy Requirements: Ideal  Energy (kcal/day): 1770 - 2065 kcals/day     Protein (g/day): 59 - 71 g/day  Method Used for Fluid Requirements: 1 ml/kcal  Fluid (ml/day): 1770 - 2065 ml/day    Nutrition Diagnosis:   Inadequate oral intake related to inadequate protein-energy intake as evidenced by intake 0-25%, weight loss    Nutrition Interventions:   Food and/or Nutrient Delivery: Start Oral Diet, Start Oral Nutrition Supplement (Adv when medically appropriate per MD)  Nutrition Education/Counseling: No recommendation at this time  Coordination of Nutrition Care: Continue to monitor while inpatient       Goals:  Previous 
MD notified: 15 beat run of asymptomatic vtach at 8:20am.   
Patient admitted to room 365 from ED.  Patient oriented to room, call light, bed rails, phone, lights and bathroom.  Patient instructed about the schedule of the day including: vital sign frequency, lab draws, possible tests, frequency of MD and staff rounds, including RN/MD rounding together at bedside, daily weights, and I &O's.  Patient instructed about prescribed diet, how to use 8MENU, and television.  With bed alarm in place, patient aware of placement and reason.  With Telemetry box  in place, patient aware of placement and reason.  Bed locked, in lowest position, side rails up 2/4, call light within reach.  Will continue to monitor.     
Patient: Alma Delia Garner MRN: [id number]  Date of  Admission: 9/23/2024   YOB: 1950  Age: 74 y.o.  Sex: male    Unit: [address] SURG  Room/Bed: [facility]/0365- Admitting Physician: BRIDGER HERNANDEZ    Attending Physician:  Cesar Kenny MD         Pulmonary Service Note      SUBJECTIVE:    Patient has no complaints of shortness of breath.  He was sleeping after thoracentesis.        OBJECTIVE    Medications    Continuous Infusions:   sodium chloride      dextrose         Scheduled Meds:   doxycycline hyclate  100 mg Oral 2 times per day    ipratropium 0.5 mg-albuterol 2.5 mg  1 Dose Inhalation Q4H WA RT    [Held by provider] aspirin  81 mg Oral Daily    atorvastatin  10 mg Oral Nightly    divalproex  250 mg Oral 2 times per day    vitamin B complex w/C  1 tablet Oral Daily    pantoprazole  20 mg Oral Daily    polycarbophil  625 mg Oral QPM    rivastigmine  1 patch TransDERmal Nightly    senna  1 tablet Oral BID    traZODone  50 mg Oral Nightly    hydrALAZINE  25 mg Oral 2 times per day    sodium chloride flush  5-40 mL IntraVENous 2 times per day    heparin (porcine)  5,000 Units SubCUTAneous 3 times per day    insulin lispro  0-4 Units SubCUTAneous TID WC    insulin lispro  0-4 Units SubCUTAneous Nightly       PRN Meds:  cloNIDine, polyethylene glycol, traMADol, sodium chloride flush, sodium chloride, ondansetron **OR** ondansetron, acetaminophen **OR** acetaminophen, glucose, dextrose bolus **OR** dextrose bolus, glucagon (rDNA), dextrose    Physical    Patient Vitals for the past 24 hrs:   BP Temp Temp src Pulse Resp SpO2 Height Weight   09/25/24 1452 -- -- -- -- -- -- 1.93 m (6' 4\") --   09/25/24 1400 120/69 97.1 °F (36.2 °C) Axillary (!) 101 18 100 % -- --   09/25/24 1207 (!) 143/93 97.9 °F (36.6 °C) Oral 100 -- 93 % -- --   09/25/24 0758 132/80 98 °F (36.7 °C) Oral (!) 106 18 97 % -- --   09/25/24 0748 -- -- -- (!) 102 20 96 % -- --   09/25/24 0409 -- -- -- -- -- -- -- 108.4 kg (239 lb) 
Patient: Alma Delia Garner MRN: [id number]  Date of  Admission: 9/23/2024   YOB: 1950  Age: 74 y.o.  Sex: male    Unit: [address] SURG  Room/Bed: 0365/0365-01 Admitting Physician: BRIDGER HERNANDEZ    Attending Physician:  Cesar Kenny MD         Pulmonary Service Note      SUBJECTIVE:    Patient has no complaints.  He denies any shortness of breath.        OBJECTIVE    Medications    Continuous Infusions:   sodium chloride      dextrose         Scheduled Meds:   epoetin micki-epbx  10,000 Units IntraVENous Once per day on Tuesday Thursday Saturday    doxycycline hyclate  100 mg Oral 2 times per day    ipratropium 0.5 mg-albuterol 2.5 mg  1 Dose Inhalation Q4H WA RT    [Held by provider] aspirin  81 mg Oral Daily    atorvastatin  10 mg Oral Nightly    divalproex  250 mg Oral 2 times per day    vitamin B complex w/C  1 tablet Oral Daily    pantoprazole  20 mg Oral Daily    polycarbophil  625 mg Oral QPM    rivastigmine  1 patch TransDERmal Nightly    senna  1 tablet Oral BID    traZODone  50 mg Oral Nightly    hydrALAZINE  25 mg Oral 2 times per day    sodium chloride flush  5-40 mL IntraVENous 2 times per day    heparin (porcine)  5,000 Units SubCUTAneous 3 times per day    insulin lispro  0-4 Units SubCUTAneous TID WC    insulin lispro  0-4 Units SubCUTAneous Nightly       PRN Meds:  heparin (porcine), cloNIDine, polyethylene glycol, traMADol, sodium chloride flush, sodium chloride, ondansetron **OR** ondansetron, acetaminophen **OR** acetaminophen, glucose, dextrose bolus **OR** dextrose bolus, glucagon (rDNA), dextrose    Physical    Patient Vitals for the past 24 hrs:   BP Temp Temp src Pulse Resp SpO2 Weight   09/26/24 1511 129/62 97.5 °F (36.4 °C) Axillary 96 18 93 % --   09/26/24 1337 116/73 97 °F (36.1 °C) -- (!) 115 20 -- 107.8 kg (237 lb 10.5 oz)   09/26/24 1104 130/67 97.6 °F (36.4 °C) -- 95 18 -- 108.3 kg (238 lb 12.1 oz)   09/26/24 1041 139/88 -- -- (!) 107 18 99 % --   09/26/24 1034 
Pt ok for discharge per MD. Discharge instructions and script given. IVs removed without complications. Vas cath in place. Tele box removed and returned. Discharge instructions reviewed with pt, pt daughter reagan and pt son.Report also called to [phone] CARLEY Peterson at [facility]. No questions or concerns at this time. PT clean denies needs and awaiting transport by squad in room. Pt stable at time of discharge.   
Treatment time: 2.5 hours    Net UF: 1057 ml    Pre weight: 107.9 kg  Post weight: 106.9 kg  EDW: TBD    Access used: Rt.IJ tunneled CVC  Access function: well    Medications or blood products given: NA    Regular outpatient schedule: MWF    Summary of response to treatment: Patient has completed 2.5 hour hemodialysis treatment. He tolerated treatment well and was able to remove 1057 ml. Patient returned to his room per hospital bed.    Copy of dialysis treatment record placed in chart, to be scanned into EMR.  
Tried putting Pulmonology consult through [facility]. As per clinical Dr. Rodriguez is not working here anymore and when I tried Dr. Gomez it says \"There is currently no one on call. Please contact another physician.\" I informed charge nurse regarding this.   
2065 ml/day    Nutrition Diagnosis:   Inadequate oral intake related to inadequate protein-energy intake as evidenced by intake 0-25%, weight loss    Nutrition Interventions:   Food and/or Nutrient Delivery: Continue Current Diet, Continue Oral Nutrition Supplement  Nutrition Education/Counseling: No recommendation at this time  Coordination of Nutrition Care: Continue to monitor while inpatient       Goals:     Goals: PO intake 50% or greater, prior to discharge       Nutrition Monitoring and Evaluation:   Behavioral-Environmental Outcomes: None Identified  Food/Nutrient Intake Outcomes: Diet Advancement/Tolerance, Food and Nutrient Intake, Supplement Intake  Physical Signs/Symptoms Outcomes: Biochemical Data, Nutrition Focused Physical Findings, Weight    Discharge Planning:    Too soon to determine     Antonieta Hoyt  Contact: [phone]  
from previous study  - Pulmonary consulted   - Thoracentesis 9/25/24    Anemia   - Hgb stable at 7.5    HFpEF  - Holding diuretics for now       ____________________________________  JAMEY Garrison - NP  [facility]  [email or web address]  Office: [phone]  
initiation/adjustment as needed.       Dementia  Seems to have moderate to severe dementia.  He seems to be close to his baseline status  - continue exelon, depakote  Continue supportive care     Diabetes mellitus type 2:  Currenlty on sliding scale insulin, follow blood sugars, adjust as needed to optimize blood sugar control        Anemia : Suspect he does have anemia of chronic disease.  On 9/27/2024 H/H= 6.8/21.2  No active bleeding has been noted.  Hemodynamically is remaining stable.  He will be transfused 1 unit of PRBC's on 9/27/24.   I did discuss with the patient at the bedside and obtained consent from him.  He does have some confusion but seems little more alert today and he did get consent to be transfused with blood.   Will check stool for occult blood     Palliative care consulted and they have seen the patient appreciate their input          Physical Exam Performed:      General appearance: He is lying in bed, looks weak and tired, he is confused but in no acute distress   Respiratory: Bilateral breath sounds, decreased both bases, normal respiratory effort.   Cardiovascular: S1-S2 irregularly irregular  rhythm.  Abdomen:  Soft, non-tender, non-distended.  Positive bowel sounds  Musculoskeletal: +1 bilateral lower extremity  Neurologic: Nonfocal he does move all 4 extremity  Psychiatric: He is confused but he does seem more alert today.    He me he is in [facility], the name of the month.    However he could not tell me the year short-term recall was poor       BP (!) 156/78   Pulse 76   Temp 97.5 °F (36.4 °C) (Oral)   Resp 16   Ht 1.93 m (6' 4\")   Wt 108 kg (238 lb 3.2 oz)   SpO2 98%   BMI 28.99 kg/m²     Diet: ADULT DIET; Dysphagia - Minced and Moist  ADULT ORAL NUTRITION SUPPLEMENT; Breakfast, Lunch, Dinner; Renal Oral Supplement  DVT Prophylaxis: []PPx LMWH  [x]SQ Heparin  []IPC/SCDs  []Eliquis  []Xarelto  []Coumadin  []Other -      Code status: Full Code  PT/OT Eval Status: 
on home Statin. Continued.  F/U w/ PCP outpt for medication initiation/adjustment as needed.       Dementia  Seems to have moderate to severe dementia.  He seems to be close to his baseline status  - continue exelon, depakote  Continue supportive care     Diabetes mellitus type 2:  Currenlty on sliding scale insulin, follow blood sugars, adjust as needed to optimize blood sugar control        Anemia : Suspect he does have anemia of chronic disease.  On 9/27/2024 H/H= 6.8/21.2  No active bleeding has been noted.  Hemodynamically is remaining stable.  He will be transfused 1 unit of PRBC's on 9/27/24.   I did discuss with the patient at the bedside and obtained consent from him.  He does have some confusion but seems little more alert today and he did get consent to be transfused with blood.   Will check stool for occult blood     Palliative care consulted and they have seen the patient appreciate their input     Disposition : He will be returning to long term at care at [facility].   However CM spoke with Kalyn from [facility] who states they are unable to take patient back until they know dialysis chair time and get transport arranged from him to and from dialysis, also needs hepatitis B which has not been done yet  Hopefully this can get arranged in the next day or 2 and tentatively plan on discharge to [facility]       Physical Exam Performed:      General appearance: He is lying in bed, he is confused but in no acute distress   Respiratory: Bilateral breath sounds, decreased both bases, normal respiratory effort.   Cardiovascular: S1-S2 irregularly irregular  rhythm.  Abdomen:  Soft, non-tender, non-distended.  Positive bowel sounds  Musculoskeletal: +1 bilateral lower extremity  Neurologic: Nonfocal he does move all 4 extremity  Psychiatric: He is confused but he does seem more alert today.    He tells me he is in [facility],and  the name of the month.    However he could not tell me the year 
routine workup patient was found to have creatinine of almost 5 and was transferred to the hospital  Earlier in the month, patient had CT scan of the chest which showed bilateral pleural effusion, Lasix was started at 40 mg daily on 9/18/2024.  Patient was supposed to see pulmonary, Dr. David on 9/25/2024 to discuss about pleural effusion drainage.  Creatinine in July and August has been between 2-2.5    Physical exam:   Constitutional:  VITALS:  BP (!) 150/90   Pulse 82   Temp 98 °F (36.7 °C) (Oral)   Resp 16   Ht 1.93 m (6' 4\")   Wt 105 kg (231 lb 6.4 oz)   SpO2 93%   BMI 28.17 kg/m²   Gen: alert, awake  Neck: No JVD  Skin: Unremarkable  Cardiovascular:  S1, S2 without m/r/g   Respiratory: Decreased breath sounds at the bases   abdomen:  soft, nt, nd,   Extremities: no lower extremity edema  Neuro/Psy: AAoriented times 2-3 ; moves all 4 ext    Data/  Recent Labs     09/30/24  0654   WBC 4.4   HGB 8.2*   HCT 25.8*   MCV 84.9        Recent Labs     09/30/24  0654 10/01/24  0609   * 135*   K 3.9 4.1   CL 99 100   CO2 26 26   GLUCOSE 93 82   PHOS 3.9  --    MG  --  1.70*   BUN 41* 23*   CREATININE 3.7* 2.5*   LABGLOM 16* 26*     UA with 100 protein otherwise unremarkable  Assessment  -MAISHA on CKD 3   Patient seems to have progressed on his chronic kidney disease with most recent cr between 2-2.5   Creatinine on admission is 5- 5.3   Likely in the setting of diuretic use, decreased p.o. intake vs progression of CKD   HD initiated from 9/26/24; status post right IJ TDC on 9/27    -Hypokalemia with hypomagnesemia    -Anemia   1 PRBC on 9/27   Hb 7.7 on 9/23; 6.8 on 9/27, increased to 7.6    -History of CHF with preserved EF    -Bilateral pleural effusion    Status post 1.3 L of thoracentesis of left side on 9/25     -Hypertension    Plan  -In hosp HD 3.5 h  on a Monday Wednesday Friday basis with UF goal of 2 L  -can switch to TTS at [facility]   -Hepatitis serologies up-to-date  -Outpatient dialysis placement 
  ---------------------------------------------------------------------  B. Risk of Treatment (any 1)   [x] Drugs/treatments that require intensive monitoring for toxicity include:    [] IV ABX requiring serial renal monitoring for nephrotoxicity:     [] IV Narcotic analgesia for adverse drug reaction  [x] IV diuresis requiring serial monitoring for renal impairment and electrolyte derangements  [x] Critical electrolyte abnormalities requiring IV replacement and close serial monitoring  [] Insulin - monitoring serial FSBS for Hypoglycemic adverse drug reaction  [] Anticoagulation requiring serial monitoring of coagulation factors  [] Other -   [] Change in code status:    [] Decision to escalate care:    [] Major surgery/procedure with associated risk factors:    ----------------------------------------------------------------------  C. Data (any 2)  [x] Discussed current management and discharge planning options with Case Management.  [] Discussed management of the case with:    [] Telemetry personally reviewed and interpreted as documented above    [] Imaging personally reviewed and interpreted, includes:    [x] Data Review (any 3)  [x] All available Consultant notes from yesterday/today were reviewed  [x] All current labs were reviewed and interpreted for clinical significance   [x] Appropriate follow-up labs were ordered  [] Collateral history obtained from:        Medications:  Personally reviewed in detail in conjunction w/ labs as documented for evidence of drug toxicity.     Infusion Medications    sodium chloride      dextrose       Scheduled Medications    furosemide  80 mg IntraVENous Once    doxycycline hyclate  100 mg Oral 2 times per day    ipratropium 0.5 mg-albuterol 2.5 mg  1 Dose Inhalation Q4H WA RT    [Held by provider] aspirin  81 mg Oral Daily    atorvastatin  10 mg Oral Nightly    divalproex  250 mg Oral 2 times per day    vitamin B complex w/C  1 tablet Oral Daily    pantoprazole  20 mg Oral 
discussed with - has spot at [facility] M2  -retacrit 10k MWF  -Serial renal panel  -daily wts and strict i/o  -renal dose medications   -avoid nephrotoxins        Thank you for the consultation.  Please do not hesitate to call with questions.    Aliza Trevizo MD  Office: [phone]  Fax:    [phone]  [facility]              
medications   -avoid nephrotoxins  Ongoing palliative care discussion      Thank you for the consultation.  Please do not hesitate to call with questions.    Brandi Ambrosio MD  Office: [phone]  Fax:    [phone]  [facility]              
Telemetry personally reviewed and interpreted as documented above    [] Imaging personally reviewed and interpreted, includes:    [x] Data Review (any 3)  [x] All available Consultant notes from yesterday/today were reviewed  [x] All current labs were reviewed and interpreted for clinical significance   [x] Appropriate follow-up labs were ordered  [] Collateral history obtained from:        Medications:  Personally reviewed in detail in conjunction w/ labs as documented for evidence of drug toxicity.     Infusion Medications    sodium chloride      sodium chloride      dextrose       Scheduled Medications    epoetin [id number]  10,000 Units IntraVENous Once per day on Tuesday Thursday Saturday    doxycycline hyclate  100 mg Oral 2 times per day    ipratropium 0.5 mg-albuterol 2.5 mg  1 Dose Inhalation Q4H WA RT    [Held by provider] aspirin  81 mg Oral Daily    atorvastatin  10 mg Oral Nightly    divalproex  250 mg Oral 2 times per day    vitamin B complex w/C  1 tablet Oral Daily    pantoprazole  20 mg Oral Daily    polycarbophil  625 mg Oral QPM    rivastigmine  1 patch TransDERmal Nightly    senna  1 tablet Oral BID    traZODone  50 mg Oral Nightly    hydrALAZINE  25 mg Oral 2 times per day    sodium chloride flush  5-40 mL IntraVENous 2 times per day    heparin (porcine)  5,000 Units SubCUTAneous 3 times per day    insulin lispro  0-4 Units SubCUTAneous TID WC    insulin lispro  0-4 Units SubCUTAneous Nightly     PRN Meds: sodium chloride, heparin (porcine), cloNIDine, polyethylene glycol, traMADol, sodium chloride flush, sodium chloride, ondansetron **OR** ondansetron, acetaminophen **OR** acetaminophen, glucose, dextrose bolus **OR** dextrose bolus, glucagon (rDNA), dextrose     Labs:  Personally reviewed and interpreted for clinical significance.     Recent Labs     09/25/24  0652 09/26/24  0551 09/27/24  0629   WBC 7.8 7.1 5.1   HGB 7.6* 7.3* 6.8*   HCT 24.6* 22.9* 21.2*   * 124* 115*     Recent Labs

## 2024-10-01 NOTE — PLAN OF CARE
Problem: Discharge Planning  Goal: Discharge to home or other facility with appropriate resources  9/30/2024 2116 by Guillermina Goff RN  Outcome: Progressing  Flowsheets (Taken 9/30/2024 2018)  Discharge to home or other facility with appropriate resources: Identify barriers to discharge with patient and caregiver     Problem: Pain  Goal: Verbalizes/displays adequate comfort level or baseline comfort level  Outcome: Progressing     Problem: Skin/Tissue Integrity  Goal: Absence of new skin breakdown  Description: 1.  Monitor for areas of redness and/or skin breakdown  2.  Assess vascular access sites hourly  3.  Every 4-6 hours minimum:  Change oxygen saturation probe site  4.  Every 4-6 hours:  If on nasal continuous positive airway pressure, respiratory therapy assess nares and determine need for appliance change or resting period.  Outcome: Progressing     Problem: Safety - Adult  Goal: Free from fall injury  Outcome: Progressing     Problem: Neurosensory - Adult  Goal: Achieves stable or improved neurological status  Outcome: Progressing  Flowsheets (Taken 9/30/2024 2018)  Achieves stable or improved neurological status: Assess for and report changes in neurological status     Problem: Respiratory - Adult  Goal: Achieves optimal ventilation and oxygenation  Outcome: Progressing  Flowsheets (Taken 9/30/2024 2018)  Achieves optimal ventilation and oxygenation: Assess for changes in respiratory status     Problem: Cardiovascular - Adult  Goal: Maintains optimal cardiac output and hemodynamic stability  Outcome: Progressing  Flowsheets (Taken 9/30/2024 2018)  Maintains optimal cardiac output and hemodynamic stability: Monitor blood pressure and heart rate     Problem: Skin/Tissue Integrity - Adult  Goal: Skin integrity remains intact  Outcome: Progressing  Flowsheets  Taken 9/30/2024 2116  Skin Integrity Remains Intact: Monitor for areas of redness and/or skin breakdown  Taken 9/30/2024 2018  Skin Integrity Remains 
  Problem: Discharge Planning  Goal: Discharge to home or other facility with appropriate resources  Outcome: Progressing     Problem: Pain  Goal: Verbalizes/displays adequate comfort level or baseline comfort level  Outcome: Progressing     Problem: Skin/Tissue Integrity  Goal: Absence of new skin breakdown  Description: 1.  Monitor for areas of redness and/or skin breakdown  2.  Assess vascular access sites hourly  3.  Every 4-6 hours minimum:  Change oxygen saturation probe site  4.  Every 4-6 hours:  If on nasal continuous positive airway pressure, respiratory therapy assess nares and determine need for appliance change or resting period.  Outcome: Progressing     Problem: Safety - Adult  Goal: Free from fall injury  Outcome: Progressing     
  Problem: Discharge Planning  Goal: Discharge to home or other facility with appropriate resources  Outcome: Progressing     Problem: Pain  Goal: Verbalizes/displays adequate comfort level or baseline comfort level  Outcome: Progressing     Problem: Skin/Tissue Integrity  Goal: Absence of new skin breakdown  Description: 1.  Monitor for areas of redness and/or skin breakdown  2.  Assess vascular access sites hourly  3.  Every 4-6 hours minimum:  Change oxygen saturation probe site  4.  Every 4-6 hours:  If on nasal continuous positive airway pressure, respiratory therapy assess nares and determine need for appliance change or resting period.  Outcome: Progressing     Problem: Safety - Adult  Goal: Free from fall injury  Outcome: Progressing     Problem: Respiratory - Adult  Goal: Achieves optimal ventilation and oxygenation  Outcome: Progressing     
  Problem: Discharge Planning  Goal: Discharge to home or other facility with appropriate resources  Outcome: Progressing  Flowsheets  Taken 9/24/2024 2015 by Guillermina Goff RN  Discharge to home or other facility with appropriate resources: Identify barriers to discharge with patient and caregiver     Problem: Pain  Goal: Verbalizes/displays adequate comfort level or baseline comfort level  Outcome: Progressing  Flowsheets (Taken 9/24/2024 2013)  Verbalizes/displays adequate comfort level or baseline comfort level: Encourage patient to monitor pain and request assistance     Problem: Skin/Tissue Integrity  Goal: Absence of new skin breakdown  Description: 1.  Monitor for areas of redness and/or skin breakdown  2.  Assess vascular access sites hourly  3.  Every 4-6 hours minimum:  Change oxygen saturation probe site  4.  Every 4-6 hours:  If on nasal continuous positive airway pressure, respiratory therapy assess nares and determine need for appliance change or resting period.  Outcome: Progressing     Problem: Safety - Adult  Goal: Free from fall injury  Outcome: Progressing     Problem: Neurosensory - Adult  Goal: Achieves stable or improved neurological status  Outcome: Progressing  Flowsheets  Taken 9/24/2024 2015 by Guillermina Goff RN  Achieves stable or improved neurological status: Assess for and report changes in neurological status     Problem: Respiratory - Adult  Goal: Achieves optimal ventilation and oxygenation  Outcome: Progressing  Flowsheets  Taken 9/24/2024 2015 by Guillermina Goff RN  Achieves optimal ventilation and oxygenation: Assess for changes in respiratory status     Problem: Cardiovascular - Adult  Goal: Maintains optimal cardiac output and hemodynamic stability  Outcome: Progressing  Flowsheets  Taken 9/24/2024 2015 by Guillermina Goff RN  Maintains optimal cardiac output and hemodynamic stability: Monitor blood pressure and heart rate     Problem: Skin/Tissue Integrity - Adult  Goal: Skin 
  Problem: Discharge Planning  Goal: Discharge to home or other facility with appropriate resources  Outcome: Progressing  Flowsheets (Taken 9/25/2024 1937)  Discharge to home or other facility with appropriate resources: Identify barriers to discharge with patient and caregiver     Problem: Pain  Goal: Verbalizes/displays adequate comfort level or baseline comfort level  Outcome: Progressing  Flowsheets (Taken 9/25/2024 1935)  Verbalizes/displays adequate comfort level or baseline comfort level: Encourage patient to monitor pain and request assistance     Problem: Skin/Tissue Integrity  Goal: Absence of new skin breakdown  Description: 1.  Monitor for areas of redness and/or skin breakdown  2.  Assess vascular access sites hourly  3.  Every 4-6 hours minimum:  Change oxygen saturation probe site  4.  Every 4-6 hours:  If on nasal continuous positive airway pressure, respiratory therapy assess nares and determine need for appliance change or resting period.  Outcome: Progressing     Problem: Safety - Adult  Goal: Free from fall injury  Outcome: Progressing     Problem: Neurosensory - Adult  Goal: Achieves stable or improved neurological status  Outcome: Progressing  Flowsheets (Taken 9/25/2024 1937)  Achieves stable or improved neurological status: Assess for and report changes in neurological status     Problem: Respiratory - Adult  Goal: Achieves optimal ventilation and oxygenation  Outcome: Progressing  Flowsheets (Taken 9/25/2024 1937)  Achieves optimal ventilation and oxygenation: Assess for changes in respiratory status     Problem: Cardiovascular - Adult  Goal: Maintains optimal cardiac output and hemodynamic stability  Outcome: Progressing  Flowsheets (Taken 9/25/2024 1937)  Maintains optimal cardiac output and hemodynamic stability: Monitor blood pressure and heart rate     Problem: Skin/Tissue Integrity - Adult  Goal: Skin integrity remains intact  Outcome: Progressing  Flowsheets  Taken 9/25/2024 
  Problem: Discharge Planning  Goal: Discharge to home or other facility with appropriate resources  Outcome: Progressing  Flowsheets (Taken 9/29/2024 2057)  Discharge to home or other facility with appropriate resources: Identify barriers to discharge with patient and caregiver     Problem: Pain  Goal: Verbalizes/displays adequate comfort level or baseline comfort level  Outcome: Progressing  Flowsheets (Taken 9/29/2024 2057)  Verbalizes/displays adequate comfort level or baseline comfort level: Encourage patient to monitor pain and request assistance     Problem: Skin/Tissue Integrity  Goal: Absence of new skin breakdown  Description: 1.  Monitor for areas of redness and/or skin breakdown  2.  Assess vascular access sites hourly  3.  Every 4-6 hours minimum:  Change oxygen saturation probe site  4.  Every 4-6 hours:  If on nasal continuous positive airway pressure, respiratory therapy assess nares and determine need for appliance change or resting period.  Outcome: Progressing     Problem: Safety - Adult  Goal: Free from fall injury  Outcome: Progressing     Problem: Neurosensory - Adult  Goal: Achieves stable or improved neurological status  Outcome: Progressing  Flowsheets (Taken 9/29/2024 2057)  Achieves stable or improved neurological status: Assess for and report changes in neurological status     Problem: Respiratory - Adult  Goal: Achieves optimal ventilation and oxygenation  Outcome: Progressing  Flowsheets (Taken 9/29/2024 2057)  Achieves optimal ventilation and oxygenation: Assess for changes in respiratory status     Problem: Cardiovascular - Adult  Goal: Maintains optimal cardiac output and hemodynamic stability  Outcome: Progressing  Flowsheets (Taken 9/29/2024 2057)  Maintains optimal cardiac output and hemodynamic stability: Monitor blood pressure and heart rate     Problem: Skin/Tissue Integrity - Adult  Goal: Skin integrity remains intact  Outcome: Progressing  Flowsheets  Taken 9/29/2024 
  Problem: Nutrition Deficit:  Goal: Optimize nutritional status  Outcome: Not Progressing   Patient not eating much, will continue to offer small vol at frequent intervals  Problem: Skin/Tissue Integrity  Goal: Absence of new skin breakdown  Description: 1.  Monitor for areas of redness and/or skin breakdown  2.  Assess vascular access sites hourly  3.  Every 4-6 hours minimum:  Change oxygen saturation probe site  4.  Every 4-6 hours:  If on nasal continuous positive airway pressure, respiratory therapy assess nares and determine need for appliance change or resting period.  Outcome: Progressing     Problem: Safety - Adult  Goal: Free from fall injury  Outcome: Progressing     Problem: Neurosensory - Adult  Goal: Achieves stable or improved neurological status  Outcome: Progressing     Problem: Respiratory - Adult  Goal: Achieves optimal ventilation and oxygenation  Outcome: Progressing     Problem: Nutrition Deficit:  Goal: Optimize nutritional status  Outcome: Not Progressing     Problem: ABCDS Injury Assessment  Goal: Absence of physical injury  Outcome: Progressing     Problem: Confusion  Goal: Confusion, delirium, dementia, or psychosis is improved or at baseline  Description: INTERVENTIONS:  1. Assess for possible contributors to thought disturbance, including medications, impaired vision or hearing, underlying metabolic abnormalities, dehydration, psychiatric diagnoses, and notify attending LIP  2. [place] high risk fall precautions, as indicated  3. Provide frequent short contacts to provide reality reorientation, refocusing and direction  4. Decrease environmental stimuli, including noise as appropriate  5. Monitor and intervene to maintain adequate nutrition, hydration, elimination, sleep and activity  6. If unable to ensure safety without constant attention obtain sitter and review sitter guidelines with assigned personnel  7. Initiate Psychosocial CNS and Spiritual Care consult, as 
CHF Care Plan      Patient's EF (Ejection Fraction) is greater than 40%    Heart Failure Medications:  Diuretics:: None    (One of the following REQUIRED for EF </= 40%/SYSTOLIC FAILURE but MAY be used in EF% >40%/DIASTOLIC FAILURE)        ACE:: None        ARB:: None         ARNI:: None    (Beta Blockers)  NON- Evidenced Based Beta Blocker (for EF% >40%/DIASTOLIC FAILURE): None    Evidenced Based Beta Blocker::(REQUIRED for EF% <40%/SYSTOLIC FAILURE) None  ...................................................................................................................................................    Failed to redirect to the Timeline version of the "[employer] SmartLink.      Patient's weights and intake/output reviewed    Daily Weight log at bedside, patient/family participation in use of log: \"yes    Patient's current weight today shows a difference of 7 lbs less than last documented weight.      Intake/Output Summary (Last 24 hours) at 10/1/2024 0517  Last data filed at 9/30/2024 2227  Gross per 24 hour   Intake 556 ml   Output --   Net 556 ml       Education Booklet Provided: yes    Comorbidities Reviewed Yes    Patient has a past medical history of Acute gastric ulcer with bleeding, Acute metabolic encephalopathy, MAISHA (acute kidney injury) ([facility]), ATN (acute tubular necrosis) ([facility]), Atrial fib/flutter, transient, Atrial fibrillation ([facility]), BPH (benign prostatic hyperplasia), CAD (coronary artery disease), Cerebral artery occlusion with cerebral infarction ([facility]), CHF (congestive heart failure) ([facility]), CKD (chronic kidney disease), Dementia ([facility]), Diabetes mellitus ([facility]), GI bleed, Glaucoma, History of blood transfusion, Hx of diabetic neuropathy, Hyperlipidemia, Hypertension, Mitral insufficiency, Mobility impaired, 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     >>For CHF and Comorbidity documentation on Education Time and Topics, 
CHF Care Plan      Patient's EF (Ejection Fraction) is greater than 40%    Heart Failure Medications:  Diuretics:: None    (One of the following REQUIRED for EF </= 40%/SYSTOLIC FAILURE but MAY be used in EF% >40%/DIASTOLIC FAILURE)        ACE:: None        ARB:: None         ARNI:: None    (Beta Blockers)  NON- Evidenced Based Beta Blocker (for EF% >40%/DIASTOLIC FAILURE): None    Evidenced Based Beta Blocker::(REQUIRED for EF% <40%/SYSTOLIC FAILURE) None  ...................................................................................................................................................    Failed to redirect to the Timeline version of the [employer] SmartLink.      Patient's weights and intake/output reviewed    Daily Weight log at bedside, patient/family participation in use of log: \"yes    Patient's current weight today shows a difference of 0.2 lbs more than last documented weight.      Intake/Output Summary (Last 24 hours) at 9/27/2024 0639  Last data filed at 9/27/2024 0400  Gross per 24 hour   Intake 480 ml   Output 450 ml   Net 30 ml       Education Booklet Provided: yes    Comorbidities Reviewed Yes    Patient has a past medical history of Acute gastric ulcer with bleeding, Acute metabolic encephalopathy, MAISHA (acute kidney injury) ([facility]), ATN (acute tubular necrosis) ([facility]), Atrial fib/flutter, transient, Atrial fibrillation ([facility]), BPH (benign prostatic hyperplasia), CAD (coronary artery disease), Cerebral artery occlusion with cerebral infarction ([facility]), CHF (congestive heart failure) ([facility]), CKD (chronic kidney disease), Dementia ([facility]), Diabetes mellitus ([facility]), GI bleed, Glaucoma, History of blood transfusion, Hx of diabetic neuropathy, Hyperlipidemia, Hypertension, Mitral insufficiency, Mobility impaired, 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     >>For CHF and Comorbidity documentation on Education Time and 
CHF Care Plan      Patient's EF (Ejection Fraction) is greater than 40%    Heart Failure Medications:  Diuretics:: None    (One of the following REQUIRED for EF </= 40%/SYSTOLIC FAILURE but MAY be used in EF% >40%/DIASTOLIC FAILURE)        ACE:: None        ARB:: None         ARNI:: None    (Beta Blockers)  NON- Evidenced Based Beta Blocker (for EF% >40%/DIASTOLIC FAILURE): None    Evidenced Based Beta Blocker::(REQUIRED for EF% <40%/SYSTOLIC FAILURE) None  ...................................................................................................................................................    Failed to redirect to the Timeline version of the [employer] SmartLink.      Patient's weights and intake/output reviewed    Daily Weight log at bedside, patient/family participation in use of log: \"yes    Patient's current weight today shows a difference of 3 lbs more than last documented weight.      Intake/Output Summary (Last 24 hours) at 9/28/2024 0635  Last data filed at 9/28/2024 0605  Gross per 24 hour   Intake 660 ml   Output 1882 ml   Net -1222 ml       Education Booklet Provided: yes    Comorbidities Reviewed Yes    Patient has a past medical history of Acute gastric ulcer with bleeding, Acute metabolic encephalopathy, MAISHA (acute kidney injury) ([facility]), ATN (acute tubular necrosis) ([facility]), Atrial fib/flutter, transient, Atrial fibrillation ([facility]), BPH (benign prostatic hyperplasia), CAD (coronary artery disease), Cerebral artery occlusion with cerebral infarction ([facility]), CHF (congestive heart failure) ([facility]), CKD (chronic kidney disease), Dementia ([facility]), Diabetes mellitus ([facility]), GI bleed, Glaucoma, History of blood transfusion, Hx of diabetic neuropathy, Hyperlipidemia, Hypertension, Mitral insufficiency, Mobility impaired, 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     >>For CHF and Comorbidity documentation on Education Time and 
CHF Care Plan      Patient's EF (Ejection Fraction) is greater than 40%    Heart Failure Medications:  Diuretics:: None    (One of the following REQUIRED for EF </= 40%/SYSTOLIC FAILURE but MAY be used in EF% >40%/DIASTOLIC FAILURE)        ACE:: None        ARB:: None         ARNI:: None    (Beta Blockers)  NON- Evidenced Based Beta Blocker (for EF% >40%/DIASTOLIC FAILURE): None    Evidenced Based Beta Blocker::(REQUIRED for EF% <40%/SYSTOLIC FAILURE) None  ...................................................................................................................................................    Failed to redirect to the Timeline version of the [employer] SmartLink.      Patient's weights and intake/output reviewed    Daily Weight log at bedside, patient/family participation in use of log: \"yes    Patient's current weight today shows a difference of 1 lbs more than last documented weight.      Intake/Output Summary (Last 24 hours) at 9/30/2024 0431  Last data filed at 9/29/2024 2057  Gross per 24 hour   Intake 110 ml   Output --   Net 110 ml       Education Booklet Provided: yes    Comorbidities Reviewed Yes    Patient has a past medical history of Acute gastric ulcer with bleeding, Acute metabolic encephalopathy, MAISHA (acute kidney injury) ([facility]), ATN (acute tubular necrosis) ([facility]), Atrial fib/flutter, transient, Atrial fibrillation ([facility]), BPH (benign prostatic hyperplasia), CAD (coronary artery disease), Cerebral artery occlusion with cerebral infarction ([facility]), CHF (congestive heart failure) ([facility]), CKD (chronic kidney disease), Dementia ([facility]), Diabetes mellitus ([facility]), GI bleed, Glaucoma, History of blood transfusion, Hx of diabetic neuropathy, Hyperlipidemia, Hypertension, Mitral insufficiency, Mobility impaired, 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     >>For CHF and Comorbidity documentation on Education Time and Topics, 
CHF Care Plan      Patient's EF (Ejection Fraction) is greater than 40%    Heart Failure Medications:  Diuretics:: None    (One of the following REQUIRED for EF </= 40%/SYSTOLIC FAILURE but MAY be used in EF% >40%/DIASTOLIC FAILURE)        ACE:: None        ARB:: None         ARNI:: None    (Beta Blockers)  NON- Evidenced Based Beta Blocker (for EF% >40%/DIASTOLIC FAILURE): None    Evidenced Based Beta Blocker::(REQUIRED for EF% <40%/SYSTOLIC FAILURE) None  ...................................................................................................................................................    Failed to redirect to the Timeline version of the [employer] SmartLink.      Patient's weights and intake/output reviewed    Daily Weight log at bedside, patient/family participation in use of log: \"yes    Patient's current weight today shows a difference of 3 lbs less than last documented weight.      Intake/Output Summary (Last 24 hours) at 9/25/2024 0411  Last data filed at 9/24/2024 2111  Gross per 24 hour   Intake 377 ml   Output 150 ml   Net 227 ml       Education Booklet Provided: yes    Comorbidities Reviewed Yes    Patient has a past medical history of Acute gastric ulcer with bleeding, Acute metabolic encephalopathy, MAISHA (acute kidney injury) ([facility]), ATN (acute tubular necrosis) ([facility]), Atrial fib/flutter, transient, Atrial fibrillation ([facility]), BPH (benign prostatic hyperplasia), CAD (coronary artery disease), Cerebral artery occlusion with cerebral infarction ([facility]), CHF (congestive heart failure) ([facility]), CKD (chronic kidney disease), Dementia ([facility]), Diabetes mellitus ([facility]), GI bleed, Glaucoma, History of blood transfusion, Hx of diabetic neuropathy, Hyperlipidemia, Hypertension, Mitral insufficiency, Mobility impaired, 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     >>For CHF and Comorbidity documentation on Education Time and 
CHF Care Plan      Patient's EF (Ejection Fraction) is greater than 40%    Heart Failure Medications:  Diuretics:: None    (One of the following REQUIRED for EF </= 40%/SYSTOLIC FAILURE but MAY be used in EF% >40%/DIASTOLIC FAILURE)        ACE:: None        ARB:: None         ARNI:: None    (Beta Blockers)  NON- Evidenced Based Beta Blocker (for EF% >40%/DIASTOLIC FAILURE): None    Evidenced Based Beta Blocker::(REQUIRED for EF% <40%/SYSTOLIC FAILURE) None  ...................................................................................................................................................    Failed to redirect to the Timeline version of the [employer] SmartLink.      Patient's weights and intake/output reviewed    Daily Weight log at bedside, patient/family participation in use of log: \"yes    Patient's current weight today shows a difference of 1.5 lbs less than last documented weight.      Intake/Output Summary (Last 24 hours) at 9/29/2024 0620  Last data filed at 9/28/2024 2256  Gross per 24 hour   Intake 145 ml   Output 400 ml   Net -255 ml       Education Booklet Provided: yes    Comorbidities Reviewed Yes    Patient has a past medical history of Acute gastric ulcer with bleeding, Acute metabolic encephalopathy, MAISHA (acute kidney injury) ([facility]), ATN (acute tubular necrosis) ([facility]), Atrial fib/flutter, transient, Atrial fibrillation ([facility]), BPH (benign prostatic hyperplasia), CAD (coronary artery disease), Cerebral artery occlusion with cerebral infarction ([facility]), CHF (congestive heart failure) ([facility]), CKD (chronic kidney disease), Dementia ([facility]), Diabetes mellitus ([facility]), GI bleed, Glaucoma, History of blood transfusion, Hx of diabetic neuropathy, Hyperlipidemia, Hypertension, Mitral insufficiency, Mobility impaired, 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     >>For CHF and Comorbidity documentation on Education Time and 
CHF Care Plan      Patient's EF (Ejection Fraction) is greater than 40%    Heart Failure Medications:  Diuretics:: None    (One of the following REQUIRED for EF </= 40%/SYSTOLIC FAILURE but MAY be used in EF% >40%/DIASTOLIC FAILURE)        ACE:: None        ARB:: None         ARNI:: None    (Beta Blockers)  NON- Evidenced Based Beta Blocker (for EF% >40%/DIASTOLIC FAILURE): None    Evidenced Based Beta Blocker::(REQUIRED for EF% <40%/SYSTOLIC FAILURE) None  ...................................................................................................................................................    Failed to redirect to the Timeline version of the [employer] SmartLink.      Patient's weights and intake/output reviewed    Daily Weight log at bedside, patient/family participation in use of log: \"yes    Patient's current weight today shows a difference of 1 lbs less than last documented weight.      Intake/Output Summary (Last 24 hours) at 9/26/2024 0403  Last data filed at 9/26/2024 0342  Gross per 24 hour   Intake 270 ml   Output 700 ml   Net -430 ml       Education Booklet Provided: yes    Comorbidities Reviewed Yes    Patient has a past medical history of Acute gastric ulcer with bleeding, Acute metabolic encephalopathy, MAISHA (acute kidney injury) ([facility]), ATN (acute tubular necrosis) ([facility]), Atrial fib/flutter, transient, Atrial fibrillation (HCC), BPH (benign prostatic hyperplasia), CAD (coronary artery disease), Cerebral artery occlusion with cerebral infarction ([facility]), CHF (congestive heart failure) ([facility]), CKD (chronic kidney disease), Dementia ([facility]), Diabetes mellitus ([facility]), GI bleed, Glaucoma, History of blood transfusion, Hx of diabetic neuropathy, Hyperlipidemia, Hypertension, Mitral insufficiency, Mobility impaired, 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     >>For CHF and Comorbidity documentation on Education Time and 
F/u d/w Joselin today, pt currently off floor for dialysis cath.  She is aware of the likelihood pt dialysis will be a long term need per nephrology.   She has requested that Erna Farmer follow pt once he is back at facility.  Referral made.  
Reviewed plan of care including dc barriers  
Reviewed plan of care including dc barriers  Problem: Nutrition Deficit:  Goal: Optimize nutritional status  9/28/2024 2213 by Cruz Fuentes, RN  Outcome: Not Progressing     
Topics, please see Education Tab      Pt resting in bed at this time on  3 L O2. Pt denies shortness of breath. Pt with pitting lower extremity edema.     Patient and/or Family's stated Goal of Care this Admission: reduce shortness of breath, increase activity tolerance, better understand heart failure and disease management, be more comfortable, and reduce lower extremity edema prior to discharge        :

## 2024-10-01 NOTE — CARE COORDINATION
Chart reviewed day 8. Care provided by nephro and IM. Pt is from [facility]. Pt will be discharging with new HD. He has been accepted at [facility] at 0845. Luke at [facility] is aware and working on transport. Plan will be for pt to start on Saturday. This is ok with nephro. Pt's hep B core was not drawn correctly yesterday. I spoke with Simran in the lab today and she stated they have blood from 9/28 and they can use that. This test is a send out to [place]. I have left another message for pts coordinator for the [facility] and the [facility] portal to see if pt can start Thursday even though Hep B Core may not be resulted. Still awaiting a call back. Will continue to follow course for needs. Terri Panda RN

## 2024-10-01 NOTE — DISCHARGE SUMMARY
mouth nightlyHistorical Med      pantoprazole (PROTONIX) 20 MG tablet Take 1 tablet by mouth dailyHistorical Med      rivastigmine (EXELON) 4.6 MG/24HR Place 1 patch onto the skin dailyHistorical Med      aspirin 81 MG chewable tablet Take 1 tablet by mouth daily, Disp-30 tablet, R-3Normal      cloNIDine (CATAPRES) 0.1 MG tablet Take 1 tablet by mouth 2 times dailyHistorical Med      senna (SENOKOT) 8.6 MG tablet Take 1 tablet by mouth 2 times dailyHistorical Med      traMADol (ULTRAM) 50 MG tablet Take 1 tablet by mouth every 6 hours as needed for Pain.Historical Med      isosorbide mononitrate (IMDUR) 60 MG extended release tablet Take 1 tablet by mouth dailyHistorical Med      atorvastatin (LIPITOR) 20 MG tablet Take 1 tablet by mouth dailyHistorical Med      traZODone (DESYREL) 50 MG tablet Take 1 tablet by mouth nightlyHistorical Med      polycarbophil (FIBERCON) 625 MG tablet Take 1 tablet by mouth every evening, R-4OTC      polyethylene glycol (GLYCOLAX) 17 g packet Take 1 packet by mouth daily as needed for Constipation, Disp-527 g, R-0NO PRINT      Multiple Vitamin (VITAMIN B COMPLEX W/C) TABS Take 1 tablet by mouth daily, Disp-30 tablet, R-0Normal      ONE TOUCH LANCETS MISC Historical Med           Discharge Medication List as of 10/1/2024  3:29 PM        STOP taking these medications       furosemide (LASIX) 20 MG tablet Comments:   Reason for Stopping:         escitalopram (LEXAPRO) 10 MG tablet Comments:   Reason for Stopping:         potassium chloride (KLOR-CON M) 20 MEQ extended release tablet Comments:   Reason for Stopping:               Significant Test Results    IR TUNNELED CVC PLACE WO SQ PORT/PUMP > 5 YEARS    Result Date: 9/27/2024  PROCEDURE: FLUOROSCOPY GUIDED PLACEMENT OF A TUNNELED CATHETER MODERATE CONSCIOUS SEDATION 9/27/2024 HISTORY: ORDERING SYSTEM PROVIDED HISTORY: for long term HD TECHNOLOGIST PROVIDED HISTORY: Reason for exam:->for long term HD PHYSICIANS: Mulugeta Hernández D.O.

## 2024-10-02 LAB — HBV CORE AB SERPL QL IA: NEGATIVE

## 2025-02-05 ENCOUNTER — APPOINTMENT (OUTPATIENT)
Dept: CT IMAGING | Age: 75
DRG: 592 | End: 2025-02-05
Payer: MEDICARE

## 2025-02-05 ENCOUNTER — APPOINTMENT (OUTPATIENT)
Dept: GENERAL RADIOLOGY | Age: 75
DRG: 592 | End: 2025-02-05
Payer: MEDICARE

## 2025-02-05 ENCOUNTER — HOSPITAL ENCOUNTER (INPATIENT)
Age: 75
LOS: 2 days | Discharge: HOSPICE | DRG: 592 | End: 2025-02-07
Attending: STUDENT IN AN ORGANIZED HEALTH CARE EDUCATION/TRAINING PROGRAM | Admitting: INTERNAL MEDICINE
Payer: MEDICARE

## 2025-02-05 DIAGNOSIS — J90 PLEURAL EFFUSION: ICD-10-CM

## 2025-02-05 DIAGNOSIS — L03.317 CELLULITIS OF BUTTOCK: ICD-10-CM

## 2025-02-05 DIAGNOSIS — L89.154 PRESSURE INJURY OF SACRAL REGION, STAGE 4 (HCC): Primary | ICD-10-CM

## 2025-02-05 DIAGNOSIS — L89.150 DECUBITUS ULCER OF SACRAL REGION, UNSTAGEABLE (HCC): ICD-10-CM

## 2025-02-05 LAB
ALBUMIN SERPL-MCNC: 2.2 G/DL (ref 3.4–5)
ALBUMIN/GLOB SERPL: 0.4 {RATIO} (ref 1.1–2.2)
ALP SERPL-CCNC: 73 U/L (ref 40–129)
ALT SERPL-CCNC: 6 U/L (ref 10–40)
ANION GAP SERPL CALCULATED.3IONS-SCNC: 9 MMOL/L (ref 3–16)
AST SERPL-CCNC: 23 U/L (ref 15–37)
BASOPHILS # BLD: 0.1 K/UL (ref 0–0.2)
BASOPHILS NFR BLD: 1 %
BILIRUB SERPL-MCNC: 0.4 MG/DL (ref 0–1)
BUN SERPL-MCNC: 28 MG/DL (ref 7–20)
CALCIUM SERPL-MCNC: 8.4 MG/DL (ref 8.3–10.6)
CHLORIDE SERPL-SCNC: 94 MMOL/L (ref 99–110)
CO2 SERPL-SCNC: 24 MMOL/L (ref 21–32)
CREAT SERPL-MCNC: 1.6 MG/DL (ref 0.8–1.3)
CRP SERPL-MCNC: 142 MG/L (ref 0–5.1)
DEPRECATED RDW RBC AUTO: 22 % (ref 12.4–15.4)
EOSINOPHIL # BLD: 0.1 K/UL (ref 0–0.6)
EOSINOPHIL NFR BLD: 1.1 %
ERYTHROCYTE [SEDIMENTATION RATE] IN BLOOD BY WESTERGREN METHOD: 92 MM/HR (ref 0–20)
GFR SERPLBLD CREATININE-BSD FMLA CKD-EPI: 44 ML/MIN/{1.73_M2}
GLUCOSE SERPL-MCNC: 91 MG/DL (ref 70–99)
HCT VFR BLD AUTO: 29 % (ref 40.5–52.5)
HGB BLD-MCNC: 9.3 G/DL (ref 13.5–17.5)
LACTATE BLDV-SCNC: 2 MMOL/L (ref 0.4–2)
LYMPHOCYTES # BLD: 1.2 K/UL (ref 1–5.1)
LYMPHOCYTES NFR BLD: 15.1 %
MCH RBC QN AUTO: 29 PG (ref 26–34)
MCHC RBC AUTO-ENTMCNC: 32 G/DL (ref 31–36)
MCV RBC AUTO: 90.9 FL (ref 80–100)
MONOCYTES # BLD: 0.9 K/UL (ref 0–1.3)
MONOCYTES NFR BLD: 11.8 %
NEUTROPHILS # BLD: 5.7 K/UL (ref 1.7–7.7)
NEUTROPHILS NFR BLD: 71 %
NT-PROBNP SERPL-MCNC: ABNORMAL PG/ML (ref 0–449)
PLATELET # BLD AUTO: 224 K/UL (ref 135–450)
PMV BLD AUTO: 7.5 FL (ref 5–10.5)
POTASSIUM SERPL-SCNC: 5.1 MMOL/L (ref 3.5–5.1)
PROCALCITONIN SERPL IA-MCNC: 0.66 NG/ML (ref 0–0.15)
PROT SERPL-MCNC: 7.2 G/DL (ref 6.4–8.2)
RBC # BLD AUTO: 3.19 M/UL (ref 4.2–5.9)
SODIUM SERPL-SCNC: 127 MMOL/L (ref 136–145)
WBC # BLD AUTO: 8 K/UL (ref 4–11)

## 2025-02-05 PROCEDURE — 86140 C-REACTIVE PROTEIN: CPT

## 2025-02-05 PROCEDURE — 2060000000 HC ICU INTERMEDIATE R&B

## 2025-02-05 PROCEDURE — 85025 COMPLETE CBC W/AUTO DIFF WBC: CPT

## 2025-02-05 PROCEDURE — 85652 RBC SED RATE AUTOMATED: CPT

## 2025-02-05 PROCEDURE — 2500000003 HC RX 250 WO HCPCS

## 2025-02-05 PROCEDURE — 96365 THER/PROPH/DIAG IV INF INIT: CPT

## 2025-02-05 PROCEDURE — 2580000003 HC RX 258

## 2025-02-05 PROCEDURE — 71045 X-RAY EXAM CHEST 1 VIEW: CPT

## 2025-02-05 PROCEDURE — 74176 CT ABD & PELVIS W/O CONTRAST: CPT

## 2025-02-05 PROCEDURE — 84145 PROCALCITONIN (PCT): CPT

## 2025-02-05 PROCEDURE — 96375 TX/PRO/DX INJ NEW DRUG ADDON: CPT

## 2025-02-05 PROCEDURE — 83880 ASSAY OF NATRIURETIC PEPTIDE: CPT

## 2025-02-05 PROCEDURE — 36415 COLL VENOUS BLD VENIPUNCTURE: CPT

## 2025-02-05 PROCEDURE — 94761 N-INVAS EAR/PLS OXIMETRY MLT: CPT

## 2025-02-05 PROCEDURE — 6360000002 HC RX W HCPCS

## 2025-02-05 PROCEDURE — 2700000000 HC OXYGEN THERAPY PER DAY

## 2025-02-05 PROCEDURE — 83605 ASSAY OF LACTIC ACID: CPT

## 2025-02-05 PROCEDURE — 6370000000 HC RX 637 (ALT 250 FOR IP)

## 2025-02-05 PROCEDURE — 99285 EMERGENCY DEPT VISIT HI MDM: CPT

## 2025-02-05 PROCEDURE — 80053 COMPREHEN METABOLIC PANEL: CPT

## 2025-02-05 RX ORDER — MAGNESIUM SULFATE IN WATER 40 MG/ML
2000 INJECTION, SOLUTION INTRAVENOUS PRN
Status: DISCONTINUED | OUTPATIENT
Start: 2025-02-05 | End: 2025-02-08 | Stop reason: HOSPADM

## 2025-02-05 RX ORDER — ONDANSETRON 2 MG/ML
4 INJECTION INTRAMUSCULAR; INTRAVENOUS ONCE
Status: COMPLETED | OUTPATIENT
Start: 2025-02-05 | End: 2025-02-05

## 2025-02-05 RX ORDER — MORPHINE SULFATE 4 MG/ML
4 INJECTION, SOLUTION INTRAMUSCULAR; INTRAVENOUS ONCE
Status: COMPLETED | OUTPATIENT
Start: 2025-02-05 | End: 2025-02-05

## 2025-02-05 RX ORDER — ONDANSETRON 2 MG/ML
4 INJECTION INTRAMUSCULAR; INTRAVENOUS EVERY 6 HOURS PRN
Status: DISCONTINUED | OUTPATIENT
Start: 2025-02-05 | End: 2025-02-08 | Stop reason: HOSPADM

## 2025-02-05 RX ORDER — SODIUM CHLORIDE 0.9 % (FLUSH) 0.9 %
5-40 SYRINGE (ML) INJECTION PRN
Status: DISCONTINUED | OUTPATIENT
Start: 2025-02-05 | End: 2025-02-08 | Stop reason: HOSPADM

## 2025-02-05 RX ORDER — OXYCODONE HYDROCHLORIDE 5 MG/1
5 TABLET ORAL ONCE
Status: DISCONTINUED | OUTPATIENT
Start: 2025-02-05 | End: 2025-02-08 | Stop reason: HOSPADM

## 2025-02-05 RX ORDER — ATORVASTATIN CALCIUM 10 MG/1
20 TABLET, FILM COATED ORAL NIGHTLY
Status: DISCONTINUED | OUTPATIENT
Start: 2025-02-05 | End: 2025-02-08 | Stop reason: HOSPADM

## 2025-02-05 RX ORDER — ONDANSETRON 4 MG/1
4 TABLET, ORALLY DISINTEGRATING ORAL EVERY 8 HOURS PRN
Status: DISCONTINUED | OUTPATIENT
Start: 2025-02-05 | End: 2025-02-08 | Stop reason: HOSPADM

## 2025-02-05 RX ORDER — VANCOMYCIN 2 G/400ML
2000 INJECTION, SOLUTION INTRAVENOUS ONCE
Status: DISCONTINUED | OUTPATIENT
Start: 2025-02-05 | End: 2025-02-05

## 2025-02-05 RX ORDER — SODIUM CHLORIDE 9 MG/ML
INJECTION, SOLUTION INTRAVENOUS PRN
Status: DISCONTINUED | OUTPATIENT
Start: 2025-02-05 | End: 2025-02-08 | Stop reason: HOSPADM

## 2025-02-05 RX ORDER — RIVASTIGMINE 4.6 MG/24H
1 PATCH, EXTENDED RELEASE TRANSDERMAL DAILY
Status: DISCONTINUED | OUTPATIENT
Start: 2025-02-06 | End: 2025-02-08 | Stop reason: HOSPADM

## 2025-02-05 RX ORDER — ASPIRIN 81 MG/1
81 TABLET, CHEWABLE ORAL DAILY
Status: DISCONTINUED | OUTPATIENT
Start: 2025-02-06 | End: 2025-02-08 | Stop reason: HOSPADM

## 2025-02-05 RX ORDER — POTASSIUM CHLORIDE 7.45 MG/ML
10 INJECTION INTRAVENOUS PRN
Status: DISCONTINUED | OUTPATIENT
Start: 2025-02-05 | End: 2025-02-08 | Stop reason: HOSPADM

## 2025-02-05 RX ORDER — POLYETHYLENE GLYCOL 3350 17 G/17G
17 POWDER, FOR SOLUTION ORAL DAILY PRN
Status: DISCONTINUED | OUTPATIENT
Start: 2025-02-05 | End: 2025-02-08 | Stop reason: HOSPADM

## 2025-02-05 RX ORDER — FENTANYL CITRATE 50 UG/ML
25 INJECTION, SOLUTION INTRAMUSCULAR; INTRAVENOUS ONCE
Status: COMPLETED | OUTPATIENT
Start: 2025-02-05 | End: 2025-02-05

## 2025-02-05 RX ORDER — PANTOPRAZOLE SODIUM 40 MG/10ML
40 INJECTION, POWDER, LYOPHILIZED, FOR SOLUTION INTRAVENOUS DAILY
Status: DISCONTINUED | OUTPATIENT
Start: 2025-02-06 | End: 2025-02-08 | Stop reason: HOSPADM

## 2025-02-05 RX ORDER — ACETAMINOPHEN 325 MG/1
650 TABLET ORAL EVERY 6 HOURS PRN
Status: DISCONTINUED | OUTPATIENT
Start: 2025-02-05 | End: 2025-02-08 | Stop reason: HOSPADM

## 2025-02-05 RX ORDER — TRAZODONE HYDROCHLORIDE 50 MG/1
50 TABLET, FILM COATED ORAL NIGHTLY
Status: DISCONTINUED | OUTPATIENT
Start: 2025-02-05 | End: 2025-02-08 | Stop reason: HOSPADM

## 2025-02-05 RX ORDER — FAMOTIDINE 10 MG
10 TABLET ORAL 2 TIMES DAILY
COMMUNITY

## 2025-02-05 RX ORDER — TRAMADOL HYDROCHLORIDE 50 MG/1
50 TABLET ORAL EVERY 6 HOURS PRN
Status: DISCONTINUED | OUTPATIENT
Start: 2025-02-05 | End: 2025-02-08 | Stop reason: HOSPADM

## 2025-02-05 RX ORDER — SODIUM CHLORIDE 0.9 % (FLUSH) 0.9 %
5-40 SYRINGE (ML) INJECTION EVERY 12 HOURS SCHEDULED
Status: DISCONTINUED | OUTPATIENT
Start: 2025-02-05 | End: 2025-02-08 | Stop reason: HOSPADM

## 2025-02-05 RX ORDER — DIVALPROEX SODIUM 125 MG/1
375 CAPSULE, COATED PELLETS ORAL NIGHTLY
Status: DISCONTINUED | OUTPATIENT
Start: 2025-02-05 | End: 2025-02-06

## 2025-02-05 RX ORDER — ACETAMINOPHEN 650 MG/1
650 SUPPOSITORY RECTAL EVERY 6 HOURS PRN
Status: DISCONTINUED | OUTPATIENT
Start: 2025-02-05 | End: 2025-02-08 | Stop reason: HOSPADM

## 2025-02-05 RX ORDER — MIDODRINE HYDROCHLORIDE 5 MG/1
2.5 TABLET ORAL
Status: DISCONTINUED | OUTPATIENT
Start: 2025-02-06 | End: 2025-02-07

## 2025-02-05 RX ORDER — SENNOSIDES A AND B 8.6 MG/1
1 TABLET, FILM COATED ORAL 2 TIMES DAILY
Status: DISCONTINUED | OUTPATIENT
Start: 2025-02-05 | End: 2025-02-08 | Stop reason: HOSPADM

## 2025-02-05 RX ORDER — POTASSIUM CHLORIDE 1500 MG/1
40 TABLET, EXTENDED RELEASE ORAL PRN
Status: DISCONTINUED | OUTPATIENT
Start: 2025-02-05 | End: 2025-02-08 | Stop reason: HOSPADM

## 2025-02-05 RX ORDER — METOPROLOL TARTRATE 25 MG/1
75 TABLET, FILM COATED ORAL 2 TIMES DAILY
COMMUNITY

## 2025-02-05 RX ADMIN — ONDANSETRON 4 MG: 2 INJECTION, SOLUTION INTRAMUSCULAR; INTRAVENOUS at 19:35

## 2025-02-05 RX ADMIN — TRAZODONE HYDROCHLORIDE 50 MG: 50 TABLET ORAL at 23:54

## 2025-02-05 RX ADMIN — SODIUM CHLORIDE, PRESERVATIVE FREE 10 ML: 5 INJECTION INTRAVENOUS at 21:45

## 2025-02-05 RX ADMIN — MORPHINE SULFATE 4 MG: 4 INJECTION, SOLUTION INTRAMUSCULAR; INTRAVENOUS at 19:37

## 2025-02-05 RX ADMIN — PIPERACILLIN AND TAZOBACTAM 3375 MG: 3; .375 INJECTION, POWDER, LYOPHILIZED, FOR SOLUTION INTRAVENOUS at 19:44

## 2025-02-05 RX ADMIN — ATORVASTATIN CALCIUM 20 MG: 10 TABLET, FILM COATED ORAL at 23:54

## 2025-02-05 RX ADMIN — FENTANYL CITRATE 25 MCG: 50 INJECTION INTRAMUSCULAR; INTRAVENOUS at 17:13

## 2025-02-05 ASSESSMENT — PAIN SCALES - GENERAL: PAINLEVEL_OUTOF10: 9

## 2025-02-05 NOTE — ED NOTES
Patient has a tunneling wound on the coccyx that has drainage and foul odor. The wound has been packed with gauze and meraplex.

## 2025-02-05 NOTE — ED PROVIDER NOTES
[facility] EMERGENCY DEPARTMENT  EMERGENCY DEPARTMENT ENCOUNTER        Pt Name: Alma Delia Garner  MRN: [id number]  Birthdate 1950  Date of evaluation: 2/5/2025  Provider: NATALIA Momin Jr  PCP: Yvette Leonard MD  Note Started: 4:57 PM EST 2/5/25       I have seen and evaluated this patient with my supervising physician Darron Redman MD.      CHIEF COMPLAINT       Chief Complaint   Patient presents with    Shortness of Breath    Skin Ulcer     Patient is currently being taken care of at home by his daughter. A private ambulance was called today to bring patient to the ED due to his c/o SOB. Patient's son at his bedside with patient. He stated, \"I'm just the messenger. My sister takes care of him\" He is also reporting pt has a pretty significant bed sore on his buttocks that should be addressed.     Back Pain     Patient reporting chronic back pain. Son stating pt takes Tramadol for his pain. He says pt was recently seen by the \"wound doctor\" who assessed pt bed sore. States the sore has gotten worse.        HISTORY OF PRESENT ILLNESS: 1 or more Elements     History from : Patient and Family son    Limitations to history : None    Alma Delia Garner is a 74 y.o. male who presents with reports of shortness of breath and lower sacral pain.  Does have a sacral decubitus ulcer that has been being treated by wound care however, seems like it has gotten larger and open up more.  He has also had some foul odor.  Patient has had some decreased mentation over the past week per the son.  Patient has not had any nausea vomiting or fevers otherwise.  No other complaints this time.  Review of systems otherwise negative.    Nursing Notes were all reviewed and agreed with or any disagreements were addressed in the HPI.      SURGICAL HISTORY     Past Surgical History:   Procedure Laterality Date    ABDOMEN SURGERY      CARDIAC SURGERY      COLONOSCOPY      DENTAL SURGERY N/A 03/28/2019    EXTRACTION OF ALL

## 2025-02-06 LAB
ANION GAP SERPL CALCULATED.3IONS-SCNC: 11 MMOL/L (ref 3–16)
BASOPHILS # BLD: 0.1 K/UL (ref 0–0.2)
BASOPHILS NFR BLD: 1.1 %
BUN SERPL-MCNC: 34 MG/DL (ref 7–20)
CALCIUM SERPL-MCNC: 8.4 MG/DL (ref 8.3–10.6)
CHLORIDE SERPL-SCNC: 95 MMOL/L (ref 99–110)
CO2 SERPL-SCNC: 20 MMOL/L (ref 21–32)
CREAT SERPL-MCNC: 1.8 MG/DL (ref 0.8–1.3)
DEPRECATED RDW RBC AUTO: 21.9 % (ref 12.4–15.4)
EOSINOPHIL # BLD: 0.1 K/UL (ref 0–0.6)
EOSINOPHIL NFR BLD: 1.2 %
GFR SERPLBLD CREATININE-BSD FMLA CKD-EPI: 38 ML/MIN/{1.73_M2}
GLUCOSE SERPL-MCNC: 63 MG/DL (ref 70–99)
HBV SURFACE AB SERPL IA-ACNC: <3.5 MIU/ML
HBV SURFACE AG SERPL QL IA: NORMAL
HCT VFR BLD AUTO: 27.8 % (ref 40.5–52.5)
HGB BLD-MCNC: 8.6 G/DL (ref 13.5–17.5)
LYMPHOCYTES # BLD: 1 K/UL (ref 1–5.1)
LYMPHOCYTES NFR BLD: 14.9 %
MCH RBC QN AUTO: 28.7 PG (ref 26–34)
MCHC RBC AUTO-ENTMCNC: 31.1 G/DL (ref 31–36)
MCV RBC AUTO: 92.5 FL (ref 80–100)
MONOCYTES # BLD: 0.9 K/UL (ref 0–1.3)
MONOCYTES NFR BLD: 12.6 %
NEUTROPHILS # BLD: 4.9 K/UL (ref 1.7–7.7)
NEUTROPHILS NFR BLD: 70.2 %
PLATELET # BLD AUTO: 210 K/UL (ref 135–450)
PMV BLD AUTO: 7.8 FL (ref 5–10.5)
POTASSIUM SERPL-SCNC: 5.6 MMOL/L (ref 3.5–5.1)
RBC # BLD AUTO: 3 M/UL (ref 4.2–5.9)
SODIUM SERPL-SCNC: 126 MMOL/L (ref 136–145)
WBC # BLD AUTO: 7 K/UL (ref 4–11)

## 2025-02-06 PROCEDURE — 87340 HEPATITIS B SURFACE AG IA: CPT

## 2025-02-06 PROCEDURE — 36415 COLL VENOUS BLD VENIPUNCTURE: CPT

## 2025-02-06 PROCEDURE — 85025 COMPLETE CBC W/AUTO DIFF WBC: CPT

## 2025-02-06 PROCEDURE — 51798 US URINE CAPACITY MEASURE: CPT

## 2025-02-06 PROCEDURE — 2580000003 HC RX 258: Performed by: STUDENT IN AN ORGANIZED HEALTH CARE EDUCATION/TRAINING PROGRAM

## 2025-02-06 PROCEDURE — 5A1D70Z PERFORMANCE OF URINARY FILTRATION, INTERMITTENT, LESS THAN 6 HOURS PER DAY: ICD-10-PCS | Performed by: INTERNAL MEDICINE

## 2025-02-06 PROCEDURE — 6360000002 HC RX W HCPCS: Performed by: STUDENT IN AN ORGANIZED HEALTH CARE EDUCATION/TRAINING PROGRAM

## 2025-02-06 PROCEDURE — 6360000002 HC RX W HCPCS

## 2025-02-06 PROCEDURE — 2060000000 HC ICU INTERMEDIATE R&B

## 2025-02-06 PROCEDURE — 6370000000 HC RX 637 (ALT 250 FOR IP): Performed by: INTERNAL MEDICINE

## 2025-02-06 PROCEDURE — 86706 HEP B SURFACE ANTIBODY: CPT

## 2025-02-06 PROCEDURE — 6370000000 HC RX 637 (ALT 250 FOR IP)

## 2025-02-06 PROCEDURE — 6370000000 HC RX 637 (ALT 250 FOR IP): Performed by: NURSE PRACTITIONER

## 2025-02-06 PROCEDURE — 90935 HEMODIALYSIS ONE EVALUATION: CPT

## 2025-02-06 PROCEDURE — 80048 BASIC METABOLIC PNL TOTAL CA: CPT

## 2025-02-06 RX ORDER — METOPROLOL TARTRATE 25 MG/1
25 TABLET, FILM COATED ORAL 2 TIMES DAILY
Status: DISCONTINUED | OUTPATIENT
Start: 2025-02-06 | End: 2025-02-08 | Stop reason: HOSPADM

## 2025-02-06 RX ORDER — DIVALPROEX SODIUM 125 MG/1
250 CAPSULE, COATED PELLETS ORAL EVERY 8 HOURS SCHEDULED
Status: DISCONTINUED | OUTPATIENT
Start: 2025-02-06 | End: 2025-02-08 | Stop reason: HOSPADM

## 2025-02-06 RX ORDER — MIDODRINE HYDROCHLORIDE 5 MG/1
10 TABLET ORAL ONCE
Status: COMPLETED | OUTPATIENT
Start: 2025-02-07 | End: 2025-02-06

## 2025-02-06 RX ORDER — MIDODRINE HYDROCHLORIDE 5 MG/1
10 TABLET ORAL PRN
Status: DISCONTINUED | OUTPATIENT
Start: 2025-02-06 | End: 2025-02-08 | Stop reason: HOSPADM

## 2025-02-06 RX ORDER — LINEZOLID 2 MG/ML
600 INJECTION, SOLUTION INTRAVENOUS EVERY 12 HOURS
Status: DISCONTINUED | OUTPATIENT
Start: 2025-02-06 | End: 2025-02-08 | Stop reason: HOSPADM

## 2025-02-06 RX ADMIN — METOPROLOL TARTRATE 25 MG: 25 TABLET, FILM COATED ORAL at 20:52

## 2025-02-06 RX ADMIN — MIDODRINE HYDROCHLORIDE 10 MG: 5 TABLET ORAL at 23:46

## 2025-02-06 RX ADMIN — MIDODRINE HYDROCHLORIDE 10 MG: 5 TABLET ORAL at 14:26

## 2025-02-06 RX ADMIN — PIPERACILLIN AND TAZOBACTAM 3375 MG: 3; .375 INJECTION, POWDER, LYOPHILIZED, FOR SOLUTION INTRAVENOUS at 04:05

## 2025-02-06 RX ADMIN — ASPIRIN 81 MG: 81 TABLET, CHEWABLE ORAL at 10:25

## 2025-02-06 RX ADMIN — METOPROLOL TARTRATE 75 MG: 50 TABLET, FILM COATED ORAL at 10:25

## 2025-02-06 RX ADMIN — DIVALPROEX SODIUM 250 MG: 125 CAPSULE, COATED PELLETS ORAL at 20:52

## 2025-02-06 RX ADMIN — SENNOSIDES 8.6 MG: 8.6 TABLET, FILM COATED ORAL at 20:52

## 2025-02-06 RX ADMIN — MIDODRINE HYDROCHLORIDE 2.5 MG: 5 TABLET ORAL at 18:36

## 2025-02-06 RX ADMIN — ATORVASTATIN CALCIUM 20 MG: 10 TABLET, FILM COATED ORAL at 20:52

## 2025-02-06 RX ADMIN — PIPERACILLIN AND TAZOBACTAM 3375 MG: 3; .375 INJECTION, POWDER, LYOPHILIZED, FOR SOLUTION INTRAVENOUS at 17:39

## 2025-02-06 RX ADMIN — TRAZODONE HYDROCHLORIDE 50 MG: 50 TABLET ORAL at 20:52

## 2025-02-06 RX ADMIN — LINEZOLID 600 MG: 600 INJECTION, SOLUTION INTRAVENOUS at 02:50

## 2025-02-06 RX ADMIN — PANTOPRAZOLE SODIUM 40 MG: 40 INJECTION, POWDER, FOR SOLUTION INTRAVENOUS at 10:25

## 2025-02-06 RX ADMIN — DIVALPROEX SODIUM 250 MG: 125 CAPSULE, COATED PELLETS ORAL at 14:24

## 2025-02-06 RX ADMIN — LINEZOLID 600 MG: 600 INJECTION, SOLUTION INTRAVENOUS at 14:31

## 2025-02-06 RX ADMIN — MIDODRINE HYDROCHLORIDE 2.5 MG: 5 TABLET ORAL at 10:25

## 2025-02-06 RX ADMIN — SENNOSIDES 8.6 MG: 8.6 TABLET, FILM COATED ORAL at 10:25

## 2025-02-06 ASSESSMENT — PAIN SCALES - GENERAL
PAINLEVEL_OUTOF10: 0

## 2025-02-06 NOTE — CONSULTS
Comprehensive Nutrition Assessment    Type and Reason for Visit:  Initial, Consult, Wound    Nutrition Recommendations/Plan:   Monitor closely and correct lytes (K, Phos, Mg) before progressing TF to goal d/t risk of refeeding syndrome (hx extended inadequate nutritional intake).  Recommend order \"Diet: Adult Tube Feed\".  Initiate Jevity 1.5 (standard w/ fiber formula) at 20 mL/hr and as tolerated, increase by 10 mL/hr q 4 hours until goal of 80 mL/hr.  Recommend 60 mL H20 q 4 hours.  Recommend reevaluate IV fluids at this time.  Increase flush if Na+ increases greater than 145 mEq/L.  Recommend 1 Bottle Proteinex P2Go daily via syringe. Flush with 30 mL H20 before and after.  Proteinex P2Go should not be mixed directly with the tube feeding formula.  Monitor for tolerance (bowel habits, N/V, cramping, abdominal exam findings: distended, firm, tense, guarded, discomfort).     Malnutrition Assessment:  Malnutrition Status:  At risk for malnutrition (02/06/25 1028)    Context:  Chronic Illness     Findings of the 6 clinical characteristics of malnutrition:  Energy Intake:  Unable to assess (enteral nutrition)  Weight Loss:  Greater than 10% over 6 months     Body Fat Loss:  Unable to assess     Muscle Mass Loss:  Unable to assess    Fluid Accumulation:  No fluid accumulation     Strength:  Not Performed    Nutrition Assessment:    Consulted for tube feeds and management. Admitted for Pressure injury of sacral region, stage 4. PMHx of Atrial fibrillation/flutter, Chronic Dysphagia with Aspiration, BPH, CVA (2017), CHF, Dementia, HLD, Type 2 diabetes mellitus with neuropathy, HTN, and ESRD. Nutritionally at risk AEB weight loss. Significant weight loss of -13% x4 months noted per EMR. Patient NPO with PEG already in place. Tube feeds ordered and recommendations above. Pt at risk of refeeding due to NPO status. Will continue to monitor.    Nutrition Related Findings:    Na 126, Mg 1.70, glucose 63-91, K 5.6. LBM 2/5.

## 2025-02-06 NOTE — CONSULTS
[facility] Nurse  Consult Note       NAME:  Alma Delia Garner  MEDICAL RECORD NUMBER:  [id number]  AGE: 74 y.o.   GENDER: male  : 1950  TODAY'S DATE:  2025    Subjective; No, no.  O.K.   Reason for WOCN Evaluation and Assessment:     Stage 4 sacral wound with tunneling         Alma Delia Garner is a 74 y.o. male referred by:   [] Physician  [x] Nursing  [] Other:     Wound Identification:  Wound Type: arterial and pressure  Contributing Factors: diabetes, poor glucose control, chronic pressure, decreased mobility, shear force, malnutrition, and non-adherence    Wound History:   Current Wound Car74 y.o. male who presents with reports of shortness of breath and lower sacral pain.  Does have a sacral decubitus ulcer that has been being treated by wound care however, seems like it has gotten larger and open up more.  He has also had some foul odor.    Treatment:  packing    Patient Goal of Care:  [x] Wound Healing  [] Odor Control  [] Palliative Care  [x] Pain Control   [] Other:         PAST MEDICAL HISTORY        Diagnosis Date    Acute gastric ulcer with bleeding     Acute metabolic encephalopathy     MAISHA (acute kidney injury) ([facility])     Requiring dialysis since 15 in setting of hemorrhagic shock    ATN (acute tubular necrosis) ([facility])     Atrial fib/flutter, transient     Atrial fibrillation ([facility])     BPH (benign prostatic hyperplasia)     CAD (coronary artery disease)     Cerebral artery occlusion with cerebral infarction ([facility]) 2017    weakness and aphasia    CHF (congestive heart failure) ([facility]) 2024    CKD (chronic kidney disease)     Dr. Ambrosio    Dementia ([facility])     Diabetes mellitus ([facility])     GI bleed     Glaucoma     History of blood transfusion     Hx of diabetic neuropathy     Hyperlipidemia     Hypertension     Mitral insufficiency     Mobility impaired     uses walker and W/C    Multiple drug resistant organism (MDRO) culture positive 2020    urine    Pulmonary

## 2025-02-06 NOTE — CONSULTS
Pharmacy Note  Vancomycin Consult    Alma Delia Garner is a 74 y.o. male with SSTI. Pharmacy consult received from NATALIA Faye to initiate vancomycin therapy.     Patient is also receiving the following antibiotics: piperacillin-tazobactam.    Allergies: Sulfamethoxazole-trimethoprim     Tmax: 97.1 F    Micro: none    Recent Labs     02/05/25  1607   CREATININE 1.6*   WBC 8.0     No intake or output data in the 24 hours ending 02/05/25 1935  Wt Readings from Last 1 Encounters:   10/01/24 105 kg (231 lb 6.4 oz)       Assessment/Plan:  Will initiate vancomycin therapy with a one time loading dose of 2000 mg.  Pharmacy will sign off.  Please re-consult pharmacy if further vancomycin therapy is warranted upon admission.     Thank you for the consult!  Thao Raman, PharmD, BCCCP     Addendum:  Patient reports previous anaphylaxis with vancomycin at OSH. Vancomycin has been discontinued without a dose administered. Allergies updated.     Thanks,   Thao Raman, BrunoD, BCCCP

## 2025-02-06 NOTE — CONSULTS
Department of General Surgery Consult    PATIENT NAME: Alma Delia Garner   YOB: 1950    ADMISSION DATE: 2/5/2025  3:30 PM      TODAY'S DATE: 2/6/2025    Reason for Consult:  sacral wound    Chief Complaint: sacral wound    Historian: nurse, chart review    Requesting Practitioner:  Javon    HISTORY OF PRESENT ILLNESS:              The patient is a 74 y.o. male who is admitted to the medical service. Per chart review, he was brought in for SOA and also admitted due to foul smelling sacral wound for which surgery was consulted. Nurse states that patient is oriented to self, lives with family who care for him. Patient asked if he could state his name and he did not want to answer any questions. No family was available at bedside.    Past Medical History:        Diagnosis Date    Acute gastric ulcer with bleeding     Acute metabolic encephalopathy     MAISHA (acute kidney injury) ([facility])     Requiring dialysis since 7/4/17 in setting of hemorrhagic shock    ATN (acute tubular necrosis) ([facility])     Atrial fib/flutter, transient     Atrial fibrillation ([facility])     BPH (benign prostatic hyperplasia)     CAD (coronary artery disease)     Cerebral artery occlusion with cerebral infarction ([facility]) 08/05/2017    weakness and aphasia    CHF (congestive heart failure) ([facility]) 05/04/2024    CKD (chronic kidney disease)     Dr. Ambrosio    Dementia ([facility])     Diabetes mellitus ([facility])     GI bleed     Glaucoma     History of blood transfusion     Hx of diabetic neuropathy     Hyperlipidemia     Hypertension     Mitral insufficiency     Mobility impaired     uses walker and W/C    Multiple drug resistant organism (MDRO) culture positive 12/29/2020    urine    Pulmonary hypertension ([facility])     Traumatic hemorrhagic shock ([facility])        Past Surgical History:        Procedure Laterality Date    ABDOMEN SURGERY      CARDIAC SURGERY      COLONOSCOPY      DENTAL SURGERY N/A 03/28/2019    EXTRACTION OF ALL REMAINING UPPER AND LOWER

## 2025-02-06 NOTE — H&P
not limited to, dose reduction technique, automated exposure control, the use of iterative reconstruction, and ALARA (As Low As Reasonably Achievable) / Image Gently techniques. FINDINGS: Lower Chest: Large left pleural effusion. Left basilar infiltrate or atelectasis. There is also focal consolidation at the right lung base pneumonia versus atelectasis. No right pleural effusioncardiomegaly. Extensive coronary calcification. Watchman left atrial appendage occlusion device Abdomen: Liver: Suboptimal evaluation without intravenous contrast.  No suspicious hepatic masses. Calcification along the liver capsule or margin at the dome of diaphragm anterolaterally. Bile Ducts: Normal caliber. Gallbladder: Multiple small calculi within the gallbladder. No definite wall thickening or pericholecystic fluid. Pancreas: Pancreatic atrophy pancreas not well evaluated without contrast Spleen: Stable appearance of the spleen borderline splenomegaly. Calcifications. Evaluated without vascular contrast Adrenals: No suspicious adrenal enlargement. Kidneys: Multiple nonobstructing calyceal calculi left kidney. Largest measures 5 mm. No hydronephrosis. Pelvis: Reproductive Organs: Prostate calcifications no masses. Ureters: No ureteral dilatation or calculi. Bladder: Mild bladder wall thickening may be due to incomplete distention. No discrete mass Appendix: No evidence of appendicitis. Bowel: Moderate colonic stool. No small bowel distention. Few colonic diverticuli no diverticulitis gastrostomy tube extends into the body of the stomach appears to be in good position Mesenteric Lymph Nodes: No enlarged mesenteric lymph nodes. Peritoneum: There is a trace amount of ascitesno pneumoperitoneum Vessels: Extensive sclerotic change aorta and iliac vessels . Retroperitoneum: Small less than 1 cm retroperitoneal lymph nodes nonspecific. No masses Abdominal Wall: Subcutaneous edema infiltration anterior abdominal wall and both flanks correlate

## 2025-02-06 NOTE — PLAN OF CARE
Problem: Chronic Conditions and Co-morbidities  Goal: Patient's chronic conditions and co-morbidity symptoms are monitored and maintained or improved  Outcome: Not Progressing  Flowsheets (Taken 2/5/2025 2134)  Care Plan - Patient's Chronic Conditions and Co-Morbidity Symptoms are Monitored and Maintained or Improved:   Monitor and assess patient's chronic conditions and comorbid symptoms for stability, deterioration, or improvement   Collaborate with multidisciplinary team to address chronic and comorbid conditions and prevent exacerbation or deterioration   Update acute care plan with appropriate goals if chronic or comorbid symptoms are exacerbated and prevent overall improvement and discharge     Problem: Discharge Planning  Goal: Discharge to home or other facility with appropriate resources  Outcome: Progressing  Flowsheets (Taken 2/5/2025 2134)  Discharge to home or other facility with appropriate resources:   Identify barriers to discharge with patient and caregiver   Arrange for needed discharge resources and transportation as appropriate   Identify discharge learning needs (meds, wound care, etc)   Refer to discharge planning if patient needs post-hospital services based on physician order or complex needs related to functional status, cognitive ability or social support system     Problem: Pain  Goal: Verbalizes/displays adequate comfort level or baseline comfort level  Outcome: Progressing  Flowsheets (Taken 2/5/2025 2134)  Verbalizes/displays adequate comfort level or baseline comfort level:   Encourage patient to monitor pain and request assistance   Assess pain using appropriate pain scale   Administer analgesics based on type and severity of pain and evaluate response   Implement non-pharmacological measures as appropriate and evaluate response   Consider cultural and social influences on pain and pain management   Notify Licensed Independent Practitioner if interventions unsuccessful or patient

## 2025-02-06 NOTE — ED PROVIDER NOTES
I independently performed a history and physical on Alma Delia Garner.     I have discussed the case with the DENISSE/resident at 1903 and approve / take responsibility for the initial management plan and anticipated disposition as documented below.     In summary the patient presents with concern for altered mental status and a progressive sacral wound possibly complicated by a soft tissue infection.  On my assessment the patient is alert but confused his daughter is bedside and states that his mental status is waxing waning.  He is quite chronically ill dialysis dependent recently hospitalized for pleural effusions attributed to inadequate dialysis schedule therefore increased to 3 days weekly.  He had dialysis yesterday.  On my assessment the patient is afebrile and hemodynamically stable.  His breath sounds are clear dialysis access right chest unremarkable.  Abdomen soft nontender nondistended extremities are warm and well-perfused.  I did not have opportunity to examine the sacral wound but it is noted with foul odor and drainage and is of a high stage.  I did review his CT imaging in real-time and certainly the wound appears to be tracking to the sacrum could raise concern for osteomyelitis.  The patient has an elevated inflammatory markers.  He is also hyponatremic likely hypervolemic hyponatremia secondary to renal failure.  Creatinine stable.  Remainder of workup generally noncontributory given these concerns however he started on broad-spectrum antibiotics and is admitted for the benefit of further characterization of his wound as well as his more global condition as on my assessment I do of concern the patient is suffering failure to thrive and perhaps would benefit from placement to skilled nursing    I interpreted the following studies:  na    I personally discussed the patient's management with the following:  na         For further details of Alma Delia Garner's emergency department encounter, please see the

## 2025-02-06 NOTE — PLAN OF CARE
CHF Care Plan      Patient's EF (Ejection Fraction) is greater than 40%    Heart Failure Medications:  Diuretics: none    (One of the following REQUIRED for EF </= 40%/SYSTOLIC FAILURE but MAY be used in EF% >40%/DIASTOLIC FAILURE)        ACE:: None        ARB:: None         ARNI:: None    (Beta Blockers)  NON- Evidenced Based Beta Blocker (for EF% >40%/DIASTOLIC FAILURE): Metoprolol TARTrate- Lopressor    Evidenced Based Beta Blocker::(REQUIRED for EF% <40%/SYSTOLIC FAILURE) None  ...................................................................................................................................................    Failed to redirect to the Timeline version of the [employer] SmartLink.      Patient's weights and intake/output reviewed    Daily Weight log at bedside, patient/family participation in use of log: pt unable to participate\" (confusion at baseline)    Patient's current weight today shows a difference of 1.5 lbs more.  ESRD pt on HD (Tu/Th/Sa).    Intake/Output Summary (Last 24 hours) at 2/6/2025 0646  Last data filed at 2/5/2025 2145  Gross per 24 hour   Intake 10 ml   Output --   Net 10 ml       Education Booklet Provided: no    Comorbidities Reviewed No    Patient has a past medical history of Acute gastric ulcer with bleeding, Acute metabolic encephalopathy, MAISHA (acute kidney injury) ([facility]), ATN (acute tubular necrosis) ([facility]), Atrial fib/flutter, transient, Atrial fibrillation (HCC), BPH (benign prostatic hyperplasia), CAD (coronary artery disease), Cerebral artery occlusion with cerebral infarction (HCC), CHF (congestive heart failure) ([facility]), CKD (chronic kidney disease), Dementia ([facility]), Diabetes mellitus ([facility]), GI bleed, Glaucoma, History of blood transfusion, Hx of diabetic neuropathy, Hyperlipidemia, Hypertension, Mitral insufficiency, Mobility impaired, 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     >>For

## 2025-02-06 NOTE — ED NOTES
Alma Delia Garner is a 74 y.o. male admitted for  Principal Problem:    Sacral decubitus ulcer, stage IV ([facility])  Resolved Problems:    * No resolved hospital problems. *  .   Patient Home via EMS transportation with   Chief Complaint   Patient presents with    Shortness of Breath    Skin Ulcer     Patient is currently being taken care of at home by his daughter. A private ambulance was called today to bring patient to the ED due to his c/o SOB. Patient's son at his bedside with patient. He stated, \"I'm just the messenger. My sister takes care of him\" He is also reporting pt has a pretty significant bed sore on his buttocks that should be addressed.     Back Pain     Patient reporting chronic back pain. Son stating pt takes Tramadol for his pain. He says pt was recently seen by the \"wound doctor\" who assessed pt bed sore. States the sore has gotten worse.    .  Patient is alert and Disoriented   Patient's baseline mobility: Baseline Mobility: Bed bound   Code Status: Full Code   Cardiac Rhythm:       Is patient on baseline Oxygen: no how many Liters:   Abnormal Assessment Findings:     Isolation:       NIH Score:    C-SSRS: Risk of Suicide: No Risk  Bedside swallow:        Active LDA's:   Peripheral IV 02/05/25 Right Antecubital (Active)   Site Assessment Clean, dry & intact 02/05/25 1605   Line Status Blood return noted;Brisk blood return;Flushed;Normal saline locked;Specimen collected 02/05/25 1605   Phlebitis Assessment No symptoms 02/05/25 1605   Infiltration Assessment 0 02/05/25 1605   Dressing Status New dressing applied 02/05/25 1605   Dressing Type Transparent 02/05/25 1605   Dressing Intervention New 02/05/25 1605         Family/Caregiver Present no Any Concerns: no   Restraints no  Sitter no         Vitals: MEWS Score: 2    Vitals:    02/05/25 1802 02/05/25 1822 02/05/25 1857 02/05/25 1927   BP: 129/86 117/72 115/89 123/75   Pulse: 73 80 89 86   Resp: 17 16 25 20   Temp:       TempSrc:       SpO2: 94% 100%

## 2025-02-06 NOTE — DIALYSIS
Treatment time: 2.5 hours  Net UF: 500 ml     Pre weight: 93.5 kg  Post weight:93 kg  EDW: TBD kg    Access used: R TDC    Access function: Well with  ml/min     Medications or blood products given: n/a     Regular outpatient schedule: TBD     Summary of response to treatment: Patient tolerated treatment well with asymptomatic hypotension. Patient remained stable throughout entire treatment and upon the exiting the dialysis suite via transport.     Report given to Gianfranco Da Silva RN and copy of dialysis treatment record placed in chart, to be scanned into EMR.

## 2025-02-07 VITALS
HEIGHT: 77 IN | SYSTOLIC BLOOD PRESSURE: 90 MMHG | DIASTOLIC BLOOD PRESSURE: 56 MMHG | WEIGHT: 208.34 LBS | TEMPERATURE: 98.4 F | HEART RATE: 72 BPM | BODY MASS INDEX: 24.6 KG/M2 | OXYGEN SATURATION: 95 % | RESPIRATION RATE: 16 BRPM

## 2025-02-07 PROBLEM — L89.150 DECUBITUS ULCER OF SACRAL REGION, UNSTAGEABLE (HCC): Status: ACTIVE | Noted: 2025-02-05

## 2025-02-07 LAB
ANION GAP SERPL CALCULATED.3IONS-SCNC: 9 MMOL/L (ref 3–16)
BASOPHILS # BLD: 0 K/UL (ref 0–0.2)
BASOPHILS NFR BLD: 0.5 %
BUN SERPL-MCNC: 26 MG/DL (ref 7–20)
CALCIUM SERPL-MCNC: 8.2 MG/DL (ref 8.3–10.6)
CHLORIDE SERPL-SCNC: 95 MMOL/L (ref 99–110)
CO2 SERPL-SCNC: 24 MMOL/L (ref 21–32)
CREAT SERPL-MCNC: 1.6 MG/DL (ref 0.8–1.3)
DEPRECATED RDW RBC AUTO: 21.6 % (ref 12.4–15.4)
EOSINOPHIL # BLD: 0 K/UL (ref 0–0.6)
EOSINOPHIL NFR BLD: 0.3 %
GFR SERPLBLD CREATININE-BSD FMLA CKD-EPI: 44 ML/MIN/{1.73_M2}
GLUCOSE BLD-MCNC: 105 MG/DL (ref 70–99)
GLUCOSE BLD-MCNC: 67 MG/DL (ref 70–99)
GLUCOSE BLD-MCNC: 68 MG/DL (ref 70–99)
GLUCOSE BLD-MCNC: 70 MG/DL (ref 70–99)
GLUCOSE BLD-MCNC: 73 MG/DL (ref 70–99)
GLUCOSE BLD-MCNC: 84 MG/DL (ref 70–99)
GLUCOSE SERPL-MCNC: 90 MG/DL (ref 70–99)
HCT VFR BLD AUTO: 26.2 % (ref 40.5–52.5)
HGB BLD-MCNC: 8.5 G/DL (ref 13.5–17.5)
LYMPHOCYTES # BLD: 1.1 K/UL (ref 1–5.1)
LYMPHOCYTES NFR BLD: 11.8 %
MCH RBC QN AUTO: 29.5 PG (ref 26–34)
MCHC RBC AUTO-ENTMCNC: 32.2 G/DL (ref 31–36)
MCV RBC AUTO: 91.6 FL (ref 80–100)
MONOCYTES # BLD: 1 K/UL (ref 0–1.3)
MONOCYTES NFR BLD: 9.8 %
NEUTROPHILS # BLD: 7.5 K/UL (ref 1.7–7.7)
NEUTROPHILS NFR BLD: 77.6 %
PERFORMED ON: ABNORMAL
PERFORMED ON: NORMAL
PLATELET # BLD AUTO: 233 K/UL (ref 135–450)
PMV BLD AUTO: 7.3 FL (ref 5–10.5)
POTASSIUM SERPL-SCNC: 4.5 MMOL/L (ref 3.5–5.1)
RBC # BLD AUTO: 2.86 M/UL (ref 4.2–5.9)
SODIUM SERPL-SCNC: 128 MMOL/L (ref 136–145)
WBC # BLD AUTO: 9.7 K/UL (ref 4–11)

## 2025-02-07 PROCEDURE — 6360000002 HC RX W HCPCS

## 2025-02-07 PROCEDURE — 6370000000 HC RX 637 (ALT 250 FOR IP): Performed by: INTERNAL MEDICINE

## 2025-02-07 PROCEDURE — 2500000003 HC RX 250 WO HCPCS

## 2025-02-07 PROCEDURE — 6370000000 HC RX 637 (ALT 250 FOR IP): Performed by: STUDENT IN AN ORGANIZED HEALTH CARE EDUCATION/TRAINING PROGRAM

## 2025-02-07 PROCEDURE — 6360000002 HC RX W HCPCS: Performed by: INTERNAL MEDICINE

## 2025-02-07 PROCEDURE — 85025 COMPLETE CBC W/AUTO DIFF WBC: CPT

## 2025-02-07 PROCEDURE — 6370000000 HC RX 637 (ALT 250 FOR IP): Performed by: NURSE PRACTITIONER

## 2025-02-07 PROCEDURE — 80048 BASIC METABOLIC PNL TOTAL CA: CPT

## 2025-02-07 PROCEDURE — 6360000002 HC RX W HCPCS: Performed by: STUDENT IN AN ORGANIZED HEALTH CARE EDUCATION/TRAINING PROGRAM

## 2025-02-07 PROCEDURE — 2580000003 HC RX 258: Performed by: STUDENT IN AN ORGANIZED HEALTH CARE EDUCATION/TRAINING PROGRAM

## 2025-02-07 PROCEDURE — 2580000003 HC RX 258

## 2025-02-07 PROCEDURE — 6370000000 HC RX 637 (ALT 250 FOR IP)

## 2025-02-07 PROCEDURE — 2580000003 HC RX 258: Performed by: INTERNAL MEDICINE

## 2025-02-07 RX ORDER — MIDODRINE HYDROCHLORIDE 5 MG/1
5 TABLET ORAL
Status: DISCONTINUED | OUTPATIENT
Start: 2025-02-07 | End: 2025-02-08 | Stop reason: HOSPADM

## 2025-02-07 RX ORDER — ATROPINE SULFATE 10 MG/ML
2 SOLUTION/ DROPS OPHTHALMIC EVERY 4 HOURS PRN
Status: DISCONTINUED | OUTPATIENT
Start: 2025-02-07 | End: 2025-02-08 | Stop reason: HOSPADM

## 2025-02-07 RX ORDER — DEXTROSE MONOHYDRATE AND SODIUM CHLORIDE 5; .9 G/100ML; G/100ML
INJECTION, SOLUTION INTRAVENOUS CONTINUOUS
Status: DISCONTINUED | OUTPATIENT
Start: 2025-02-07 | End: 2025-02-08 | Stop reason: HOSPADM

## 2025-02-07 RX ORDER — MORPHINE SULFATE 20 MG/ML
10 SOLUTION ORAL
Status: COMPLETED | OUTPATIENT
Start: 2025-02-07 | End: 2025-02-07

## 2025-02-07 RX ORDER — MIDODRINE HYDROCHLORIDE 5 MG/1
10 TABLET ORAL
Status: DISCONTINUED | OUTPATIENT
Start: 2025-02-07 | End: 2025-02-07

## 2025-02-07 RX ORDER — MORPHINE SULFATE 2 MG/ML
2 INJECTION, SOLUTION INTRAMUSCULAR; INTRAVENOUS EVERY 4 HOURS PRN
Status: DISCONTINUED | OUTPATIENT
Start: 2025-02-07 | End: 2025-02-08 | Stop reason: HOSPADM

## 2025-02-07 RX ORDER — OXYCODONE HYDROCHLORIDE 5 MG/1
5 TABLET ORAL EVERY 6 HOURS PRN
Status: DISCONTINUED | OUTPATIENT
Start: 2025-02-07 | End: 2025-02-07

## 2025-02-07 RX ORDER — DEXTROSE MONOHYDRATE 100 MG/ML
INJECTION, SOLUTION INTRAVENOUS CONTINUOUS PRN
Status: DISCONTINUED | OUTPATIENT
Start: 2025-02-07 | End: 2025-02-08 | Stop reason: HOSPADM

## 2025-02-07 RX ORDER — GLUCAGON 1 MG/ML
1 KIT INJECTION PRN
Status: DISCONTINUED | OUTPATIENT
Start: 2025-02-07 | End: 2025-02-08 | Stop reason: HOSPADM

## 2025-02-07 RX ADMIN — DEXTROSE MONOHYDRATE 125 ML: 100 INJECTION, SOLUTION INTRAVENOUS at 08:18

## 2025-02-07 RX ADMIN — MORPHINE SULFATE 2 MG: 2 INJECTION, SOLUTION INTRAMUSCULAR; INTRAVENOUS at 16:36

## 2025-02-07 RX ADMIN — SODIUM CHLORIDE, PRESERVATIVE FREE 10 ML: 5 INJECTION INTRAVENOUS at 08:20

## 2025-02-07 RX ADMIN — TRAMADOL HYDROCHLORIDE 50 MG: 50 TABLET, COATED ORAL at 18:57

## 2025-02-07 RX ADMIN — Medication 10 MG: at 22:50

## 2025-02-07 RX ADMIN — GLUCAGON 1 MG: 1 INJECTION, POWDER, LYOPHILIZED, FOR SOLUTION INTRAMUSCULAR; INTRAVENOUS at 01:34

## 2025-02-07 RX ADMIN — PIPERACILLIN AND TAZOBACTAM 3375 MG: 3; .375 INJECTION, POWDER, LYOPHILIZED, FOR SOLUTION INTRAVENOUS at 04:25

## 2025-02-07 RX ADMIN — LINEZOLID 600 MG: 600 INJECTION, SOLUTION INTRAVENOUS at 03:10

## 2025-02-07 RX ADMIN — Medication 10 MG: at 19:31

## 2025-02-07 RX ADMIN — MIDODRINE HYDROCHLORIDE 5 MG: 5 TABLET ORAL at 06:25

## 2025-02-07 RX ADMIN — DEXTROSE AND SODIUM CHLORIDE: 5; 900 INJECTION, SOLUTION INTRAVENOUS at 10:49

## 2025-02-07 RX ADMIN — DIVALPROEX SODIUM 250 MG: 125 CAPSULE, COATED PELLETS ORAL at 06:11

## 2025-02-07 RX ADMIN — PANTOPRAZOLE SODIUM 40 MG: 40 INJECTION, POWDER, FOR SOLUTION INTRAVENOUS at 08:20

## 2025-02-07 NOTE — CONSULTS
Palliative Care Initial Note  Palliative Care Admit date:  2/7/25    ACP/palcare referral noted.   Placed call to pts dtr/guardian, Joselin, but had to leave message requesting return call.

## 2025-02-07 NOTE — PLAN OF CARE
Problem: Pain  Goal: Verbalizes/displays adequate comfort level or baseline comfort level  Outcome: Progressing     Problem: Safety - Adult  Goal: Free from fall injury  Outcome: Progressing     Problem: Chronic Conditions and Co-morbidities  Goal: Patient's chronic conditions and co-morbidity symptoms are monitored and maintained or improved  Outcome: Not Progressing  Flowsheets (Taken 2/7/2025 0745)  Care Plan - Patient's Chronic Conditions and Co-Morbidity Symptoms are Monitored and Maintained or Improved: Monitor and assess patient's chronic conditions and comorbid symptoms for stability, deterioration, or improvement     Problem: Discharge Planning  Goal: Discharge to home or other facility with appropriate resources  Outcome: Not Progressing  Flowsheets (Taken 2/7/2025 0745)  Discharge to home or other facility with appropriate resources:   Arrange for needed discharge resources and transportation as appropriate   Identify discharge learning needs (meds, wound care, etc)     Problem: Skin/Tissue Integrity  Goal: Skin integrity remains intact  Description: 1.  Monitor for areas of redness and/or skin breakdown  2.  Assess vascular access sites hourly  3.  Every 4-6 hours minimum:  Change oxygen saturation probe site  4.  Every 4-6 hours:  If on nasal continuous positive airway pressure, respiratory therapy assess nares and determine need for appliance change or resting period  Outcome: Not Progressing  Flowsheets (Taken 2/7/2025 0745)  Skin Integrity Remains Intact: Monitor for areas of redness and/or skin breakdown     Problem: Nutrition Deficit:  Goal: Optimize nutritional status  Outcome: Not Progressing

## 2025-02-07 NOTE — PLAN OF CARE
Problem: Chronic Conditions and Co-morbidities  Goal: Patient's chronic conditions and co-morbidity symptoms are monitored and maintained or improved  2/6/2025 1912 by Gianfranco Da Silva RN  Outcome: Progressing  2/6/2025 0519 by Rufina Sanchez RN  Outcome: Not Progressing  Flowsheets (Taken 2/5/2025 2134)  Care Plan - Patient's Chronic Conditions and Co-Morbidity Symptoms are Monitored and Maintained or Improved:   Monitor and assess patient's chronic conditions and comorbid symptoms for stability, deterioration, or improvement   Collaborate with multidisciplinary team to address chronic and comorbid conditions and prevent exacerbation or deterioration   Update acute care plan with appropriate goals if chronic or comorbid symptoms are exacerbated and prevent overall improvement and discharge     Problem: Discharge Planning  Goal: Discharge to home or other facility with appropriate resources  2/6/2025 1912 by Gianfranco Da Silva RN  Outcome: Progressing  2/6/2025 0519 by Rufina Sanchez RN  Outcome: Progressing  Flowsheets (Taken 2/5/2025 2134)  Discharge to home or other facility with appropriate resources:   Identify barriers to discharge with patient and caregiver   Arrange for needed discharge resources and transportation as appropriate   Identify discharge learning needs (meds, wound care, etc)   Refer to discharge planning if patient needs post-hospital services based on physician order or complex needs related to functional status, cognitive ability or social support system     Problem: Pain  Goal: Verbalizes/displays adequate comfort level or baseline comfort level  2/6/2025 1912 by Gianfranco Da Silva RN  Outcome: Progressing  2/6/2025 0519 by Rufina Sanchez RN  Outcome: Progressing  Flowsheets (Taken 2/5/2025 2134)  Verbalizes/displays adequate comfort level or baseline comfort level:   Encourage patient to monitor pain and request assistance   Assess pain using appropriate pain scale   Administer analgesics based on type

## 2025-02-07 NOTE — PLAN OF CARE
Rec'f call from HOC RN who said she would be meeting w/ Joselin sometime around 5 or so this afternoon.   Joselin not sure if she wants to consider IPU vs home and will talk more to hospice about that.   Have informed family and hospice to plan for d/c tomorrow.

## 2025-02-07 NOTE — PLAN OF CARE
CHF Care Plan      Patient's EF (Ejection Fraction) is greater than 40%    Heart Failure Medications:  Diuretics:: None    (One of the following REQUIRED for EF </= 40%/SYSTOLIC FAILURE but MAY be used in EF% >40%/DIASTOLIC FAILURE)        ACE:: None        ARB:: None         ARNI:: None    (Beta Blockers)  NON- Evidenced Based Beta Blocker (for EF% >40%/DIASTOLIC FAILURE): Metoprolol TARTrate- Lopressor    Evidenced Based Beta Blocker::(REQUIRED for EF% <40%/SYSTOLIC FAILURE) None  ...................................................................................................................................................    Failed to redirect to the Timeline version of the [employer] SmartLink.      Patient's weights and intake/output reviewed    Daily Weight log at bedside, patient/family participation in use of log: \"yes    Patient's current weight today shows a difference of 2 lbs more than last documented weight.      Intake/Output Summary (Last 24 hours) at 2/7/2025 0632  Last data filed at 2/7/2025 0044  Gross per 24 hour   Intake --   Output 100 ml   Net -100 ml       Education Booklet Provided: yes    Comorbidities Reviewed Yes    Patient has a past medical history of Acute gastric ulcer with bleeding, Acute metabolic encephalopathy, MAISHA (acute kidney injury) ([facility]), ATN (acute tubular necrosis) ([facility]), Atrial fib/flutter, transient, Atrial fibrillation (HCC), BPH (benign prostatic hyperplasia), CAD (coronary artery disease), Cerebral artery occlusion with cerebral infarction ([facility]), CHF (congestive heart failure) ([facility]), CKD (chronic kidney disease), Dementia ([facility]), Diabetes mellitus ([facility]), GI bleed, Glaucoma, History of blood transfusion, Hx of diabetic neuropathy, Hyperlipidemia, Hypertension, Mitral insufficiency, Mobility impaired, 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     >>For CHF and Comorbidity documentation on

## 2025-02-07 NOTE — CARE COORDINATION
Palliative care to see . Per RN BP low, ESRD. Lives at home and daughter cares for him and her mother. May go home with hospice per RN in huddle today. Unable to complete full CM assessment. Will await palliative input and assist with dd/c plan based on outcome of goals of care discussion.

## 2025-02-07 NOTE — DISCHARGE INSTR - COC
{[facility] Delivery:[id number]}    Wound Care Documentation and Therapy:  Wound 09/04/23 Sacrum Left Pressure injury has previously been chart on other admission also (Active)   Wound Image   02/06/25 1446   Wound Etiology Pressure Stage 4 02/06/25 1600   Dressing Status Reinforced dressing 02/07/25 0745   Wound Cleansed Vashe;Irrigated with saline 02/06/25 2353   Dressing/Treatment Packing;ABD;Other (comment) 02/06/25 2353   Offloading for Diabetic Foot Ulcers Offloading ordered 02/06/25 1446   Dressing Change Due 02/07/25 02/06/25 1446   Wound Length (cm) 9 cm 02/06/25 1446   Wound Width (cm) 3.5 cm 02/06/25 1446   Wound Depth (cm) 2 cm 02/06/25 1446   Wound Surface Area (cm^2) 31.5 cm^2 02/06/25 1446   Change in Wound Size % (l*w) -53807 02/06/25 1446   Wound Volume (cm^3) 63 cm^3 02/06/25 1446   Distance Tunneling (cm) 3 cm 02/06/25 1446   Tunneling Position ___ O'Clock anterior 02/06/25 1446   Undermining Starts ___ O'Clock 1100 02/06/25 1446   Undermining Ends___ O'Clock 0100 02/06/25 1446   Wound Assessment Slough;Eschar moist 02/06/25 1446   Drainage Amount Moderate (25-50%) 02/06/25 1446   Drainage Description Serosanguinous;Other (Comment) 02/06/25 2353   Odor Malodorous/putrid 02/06/25 2353   Tasia-wound Assessment Fragile;Maceration 02/06/25 2353   Margins Unattached edges 02/06/25 1446   Wound Thickness Description not for Pressure Injury Full thickness 02/06/25 1446   Number of days: 522       Wound 09/04/23 Pretibial Right;Lateral (Active)   Number of days: 522       Wound 07/26/19 Arm Left;Lower;Proximal;Posterior bleeding noted, 1.5 inches long, 0.5 inches wide  (Active)   Number of days: 2022       Wound 09/18/19 Pretibial Left (Active)   Number of days: 1969       Wound 12/31/20 Foot Left;Medial (Active)   Number of days: 1499       Wound 09/04/23 Tibial Posterior;Right (Active)   Number of days: 522       Wound 09/04/23 Thigh Distal;Left (Active)   Number of days: 522       Wound 09/04/23 Tibial

## 2025-02-07 NOTE — ACP (ADVANCE CARE PLANNING)
Advance Care Planning     Palliative Team Advance Care Planning (ACP) Conversation    Date of Conversation: 02/07/25    Individuals present for the conversation: Legal Guardian /dtr, Joselin and Daughter , Dorothy     ACP documents on file prior to discussion:  -Legal Guardianship Document    Previously completed document/s not on file:  N/A    Healthcare Decision Maker:    Primary Decision Maker: Joselin Garner - Child - [phone]     Conversation Summary:  Reviewed note in Care Everywhere from [facility] who had met w/ Joselin 2/5.  She wanted to speak w/ other family members before deciding on hospice support.  Await return call from Joselin.    Resuscitation Status:   Code Status: Full Code   Notified by shift RN that Joselin had asked to have pt code status amended to dnr.  In d/w Joselin, she affirmed dnr and dni and wanted it to reflect comfort.    Documentation Completed:  -No new documents completed.    Palliative Care Initial Note  Palliative Care Admit date:  2/7/25  Reason for c/s:  GOC    Advance Directives:  Reviewed the Guardianship paperwork in this EMR.  The newer guardianship paperwork of 2023 supercedes the document from 2017.   Joselin Garner is pts official guardian.    Plan of care/goals:  PTA, pt was active w/ [facility] and Joselin has had the benefit of ongoing discussion around GOC.  Dtr's Joselin and Dorothy are present.  Met w/ Joselin for entirety of discussion and Dorothy joined later.   Joselin was appropriately tearful, verbalizing the stressor that not all siblings are of like mind in terms of stopping HD.  Joselin shared that pt has been \"sleeping more\" and just endured a hospitalization @ [facility] prior to [facility].    Joselin desires, along w/ dnr/dni, to forego surgical debridement and to take pt home.  We discussed this option but, w/o a focus on comfort care w/ hospice, the likelihood for ongoing recurrent admissions is expected.  We discussed pt being able to have palliative dsg changes to

## 2025-02-07 NOTE — CONSULTS
Met with patient's daughter/Legal Guardian and several other family members at bedside to discuss hospice philosophy and services.  Discussed symptom management at the inpatient care center vs return home.  Family stated that they were taking him home tonight even if they had to transport him themselves and had to leave AMA.  Son stood up and stated,\"He is not going to die in this hospital\".  Arrangements were made for discharge, transport, and comfort medications were sent to [facility] on [address].  Family was updated with pharmacy address.  Transport packet left with chart.  Please remove IV access prior to transport.  Please call with any questions.    Julissa Hi RN  Hospital Liaison   [phone]

## 2025-02-08 NOTE — PROGRESS NOTES
[facility]  Nephrology Follow up Note           Reason for Consult: ESRD  Requesting Physician:  Dr. Alejandro    Sub/interval history  HD on 2/6 w 500 ml net UF, post wt 93 kg  Family decided to officially enroll in hospice    ROS: No fever  PSFH: No visitor    Scheduled Meds:   midodrine  5 mg PEG Tube TID WC    linezolid  600 mg IntraVENous Q12H    divalproex  250 mg PEG Tube 3 times per day    piperacillin-tazobactam  3,375 mg IntraVENous Q12H    metoprolol tartrate  25 mg PEG Tube BID    oxyCODONE  5 mg Oral Once    sodium chloride flush  5-40 mL IntraVENous 2 times per day    atorvastatin  20 mg PEG Tube Nightly    senna  1 tablet PEG Tube BID    rivastigmine  1 patch TransDERmal Daily    aspirin  81 mg PEG Tube Daily    traZODone  50 mg PEG Tube Nightly    pantoprazole  40 mg IntraVENous Daily     Continuous Infusions:   dextrose      dextrose 5 % and 0.9 % NaCl 75 mL/hr at 02/07/25 1049    sodium chloride       PRN Meds:.glucose, dextrose bolus **OR** dextrose bolus, glucagon (rDNA), dextrose, oxyCODONE, midodrine, sodium chloride flush, sodium chloride, potassium chloride **OR** potassium alternative oral replacement **OR** potassium chloride, magnesium sulfate, ondansetron **OR** ondansetron, polyethylene glycol, acetaminophen **OR** acetaminophen, traMADol    History of Present Illness on 2/6/2025:    74 y.o. yo male with PMH of ESRD on HD TTS, diabetes, hypertension, hyperlipidemia, BPH, CVA, A-fib, dementia who is admitted on 2/5/2025 for sacral ulcer and shortness of breath  Patient was just discharged from [facility] on 1/29/2025 when he was admitted for pleural effusion during which she refused thoracentesis, and dialysis frequency was increased from twice a week to 3 times a week.  Family is considering hospice and had a meeting with hospice on 2/5/2025.  The daughter who is the main caregiver is supposed to discuss with other siblings and decide ultimately on enrolling the patient in hospice 
  [facility] Medicine Progress Note  V 1.6      Date of Admission: 2/5/2025    Hospital Day: 2      Chief Admission Complaint:  SOB     Subjective:   llepy , arousable , not that much conversant with me    Presenting Admission History:       Alma Delia Garner is a 74 y.o. male with pmh of Atrial fibrillation/flutter, BPH, CVA (2017), CHF, Dementia, HLD, Type 2 diabetes mellitus with neuropathy, HTN, and ESRD who presents with SOB. His shortness of breath started today; the trigger is unknown. The patient is also complaining of back pain which is a chronic condition, however he has a sacral ulcer. Daughter states that his mentation started to decline after he saw wound care on Friday. Per the son, his ulcer has gotten worse. Per the daughter, he did not have any recent fevers, nausea, vomiting, or chest pain.       Assessment/Plan:      Current Principal Problem:  Sacral decubitus ulcer, stage IV (HCC)    Sacral decubitus ulcer   -Patient is complaining of pain  -Large malodorous sacral ulcer with granular tissue and purulence; the wound is tunneling  -This condition may be playing a role in patient's decreased mentation over the past week (has a history of dementia)   -Procalcitonin 0.06, , and sed rate 92  -Continue Zosyn and linezolid; as patient has an allergy to vancomycin  -Wound was packed with gauze and meraplex  -Patient received fentanyl for pain  -Consulted wound care; appreciate recommendations in advance  -Consulted General surgery; appreciate recommendations in advance     Pleural effusion/ hypoxia - on o2 ? Acute or chronic   -Patient is complaining of shortness of breath  -Patient is on 2 L nasal cannula; his baseline is room air  -CXR revealed volume overload/interstitial edema with left pleural effusion and left basilar atelectasis  -CT abdomen pelvis revealed large left pleural effusion  - HD today , T, TH and FRI     Chronic HFpEF  -Echo 5/6/2024 revealed an EF of 55-59%  -BNP is > 70,000 which 
EMS arrived at this time to take patient home with hospice. PIV removed by day shift RN. PRN morphine 10mg sublingual given to patient for comfort at 1050pm. All patient belongings gathered and given to patient's family.   
Pt blood pressures have been soft throughout the night. Lowest reading was 87/49. Midodrine prn orders were in pt chart but orders specified only for HD. Justin Np notified and a one time midodrine order was placed and administered current bp 93/59.    Glucose checked after viewing lab draw POCT was 68. Glucagon IM was administered BS increased to 105.  
[facility] called earlier to update the floor that they would not be able to  patient till 10pm. Original pickup time was 830pm. Family was updated at bedside.     1030pm: [facility] called by this RN to ask for update since new  time was 10pm. They said they should be here in 10-15 minutes. Family updated once again.   
Lipitor 20 mg  -Strict I&O's   -Daily Weights   -Heart failure order set was placed  -Continue telemetry monitoring     Atrial fibrillation  -s/p Watchman device  -Patient is not on anticoagulation  -Continue Lopressor 25 mg twice daily- with parameters   -Continue telemetry monitoring     ESRD with Chronic Hypotension  -Patient gets hemodialysis on Tuesday, Thursday, and Saturday  -Consulted Nephrology; appreciate recommendations in advance  -Continue midodrine 2.5 mg 3 times daily     Bipolar Disorder  Vascular Dementia  -Continue Depakote sprinkles and Exelon patch     Chronic low back pain  -Continue Tramadol 50 mg     Chronic Dysphagia with Aspiration  -Patient has a PEG tube in place- feeding on hold , anticipating procedure      Insomnia  -Continue Trazodone 50 mg    Hyponatremia / hyperkalemia - nephro on board - better with HD    Over all prognosis is poor   Palliative care / hospice   DW dtr - agreeable in hospice care , code status DNRCC    Ongoing threat to life and/or bodily function without ongoing treatment due to:  sacral decubiti - infected     Consults:      PHARMACY TO DOSE VANCOMYCIN  IP CONSULT TO HOSPITALIST  IP CONSULT TO NEPHROLOGY  IP CONSULT TO HEART FAILURE NURSE/COORDINATOR  IP CONSULT TO GENERAL SURGERY  IP CONSULT TO DIETITIAN  IP CONSULT TO PALLIATIVE CARE        --------------------------------------------------      Medications:        Infusion Medications    dextrose      sodium chloride       Scheduled Medications    midodrine  5 mg PEG Tube TID     linezolid  600 mg IntraVENous Q12H    divalproex  250 mg PEG Tube 3 times per day    piperacillin-tazobactam  3,375 mg IntraVENous Q12H    metoprolol tartrate  25 mg PEG Tube BID    oxyCODONE  5 mg Oral Once    sodium chloride flush  5-40 mL IntraVENous 2 times per day    atorvastatin  20 mg PEG Tube Nightly    senna  1 tablet PEG Tube BID    rivastigmine  1 patch TransDERmal Daily    aspirin  81 mg PEG Tube Daily    traZODone  50 mg

## (undated) DEVICE — PACK PROCEDURE SURG ORAL CDS

## (undated) DEVICE — SUTURE MCRYL + SZ 4-0 L18IN ABSRB UD L19MM PS-2 3/8 CIR MCP496G

## (undated) DEVICE — TOWEL,OR,DSP,ST,BLUE,STD,6/PK,12PK/CS: Brand: MEDLINE

## (undated) DEVICE — GOWN SIRUS NONREIN XL W/TWL: Brand: MEDLINE INDUSTRIES, INC.

## (undated) DEVICE — SEALER TISS L20CM DIA13MM ADV BPLR L CRV JAW OPN APPRCH

## (undated) DEVICE — SUTURE VCRL + SZ 3-0 L18IN ABSRB UD SH 1/2 CIR TAPERCUT NDL VCP864D

## (undated) DEVICE — STAPLER INT L75MM CUT LN L73MM STPL LN L77MM BLU B FRM 8

## (undated) DEVICE — DRESSING FOAM W10XL20CM ANTIMIC SELF ADH SAFETAC TECHNOLOGY

## (undated) DEVICE — LAP SPONGE  18 X 18 IN. PREWASHED SOFTPACK X-RAY DETECTABLE: Brand: CARDINAL HEALTH

## (undated) DEVICE — PACK PROCEDURE SURG GEN LAPAROSCOPY CDS

## (undated) DEVICE — SYRINGE MED 10ML LUERLOCK TIP W/O SFTY DISP

## (undated) DEVICE — STAPLER SKIN H3.9MM WIRE DIA0.58MM CRWN 6.9MM 35 STPL FIX

## (undated) DEVICE — GLOVE,SURG,SENSICARE SLT,LF,PF,7.5: Brand: MEDLINE

## (undated) DEVICE — GOWN,REINF,POLY,AURORA,XLNG/XXL,STRL: Brand: MEDLINE

## (undated) DEVICE — SUTURE VCRL + SZ 3-0 L27IN ABSRB UD L26MM SH 1/2 CIR VCP416H

## (undated) DEVICE — RELOAD STPL L75MM OPN H3.8MM CLS 1.5MM WIRE DIA0.2MM REG

## (undated) DEVICE — TOTAL TRAY, DB, 100% SILI FOLEY, 16FR 10: Brand: MEDLINE

## (undated) DEVICE — NEEDLE HYPO 25GA L1.5IN BLU POLYPR HUB S STL REG BVL STR

## (undated) DEVICE — GLOVE ORANGE PI 8 1/2   MSG9085

## (undated) DEVICE — SUTURE PERMAHAND SZ 3-0 L18IN NONABSORBABLE BLK L26MM SH C013D

## (undated) DEVICE — SUTURE CHROMIC GUT SZ 3-0 L27IN ABSRB BRN L19MM FS-2 3/8 636H

## (undated) DEVICE — SUTURE PDS II SZ 0 L60IN ABSRB VLT L48MM CTX 1/2 CIR Z990G

## (undated) DEVICE — JAW BRA SURG TMJ VELC CLSR SYS V CUT SUPP STRP 2 ZIPLOK BG

## (undated) DEVICE — SUTURE PERMAHAND SZ 2-0 L18IN NONABSORBABLE BLK L26MM SH C012D

## (undated) DEVICE — ELECTRODE BLDE L6.5IN CAUT EXT DISP

## (undated) DEVICE — SOLUTION IV IRRIG POUR BRL 0.9% SODIUM CHL 2F7124

## (undated) DEVICE — GAUZE,SPONGE,4"X4",8PLY,STRL,LF,10/TRAY: Brand: MEDLINE

## (undated) DEVICE — PENCIL ES CRD L10FT HND SWCHING ROCK SWCH W/ EDGE COAT BLDE

## (undated) DEVICE — TUBING, SUCTION, 3/16" X 12', STRAIGHT: Brand: MEDLINE

## (undated) DEVICE — GLOVE SURG SZ 9 THK91MIL LTX FREE SYN POLYISOPRENE ANTI

## (undated) DEVICE — TROCAR: Brand: KII FIOS FIRST ENTRY

## (undated) DEVICE — SYRINGE BLB 50CC IRRIG PLIABLE FNGR FLNG GRAD FLSK DISP